# Patient Record
Sex: FEMALE | Race: WHITE | HISPANIC OR LATINO | Employment: FULL TIME | URBAN - METROPOLITAN AREA
[De-identification: names, ages, dates, MRNs, and addresses within clinical notes are randomized per-mention and may not be internally consistent; named-entity substitution may affect disease eponyms.]

---

## 2017-01-17 ENCOUNTER — TELEPHONE (OUTPATIENT)
Dept: INTERNAL MEDICINE | Facility: MEDICAL CENTER | Age: 22
End: 2017-01-17

## 2017-01-17 RX ORDER — PROPRANOLOL HYDROCHLORIDE 40 MG/1
40 TABLET ORAL 3 TIMES DAILY
Qty: 90 TAB | Refills: 5 | Status: ON HOLD | OUTPATIENT
Start: 2017-01-17 | End: 2017-03-23

## 2017-01-17 NOTE — TELEPHONE ENCOUNTER
Last seen: 12/5/16 by Dr. Flores  Next appt: None    Was the patient seen in the last year in this department? Yes   Does patient have an active prescription for medications requested? No   Received Request Via: Pharmacy

## 2017-01-17 NOTE — TELEPHONE ENCOUNTER
----- Message from Loree Tobin sent at 1/17/2017 10:50 AM PST -----  Regarding: RX REFILL/SEES DR [facility]  Contact: [phone]  Pt states she was on an rx for her heart,Inderal. Pt was directed to take 3x/day and has no refills. Pt states she needs a refill on this rx.

## 2017-02-04 ENCOUNTER — HOSPITAL ENCOUNTER (EMERGENCY)
Facility: MEDICAL CENTER | Age: 22
End: 2017-02-04
Payer: MEDICAID

## 2017-02-04 VITALS
HEART RATE: 106 BPM | HEIGHT: 62 IN | BODY MASS INDEX: 26.37 KG/M2 | RESPIRATION RATE: 16 BRPM | SYSTOLIC BLOOD PRESSURE: 142 MMHG | DIASTOLIC BLOOD PRESSURE: 67 MMHG | WEIGHT: 143.3 LBS | TEMPERATURE: 99 F | OXYGEN SATURATION: 98 %

## 2017-02-04 PROCEDURE — 302449 STATCHG TRIAGE ONLY (STATISTIC)

## 2017-02-04 ASSESSMENT — PAIN SCALES - GENERAL: PAINLEVEL_OUTOF10: 8

## 2017-02-08 ENCOUNTER — TELEPHONE (OUTPATIENT)
Dept: INTERNAL MEDICINE | Facility: MEDICAL CENTER | Age: 22
End: 2017-02-08

## 2017-02-08 NOTE — TELEPHONE ENCOUNTER
1. Caller Name: Ivis Klein                      Call Back Number: [phone] (home)     2. Message: Patient has called to hear back about her labs that she got done on 02/03/17. I scanned the results in the chart it will be in the media tab. I routed the results over to Dr. Flores.    3. Patient approves office to leave a detailed voicemail/MyChart message: N\A

## 2017-02-09 NOTE — TELEPHONE ENCOUNTER
Attempted to call patient regarding lab results but no answer.   The patients thyroid panel shows: TSH: 0.01, FT4: 1.5 and T3: 236. This suggests that her thyroid is still hyperactive.  Based on chart review, it appears that the patient's thyroid medications are being adjusted by Dr. Trivedi.   Would advise the patient to call Dr. Trivedi for further adjustments in medications.

## 2017-02-16 ENCOUNTER — OFFICE VISIT (OUTPATIENT)
Dept: INTERNAL MEDICINE | Facility: MEDICAL CENTER | Age: 22
End: 2017-02-16
Payer: MEDICAID

## 2017-02-16 VITALS
OXYGEN SATURATION: 96 % | HEART RATE: 113 BPM | BODY MASS INDEX: 25.45 KG/M2 | HEIGHT: 63 IN | SYSTOLIC BLOOD PRESSURE: 90 MMHG | DIASTOLIC BLOOD PRESSURE: 60 MMHG | WEIGHT: 143.6 LBS | TEMPERATURE: 99.1 F

## 2017-02-16 DIAGNOSIS — F39 MOOD DISORDER (HCC): ICD-10-CM

## 2017-02-16 DIAGNOSIS — E05.00 GRAVES DISEASE: ICD-10-CM

## 2017-02-16 DIAGNOSIS — E05.90 HYPERTHYROIDISM: ICD-10-CM

## 2017-02-16 DIAGNOSIS — Z86.79 HISTORY OF PERICARDITIS: ICD-10-CM

## 2017-02-16 DIAGNOSIS — Z00.00 HEALTH CARE MAINTENANCE: ICD-10-CM

## 2017-02-16 DIAGNOSIS — F41.9 ANXIETY: ICD-10-CM

## 2017-02-16 DIAGNOSIS — I31.9 PERICARDITIS, UNSPECIFIED CHRONICITY, UNSPECIFIED TYPE: ICD-10-CM

## 2017-02-16 DIAGNOSIS — R71.8 MICROCYTOSIS: ICD-10-CM

## 2017-02-16 DIAGNOSIS — N30.00 ACUTE CYSTITIS WITHOUT HEMATURIA: ICD-10-CM

## 2017-02-16 PROBLEM — N39.0 URINARY TRACT INFECTION WITHOUT HEMATURIA: Status: ACTIVE | Noted: 2017-02-16

## 2017-02-16 PROCEDURE — 99214 OFFICE O/P EST MOD 30 MIN: CPT | Mod: GC | Performed by: INTERNAL MEDICINE

## 2017-02-16 RX ORDER — INDOMETHACIN 25 MG/1
25 CAPSULE ORAL 3 TIMES DAILY
Qty: 42 CAP | Refills: 0 | Status: SHIPPED | OUTPATIENT
Start: 2017-02-16 | End: 2017-03-16

## 2017-02-16 RX ORDER — PAROXETINE HYDROCHLORIDE 20 MG/1
20 TABLET, FILM COATED ORAL DAILY
Qty: 30 TAB | Refills: 0 | Status: SHIPPED | OUTPATIENT
Start: 2017-02-16 | End: 2017-03-16

## 2017-02-16 RX ORDER — OMEPRAZOLE 20 MG/1
20 CAPSULE, DELAYED RELEASE ORAL DAILY
Qty: 30 CAP | Refills: 0 | Status: SHIPPED | OUTPATIENT
Start: 2017-02-16 | End: 2017-03-16

## 2017-02-16 RX ORDER — NITROFURANTOIN 25; 75 MG/1; MG/1
100 CAPSULE ORAL 2 TIMES DAILY
Qty: 6 CAP | Refills: 0 | Status: SHIPPED | OUTPATIENT
Start: 2017-02-16 | End: 2017-03-16

## 2017-02-16 RX ORDER — PROPYLTHIOURACIL 50 MG/1
100 TABLET ORAL 2 TIMES DAILY
Qty: 60 TAB | Refills: 0 | Status: SHIPPED | OUTPATIENT
Start: 2017-02-16 | End: 2017-03-22

## 2017-02-16 NOTE — MR AVS SNAPSHOT
"        Ivis Klein   2017 3:30 PM   Office Visit   MRN: [id number]    Department:  [facility] Med - Internal Med   Dept Phone:  [phone]    Description:  Female : 1995   Provider:  Kia Flores M.D.           Reason for Visit     Chest Pain x2 weeks    Anxiety starts having headaches with anxiety attacks    Depression depressed feeling    Medication Management new prescription for sumatriptan      Allergies as of 2017     Allergen Noted Reactions    Nkda [No Known Drug Allergy] 2009         You were diagnosed with     Graves disease   [537881]       Health care maintenance   [965776]       History of pericarditis   [498808]       Hyperthyroidism   [988336]       Mood disorder (CMS-[facility])   [348630]       Anxiety   [746454]       Acute cystitis without hematuria   [092849]       Microcytosis   [262804]       Pericarditis, unspecified chronicity, unspecified type   [1618605]         Vital Signs     Blood Pressure Pulse Temperature Height Weight Body Mass Index    90/60 mmHg 113 37.3 °C (99.1 °F) 1.588 m (5' 2.5\") 65.137 kg (143 lb 9.6 oz) 25.83 kg/m2    Oxygen Saturation Smoking Status                96% Former Smoker          Basic Information     Date Of Birth Sex Race Ethnicity Preferred Language    1995 Female White  Origin (Kyrgyz,Iraqi,Montserratian,Jay, etc) English      Your appointments     Mar 15, 2017  1:45 PM   Established Patient with Joao Lee M.D.   [facility] Med - Internal Medicine (--)    [address] [place] [id number] [phone]           You will be receiving a confirmation call a few days before your appointment from our automated call confirmation system.              Problem List              ICD-10-CM Priority Class Noted - Resolved    Migraine with aura, not intractable (Chronic) G43.109   2010 - Present    Not immune to rubella Z78.9   2013 - Present    Back pain M54.9   2016 - Present   " Vomiting R11.10   6/29/2016 - Present    Thyroid storm E05.91   6/29/2016 - Present    Health care maintenance Z00.00   12/5/2016 - Present    Hyperthyroidism E05.90   12/5/2016 - Present    Graves disease E05.00   12/5/2016 - Present    Need for influenza vaccination Z23   12/5/2016 - Present    History of pericarditis Z86.79   12/5/2016 - Present    Mood disorder (CMS-HCC) F39   12/5/2016 - Present    Anxiety F41.9   2/16/2017 - Present    Urinary tract infection without hematuria N39.0   2/16/2017 - Present    Microcytosis R71.8   2/16/2017 - Present    Pericarditis I31.9   2/16/2017 - Present      Health Maintenance        Date Due Completion Dates    IMM VARICELLA (CHICKENPOX) VACCINE (1 of 2 - 2 Dose Adolescent Series) 9/30/2010 ---    IMM HPV VACCINE (3 of 3 - Female 3 Dose Series) 3/30/2011 11/30/2010, 11/29/2010, 9/28/2010    IMM MENINGOCOCCAL VACCINE (MCV4) (1 of 1) 9/30/2013 ---    IMM DTaP/Tdap/Td Vaccine (8 - Td) 9/9/2023 9/9/2013, 8/25/2008, 10/2/2001, 12/30/1998, 4/1/1998, 2/2/1998, 1995            Current Immunizations     Dtap Vaccine 10/2/2001, 12/30/1998, 4/1/1998, 2/2/1998, 1997    HIB Vaccine (ACTHIB/HIBERIX) 12/30/1998, 4/1/1998, 2/2/1998, 1997    HPV Quadrivalent Vaccine (GARDASIL) 11/30/2010, 11/29/2010, 9/28/2010    Hepatitis A Vaccine, Ped/Adol 12/9/2002, 5/30/2002    Hepatitis B Vaccine Non-Recombivax (Ped/Adol) 4/1/1998, 2/2/1998, 1997    INFLUENZA VACCINE H1N1 11/14/2009    IPV 10/2/2001, 2/2/1998, 1997    Influenza Vaccine Quad Inj (Pf) 12/5/2016    MMR Vaccine 9/9/2013  4:45 AM, 10/2/2001, 12/30/1998    MMR/Varicella Combined Vaccine  Incomplete    OPV - Historical Data 12/30/1998    Pneumococcal polysaccharide vaccine (PPSV-23) 7/2/2016  2:42 PM    Tdap Vaccine  Incomplete, 9/9/2013  2:45 AM, 8/25/2008      Below and/or attached are the medications your provider expects you to take. Review all of your home medications and newly ordered medications with  your provider and/or pharmacist. Follow medication instructions as directed by your provider and/or pharmacist. Please keep your medication list with you and share with your provider. Update the information when medications are discontinued, doses are changed, or new medications (including over-the-counter products) are added; and carry medication information at all times in the event of emergency situations     Allergies:  NKDA - (reactions not documented)               Medications  Valid as of: February 16, 2017 -  4:38 PM    Generic Name Brand Name Tablet Size Instructions for use    Indomethacin (Cap) INDOCIN 25 MG Take 1 Cap by mouth 3 times a day.        Nitrofurantoin Monohyd Macro (Cap) MACROBID 100 MG Take 1 Cap by mouth 2 times a day.        Omeprazole (CAPSULE DELAYED RELEASE) PRILOSEC 20 MG Take 1 Cap by mouth every day.        Ondansetron (TABLET DISPERSIBLE) ZOFRAN ODT 4 MG Take 1 Tab by mouth every 8 hours as needed for Nausea/Vomiting.        PARoxetine HCl (Tab) PAXIL 20 MG Take 1 Tab by mouth every day.        Propranolol HCl (Tab) INDERAL 40 MG Take 1 Tab by mouth 3 times a day.        Propylthiouracil (Tab) PTU 50 MG Take 2 Tabs by mouth 2 Times a Day.        SUMAtriptan Succinate (Tab) IMITREX 25 MG Take 1-4 Tabs by mouth Once PRN for Migraine for up to 1 dose.        .                 Medicines prescribed today were sent to:     [facility] [address] [place] [address] (S), [place] - [address] [id number] [facility] (S) [place] [address]    Phone: [phone] Fax: [phone]    Open 24 Hours?: No      Medication refill instructions:       If your prescription bottle indicates you have medication refills left, it is not necessary to call your provider’s office. Please contact your pharmacy and they will refill your medication.    If your prescription bottle indicates you do not have any refills left, you may request refills at any time through one of the following ways: The online Ac[employer]ia system (except  Urgent Care), by calling your provider’s office, or by asking your pharmacy to contact your provider’s office with a refill request. Medication refills are processed only during regular business hours and may not be available until the next business day. Your provider may request additional information or to have a follow-up visit with you prior to refilling your medication.   *Please Note: Medication refills are assigned a new Rx number when refilled electronically. Your pharmacy may indicate that no refills were authorized even though a new prescription for the same medication is available at the pharmacy. Please request the medicine by name with the pharmacy before contacting your provider for a refill.        Your To Do List     Future Labs/Procedures Complete By Expires    DEJON ANTIBODY WITH REFLEX  As directed 2/16/2018    CBC WITH DIFFERENTIAL  As directed 2/16/2018    FERRITIN  As directed 2/16/2018    IRON/TOTAL IRON BIND  As directed 2/16/2018      Instructions    F/u in 4 Weeks..Please schedule on Wednesday with Resident and Dr. Gongora       Other Notes About Your Plan     Boy! Dennis Beatty Status: Patient Declined

## 2017-02-17 NOTE — PROGRESS NOTES
Established Patient    Ivis presents today with the following:    CC: Urinary tract infection, ongoing anxiety/depression/follow-up on labs    HPI:      ID: 21-year-old female with past medical history of recently diagnosis of Graves' disease, mood symptoms, untreated pericarditis anxiety/depression, history of poor compliance and thyroid storm came in for above-mentioned reasons.    Graves' disease  Hyperthyroidism  Diagnosied with hyperthyroidism due to thyroid storm in July 2016  Initially she was on methimazole and propranolol  --has been noncompliant multiple ER visits  Then she was Switchd to propylthiouracil and propranolol--  And had Poor compliance due to GI upsets  Ultrasound of the thyroid gland was suggestive of thyroiditis,  Discussed case with endocrinologist Dr. Wong--who suggested it is Graves' disease, given the continuity of symptoms, lack of preceding viral prodromes, [place] of painful enlargement of thyroid before onset of clinical symptoms, onset 3 years after the pregnancy.   Positive for thyroid-stimulating immunoglobulin 96, free T3 was 357, free T4 was3.97  Has been evaluated by thyroid surgeon and plan for subtotal thyroidectomy after making the euthyroid with propylthiouracil and propranolol  trending free T4/T3/TSH with increasing doses of PTU, monitoring CBC  Last seen by surgeon in January  Currently on propylthiouracil 50 mg 3 times a day, which she was supposed to be 100 mg twice daily per surgeon but patient is not taking the supposed dose because she was not told per patient  Persistent s/s of hyperthyroidism last 9 months, however symptoms are better compared to prior visit  She still endorses insomnia, occasional sweating palpitation and anxiety/depression, thyroid gland palpable on examination, nonpainful           Pericarditis, unspecified chronicity, unspecified type   History of pericarditis  Complains of sharp/acute chest pain on bending forward or lying back  No  exertional dyspnea, orthopnea, PND  EKG normal suggestive of pericarditis, has not been taking any ibuprofen lately    no pericardial rub on examination      Mood disorder (CMS-HCC)  Anxiety  no suicidal or homicidal ideation  Intermittent anxiety versus depressed mood  PHQ -9 score is 10     Acute cystitis without hematuria  Complains of increased frequency, dysuria  Complains of foul-smelling urine sometimes  Sexually active  Suprapubic discomfort       Microcytosis  Low MCV on CBC  No history of menorrhagia or bleeding  No family history of thalassemia or other problems,         Migraine with aura and without status migrainosus, not intractable  Relatively stable  Takes Excedrin once or twice a week as needed       Health care maintenance  Up to date with vaccinations including TDAP, hepatitis A, hepatitis B, HPV, MMR, Pneumovax 23,    Patient Active Problem List    Diagnosis Date Noted   • Anxiety 02/16/2017   • Urinary tract infection without hematuria 02/16/2017   • Microcytosis 02/16/2017   • Pericarditis 02/16/2017   • Health care maintenance 12/05/2016   • Hyperthyroidism 12/05/2016   • Graves disease 12/05/2016   • Need for influenza vaccination 12/05/2016   • History of pericarditis 12/05/2016   • Mood disorder (CMS-[facility]) 12/05/2016   • Back pain 06/29/2016   • Vomiting 06/29/2016   • Thyroid storm 06/29/2016   • Not immune to rubella 05/23/2013   • Migraine with aura, not intractable 09/28/2010       Current Outpatient Prescriptions   Medication Sig Dispense Refill   • indomethacin (INDOCIN) 25 MG Cap Take 1 Cap by mouth 3 times a day. 42 Cap 0   • paroxetine (PAXIL) 20 MG Tab Take 1 Tab by mouth every day. 30 Tab 0   • propylthiouracil (PTU) 50 MG Tab Take 2 Tabs by mouth 2 Times a Day. 60 Tab 0   • nitrofurantoin monohydr macro (MACROBID) 100 MG Cap Take 1 Cap by mouth 2 times a day. 6 Cap 0   • omeprazole (PRILOSEC) 20 MG delayed-release capsule Take 1 Cap by mouth every day. 30 Cap 0   • propranolol  "(INDERAL) 40 MG Tab Take 1 Tab by mouth 3 times a day. 90 Tab 5   • SUMAtriptan (IMITREX) 25 MG Tab tablet Take 1-4 Tabs by mouth Once PRN for Migraine for up to 1 dose. 10 Tab 1   • ondansetron (ZOFRAN ODT) 4 MG TABLET DISPERSIBLE Take 1 Tab by mouth every 8 hours as needed for Nausea/Vomiting. 20 Tab 1     No current facility-administered medications for this visit.       Social History     Social History   • Marital Status: Single     Spouse Name: N/A   • Number of Children: N/A   • Years of Education: N/A     Occupational History   • Not on file.     Social History Main Topics   • Smoking status: Former Smoker   • Smokeless tobacco: Never Used      Comment: quit in 2012   • Alcohol Use: Yes      Comment: occ   • Drug Use: Yes     Special: Marijuana   • Sexual Activity:     Partners: Male     Birth Control/ Protection: Abstinence      Comment: single     Other Topics Concern   • Not on file     Social History Narrative       Family History   Problem Relation Age of Onset   • Cancer Paternal Grandmother 56     breast cancer       ROS: As per HPI. Additional pertinent symptoms as noted below.    GI: Denies nausea vomiting, black stools  Respiratory: Denies cough fever, shortness of breath  Cardiovascular: Denies exertional dyspnea, orthopnea, PND  Genitourinary: see hpi  Musculoskeletal: Denies arthritis, swelling, back pain  Neuro: Denies numbness tingling, focal deficits, headaches  Skin: Denies any rash  Psychiatric: Denies suicidal or homicidal ideation, hallucination, however positive for intermittent anxiety/mood symptoms  OB/GYN: Denies dyspareunia, menorrhagia    Endocrine: Denies history of diabetes          BP 90/60 mmHg  Pulse 113  Temp(Src) 37.3 °C (99.1 °F)  Ht 1.588 m (5' 2.5\")  Wt 65.137 kg (143 lb 9.6 oz)  BMI 25.83 kg/m2  SpO2 96%    Physical Exam  General:  Alert and oriented, No apparent distress.    Eyes: Pupils equal and reactive. No scleral icterus.--and no exophthalmos     Throat: Clear " no erythema or exudates noted.    Neck: Supple. No lymphadenopathy noted. Thyroid gland mildly enlarged--- nontender, no isolated solitary nodule--no thyroid bruit    Lungs: Clear to auscultation and percussion bilaterally.    Cardiovascular: Regular rate and rhythm. No murmurs, rubs or gallops.    Abdomen:  Benign. No rebound or guarding noted.    Extremities: No clubbing, cyanosis, edema.    Skin: Clear. No rash or suspicious skin lesions noted.      Assessment and Plan     Graves' disease  Hyperthyroidism  Diagnosied with hyperthyroidism due to thyroid storm in July 2016  Initially she was on methimazole and propranolol  --has been noncompliant multiple ER visits  Then she was Switchd to propylthiouracil and propranolol--  And had Poor compliance due to GI upsets  Ultrasound of the thyroid gland was suggestive of thyroiditis,  Discussed case with endocrinologist Dr. Wong--who suggested it is Graves' disease, given the continuity of symptoms, lack of preceding viral prodromes, [place] of painful enlargement of thyroid before onset of clinical symptoms, onset 3 years after the pregnancy.   Positive for thyroid-stimulating immunoglobulin 296, free T3 was 357, free T4 was3.97, TSH 0.01  Has been evaluated by thyroid surgeon and plan for subtotal thyroidectomy after making her euthyroid with propylthiouracil   trending free T4/T3/TSH with increasing doses of PTU, monitoring CBC  Last seen by surgeon in January  Currently on propylthiouracil 50 mg 3 times a day, which she was supposed to be 100 mg twice daily per surgeon but patient is not taking the supposed dose because she was not told per patient  Persistent s/s of hyperthyroidism last 9 months, however symptoms are better compared to prior visit  She still endorses insomnia, occasional sweating palpitation and anxiety/depression, thyroid gland palpable on examination, nonpainful  Plan  Continue propylthiouracil 100 mg twice daily, propranolol 40 mg 3 times a  day  Follow-up with surgeon  Repeat T3/T4/TSH and CBC, ordered and followed by surgeon  Follow-up in 4 weeks  Repeat labs before appointment     Pericarditis, unspecified chronicity, unspecified type   History of pericarditis  Complains of sharp/acute chest pain on bending forward or lying back  No exertional dyspnea, orthopnea, PND  EKG suggestive of pericarditis, has not been taking any ibuprofen lately    no pericardial rub on examination  Pericarditis is likely related to Graves' disease  Plan  Indomethacin 25 mg 3 times a day for 2 weeks  Omeprazole as needed  Hold off on to colchicine  Repet EKG on next visit    Mood disorder (CMS-HCC)  Anxiety  no suicidal or homicidal ideation  Intermittent anxiety versus depressed mood  PHQ -9 score is 10  Plan  Start on paroxetine 20 mg daily for 6 weeks  Follow-up in 3-4 weeks      Acute cystitis without hematuria  Complains of increased frequency, dysuria, foul-smelling urine sometimes  Sexually active, Suprapubic discomfor  Plan  Macrobid 100 mg twice daily for 3 days     Microcytosis  Low MCV on CBC  No history of menorrhagia or bleeding  No family history of thalassemia or other problems,  Plan  Will get iron/TIBC/ferritin  If within normal limits we'll get hemoglobin electrophoresis to rule out thalassemia trait    Migraine with aura and without status migrainosus, not intractable  Relatively stable  Takes Excedrin once or twice a week as needed  Plan  Continue excederin  Continue Indomethacin    Health care maintenance  Up to date with vaccinations including TDAP, hepatitis A, hepatitis B, HPV, MMR, Pneumovax 23,    Followup: Return in about 4 weeks (around 3/16/2017).      Signed by: Kia Flores M.D.

## 2017-03-16 ENCOUNTER — HOSPITAL ENCOUNTER (OUTPATIENT)
Dept: RADIOLOGY | Facility: MEDICAL CENTER | Age: 22
End: 2017-03-16
Attending: SURGERY | Admitting: SURGERY
Payer: MEDICAID

## 2017-03-16 DIAGNOSIS — Z01.812 PRE-OPERATIVE LABORATORY EXAMINATION: ICD-10-CM

## 2017-03-16 DIAGNOSIS — Z01.810 PRE-OPERATIVE CARDIOVASCULAR EXAMINATION: ICD-10-CM

## 2017-03-16 DIAGNOSIS — Z01.811 PRE-OPERATIVE RESPIRATORY EXAMINATION: ICD-10-CM

## 2017-03-16 LAB
ALBUMIN SERPL BCP-MCNC: 4.9 G/DL (ref 3.2–4.9)
ALBUMIN/GLOB SERPL: 1.4 G/DL
ALP SERPL-CCNC: 149 U/L (ref 30–99)
ALT SERPL-CCNC: 18 U/L (ref 2–50)
ANION GAP SERPL CALC-SCNC: 10 MMOL/L (ref 0–11.9)
AST SERPL-CCNC: 19 U/L (ref 12–45)
BASOPHILS # BLD AUTO: 0.4 % (ref 0–1.8)
BASOPHILS # BLD: 0.03 K/UL (ref 0–0.12)
BILIRUB SERPL-MCNC: 0.8 MG/DL (ref 0.1–1.5)
BUN SERPL-MCNC: 10 MG/DL (ref 8–22)
CALCIUM SERPL-MCNC: 10.2 MG/DL (ref 8.5–10.5)
CHLORIDE SERPL-SCNC: 99 MMOL/L (ref 96–112)
CO2 SERPL-SCNC: 25 MMOL/L (ref 20–33)
CREAT SERPL-MCNC: 0.57 MG/DL (ref 0.5–1.4)
EKG IMPRESSION: NORMAL
EOSINOPHIL # BLD AUTO: 0.01 K/UL (ref 0–0.51)
EOSINOPHIL NFR BLD: 0.1 % (ref 0–6.9)
ERYTHROCYTE [DISTWIDTH] IN BLOOD BY AUTOMATED COUNT: 40.5 FL (ref 35.9–50)
GFR SERPL CREATININE-BSD FRML MDRD: >60 ML/MIN/1.73 M 2
GLOBULIN SER CALC-MCNC: 3.4 G/DL (ref 1.9–3.5)
GLUCOSE SERPL-MCNC: 92 MG/DL (ref 65–99)
HCT VFR BLD AUTO: 42.5 % (ref 37–47)
HGB BLD-MCNC: 13.4 G/DL (ref 12–16)
IMM GRANULOCYTES # BLD AUTO: 0.01 K/UL (ref 0–0.11)
IMM GRANULOCYTES NFR BLD AUTO: 0.1 % (ref 0–0.9)
LYMPHOCYTES # BLD AUTO: 1.81 K/UL (ref 1–4.8)
LYMPHOCYTES NFR BLD: 26 % (ref 22–41)
MCH RBC QN AUTO: 24.3 PG (ref 27–33)
MCHC RBC AUTO-ENTMCNC: 31.5 G/DL (ref 33.6–35)
MCV RBC AUTO: 77.1 FL (ref 81.4–97.8)
MONOCYTES # BLD AUTO: 0.45 K/UL (ref 0–0.85)
MONOCYTES NFR BLD AUTO: 6.5 % (ref 0–13.4)
NEUTROPHILS # BLD AUTO: 4.66 K/UL (ref 2–7.15)
NEUTROPHILS NFR BLD: 66.9 % (ref 44–72)
NRBC # BLD AUTO: 0 K/UL
NRBC BLD AUTO-RTO: 0 /100 WBC
PLATELET # BLD AUTO: 308 K/UL (ref 164–446)
PMV BLD AUTO: 11.4 FL (ref 9–12.9)
POTASSIUM SERPL-SCNC: 3.1 MMOL/L (ref 3.6–5.5)
PROT SERPL-MCNC: 8.3 G/DL (ref 6–8.2)
RBC # BLD AUTO: 5.51 M/UL (ref 4.2–5.4)
SODIUM SERPL-SCNC: 134 MMOL/L (ref 135–145)
WBC # BLD AUTO: 7 K/UL (ref 4.8–10.8)

## 2017-03-16 PROCEDURE — 84443 ASSAY THYROID STIM HORMONE: CPT

## 2017-03-16 PROCEDURE — 80053 COMPREHEN METABOLIC PANEL: CPT

## 2017-03-16 PROCEDURE — 85025 COMPLETE CBC W/AUTO DIFF WBC: CPT

## 2017-03-16 PROCEDURE — 84481 FREE ASSAY (FT-3): CPT

## 2017-03-16 PROCEDURE — 84439 ASSAY OF FREE THYROXINE: CPT

## 2017-03-16 PROCEDURE — 36415 COLL VENOUS BLD VENIPUNCTURE: CPT

## 2017-03-16 PROCEDURE — 84703 CHORIONIC GONADOTROPIN ASSAY: CPT

## 2017-03-16 PROCEDURE — 86800 THYROGLOBULIN ANTIBODY: CPT

## 2017-03-16 PROCEDURE — 84432 ASSAY OF THYROGLOBULIN: CPT

## 2017-03-16 PROCEDURE — 71010 DX-CHEST-LIMITED (1 VIEW): CPT

## 2017-03-16 RX ORDER — POTASSIUM IODIDE 1 G/ML
1 SOLUTION ORAL 3 TIMES DAILY
COMMUNITY
End: 2017-03-22

## 2017-03-17 LAB
HCG SERPL QL: NEGATIVE
T3FREE SERPL-MCNC: 5.37 PG/ML (ref 2.4–4.2)
T4 FREE SERPL-MCNC: 1.69 NG/DL (ref 0.53–1.43)
TSH SERPL DL<=0.005 MIU/L-ACNC: <0.015 UIU/ML (ref 0.3–3.7)

## 2017-03-22 ENCOUNTER — HOSPITAL ENCOUNTER (OUTPATIENT)
Facility: MEDICAL CENTER | Age: 22
End: 2017-03-23
Attending: SURGERY | Admitting: SURGERY
Payer: MEDICAID

## 2017-03-22 PROBLEM — E05.00 GRAVES' DISEASE: Status: ACTIVE | Noted: 2017-03-22

## 2017-03-22 LAB
ALBUMIN SERPL BCP-MCNC: 4 G/DL (ref 3.2–4.9)
HCG SERPL QL: NEGATIVE
THYROGLOB AB SERPL-ACNC: 17.1 IU/ML (ref 0–4)
THYROGLOB SERPL-MCNC: 3.6 NG/ML (ref 1.3–31.8)
THYROGLOB SERPL-MCNC: ABNORMAL NG/ML (ref 1.3–31.8)

## 2017-03-22 PROCEDURE — 700102 HCHG RX REV CODE 250 W/ 637 OVERRIDE(OP): Performed by: SURGERY

## 2017-03-22 PROCEDURE — 502627 HCHG HEMOSTAT, SURGICEL 4X4: Performed by: SURGERY

## 2017-03-22 PROCEDURE — 502594 HCHG SCISSOR HANDLE, HARMONIC ACE: Performed by: SURGERY

## 2017-03-22 PROCEDURE — A4606 OXYGEN PROBE USED W OXIMETER: HCPCS | Performed by: SURGERY

## 2017-03-22 PROCEDURE — 160042 HCHG SURGERY MINUTES - EA ADDL 1 MIN LEVEL 5: Performed by: SURGERY

## 2017-03-22 PROCEDURE — 160048 HCHG OR STATISTICAL LEVEL 1-5: Performed by: SURGERY

## 2017-03-22 PROCEDURE — A9270 NON-COVERED ITEM OR SERVICE: HCPCS

## 2017-03-22 PROCEDURE — 84703 CHORIONIC GONADOTROPIN ASSAY: CPT

## 2017-03-22 PROCEDURE — 700102 HCHG RX REV CODE 250 W/ 637 OVERRIDE(OP)

## 2017-03-22 PROCEDURE — 160009 HCHG ANES TIME/MIN: Performed by: SURGERY

## 2017-03-22 PROCEDURE — 700111 HCHG RX REV CODE 636 W/ 250 OVERRIDE (IP)

## 2017-03-22 PROCEDURE — 82040 ASSAY OF SERUM ALBUMIN: CPT

## 2017-03-22 PROCEDURE — 88307 TISSUE EXAM BY PATHOLOGIST: CPT

## 2017-03-22 PROCEDURE — 502590 HCHG PROBE, PRASS STANDARD: Performed by: SURGERY

## 2017-03-22 PROCEDURE — 501838 HCHG SUTURE GENERAL: Performed by: SURGERY

## 2017-03-22 PROCEDURE — G0378 HOSPITAL OBSERVATION PER HR: HCPCS

## 2017-03-22 PROCEDURE — 500888 HCHG PACK, MINOR BASIN: Performed by: SURGERY

## 2017-03-22 PROCEDURE — 160031 HCHG SURGERY MINUTES - 1ST 30 MINS LEVEL 5: Performed by: SURGERY

## 2017-03-22 PROCEDURE — A9270 NON-COVERED ITEM OR SERVICE: HCPCS | Performed by: SURGERY

## 2017-03-22 PROCEDURE — 160036 HCHG PACU - EA ADDL 30 MINS PHASE I: Performed by: SURGERY

## 2017-03-22 PROCEDURE — 110382 HCHG SHELL REV 271: Performed by: SURGERY

## 2017-03-22 PROCEDURE — 160035 HCHG PACU - 1ST 60 MINS PHASE I: Performed by: SURGERY

## 2017-03-22 PROCEDURE — A6402 STERILE GAUZE <= 16 SQ IN: HCPCS | Performed by: SURGERY

## 2017-03-22 PROCEDURE — 700101 HCHG RX REV CODE 250: Performed by: SURGERY

## 2017-03-22 PROCEDURE — 502240 HCHG MISC OR SUPPLY RC 0272: Performed by: SURGERY

## 2017-03-22 PROCEDURE — 110371 HCHG SHELL REV 272: Performed by: SURGERY

## 2017-03-22 PROCEDURE — 88305 TISSUE EXAM BY PATHOLOGIST: CPT

## 2017-03-22 PROCEDURE — 36415 COLL VENOUS BLD VENIPUNCTURE: CPT

## 2017-03-22 PROCEDURE — 500126 HCHG BOVIE, NEEDLE TIP: Performed by: SURGERY

## 2017-03-22 PROCEDURE — 160002 HCHG RECOVERY MINUTES (STAT): Performed by: SURGERY

## 2017-03-22 RX ORDER — PROPRANOLOL HYDROCHLORIDE 20 MG/1
40 TABLET ORAL TWICE DAILY
Status: DISCONTINUED | OUTPATIENT
Start: 2017-03-22 | End: 2017-03-23 | Stop reason: HOSPADM

## 2017-03-22 RX ORDER — ACETAMINOPHEN 500 MG
1000 TABLET ORAL EVERY 6 HOURS
Status: DISCONTINUED | OUTPATIENT
Start: 2017-03-22 | End: 2017-03-23 | Stop reason: HOSPADM

## 2017-03-22 RX ORDER — DIPHENHYDRAMINE HYDROCHLORIDE 50 MG/ML
25-50 INJECTION INTRAMUSCULAR; INTRAVENOUS EVERY 6 HOURS PRN
Status: DISCONTINUED | OUTPATIENT
Start: 2017-03-22 | End: 2017-03-23 | Stop reason: HOSPADM

## 2017-03-22 RX ORDER — ONDANSETRON 2 MG/ML
4 INJECTION INTRAMUSCULAR; INTRAVENOUS EVERY 6 HOURS PRN
Status: DISCONTINUED | OUTPATIENT
Start: 2017-03-22 | End: 2017-03-23 | Stop reason: HOSPADM

## 2017-03-22 RX ORDER — GABAPENTIN 300 MG/1
CAPSULE ORAL
Status: COMPLETED
Start: 2017-03-22 | End: 2017-03-22

## 2017-03-22 RX ORDER — POTASSIUM IODIDE 1 G/ML
1 SOLUTION ORAL 3 TIMES DAILY
Status: ON HOLD | COMMUNITY
End: 2017-03-23

## 2017-03-22 RX ORDER — LEVOTHYROXINE SODIUM 112 UG/1
112 TABLET ORAL
Status: DISCONTINUED | OUTPATIENT
Start: 2017-03-23 | End: 2017-03-23 | Stop reason: HOSPADM

## 2017-03-22 RX ORDER — CELECOXIB 200 MG/1
200 CAPSULE ORAL 2 TIMES DAILY WITH MEALS
Status: DISCONTINUED | OUTPATIENT
Start: 2017-03-22 | End: 2017-03-23 | Stop reason: HOSPADM

## 2017-03-22 RX ORDER — SODIUM CHLORIDE, SODIUM LACTATE, POTASSIUM CHLORIDE, CALCIUM CHLORIDE 600; 310; 30; 20 MG/100ML; MG/100ML; MG/100ML; MG/100ML
1000 INJECTION, SOLUTION INTRAVENOUS
Status: COMPLETED | OUTPATIENT
Start: 2017-03-22 | End: 2017-03-22

## 2017-03-22 RX ORDER — DEXTROSE MONOHYDRATE, SODIUM CHLORIDE, AND POTASSIUM CHLORIDE 50; 1.49; 4.5 G/1000ML; G/1000ML; G/1000ML
INJECTION, SOLUTION INTRAVENOUS CONTINUOUS
Status: DISCONTINUED | OUTPATIENT
Start: 2017-03-22 | End: 2017-03-23

## 2017-03-22 RX ORDER — OXYCODONE HCL 5 MG/5 ML
SOLUTION, ORAL ORAL
Status: COMPLETED
Start: 2017-03-22 | End: 2017-03-22

## 2017-03-22 RX ORDER — HALOPERIDOL 5 MG/ML
INJECTION INTRAMUSCULAR
Status: COMPLETED
Start: 2017-03-22 | End: 2017-03-22

## 2017-03-22 RX ORDER — CELECOXIB 200 MG/1
CAPSULE ORAL
Status: COMPLETED
Start: 2017-03-22 | End: 2017-03-22

## 2017-03-22 RX ORDER — PROPYLTHIOURACIL 50 MG/1
50 TABLET ORAL
Status: ON HOLD | COMMUNITY
End: 2017-03-23

## 2017-03-22 RX ORDER — OXYCODONE HYDROCHLORIDE 10 MG/1
10 TABLET ORAL
Status: DISCONTINUED | OUTPATIENT
Start: 2017-03-22 | End: 2017-03-23 | Stop reason: HOSPADM

## 2017-03-22 RX ORDER — ACETAMINOPHEN 500 MG
TABLET ORAL
Status: COMPLETED
Start: 2017-03-22 | End: 2017-03-22

## 2017-03-22 RX ORDER — MORPHINE SULFATE 4 MG/ML
INJECTION, SOLUTION INTRAMUSCULAR; INTRAVENOUS
Status: COMPLETED
Start: 2017-03-22 | End: 2017-03-22

## 2017-03-22 RX ORDER — OXYCODONE HYDROCHLORIDE 5 MG/1
5 TABLET ORAL
Status: DISCONTINUED | OUTPATIENT
Start: 2017-03-22 | End: 2017-03-23 | Stop reason: HOSPADM

## 2017-03-22 RX ADMIN — ACETAMINOPHEN 1000 MG: 500 TABLET, FILM COATED ORAL at 12:05

## 2017-03-22 RX ADMIN — MORPHINE SULFATE 2 MG: 4 INJECTION INTRAVENOUS at 16:20

## 2017-03-22 RX ADMIN — ACETAMINOPHEN 1000 MG: 500 TABLET, FILM COATED ORAL at 20:16

## 2017-03-22 RX ADMIN — FENTANYL CITRATE 25 MCG: 50 INJECTION, SOLUTION INTRAMUSCULAR; INTRAVENOUS at 15:35

## 2017-03-22 RX ADMIN — POTASSIUM CHLORIDE, DEXTROSE MONOHYDRATE AND SODIUM CHLORIDE: 150; 5; 450 INJECTION, SOLUTION INTRAVENOUS at 19:38

## 2017-03-22 RX ADMIN — PROPRANOLOL HYDROCHLORIDE 40 MG: 20 TABLET ORAL at 20:16

## 2017-03-22 RX ADMIN — MORPHINE SULFATE 2 MG: 4 INJECTION INTRAVENOUS at 17:37

## 2017-03-22 RX ADMIN — HALOPERIDOL LACTATE 1 MG: 5 INJECTION, SOLUTION INTRAMUSCULAR at 18:02

## 2017-03-22 RX ADMIN — CELECOXIB 200 MG: 200 CAPSULE ORAL at 20:16

## 2017-03-22 RX ADMIN — OXYCODONE HYDROCHLORIDE 10 MG: 5 SOLUTION ORAL at 15:35

## 2017-03-22 RX ADMIN — CELECOXIB 400 MG: 200 CAPSULE ORAL at 12:05

## 2017-03-22 RX ADMIN — SODIUM CHLORIDE, SODIUM LACTATE, POTASSIUM CHLORIDE, CALCIUM CHLORIDE 1000 ML: 600; 310; 30; 20 INJECTION, SOLUTION INTRAVENOUS at 11:40

## 2017-03-22 RX ADMIN — FENTANYL CITRATE 50 MCG: 50 INJECTION, SOLUTION INTRAMUSCULAR; INTRAVENOUS at 16:00

## 2017-03-22 RX ADMIN — OXYCODONE HYDROCHLORIDE 5 MG: 5 TABLET ORAL at 22:12

## 2017-03-22 RX ADMIN — GABAPENTIN 600 MG: 300 CAPSULE ORAL at 12:05

## 2017-03-22 RX ADMIN — FENTANYL CITRATE 25 MCG: 50 INJECTION, SOLUTION INTRAMUSCULAR; INTRAVENOUS at 15:45

## 2017-03-22 ASSESSMENT — PAIN SCALES - GENERAL
PAINLEVEL_OUTOF10: 4
PAINLEVEL_OUTOF10: ASSUMED PAIN PRESENT
PAINLEVEL_OUTOF10: 5
PAINLEVEL_OUTOF10: 0
PAINLEVEL_OUTOF10: 4
PAINLEVEL_OUTOF10: 3
PAINLEVEL_OUTOF10: 0
PAINLEVEL_OUTOF10: ASSUMED PAIN PRESENT
PAINLEVEL_OUTOF10: 4
PAINLEVEL_OUTOF10: 5

## 2017-03-22 ASSESSMENT — LIFESTYLE VARIABLES
EVER_SMOKED: NEVER
ALCOHOL_USE: NO

## 2017-03-22 NOTE — IP AVS SNAPSHOT
Showpad Access Code: Activation code not generated  Current Showpad Status: Patient Declined    Your email address is not on file at [facility].  Email Addresses are required for you to sign up for Showpad, please contact [phone] to verify your personal information and to provide your email address prior to attempting to register for Showpad.    Ivis Klein  [address] [place] [address]    LikeWheret  A secure, online tool to manage your health information     [facility]’s Showpad® is a secure, online tool that connects you to your personalized health information from the privacy of your home -- day or night - making it very easy for you to manage your healthcare. Once the activation process is completed, you can even access your medical information using the Showpad kt, which is available for free in the Apple Kt store or Google Play store.     To learn more about Showpad, visit [email or web address]    There are two levels of access available (as shown below):   My Chart Features  [facility] Primary Care Doctor [facility]  Specialists [facility]  Urgent  Care Non-[facility] Primary Care Doctor   Email your healthcare team securely and privately 24/7 X X X    Manage appointments: schedule your next appointment; view details of past/upcoming appointments X      Request prescription refills. X      View recent personal medical records, including lab and immunizations X X X X   View health record, including health history, allergies, medications X X X X   Read reports about your outpatient visits, procedures, consult and ER notes X X X X   See your discharge summary, which is a recap of your hospital and/or ER visit that includes your diagnosis, lab results, and care plan X X  X     How to register for LikeWheret:  Once your e-mail address has been verified, follow the following steps to sign up for LikeWheret.     1. Go to  [email or web address]  2. Click on the Sign Up Now box, which takes you to the  New Member Sign Up page. You will need to provide the following information:  a. Enter your Craigslist Access Code exactly as it appears at the top of this page. (You will not need to use this code after you’ve completed the sign-up process. If you do not sign up before the expiration date, you must request a new code.)   b. Enter your date of birth.   c. Enter your home email address.   d. Click Submit, and follow the next screen’s instructions.  3. Create a Craigslist ID. This will be your Craigslist login ID and cannot be changed, so think of one that is secure and easy to remember.  4. Create a Craigslist password. You can change your password at any time.  5. Enter your Password Reset Question and Answer. This can be used at a later time if you forget your password.   6. Enter your e-mail address. This allows you to receive e-mail notifications when new information is available in Craigslist.  7. Click Sign Up. You can now view your health information.    For assistance activating your Craigslist account, call [phone]

## 2017-03-22 NOTE — IP AVS SNAPSHOT
3/23/2017          Ivis Klein  [address] [place] [address]    Dear Ivis:    [facility] wants to ensure your discharge home is safe and you or your loved ones have had all your questions answered regarding your care after you leave the hospital.    You may receive a telephone call within two days of your discharge.  This call is to make certain you understand your discharge instructions as well as ensure we provided you with the best care possible during your stay with us.     The call will only last approximately 3-5 minutes and will be done by a nurse.    Once again, we want to ensure your discharge home is safe and that you have a clear understanding of any next steps in your care.  If you have any questions or concerns, please do not hesitate to contact us, we are here for you.  Thank you for choosing [facility] for your healthcare needs.    Sincerely,    Rishi Olvera    [facility]

## 2017-03-22 NOTE — IP AVS SNAPSHOT
" Home Care Instructions                                                                                                                  Name:Ivis Klein  Medical Record Number:[id number]  CSN: [id number]    YOB: 1995   Age: 21 y.o.  Sex: female  HT:1.575 m (5' 2\") (18 %, Z = -0.90, Source: [facility] 2-20 Years) WT: 60.7 kg (133 lb 13.1 oz) (61 %, Z = 0.28, Source: [facility] 2-20 Years)          Admit Date: 3/22/2017     Discharge Date:   Today's Date: 3/23/2017  Attending Doctor:  Vicente Eldridge M.D.                  Allergies:  Nkda            Discharge Instructions       Regular diet  May bathe or shower in 1 day  Stop taking PTU and SSKI  Follow prescription to taper off propranolol    Discharge Instructions    Discharged to home by car with relative. Discharged via wheelchair, hospital escort: Yes.  Special equipment needed: Not Applicable    Be sure to schedule a follow-up appointment with your primary care doctor or any specialists as instructed.     Discharge Plan:        I understand that a diet low in cholesterol, fat, and sodium is recommended for good health. Unless I have been given specific instructions below for another diet, I accept this instruction as my diet prescription.   Other diet: As Tolerated    Special Instructions: None    · Is patient discharged on Warfarin / Coumadin?   No     · Is patient Post Blood Transfusion?  No    Depression / Suicide Risk    As you are discharged from this [facility] facility, it is important to learn how to keep safe from harming yourself.    Recognize the warning signs:  · Abrupt changes in personality, positive or negative- including increase in energy   · Giving away possessions  · Change in eating patterns- significant weight changes-  positive or negative  · Change in sleeping patterns- unable to sleep or sleeping all the time   · Unwillingness or inability to communicate  · Depression  · Unusual sadness, discouragement and loneliness  · Talk " of wanting to die  · Neglect of personal appearance   · Rebelliousness- reckless behavior  · Withdrawal from people/activities they love  · Confusion- inability to concentrate     If you or a loved one observes any of these behaviors or has concerns about self-harm, here's what you can do:  · Talk about it- your feelings and reasons for harming yourself  · Remove any means that you might use to hurt yourself (examples: pills, rope, extension cords, firearm)  · Get professional help from the community (Mental Health, Substance Abuse, psychological counseling)  · Do not be alone:Call your Safe Contact- someone whom you trust who will be there for you.  · Call your local CRISIS HOTLINE 881-4028 or 593-063-5963  · Call your local Children's Mobile Crisis Response Team [place] (374) 951-6303 or www.Art[employer]  · Call the toll free National Suicide Prevention Hotlines   · National Suicide Prevention Lifeline 462-939-RMJL (4164)  · [employer] Hope Line Network 800-SUICIDE (287-6075)    Thyroidectomy, Care After  Refer to this sheet in the next few weeks. These instructions provide you with information about caring for yourself after your procedure. Your health care provider may also give you more specific instructions. Your treatment has been planned according to current medical practices, but problems sometimes occur. Call your health care provider if you have any problems or questions after your procedure.  WHAT TO EXPECT AFTER THE PROCEDURE  After your procedure, it is typical to have:  · Mild pain in the neck or upper body, especially when swallowing.  · A sore throat.  · A weak voice.  HOME CARE INSTRUCTIONS   · Take medicines only as directed by your health care provider.  · If your entire thyroid gland was removed, you may need to take thyroid hormone medicine from now on.  · Do not take medicines that contain aspirin and ibuprofen until your health care provider says that you can. These medicines can  increase your risk of bleeding.  · Some pain medicines cause constipation. Drink enough fluid to keep your urine clear or pale yellow. This can help to prevent constipation.  · Start slowly with eating. You may need to have only liquids and soft foods for a few days or as directed by your health care provider.  · Do not take baths, swim, or use a hot tub until your health care provider approves.  · There are many different ways to close and cover an incision, including stitches (sutures), skin glue, and adhesive strips. Follow your health care provider's instructions for:  ¨ Incision care.  ¨ Bandage (dressing) changes and removal.  ¨ Incision closure removal.  · Resume your usual activities as directed by your health care provider.  · For the first 10 days after the procedure or as instructed by your health care provider:  ¨ Do not lift anything heavier than 20 lb (9.1 kg).  ¨ Do not jog, swim, or do other strenuous exercises.  ¨ Do not play contact sports.  · Keep all follow-up visits as directed by your health care provider. This is important.  SEEK MEDICAL CARE IF:  · The soreness in your throat gets worse.  · You have increased pain at your incision or incisions.  · You have increased bleeding from an incision.  · Your incision becomes infected. Watch for:  ¨ Swelling.  ¨ Redness.  ¨ Warmth.  ¨ Pus.  · You notice a bad smell coming from an incision or dressing.  · You have a fever.  · You feel lightheaded or faint.  · You have numbness, tingling, or muscle spasms in your:  ¨ Arms.  ¨ Hands.  ¨ Feet.  ¨ Face.  · You have trouble swallowing.  SEEK IMMEDIATE MEDICAL CARE IF:   · You develop a rash.  · You have difficulty breathing.  · You hear whistling noises coming from your chest.  · You develop a cough that gets worse.  · Your speech changes, or you have hoarseness that gets worse.     This information is not intended to replace advice given to you by your health care provider. Make sure you discuss any  questions you have with your health care provider.     Levothyroxine tablets  What is this medicine?  LEVOTHYROXINE (edwin tian gallardo SOFÍA een) is a thyroid hormone. This medicine can improve symptoms of thyroid deficiency such as slow speech, lack of energy, weight gain, hair loss, dry skin, and feeling cold. It also helps to treat goiter (an enlarged thyroid gland). It is also used to treat some kinds of thyroid cancer along with surgery and other medicines.  This medicine may be used for other purposes; ask your health care provider or pharmacist if you have questions.  COMMON BRAND NAME(S): Estre , Levo-T, Levothroid, Levoxyl, Synthroid, Thyro-Tabs, Unithroid  What should I tell my health care provider before I take this medicine?  They need to know if you have any of these conditions:  -angina  -blood clotting problems  -diabetes  -dieting or on a weight loss program  -fertility problems  -heart disease  -high levels of thyroid hormone  -pituitary gland problem  -previous heart attack  -an unusual or allergic reaction to levothyroxine, thyroid hormones, other medicines, foods, dyes, or preservatives  -pregnant or trying to get pregnant  -breast-feeding  How should I use this medicine?  Take this medicine by mouth with plenty of water. It is best to take on an empty stomach, at least 30 minutes before or 2 hours after food. Follow the directions on the prescription label. Take at the same time each day. Do not take your medicine more often than directed.  Contact your pediatrician regarding the use of this medicine in children. While this drug may be prescribed for children and infants as young as a few days of age for selected conditions, precautions do apply. For infants, you may crush the tablet and place in a small amount of (5-10 ml or 1 to 2 teaspoonfuls) of water, breast milk, or non-soy based infant formula. Do not mix with soy-based infant formula. Give as directed.  Overdosage: If you think you have taken  too much of this medicine contact a poison control center or emergency room at once.  NOTE: This medicine is only for you. Do not share this medicine with others.  What if I miss a dose?  If you miss a dose, take it as soon as you can. If it is almost time for your next dose, take only that dose. Do not take double or extra doses.  What may interact with this medicine?  -amiodarone  -antacids  -anti-thyroid medicines  -calcium supplements  -carbamazepine  -cholestyramine  -colestipol  -digoxin  -female hormones, including contraceptive or birth control pills  -iron supplements  -ketamine  -liquid nutrition products like Ensure  -medicines for colds and breathing difficulties  -medicines for diabetes  -medicines for mental depression  -medicines or herbals used to decrease weight or appetite  -phenobarbital or other barbiturate medications  -phenytoin  -prednisone or other corticosteroids  -rifabutin  -rifampin  -soy isoflavones  -sucralfate  -theophylline  -warfarin  This list may not describe all possible interactions. Give your health care provider a list of all the medicines, herbs, non-prescription drugs, or dietary supplements you use. Also tell them if you smoke, drink alcohol, or use illegal drugs. Some items may interact with your medicine.  What should I watch for while using this medicine?  Be sure to take this medicine with plenty of fluids. Some tablets may cause choking, gagging, or difficulty swallowing from the tablet getting stuck in your throat. Most of these problems disappear if the medicine is taken with the right amount of water or other fluids.  Do not switch brands of this medicine unless your health care professional agrees with the change. Ask questions if you are uncertain.  You will need regular exams and occasional blood tests to check the response to treatment. If you are receiving this medicine for an underactive thyroid, it may be several weeks before you notice an improvement. Check  with your doctor or health care professional if your symptoms do not improve.  It may be necessary for you to take this medicine for the rest of your life. Do not stop using this medicine unless your doctor or health care professional advises you to.  This medicine can affect blood sugar levels. If you have diabetes, check your blood sugar as directed.  You may lose some of your hair when you first start treatment. With time, this usually corrects itself.  If you are going to have surgery, tell your doctor or health care professional that you are taking this medicine.  What side effects may I notice from receiving this medicine?  Side effects that you should report to your doctor or health care professional as soon as possible:  -allergic reactions like skin rash, itching or hives, swelling of the face, lips, or tongue  -chest pain  -excessive sweating or intolerance to heat  -fast or irregular heartbeat  -nervousness  -skin rash or hives  -swelling of ankles, feet, or legs  -tremors  Side effects that usually do not require medical attention (report to your doctor or health care professional if they continue or are bothersome):  -changes in appetite  -changes in menstrual periods  -diarrhea  -hair loss  -headache  -trouble sleeping  -weight loss  This list may not describe all possible side effects. Call your doctor for medical advice about side effects. You may report side effects to FDA at 2-984-FDA-7840.  Where should I keep my medicine?  Keep out of the reach of children.  Store at room temperature between 15 and 30 degrees C (59 and 86 degrees F). Protect from light and moisture. Keep container tightly closed. Throw away any unused medicine after the expiration date.          Follow-up Information     1. Follow up with Vicente Eldridge M.D. In 1 week.    Specialties:  Surgery, Radiology    Contact information    [address]  [place] [address] [phone]           Discharge Medication  Instructions:    Below are the medications your physician expects you to take upon discharge:    Review all your home medications and newly ordered medications with your doctor and/or pharmacist. Follow medication instructions as directed by your doctor and/or pharmacist.    Please keep your medication list with you and share with your physician.               Medication List      START taking these medications        Instructions    oxycodone immediate-release 5 MG Tabs   Last time this was given:  5 mg on 3/23/2017 11:09 AM   Commonly known as:  ROXICODONE   Next Dose Due:  3/23/17 3:10 as needed for pain     Take 1-2 Tabs by mouth every four hours as needed (Moderate Pain (NRS Pain Scale 4-6; CPOT Pain Scale 3-5)).   Dose:  5-10 mg       SYNTHROID 112 MCG Tabs   Last time this was given:  112 mcg on 3/23/2017  5:32 AM   Generic drug:  levothyroxine   Next Dose Due:  3/24/17 6:00 am 30min before breakfast    Take 1 Tab by mouth Every morning on an empty stomach.   Dose:  112 mcg         CHANGE how you take these medications        Instructions    propranolol 40 MG Tabs   What changed:    - when to take this  - additional instructions   Last time this was given:  40 mg on 3/23/2017  8:46 AM   Commonly known as:  INDERAL   Next Dose Due:  3/23/17 9:00pm     Take 1 Tab by mouth 2 times a day. Take 1 tablet twice a day for 3 days, 1 tablet daily for 3 days, 1/2 of a tablet daily for 4 days, then stop the medication.   Dose:  40 mg         STOP taking these medications     potassium iodide 1 GM/ML Soln   Commonly known as:  SSKI       propylthiouracil 50 MG Tabs   Commonly known as:  PTU               Instructions           Diet / Nutrition:    Follow any diet instructions given to you by your doctor or the dietician, including how much salt (sodium) you are allowed each day.    If you are overweight, talk to your doctor about a weight reduction plan.    Activity:    Remain physically active following your doctor's  instructions about exercise and activity.    Rest often.     Any time you become even a little tired or short of breath, SIT DOWN and rest.    Worsening Symptoms:    Report any of the following signs and symptoms to the doctor's office immediately:    *Pain of jaw, arm, or neck  *Chest pain not relieved by medication                               *Dizziness or loss of consciousness  *Difficulty breathing even when at rest   *More tired than usual                                       *Bleeding drainage or swelling of surgical site  *Swelling of feet, ankles, legs or stomach                 *Fever (>100ºF)  *Pink or blood tinged sputum  *Weight gain (3lbs/day or 5lbs /week)           *Shock from internal defibrillator (if applicable)  *Palpitations or irregular heartbeats                *Cool and/or numb extremities    Stroke Awareness    Common Risk Factors for Stroke include:    Age  Atrial Fibrillation  Carotid Artery Stenosis  Diabetes Mellitus  Excessive alcohol consumption  High blood pressure  Overweight   Physical inactivity  Smoking    Warning signs and symptoms of a stroke include:    *Sudden numbness or weakness of the face, arm or leg (especially on one side of the body).  *Sudden confusion, trouble speaking or understanding.  *Sudden trouble seeing in one or both eyes.  *Sudden trouble walking, dizziness, loss of balance or coordination.Sudden severe headache with no known cause.    It is very important to get treatment quickly when a stroke occurs. If you experience any of the above warning signs, call 911 immediately.                   Disclaimer         Quit Smoking / Tobacco Use:    I understand the use of any tobacco products increases my chance of suffering from future heart disease or stroke and could cause other illnesses which may shorten my life. Quitting the use of tobacco products is the single most important thing I can do to improve my health. For further information on smoking / tobacco  cessation call a Toll Free Quit Line at 1-104.577.2296 (*National Cancer [place]) or 1-587.827.8565 (American Lung Association) or you can access the web based program at www.lungusa.org.    [place] Tobacco Users Help Line:  (627) 331-6056       Toll Free: 1-232.208.3101  Quit Tobacco Program [facility] (716)192-7549    Crisis Hotline:    [place] Crisis Hotline:  5-238-NMIVFCJ or 1-977.481.3290    [place] Crisis Hotline:    1-162.961.6294 or 146-170-3110    Discharge Survey:   Thank you for choosing [facility]. We hope we did everything we could to make your hospital stay a pleasant one. You may be receiving a phone survey and we would appreciate your time and participation in answering the questions. Your input is very valuable to us in our efforts to improve our service to our patients and their families.        My signature on this form indicates that:    1. I have reviewed and understand the above information.  2. My questions regarding this information have been answered to my satisfaction.  3. I have formulated a plan with my discharge nurse to obtain my prescribed medications for home.                  Disclaimer         __________________________________                     __________       ________                       Patient Signature                                                 Date                    Time

## 2017-03-22 NOTE — OR NURSING
1512 received pt from OR with Dr. Copeland, VSS breathing even and unlabored. Dressing to anterior neck CDI.  1535 pt crying, pt medicated for assumed pain- see MAR  1545 pt continues to cry, not speaking, 2nd dose of IV fentanyl given- see MAR  1605 family at bedside  1725 pt up to bathroom with steady gait   1802 haldol given for nausea  1810 report called to Mil RN, plan of care discussed.  1820 pt feeling better no longer nauseated.   1830 pt transferred to [facility], family at side.

## 2017-03-22 NOTE — IP AVS SNAPSHOT
" <p align=\"LEFT\"><IMG SRC=\"[email or web address]\" alt=\"Image\" WIDTH=\"50%\" HEIGHT=\"200\" BORDER=\"\"></p>                   Name:Ivis Klein  Medical Record Number:[id number]  CSN: [id number]    YOB: 1995   Age: 21 y.o.  Sex: female  HT:1.575 m (5' 2\") (18 %, Z = -0.90, Source: [facility] 2-20 Years) WT: 60.7 kg (133 lb 13.1 oz) (61 %, Z = 0.28, Source: [facility] 2-20 Years)          Admit Date: 3/22/2017     Discharge Date:   Today's Date: 3/23/2017  Attending Doctor:  Vicente Eldridge M.D.                  Allergies:  Nkda          Follow-up Information     1. Follow up with Vicente Eldridge M.D. In 1 week.    Specialties:  Surgery, Radiology    Contact information    [address]  [address]  [place] [address] [phone]           Medication List      Take these Medications        Instructions    oxycodone immediate-release 5 MG Tabs   Commonly known as:  ROXICODONE    Take 1-2 Tabs by mouth every four hours as needed (Moderate Pain (NRS Pain Scale 4-6; CPOT Pain Scale 3-5)).   Dose:  5-10 mg       propranolol 40 MG Tabs   What changed:    - when to take this  - additional instructions   Commonly known as:  INDERAL    Take 1 Tab by mouth 2 times a day. Take 1 tablet twice a day for 3 days, 1 tablet daily for 3 days, 1/2 of a tablet daily for 4 days, then stop the medication.   Dose:  40 mg       SYNTHROID 112 MCG Tabs   Generic drug:  levothyroxine    Take 1 Tab by mouth Every morning on an empty stomach.   Dose:  112 mcg         "

## 2017-03-22 NOTE — OR SURGEON
Immediate Post-Operative Note      PreOp Diagnosis: Graves disease    PostOp Diagnosis: Graves disease    Procedure(s): 1. Total thyroidectomy                          2. Bilateral NIMS recurrent laryngeal nerve monitoring                          3. Left deep cervical lymph node biopsy    Surgeon(s): Cullen Eldridge M.D.    Assistant: SUNDAY Rhodes    Anesthesiologist/Type of Anesthesia:  Anesthesiologist: Hamzah Copeland M.D./General endotracheal anesthesia    Surgical Staff:  Circulator: (Unknown)  Scrub Person: (Unknown)    Specimen: 1. Left deep cervical lymph nodes                     2. Thyroid gland    Estimated Blood Loss: 20 ml    Findings: Mildly enlarged thyroid gland. Mildly prominent left deep cervical lymph nodes.     Complications: None        3/22/2017 7:12 AM Cullen Eldridge

## 2017-03-23 VITALS
RESPIRATION RATE: 16 BRPM | BODY MASS INDEX: 24.63 KG/M2 | OXYGEN SATURATION: 97 % | HEART RATE: 82 BPM | WEIGHT: 133.82 LBS | TEMPERATURE: 98.3 F | SYSTOLIC BLOOD PRESSURE: 117 MMHG | HEIGHT: 62 IN | DIASTOLIC BLOOD PRESSURE: 61 MMHG

## 2017-03-23 PROBLEM — E05.00 GRAVES' DISEASE: Status: RESOLVED | Noted: 2017-03-22 | Resolved: 2017-03-23

## 2017-03-23 LAB
ALBUMIN SERPL BCP-MCNC: 4 G/DL (ref 3.2–4.9)
CALCIUM SERPL-MCNC: 8.8 MG/DL (ref 8.5–10.5)
CALCIUM SERPL-MCNC: 8.9 MG/DL (ref 8.5–10.5)
CALCIUM SERPL-MCNC: 9.1 MG/DL (ref 8.5–10.5)

## 2017-03-23 PROCEDURE — 82310 ASSAY OF CALCIUM: CPT | Mod: 91

## 2017-03-23 PROCEDURE — 700102 HCHG RX REV CODE 250 W/ 637 OVERRIDE(OP): Performed by: SURGERY

## 2017-03-23 PROCEDURE — 82040 ASSAY OF SERUM ALBUMIN: CPT

## 2017-03-23 PROCEDURE — A9270 NON-COVERED ITEM OR SERVICE: HCPCS | Performed by: SURGERY

## 2017-03-23 PROCEDURE — 36415 COLL VENOUS BLD VENIPUNCTURE: CPT

## 2017-03-23 PROCEDURE — G0378 HOSPITAL OBSERVATION PER HR: HCPCS

## 2017-03-23 RX ORDER — OXYCODONE HYDROCHLORIDE 5 MG/1
5-10 TABLET ORAL EVERY 4 HOURS PRN
Qty: 30 TAB | Refills: 0 | Status: SHIPPED | OUTPATIENT
Start: 2017-03-23 | End: 2018-04-04

## 2017-03-23 RX ORDER — LEVOTHYROXINE SODIUM 112 MCG
112 TABLET ORAL
Qty: 30 TAB | Refills: 2 | Status: ON HOLD | OUTPATIENT
Start: 2017-03-23 | End: 2017-07-05

## 2017-03-23 RX ORDER — PROPRANOLOL HYDROCHLORIDE 40 MG/1
40 TABLET ORAL 2 TIMES DAILY
Qty: 11 TAB | Refills: 0 | Status: ON HOLD | OUTPATIENT
Start: 2017-03-23 | End: 2017-07-05

## 2017-03-23 RX ADMIN — ACETAMINOPHEN 1000 MG: 500 TABLET, FILM COATED ORAL at 12:25

## 2017-03-23 RX ADMIN — LEVOTHYROXINE SODIUM 112 MCG: 112 TABLET ORAL at 05:32

## 2017-03-23 RX ADMIN — OXYCODONE HYDROCHLORIDE 5 MG: 5 TABLET ORAL at 11:09

## 2017-03-23 RX ADMIN — CELECOXIB 200 MG: 200 CAPSULE ORAL at 08:46

## 2017-03-23 RX ADMIN — OXYCODONE HYDROCHLORIDE 5 MG: 5 TABLET ORAL at 03:44

## 2017-03-23 RX ADMIN — PROPRANOLOL HYDROCHLORIDE 40 MG: 20 TABLET ORAL at 08:46

## 2017-03-23 ASSESSMENT — PAIN SCALES - GENERAL
PAINLEVEL_OUTOF10: 8
PAINLEVEL_OUTOF10: 7
PAINLEVEL_OUTOF10: 9
PAINLEVEL_OUTOF10: 8

## 2017-03-23 NOTE — DISCHARGE INSTRUCTIONS
Regular diet  May bathe or shower in 1 day  Stop taking PTU and SSKI  Follow prescription to taper off propranolol    Discharge Instructions    Discharged to home by car with relative. Discharged via wheelchair, hospital escort: Yes.  Special equipment needed: Not Applicable    Be sure to schedule a follow-up appointment with your primary care doctor or any specialists as instructed.     Discharge Plan:        I understand that a diet low in cholesterol, fat, and sodium is recommended for good health. Unless I have been given specific instructions below for another diet, I accept this instruction as my diet prescription.   Other diet: As Tolerated    Special Instructions: None    · Is patient discharged on Warfarin / Coumadin?   No     · Is patient Post Blood Transfusion?  No    Depression / Suicide Risk    As you are discharged from this [facility] facility, it is important to learn how to keep safe from harming yourself.    Recognize the warning signs:  · Abrupt changes in personality, positive or negative- including increase in energy   · Giving away possessions  · Change in eating patterns- significant weight changes-  positive or negative  · Change in sleeping patterns- unable to sleep or sleeping all the time   · Unwillingness or inability to communicate  · Depression  · Unusual sadness, discouragement and loneliness  · Talk of wanting to die  · Neglect of personal appearance   · Rebelliousness- reckless behavior  · Withdrawal from people/activities they love  · Confusion- inability to concentrate     If you or a loved one observes any of these behaviors or has concerns about self-harm, here's what you can do:  · Talk about it- your feelings and reasons for harming yourself  · Remove any means that you might use to hurt yourself (examples: pills, rope, extension cords, firearm)  · Get professional help from the community (Mental Health, Substance Abuse, psychological counseling)  · Do not be alone:Call your  Safe Contact- someone whom you trust who will be there for you.  · Call your local CRISIS HOTLINE 653-4316 or 602-434-8518  · Call your local Children's Mobile Crisis Response Team [place] (401) 112-3147 or www.IMedExchange  · Call the toll free National Suicide Prevention Hotlines   · National Suicide Prevention Lifeline 267-260-WNXO (7558)  · National Hope Line Network 800-SUICIDE (061-1108)    Thyroidectomy, Care After  Refer to this sheet in the next few weeks. These instructions provide you with information about caring for yourself after your procedure. Your health care provider may also give you more specific instructions. Your treatment has been planned according to current medical practices, but problems sometimes occur. Call your health care provider if you have any problems or questions after your procedure.  WHAT TO EXPECT AFTER THE PROCEDURE  After your procedure, it is typical to have:  · Mild pain in the neck or upper body, especially when swallowing.  · A sore throat.  · A weak voice.  HOME CARE INSTRUCTIONS   · Take medicines only as directed by your health care provider.  · If your entire thyroid gland was removed, you may need to take thyroid hormone medicine from now on.  · Do not take medicines that contain aspirin and ibuprofen until your health care provider says that you can. These medicines can increase your risk of bleeding.  · Some pain medicines cause constipation. Drink enough fluid to keep your urine clear or pale yellow. This can help to prevent constipation.  · Start slowly with eating. You may need to have only liquids and soft foods for a few days or as directed by your health care provider.  · Do not take baths, swim, or use a hot tub until your health care provider approves.  · There are many different ways to close and cover an incision, including stitches (sutures), skin glue, and adhesive strips. Follow your health care provider's instructions for:  ¨ Incision  care.  ¨ Bandage (dressing) changes and removal.  ¨ Incision closure removal.  · Resume your usual activities as directed by your health care provider.  · For the first 10 days after the procedure or as instructed by your health care provider:  ¨ Do not lift anything heavier than 20 lb (9.1 kg).  ¨ Do not jog, swim, or do other strenuous exercises.  ¨ Do not play contact sports.  · Keep all follow-up visits as directed by your health care provider. This is important.  SEEK MEDICAL CARE IF:  · The soreness in your throat gets worse.  · You have increased pain at your incision or incisions.  · You have increased bleeding from an incision.  · Your incision becomes infected. Watch for:  ¨ Swelling.  ¨ Redness.  ¨ Warmth.  ¨ Pus.  · You notice a bad smell coming from an incision or dressing.  · You have a fever.  · You feel lightheaded or faint.  · You have numbness, tingling, or muscle spasms in your:  ¨ Arms.  ¨ Hands.  ¨ Feet.  ¨ Face.  · You have trouble swallowing.  SEEK IMMEDIATE MEDICAL CARE IF:   · You develop a rash.  · You have difficulty breathing.  · You hear whistling noises coming from your chest.  · You develop a cough that gets worse.  · Your speech changes, or you have hoarseness that gets worse.     This information is not intended to replace advice given to you by your health care provider. Make sure you discuss any questions you have with your health care provider.     Levothyroxine tablets  What is this medicine?  LEVOTHYROXINE (edwin voe thye SOFÍA een) is a thyroid hormone. This medicine can improve symptoms of thyroid deficiency such as slow speech, lack of energy, weight gain, hair loss, dry skin, and feeling cold. It also helps to treat goiter (an enlarged thyroid gland). It is also used to treat some kinds of thyroid cancer along with surgery and other medicines.  This medicine may be used for other purposes; ask your health care provider or pharmacist if you have questions.  COMMON BRAND NAME(S):  Estre , Levo-T, Levothroid, Levoxyl, Synthroid, Thyro-Tabs, Unithroid  What should I tell my health care provider before I take this medicine?  They need to know if you have any of these conditions:  -angina  -blood clotting problems  -diabetes  -dieting or on a weight loss program  -fertility problems  -heart disease  -high levels of thyroid hormone  -pituitary gland problem  -previous heart attack  -an unusual or allergic reaction to levothyroxine, thyroid hormones, other medicines, foods, dyes, or preservatives  -pregnant or trying to get pregnant  -breast-feeding  How should I use this medicine?  Take this medicine by mouth with plenty of water. It is best to take on an empty stomach, at least 30 minutes before or 2 hours after food. Follow the directions on the prescription label. Take at the same time each day. Do not take your medicine more often than directed.  Contact your pediatrician regarding the use of this medicine in children. While this drug may be prescribed for children and infants as young as a few days of age for selected conditions, precautions do apply. For infants, you may crush the tablet and place in a small amount of (5-10 ml or 1 to 2 teaspoonfuls) of water, breast milk, or non-soy based infant formula. Do not mix with soy-based infant formula. Give as directed.  Overdosage: If you think you have taken too much of this medicine contact a poison control center or emergency room at once.  NOTE: This medicine is only for you. Do not share this medicine with others.  What if I miss a dose?  If you miss a dose, take it as soon as you can. If it is almost time for your next dose, take only that dose. Do not take double or extra doses.  What may interact with this medicine?  -amiodarone  -antacids  -anti-thyroid medicines  -calcium supplements  -carbamazepine  -cholestyramine  -colestipol  -digoxin  -female hormones, including contraceptive or birth control pills  -iron  supplements  -ketamine  -liquid nutrition products like Ensure  -medicines for colds and breathing difficulties  -medicines for diabetes  -medicines for mental depression  -medicines or herbals used to decrease weight or appetite  -phenobarbital or other barbiturate medications  -phenytoin  -prednisone or other corticosteroids  -rifabutin  -rifampin  -soy isoflavones  -sucralfate  -theophylline  -warfarin  This list may not describe all possible interactions. Give your health care provider a list of all the medicines, herbs, non-prescription drugs, or dietary supplements you use. Also tell them if you smoke, drink alcohol, or use illegal drugs. Some items may interact with your medicine.  What should I watch for while using this medicine?  Be sure to take this medicine with plenty of fluids. Some tablets may cause choking, gagging, or difficulty swallowing from the tablet getting stuck in your throat. Most of these problems disappear if the medicine is taken with the right amount of water or other fluids.  Do not switch brands of this medicine unless your health care professional agrees with the change. Ask questions if you are uncertain.  You will need regular exams and occasional blood tests to check the response to treatment. If you are receiving this medicine for an underactive thyroid, it may be several weeks before you notice an improvement. Check with your doctor or health care professional if your symptoms do not improve.  It may be necessary for you to take this medicine for the rest of your life. Do not stop using this medicine unless your doctor or health care professional advises you to.  This medicine can affect blood sugar levels. If you have diabetes, check your blood sugar as directed.  You may lose some of your hair when you first start treatment. With time, this usually corrects itself.  If you are going to have surgery, tell your doctor or health care professional that you are taking this  medicine.  What side effects may I notice from receiving this medicine?  Side effects that you should report to your doctor or health care professional as soon as possible:  -allergic reactions like skin rash, itching or hives, swelling of the face, lips, or tongue  -chest pain  -excessive sweating or intolerance to heat  -fast or irregular heartbeat  -nervousness  -skin rash or hives  -swelling of ankles, feet, or legs  -tremors  Side effects that usually do not require medical attention (report to your doctor or health care professional if they continue or are bothersome):  -changes in appetite  -changes in menstrual periods  -diarrhea  -hair loss  -headache  -trouble sleeping  -weight loss  This list may not describe all possible side effects. Call your doctor for medical advice about side effects. You may report side effects to FDA at 4-138-FDA-1065.  Where should I keep my medicine?  Keep out of the reach of children.  Store at room temperature between 15 and 30 degrees C (59 and 86 degrees F). Protect from light and moisture. Keep container tightly closed. Throw away any unused medicine after the expiration date.

## 2017-03-23 NOTE — OP REPORT
DATE OF SERVICE:  03/22/2017    PREOPERATIVE DIAGNOSIS:  Graves' disease.    POSTOPERATIVE DIAGNOSIS:  Graves' disease.    OPERATIONS PERFORMED:  1.  Total thyroidectomy.  2.  Bilateral NIMS recurrent laryngeal nerve monitoring.  3.  Left deep cervical lymph node biopsy.    SURGEON:  Cullen Eldridge MD    FIRST ASSISTANT:  SEBASTIEN Rhodes    ANESTHESIOLOGIST:  Hamzah Copeland MD    ANESTHETIC TECHNIQUE:  General endotracheal anesthesia.    OPERATIVE FINDINGS:  The thyroid gland was mildly prominent, and there were   some mildly prominent left deep cervical lymph nodes.    DESCRIPTION OF THE PROCEDURE:  With the patient in the supine position, the   head of the bed slightly elevated, the neck slightly extended and the patient   intubated with a NIMS endotracheal tube, the precordial electrodes were placed   by the surgical assistant who then monitored the recurrent laryngeal nerves   bilaterally throughout the procedure.  The neck was prepared with Betadine and   draped in the usual fashion.  The neck was entered through a lower transverse   cervical incision, carried down through the platysma.  Subplatysmal flaps   were dissected superiorly to the level of the tracheal notch and inferiorly   down into the sternal notch.  The midline cervical fascia was incised down to   the capsule of the thyroid.  The strap muscles were mobilized off the left   thyroid lobe and the superior pole vessels progressively divided with the   Thunderbeat device.  The middle and inferior thyroid veins were divided with   the Thunderbeat device.  A couple of mildly prominent deep cervical lymph   nodes in the left neck were removed and sent for permanent pathologic exam.    Both parathyroids and the recurrent laryngeal nerve were identified and   protected.  Branches of the inferior thyroid artery were divided on the   capsule of the gland with the Thunderbeat device.  Smaller vessels were either   divided with the Thunderbeat  device or, when too near the recurrent nerve,   divided between clamps and suture ligated with 3-0 coated Vicryl suture as the   gland was dissected free of the nerve and off the trachea.    The strap muscles were mobilized off the right thyroid lobe and the superior   pole vessels progressively divided with the Thunderbeat device.  The middle   and inferior thyroid veins were divided with the Thunderbeat device.  Once   again, the recurrent laryngeal nerve and both parathyroids were identified and   protected.  However, when I first identified the recurrent laryngeal nerve on   the right, I could not get any activity from that nerve.  It was very   carefully dissected superiorly and noted to be intact throughout its course,   but even with manipulation of the ET tube, I could not get any stimulation of   that nerve.  I was careful there was not an anterior branch coming off low to   account for that.  Branches of the inferior thyroid artery were divided on the   capsule of the gland with the Thunderbeat device.  Smaller vessels were   either divided with the Thunderbeat device or, when too near the recurrent   nerve, divided between clamps and suture ligated with 3-0 coated Vicryl suture   as the gland was dissected free of the nerve and off the trachea.  I had   planned to do a subtotal thyroidectomy and would have left some thyroid tissue   on the right side overlying the nerve, but there was a nodule sitting   directly anterior to the recurrent nerve, and I did not want to leave that in   place.  The entire thyroid gland was examined on the operative field and sent   for permanent pathologic exam.  Good hemostasis was assured.  The integrity of   the left recurrent laryngeal nerve was documented.  Small pieces of Surgicel   fibrillar were placed in both sides of the neck.  The midline cervical fascia   was reapproximated with running 3-0 coated Vicryl suture.  Platysma was   reapproximated with interrupted 3-0  coated Vicryl suture.  Skin was closed   with interrupted 5-0 silk suture.  Sponge and needle counts were correct at   the completion of the procedure.  The estimated blood loss was 20 mL.  The   patient was awakened, extubated, and taken to the recovery room in   satisfactory condition.       ____________________________________     TRISTAN L. MD RONALDO RUIZ / COURTNEY    DD:  03/22/2017 15:35:00  DT:  03/22/2017 18:05:28    D#:  [id number]  Job#:  [id number]    cc: MICHELLE HINTON MD, Leonel Wong MD, MARGE CONWAY MD

## 2017-03-23 NOTE — PROGRESS NOTES
Pt discharged home. Left T421 via wheelchair with this RN. Discharge instructions and prescriptions provided prior to departure.

## 2017-03-23 NOTE — PROGRESS NOTES
The patient is afebrile with stable vital signs. The intake and output are good. The patient denies nausea, vomiting or paresthesias. The incision looks good. The IV site and extremities are normal. The total calcium was 8.8 mg/dl with an albumin of 4.0 grams at 0018 . I will heparin lock the IV, ambulate the patient and advance the diet as tolerated. I will continue to monitor the calcium levels today. If they stabilize, I anticipate discharge later today. I will see the patient in the office in 1 week. Activity limitations have been explained. The patient is to call or return sooner for any problems. The patient has been given discharge instructions and medications.

## 2017-03-23 NOTE — PROGRESS NOTES
Received report from PACU RN. Pt up to T424 bed 1 at 1845. Pt AOx4, up with SBA, ambulating from gurney to bed.  VSS on 1L O2. Admit profile completed with assistance of pts mother.  Pt oriented to unit and unit protocols. Suture removal kit, steri strips and benzoin at bedside. Pt voided QS in PACU.  Pt very timid and upset she is in a double room, discussed with charge and will move pt to T421 once clean. Stat order placed. All needs met at this time.

## 2017-03-23 NOTE — CARE PLAN
Problem: Safety  Goal: Will remain free from injury  Updated about POC. Reinforce call light use. Pt acknowledged understanding    Problem: Venous Thromboembolism (VTW)/Deep Vein Thrombosis (DVT) Prevention:  Goal: Patient will participate in Venous Thrombosis (VTE)/Deep Vein Thrombosis (DVT)Prevention Measures  Keith hose in place. Pt ambulatory.     Problem: Mobility  Goal: Risk for activity intolerance will decrease  Ambulating by self in room and halls with no issues.

## 2017-03-23 NOTE — CARE PLAN
Problem: Safety  Goal: Will remain free from injury  Outcome: PROGRESSING AS EXPECTED  Fall risk assessed, call light within reach, bed in lowest position, treaded socks on, pt educated to call for assistance     Problem: Knowledge Deficit  Goal: Knowledge of disease process/condition, treatment plan, diagnostic tests, and medications will improve  Outcome: PROGRESSING AS EXPECTED  POC discussed, questions answered

## 2017-03-23 NOTE — PROGRESS NOTES
Assumed care of pt at 0700. Pt sitting in bed, family present, oriented x4, Pt rates pain 8 out of 10 in throat and head, medicated per MAR. Pt - N/V. + BS, - flatus, PTA BM. Anterior neck incision with sutures, open to air, CDI. Pt up self. Labs noted, assessment complete. Pt updated on POC. Pt educated to use call light when in need of assisstance. Bed locked and in lowest position. All needs met at this time, call light within reach, will continue to monitor.

## 2017-03-23 NOTE — PROGRESS NOTES
Assumed care at 1845. Pt resting in bed. A&ox 4. Family at bedside. Dressing to anterior neck CDI. Tolerating liquids for now. C/o hard time swallowing. meds crushed for now. Pain well controlled. 96%on RA. Pt on 1LNC for comfort and for pt request. Call light within reach. Hourly rounding in place

## 2017-04-18 ENCOUNTER — APPOINTMENT (OUTPATIENT)
Dept: RADIOLOGY | Facility: MEDICAL CENTER | Age: 22
End: 2017-04-18
Attending: EMERGENCY MEDICINE
Payer: MEDICAID

## 2017-04-18 ENCOUNTER — HOSPITAL ENCOUNTER (EMERGENCY)
Facility: MEDICAL CENTER | Age: 22
End: 2017-04-18
Attending: EMERGENCY MEDICINE
Payer: MEDICAID

## 2017-04-18 VITALS
WEIGHT: 133.38 LBS | OXYGEN SATURATION: 99 % | RESPIRATION RATE: 16 BRPM | HEART RATE: 63 BPM | SYSTOLIC BLOOD PRESSURE: 118 MMHG | DIASTOLIC BLOOD PRESSURE: 66 MMHG | HEIGHT: 62 IN | TEMPERATURE: 97.4 F | BODY MASS INDEX: 24.54 KG/M2

## 2017-04-18 DIAGNOSIS — R10.9 ACUTE ABDOMINAL PAIN: ICD-10-CM

## 2017-04-18 DIAGNOSIS — R10.9 CHRONIC ABDOMINAL PAIN: ICD-10-CM

## 2017-04-18 DIAGNOSIS — G89.29 CHRONIC ABDOMINAL PAIN: ICD-10-CM

## 2017-04-18 LAB
ALBUMIN SERPL BCP-MCNC: 4.5 G/DL (ref 3.2–4.9)
ALBUMIN/GLOB SERPL: 1.5 G/DL
ALP SERPL-CCNC: 124 U/L (ref 30–99)
ALT SERPL-CCNC: 12 U/L (ref 2–50)
AMPHET UR QL SCN: NEGATIVE
ANION GAP SERPL CALC-SCNC: 9 MMOL/L (ref 0–11.9)
APPEARANCE UR: CLEAR
AST SERPL-CCNC: 21 U/L (ref 12–45)
BARBITURATES UR QL SCN: NEGATIVE
BASOPHILS # BLD AUTO: 1.1 % (ref 0–1.8)
BASOPHILS # BLD: 0.06 K/UL (ref 0–0.12)
BENZODIAZ UR QL SCN: NEGATIVE
BILIRUB SERPL-MCNC: 0.5 MG/DL (ref 0.1–1.5)
BILIRUB UR QL STRIP.AUTO: NEGATIVE
BUN SERPL-MCNC: 7 MG/DL (ref 8–22)
BZE UR QL SCN: NEGATIVE
CALCIUM SERPL-MCNC: 9.6 MG/DL (ref 8.5–10.5)
CANNABINOIDS UR QL SCN: POSITIVE
CHLORIDE SERPL-SCNC: 102 MMOL/L (ref 96–112)
CO2 SERPL-SCNC: 26 MMOL/L (ref 20–33)
COLOR UR: YELLOW
CREAT SERPL-MCNC: 0.69 MG/DL (ref 0.5–1.4)
CULTURE IF INDICATED INDCX: NO UA CULTURE
EOSINOPHIL # BLD AUTO: 0.02 K/UL (ref 0–0.51)
EOSINOPHIL NFR BLD: 0.4 % (ref 0–6.9)
EPI CELLS #/AREA URNS HPF: NORMAL /HPF
ERYTHROCYTE [DISTWIDTH] IN BLOOD BY AUTOMATED COUNT: 44.2 FL (ref 35.9–50)
GFR SERPL CREATININE-BSD FRML MDRD: >60 ML/MIN/1.73 M 2
GLOBULIN SER CALC-MCNC: 3.1 G/DL (ref 1.9–3.5)
GLUCOSE SERPL-MCNC: 82 MG/DL (ref 65–99)
GLUCOSE UR STRIP.AUTO-MCNC: NEGATIVE MG/DL
HCG SERPL QL: NEGATIVE
HCT VFR BLD AUTO: 41.3 % (ref 37–47)
HGB BLD-MCNC: 12.7 G/DL (ref 12–16)
IMM GRANULOCYTES # BLD AUTO: 0.01 K/UL (ref 0–0.11)
IMM GRANULOCYTES NFR BLD AUTO: 0.2 % (ref 0–0.9)
KETONES UR STRIP.AUTO-MCNC: 40 MG/DL
LEUKOCYTE ESTERASE UR QL STRIP.AUTO: NEGATIVE
LIPASE SERPL-CCNC: 5 U/L (ref 11–82)
LYMPHOCYTES # BLD AUTO: 2.84 K/UL (ref 1–4.8)
LYMPHOCYTES NFR BLD: 50.2 % (ref 22–41)
MCH RBC QN AUTO: 24.3 PG (ref 27–33)
MCHC RBC AUTO-ENTMCNC: 30.8 G/DL (ref 33.6–35)
MCV RBC AUTO: 79.1 FL (ref 81.4–97.8)
MDMA UR QL SCN: NEGATIVE
METHADONE UR QL SCN: NEGATIVE
MICRO URNS: ABNORMAL
MONOCYTES # BLD AUTO: 0.2 K/UL (ref 0–0.85)
MONOCYTES NFR BLD AUTO: 3.5 % (ref 0–13.4)
MUCOUS THREADS #/AREA URNS HPF: NORMAL /HPF
NEUTROPHILS # BLD AUTO: 2.53 K/UL (ref 2–7.15)
NEUTROPHILS NFR BLD: 44.6 % (ref 44–72)
NITRITE UR QL STRIP.AUTO: NEGATIVE
NRBC # BLD AUTO: 0 K/UL
NRBC BLD AUTO-RTO: 0 /100 WBC
OPIATES UR QL SCN: NEGATIVE
OXYCODONE UR QL SCN: POSITIVE
PCP UR QL SCN: NEGATIVE
PH UR STRIP.AUTO: 6 [PH]
PLATELET # BLD AUTO: 273 K/UL (ref 164–446)
PMV BLD AUTO: 11.2 FL (ref 9–12.9)
POTASSIUM SERPL-SCNC: 3.1 MMOL/L (ref 3.6–5.5)
PROPOXYPH UR QL SCN: NEGATIVE
PROT SERPL-MCNC: 7.6 G/DL (ref 6–8.2)
PROT UR QL STRIP: NEGATIVE MG/DL
RBC # BLD AUTO: 5.22 M/UL (ref 4.2–5.4)
RBC UR QL AUTO: NEGATIVE
SODIUM SERPL-SCNC: 137 MMOL/L (ref 135–145)
SP GR UR STRIP.AUTO: 1.03
WBC # BLD AUTO: 5.7 K/UL (ref 4.8–10.8)
WBC #/AREA URNS HPF: NORMAL /HPF

## 2017-04-18 PROCEDURE — 83690 ASSAY OF LIPASE: CPT

## 2017-04-18 PROCEDURE — 700117 HCHG RX CONTRAST REV CODE 255: Performed by: EMERGENCY MEDICINE

## 2017-04-18 PROCEDURE — 85025 COMPLETE CBC W/AUTO DIFF WBC: CPT

## 2017-04-18 PROCEDURE — 99284 EMERGENCY DEPT VISIT MOD MDM: CPT

## 2017-04-18 PROCEDURE — 80053 COMPREHEN METABOLIC PANEL: CPT

## 2017-04-18 PROCEDURE — 84703 CHORIONIC GONADOTROPIN ASSAY: CPT

## 2017-04-18 PROCEDURE — 36415 COLL VENOUS BLD VENIPUNCTURE: CPT

## 2017-04-18 PROCEDURE — 81001 URINALYSIS AUTO W/SCOPE: CPT

## 2017-04-18 PROCEDURE — 74177 CT ABD & PELVIS W/CONTRAST: CPT

## 2017-04-18 PROCEDURE — 80307 DRUG TEST PRSMV CHEM ANLYZR: CPT

## 2017-04-18 RX ADMIN — IOHEXOL 100 ML: 350 INJECTION, SOLUTION INTRAVENOUS at 14:51

## 2017-04-18 ASSESSMENT — ENCOUNTER SYMPTOMS
CHILLS: 0
ABDOMINAL PAIN: 1
VOMITING: 1
FEVER: 0
CONSTIPATION: 0
DIARRHEA: 0
NAUSEA: 1

## 2017-04-18 ASSESSMENT — PAIN SCALES - GENERAL: PAINLEVEL_OUTOF10: 9

## 2017-04-18 NOTE — ED AVS SNAPSHOT
Pick1 Access Code: Activation code not generated  Current Pick1 Status: Patient Declined    Your email address is not on file at [facility].  Email Addresses are required for you to sign up for Pick1, please contact [phone] to verify your personal information and to provide your email address prior to attempting to register for Pick1.    Ivis Klein  [address] [place] [address]    [email or web address]  A secure, online tool to manage your health information     [facility]’s Pick1® is a secure, online tool that connects you to your personalized health information from the privacy of your home -- day or night - making it very easy for you to manage your healthcare. Once the activation process is completed, you can even access your medical information using the Pick1 kt, which is available for free in the Apple Kt store or Google Play store.     To learn more about Pick1, visit [email or web address]    There are two levels of access available (as shown below):   My Chart Features  [facility] Primary Care Doctor [facility]  Specialists [facility]  Urgent  Care Non-[facility] Primary Care Doctor   Email your healthcare team securely and privately 24/7 X X X    Manage appointments: schedule your next appointment; view details of past/upcoming appointments X      Request prescription refills. X      View recent personal medical records, including lab and immunizations X X X X   View health record, including health history, allergies, medications X X X X   Read reports about your outpatient visits, procedures, consult and ER notes X X X X   See your discharge summary, which is a recap of your hospital and/or ER visit that includes your diagnosis, lab results, and care plan X X  X     How to register for [email or web address]:  Once your e-mail address has been verified, follow the following steps to sign up for [email or web address].     1. Go to  [email or web address]  2. Click on the Sign Up Now box, which takes you to the  New Member Sign Up page. You will need to provide the following information:  a. Enter your [employer] Access Code exactly as it appears at the top of this page. (You will not need to use this code after you’ve completed the sign-up process. If you do not sign up before the expiration date, you must request a new code.)   b. Enter your date of birth.   c. Enter your home email address.   d. Click Submit, and follow the next screen’s instructions.  3. Create a [employer] ID. This will be your [employer] login ID and cannot be changed, so think of one that is secure and easy to remember.  4. Create a [employer] password. You can change your password at any time.  5. Enter your Password Reset Question and Answer. This can be used at a later time if you forget your password.   6. Enter your e-mail address. This allows you to receive e-mail notifications when new information is available in [employer].  7. Click Sign Up. You can now view your health information.    For assistance activating your [employer] account, call [phone]

## 2017-04-18 NOTE — DISCHARGE INSTRUCTIONS
Abdominal Pain, Women  Abdominal (stomach, pelvic, or belly) pain can be caused by many things. It is important to tell your doctor:  · The location of the pain.  · Does it come and go or is it present all the time?  · Are there things that start the pain (eating certain foods, exercise)?  · Are there other symptoms associated with the pain (fever, nausea, vomiting, diarrhea)?  All of this is helpful to know when trying to find the cause of the pain.  CAUSES   · Stomach: virus or bacteria infection, or ulcer.  · Intestine: appendicitis (inflamed appendix), regional ileitis (Crohn's disease), ulcerative colitis (inflamed colon), irritable bowel syndrome, diverticulitis (inflamed diverticulum of the colon), or cancer of the stomach or intestine.  · Gallbladder disease or stones in the gallbladder.  · Kidney disease, kidney stones, or infection.  · Pancreas infection or cancer.  · Fibromyalgia (pain disorder).  · Diseases of the female organs:  · Uterus: fibroid (non-cancerous) tumors or infection.  · Fallopian tubes: infection or tubal pregnancy.  · Ovary: cysts or tumors.  · Pelvic adhesions (scar tissue).  · Endometriosis (uterus lining tissue growing in the pelvis and on the pelvic organs).  · Pelvic congestion syndrome (female organs filling up with blood just before the menstrual period).  · Pain with the menstrual period.  · Pain with ovulation (producing an egg).  · Pain with an IUD (intrauterine device, birth control) in the uterus.  · Cancer of the female organs.  · Functional pain (pain not caused by a disease, may improve without treatment).  · Psychological pain.  · Depression.  DIAGNOSIS   Your doctor will decide the seriousness of your pain by doing an examination.  · Blood tests.  · X-rays.  · Ultrasound.  · CT scan (computed tomography, special type of X-ray).  · MRI (magnetic resonance imaging).  · Cultures, for infection.  · Barium enema (dye inserted in the large intestine, to better view it with  X-rays).  · Colonoscopy (looking in intestine with a lighted tube).  · Laparoscopy (minor surgery, looking in abdomen with a lighted tube).  · Major abdominal exploratory surgery (looking in abdomen with a large incision).  TREATMENT   The treatment will depend on the cause of the pain.   · Many cases can be observed and treated at home.  · Over-the-counter medicines recommended by your caregiver.  · Prescription medicine.  · Antibiotics, for infection.  · Birth control pills, for painful periods or for ovulation pain.  · Hormone treatment, for endometriosis.  · Nerve blocking injections.  · Physical therapy.  · Antidepressants.  · Counseling with a psychologist or psychiatrist.  · Minor or major surgery.  HOME CARE INSTRUCTIONS   · Do not take laxatives, unless directed by your caregiver.  · Take over-the-counter pain medicine only if ordered by your caregiver. Do not take aspirin because it can cause an upset stomach or bleeding.  · Try a clear liquid diet (broth or water) as ordered by your caregiver. Slowly move to a bland diet, as tolerated, if the pain is related to the stomach or intestine.  · Have a thermometer and take your temperature several times a day, and record it.  · Bed rest and sleep, if it helps the pain.  · Avoid sexual intercourse, if it causes pain.  · Avoid stressful situations.  · Keep your follow-up appointments and tests, as your caregiver orders.  · If the pain does not go away with medicine or surgery, you may try:  · Acupuncture.  · Relaxation exercises (yoga, meditation).  · Group therapy.  · Counseling.  SEEK MEDICAL CARE IF:   · You notice certain foods cause stomach pain.  · Your home care treatment is not helping your pain.  · You need stronger pain medicine.  · You want your IUD removed.  · You feel faint or lightheaded.  · You develop nausea and vomiting.  · You develop a rash.  · You are having side effects or an allergy to your medicine.  SEEK IMMEDIATE MEDICAL CARE IF:   · Your  pain does not go away or gets worse.  · You have a fever.  · Your pain is felt only in portions of the abdomen. The right side could possibly be appendicitis. The left lower portion of the abdomen could be colitis or diverticulitis.  · You are passing blood in your stools (bright red or black tarry stools, with or without vomiting).  · You have blood in your urine.  · You develop chills, with or without a fever.  · You pass out.  MAKE SURE YOU:   · Understand these instructions.  · Will watch your condition.  · Will get help right away if you are not doing well or get worse.  Document Released: 10/14/2008 Document Revised: 03/11/2013 Document Reviewed: 11/04/2010  [employer]® Patient Information ©2014 [employer].

## 2017-04-18 NOTE — ED PROVIDER NOTES
"ED Provider Note    Scribed for Chilango Cummins M.D. by Laurel Patton. 2017, 1:46 PM.    Primary care provider: Kia Flores M.D.  Means of arrival: Walk-in  History obtained from: Patient  History limited by: None    CHIEF COMPLAINT  Chief Complaint   Patient presents with   • Abdominal Pain     lower abd   • Vomiting     x yesterday x3 yesterday and x 1 this am   • Painful Urination     burning       HPI  Ivis Klein is a 21 y.o. female who presents to the Emergency Department for abdominal pain. Her pain has been intermittent over the past 3 weeks, but significantly worsened yesterday to where she \"couldn't get out of bed.\" Her pain is located across her lower abdomen and left upper quadrant. The patient's only abdominal surgery consisted of a . However, she also notes she has not had her IUD evaluated by a physician in over 2 years. The patient has had a decreased appetite, but continues to drink fluids. She's felt nauseated and has vomited. The patient denies any fevers, chills, diarrhea, or constipation. She reports no other pertinent medical history.    Review of old medical records shows the patient was last seen in the ED on 2017 for shortness of breath.    Review of chart shows the patient was last in February for shortness of breath and headache. In November last year she was once seen for vomiting and hypothyroidism on one occasion and flu symptoms on another and chest pain and syncope on a 3rd occasion. Patient was evaluated and discharged for pericarditis in July and seen for headache back pain in ..    REVIEW OF SYSTEMS  Review of Systems   Constitutional: Negative for fever and chills.        + decreased appetite   Gastrointestinal: Positive for nausea, vomiting and abdominal pain. Negative for diarrhea and constipation.   All other systems reviewed and are negative.      PAST MEDICAL HISTORY   has a past medical history of Menarche; Migraine; Vomiting " "(6/29/2016); Hyperthyroidism (6/29/2016); Graves disease (12/5/2016); Anxiety (2/16/2017); Psychiatric problem; Supervision of normal first teen pregnancy; Pregnancy; Pericarditis (06/2016); Arrhythmia; and Breath shortness.    SURGICAL HISTORY   has past surgical history that includes primary c section (9/8/2013) and thyroidectomy total (Bilateral, 3/22/2017).    SOCIAL HISTORY  Social History   Substance Use Topics   • Smoking status: Never Smoker    • Smokeless tobacco: Never Used      Comment: quit in 2012   • Alcohol Use: No      Comment: occ      History   Drug Use No     Comment: marijuana, just quit 3/13/17     FAMILY HISTORY  Family History   Problem Relation Age of Onset   • Cancer Paternal Grandmother 56     breast cancer     CURRENT MEDICATIONS  No current facility-administered medications on file prior to encounter.     Current Outpatient Prescriptions on File Prior to Encounter   Medication Sig Dispense Refill   • oxycodone immediate-release (ROXICODONE) 5 MG Tab Take 1-2 Tabs by mouth every four hours as needed (Moderate Pain (NRS Pain Scale 4-6; CPOT Pain Scale 3-5)). 30 Tab 0   • propranolol (INDERAL) 40 MG Tab Take 1 Tab by mouth 2 times a day. Take 1 tablet twice a day for 3 days, 1 tablet daily for 3 days, 1/2 of a tablet daily for 4 days, then stop the medication. 11 Tab 0   • SYNTHROID 112 MCG Tab Take 1 Tab by mouth Every morning on an empty stomach. 30 Tab 2     ALLERGIES  Allergies   Allergen Reactions   • Nkda [No Known Drug Allergy]        PHYSICAL EXAM  VITAL SIGNS: /66 mmHg  Pulse 71  Temp(Src) 36.2 °C (97.1 °F) (Temporal)  Resp 16  Ht 1.575 m (5' 2.01\")  Wt 60.5 kg (133 lb 6.1 oz)  BMI 24.39 kg/m2  SpO2 98%    Constitutional: Well developed, Well nourished, No acute distress, Non-toxic appearance.   HENT: Normocephalic, Atraumatic, Bilateral external ears normal, Oropharynx moist, no evidence of dehydration, No oral exudates, Nose normal.   Eyes: PERRLA, EOMI, Conjunctiva " normal, No discharge.   Neck: Normal range of motion, No tenderness, Supple, No stridor. No masses. No evidence of meningitis or meningismus.   Lymphatic: No lymphadenopathy noted.   Cardiovascular: Normal heart rate, Normal rhythm, No murmurs, No rubs, No gallops.   Thorax & Lungs: Normal breath sounds, No respiratory distress, No wheezing or rhonchi, No chest tenderness.   Abdomen: Bowel sounds normal, Soft, Minimal tenderness to lower abdomen, No masses, No pulsatile masses. No guarding or rebound. No evidence of peritoneal findings.  Skin: Warm, Dry, No erythema, No rash. No exanthem.   Back: No tenderness.   Extremities:  No edema, No tenderness, No cyanosis, No clubbing.   Musculoskeletal: Good range of motion in all major joints. No major deformities noted.   Neurologic: Alert & oriented x 3, Normal motor function, No focal deficits noted.   Psychiatric: Affect normal, mood normal.                                                              DIAGNOSTIC STUDIES / PROCEDURES    LABS  Results for orders placed or performed during the hospital encounter of 04/18/17   CBC WITH DIFFERENTIAL   Result Value Ref Range    WBC 5.7 4.8 - 10.8 K/uL    RBC 5.22 4.20 - 5.40 M/uL    Hemoglobin 12.7 12.0 - 16.0 g/dL    Hematocrit 41.3 37.0 - 47.0 %    MCV 79.1 (L) 81.4 - 97.8 fL    MCH 24.3 (L) 27.0 - 33.0 pg    MCHC 30.8 (L) 33.6 - 35.0 g/dL    RDW 44.2 35.9 - 50.0 fL    Platelet Count 273 164 - 446 K/uL    MPV 11.2 9.0 - 12.9 fL    Neutrophils-Polys 44.60 44.00 - 72.00 %    Lymphocytes 50.20 (H) 22.00 - 41.00 %    Monocytes 3.50 0.00 - 13.40 %    Eosinophils 0.40 0.00 - 6.90 %    Basophils 1.10 0.00 - 1.80 %    Immature Granulocytes 0.20 0.00 - 0.90 %    Nucleated RBC 0.00 /100 WBC    Neutrophils (Absolute) 2.53 2.00 - 7.15 K/uL    Lymphs (Absolute) 2.84 1.00 - 4.80 K/uL    Monos (Absolute) 0.20 0.00 - 0.85 K/uL    Eos (Absolute) 0.02 0.00 - 0.51 K/uL    Baso (Absolute) 0.06 0.00 - 0.12 K/uL    Immature Granulocytes (abs)  0.01 0.00 - 0.11 K/uL    NRBC (Absolute) 0.00 K/uL   COMP METABOLIC PANEL   Result Value Ref Range    Sodium 137 135 - 145 mmol/L    Potassium 3.1 (L) 3.6 - 5.5 mmol/L    Chloride 102 96 - 112 mmol/L    Co2 26 20 - 33 mmol/L    Anion Gap 9.0 0.0 - 11.9    Glucose 82 65 - 99 mg/dL    Bun 7 (L) 8 - 22 mg/dL    Creatinine 0.69 0.50 - 1.40 mg/dL    Calcium 9.6 8.5 - 10.5 mg/dL    AST(SGOT) 21 12 - 45 U/L    ALT(SGPT) 12 2 - 50 U/L    Alkaline Phosphatase 124 (H) 30 - 99 U/L    Total Bilirubin 0.5 0.1 - 1.5 mg/dL    Albumin 4.5 3.2 - 4.9 g/dL    Total Protein 7.6 6.0 - 8.2 g/dL    Globulin 3.1 1.9 - 3.5 g/dL    A-G Ratio 1.5 g/dL   LIPASE   Result Value Ref Range    Lipase 5 (L) 11 - 82 U/L   URINALYSIS,CULTURE IF INDICATED   Result Value Ref Range    Color Yellow     Character Clear     Specific Gravity 1.027 <1.035    Ph 6.0 5.0-8.0    Glucose Negative Negative mg/dL    Ketones 40 (A) Negative mg/dL    Protein Negative Negative mg/dL    Bilirubin Negative Negative    Nitrite Negative Negative    Leukocyte Esterase Negative Negative    Occult Blood Negative Negative    Micro Urine Req see below     Culture Indicated No UA Culture   BETA-HCG QUALITATIVE SERUM   Result Value Ref Range    Beta-Hcg Qualitative Serum Negative Negative   URINE MICROSCOPIC (W/UA)   Result Value Ref Range    WBC 0-2 /hpf    Epithelial Cells Few /hpf    Mucous Threads Many /hpf   ESTIMATED GFR   Result Value Ref Range    GFR If African American >60 >60 mL/min/1.73 m 2    GFR If Non African American >60 >60 mL/min/1.73 m 2   URINE DRUG SCREEN   Result Value Ref Range    Amphetamines Urine Negative Negative    Barbiturates Negative Negative    Benzodiazepines Negative Negative    Cocaine Metabolite Negative Negative    Methadone Negative Negative    Ecstasy Negative Negative    Opiates Negative Negative    Oxycodone Positive (A) Negative    Phencyclidine -Pcp Negative Negative    Propoxyphene Negative Negative    Cannabinoid Metab Positive (A)  Negative   All labs reviewed by me.    RADIOLOGY  The radiologist's interpretation of all radiological studies have been reviewed by me.    COURSE & MEDICAL DECISION MAKING    CT-ABDOMEN-PELVIS WITH   Final Result      1.  No evidence of bowel obstruction or inflammatory change.      2.  Bilateral ovarian follicles, right greater than left. There is a small amount of free fluid seen dependently within the pelvis.      3.  IUD present.          Nursing notes, VS, PMSFHx reviewed in chart.    Review of old medical records shows the patient was last seen in the ED on 02/04/2017 for shortness of breath.     1:46 PM - Patient seen and examined at bedside. Ordered CT abdomen-pelvis, urine drug screen, CBC, CMP, lipase, urinalysis, beta HCG qualitative serum, urine microscopic, and estimated GFR to evaluate her symptoms.    4:27 PM - Re-examined; The patient is resting in bed comfortably. She offers no further complaints at this time. I discussed her above findings were overall unremarkable and plans for discharge. She was given a referral to [facility] and instructed to return to the ED if her symptoms worsen. Patient understands and agrees.     The patient is referred to a primary physician for blood pressure management, diabetic screening, and for all other preventative health concerns.    DISPOSITION:  Patient will be discharged home in stable condition.    FOLLOW UP:  [facility]    Schedule an appointment as soon as possible for a visit in 1 day      FINAL IMPRESSION  1. Acute abdominal pain    2. Chronic abdominal pain         Laurel HARRIS (Kamini), am scribing for, and in the presence of, Chilango Cummins M.D..    Electronically signed by: Laurel Aguilar), 4/18/2017    Chilango HARRIS M.D. personally performed the services described in this documentation, as scribed by Laurel Patton in my presence, and it is both accurate and complete.    The note  accurately reflects work and decisions made by me.  Chilango Cummins  4/18/2017  8:20 PM

## 2017-04-18 NOTE — ED NOTES
Pt ambulates to triage, with guest  Chief Complaint   Patient presents with   • Abdominal Pain     lower abd   • Vomiting     x yesterday x3 yesterday and x 1 this am   • Painful Urination     burning   recently had thyroid removed  Pt also c/o fatigue and headache  Clean catch urine sample requested  Pt asked to wait in lobby, pt updated on triage process and pt asked to inform RN of any changes.

## 2017-04-18 NOTE — ED AVS SNAPSHOT
Home Care Instructions                                                                                                                Ivis Klein   MRN: [id number]    Department:  [facility], Emergency Dept   Date of Visit:  4/18/2017            [facility], Emergency Dept    [address]    [place] [address]    Phone:  [phone]      You were seen by     Chilango Cummins M.D.      Your Diagnosis Was     Acute abdominal pain     R10.9       These are the medications you received during your hospitalization from 04/18/2017 0913 to 04/18/2017 1628     Date/Time Order Dose Route Action    04/18/2017 1451 iohexol (OMNIPAQUE) 350 mg/mL 100 mL Intravenous Given      Follow-up Information     1. Follow up with [facility]. Schedule an appointment as soon as possible for a visit in 1 day.      Medication Information     Review all of your home medications and newly ordered medications with your primary doctor and/or pharmacist as soon as possible. Follow medication instructions as directed by your doctor and/or pharmacist.     Please keep your complete medication list with you and share with your physician. Update the information when medications are discontinued, doses are changed, or new medications (including over-the-counter products) are added; and carry medication information at all times in the event of emergency situations.               Medication List      ASK your doctor about these medications        Instructions    Morning Afternoon Evening Bedtime    oxycodone immediate-release 5 MG Tabs   Commonly known as:  ROXICODONE        Take 1-2 Tabs by mouth every four hours as needed (Moderate Pain (NRS Pain Scale 4-6; CPOT Pain Scale 3-5)).   Dose:  5-10 mg                        propranolol 40 MG Tabs   Commonly known as:  INDERAL        Take 1 Tab by mouth 2 times a day. Take 1 tablet twice a day for 3 days, 1 tablet daily for 3 days,  1/2 of a tablet daily for 4 days, then stop the medication.   Dose:  40 mg                        SYNTHROID 112 MCG Tabs   Generic drug:  levothyroxine        Take 1 Tab by mouth Every morning on an empty stomach.   Dose:  112 mcg                                Procedures and tests performed during your visit     BETA-HCG QUALITATIVE SERUM    CARDIAC MONITORING    CBC WITH DIFFERENTIAL    COMP METABOLIC PANEL    CONSENT FOR CONTRAST INJECTION    CT-ABDOMEN-PELVIS WITH    ESTIMATED GFR    LIPASE    O2 Protocol    SALINE LOCK    URINALYSIS,CULTURE IF INDICATED    URINE DRUG SCREEN    URINE MICROSCOPIC (W/UA)        Discharge Instructions       Abdominal Pain, Women  Abdominal (stomach, pelvic, or belly) pain can be caused by many things. It is important to tell your doctor:  · The location of the pain.  · Does it come and go or is it present all the time?  · Are there things that start the pain (eating certain foods, exercise)?  · Are there other symptoms associated with the pain (fever, nausea, vomiting, diarrhea)?  All of this is helpful to know when trying to find the cause of the pain.  CAUSES   · Stomach: virus or bacteria infection, or ulcer.  · Intestine: appendicitis (inflamed appendix), regional ileitis (Crohn's disease), ulcerative colitis (inflamed colon), irritable bowel syndrome, diverticulitis (inflamed diverticulum of the colon), or cancer of the stomach or intestine.  · Gallbladder disease or stones in the gallbladder.  · Kidney disease, kidney stones, or infection.  · Pancreas infection or cancer.  · Fibromyalgia (pain disorder).  · Diseases of the female organs:  · Uterus: fibroid (non-cancerous) tumors or infection.  · Fallopian tubes: infection or tubal pregnancy.  · Ovary: cysts or tumors.  · Pelvic adhesions (scar tissue).  · Endometriosis (uterus lining tissue growing in the pelvis and on the pelvic organs).  · Pelvic congestion syndrome (female organs filling up with blood just before the  menstrual period).  · Pain with the menstrual period.  · Pain with ovulation (producing an egg).  · Pain with an IUD (intrauterine device, birth control) in the uterus.  · Cancer of the female organs.  · Functional pain (pain not caused by a disease, may improve without treatment).  · Psychological pain.  · Depression.  DIAGNOSIS   Your doctor will decide the seriousness of your pain by doing an examination.  · Blood tests.  · X-rays.  · Ultrasound.  · CT scan (computed tomography, special type of X-ray).  · MRI (magnetic resonance imaging).  · Cultures, for infection.  · Barium enema (dye inserted in the large intestine, to better view it with X-rays).  · Colonoscopy (looking in intestine with a lighted tube).  · Laparoscopy (minor surgery, looking in abdomen with a lighted tube).  · Major abdominal exploratory surgery (looking in abdomen with a large incision).  TREATMENT   The treatment will depend on the cause of the pain.   · Many cases can be observed and treated at home.  · Over-the-counter medicines recommended by your caregiver.  · Prescription medicine.  · Antibiotics, for infection.  · Birth control pills, for painful periods or for ovulation pain.  · Hormone treatment, for endometriosis.  · Nerve blocking injections.  · Physical therapy.  · Antidepressants.  · Counseling with a psychologist or psychiatrist.  · Minor or major surgery.  HOME CARE INSTRUCTIONS   · Do not take laxatives, unless directed by your caregiver.  · Take over-the-counter pain medicine only if ordered by your caregiver. Do not take aspirin because it can cause an upset stomach or bleeding.  · Try a clear liquid diet (broth or water) as ordered by your caregiver. Slowly move to a bland diet, as tolerated, if the pain is related to the stomach or intestine.  · Have a thermometer and take your temperature several times a day, and record it.  · Bed rest and sleep, if it helps the pain.  · Avoid sexual intercourse, if it causes  pain.  · Avoid stressful situations.  · Keep your follow-up appointments and tests, as your caregiver orders.  · If the pain does not go away with medicine or surgery, you may try:  · Acupuncture.  · Relaxation exercises (yoga, meditation).  · Group therapy.  · Counseling.  SEEK MEDICAL CARE IF:   · You notice certain foods cause stomach pain.  · Your home care treatment is not helping your pain.  · You need stronger pain medicine.  · You want your IUD removed.  · You feel faint or lightheaded.  · You develop nausea and vomiting.  · You develop a rash.  · You are having side effects or an allergy to your medicine.  SEEK IMMEDIATE MEDICAL CARE IF:   · Your pain does not go away or gets worse.  · You have a fever.  · Your pain is felt only in portions of the abdomen. The right side could possibly be appendicitis. The left lower portion of the abdomen could be colitis or diverticulitis.  · You are passing blood in your stools (bright red or black tarry stools, with or without vomiting).  · You have blood in your urine.  · You develop chills, with or without a fever.  · You pass out.  MAKE SURE YOU:   · Understand these instructions.  · Will watch your condition.  · Will get help right away if you are not doing well or get worse.  Document Released: 10/14/2008 Document Revised: 03/11/2013 Document Reviewed: 11/04/2010  ExitCare® Patient Information ©2014 [email or web address].            Patient Information     Patient Information    Following emergency treatment: all patient requiring follow-up care must return either to a private physician or a clinic if your condition worsens before you are able to obtain further medical attention, please return to the emergency room.     Billing Information    At [facility], we work to make the billing process streamlined for our patients.  Our Representatives are here to answer any questions you may have regarding your hospital bill.  If you have insurance coverage and have supplied  your insurance information to us, we will submit a claim to your insurer on your behalf.  Should you have any questions regarding your bill, we can be reached online or by phone as follows:  Online: You are able pay your bills online or live chat with our representatives about any billing questions you may have. We are here to help Monday - Friday from 8:00am to 7:30pm and 9:00am - 12:00pm on Saturdays.  Please visit [email or web address]  for more information.   Phone:  [phone] or [phone]    Please note that your emergency physician, surgeon, pathologist, radiologist, anesthesiologist, and other specialists are not employed by [facility] and will therefore bill separately for their services.  Please contact them directly for any questions concerning their bills at the numbers below:     [facility]:  [phone]  [facility]:  [phone]  [facility]:  [phone]  [facility]:  [phone]    1. Your final bill may vary from the amount quoted upon discharge if all procedures are not complete at that time, or if your doctor has additional procedures of which we are not aware. You will receive an additional bill if you return to the Emergency Department at [facility] for suture removal regardless of the facility of which the sutures were placed.     2. Please arrange for settlement of this account at the emergency registration.    3. All self-pay accounts are due in full at the time of treatment.  If you are unable to meet this obligation then payment is expected within 4-5 days.     4. If you have had radiology studies (CT, X-ray, Ultrasound, MRI), you have received a preliminary result during your emergency department visit. Please contact the radiology department [phone] to receive a copy of your final result. Please discuss the Final result with your primary physician or with the follow up  physician provided.     Crisis Hotline:  [place] Crisis Hotline:  9-316-MRFFUDE or 1-898.180.6677  [place] Crisis Hotline:    1-510.110.9713 or 607-214-7617         ED Discharge Follow Up Questions    1. In order to provide you with very good care, we would like to follow up with a phone call in the next few days.  May we have your permission to contact you?     YES /  NO    2. What is the best phone number to call you? (       )_____-__________    3. What is the best time to call you?      Morning  /  Afternoon  /  Evening                   Patient Signature:  ____________________________________________________________    Date:  ____________________________________________________________

## 2017-04-18 NOTE — ED NOTES
Discharged home with friend. Pt advised to follow up with digestive health if symptoms persist. If symptoms worsen or pt developers fever return to ed. Follow up with pcp.

## 2017-04-28 DIAGNOSIS — E87.6 HYPOKALEMIA: ICD-10-CM

## 2017-05-08 PROCEDURE — 99284 EMERGENCY DEPT VISIT MOD MDM: CPT

## 2017-05-08 ASSESSMENT — PAIN SCALES - GENERAL: PAINLEVEL_OUTOF10: 8

## 2017-05-09 ENCOUNTER — HOSPITAL ENCOUNTER (EMERGENCY)
Facility: MEDICAL CENTER | Age: 22
End: 2017-05-09
Attending: EMERGENCY MEDICINE
Payer: MEDICAID

## 2017-05-09 VITALS
BODY MASS INDEX: 25.76 KG/M2 | HEIGHT: 62 IN | TEMPERATURE: 98.2 F | OXYGEN SATURATION: 99 % | WEIGHT: 139.99 LBS | DIASTOLIC BLOOD PRESSURE: 62 MMHG | HEART RATE: 89 BPM | RESPIRATION RATE: 16 BRPM | SYSTOLIC BLOOD PRESSURE: 121 MMHG

## 2017-05-09 DIAGNOSIS — N30.01 ACUTE CYSTITIS WITH HEMATURIA: ICD-10-CM

## 2017-05-09 LAB
APPEARANCE UR: ABNORMAL
COLOR UR AUTO: ABNORMAL
GLUCOSE UR QL STRIP.AUTO: NEGATIVE MG/DL
HCG UR QL: NEGATIVE
KETONES UR QL STRIP.AUTO: NEGATIVE MG/DL
LEUKOCYTE ESTERASE UR QL STRIP.AUTO: ABNORMAL
NITRITE UR QL STRIP.AUTO: POSITIVE
PH UR STRIP.AUTO: 7 [PH]
PROT UR QL STRIP: >=300 MG/DL
RBC UR QL AUTO: ABNORMAL
SP GR UR: 1.02

## 2017-05-09 PROCEDURE — 87186 SC STD MICRODIL/AGAR DIL: CPT

## 2017-05-09 PROCEDURE — A9270 NON-COVERED ITEM OR SERVICE: HCPCS | Performed by: EMERGENCY MEDICINE

## 2017-05-09 PROCEDURE — 87086 URINE CULTURE/COLONY COUNT: CPT

## 2017-05-09 PROCEDURE — 81002 URINALYSIS NONAUTO W/O SCOPE: CPT

## 2017-05-09 PROCEDURE — 87077 CULTURE AEROBIC IDENTIFY: CPT

## 2017-05-09 PROCEDURE — 700102 HCHG RX REV CODE 250 W/ 637 OVERRIDE(OP): Performed by: EMERGENCY MEDICINE

## 2017-05-09 PROCEDURE — 81025 URINE PREGNANCY TEST: CPT

## 2017-05-09 RX ORDER — CIPROFLOXACIN 500 MG/1
500 TABLET, FILM COATED ORAL 2 TIMES DAILY
Qty: 6 TAB | Refills: 0 | Status: SHIPPED | OUTPATIENT
Start: 2017-05-09 | End: 2017-05-12

## 2017-05-09 RX ORDER — CIPROFLOXACIN 500 MG/1
500 TABLET, FILM COATED ORAL ONCE
Status: COMPLETED | OUTPATIENT
Start: 2017-05-09 | End: 2017-05-09

## 2017-05-09 RX ORDER — PHENAZOPYRIDINE HYDROCHLORIDE 200 MG/1
200 TABLET, FILM COATED ORAL 3 TIMES DAILY PRN
Qty: 6 TAB | Refills: 0 | Status: ON HOLD | OUTPATIENT
Start: 2017-05-09 | End: 2017-07-05

## 2017-05-09 RX ORDER — PHENAZOPYRIDINE HYDROCHLORIDE 200 MG/1
200 TABLET, FILM COATED ORAL ONCE
Status: COMPLETED | OUTPATIENT
Start: 2017-05-09 | End: 2017-05-09

## 2017-05-09 RX ADMIN — CIPROFLOXACIN HYDROCHLORIDE 500 MG: 500 TABLET, FILM COATED ORAL at 02:21

## 2017-05-09 RX ADMIN — PHENAZOPYRIDINE 200 MG: 200 TABLET ORAL at 02:21

## 2017-05-09 NOTE — ED AVS SNAPSHOT
Home Care Instructions                                                                                                                Ivis Klein   MRN: [id number]    Department:  [facility], Emergency Dept   Date of Visit:  5/8/2017            [facility], Emergency Dept    [address] [place] [id number]    Phone:  [phone]      You were seen by     Rosetta Roman M.D.      Your Diagnosis Was     Acute cystitis with hematuria     N30.01       These are the medications you received during your hospitalization from 05/08/2017 2024 to 05/09/2017 0223     Date/Time Order Dose Route Action    05/09/2017 0221 ciprofloxacin (CIPRO) tablet 500 mg 500 mg Oral Given    05/09/2017 0221 phenazopyridine (PYRIDIUM) tablet 200 mg 200 mg Oral Given      Follow-up Information     1. Follow up with [facility], Emergency Dept.    Specialty:  Emergency Medicine    Why:  If symptoms worsen    Contact information    [address] [place] [id number] [phone]      Medication Information     Review all of your home medications and newly ordered medications with your primary doctor and/or pharmacist as soon as possible. Follow medication instructions as directed by your doctor and/or pharmacist.     Please keep your complete medication list with you and share with your physician. Update the information when medications are discontinued, doses are changed, or new medications (including over-the-counter products) are added; and carry medication information at all times in the event of emergency situations.               Medication List      START taking these medications        Instructions    Morning Afternoon Evening Bedtime    ciprofloxacin 500 MG Tabs   Last time this was given:  500 mg on 5/9/2017  2:21 AM   Commonly known as:  CIPRO        Take 1 Tab by mouth 2 times a day for 3 days.   Dose:  500 mg                        phenazopyridine 200  MG Tabs   Last time this was given:  200 mg on 5/9/2017  2:21 AM   Commonly known as:  PYRIDIUM        Take 1 Tab by mouth 3 times a day as needed.   Dose:  200 mg                          ASK your doctor about these medications        Instructions    Morning Afternoon Evening Bedtime    oxycodone immediate-release 5 MG Tabs   Commonly known as:  ROXICODONE        Take 1-2 Tabs by mouth every four hours as needed (Moderate Pain (NRS Pain Scale 4-6; CPOT Pain Scale 3-5)).   Dose:  5-10 mg                        propranolol 40 MG Tabs   Commonly known as:  INDERAL        Take 1 Tab by mouth 2 times a day. Take 1 tablet twice a day for 3 days, 1 tablet daily for 3 days, 1/2 of a tablet daily for 4 days, then stop the medication.   Dose:  40 mg                        SYNTHROID 112 MCG Tabs   Generic drug:  levothyroxine        Take 1 Tab by mouth Every morning on an empty stomach.   Dose:  112 mcg                             Where to Get Your Medications      These medications were sent to [facility] [place] [address]  [address] [place] [address]     Phone:  [phone]    - ciprofloxacin 500 MG Tabs  - phenazopyridine 200 MG Tabs            Procedures and tests performed during your visit     POC UA    POC URINE PREGNANCY    URINE CULTURE        Discharge Instructions       Urinary Tract Infection  A urinary tract infection (UTI) can occur any place along the urinary tract. The tract includes the kidneys, ureters, bladder, and urethra. A type of germ called bacteria often causes a UTI. UTIs are often helped with antibiotic medicine.   HOME CARE   · If given, take antibiotics as told by your doctor. Finish them even if you start to feel better.  · Drink enough fluids to keep your pee (urine) clear or pale yellow.  · Avoid tea, drinks with caffeine, and bubbly (carbonated) drinks.  · Pee often. Avoid holding your pee in for a long time.  · Pee before and after having sex  (intercourse).  · Wipe from front to back after you poop (bowel movement) if you are a woman. Use each tissue only once.  GET HELP RIGHT AWAY IF:   · You have back pain.  · You have lower belly (abdominal) pain.  · You have chills.  · You feel sick to your stomach (nauseous).  · You throw up (vomit).  · Your burning or discomfort with peeing does not go away.  · You have a fever.  · Your symptoms are not better in 3 days.  MAKE SURE YOU:   · Understand these instructions.  · Will watch your condition.  · Will get help right away if you are not doing well or get worse.     This information is not intended to replace advice given to you by your health care provider. Make sure you discuss any questions you have with your health care provider.     Document Released: 06/05/2009 Document Revised: 01/08/2016 Document Reviewed: 07/18/2013  [employer] Interactive Patient Education ©2016 [employer]            Patient Information     Patient Information    Following emergency treatment: all patient requiring follow-up care must return either to a private physician or a clinic if your condition worsens before you are able to obtain further medical attention, please return to the emergency room.     Billing Information    At [facility], we work to make the billing process streamlined for our patients.  Our Representatives are here to answer any questions you may have regarding your hospital bill.  If you have insurance coverage and have supplied your insurance information to us, we will submit a claim to your insurer on your behalf.  Should you have any questions regarding your bill, we can be reached online or by phone as follows:  Online: You are able pay your bills online or live chat with our representatives about any billing questions you may have. We are here to help Monday - Friday from 8:00am to 7:30pm and 9:00am - 12:00pm on Saturdays.  Please visit [email or web address]  for more  information.   Phone:  [phone] or [phone]    Please note that your emergency physician, surgeon, pathologist, radiologist, anesthesiologist, and other specialists are not employed by [facility] and will therefore bill separately for their services.  Please contact them directly for any questions concerning their bills at the numbers below:     Emergency Physician Services:  [phone]  [facility]:  [phone]  [facility]:  [phone]  [facility]:  [phone]    1. Your final bill may vary from the amount quoted upon discharge if all procedures are not complete at that time, or if your doctor has additional procedures of which we are not aware. You will receive an additional bill if you return to the Emergency Department at [facility] for suture removal regardless of the facility of which the sutures were placed.     2. Please arrange for settlement of this account at the emergency registration.    3. All self-pay accounts are due in full at the time of treatment.  If you are unable to meet this obligation then payment is expected within 4-5 days.     4. If you have had radiology studies (CT, X-ray, Ultrasound, MRI), you have received a preliminary result during your emergency department visit. Please contact the radiology department [phone] to receive a copy of your final result. Please discuss the Final result with your primary physician or with the follow up physician provided.     Crisis Hotline:  [place] Crisis Hotline:  0-311-QEBJDYM or 1-791.266.9626  [place] Crisis Hotline:    1-368.684.6210 or 607-797-9907         ED Discharge Follow Up Questions    1. In order to provide you with very good care, we would like to follow up with a phone call in the next few days.  May we have your permission to contact you?     YES /  NO    2. What is the best phone number to call you? (       )_____-__________    3. What is the best time to call  you?      Morning  /  Afternoon  /  Evening                   Patient Signature:  ____________________________________________________________    Date:  ____________________________________________________________      Your appointments     May 31, 2017  1:15 PM   Established Patient with Kia Flores M.D.   [facility] - Internal Medicine (--)    [facility] [address] [place] [id number] [phone]           You will be receiving a confirmation call a few days before your appointment from our automated call confirmation system.

## 2017-05-09 NOTE — ED AVS SNAPSHOT
5/9/2017    Ivis Klein  [address] [place] [address]    Dear Ivis:    [facility] wants to ensure your discharge home is safe and you or your loved ones have had all of your questions answered regarding your care after you leave the hospital.    Below is a list of resources and contact information should you have any questions regarding your hospital stay, follow-up instructions, or active medical symptoms.    Questions or Concerns Regarding… Contact   Medical Questions Related to Your Discharge  (7 days a week, 8am-5pm) Contact a Nurse Care Coordinator   [phone]   Medical Questions Not Related to Your Discharge  (24 hours a day / 7 days a week)  Contact the Nurse Health Line   [phone]    Medications or Discharge Instructions Refer to your discharge packet   or contact your [facility] Primary Care Provider   [phone]   Follow-up Appointment(s) Schedule your appointment via [employer]   or contact Scheduling [phone]   Billing Review your statement via [employer]  or contact Billing [phone]   Medical Records Review your records via [employer]   or contact Medical Records [phone]     You may receive a telephone call within two days of discharge. This call is to make certain you understand your discharge instructions and have the opportunity to have any questions answered. You can also easily access your medical information, test results and upcoming appointments via the [employer]unch free online health management tool. You can learn more and sign up at [email or web address]. For assistance setting up your [employer] account, please call [phone].    Once again, we want to ensure your discharge home is safe and that you have a clear understanding of any next steps in your care. If you have any questions or concerns, please do not hesitate to contact us, we are here for you. Thank you for choosing [facility] for your healthcare needs.    Sincerely,    Your [facility] Healthcare Team

## 2017-05-09 NOTE — ED NOTES
Pt ambualtory to triage:  Chief Complaint   Patient presents with   • Painful Urination     x1 day, frequent urination, small amount.   • Blood in Urine   • Cramping     x1 day     Pt reports was seen 2 weeks ago in ED for similar symptoms, worsening over past 2 weeks.      Explained wait time and triage process to pt. Pt placed back out in lobby, told to notify ED tech or triage RN of any changes.

## 2017-05-09 NOTE — DISCHARGE INSTRUCTIONS
Urinary Tract Infection    Please follow up with a primary physician for blood pressure management, diabetic screening, and all other preventive health concerns.     A urinary tract infection (UTI) can occur any place along the urinary tract. The tract includes the kidneys, ureters, bladder, and urethra. A type of germ called bacteria often causes a UTI. UTIs are often helped with antibiotic medicine.   HOME CARE   · If given, take antibiotics as told by your doctor. Finish them even if you start to feel better.  · Drink enough fluids to keep your pee (urine) clear or pale yellow.  · Avoid tea, drinks with caffeine, and bubbly (carbonated) drinks.  · Pee often. Avoid holding your pee in for a long time.  · Pee before and after having sex (intercourse).  · Wipe from front to back after you poop (bowel movement) if you are a woman. Use each tissue only once.  GET HELP RIGHT AWAY IF:   · You have back pain.  · You have lower belly (abdominal) pain.  · You have chills.  · You feel sick to your stomach (nauseous).  · You throw up (vomit).  · Your burning or discomfort with peeing does not go away.  · You have a fever.  · Your symptoms are not better in 3 days.  MAKE SURE YOU:   · Understand these instructions.  · Will watch your condition.  · Will get help right away if you are not doing well or get worse.     This information is not intended to replace advice given to you by your health care provider. Make sure you discuss any questions you have with your health care provider.     Document Released: 06/05/2009 Document Revised: 01/08/2016 Document Reviewed: 07/18/2013  [employer] Interactive Patient Education ©2016 [employer]

## 2017-05-09 NOTE — ED PROVIDER NOTES
ED Provider Note    Scribed for Rosetta Roman M.D. by Serene Winter. 5/9/2017, 2:14 AM.    Primary care provider: None  Means of arrival: Walkin  History obtained from: patient  History limited by: none    CHIEF COMPLAINT  Chief Complaint   Patient presents with   • Painful Urination     x1 day, frequent urination, small amount.   • Blood in Urine   • Cramping     x1 day       HPI  Ivis Klein is a 21 y.o. female who presents to the Emergency Department for suprapubic pain the last two weeks. This worsened tonight when it woke her from sleep. She was seen for the same two weeks ago. Urination exacerbates the pain. There is associated frequency and blood with wiping. She denies dysuria and vomiting. Last menstruation was one month. She denies any allergies to medication.    REVIEW OF SYSTEMS  Pertinent positives include suprapubic pain, frequency, blood with wiping. Pertinent negatives include no dysuria, vomiting.  See HPI for further details.   E    PAST MEDICAL HISTORY   has a past medical history of Menarche; Migraine; Vomiting (6/29/2016); Hyperthyroidism (6/29/2016); Graves disease (12/5/2016); Anxiety (2/16/2017); Psychiatric problem; Supervision of normal first teen pregnancy; Pregnancy; Pericarditis (06/2016); Arrhythmia; and Breath shortness.    SURGICAL HISTORY  Past Surgical History   Procedure Laterality Date   • Primary c section  9/8/2013     Performed by Melisa Wright M.D. at LABOR AND DELIVERY   • Thyroidectomy total Bilateral 3/22/2017     Procedure: THYROIDECTOMY TOTAL -NIMS RECURRENT LARYNGEAL NERVE MONITORING;  Surgeon: Vicente Eldridge M.D.;  Location: SURGERY SAME DAY [facility];  Service:        SOCIAL HISTORY  Social History   Substance Use Topics   • Smoking status: Never Smoker    • Smokeless tobacco: Never Used      Comment: quit in 2012   • Alcohol Use: No      Comment: occ      History   Drug Use   • Yes   • Special: Marijuana     Comment: marijuana, just  "quit 3/13/17       FAMILY HISTORY  Family History   Problem Relation Age of Onset   • Cancer Paternal Grandmother 56     breast cancer       CURRENT MEDICATIONS  None    ALLERGIES  Allergies   Allergen Reactions   • Nkda [No Known Drug Allergy]        PHYSICAL EXAM  VITAL SIGNS: /61 mmHg  Pulse 86  Temp(Src) 36.8 °C (98.2 °F)  Resp 16  Ht 1.575 m (5' 2.01\")  Wt 63.5 kg (139 lb 15.9 oz)  BMI 25.60 kg/m2  SpO2 99%  LMP 04/12/2017 (Approximate)  Vitals reviewed.  Consitutional: Well-developed, well-nourished. Negative for: distress.  HENT: Normocephalic, atraumatic, right external ear normal, left external ear normal, oropharynx clear and moist.  Eyes: Conjunctivae normal, negative for left and right eye discharge.  Neck: Range of motion normal, supple.   Musculoskeletal: Normal range of motion.  Back: No CVA tenderness  Neurological: Alert and oriented x3.  Skin: Warm, dry. Negative for: erythema, rash.  Psych: Mood/affect normal, behavior normal.    DIAGNOSTIC STUDIES / PROCEDURES  LABS  Results for orders placed or performed during the hospital encounter of 05/09/17   POC UA   Result Value Ref Range    POC Color Felisha     POC Appearance Turbid (A)     POC Glucose Negative Negative mg/dL    POC Ketones Negative Negative mg/dL    POC Specific Gravity 1.025 1.005-1.030    POC Blood Large (A) Negative    POC Urine PH 7.0 5.0-8.0    POC Protein >=300 (A) Negative mg/dL    POC Nitrites Positive (A) Negative    POC Leukocyte Esterase Large (A) Negative   POC URINE PREGNANCY   Result Value Ref Range    POC Urine Pregnancy Test Negative Negative   All labs reviewed by me.    Urine has been sent for culture    COURSE & MEDICAL DECISION MAKING  Nursing notes, VS, PMSFHx reviewed in chart.    2:14 AM - Patient seen and examined at bedside. Ordered for urine pregnancy, poc UA to evaluate. Patient will be treated with 500mg cipro, 200mg pyridium for her symptoms. Explained that she has a UTI. Discussed plan for " discharge; I advised the patient to follow-up with [facility], and to return to the [facility] ED with any new or worsening symptoms, including fever. Patient was given the opportunity for questions. I addressed all questions or concerns at this time and they verbalize agreement to the plan of care.      The patient will return for new or worsening symptoms and is stable at the time of discharge.      DISPOSITION:  Patient will be discharged home in stable condition.    FOLLOW UP:  [facility], Emergency Dept  [address] [address] [phone]    If symptoms worsen      OUTPATIENT MEDICATIONS:  Discharge Medication List as of 5/9/2017  2:23 AM      START taking these medications    Details   ciprofloxacin (CIPRO) 500 MG Tab Take 1 Tab by mouth 2 times a day for 3 days., Disp-6 Tab, R-0, Normal      phenazopyridine (PYRIDIUM) 200 MG Tab Take 1 Tab by mouth 3 times a day as needed., Disp-6 Tab, R-0, Normal           FINAL IMPRESSION  1. Acute cystitis with hematuria       Serene HARRIS), am scribing for, and in the presence of, Rosetta Roman M.D.    Electronically signed by: Serene Aguilar), 5/9/2017    Rosetta HARRIS M.D. personally performed the services described in this documentation, as scribed by Serene Winter in my presence, and it is both accurate and complete.    The note accurately reflects work and decisions made by me.  Rosetta Roman  5/9/2017  4:28 AM

## 2017-05-09 NOTE — ED AVS SNAPSHOT
[facility] Access Code: Activation code not generated  Current [facility] Status: Patient Declined    Your email address is not on file at [facility].  Email Addresses are required for you to sign up for [facility], please contact [phone] to verify your personal information and to provide your email address prior to attempting to register for [facility].    Ivis Klein  [address] [place] [address]    iClinicalt  A secure, online tool to manage your health information     [facility]’s [facility]® is a secure, online tool that connects you to your personalized health information from the privacy of your home -- day or night - making it very easy for you to manage your healthcare. Once the activation process is completed, you can even access your medical information using the [facility] kt, which is available for free in the Apple Kt store or Google Play store.     To learn more about [facility], visit [email or web address]    There are two levels of access available (as shown below):   My Chart Features  [facility] Primary Care Doctor [facility]  Specialists [facility]  Urgent  Care Non-[facility] Primary Care Doctor   Email your healthcare team securely and privately 24/7 X X X    Manage appointments: schedule your next appointment; view details of past/upcoming appointments X      Request prescription refills. X      View recent personal medical records, including lab and immunizations X X X X   View health record, including health history, allergies, medications X X X X   Read reports about your outpatient visits, procedures, consult and ER notes X X X X   See your discharge summary, which is a recap of your hospital and/or ER visit that includes your diagnosis, lab results, and care plan X X  X     How to register for [employer]:  Once your e-mail address has been verified, follow the following steps to sign up for [employer].     1. Go to  [email or web address]  2. Click on the Sign Up Now box, which takes you to the  New Member Sign Up page. You will need to provide the following information:  a. Enter your [employer] Access Code exactly as it appears at the top of this page. (You will not need to use this code after you’ve completed the sign-up process. If you do not sign up before the expiration date, you must request a new code.)   b. Enter your date of birth.   c. Enter your home email address.   d. Click Submit, and follow the next screen’s instructions.  3. Create a [employer] ID. This will be your [employer] login ID and cannot be changed, so think of one that is secure and easy to remember.  4. Create a [employer] password. You can change your password at any time.  5. Enter your Password Reset Question and Answer. This can be used at a later time if you forget your password.   6. Enter your e-mail address. This allows you to receive e-mail notifications when new information is available in [employer].  7. Click Sign Up. You can now view your health information.    For assistance activating your [employer] account, call [phone]

## 2017-05-09 NOTE — ED NOTES
Pt discharged no IV in place and 2 prescriptions given to have filled.  Pt having friend  in lobby and ambulated well on own out of ED

## 2017-05-11 LAB
BACTERIA UR CULT: ABNORMAL
BACTERIA UR CULT: ABNORMAL
SIGNIFICANT IND 70042: ABNORMAL
SITE SITE: ABNORMAL
SOURCE SOURCE: ABNORMAL

## 2017-05-11 NOTE — ED NOTES
ED Positive Culture Follow-up/Notification Note:    Date: 5/11/17     Patient seen in the ED on 5/8/2017 for suprapubic pain x 2 weeks exacerbated by urination.   1. Acute cystitis with hematuria       Discharge Medication List as of 5/9/2017  2:23 AM      START taking these medications    Details   ciprofloxacin (CIPRO) 500 MG Tab Take 1 Tab by mouth 2 times a day for 3 days., Disp-6 Tab, R-0, Normal      phenazopyridine (PYRIDIUM) 200 MG Tab Take 1 Tab by mouth 3 times a day as needed., Disp-6 Tab, R-0, Normal             Allergies: Nkda     Final cultures:   Results     Procedure Component Value Units Date/Time    URINE CULTURE [[id number]]  (Abnormal)  (Susceptibility) Collected:  05/09/17 0142    Order Status:  Completed Specimen Information:  Urine Updated:  05/11/17 0850     Significant Indicator POS (POS)      Source UR      Site --      Urine Culture -- (A)      Urine Culture -- (A)      Result:        Escherichia coli  >100,000 cfu/mL      Narrative:      Indication for culture:->Dysuria/Frequency/Burning    Culture & Susceptibility     ESCHERICHIA COLI     Antibiotic Sensitivity Microscan Unit Status    Ampicillin Sensitive <=8 mcg/mL Final    Cefepime Sensitive <=8 mcg/mL Final    Cefotaxime Sensitive <=2 mcg/mL Final    Cefotetan Sensitive <=16 mcg/mL Final    Ceftazidime Sensitive <=1 mcg/mL Final    Ceftriaxone Sensitive <=8 mcg/mL Final    Cefuroxime Sensitive <=4 mcg/mL Final    Cephalothin Sensitive <=8 mcg/mL Final    Ciprofloxacin Sensitive <=1 mcg/mL Final    Gentamicin Sensitive <=4 mcg/mL Final    Levofloxacin Sensitive <=2 mcg/mL Final    Nitrofurantoin Sensitive <=32 mcg/mL Final    Pip/Tazobactam Sensitive <=16 mcg/mL Final    Piperacillin Sensitive <=16 mcg/mL Final    Tigecycline Sensitive <=2 mcg/mL Final    Tobramycin Sensitive <=4 mcg/mL Final    Trimeth/Sulfa Sensitive <=2/38 mcg/mL Final                             Plan:   Appropriate antibiotic therapy prescribed. No changes required  based upon culture result.      Salina Gutierrez

## 2017-07-03 ENCOUNTER — HOSPITAL ENCOUNTER (INPATIENT)
Facility: MEDICAL CENTER | Age: 22
LOS: 2 days | DRG: 081 | End: 2017-07-05
Attending: EMERGENCY MEDICINE | Admitting: HOSPITALIST
Payer: MEDICAID

## 2017-07-03 ENCOUNTER — RESOLUTE PROFESSIONAL BILLING HOSPITAL PROF FEE (OUTPATIENT)
Dept: HOSPITALIST | Facility: MEDICAL CENTER | Age: 22
End: 2017-07-03
Payer: MEDICAID

## 2017-07-03 ENCOUNTER — APPOINTMENT (OUTPATIENT)
Dept: RADIOLOGY | Facility: MEDICAL CENTER | Age: 22
DRG: 081 | End: 2017-07-03
Attending: EMERGENCY MEDICINE
Payer: MEDICAID

## 2017-07-03 DIAGNOSIS — E87.20 LACTIC ACIDOSIS: ICD-10-CM

## 2017-07-03 DIAGNOSIS — E03.9 HYPOTHYROIDISM, UNSPECIFIED TYPE: ICD-10-CM

## 2017-07-03 PROBLEM — Z91.148 NONCOMPLIANCE WITH MEDICATION REGIMEN: Status: ACTIVE | Noted: 2017-07-03

## 2017-07-03 PROBLEM — E03.5 HYPOTHYROID COMA (HCC): Status: ACTIVE | Noted: 2017-07-03

## 2017-07-03 PROBLEM — R79.89 TSH ELEVATION: Status: ACTIVE | Noted: 2017-07-03

## 2017-07-03 LAB
ALBUMIN SERPL BCP-MCNC: 4.9 G/DL (ref 3.2–4.9)
ALBUMIN/GLOB SERPL: 1.3 G/DL
ALP SERPL-CCNC: 136 U/L (ref 30–99)
ALT SERPL-CCNC: 9 U/L (ref 2–50)
ANION GAP SERPL CALC-SCNC: 18 MMOL/L (ref 0–11.9)
APPEARANCE UR: ABNORMAL
AST SERPL-CCNC: 18 U/L (ref 12–45)
BACTERIA #/AREA URNS HPF: ABNORMAL /HPF
BASOPHILS # BLD AUTO: 0.7 % (ref 0–1.8)
BASOPHILS # BLD: 0.06 K/UL (ref 0–0.12)
BILIRUB SERPL-MCNC: 1.1 MG/DL (ref 0.1–1.5)
BILIRUB UR QL STRIP.AUTO: NEGATIVE
BUN SERPL-MCNC: 9 MG/DL (ref 8–22)
CALCIUM SERPL-MCNC: 9.8 MG/DL (ref 8.5–10.5)
CHLORIDE SERPL-SCNC: 102 MMOL/L (ref 96–112)
CK SERPL-CCNC: 151 U/L (ref 0–154)
CO2 SERPL-SCNC: 19 MMOL/L (ref 20–33)
COLOR UR: YELLOW
CORTIS SERPL-MCNC: 36.1 UG/DL (ref 0–23)
CREAT SERPL-MCNC: 0.76 MG/DL (ref 0.5–1.4)
CULTURE IF INDICATED INDCX: YES UA CULTURE
EOSINOPHIL # BLD AUTO: 0.01 K/UL (ref 0–0.51)
EOSINOPHIL NFR BLD: 0.1 % (ref 0–6.9)
EPI CELLS #/AREA URNS HPF: ABNORMAL /HPF
ERYTHROCYTE [DISTWIDTH] IN BLOOD BY AUTOMATED COUNT: 52.7 FL (ref 35.9–50)
GFR SERPL CREATININE-BSD FRML MDRD: >60 ML/MIN/1.73 M 2
GLOBULIN SER CALC-MCNC: 3.8 G/DL (ref 1.9–3.5)
GLUCOSE SERPL-MCNC: 92 MG/DL (ref 65–99)
GLUCOSE UR STRIP.AUTO-MCNC: NEGATIVE MG/DL
HCG SERPL QL: NEGATIVE
HCT VFR BLD AUTO: 39 % (ref 37–47)
HGB BLD-MCNC: 12.5 G/DL (ref 12–16)
IMM GRANULOCYTES # BLD AUTO: 0.02 K/UL (ref 0–0.11)
IMM GRANULOCYTES NFR BLD AUTO: 0.2 % (ref 0–0.9)
KETONES UR STRIP.AUTO-MCNC: >=160 MG/DL
LACTATE BLD-SCNC: 1.7 MMOL/L (ref 0.5–2)
LACTATE BLD-SCNC: 4.3 MMOL/L (ref 0.5–2)
LEUKOCYTE ESTERASE UR QL STRIP.AUTO: NEGATIVE
LYMPHOCYTES # BLD AUTO: 2.21 K/UL (ref 1–4.8)
LYMPHOCYTES NFR BLD: 24.3 % (ref 22–41)
MCH RBC QN AUTO: 27.2 PG (ref 27–33)
MCHC RBC AUTO-ENTMCNC: 32.1 G/DL (ref 33.6–35)
MCV RBC AUTO: 84.8 FL (ref 81.4–97.8)
MICRO URNS: ABNORMAL
MONOCYTES # BLD AUTO: 0.48 K/UL (ref 0–0.85)
MONOCYTES NFR BLD AUTO: 5.3 % (ref 0–13.4)
MUCOUS THREADS #/AREA URNS HPF: ABNORMAL /HPF
NEUTROPHILS # BLD AUTO: 6.33 K/UL (ref 2–7.15)
NEUTROPHILS NFR BLD: 69.4 % (ref 44–72)
NITRITE UR QL STRIP.AUTO: NEGATIVE
NRBC # BLD AUTO: 0 K/UL
NRBC BLD AUTO-RTO: 0 /100 WBC
PH UR STRIP.AUTO: 8 [PH]
PLATELET # BLD AUTO: 309 K/UL (ref 164–446)
PMV BLD AUTO: 11.5 FL (ref 9–12.9)
POTASSIUM SERPL-SCNC: 3.3 MMOL/L (ref 3.6–5.5)
PROT SERPL-MCNC: 8.7 G/DL (ref 6–8.2)
PROT UR QL STRIP: NEGATIVE MG/DL
RBC # BLD AUTO: 4.6 M/UL (ref 4.2–5.4)
RBC # URNS HPF: ABNORMAL /HPF
RBC UR QL AUTO: NEGATIVE
SODIUM SERPL-SCNC: 139 MMOL/L (ref 135–145)
SP GR UR STRIP.AUTO: 1.02
T4 FREE SERPL-MCNC: 0.61 NG/DL (ref 0.53–1.43)
TROPONIN I SERPL-MCNC: <0.01 NG/ML (ref 0–0.04)
TSH SERPL DL<=0.005 MIU/L-ACNC: 82.2 UIU/ML (ref 0.3–3.7)
WBC # BLD AUTO: 9.1 K/UL (ref 4.8–10.8)
WBC #/AREA URNS HPF: ABNORMAL /HPF

## 2017-07-03 PROCEDURE — 700102 HCHG RX REV CODE 250 W/ 637 OVERRIDE(OP): Performed by: HOSPITALIST

## 2017-07-03 PROCEDURE — 700111 HCHG RX REV CODE 636 W/ 250 OVERRIDE (IP): Performed by: HOSPITALIST

## 2017-07-03 PROCEDURE — 36415 COLL VENOUS BLD VENIPUNCTURE: CPT

## 2017-07-03 PROCEDURE — 84439 ASSAY OF FREE THYROXINE: CPT

## 2017-07-03 PROCEDURE — 700105 HCHG RX REV CODE 258: Performed by: EMERGENCY MEDICINE

## 2017-07-03 PROCEDURE — 81001 URINALYSIS AUTO W/SCOPE: CPT

## 2017-07-03 PROCEDURE — 96375 TX/PRO/DX INJ NEW DRUG ADDON: CPT

## 2017-07-03 PROCEDURE — 82550 ASSAY OF CK (CPK): CPT

## 2017-07-03 PROCEDURE — 85025 COMPLETE CBC W/AUTO DIFF WBC: CPT

## 2017-07-03 PROCEDURE — 700105 HCHG RX REV CODE 258: Performed by: HOSPITALIST

## 2017-07-03 PROCEDURE — 82533 TOTAL CORTISOL: CPT

## 2017-07-03 PROCEDURE — 99223 1ST HOSP IP/OBS HIGH 75: CPT | Performed by: HOSPITALIST

## 2017-07-03 PROCEDURE — 700111 HCHG RX REV CODE 636 W/ 250 OVERRIDE (IP): Performed by: EMERGENCY MEDICINE

## 2017-07-03 PROCEDURE — 84703 CHORIONIC GONADOTROPIN ASSAY: CPT

## 2017-07-03 PROCEDURE — 770022 HCHG ROOM/CARE - ICU (200)

## 2017-07-03 PROCEDURE — 96374 THER/PROPH/DIAG INJ IV PUSH: CPT

## 2017-07-03 PROCEDURE — 84443 ASSAY THYROID STIM HORMONE: CPT

## 2017-07-03 PROCEDURE — A9270 NON-COVERED ITEM OR SERVICE: HCPCS | Performed by: HOSPITALIST

## 2017-07-03 PROCEDURE — 80053 COMPREHEN METABOLIC PANEL: CPT

## 2017-07-03 PROCEDURE — 99291 CRITICAL CARE FIRST HOUR: CPT

## 2017-07-03 PROCEDURE — 87086 URINE CULTURE/COLONY COUNT: CPT

## 2017-07-03 PROCEDURE — 83605 ASSAY OF LACTIC ACID: CPT

## 2017-07-03 PROCEDURE — 84484 ASSAY OF TROPONIN QUANT: CPT

## 2017-07-03 PROCEDURE — 700101 HCHG RX REV CODE 250: Performed by: HOSPITALIST

## 2017-07-03 PROCEDURE — 71010 DX-CHEST-PORTABLE (1 VIEW): CPT

## 2017-07-03 RX ORDER — OXYCODONE HYDROCHLORIDE 5 MG/1
5-10 TABLET ORAL EVERY 4 HOURS PRN
Status: DISCONTINUED | OUTPATIENT
Start: 2017-07-03 | End: 2017-07-03

## 2017-07-03 RX ORDER — LEVOTHYROXINE SODIUM 0.05 MG/1
125 TABLET ORAL
Status: DISCONTINUED | OUTPATIENT
Start: 2017-07-04 | End: 2017-07-05 | Stop reason: HOSPADM

## 2017-07-03 RX ORDER — SODIUM CHLORIDE 9 MG/ML
1000 INJECTION, SOLUTION INTRAVENOUS ONCE
Status: COMPLETED | OUTPATIENT
Start: 2017-07-03 | End: 2017-07-03

## 2017-07-03 RX ORDER — PHENAZOPYRIDINE HYDROCHLORIDE 200 MG/1
200 TABLET, FILM COATED ORAL 3 TIMES DAILY PRN
Status: DISCONTINUED | OUTPATIENT
Start: 2017-07-03 | End: 2017-07-05 | Stop reason: HOSPADM

## 2017-07-03 RX ORDER — POLYETHYLENE GLYCOL 3350 17 G/17G
1 POWDER, FOR SOLUTION ORAL
Status: DISCONTINUED | OUTPATIENT
Start: 2017-07-03 | End: 2017-07-05 | Stop reason: HOSPADM

## 2017-07-03 RX ORDER — OXYCODONE HYDROCHLORIDE 5 MG/1
5 TABLET ORAL
Status: DISCONTINUED | OUTPATIENT
Start: 2017-07-03 | End: 2017-07-05 | Stop reason: HOSPADM

## 2017-07-03 RX ORDER — AMOXICILLIN 250 MG
2 CAPSULE ORAL 2 TIMES DAILY
Status: DISCONTINUED | OUTPATIENT
Start: 2017-07-03 | End: 2017-07-05 | Stop reason: HOSPADM

## 2017-07-03 RX ORDER — BISACODYL 10 MG
10 SUPPOSITORY, RECTAL RECTAL
Status: DISCONTINUED | OUTPATIENT
Start: 2017-07-03 | End: 2017-07-05 | Stop reason: HOSPADM

## 2017-07-03 RX ORDER — ONDANSETRON 4 MG/1
4 TABLET, ORALLY DISINTEGRATING ORAL EVERY 4 HOURS PRN
Status: DISCONTINUED | OUTPATIENT
Start: 2017-07-03 | End: 2017-07-05 | Stop reason: HOSPADM

## 2017-07-03 RX ORDER — SODIUM CHLORIDE 9 MG/ML
INJECTION, SOLUTION INTRAVENOUS CONTINUOUS
Status: DISCONTINUED | OUTPATIENT
Start: 2017-07-03 | End: 2017-07-04

## 2017-07-03 RX ORDER — OXYCODONE HYDROCHLORIDE 5 MG/1
2.5 TABLET ORAL
Status: DISCONTINUED | OUTPATIENT
Start: 2017-07-03 | End: 2017-07-05 | Stop reason: HOSPADM

## 2017-07-03 RX ORDER — ACETAMINOPHEN 325 MG/1
650 TABLET ORAL EVERY 6 HOURS PRN
Status: DISCONTINUED | OUTPATIENT
Start: 2017-07-03 | End: 2017-07-05 | Stop reason: HOSPADM

## 2017-07-03 RX ORDER — LIOTHYRONINE SODIUM 5 UG/1
10 TABLET ORAL ONCE
Status: COMPLETED | OUTPATIENT
Start: 2017-07-03 | End: 2017-07-03

## 2017-07-03 RX ORDER — ONDANSETRON 2 MG/ML
4 INJECTION INTRAMUSCULAR; INTRAVENOUS EVERY 4 HOURS PRN
Status: DISCONTINUED | OUTPATIENT
Start: 2017-07-03 | End: 2017-07-05 | Stop reason: HOSPADM

## 2017-07-03 RX ORDER — MORPHINE SULFATE 4 MG/ML
2 INJECTION, SOLUTION INTRAMUSCULAR; INTRAVENOUS
Status: DISCONTINUED | OUTPATIENT
Start: 2017-07-03 | End: 2017-07-05 | Stop reason: HOSPADM

## 2017-07-03 RX ORDER — ONDANSETRON 2 MG/ML
4 INJECTION INTRAMUSCULAR; INTRAVENOUS ONCE
Status: COMPLETED | OUTPATIENT
Start: 2017-07-03 | End: 2017-07-03

## 2017-07-03 RX ORDER — MORPHINE SULFATE 4 MG/ML
4 INJECTION, SOLUTION INTRAMUSCULAR; INTRAVENOUS ONCE
Status: COMPLETED | OUTPATIENT
Start: 2017-07-03 | End: 2017-07-03

## 2017-07-03 RX ORDER — HEPARIN SODIUM 5000 [USP'U]/ML
5000 INJECTION, SOLUTION INTRAVENOUS; SUBCUTANEOUS EVERY 8 HOURS
Status: DISCONTINUED | OUTPATIENT
Start: 2017-07-04 | End: 2017-07-05 | Stop reason: HOSPADM

## 2017-07-03 RX ORDER — PROMETHAZINE HYDROCHLORIDE 25 MG/1
12.5-25 SUPPOSITORY RECTAL EVERY 4 HOURS PRN
Status: DISCONTINUED | OUTPATIENT
Start: 2017-07-03 | End: 2017-07-05 | Stop reason: HOSPADM

## 2017-07-03 RX ORDER — PROMETHAZINE HYDROCHLORIDE 25 MG/1
12.5-25 TABLET ORAL EVERY 4 HOURS PRN
Status: DISCONTINUED | OUTPATIENT
Start: 2017-07-03 | End: 2017-07-05 | Stop reason: HOSPADM

## 2017-07-03 RX ADMIN — SODIUM CHLORIDE 1000 ML: 9 INJECTION, SOLUTION INTRAVENOUS at 18:47

## 2017-07-03 RX ADMIN — SODIUM CHLORIDE: 9 INJECTION, SOLUTION INTRAVENOUS at 22:40

## 2017-07-03 RX ADMIN — MORPHINE SULFATE 4 MG: 4 INJECTION INTRAVENOUS at 18:47

## 2017-07-03 RX ADMIN — OXYCODONE HYDROCHLORIDE 5 MG: 5 TABLET ORAL at 22:40

## 2017-07-03 RX ADMIN — PROCHLORPERAZINE EDISYLATE 10 MG: 5 INJECTION INTRAMUSCULAR; INTRAVENOUS at 22:40

## 2017-07-03 RX ADMIN — LIOTHYRONINE SODIUM 10 MCG: 5 TABLET ORAL at 23:07

## 2017-07-03 RX ADMIN — ONDANSETRON 4 MG: 2 INJECTION INTRAMUSCULAR; INTRAVENOUS at 18:47

## 2017-07-03 RX ADMIN — MORPHINE SULFATE 4 MG: 4 INJECTION INTRAVENOUS at 21:22

## 2017-07-03 RX ADMIN — LEVOTHYROXINE SODIUM ANHYDROUS 50 MCG: 100 INJECTION, POWDER, LYOPHILIZED, FOR SOLUTION INTRAVENOUS at 23:07

## 2017-07-03 ASSESSMENT — COGNITIVE AND FUNCTIONAL STATUS - GENERAL
EATING MEALS: A LITTLE
CLIMB 3 TO 5 STEPS WITH RAILING: A LOT
WALKING IN HOSPITAL ROOM: A LOT
SUGGESTED CMS G CODE MODIFIER DAILY ACTIVITY: CK
STANDING UP FROM CHAIR USING ARMS: A LITTLE
MOBILITY SCORE: 17
HELP NEEDED FOR BATHING: A LITTLE
DAILY ACTIVITIY SCORE: 17
PERSONAL GROOMING: A LITTLE
MOVING FROM LYING ON BACK TO SITTING ON SIDE OF FLAT BED: A LOT
DRESSING REGULAR LOWER BODY CLOTHING: A LOT
TOILETING: A LITTLE
DRESSING REGULAR UPPER BODY CLOTHING: A LITTLE
SUGGESTED CMS G CODE MODIFIER MOBILITY: CK

## 2017-07-03 ASSESSMENT — LIFESTYLE VARIABLES
TOTAL SCORE: 0
EVER_SMOKED: NEVER
EVER HAD A DRINK FIRST THING IN THE MORNING TO STEADY YOUR NERVES TO GET RID OF A HANGOVER: NO
DO YOU DRINK ALCOHOL: YES
CONSUMPTION TOTAL: INCOMPLETE
EVER HAD A DRINK FIRST THING IN THE MORNING TO STEADY YOUR NERVES TO GET RID OF A HANGOVER: NO
HAVE YOU EVER FELT YOU SHOULD CUT DOWN ON YOUR DRINKING: NO
EVER FELT BAD OR GUILTY ABOUT YOUR DRINKING: NO
TOTAL SCORE: 0
HAVE PEOPLE ANNOYED YOU BY CRITICIZING YOUR DRINKING: NO
HAVE PEOPLE ANNOYED YOU BY CRITICIZING YOUR DRINKING: NO
HAVE YOU EVER FELT YOU SHOULD CUT DOWN ON YOUR DRINKING: NO
TOTAL SCORE: 0
TOTAL SCORE: 0
CONSUMPTION TOTAL: INCOMPLETE
ALCOHOL_USE: YES
EVER FELT BAD OR GUILTY ABOUT YOUR DRINKING: NO

## 2017-07-03 ASSESSMENT — ENCOUNTER SYMPTOMS
MYALGIAS: 1
HEADACHES: 1
NECK PAIN: 1
CHILLS: 1
NAUSEA: 1
DIARRHEA: 1
VOMITING: 1

## 2017-07-03 ASSESSMENT — PAIN SCALES - GENERAL
PAINLEVEL_OUTOF10: 8
PAINLEVEL_OUTOF10: 10
PAINLEVEL_OUTOF10: 8

## 2017-07-03 ASSESSMENT — PATIENT HEALTH QUESTIONNAIRE - PHQ9
1. LITTLE INTEREST OR PLEASURE IN DOING THINGS: NOT AT ALL
SUM OF ALL RESPONSES TO PHQ9 QUESTIONS 1 AND 2: 0
SUM OF ALL RESPONSES TO PHQ QUESTIONS 1-9: 0
2. FEELING DOWN, DEPRESSED, IRRITABLE, OR HOPELESS: NOT AT ALL

## 2017-07-03 NOTE — IP AVS SNAPSHOT
" Home Care Instructions                                                                                                                  Name:Ivis Klein  Medical Record Number:[id number]  CSN: [id number]    YOB: 1995   Age: 21 y.o.  Sex: female  HT:1.575 m (5' 2\") (18 %, Z = -0.90, Source: [facility] 2-20 Years) WT: 79.8 kg (175 lb 14.8 oz) (93 %, Z = 1.51, Source: [facility] 2-20 Years)          Admit Date: 7/3/2017     Discharge Date:   Today's Date: 7/5/2017  Attending Doctor:  Tristin Sanchez M.D.                  Allergies:  Nkda            Discharge Instructions       Discharge Instructions    Discharged to home  by car with relative. Discharged via wheelchair, hospital escort: Yes.  Special equipment needed: none    Be sure to schedule a follow-up appointment with your primary care doctor or any specialists as instructed.     Discharge Plan:   Influenza Vaccine Indication: Indicated: Adults > or equal to 18 years of age with severe allergy to eggs (Flublok vaccine)  Influenza Vaccine Given - only chart on this line when given:  (pt already given )    I understand that a diet low in cholesterol, fat, and sodium is recommended for good health. Unless I have been given specific instructions below for another diet, I accept this instruction as my diet prescription.   Other diet: Regular diet    Special Instructions: None    · Is patient discharged on Warfarin / Coumadin?   no    · Is patient Post Blood Transfusion?  no    Depression / Suicide Risk    As you are discharged from this [facility] facility, it is important to learn how to keep safe from harming yourself.    Recognize the warning signs:  · Abrupt changes in personality, positive or negative- including increase in energy   · Giving away possessions  · Change in eating patterns- significant weight changes-  positive or negative  · Change in sleeping patterns- unable to sleep or sleeping all the time   · Unwillingness or inability to " communicate  · Depression  · Unusual sadness, discouragement and loneliness  · Talk of wanting to die  · Neglect of personal appearance   · Rebelliousness- reckless behavior  · Withdrawal from people/activities they love  · Confusion- inability to concentrate     If you or a loved one observes any of these behaviors or has concerns about self-harm, here's what you can do:  · Talk about it- your feelings and reasons for harming yourself  · Remove any means that you might use to hurt yourself (examples: pills, rope, extension cords, firearm)  · Get professional help from the community (Mental Health, Substance Abuse, psychological counseling)  · Do not be alone:Call your Safe Contact- someone whom you trust who will be there for you.  · Call your local CRISIS HOTLINE 805-6022 or 598-354-3411  · Call your local Children's Mobile Crisis Response Team [place] (227) 114-1101 or www.xCloud  · Call the toll free National Suicide Prevention Hotlines   · National Suicide Prevention Lifeline 229-982-BFUH (7472)  · [place] Hope Line Network 800-SUICIDE (266-3474)        Follow-up Information     1. Follow up with [facility]. Schedule an appointment as soon as possible for a visit in 1 week.    Why:   left a message with the office to contact patient and schedule an appointment.    Contact information    [address] [place] [address]  [phone]           Discharge Medication Instructions:    Below are the medications your physician expects you to take upon discharge:    Review all your home medications and newly ordered medications with your doctor and/or pharmacist. Follow medication instructions as directed by your doctor and/or pharmacist.    Please keep your medication list with you and share with your physician.               Medication List      CHANGE how you take these medications        Instructions    Morning Afternoon Evening Bedtime    levothyroxine 125 MCG Tabs    What changed:    - medication strength  - how much to take   Last time this was given:  125 mcg on 7/5/2017  6:18 AM   Commonly known as:  SYNTHROID        Take 1 Tab by mouth Every morning on an empty stomach.   Dose:  125 mcg                          CONTINUE taking these medications        Instructions    Morning Afternoon Evening Bedtime    oxycodone immediate-release 5 MG Tabs   Last time this was given:  5 mg on 7/3/2017 10:40 PM   Commonly known as:  ROXICODONE        Take 1-2 Tabs by mouth every four hours as needed (Moderate Pain (NRS Pain Scale 4-6; CPOT Pain Scale 3-5)).   Dose:  5-10 mg                          STOP taking these medications     phenazopyridine 200 MG Tabs   Commonly known as:  PYRIDIUM               propranolol 40 MG Tabs   Commonly known as:  INDERAL                    Where to Get Your Medications      Information about where to get these medications is not yet available     ! Ask your nurse or doctor about these medications    - levothyroxine 125 MCG Tabs            Instructions           Diet / Nutrition:    Follow any diet instructions given to you by your doctor or the dietician, including how much salt (sodium) you are allowed each day.    If you are overweight, talk to your doctor about a weight reduction plan.    Activity:    Remain physically active following your doctor's instructions about exercise and activity.    Rest often.     Any time you become even a little tired or short of breath, SIT DOWN and rest.    Worsening Symptoms:    Report any of the following signs and symptoms to the doctor's office immediately:    *Pain of jaw, arm, or neck  *Chest pain not relieved by medication                               *Dizziness or loss of consciousness  *Difficulty breathing even when at rest   *More tired than usual                                       *Bleeding drainage or swelling of surgical site  *Swelling of feet, ankles, legs or stomach                 *Fever  (>100ºF)  *Pink or blood tinged sputum  *Weight gain (3lbs/day or 5lbs /week)           *Shock from internal defibrillator (if applicable)  *Palpitations or irregular heartbeats                *Cool and/or numb extremities    Stroke Awareness    Common Risk Factors for Stroke include:    Age  Atrial Fibrillation  Carotid Artery Stenosis  Diabetes Mellitus  Excessive alcohol consumption  High blood pressure  Overweight   Physical inactivity  Smoking    Warning signs and symptoms of a stroke include:    *Sudden numbness or weakness of the face, arm or leg (especially on one side of the body).  *Sudden confusion, trouble speaking or understanding.  *Sudden trouble seeing in one or both eyes.  *Sudden trouble walking, dizziness, loss of balance or coordination.Sudden severe headache with no known cause.    It is very important to get treatment quickly when a stroke occurs. If you experience any of the above warning signs, call 911 immediately.                   Disclaimer         Quit Smoking / Tobacco Use:    I understand the use of any tobacco products increases my chance of suffering from future heart disease or stroke and could cause other illnesses which may shorten my life. Quitting the use of tobacco products is the single most important thing I can do to improve my health. For further information on smoking / tobacco cessation call a Toll Free Quit Line at 1-188.872.6343 (*National Cancer [place]) or 1-310.384.1334 (American Lung Association) or you can access the web based program at www.lungusa.org.    [place] Tobacco Users Help Line:  (494) 779-5998       Toll Free: 1-663.324.3144  Quit Tobacco Program [facility] (799)448-1808    Crisis Hotline:    [place] Crisis Hotline:  1-548-WJUEUAA or 1-994.734.6913    [place] Crisis Hotline:    1-394.234.6076 or 573-484-2468    Discharge Survey:   Thank you for choosing [facility]. We hope we did everything we could to make your hospital stay  a pleasant one. You may be receiving a phone survey and we would appreciate your time and participation in answering the questions. Your input is very valuable to us in our efforts to improve our service to our patients and their families.        My signature on this form indicates that:    1. I have reviewed and understand the above information.  2. My questions regarding this information have been answered to my satisfaction.  3. I have formulated a plan with my discharge nurse to obtain my prescribed medications for home.                  Disclaimer         __________________________________                     __________       ________                       Patient Signature                                                 Date                    Time

## 2017-07-03 NOTE — IP AVS SNAPSHOT
" <p align=\"LEFT\"><IMG SRC=\"[email or web address]\" alt=\"Image\" WIDTH=\"50%\" HEIGHT=\"200\" BORDER=\"\"></p>                   Name:Ivis Klein  Medical Record Number:[id number]  CSN: [id number]    YOB: 1995   Age: 21 y.o.  Sex: female  HT:1.575 m (5' 2\") (18 %, Z = -0.90, Source: [facility] 2-20 Years) WT: 79.8 kg (175 lb 14.8 oz) (93 %, Z = 1.51, Source: [facility] 2-20 Years)          Admit Date: 7/3/2017     Discharge Date:   Today's Date: 7/5/2017  Attending Doctor:  Tristin Sanchez M.D.                  Allergies:  Nkda          Follow-up Information     1. Follow up with [facility]. Schedule an appointment as soon as possible for a visit in 1 week.    Why:   left a message with the office to contact patient and schedule an appointment.    Contact information    [address] [facility] [id number] [phone]           Medication List      Take these Medications        Instructions    levothyroxine 125 MCG Tabs   What changed:    - medication strength  - how much to take   Commonly known as:  SYNTHROID    Take 1 Tab by mouth Every morning on an empty stomach.   Dose:  125 mcg       oxycodone immediate-release 5 MG Tabs   Commonly known as:  ROXICODONE    Take 1-2 Tabs by mouth every four hours as needed (Moderate Pain (NRS Pain Scale 4-6; CPOT Pain Scale 3-5)).   Dose:  5-10 mg         "

## 2017-07-03 NOTE — IP AVS SNAPSHOT
7/5/2017    Ivis Klein  [address] [address]    Dear Ivis:    [facility] wants to ensure your discharge home is safe and you or your loved ones have had all of your questions answered regarding your care after you leave the hospital.    Below is a list of resources and contact information should you have any questions regarding your hospital stay, follow-up instructions, or active medical symptoms.    Questions or Concerns Regarding… Contact   Medical Questions Related to Your Discharge  (7 days a week, 8am-5pm) Contact a Nurse Care Coordinator   [phone]   Medical Questions Not Related to Your Discharge  (24 hours a day / 7 days a week)  Contact the Nurse Health Line   [phone]    Medications or Discharge Instructions Refer to your discharge packet   or contact your [facility] Primary Care Provider   [phone]   Follow-up Appointment(s) Schedule your appointment via Easy [employer]ings   or contact Scheduling [phone]   Billing Review your statement via [employer]  or contact Billing [phone]   Medical Records Review your records via [employer]   or contact Medical Records [phone]     You may receive a telephone call within two days of discharge. This call is to make certain you understand your discharge instructions and have the opportunity to have any questions answered. You can also easily access your medical information, test results and upcoming appointments via the [employer]ings free online health management tool. You can learn more and sign up at [email or web address]/Easy [email or web address]. For assistance setting up your [employer] account, please call [phone].    Once again, we want to ensure your discharge home is safe and that you have a clear understanding of any next steps in your care. If you have any questions or concerns, please do not hesitate to contact us, we are here for you. Thank you for choosing [facility] for your healthcare needs.    Sincerely,    Your [facility] Healthcare Team

## 2017-07-03 NOTE — IP AVS SNAPSHOT
"2,10E+07" Access Code: Activation code not generated  Current "2,10E+07" Status: Patient Declined    Your email address is not on file at [facility].  Email Addresses are required for you to sign up for "2,10E+07", please contact [phone] to verify your personal information and to provide your email address prior to attempting to register for "2,10E+07".    Ivisadriana Dawkinsz  [address] [place] [address]    [employer]  A secure, online tool to manage your health information     [facility]’s "2,10E+07"® is a secure, online tool that connects you to your personalized health information from the privacy of your home -- day or night - making it very easy for you to manage your healthcare. Once the activation process is completed, you can even access your medical information using the "2,10E+07" kt, which is available for free in the Apple Kt store or Google Play store.     To learn more about "2,10E+07", visit [email or web address]    There are two levels of access available (as shown below):   My Chart Features  [facility] Primary Care Doctor [facility]  Specialists [facility]  Urgent  Care Non-[facility] Primary Care Doctor   Email your healthcare team securely and privately 24/7 X X X    Manage appointments: schedule your next appointment; view details of past/upcoming appointments X      Request prescription refills. X      View recent personal medical records, including lab and immunizations X X X X   View health record, including health history, allergies, medications X X X X   Read reports about your outpatient visits, procedures, consult and ER notes X X X X   See your discharge summary, which is a recap of your hospital and/or ER visit that includes your diagnosis, lab results, and care plan X X  X     How to register for [employer]:  Once your e-mail address has been verified, follow the following steps to sign up for [employer].     1. Go to  [email or web address]  2. Click on the Sign Up Now box, which takes you to  the New Member Sign Up page. You will need to provide the following information:  a. Enter your Tappit Access Code exactly as it appears at the top of this page. (You will not need to use this code after you’ve completed the sign-up process. If you do not sign up before the expiration date, you must request a new code.)   b. Enter your date of birth.   c. Enter your home email address.   d. Click Submit, and follow the next screen’s instructions.  3. Create a Tappit ID. This will be your Tappit login ID and cannot be changed, so think of one that is secure and easy to remember.  4. Create a Tappit password. You can change your password at any time.  5. Enter your Password Reset Question and Answer. This can be used at a later time if you forget your password.   6. Enter your e-mail address. This allows you to receive e-mail notifications when new information is available in Tappit.  7. Click Sign Up. You can now view your health information.    For assistance activating your [employer] account, call [phone]

## 2017-07-04 PROBLEM — E87.6 HYPOKALEMIA: Status: ACTIVE | Noted: 2017-07-04

## 2017-07-04 PROBLEM — Z91.148 NONCOMPLIANCE WITH MEDICATION REGIMEN: Status: ACTIVE | Noted: 2017-07-04

## 2017-07-04 PROBLEM — Z91.148 NONCOMPLIANCE WITH MEDICATION REGIMEN: Status: RESOLVED | Noted: 2017-07-03 | Resolved: 2017-07-04

## 2017-07-04 PROBLEM — R79.89 TSH ELEVATION: Status: RESOLVED | Noted: 2017-07-03 | Resolved: 2017-07-04

## 2017-07-04 PROBLEM — R79.89 TSH ELEVATION: Status: ACTIVE | Noted: 2017-07-04

## 2017-07-04 LAB
ALBUMIN SERPL BCP-MCNC: 3.7 G/DL (ref 3.2–4.9)
ALBUMIN/GLOB SERPL: 1.4 G/DL
ALP SERPL-CCNC: 95 U/L (ref 30–99)
ALT SERPL-CCNC: 6 U/L (ref 2–50)
ANION GAP SERPL CALC-SCNC: 9 MMOL/L (ref 0–11.9)
AST SERPL-CCNC: 13 U/L (ref 12–45)
BASOPHILS # BLD AUTO: 0.3 % (ref 0–1.8)
BASOPHILS # BLD: 0.03 K/UL (ref 0–0.12)
BILIRUB SERPL-MCNC: 0.7 MG/DL (ref 0.1–1.5)
BUN SERPL-MCNC: 6 MG/DL (ref 8–22)
CALCIUM SERPL-MCNC: 7.9 MG/DL (ref 8.5–10.5)
CHLORIDE SERPL-SCNC: 106 MMOL/L (ref 96–112)
CO2 SERPL-SCNC: 21 MMOL/L (ref 20–33)
CREAT SERPL-MCNC: 0.54 MG/DL (ref 0.5–1.4)
EKG IMPRESSION: NORMAL
EOSINOPHIL # BLD AUTO: 0 K/UL (ref 0–0.51)
EOSINOPHIL NFR BLD: 0 % (ref 0–6.9)
ERYTHROCYTE [DISTWIDTH] IN BLOOD BY AUTOMATED COUNT: 52.9 FL (ref 35.9–50)
GFR SERPL CREATININE-BSD FRML MDRD: >60 ML/MIN/1.73 M 2
GLOBULIN SER CALC-MCNC: 2.6 G/DL (ref 1.9–3.5)
GLUCOSE SERPL-MCNC: 99 MG/DL (ref 65–99)
HCT VFR BLD AUTO: 31.7 % (ref 37–47)
HGB BLD-MCNC: 10.2 G/DL (ref 12–16)
IMM GRANULOCYTES # BLD AUTO: 0.04 K/UL (ref 0–0.11)
IMM GRANULOCYTES NFR BLD AUTO: 0.3 % (ref 0–0.9)
LYMPHOCYTES # BLD AUTO: 1.41 K/UL (ref 1–4.8)
LYMPHOCYTES NFR BLD: 11.9 % (ref 22–41)
MAGNESIUM SERPL-MCNC: 1.6 MG/DL (ref 1.5–2.5)
MCH RBC QN AUTO: 27.6 PG (ref 27–33)
MCHC RBC AUTO-ENTMCNC: 32.2 G/DL (ref 33.6–35)
MCV RBC AUTO: 85.9 FL (ref 81.4–97.8)
MONOCYTES # BLD AUTO: 0.39 K/UL (ref 0–0.85)
MONOCYTES NFR BLD AUTO: 3.3 % (ref 0–13.4)
NEUTROPHILS # BLD AUTO: 9.94 K/UL (ref 2–7.15)
NEUTROPHILS NFR BLD: 84.2 % (ref 44–72)
NRBC # BLD AUTO: 0 K/UL
NRBC BLD AUTO-RTO: 0 /100 WBC
PLATELET # BLD AUTO: 252 K/UL (ref 164–446)
PMV BLD AUTO: 11.2 FL (ref 9–12.9)
POTASSIUM SERPL-SCNC: 3.6 MMOL/L (ref 3.6–5.5)
PROT SERPL-MCNC: 6.3 G/DL (ref 6–8.2)
RBC # BLD AUTO: 3.69 M/UL (ref 4.2–5.4)
SODIUM SERPL-SCNC: 136 MMOL/L (ref 135–145)
WBC # BLD AUTO: 11.8 K/UL (ref 4.8–10.8)

## 2017-07-04 PROCEDURE — 93005 ELECTROCARDIOGRAM TRACING: CPT | Performed by: HOSPITALIST

## 2017-07-04 PROCEDURE — 700111 HCHG RX REV CODE 636 W/ 250 OVERRIDE (IP): Performed by: HOSPITALIST

## 2017-07-04 PROCEDURE — 700102 HCHG RX REV CODE 250 W/ 637 OVERRIDE(OP): Performed by: HOSPITALIST

## 2017-07-04 PROCEDURE — 80053 COMPREHEN METABOLIC PANEL: CPT

## 2017-07-04 PROCEDURE — 99232 SBSQ HOSP IP/OBS MODERATE 35: CPT | Performed by: HOSPITALIST

## 2017-07-04 PROCEDURE — 85025 COMPLETE CBC W/AUTO DIFF WBC: CPT

## 2017-07-04 PROCEDURE — A9270 NON-COVERED ITEM OR SERVICE: HCPCS | Performed by: HOSPITALIST

## 2017-07-04 PROCEDURE — 770006 HCHG ROOM/CARE - MED/SURG/GYN SEMI*

## 2017-07-04 PROCEDURE — 700105 HCHG RX REV CODE 258: Performed by: HOSPITALIST

## 2017-07-04 PROCEDURE — 93010 ELECTROCARDIOGRAM REPORT: CPT | Performed by: INTERNAL MEDICINE

## 2017-07-04 PROCEDURE — 83735 ASSAY OF MAGNESIUM: CPT

## 2017-07-04 RX ORDER — POTASSIUM CHLORIDE 750 MG/1
40 TABLET, FILM COATED, EXTENDED RELEASE ORAL ONCE
Status: COMPLETED | OUTPATIENT
Start: 2017-07-04 | End: 2017-07-04

## 2017-07-04 RX ADMIN — SODIUM CHLORIDE: 9 INJECTION, SOLUTION INTRAVENOUS at 09:36

## 2017-07-04 RX ADMIN — HEPARIN SODIUM 5000 UNITS: 5000 INJECTION, SOLUTION INTRAVENOUS; SUBCUTANEOUS at 15:10

## 2017-07-04 RX ADMIN — ACETAMINOPHEN 650 MG: 325 TABLET, FILM COATED ORAL at 09:35

## 2017-07-04 RX ADMIN — LEVOTHYROXINE SODIUM 125 MCG: 50 TABLET ORAL at 06:04

## 2017-07-04 RX ADMIN — POTASSIUM CHLORIDE 40 MEQ: 750 TABLET, FILM COATED, EXTENDED RELEASE ORAL at 02:36

## 2017-07-04 RX ADMIN — ACETAMINOPHEN 650 MG: 325 TABLET, FILM COATED ORAL at 17:27

## 2017-07-04 RX ADMIN — HEPARIN SODIUM 5000 UNITS: 5000 INJECTION, SOLUTION INTRAVENOUS; SUBCUTANEOUS at 06:05

## 2017-07-04 ASSESSMENT — PAIN SCALES - GENERAL
PAINLEVEL_OUTOF10: 7
PAINLEVEL_OUTOF10: 2
PAINLEVEL_OUTOF10: 0
PAINLEVEL_OUTOF10: 7

## 2017-07-04 NOTE — PROGRESS NOTES
[facility] Hospitalist Progress Note    Date of Service: 2017    Chief Complaint  21 y.o. female admitted 7/3/2017 with fatigue.      Interval Problem Update  Ms. Klein has a hx of Graves Dz s/p thyroidectomy that was on 112 mcg of synthroid though had not taken it for nearly a week prior to presentation. In the ER she had a TSH of 82 consistent with hypothyroid coma. She also was noted to have a lactic acid over 4 and was admitted to the ICU for IV fluids and further work up.  Today she feels better. Her cousin is at bedside.  Her nurse notes that she had been bradycardic last night but not today.    Her pregnancy test is negative.  Pt seen and evaluated in the ICU.  Consultants/Specialty  none    Disposition  medical        ROS   Physical Exam  Laboratory/Imaging   Hemodynamics  Temp (24hrs), Av °C (96.8 °F), Min:35.6 °C (96.1 °F), Max:36.2 °C (97.2 °F)   Temperature: 36.2 °C (97.2 °F)  Pulse  Av.1  Min: 55  Max: 85 Heart Rate (Monitored): (!) 55  Blood Pressure: 132/83 mmHg, NIBP: (!) 96/55 mmHg      Respiratory      Respiration: (!) 10, Pulse Oximetry: 100 %        RUL Breath Sounds: Clear;Diminished, RML Breath Sounds: Clear;Diminished, RLL Breath Sounds: Clear;Diminished, LEOBARDO Breath Sounds: Clear;Diminished, LLL Breath Sounds: Clear;Diminished    Fluids    Intake/Output Summary (Last 24 hours) at 17 0815  Last data filed at 17 0600   Gross per 24 hour   Intake    975 ml   Output      0 ml   Net    975 ml       Nutrition  Orders Placed This Encounter   Procedures   • DIET ORDER     Standing Status: Standing      Number of Occurrences: 1      Standing Expiration Date:      Order Specific Question:  Diet:     Answer:  Regular [1]     Physical Exam    Recent Labs      17   1806  17   0208   WBC  9.1  11.8*   RBC  4.60  3.69*   HEMOGLOBIN  12.5  10.2*   HEMATOCRIT  39.0  31.7*   MCV  84.8  85.9   MCH  27.2  27.6   MCHC  32.1*  32.2*   RDW  52.7*  52.9*   PLATELETCT  309  252   MPV   11.5  11.2     Recent Labs      07/03/17   1806  07/04/17   0208   SODIUM  139  136   POTASSIUM  3.3*  3.6   CHLORIDE  102  106   CO2  19*  21   GLUCOSE  92  99   BUN  9  6*   CREATININE  0.76  0.54   CALCIUM  9.8  7.9*                      Assessment/Plan     Hypothyroid coma (CMS-HCC) (present on admission)  Assessment & Plan  TSH 82. Free T4 0.6.  IV synthroid given  Synthroid 125 mcg started.      Graves disease (present on admission)  Assessment & Plan  S/p thyroidectomy.    Lactic acidosis (present on admission)  Assessment & Plan  Lactic acid was 4.3 on admit.   No apparent source of infection.   IV fluids have been given.    Hypokalemia (present on admission)  Assessment & Plan  K 3.3 on admit.  Replacement given.     Core Measures

## 2017-07-04 NOTE — ED NOTES
Norm RN at bedside for report.  Patient transported by ICU RN, pt placed on zoll.  Family at bedside.  Pt has chart and all belongings.

## 2017-07-04 NOTE — ED PROVIDER NOTES
ED Provider Note    Scribed for Jaime Ennis M.D. by Chilango Dotson. 7/3/2017, 6:34 PM.    Primary care provider: Pcp Not In Computer  Means of arrival: Walk-In  History obtained from: Patient  History limited by: None    CHIEF COMPLAINT  Chief Complaint   Patient presents with   • Numbness     to legs and L arm   • Leg Pain     both legs and L arm       HPI  Ivis Klein is a 21 y.o. female who presents to the Emergency Department complaining of numbness and body aches onset 3 hours prior to arrival. Patient had a total thyroidectomy on  and has been doing fine since then. She was prescribed Synthroid but has ran out of her supplement 4 days ago when they increased the dosage. She currently has associated neck pain, chest pain, headache, diarrhea, nausea, vomiting, chills. Denies hematemesis, vaginal bleeding, vaginal discharge, dysuria, leg swelling. The patient endorses smoking marijuana. She has a history of a .     REVIEW OF SYSTEMS  Review of Systems   Constitutional: Positive for chills.   Cardiovascular: Positive for chest pain. Negative for leg swelling.   Gastrointestinal: Positive for nausea, vomiting (No blood) and diarrhea.   Genitourinary: Negative for dysuria.   Musculoskeletal: Positive for myalgias and neck pain.   Neurological: Positive for headaches.        + Numbness   All other systems reviewed and are negative.    C.    PAST MEDICAL HISTORY   has a past medical history of Menarche; Migraine; Vomiting (2016); Hyperthyroidism (2016); Graves disease (2016); Anxiety (2017); Psychiatric problem; Supervision of normal first teen pregnancy; Pregnancy; Pericarditis (2016); Arrhythmia; and Breath shortness.    SURGICAL HISTORY   has past surgical history that includes primary c section (2013) and thyroidectomy total (Bilateral, 3/22/2017).    SOCIAL HISTORY  Social History   Substance Use Topics   • Smoking status: Never Smoker    •  "Smokeless tobacco: Never Used      Comment: quit in 2012   • Alcohol Use: No      Comment: occ      History   Drug Use   • Yes   • Special: Marijuana     Comment: marijuana, just quit 3/13/17       FAMILY HISTORY  Family History   Problem Relation Age of Onset   • Cancer Paternal Grandmother 56     breast cancer       CURRENT MEDICATIONS  No current facility-administered medications on file prior to encounter.     Current Outpatient Prescriptions on File Prior to Encounter   Medication Sig Dispense Refill   • phenazopyridine (PYRIDIUM) 200 MG Tab Take 1 Tab by mouth 3 times a day as needed. 6 Tab 0   • oxycodone immediate-release (ROXICODONE) 5 MG Tab Take 1-2 Tabs by mouth every four hours as needed (Moderate Pain (NRS Pain Scale 4-6; CPOT Pain Scale 3-5)). 30 Tab 0   • propranolol (INDERAL) 40 MG Tab Take 1 Tab by mouth 2 times a day. Take 1 tablet twice a day for 3 days, 1 tablet daily for 3 days, 1/2 of a tablet daily for 4 days, then stop the medication. 11 Tab 0   • SYNTHROID 112 MCG Tab Take 1 Tab by mouth Every morning on an empty stomach. 30 Tab 2     ALLERGIES  Allergies   Allergen Reactions   • Nkda [No Known Drug Allergy]        PHYSICAL EXAM  VITAL SIGNS: /83 mmHg  Pulse 66  Temp(Src) 36.1 °C (97 °F)  Resp 18  Ht 1.575 m (5' 2\")  Wt 61.6 kg (135 lb 12.9 oz)  BMI 24.83 kg/m2  SpO2 100%    Constitutional: Moderate discomfort  HENT:  Slightly dry mucous membranes  Eyes: No conjunctivitis or icterus  Neck: trachea is midline, healing scar  Lymphatic: No cervical lymphadenopathy  Cardiovascular: Regular rate and rhythm, no murmurs  Thorax & Lungs: Normal breath sounds, no rhonchi  Abdomen: Soft, Non-tender  Skin:. no rash  Back: Non-tender, no CVA tenderness  Extremities:  no edema  Vascular: symmetric radial pulse  Neurologic: Normal gross motor    LABS  Labs Reviewed   CBC WITH DIFFERENTIAL - Abnormal; Notable for the following:     MCHC 32.1 (*)     RDW 52.7 (*)     All other components " within normal limits   COMP METABOLIC PANEL - Abnormal; Notable for the following:     Potassium 3.3 (*)     Co2 19 (*)     Anion Gap 18.0 (*)     Alkaline Phosphatase 136 (*)     Total Protein 8.7 (*)     Globulin 3.8 (*)     All other components within normal limits   LACTIC ACID - Abnormal; Notable for the following:     Lactic Acid 4.3 (*)     All other components within normal limits   URINALYSIS,CULTURE IF INDICATED - Abnormal; Notable for the following:     Character Cloudy (*)     All other components within normal limits   TSH - Abnormal; Notable for the following:     TSH 82.200 (*)     All other components within normal limits   URINE MICROSCOPIC (W/UA) - Abnormal; Notable for the following:     WBC 5-10 (*)     Bacteria Many (*)     Epithelial Cells Many (*)     All other components within normal limits   TROPONIN   HCG QUAL SERUM   CREATINE KINASE   FREE THYROXINE   ESTIMATED GFR   URINE CULTURE(NEW)   CORTISOL   LACTIC ACID     All labs reviewed by me.    RADIOLOGY  DX-CHEST-PORTABLE (1 VIEW)   Final Result      No acute cardiac or pulmonary abnormality is noted.        The radiologist's interpretation of all radiological studies have been reviewed by me.    COURSE & MEDICAL DECISION MAKING  Pertinent Labs & Imaging studies reviewed. (See chart for details)    6:34 PM - Patient seen and examined at bedside. Patient will be treated with IV fluids for hydration, 4mg Morphine, 4mg Zofran. Ordered DX-Chest, Creatine Kinase, TSH, Free Thyroxine, CBC, CMP, Troponin, Lactic Acid, Urinalysis, HCG Qual Serum to evaluate her symptoms. The differential diagnoses include but are not limited to: Rule out hypothyroidism.     8:21 PM - Consulted with Dr. Borja, Hospitalist, who is aware of the patient and agrees to admit.    8:24 PM - Patient seen at bedside and is feeling slightly better. I discussed the lab and radiology results noted above and that the patient will be admitted for further treatment. They understand  and verbalized agreement.    Medical Decision Making:  Patient has multiple complaints the arose acutely today. They include a headache muscle aching nausea vomiting. Patient's labs are obtained she has a significant lactic acidosis 4.3 and a significant TSH elevation in the 80s. She is given IV fluid and pain medication and antinausea with some improvement. Patient's rechecked and still feels uncomfortable.    I spent more than 30 minutes of critical care time with this patient    DISPOSITION:  Patient will be admitted to Dr. Borja, Hospitalist, in guarded condition.    FINAL IMPRESSION  1. Hypothyroidism, unspecified type    2. Lactic acidosis     critical care 30 minutes     Chilango HARRIS (Scribe), am scribing for, and in the presence of, Jaime Ennis M.D..    Electronically signed by: Chilango Dotson (Scribe), 7/3/2017    Jaime HARRIS M.D. personally performed the services described in this documentation, as scribed by Chilango Dotson in my presence, and it is both accurate and complete.    The note accurately reflects work and decisions made by me.  Jaime Ennis  7/3/2017  9:52 PM

## 2017-07-04 NOTE — ED NOTES
Art from Lab called with critical result of lactic 4.3 at 1854. Critical lab result read back to Art.   Dr. san notified of critical lab result at 1854.

## 2017-07-04 NOTE — ED NOTES
"Ambulates to triage with assistance from family  Chief Complaint   Patient presents with   • Numbness     to legs and L arm   • Leg Pain     both legs and L arm     Pt had her thyroid removed in March this year, her endocrinologist increased her synthroid, but it has not been approved yet.  Has been off her thyroid meds for 2 days, \"it feels like a thyroid storm.\"  Charge RN aware of pt.   "

## 2017-07-04 NOTE — PROGRESS NOTES
Md brown notified of patients decreased pulse (40s).  Md advised nurse to continue to monitor. Md advises to continue plan of care.

## 2017-07-04 NOTE — H&P
HOSPITAL MEDICINE HISTORY/ PHYSICAL    Date of Service:  7/3/2017   11:50 PM       Patient ID:   Name: Ivis Klein. YOB: 1995. Age: 21 y.o. female. MRN: [id number]    Admitting Attending:  Alex Borja     PCP : Pcp Not In Computer    Chief Complaint:       Fatigue, inability to get medications    History of Present Illness:    Ernie is a 21 y.o. female w/h/o Graves' disease status post thyroidectomy, anxiety, depression, shortness of breath who presents with fatigue and inability to get medications. Patient previously had a total thyroidectomy back in March 2017 for Graves' disease. This was done by Dr. Eldridge. Patient was then started on levothyroxine. She is previously on apparently 112 µg. However, her doctor wanted to increase it. Thus the patient stopped taking the 112 µg 4-6 days ago. Patient was ready to switch to the new dosing but she was not able to obtain it from the pharmacy as the pharmacy said that the doctor had not written the prescription yet. Thus the patient was off all levothyroxine for at least 4-6 days. She became progressively fatigued that time period and started having nausea and vomiting. She became dehydrated and thus came to the hospital.    Review of Systems:    Has headache, chills, chest pain, shortness of breath, cough, nausea, vomiting, lightheadedness, hands numb and feet hurt  Please see HPI, all other systems were reviewed and are negative (AMA/CMS criteria)              Past Medical/ Family / Social history (PFSH):   Past Medical History   Diagnosis Date   • Menarche      started at age 14   • Migraine    • Vomiting 6/29/2016   • Hyperthyroidism 6/29/2016   • Graves disease 12/5/2016   • Anxiety 2/16/2017   • Psychiatric problem      anxiety, depression   • Supervision of normal first teen pregnancy    • Pregnancy      delivered 9/8/2013, 3/16/17 pt reports that she is not pregnant at this time   • Pericarditis 06/2016   • Arrhythmia       "tachycardia \"pt reports d/t thryoid\"   • Breath shortness      no c/o at this time; c/o sob at night unsure if it is d/t anxiety   • Heart valve disease      Past Surgical History   Procedure Laterality Date   • Primary c section  9/8/2013     Performed by Melisa Wright M.D. at [facility] DELIVERY   • Thyroidectomy total Bilateral 3/22/2017     Procedure: THYROIDECTOMY TOTAL -NIMS RECURRENT LARYNGEAL NERVE MONITORING;  Surgeon: Vicente Eldridge M.D.;  Location: SURGERY SAME DAY [facility];  Service:      Current Outpatient Medications:  No current facility-administered medications on file prior to encounter.     Current Outpatient Prescriptions on File Prior to Encounter   Medication Sig Dispense Refill   • phenazopyridine (PYRIDIUM) 200 MG Tab Take 1 Tab by mouth 3 times a day as needed. 6 Tab 0   • oxycodone immediate-release (ROXICODONE) 5 MG Tab Take 1-2 Tabs by mouth every four hours as needed (Moderate Pain (NRS Pain Scale 4-6; CPOT Pain Scale 3-5)). 30 Tab 0   • propranolol (INDERAL) 40 MG Tab Take 1 Tab by mouth 2 times a day. Take 1 tablet twice a day for 3 days, 1 tablet daily for 3 days, 1/2 of a tablet daily for 4 days, then stop the medication. 11 Tab 0   • SYNTHROID 112 MCG Tab Take 1 Tab by mouth Every morning on an empty stomach. 30 Tab 2     Medication Allergy/Sensitivities:  Allergies   Allergen Reactions   • Nkda [No Known Drug Allergy]      Family History:  Family History   Problem Relation Age of Onset   • Cancer Paternal Grandmother 56     breast cancer      Social History:  Social History   Substance Use Topics   • Smoking status: Never Smoker    • Smokeless tobacco: Never Used      Comment: quit in 2012   • Alcohol Use: No      Comment: occ     #################################################################  Physical Exam:   Vitals/ General Appearance:   Weight/BMI: Body mass index is 32.17 kg/(m^2).  Blood pressure 132/83, pulse 70, temperature 36.1 °C (97 °F), resp. rate 18, " "height 1.575 m (5' 2\"), weight 79.8 kg (175 lb 14.8 oz), last menstrual period 06/03/2016, SpO2 100 %, not currently breastfeeding.   Filed Vitals:    07/03/17 2030 07/03/17 2100 07/03/17 2130 07/03/17 2200   BP:       Pulse: 68 74 83 70   Temp:       Resp:       Height:    1.575 m (5' 2\")   Weight:    79.8 kg (175 lb 14.8 oz)   SpO2: 100% 100% 100%     Oxygen Therapy:  Pulse Oximetry: 100 %, O2 (LPM): 2, O2 Delivery: Silicone Nasal Cannula    Constitutional:  well developed, well nourished, but very fatigued  HENMT: Normocephalic, atraumatic, b/l ears normal, nose normal  Eyes:  EOMI, conjunctiva normal, no discharge  Neck: no tracheal deviation, supple  Cardiovascular: normal heart rate, normal rhythm, no murmurs, no rubs or gallops; no cyanosis, clubbing or edema  Lungs: Respiratory effort is normal, normal breath sounds, breath sounds clear to auscultation b/l, no rales, rhonchi or wheezing  Abdomen: soft, non-tender, no guarding or rebound  Skin: warm, dry, no erythema, no rash  Neurologic: Alert but very fatigued  Psychiatric: Some anxiety or depression    #################################################################  Lab Data Review:    Objective  Recent Results (from the past 24 hour(s))   CBC WITH DIFFERENTIAL    Collection Time: 07/03/17  6:06 PM   Result Value Ref Range    WBC 9.1 4.8 - 10.8 K/uL    RBC 4.60 4.20 - 5.40 M/uL    Hemoglobin 12.5 12.0 - 16.0 g/dL    Hematocrit 39.0 37.0 - 47.0 %    MCV 84.8 81.4 - 97.8 fL    MCH 27.2 27.0 - 33.0 pg    MCHC 32.1 (L) 33.6 - 35.0 g/dL    RDW 52.7 (H) 35.9 - 50.0 fL    Platelet Count 309 164 - 446 K/uL    MPV 11.5 9.0 - 12.9 fL    Neutrophils-Polys 69.40 44.00 - 72.00 %    Lymphocytes 24.30 22.00 - 41.00 %    Monocytes 5.30 0.00 - 13.40 %    Eosinophils 0.10 0.00 - 6.90 %    Basophils 0.70 0.00 - 1.80 %    Immature Granulocytes 0.20 0.00 - 0.90 %    Nucleated RBC 0.00 /100 WBC    Neutrophils (Absolute) 6.33 2.00 - 7.15 K/uL    Lymphs (Absolute) 2.21 1.00 - " 4.80 K/uL    Monos (Absolute) 0.48 0.00 - 0.85 K/uL    Eos (Absolute) 0.01 0.00 - 0.51 K/uL    Baso (Absolute) 0.06 0.00 - 0.12 K/uL    Immature Granulocytes (abs) 0.02 0.00 - 0.11 K/uL    NRBC (Absolute) 0.00 K/uL   COMP METABOLIC PANEL    Collection Time: 07/03/17  6:06 PM   Result Value Ref Range    Sodium 139 135 - 145 mmol/L    Potassium 3.3 (L) 3.6 - 5.5 mmol/L    Chloride 102 96 - 112 mmol/L    Co2 19 (L) 20 - 33 mmol/L    Anion Gap 18.0 (H) 0.0 - 11.9    Glucose 92 65 - 99 mg/dL    Bun 9 8 - 22 mg/dL    Creatinine 0.76 0.50 - 1.40 mg/dL    Calcium 9.8 8.5 - 10.5 mg/dL    AST(SGOT) 18 12 - 45 U/L    ALT(SGPT) 9 2 - 50 U/L    Alkaline Phosphatase 136 (H) 30 - 99 U/L    Total Bilirubin 1.1 0.1 - 1.5 mg/dL    Albumin 4.9 3.2 - 4.9 g/dL    Total Protein 8.7 (H) 6.0 - 8.2 g/dL    Globulin 3.8 (H) 1.9 - 3.5 g/dL    A-G Ratio 1.3 g/dL   TROPONIN    Collection Time: 07/03/17  6:06 PM   Result Value Ref Range    Troponin I <0.01 0.00 - 0.04 ng/mL   LACTIC ACID    Collection Time: 07/03/17  6:06 PM   Result Value Ref Range    Lactic Acid 4.3 (HH) 0.5 - 2.0 mmol/L   HCG QUAL SERUM    Collection Time: 07/03/17  6:06 PM   Result Value Ref Range    Beta-Hcg Qualitative Serum Negative Negative   CREATINE KINASE    Collection Time: 07/03/17  6:06 PM   Result Value Ref Range    CPK Total 151 0 - 154 U/L   TSH    Collection Time: 07/03/17  6:06 PM   Result Value Ref Range    TSH 82.200 (H) 0.300 - 3.700 uIU/mL   FREE THYROXINE    Collection Time: 07/03/17  6:06 PM   Result Value Ref Range    Free T-4 0.61 0.53 - 1.43 ng/dL   ESTIMATED GFR    Collection Time: 07/03/17  6:06 PM   Result Value Ref Range    GFR If African American >60 >60 mL/min/1.73 m 2    GFR If Non African American >60 >60 mL/min/1.73 m 2   CORTISOL    Collection Time: 07/03/17  6:06 PM   Result Value Ref Range    Cortisol 36.1 (H) 0.0 - 23.0 ug/dL   URINALYSIS CULTURE, IF INDICATED    Collection Time: 07/03/17  8:45 PM   Result Value Ref Range    Color Yellow      Character Cloudy (A)     Specific Gravity 1.024 <1.035    Ph 8.0 5.0-8.0    Glucose Negative Negative mg/dL    Ketones >=160 Negative mg/dL    Protein Negative Negative mg/dL    Bilirubin Negative Negative    Nitrite Negative Negative    Leukocyte Esterase Negative Negative    Occult Blood Negative Negative    Micro Urine Req Microscopic     Culture Indicated Yes UA Culture   URINE MICROSCOPIC (W/UA)    Collection Time: 07/03/17  8:45 PM   Result Value Ref Range    WBC 5-10 (A) /hpf    RBC 0-2 /hpf    Bacteria Many (A) None /hpf    Epithelial Cells Many (A) /hpf    Mucous Threads Few /hpf   LACTIC ACID    Collection Time: 07/03/17  9:34 PM   Result Value Ref Range    Lactic Acid 1.7 0.5 - 2.0 mmol/L       (click the triangle to expand results)  My interpretation of lab results:   Lactic acid 4.3, TSH 82.2, free T4 0.61, troponin negative, potassium 3.3  Imaging/Procedures Review:    DX-CHEST-PORTABLE (1 VIEW)   Final Result      No acute cardiac or pulmonary abnormality is noted.        EKG:   per my independant read:  QTc: 421, HR: 72, Normal Sinus Rhythm, no ST/T changes    Assessment and Plan:      1. Hypothyroid coma  - Patient has severe hypothyroidism, and is very fatigued  - Missed levothyroxine for 4-6 days  - Checked cortisol and it is not low  - Thus, starting with IV levothyroxine as well as oral liothyronine  - We'll resume patient's oral levothyroxine in the morning, she does not remember her new dose but it is likely one step up from her previous dosing which would be 125 µg  2. History of Graves' disease  - Status post thyroidectomy  - Now has surgical hypothyroidism  - Holding propranolol for now as patient is no longer hyperthyroid  3. Lactic acidosis  - Likely due to severe hypothyroid state  - Repleted with IV fluids and lactic acid has now trended down  4.  Hypokalemia  - repleting orally   - Mg PENDING   5.  Sepsis ruled out  - Patient is currently not septic and we will not put the patient  on sepsis protocol    6. Prophylaxis: sc heparin  7. Code: Full code per patient with family present  8. Dispo: She will be admitted to inpatient for management that is expected to take greater than 2 midnights    Patient is critically ill.   The patient continues to have: Severe hypothyroid state/hypothyroid coma  The vital organ system that is affected is the: Circulation  If untreated there is a high chance of deterioration into: Shock  And eventually death.   The critical care that I am providing today is: Exam, medications, admission  The critical that has been undertaken is medically complex.   There has been no overlap in critical care time.   Critical Care Time not including procedures: 44 mins

## 2017-07-04 NOTE — ED NOTES
Received report from FELISA Sherwood.  Pt updated on POC, call light in place, jackelyn in low/locked position.

## 2017-07-04 NOTE — CARE PLAN
Problem: Safety  Goal: Will remain free from injury  Outcome: PROGRESSING AS EXPECTED  Patient is Clinically progressing.     Problem: Knowledge Deficit  Goal: Knowledge of disease process/condition, treatment plan, diagnostic tests, and medications will improve  Outcome: PROGRESSING AS EXPECTED  Patient is Clinically progressing.     Problem: Pain Management  Goal: Pain level will decrease to patient’s comfort goal  Outcome: PROGRESSING AS EXPECTED  Patient is Clinically progressing.

## 2017-07-05 ENCOUNTER — PATIENT OUTREACH (OUTPATIENT)
Dept: HEALTH INFORMATION MANAGEMENT | Facility: OTHER | Age: 22
End: 2017-07-05

## 2017-07-05 VITALS
HEIGHT: 62 IN | TEMPERATURE: 97 F | BODY MASS INDEX: 32.37 KG/M2 | RESPIRATION RATE: 16 BRPM | WEIGHT: 175.93 LBS | OXYGEN SATURATION: 97 % | DIASTOLIC BLOOD PRESSURE: 48 MMHG | HEART RATE: 88 BPM | SYSTOLIC BLOOD PRESSURE: 105 MMHG

## 2017-07-05 LAB
BACTERIA UR CULT: NORMAL
SIGNIFICANT IND 70042: NORMAL
SITE SITE: NORMAL
SOURCE SOURCE: NORMAL

## 2017-07-05 PROCEDURE — 700102 HCHG RX REV CODE 250 W/ 637 OVERRIDE(OP): Performed by: HOSPITALIST

## 2017-07-05 PROCEDURE — A9270 NON-COVERED ITEM OR SERVICE: HCPCS | Performed by: HOSPITALIST

## 2017-07-05 PROCEDURE — 99239 HOSP IP/OBS DSCHRG MGMT >30: CPT | Performed by: HOSPITALIST

## 2017-07-05 RX ORDER — LEVOTHYROXINE SODIUM 0.12 MG/1
125 TABLET ORAL
Qty: 30 TAB | Refills: 2 | Status: SHIPPED | OUTPATIENT
Start: 2017-07-05 | End: 2018-03-28

## 2017-07-05 RX ADMIN — LEVOTHYROXINE SODIUM 125 MCG: 50 TABLET ORAL at 06:18

## 2017-07-05 RX ADMIN — STANDARDIZED SENNA CONCENTRATE AND DOCUSATE SODIUM 2 TABLET: 8.6; 5 TABLET, FILM COATED ORAL at 10:04

## 2017-07-05 RX ADMIN — ACETAMINOPHEN 650 MG: 325 TABLET, FILM COATED ORAL at 06:24

## 2017-07-05 ASSESSMENT — PAIN SCALES - GENERAL
PAINLEVEL_OUTOF10: 0
PAINLEVEL_OUTOF10: 4
PAINLEVEL_OUTOF10: 4

## 2017-07-05 NOTE — CARE PLAN
Problem: Communication  Goal: The ability to communicate needs accurately and effectively will improve  Outcome: PROGRESSING AS EXPECTED  Pt is alert and oriented able to follow instructions, uses call light appropriately    Problem: Safety  Goal: Will remain free from injury  Outcome: PROGRESSING AS EXPECTED  Pt is steady on her feet    Problem: Bowel/Gastric:  Goal: Normal bowel function is maintained or improved  Outcome: PROGRESSING AS EXPECTED  Pt feeling constipated encourage to take her senna today and a prune juice    Problem: Knowledge Deficit  Goal: Knowledge of disease process/condition, treatment plan, diagnostic tests, and medications will improve  Outcome: PROGRESSING AS EXPECTED  Pt given information on depression and exercise and the role of the thyroid glad.

## 2017-07-05 NOTE — DISCHARGE SUMMARY
CHIEF COMPLAINT ON ADMISSION  Chief Complaint   Patient presents with   • Numbness     to legs and L arm   • Leg Pain     both legs and L arm       CODE STATUS  Full Code    HPI & HOSPITAL COURSE  This is a 21 y.o. female with a history of Graves Disease status post thyroidectomy was admitted on   7/3/17 with fatigue. She had been on 112 mcg of Synthroid and was noted by the provider at the [facility] to need a higher dose thus she stopped the 112 mcg dose and waited until the [facility] pharmacy had the medication change. She had been off of synthroid for about 6 days when she came to the ER and was found to have a TSH of 82 and a Free T4 of 0.61. Due to a lactic acid of 4.3, she was admitted to the ICU for IV fluids and further work up. No source of infection was found. She was given IV synthroid and has been initiated on Synthroid 125 mcg daily.     Today we had a long discussion about the need for daily synthroid and the need for thyroid labs in 6 weeks. Her mother is at bedside during this discussion.     Therefore, she is discharged in good and stable condition with close outpatient follow-up.    SPECIFIC OUTPATIENT FOLLOW-UP  [facility]  Outpatient TSH and Free T4 in 6 weeks    DISCHARGE PROBLEM LIST  Active Problems:    1. Hypothyroid coma     2. Graves disease-s/p thryroidectomy    3. Lactic acidosis: non-infectious    4. Hypokalemia           FOLLOW UP  No future appointments.  [address] [address]  [phone]    Schedule an appointment as soon as possible for a visit in 1 week        MEDICATIONS ON DISCHARGE   Ivis Klein   Home Medication Instructions EDIE:[id number]    Printed on:07/05/17 1130   Medication Information                      levothyroxine (SYNTHROID) 125 MCG Tab  Take 1 Tab by mouth Every morning on an empty stomach.             oxycodone immediate-release (ROXICODONE) 5 MG Tab  Take 1-2 Tabs by mouth every  four hours as needed (Moderate Pain (NRS Pain Scale 4-6; CPOT Pain Scale 3-5)).                 DIET  Orders Placed This Encounter   Procedures   • DIET ORDER     Standing Status: Standing      Number of Occurrences: 1      Standing Expiration Date:      Order Specific Question:  Diet:     Answer:  Regular [1]       ACTIVITY  Daily exercise.      CONSULTATIONS  none    PROCEDURES  none    LABORATORY  Lab Results   Component Value Date/Time    SODIUM 136 07/04/2017 02:08 AM    POTASSIUM 3.6 07/04/2017 02:08 AM    CHLORIDE 106 07/04/2017 02:08 AM    CO2 21 07/04/2017 02:08 AM    GLUCOSE 99 07/04/2017 02:08 AM    BUN 6* 07/04/2017 02:08 AM    CREATININE 0.54 07/04/2017 02:08 AM        Lab Results   Component Value Date/Time    WBC 11.8* 07/04/2017 02:08 AM    HEMOGLOBIN 10.2* 07/04/2017 02:08 AM    HEMATOCRIT 31.7* 07/04/2017 02:08 AM    PLATELET COUNT 252 07/04/2017 02:08 AM      Pregnancy test was negative.  She has an IUD  Total time of the discharge process exceeds 32 minutes

## 2017-07-05 NOTE — PROGRESS NOTES
Pt was asleep and first assessment done at 0845 to 900.  Pt is alert and oriented, VSS no c/o of pain in chest or headache.  Pt feels better still fatigued though.  During assessment pt shows decreased sensation to her right lower leg.  While doing both legs at the same time she stated this was a new symptom.  Dr. Sanchez notified during rounds.  Pt agreed to take her Senna today and had some prune juice last BM on Saturday she stated July 1.   Pt given education regarding her thyroid surgery and exercise.      IV dc'd no complications, discharge orders reviewed and signed.  Pt off the floor via wheelchair, dc'd home at 1340.

## 2017-07-05 NOTE — CARE PLAN
Problem: Safety  Goal: Will remain free from falls  Outcome: PROGRESSING AS EXPECTED  Patient educated on unit policies and procedures to prevent fall risk, including: hourly rounding, call light usage, bed alarms, treaded socks and calling for assistance. Patient Ivis verbalizes understanding of teaching. Fall prevention measures assessed and in place.     Problem: Pain Management  Goal: Pain level will decrease to patient’s comfort goal  Outcome: PROGRESSING AS EXPECTED  Pain assessed and documented per unit protocol and appropriate pharmacological and non-pharmacological interventions implemented. Reviewed pain goals with patient and family.

## 2017-07-05 NOTE — DISCHARGE INSTRUCTIONS
Discharge Instructions    Discharged to home  by car with relative. Discharged via wheelchair, hospital escort: Yes.  Special equipment needed: none    Be sure to schedule a follow-up appointment with your primary care doctor or any specialists as instructed.     Discharge Plan:   Influenza Vaccine Indication: Indicated: Adults > or equal to 18 years of age with severe allergy to eggs (Flublok vaccine)  Influenza Vaccine Given - only chart on this line when given:  (pt already given )    I understand that a diet low in cholesterol, fat, and sodium is recommended for good health. Unless I have been given specific instructions below for another diet, I accept this instruction as my diet prescription.   Other diet: Regular diet    Special Instructions: None    · Is patient discharged on Warfarin / Coumadin?   no    · Is patient Post Blood Transfusion?  no    Depression / Suicide Risk    As you are discharged from this [facility] facility, it is important to learn how to keep safe from harming yourself.    Recognize the warning signs:  · Abrupt changes in personality, positive or negative- including increase in energy   · Giving away possessions  · Change in eating patterns- significant weight changes-  positive or negative  · Change in sleeping patterns- unable to sleep or sleeping all the time   · Unwillingness or inability to communicate  · Depression  · Unusual sadness, discouragement and loneliness  · Talk of wanting to die  · Neglect of personal appearance   · Rebelliousness- reckless behavior  · Withdrawal from people/activities they love  · Confusion- inability to concentrate     If you or a loved one observes any of these behaviors or has concerns about self-harm, here's what you can do:  · Talk about it- your feelings and reasons for harming yourself  · Remove any means that you might use to hurt yourself (examples: pills, rope, extension cords, firearm)  · Get professional help from the community (Mental  Health, Substance Abuse, psychological counseling)  · Do not be alone:Call your Safe Contact- someone whom you trust who will be there for you.  · Call your local CRISIS HOTLINE 581-8082 or 438-118-8438  · Call your local Children's Mobile Crisis Response Team [place] (329) 395-9252 or www.Poken  · Call the toll free National Suicide Prevention Hotlines   · National Suicide Prevention Lifeline 617-443-NMSE (8976)  · National Hope Line Network 800-SUICIDE (126-6765)

## 2017-12-15 NOTE — ADDENDUM NOTE
Encounter addended by: Olivia Beckham R.N. on: 12/15/2017  6:53 AM<BR>    Actions taken: Flowsheet accepted

## 2017-12-17 ENCOUNTER — HOSPITAL ENCOUNTER (EMERGENCY)
Facility: MEDICAL CENTER | Age: 22
End: 2017-12-17
Attending: EMERGENCY MEDICINE
Payer: MEDICAID

## 2017-12-17 VITALS
HEART RATE: 72 BPM | WEIGHT: 139.77 LBS | SYSTOLIC BLOOD PRESSURE: 112 MMHG | RESPIRATION RATE: 16 BRPM | HEIGHT: 62 IN | TEMPERATURE: 96.7 F | BODY MASS INDEX: 25.72 KG/M2 | DIASTOLIC BLOOD PRESSURE: 54 MMHG | OXYGEN SATURATION: 99 %

## 2017-12-17 DIAGNOSIS — Z30.432 ENCOUNTER FOR IUD REMOVAL: ICD-10-CM

## 2017-12-17 DIAGNOSIS — R10.2 VAGINAL PAIN: ICD-10-CM

## 2017-12-17 LAB
APPEARANCE UR: CLEAR
BACTERIA GENITAL QL WET PREP: NORMAL
C TRACH DNA SPEC QL NAA+PROBE: NEGATIVE
COLOR UR AUTO: YELLOW
GLUCOSE UR QL STRIP.AUTO: NEGATIVE MG/DL
HCG UR QL: NEGATIVE
KETONES UR QL STRIP.AUTO: NEGATIVE MG/DL
LEUKOCYTE ESTERASE UR QL STRIP.AUTO: NEGATIVE
N GONORRHOEA DNA SPEC QL NAA+PROBE: NEGATIVE
NITRITE UR QL STRIP.AUTO: NEGATIVE
PH UR STRIP.AUTO: 5.5 [PH]
PROT UR QL STRIP: 30 MG/DL
RBC UR QL AUTO: NEGATIVE
SIGNIFICANT IND 70042: NORMAL
SITE SITE: NORMAL
SOURCE SOURCE: NORMAL
SP GR UR: >=1.03
SPECIMEN SOURCE: NORMAL

## 2017-12-17 PROCEDURE — 87591 N.GONORRHOEAE DNA AMP PROB: CPT

## 2017-12-17 PROCEDURE — 81025 URINE PREGNANCY TEST: CPT

## 2017-12-17 PROCEDURE — 99284 EMERGENCY DEPT VISIT MOD MDM: CPT | Mod: 25

## 2017-12-17 PROCEDURE — 81002 URINALYSIS NONAUTO W/O SCOPE: CPT

## 2017-12-17 PROCEDURE — 87491 CHLMYD TRACH DNA AMP PROBE: CPT

## 2017-12-17 ASSESSMENT — PAIN SCALES - GENERAL: PAINLEVEL_OUTOF10: 8

## 2017-12-17 NOTE — DISCHARGE INSTRUCTIONS
Pelvic Pain, Female  Female pelvic pain can be caused by many different things and start from a variety of places. Pelvic pain refers to pain that is located in the lower half of the abdomen and between your hips. The pain may occur over a short period of time (acute) or may be reoccurring (chronic). The cause of pelvic pain may be related to disorders affecting the female reproductive organs (gynecologic), but it may also be related to the bladder, kidney stones, an intestinal complication, or muscle or skeletal problems. Getting help right away for pelvic pain is important, especially if there has been severe, sharp, or a sudden onset of unusual pain. It is also important to get help right away because some types of pelvic pain can be life threatening.   CAUSES   Below are only some of the causes of pelvic pain. The causes of pelvic pain can be in one of several categories.   · Gynecologic.  ¨ Pelvic inflammatory disease.  ¨ Sexually transmitted infection.  ¨ Ovarian cyst or a twisted ovarian ligament (ovarian torsion).  ¨ Uterine lining that grows outside the uterus (endometriosis).  ¨ Fibroids, cysts, or tumors.  ¨ Ovulation.  · Pregnancy.  ¨ Pregnancy that occurs outside the uterus (ectopic pregnancy).  ¨ Miscarriage.  ¨ Labor.  ¨ Abruption of the placenta or ruptured uterus.  · Infection.  ¨ Uterine infection (endometritis).  ¨ Bladder infection.  ¨ Diverticulitis.  ¨ Miscarriage related to a uterine infection (septic ).  · Bladder.  ¨ Inflammation of the bladder (cystitis).  ¨ Kidney stone(s).  · Gastrointestinal.  ¨ Constipation.  ¨ Diverticulitis.  · Neurologic.  ¨ Trauma.  ¨ Feeling pelvic pain because of mental or emotional causes (psychosomatic).  · Cancers of the bowel or pelvis.  EVALUATION   Your caregiver will want to take a careful history of your concerns. This includes recent changes in your health, a careful gynecologic history of your periods (menses), and a sexual history. Obtaining  your family history and medical history is also important. Your caregiver may suggest a pelvic exam. A pelvic exam will help identify the location and severity of the pain. It also helps in the evaluation of which organ system may be involved. In order to identify the cause of the pelvic pain and be properly treated, your caregiver may order tests. These tests may include:   · A pregnancy test.  · Pelvic ultrasonography.  · An X-ray exam of the abdomen.  · A urinalysis or evaluation of vaginal discharge.  · Blood tests.  HOME CARE INSTRUCTIONS   · Only take over-the-counter or prescription medicines for pain, discomfort, or fever as directed by your caregiver.    · Rest as directed by your caregiver.    · Eat a balanced diet.    · Drink enough fluids to make your urine clear or pale yellow, or as directed.    · Avoid sexual intercourse if it causes pain.    · Apply warm or cold compresses to the lower abdomen depending on which one helps the pain.    · Avoid stressful situations.    · Keep a journal of your pelvic pain. Write down when it started, where the pain is located, and if there are things that seem to be associated with the pain, such as food or your menstrual cycle.  · Follow up with your caregiver as directed.    SEEK MEDICAL CARE IF:  · Your medicine does not help your pain.  · You have abnormal vaginal discharge.  SEEK IMMEDIATE MEDICAL CARE IF:   · You have heavy bleeding from the vagina.    · Your pelvic pain increases.    · You feel light-headed or faint.    · You have chills.    · You have pain with urination or blood in your urine.    · You have uncontrolled diarrhea or vomiting.    · You have a fever or persistent symptoms for more than 3 days.  · You have a fever and your symptoms suddenly get worse.    · You are being physically or sexually abused.    MAKE SURE YOU:  · Understand these instructions.  · Will watch your condition.  · Will get help if you are not doing well or get worse.     This  information is not intended to replace advice given to you by your health care provider. Make sure you discuss any questions you have with your health care provider.     Document Released: 11/14/2005 Document Revised: 05/03/2016 Document Reviewed: 04/08/2013  [employer] Interactive Patient Education ©2016 [employer]

## 2017-12-17 NOTE — ED PROVIDER NOTES
ED Provider Note    Scribed for Ace Becker M.D. by Annalise Coburn. 12/17/2017  7:27 AM    Primary care provider: Pcp Not In Computer  Means of arrival: walk in   History obtained from: patient   History limited by: none       CHIEF COMPLAINT  Chief Complaint   Patient presents with   • Vaginal Pain     internal vaginal pain, pt believes her IUD may be coming out        HPI  Ivis Klein is a 22 y.o. female who presents to the Emergency Department for evaluation of vaginal pain onset this morning. Patient suspects that her copper IUD is coming out because she can feel the string to her IUD. Patient requests that her IUD be removed in the ED. She has noted some associated vaginal discharge but denies vaginal bleeding, dysuria, fever, chills, vomiting and diarrhea.       REVIEW OF SYSTEMS  Pertinent positives include vaginal pain, vaginal discharge.   Pertinent negatives include no vaginal bleeding, dysuria, fever, chills, vomiting, diarrhea.    E.       PAST MEDICAL HISTORY   has a past medical history of Anxiety (2/16/2017); Arrhythmia; Breath shortness; Graves disease (12/5/2016); Heart valve disease; Hyperthyroidism (6/29/2016); Menarche; Migraine; Pericarditis (06/2016); Pregnancy; Psychiatric problem; Supervision of normal first teen pregnancy; and Vomiting (6/29/2016).      SURGICAL HISTORY   has a past surgical history that includes primary c section (9/8/2013) and thyroidectomy total (Bilateral, 3/22/2017).      SOCIAL HISTORY  Social History   Substance Use Topics   • Smoking status: Never Smoker   • Smokeless tobacco: Never Used      Comment: quit in 2012   • Alcohol use No      Comment: occ      History   Drug Use   • Types: Marijuana     Comment: marijuana, just quit 3/13/17       FAMILY HISTORY  Family History   Problem Relation Age of Onset   • Cancer Paternal Grandmother 56     breast cancer       CURRENT MEDICATIONS  Home Medications    **Home medications have not yet been reviewed  "for this encounter**         ALLERGIES  Allergies   Allergen Reactions   • Nkda [No Known Drug Allergy]          PHYSICAL EXAM  VITAL SIGNS: /54   Pulse 80   Temp 35.9 °C (96.7 °F) (Temporal)   Resp 16   Ht 1.575 m (5' 2\")   Wt 63.4 kg (139 lb 12.4 oz)   SpO2 100%   BMI 25.56 kg/m²   Nursing note and vitals reviewed.  Constitutional: Well-developed and well-nourished. No distress.   HENT: Head is normocephalic and atraumatic. Oropharynx is clear and moist without exudate or erythema.   Eyes: Pupils are equal, round, and reactive to light. Conjunctiva are normal.   Cardiovascular: Normal rate and regular rhythm. No murmur heard. Normal radial pulses.  Pulmonary/Chest: Breath sounds normal. No wheezes or rales.   Abdominal: Soft and non-tender. No distention    : Normal external female genitalia. Speculum exam reveals IUD string protruding from the cervical OS.  IUD removed at patient's request without complication.    Musculoskeletal: Extremities exhibit normal range of motion without edema or tenderness.   Neurological: Awake, alert and oriented to person, place, and time. No focal deficits noted.  Skin: Skin is warm and dry. No rash.   Psychiatric: Normal mood and affect. Appropriate for clinical situation        DIAGNOSTIC STUDIES / PROCEDURES  LABS  Results for orders placed or performed during the hospital encounter of 12/17/17   WET PREP   Result Value Ref Range    Significant Indicator NEG     Source GEN     Site VAGINAL     Wet Prep For Parasites       No yeast.  No motile Trichomonas seen.  No clue cells seen.     CHLAMYDIA & GC BY PCR   Result Value Ref Range    Source Vaginal    POC UA   Result Value Ref Range    POC Color Yellow     POC Appearance Clear     POC Glucose Negative Negative mg/dL    POC Ketones Negative Negative mg/dL    POC Specific Gravity >=1.030 (A) 1.005 - 1.030    POC Blood Negative Negative    POC Urine PH 5.5 5.0 - 8.0    POC Protein 30 (A) Negative mg/dL    POC " Nitrites Negative Negative    POC Leukocyte Esterase Negative Negative   POC URINE PREGNANCY   Result Value Ref Range    POC Urine Pregnancy Test Negative Negative   All labs reviewed by me.        COURSE & MEDICAL DECISION MAKING  Nursing notes, VS, PMSFHx reviewed in chart.     Review of past medical records shows the patient has a history of teen pregnancy.      7:27 AM - Patient seen and examined at bedside. Ordered POC urine pregnancy, POC UA, POC Urinalysis, chlamydia and GC by PCR to evaluate her symptoms.     7:38 AM Performed pelvic exam with female chaperone present. See physical exam for details. The patient's IUD was removed at her request without complication. She understands she should follow up with primary care for birth control.     8:24 AM Patients lab results were negative. She agrees to discharge home. Patient encouraged to follow up with primary care. The patient will return for new or worsening symptoms and is stable at the time of discharge.        DISPOSITION:  Patient will be discharged home in stable condition.      FOLLOW UP:  [facility], Emergency Dept  [address] [place] [id number] [phone]    If symptoms worsen        Schedule an appointment as soon as possible for a visit  your ob/gyn       OUTPATIENT MEDICATIONS:  New Prescriptions    No medications on file       FINAL IMPRESSION  1. Vaginal pain    2. Encounter for IUD removal         Annalise HARRIS (Scribe), am scribing for, and in the presence of, Ace Becker M.D..  Electronically signed by: Annalise Coburn (Scribe), 12/17/2017  Ace HARRIS M.D. personally performed the services described in this documentation, as scribed by Annalise Coburn in my presence, and it is both accurate and complete.    The note accurately reflects work and decisions made by me.  Ace Becker  12/17/2017  9:44 AM

## 2017-12-17 NOTE — ED NOTES
"Chief Complaint   Patient presents with   • Vaginal Pain     internal vaginal pain, pt believes her IUD may be coming out      /54   Pulse 80   Temp 35.9 °C (96.7 °F) (Temporal)   Resp 16   Ht 1.575 m (5' 2\")   Wt 63.4 kg (139 lb 12.4 oz)   SpO2 100%   BMI 25.56 kg/m²     Ambulatory to triage, steady on feet. Pt presents with above complaints, as well as stating \"I just don't feel right.\" Pt returned to [facility], educated on triage process and instructed to notify staff of worsening concerns.   "

## 2018-01-19 ENCOUNTER — HOSPITAL ENCOUNTER (EMERGENCY)
Facility: MEDICAL CENTER | Age: 23
End: 2018-01-19
Attending: EMERGENCY MEDICINE
Payer: MEDICAID

## 2018-01-19 VITALS
OXYGEN SATURATION: 98 % | SYSTOLIC BLOOD PRESSURE: 121 MMHG | WEIGHT: 143.74 LBS | DIASTOLIC BLOOD PRESSURE: 69 MMHG | TEMPERATURE: 96.6 F | HEIGHT: 62 IN | BODY MASS INDEX: 26.45 KG/M2 | RESPIRATION RATE: 16 BRPM | HEART RATE: 77 BPM

## 2018-01-19 DIAGNOSIS — R51.9 ACUTE NONINTRACTABLE HEADACHE, UNSPECIFIED HEADACHE TYPE: ICD-10-CM

## 2018-01-19 LAB
ANION GAP SERPL CALC-SCNC: 9 MMOL/L (ref 0–11.9)
BUN SERPL-MCNC: 15 MG/DL (ref 8–22)
CALCIUM SERPL-MCNC: 8.9 MG/DL (ref 8.5–10.5)
CHLORIDE SERPL-SCNC: 102 MMOL/L (ref 96–112)
CO2 SERPL-SCNC: 24 MMOL/L (ref 20–33)
CREAT SERPL-MCNC: 0.55 MG/DL (ref 0.5–1.4)
GLUCOSE SERPL-MCNC: 102 MG/DL (ref 65–99)
HCG UR QL: NEGATIVE
POTASSIUM SERPL-SCNC: 3.6 MMOL/L (ref 3.6–5.5)
SODIUM SERPL-SCNC: 135 MMOL/L (ref 135–145)

## 2018-01-19 PROCEDURE — 36415 COLL VENOUS BLD VENIPUNCTURE: CPT

## 2018-01-19 PROCEDURE — 80048 BASIC METABOLIC PNL TOTAL CA: CPT

## 2018-01-19 PROCEDURE — 700105 HCHG RX REV CODE 258: Performed by: EMERGENCY MEDICINE

## 2018-01-19 PROCEDURE — 99284 EMERGENCY DEPT VISIT MOD MDM: CPT

## 2018-01-19 PROCEDURE — 700111 HCHG RX REV CODE 636 W/ 250 OVERRIDE (IP): Performed by: EMERGENCY MEDICINE

## 2018-01-19 PROCEDURE — 96375 TX/PRO/DX INJ NEW DRUG ADDON: CPT

## 2018-01-19 PROCEDURE — 81025 URINE PREGNANCY TEST: CPT

## 2018-01-19 PROCEDURE — 96374 THER/PROPH/DIAG INJ IV PUSH: CPT

## 2018-01-19 RX ORDER — DIPHENHYDRAMINE HYDROCHLORIDE 50 MG/ML
12.5 INJECTION INTRAMUSCULAR; INTRAVENOUS ONCE
Status: COMPLETED | OUTPATIENT
Start: 2018-01-19 | End: 2018-01-19

## 2018-01-19 RX ORDER — SODIUM CHLORIDE 9 MG/ML
1000 INJECTION, SOLUTION INTRAVENOUS ONCE
Status: COMPLETED | OUTPATIENT
Start: 2018-01-19 | End: 2018-01-19

## 2018-01-19 RX ADMIN — PROCHLORPERAZINE EDISYLATE 10 MG: 5 INJECTION INTRAMUSCULAR; INTRAVENOUS at 10:03

## 2018-01-19 RX ADMIN — DIPHENHYDRAMINE HYDROCHLORIDE 12.5 MG: 50 INJECTION, SOLUTION INTRAMUSCULAR; INTRAVENOUS at 10:03

## 2018-01-19 RX ADMIN — SODIUM CHLORIDE 1000 ML: 9 INJECTION, SOLUTION INTRAVENOUS at 10:04

## 2018-01-19 ASSESSMENT — PAIN SCALES - GENERAL
PAINLEVEL_OUTOF10: 8
PAINLEVEL_OUTOF10: 4

## 2018-01-19 NOTE — ED NOTES
Ambulates to triage  Chief Complaint   Patient presents with   • Migraine     started last night, hx of same   • N/V     started last night     Took advil, has not helped, also asking for a pregnancy test.

## 2018-01-19 NOTE — DISCHARGE INSTRUCTIONS
General Headache Without Cause  A general headache is pain or discomfort felt around the head or neck area. The cause may not be found.   HOME CARE   · Keep all doctor visits.  · Only take medicines as told by your doctor.  · Lie down in a dark, quiet room when you have a headache.  · Keep a journal to find out if certain things bring on headaches. For example, write down:  ¨ What you eat and drink.  ¨ How much sleep you get.  ¨ Any change to your diet or medicines.  · Relax by getting a massage or doing other relaxing activities.  · Put ice or heat packs on the head and neck area as told by your doctor.  · Lessen stress.  · Sit up straight. Do not tighten (tense) your muscles.  · Quit smoking if you smoke.  · Lessen how much alcohol you drink.  · Lessen how much caffeine you drink, or stop drinking caffeine.  · Eat and sleep on a regular schedule.  · Get 7 to 9 hours of sleep, or as told by your doctor.  · Keep lights dim if bright lights bother you or make your headaches worse.  GET HELP RIGHT AWAY IF:   · Your headache becomes really bad.  · You have a fever.  · You have a stiff neck.  · You have trouble seeing.  · Your muscles are weak, or you lose muscle control.  · You lose your balance or have trouble walking.  · You feel like you will pass out (faint), or you pass out.  · You have really bad symptoms that are different than your first symptoms.  · You have problems with the medicines given to you by your doctor.  · Your medicines do not work.  · Your headache feels different than the other headaches.  · You feel sick to your stomach (nauseous) or throw up (vomit).     This information is not intended to replace advice given to you by your health care provider. Make sure you discuss any questions you have with your health care provider.     Document Released: 09/26/2009 Document Revised: 05/03/2016 Document Reviewed: 12/07/2012  Elsevier Interactive Patient Education ©2016 [employer]

## 2018-01-19 NOTE — ED PROVIDER NOTES
ED Provider Note    Scribed for Ann Mcelroy M.D. by Annalise Coburn. 1/19/2018, 8:52 AM.    Primary care provider: Pcp Pt States None  Means of arrival: walk in   History obtained from: patient   History limited by: none    CHIEF COMPLAINT  Chief Complaint   Patient presents with   • Migraine     started last night, hx of same   • N/V     started last night       HPI  Ivis Klein is a 22 y.o. female who presents to the Emergency Department for evaluation of a diffuse migraine headache onset two days ago. Her migraine was initially mild with progressively worsening severity since onset. Patient has a history of migraine headaches since she was 14 years old but she reports this has been her first migraine since having her thyroid removed on 3/22/17. She has associated numbness to her left face, right arm and bilateral legs. Patient also reports associated pain to her eyelids and lines in her vision this morning which is now improved. Last night she had several episodes of nausea and vomiting. She denies runny nose, cough, sore throat and head trauma.       REVIEW OF SYSTEMS  Pertinent positives include headache, nausea, vomiting, numbness.   Pertinent negatives include no rhinorrhea, sore throat, cough, head trauma.   All other systems reviewed and negative. C.       PAST MEDICAL HISTORY   has a past medical history of Anxiety (2/16/2017); Arrhythmia; Breath shortness; Graves disease (12/5/2016); Heart valve disease; Hyperthyroidism (6/29/2016); Menarche; Migraine; Pericarditis (06/2016); Pregnancy; Psychiatric problem; Supervision of normal first teen pregnancy; and Vomiting (6/29/2016).      SURGICAL HISTORY   has a past surgical history that includes primary c section (9/8/2013) and thyroidectomy total (Bilateral, 3/22/2017).      SOCIAL HISTORY  Social History   Substance Use Topics   • Smoking status: Never Smoker   • Smokeless tobacco: Never Used      Comment: quit in 2012   • Alcohol use  "No      Comment: occ      History   Drug Use   • Types: Marijuana     Comment: marijuana, just quit 3/13/17       FAMILY HISTORY  Family History   Problem Relation Age of Onset   • Cancer Paternal Grandmother 56     breast cancer       CURRENT MEDICATIONS  Home Medications    **Home medications have not yet been reviewed for this encounter**         ALLERGIES  Allergies   Allergen Reactions   • Nkda [No Known Drug Allergy]          PHYSICAL EXAM  VITAL SIGNS: /69   Pulse 76   Temp 35.9 °C (96.6 °F)   Resp 18   Ht 1.575 m (5' 2\")   Wt 65.2 kg (143 lb 11.8 oz)   SpO2 100%   BMI 26.29 kg/m²   Constitutional:   Able to answer questions  HENT: Nose is normal in appearance without rhinorrhea, external ears are normal,  moist mucous membranes  Eyes: Anicteric,  pupils are equal round and reactive, there is no conjunctival drainage or pallor   Neck: The trachea is midline, there is no obvious mass or meningeal signs  Cardiovascular: Equal radial pulsation, regular rate and rhythm without murmurs gallops or rubs  Thorax & Lungs: Respiratory rate and effort are normal. There is normal chest excursion with respiration.  No wheezes rhonchi or rales noted.  Abdomen: Abdomen is normal in appearance  :  No CVA tenderness to palpation  Musculoskeletal: No deformities noted in all 4 extremities. Actively moves all 4 extremities  Skin: Visualized skin is warm, no erythema, no rash.  Neurologic:  Cranial nerves II through XII are intact there is no focal abnormality noted.  Psychiatric: Normal mood and mentation        DIAGNOSTIC STUDIES / PROCEDURES  LABS  Results for orders placed or performed during the hospital encounter of 01/19/18   BMP   Result Value Ref Range    Sodium 135 135 - 145 mmol/L    Potassium 3.6 3.6 - 5.5 mmol/L    Chloride 102 96 - 112 mmol/L    Co2 24 20 - 33 mmol/L    Glucose 102 (H) 65 - 99 mg/dL    Bun 15 8 - 22 mg/dL    Creatinine 0.55 0.50 - 1.40 mg/dL    Calcium 8.9 8.5 - 10.5 mg/dL    Anion " Gap 9.0 0.0 - 11.9   ESTIMATED GFR   Result Value Ref Range    GFR If African American >60 >60 mL/min/1.73 m 2    GFR If Non African American >60 >60 mL/min/1.73 m 2   POC URINE PREGNANCY   Result Value Ref Range    POC Urine Pregnancy Test Negative Negative   All labs reviewed by me.        COURSE & MEDICAL DECISION MAKING  Nursing notes and vital signs were reviewed. (See chart for details)  The patient's  records were reviewed, history was obtained from the patient.      The patient presents with a diffuse and progressively worsening migraine headache that is concerning for pregnancy. Patient complains of neurological symptoms that do not follow a nerve distribution therefore stroke is doubtful.        8:52 AM Initial orders in the Emergency Department included BMP and POC urine pregnancy and initial treatment in the Emergency Department included Compazine 10 mg and Benadryl 12.5 mg and the patient received IV fluids secondary to history of nausea and vomiting.     10:35 AM Patient reevaluated at bedside. She is resting and reports her migraine is relieved following IV fluids and medication. Patient is requesting to go home at this time. She was encouraged to follow up with primary care. The patient will return for new or worsening symptoms and is stable at the time of discharge.    The patient was discharged home with an information sheet on headaches and told to return immediately for any signs or symptoms listed, but specifically if she has change in her speech or focal weakness.  The patient agreed to the discharge precautions and follow-up plan which is documented in EPIC.      DISPOSITION:  Patient will be discharged home in stable condition.      FOLLOW UP:  Keila Maynard M.D.  [address] [facility] [id number] [phone]    Schedule an appointment as soon as possible for a visit  for headaches, return to ED if any worsening symptoms      OUTPATIENT MEDICATIONS:  New Prescriptions    No medications on  file         FINAL IMPRESSION  1. Acute nonintractable headache, unspecified headache type           I, Annalise Coburn (Scribe), am scribing for, and in the presence of, Ann Mcelroy M.D..  Electronically signed by: Annalise Coburn (Beth[place]ibboston), 1/19/2018  I, Ann Mcelroy M.D. personally performed the services described in this documentation, as scribed by Annalise Coburn in my presence, and it is both accurate and complete.    The note accurately reflects work and decisions made by me.  Ann Mcelroy  1/19/2018  10:54 AM

## 2018-02-06 ENCOUNTER — HOSPITAL ENCOUNTER (EMERGENCY)
Facility: MEDICAL CENTER | Age: 23
End: 2018-02-06
Attending: EMERGENCY MEDICINE
Payer: MEDICAID

## 2018-02-06 VITALS
WEIGHT: 146.61 LBS | TEMPERATURE: 97.5 F | RESPIRATION RATE: 17 BRPM | SYSTOLIC BLOOD PRESSURE: 137 MMHG | DIASTOLIC BLOOD PRESSURE: 58 MMHG | HEIGHT: 62 IN | BODY MASS INDEX: 26.98 KG/M2 | OXYGEN SATURATION: 99 % | HEART RATE: 84 BPM

## 2018-02-06 DIAGNOSIS — J06.9 UPPER RESPIRATORY TRACT INFECTION, UNSPECIFIED TYPE: ICD-10-CM

## 2018-02-06 DIAGNOSIS — N39.0 ACUTE UTI: ICD-10-CM

## 2018-02-06 LAB
ALBUMIN SERPL BCP-MCNC: 4.5 G/DL (ref 3.2–4.9)
ALBUMIN/GLOB SERPL: 1.5 G/DL
ALP SERPL-CCNC: 76 U/L (ref 30–99)
ALT SERPL-CCNC: 10 U/L (ref 2–50)
ANION GAP SERPL CALC-SCNC: 11 MMOL/L (ref 0–11.9)
APPEARANCE UR: CLEAR
AST SERPL-CCNC: 19 U/L (ref 12–45)
BACTERIA #/AREA URNS HPF: ABNORMAL /HPF
BASOPHILS # BLD AUTO: 1 % (ref 0–1.8)
BASOPHILS # BLD: 0.08 K/UL (ref 0–0.12)
BILIRUB SERPL-MCNC: 0.6 MG/DL (ref 0.1–1.5)
BILIRUB UR QL STRIP.AUTO: NEGATIVE
BUN SERPL-MCNC: 9 MG/DL (ref 8–22)
CALCIUM SERPL-MCNC: 9.1 MG/DL (ref 8.5–10.5)
CHLORIDE SERPL-SCNC: 102 MMOL/L (ref 96–112)
CO2 SERPL-SCNC: 25 MMOL/L (ref 20–33)
COLOR UR: YELLOW
CREAT SERPL-MCNC: 0.73 MG/DL (ref 0.5–1.4)
EOSINOPHIL # BLD AUTO: 0.13 K/UL (ref 0–0.51)
EOSINOPHIL NFR BLD: 1.6 % (ref 0–6.9)
EPI CELLS #/AREA URNS HPF: ABNORMAL /HPF
ERYTHROCYTE [DISTWIDTH] IN BLOOD BY AUTOMATED COUNT: 44.2 FL (ref 35.9–50)
FLUAV RNA SPEC QL NAA+PROBE: NEGATIVE
FLUBV RNA SPEC QL NAA+PROBE: NEGATIVE
GLOBULIN SER CALC-MCNC: 3.1 G/DL (ref 1.9–3.5)
GLUCOSE SERPL-MCNC: 85 MG/DL (ref 65–99)
GLUCOSE UR STRIP.AUTO-MCNC: NEGATIVE MG/DL
HCG UR QL: NEGATIVE
HCT VFR BLD AUTO: 39.6 % (ref 37–47)
HGB BLD-MCNC: 12.5 G/DL (ref 12–16)
HYALINE CASTS #/AREA URNS LPF: ABNORMAL /LPF
IMM GRANULOCYTES # BLD AUTO: 0.02 K/UL (ref 0–0.11)
IMM GRANULOCYTES NFR BLD AUTO: 0.2 % (ref 0–0.9)
KETONES UR STRIP.AUTO-MCNC: 15 MG/DL
LACTATE BLD-SCNC: 1.1 MMOL/L (ref 0.5–2)
LEUKOCYTE ESTERASE UR QL STRIP.AUTO: ABNORMAL
LYMPHOCYTES # BLD AUTO: 1.35 K/UL (ref 1–4.8)
LYMPHOCYTES NFR BLD: 16.3 % (ref 22–41)
MCH RBC QN AUTO: 27.2 PG (ref 27–33)
MCHC RBC AUTO-ENTMCNC: 31.6 G/DL (ref 33.6–35)
MCV RBC AUTO: 86.1 FL (ref 81.4–97.8)
MICRO URNS: ABNORMAL
MONOCYTES # BLD AUTO: 0.55 K/UL (ref 0–0.85)
MONOCYTES NFR BLD AUTO: 6.7 % (ref 0–13.4)
NEUTROPHILS # BLD AUTO: 6.14 K/UL (ref 2–7.15)
NEUTROPHILS NFR BLD: 74.2 % (ref 44–72)
NITRITE UR QL STRIP.AUTO: NEGATIVE
NRBC # BLD AUTO: 0 K/UL
NRBC BLD-RTO: 0 /100 WBC
PH UR STRIP.AUTO: 7.5 [PH]
PLATELET # BLD AUTO: 224 K/UL (ref 164–446)
PMV BLD AUTO: 10.8 FL (ref 9–12.9)
POTASSIUM SERPL-SCNC: 3.6 MMOL/L (ref 3.6–5.5)
PROT SERPL-MCNC: 7.6 G/DL (ref 6–8.2)
PROT UR QL STRIP: NEGATIVE MG/DL
RBC # BLD AUTO: 4.6 M/UL (ref 4.2–5.4)
RBC # URNS HPF: ABNORMAL /HPF
RBC UR QL AUTO: ABNORMAL
SODIUM SERPL-SCNC: 138 MMOL/L (ref 135–145)
SP GR UR REFRACTOMETRY: 1.02
SP GR UR STRIP.AUTO: 1.02
UROBILINOGEN UR STRIP.AUTO-MCNC: 1 MG/DL
WBC # BLD AUTO: 8.3 K/UL (ref 4.8–10.8)
WBC #/AREA URNS HPF: ABNORMAL /HPF

## 2018-02-06 PROCEDURE — 87040 BLOOD CULTURE FOR BACTERIA: CPT

## 2018-02-06 PROCEDURE — 80053 COMPREHEN METABOLIC PANEL: CPT

## 2018-02-06 PROCEDURE — 85025 COMPLETE CBC W/AUTO DIFF WBC: CPT

## 2018-02-06 PROCEDURE — 83605 ASSAY OF LACTIC ACID: CPT

## 2018-02-06 PROCEDURE — 99284 EMERGENCY DEPT VISIT MOD MDM: CPT

## 2018-02-06 PROCEDURE — 81001 URINALYSIS AUTO W/SCOPE: CPT

## 2018-02-06 PROCEDURE — 87502 INFLUENZA DNA AMP PROBE: CPT

## 2018-02-06 PROCEDURE — 87086 URINE CULTURE/COLONY COUNT: CPT

## 2018-02-06 PROCEDURE — 36415 COLL VENOUS BLD VENIPUNCTURE: CPT

## 2018-02-06 PROCEDURE — 81025 URINE PREGNANCY TEST: CPT

## 2018-02-06 RX ORDER — SULFAMETHOXAZOLE AND TRIMETHOPRIM 800; 160 MG/1; MG/1
1 TABLET ORAL 2 TIMES DAILY
Qty: 10 TAB | Refills: 0 | Status: SHIPPED | OUTPATIENT
Start: 2018-02-06 | End: 2018-02-11

## 2018-02-06 ASSESSMENT — PAIN SCALES - GENERAL: PAINLEVEL_OUTOF10: 10

## 2018-02-07 NOTE — DISCHARGE INSTRUCTIONS
"Upper Respiratory Infection, Adult  Most upper respiratory infections (URIs) are a viral infection of the air passages leading to the lungs. A URI affects the nose, throat, and upper air passages. The most common type of URI is nasopharyngitis and is typically referred to as \"the common cold.\"  URIs run their course and usually go away on their own. Most of the time, a URI does not require medical attention, but sometimes a bacterial infection in the upper airways can follow a viral infection. This is called a secondary infection. Sinus and middle ear infections are common types of secondary upper respiratory infections.  Bacterial pneumonia can also complicate a URI. A URI can worsen asthma and chronic obstructive pulmonary disease (COPD). Sometimes, these complications can require emergency medical care and may be life threatening.   CAUSES  Almost all URIs are caused by viruses. A virus is a type of germ and can spread from one person to another.   RISKS FACTORS  You may be at risk for a URI if:   · You smoke.    · You have chronic heart or lung disease.  · You have a weakened defense (immune) system.    · You are very young or very old.    · You have nasal allergies or asthma.  · You work in crowded or poorly ventilated areas.  · You work in health care facilities or schools.  SIGNS AND SYMPTOMS   Symptoms typically develop 2-3 days after you come in contact with a cold virus. Most viral URIs last 7-10 days. However, viral URIs from the influenza virus (flu virus) can last 14-18 days and are typically more severe. Symptoms may include:   · Runny or stuffy (congested) nose.    · Sneezing.    · Cough.    · Sore throat.    · Headache.    · Fatigue.    · Fever.    · Loss of appetite.    · Pain in your forehead, behind your eyes, and over your cheekbones (sinus pain).  · Muscle aches.    DIAGNOSIS   Your health care provider may diagnose a URI by:  · Physical exam.  · Tests to check that your symptoms are not due to " another condition such as:  ¨ Strep throat.  ¨ Sinusitis.  ¨ Pneumonia.  ¨ Asthma.  TREATMENT   A URI goes away on its own with time. It cannot be cured with medicines, but medicines may be prescribed or recommended to relieve symptoms. Medicines may help:  · Reduce your fever.  · Reduce your cough.  · Relieve nasal congestion.  HOME CARE INSTRUCTIONS   · Take medicines only as directed by your health care provider.    · Gargle warm saltwater or take cough drops to comfort your throat as directed by your health care provider.  · Use a warm mist humidifier or inhale steam from a shower to increase air moisture. This may make it easier to breathe.  · Drink enough fluid to keep your urine clear or pale yellow.    · Eat soups and other clear broths and maintain good nutrition.    · Rest as needed.    · Return to work when your temperature has returned to normal or as your health care provider advises. You may need to stay home longer to avoid infecting others. You can also use a face mask and careful hand washing to prevent spread of the virus.  · Increase the usage of your inhaler if you have asthma.    · Do not use any tobacco products, including cigarettes, chewing tobacco, or electronic cigarettes. If you need help quitting, ask your health care provider.  PREVENTION   The best way to protect yourself from getting a cold is to practice good hygiene.   · Avoid oral or hand contact with people with cold symptoms.    · Wash your hands often if contact occurs.    There is no clear evidence that vitamin C, vitamin E, echinacea, or exercise reduces the chance of developing a cold. However, it is always recommended to get plenty of rest, exercise, and practice good nutrition.   SEEK MEDICAL CARE IF:   · You are getting worse rather than better.    · Your symptoms are not controlled by medicine.    · You have chills.  · You have worsening shortness of breath.  · You have brown or red mucus.  · You have yellow or brown nasal  discharge.  · You have pain in your face, especially when you bend forward.  · You have a fever.  · You have swollen neck glands.  · You have pain while swallowing.  · You have white areas in the back of your throat.  SEEK IMMEDIATE MEDICAL CARE IF:   · You have severe or persistent:  ¨ Headache.  ¨ Ear pain.  ¨ Sinus pain.  ¨ Chest pain.  · You have chronic lung disease and any of the following:  ¨ Wheezing.  ¨ Prolonged cough.  ¨ Coughing up blood.  ¨ A change in your usual mucus.  · You have a stiff neck.  · You have changes in your:  ¨ Vision.  ¨ Hearing.  ¨ Thinking.  ¨ Mood.  MAKE SURE YOU:   · Understand these instructions.  · Will watch your condition.  · Will get help right away if you are not doing well or get worse.     This information is not intended to replace advice given to you by your health care provider. Make sure you discuss any questions you have with your health care provider.     Document Released: 06/13/2002 Document Revised: 05/03/2016 Document Reviewed: 03/25/2015  [employer] Interactive Patient Education ©2016 [employer]      Urinary Tract Infection  Urinary tract infections (UTIs) can develop anywhere along your urinary tract. Your urinary tract is your body's drainage system for removing wastes and extra water. Your urinary tract includes two kidneys, two ureters, a bladder, and a urethra. Your kidneys are a pair of bean-shaped organs. Each kidney is about the size of your fist. They are located below your ribs, one on each side of your spine.  CAUSES  Infections are caused by microbes, which are microscopic organisms, including fungi, viruses, and bacteria. These organisms are so small that they can only be seen through a microscope. Bacteria are the microbes that most commonly cause UTIs.  SYMPTOMS   Symptoms of UTIs may vary by age and gender of the patient and by the location of the infection. Symptoms in young women typically include a frequent and intense urge to urinate and a  painful, burning feeling in the bladder or urethra during urination. Older women and men are more likely to be tired, shaky, and weak and have muscle aches and abdominal pain. A fever may mean the infection is in your kidneys. Other symptoms of a kidney infection include pain in your back or sides below the ribs, nausea, and vomiting.  DIAGNOSIS  To diagnose a UTI, your caregiver will ask you about your symptoms. Your caregiver also will ask to provide a urine sample. The urine sample will be tested for bacteria and white blood cells. White blood cells are made by your body to help fight infection.  TREATMENT   Typically, UTIs can be treated with medication. Because most UTIs are caused by a bacterial infection, they usually can be treated with the use of antibiotics. The choice of antibiotic and length of treatment depend on your symptoms and the type of bacteria causing your infection.  HOME CARE INSTRUCTIONS  · If you were prescribed antibiotics, take them exactly as your caregiver instructs you. Finish the medication even if you feel better after you have only taken some of the medication.  · Drink enough water and fluids to keep your urine clear or pale yellow.  · Avoid caffeine, tea, and carbonated beverages. They tend to irritate your bladder.  · Empty your bladder often. Avoid holding urine for long periods of time.  · Empty your bladder before and after sexual intercourse.  · After a bowel movement, women should cleanse from front to back. Use each tissue only once.  SEEK MEDICAL CARE IF:   · You have back pain.  · You develop a fever.  · Your symptoms do not begin to resolve within 3 days.  SEEK IMMEDIATE MEDICAL CARE IF:   · You have severe back pain or lower abdominal pain.  · You develop chills.  · You have nausea or vomiting.  · You have continued burning or discomfort with urination.  MAKE SURE YOU:   · Understand these instructions.  · Will watch your condition.  · Will get help right away if you are  "not doing well or get worse.     This information is not intended to replace advice given to you by your health care provider. Make sure you discuss any questions you have with your health care provider.     Document Released: 09/27/2006 Document Revised: 01/08/2016 Document Reviewed: 01/25/2013  [employer] Interactive Patient Education ©2016 [employer]    Viral Infections  A viral infection can be caused by different types of viruses. Most viral infections are not serious and resolve on their own. However, some infections may cause severe symptoms and may lead to further complications.  SYMPTOMS  Viruses can frequently cause:  · Minor sore throat.  · Aches and pains.  · Headaches.  · Runny nose.  · Different types of rashes.  · Watery eyes.  · Tiredness.  · Cough.  · Loss of appetite.  · Gastrointestinal infections, resulting in nausea, vomiting, and diarrhea.  These symptoms do not respond to antibiotics because the infection is not caused by bacteria. However, you might catch a bacterial infection following the viral infection. This is sometimes called a \"superinfection.\" Symptoms of such a bacterial infection may include:  · Worsening sore throat with pus and difficulty swallowing.  · Swollen neck glands.  · Chills and a high or persistent fever.  · Severe headache.  · Tenderness over the sinuses.  · Persistent overall ill feeling (malaise), muscle aches, and tiredness (fatigue).  · Persistent cough.  · Yellow, green, or brown mucus production with coughing.  HOME CARE INSTRUCTIONS   · Only take over-the-counter or prescription medicines for pain, discomfort, diarrhea, or fever as directed by your caregiver.  · Drink enough water and fluids to keep your urine clear or pale yellow. Sports drinks can provide valuable electrolytes, sugars, and hydration.  · Get plenty of rest and maintain proper nutrition. Soups and broths with crackers or rice are fine.  SEEK IMMEDIATE MEDICAL CARE IF:   · You have severe " headaches, shortness of breath, chest pain, neck pain, or an unusual rash.  · You have uncontrolled vomiting, diarrhea, or you are unable to keep down fluids.  · You or your child has an oral temperature above 102° F (38.9° C), not controlled by medicine.  · Your baby is older than 3 months with a rectal temperature of 102° F (38.9° C) or higher.  · Your baby is 3 months old or younger with a rectal temperature of 100.4° F (38° C) or higher.  MAKE SURE YOU:   · Understand these instructions.  · Will watch your condition.  · Will get help right away if you are not doing well or get worse.     This information is not intended to replace advice given to you by your health care provider. Make sure you discuss any questions you have with your health care provider.     Document Released: 09/27/2006 Document Revised: 03/11/2013 Document Reviewed: 04/23/2012  [employer] Interactive Patient Education ©2016 [employer]

## 2018-02-07 NOTE — ED NOTES
Assumed care...  Pt voices no complaints, urine sent, MD updated, cancel second blood cultures and lac acid.

## 2018-02-07 NOTE — ED TRIAGE NOTES
Pt ambulated to triage with   Chief Complaint   Patient presents with   • Flu Like Symptoms     fever, chills, cough, nasal congestion, ha, sore throat, congested for the last two days   • Painful Urination     started last night     Sepsis score 3, pt reports temp 101.2 at home.  Charge informed of pt.  Pt Informed regarding triage process and verbalized understanding to inform triage tech or RN for any changes in condition. Placed in lobby.

## 2018-02-07 NOTE — ED PROVIDER NOTES
"ED Provider Note    Scribed for Pollo Mcdermott D.O. by Martita Gunn. 2/6/2018  7:02 PM    Primary care provider: Pcp Pt States None   History obtained from: patient   History limited by: None     CHIEF COMPLAINT  Chief Complaint   Patient presents with   • Flu Like Symptoms     fever, chills, cough, nasal congestion, ha, sore throat, congested for the last two days   • Painful Urination     started last night        HPI    Ivis Klein is a 22 y.o. female who presents to the ED with complaints of flu like symptoms onset 2 days ago. Patient reports associated fever, body aches, chills, cough, nasal congestion, headache, ear pain, nausea, vomiting X3, shortness of breath, and a sore throat. She describes her sore throat as \"pain where her thyroid is\". Her nausea is exacerbated with coughing. Additionally, she is complaining of dysuria and frequent urination onset last night. She states she has a history of UTI's and her urinary symptoms feel similar to what she experienced with her previous UTI's. Patient reports sick contacts. She has not recently traveled outside of the [place]. Patient denies diarrhea. Patient has a medical history of hyperthyroidism.     REVIEW OF SYSTEMS  Please see HPI for pertinent positives/negatives.  All other systems reviewed and are negative. C    PAST MEDICAL HISTORY  Past Medical History:   Diagnosis Date   • Anxiety 2/16/2017   • Graves disease 12/5/2016   • Vomiting 6/29/2016   • Hyperthyroidism 6/29/2016   • Pericarditis 06/2016   • Arrhythmia     tachycardia \"pt reports d/t thryoid\"   • Breath shortness     no c/o at this time; c/o sob at night unsure if it is d/t anxiety   • Heart valve disease    • Menarche     started at age 14   • Migraine    • Pregnancy     delivered 9/8/2013, 3/16/17 pt reports that she is not pregnant at this time   • Psychiatric problem     anxiety, depression   • Supervision of normal first teen pregnancy         SURGICAL HISTORY  Past Surgical History: " "  Procedure Laterality Date   • THYROIDECTOMY TOTAL Bilateral 3/22/2017    Procedure: THYROIDECTOMY TOTAL -NIMS RECURRENT LARYNGEAL NERVE MONITORING;  Surgeon: Vicente Eldridge M.D.;  Location: SURGERY SAME DAY [facility];  Service:    • PRIMARY C SECTION  9/8/2013    Performed by Melisa Wright M.D. at LABOR AND DELIVERY        SOCIAL HISTORY  Social History     Social History Main Topics   • Smoking status: Never Smoker   • Smokeless tobacco: Never Used      Comment: quit in 2012   • Alcohol use No      Comment: occ   • Drug use: Yes     Types: Marijuana      Comment: marijuana, just quit 3/13/17   • Sexual activity: Yes     Partners: Male     Birth control/ protection: Abstinence      Comment: single        FAMILY HISTORY  Family History   Problem Relation Age of Onset   • Cancer Paternal Grandmother 56     breast cancer        CURRENT MEDICATIONS  Home Medications    **Home medications have not yet been reviewed for this encounter**          ALLERGIES  Allergies   Allergen Reactions   • Nkda [No Known Drug Allergy]         PHYSICAL EXAM  VITAL SIGNS: /58   Pulse 84   Temp 36.4 °C (97.5 °F)   Resp 17   Ht 1.575 m (5' 2\")   Wt 66.5 kg (146 lb 9.7 oz)   LMP 01/10/2018   SpO2 99%   BMI 26.81 kg/m²  @MELODIE[[id number]::@     Pulse ox interpretation: 99% I interpret this pulse ox as normal     Constitutional: Well developed, well nourished, alert in no apparent distress, nontoxic appearance    HENT: No external signs of trauma, normocephalic, bilateral external ears normal, oropharynx moist and clear, nose normal    Eyes: PERRL, conjunctiva without erythema, no discharge, no icterus    Neck: Soft and supple, trachea midline, no stridor, no tenderness, no LAD, no JVD, good ROM    Cardiovascular: Regular rate and rhythm, no murmurs/rubs/gallops, strong distal pulses and good perfusion    Thorax & Lungs: No respiratory distress, CTAB  Abdomen: Soft, nontender, nondistended, no guarding, no rebound, " normal BS    Back: No CVAT    Extremities: No clubbing, no cyanosis, no edema, no gross deformity, good ROM, no tenderness, intact distal pulses with brisk cap refill    Skin: Warm, dry, no pallor/cyanosis, no rash noted    Lymphatic: No lymphadenopathy noted    Neuro: A/O times 3, no focal deficits noted    Psychiatric: Cooperative, normal judgement, appropriate for clinical situation          DIAGNOSTIC STUDIES / PROCEDURES        LABS  All labs reviewed by me.     Results for orders placed or performed during the hospital encounter of 02/06/18   Lactic acid (lactate)   Result Value Ref Range    Lactic Acid 1.1 0.5 - 2.0 mmol/L   CBC WITH DIFFERENTIAL   Result Value Ref Range    WBC 8.3 4.8 - 10.8 K/uL    RBC 4.60 4.20 - 5.40 M/uL    Hemoglobin 12.5 12.0 - 16.0 g/dL    Hematocrit 39.6 37.0 - 47.0 %    MCV 86.1 81.4 - 97.8 fL    MCH 27.2 27.0 - 33.0 pg    MCHC 31.6 (L) 33.6 - 35.0 g/dL    RDW 44.2 35.9 - 50.0 fL    Platelet Count 224 164 - 446 K/uL    MPV 10.8 9.0 - 12.9 fL    Neutrophils-Polys 74.20 (H) 44.00 - 72.00 %    Lymphocytes 16.30 (L) 22.00 - 41.00 %    Monocytes 6.70 0.00 - 13.40 %    Eosinophils 1.60 0.00 - 6.90 %    Basophils 1.00 0.00 - 1.80 %    Immature Granulocytes 0.20 0.00 - 0.90 %    Nucleated RBC 0.00 /100 WBC    Neutrophils (Absolute) 6.14 2.00 - 7.15 K/uL    Lymphs (Absolute) 1.35 1.00 - 4.80 K/uL    Monos (Absolute) 0.55 0.00 - 0.85 K/uL    Eos (Absolute) 0.13 0.00 - 0.51 K/uL    Baso (Absolute) 0.08 0.00 - 0.12 K/uL    Immature Granulocytes (abs) 0.02 0.00 - 0.11 K/uL    NRBC (Absolute) 0.00 K/uL   COMP METABOLIC PANEL   Result Value Ref Range    Sodium 138 135 - 145 mmol/L    Potassium 3.6 3.6 - 5.5 mmol/L    Chloride 102 96 - 112 mmol/L    Co2 25 20 - 33 mmol/L    Anion Gap 11.0 0.0 - 11.9    Glucose 85 65 - 99 mg/dL    Bun 9 8 - 22 mg/dL    Creatinine 0.73 0.50 - 1.40 mg/dL    Calcium 9.1 8.5 - 10.5 mg/dL    AST(SGOT) 19 12 - 45 U/L    ALT(SGPT) 10 2 - 50 U/L    Alkaline Phosphatase 76  30 - 99 U/L    Total Bilirubin 0.6 0.1 - 1.5 mg/dL    Albumin 4.5 3.2 - 4.9 g/dL    Total Protein 7.6 6.0 - 8.2 g/dL    Globulin 3.1 1.9 - 3.5 g/dL    A-G Ratio 1.5 g/dL   URINALYSIS   Result Value Ref Range    Color Yellow     Character Clear     Specific Gravity 1.024 <1.035    Ph 7.5 5.0 - 8.0    Glucose Negative Negative mg/dL    Ketones 15 (A) Negative mg/dL    Protein Negative Negative mg/dL    Bilirubin Negative Negative    Urobilinogen, Urine 1.0 Negative    Nitrite Negative Negative    Leukocyte Esterase Large (A) Negative    Occult Blood Moderate (A) Negative    Micro Urine Req Microscopic    BLOOD CULTURE   Result Value Ref Range    Significant Indicator NEG     Source BLD     Site PERIPHERAL     Blood Culture       No Growth    Note: Blood cultures are incubated for 5 days and  are monitored continuously.Positive blood cultures  are called to the RN and reported as soon as  they are identified.     INFLUENZA A/B BY PCR   Result Value Ref Range    Influenza virus A RNA Negative Negative    Influenza virus B, PCR Negative Negative   ESTIMATED GFR   Result Value Ref Range    GFR If African American >60 >60 mL/min/1.73 m 2    GFR If Non African American >60 >60 mL/min/1.73 m 2   BETA-HCG QUALITATIVE URINE   Result Value Ref Range    Beta-Hcg Urine Negative Negative   REFRACTOMETER SG   Result Value Ref Range    Specific Gravity 1.024    URINE MICROSCOPIC (W/UA)   Result Value Ref Range    WBC Packed WBC /hpf    RBC 20-50 (A) /hpf    Bacteria Few (A) None /hpf    Epithelial Cells Few /hpf    Hyaline Cast 3-5 (A) /lpf        RADIOLOGY  The radiologist's interpretation of all radiological studies have been reviewed by me.     No orders to display          COURSE & MEDICAL DECISION MAKING  Nursing notes, VS, PMSFHx reviewed in chart.     Differential diagnoses considered include but are not limited to: URI, pneumonia, bronchitis, influenza, laryngitis, pharyngitis/tonsillitis, epiglottitis, peritonsillar  abscess, retropharyngeal abscess, sinusitis, mononucleosis, viral syndrome, allergic rhinitis, otitis media, pregnancy, UTI/cystitis/pyelo, KS/renal colic       Lactic acid, beta HCG qualitative, influenza A/B by PCR, estimated GFR, PO challenge, cardiac monitoring, CBC, CMP, urinalysis, urine culture, blood culture, refractometer SG was ordered to evaluate for her flu like symptoms and dysuria.      7:43 PM- Reviewed the patient's lab results.     7:46 PM- Upon re-evaluation the patient is resting comfortably. Updated her on her lab results. She is stable for discharge at this time. Instructed the patient on return to ED precautions and she agrees to be discharged home.     Patient presents with her  to the ED with above complaint. Labs were essentially unremarkable except for evidence of UTI. Findings discussed with the patient. This is a pleasant well-appearing patient in no acute distress and nontoxic in appearance with a benign exam. She will be treated for UTI with outpatient antibiotic and prescribed Bactrim. I discussed with her hydration, good hygiene and acetaminophen/ibuprofen as needed. She was advised on outpatient follow-up and given return to ED precautions. She verbalized understanding and agreed with plan of care with no further questions or concerns.      The patient is referred to a primary physician for blood pressure management, diabetic screening, and for all other preventative health concerns.       FINAL IMPRESSION  1. Acute UTI    2. Upper respiratory tract infection, unspecified type       DISPOSITION  Patient will be discharged home in stable condition.     FOLLOW UP  [address] [address]  [phone]  Call in 1 day      [facility] Emergency Dept  [address] [facility] [id number] [phone]    If symptoms worsen       OUTPATIENT MEDICATIONS  Discharge Medication List as of 2/6/2018  8:00 PM      START taking  these medications    Details   sulfamethoxazole-trimethoprim (BACTRIM DS) 800-160 MG tablet Take 1 Tab by mouth 2 times a day for 5 days., Disp-10 Tab, R-0, Print Rx Paper             Martita HARRIS (Bethanyibe), am scribing for, and in the presence of, Pollo Mcdermott D.O..    Electronically signed by: Martita Gunn (Kamini), 2/6/2018    Pollo HARRIS D.O. personally performed the services described in this documentation, as scribed by Martita Gunn in my presence, and it is both accurate and complete.      Portions of this record were made with voice recognition software and by scribes.  Despite my review, spelling/grammar/context errors may still remain.  Interpretation of this chart should be taken in this context.

## 2018-02-08 LAB
BACTERIA UR CULT: NORMAL
SIGNIFICANT IND 70042: NORMAL
SITE SITE: NORMAL
SOURCE SOURCE: NORMAL

## 2018-02-11 LAB
BACTERIA BLD CULT: NORMAL
SIGNIFICANT IND 70042: NORMAL
SITE SITE: NORMAL
SOURCE SOURCE: NORMAL

## 2018-03-26 ENCOUNTER — HOSPITAL ENCOUNTER (EMERGENCY)
Dept: HOSPITAL 8 - ED | Age: 23
Discharge: HOME | End: 2018-03-26
Payer: SELF-PAY

## 2018-03-26 VITALS — SYSTOLIC BLOOD PRESSURE: 114 MMHG | DIASTOLIC BLOOD PRESSURE: 59 MMHG

## 2018-03-26 VITALS — HEIGHT: 63 IN | WEIGHT: 148.15 LBS | BODY MASS INDEX: 26.25 KG/M2

## 2018-03-26 DIAGNOSIS — E89.0: Primary | ICD-10-CM

## 2018-03-26 DIAGNOSIS — F41.1: ICD-10-CM

## 2018-03-26 DIAGNOSIS — E05.01: ICD-10-CM

## 2018-03-26 LAB
ALBUMIN SERPL-MCNC: 4.5 G/DL (ref 3.4–5)
ALP SERPL-CCNC: 81 U/L (ref 45–117)
ALT SERPL-CCNC: 15 U/L (ref 12–78)
ANION GAP SERPL CALC-SCNC: 12 MMOL/L (ref 5–15)
BASOPHILS # BLD AUTO: 0.12 X10^3/UL (ref 0–0.3)
BASOPHILS NFR BLD AUTO: 1 % (ref 0–1)
BILIRUB SERPL-MCNC: 0.3 MG/DL (ref 0.2–1)
CALCIUM SERPL-MCNC: 9 MG/DL (ref 8.5–10.1)
CHLORIDE SERPL-SCNC: 105 MMOL/L (ref 98–107)
CREAT SERPL-MCNC: 0.88 MG/DL (ref 0.55–1.02)
EOSINOPHIL # BLD AUTO: 0.02 X10^3/UL (ref 0–0.8)
EOSINOPHIL NFR BLD AUTO: 0 % (ref 1–7)
ERYTHROCYTE [DISTWIDTH] IN BLOOD BY AUTOMATED COUNT: 15.8 % (ref 9.6–15.2)
LYMPHOCYTES # BLD AUTO: 2.93 X10^3/UL (ref 1–6.1)
LYMPHOCYTES NFR BLD AUTO: 36 % (ref 22–44)
MCH RBC QN AUTO: 27.3 PG (ref 27–34.8)
MCHC RBC AUTO-ENTMCNC: 33 G/DL (ref 32.4–35.8)
MCV RBC AUTO: 82.9 FL (ref 80–100)
MD: NO
MONOCYTES # BLD AUTO: 0.31 X10^3/UL (ref 0–1.4)
MONOCYTES NFR BLD AUTO: 4 % (ref 2–9)
NEUTROPHILS # BLD AUTO: 4.83 X10^3/UL (ref 1.8–8)
NEUTROPHILS NFR BLD AUTO: 59 % (ref 42–75)
PLATELET # BLD AUTO: 301 X10^3/UL (ref 130–400)
PMV BLD AUTO: 9.2 FL (ref 7.4–10.4)
PROT SERPL-MCNC: 8.8 G/DL (ref 6.4–8.2)
RBC # BLD AUTO: 4.93 X10^6/UL (ref 3.82–5.3)
T4 FREE SERPL-MCNC: 0.29 NG/DL (ref 0.76–1.46)

## 2018-03-26 PROCEDURE — 84443 ASSAY THYROID STIM HORMONE: CPT

## 2018-03-26 PROCEDURE — 93005 ELECTROCARDIOGRAM TRACING: CPT

## 2018-03-26 PROCEDURE — 96374 THER/PROPH/DIAG INJ IV PUSH: CPT

## 2018-03-26 PROCEDURE — 99285 EMERGENCY DEPT VISIT HI MDM: CPT

## 2018-03-26 PROCEDURE — 80053 COMPREHEN METABOLIC PANEL: CPT

## 2018-03-26 PROCEDURE — 85025 COMPLETE CBC W/AUTO DIFF WBC: CPT

## 2018-03-26 PROCEDURE — 84439 ASSAY OF FREE THYROXINE: CPT

## 2018-03-26 PROCEDURE — 96375 TX/PRO/DX INJ NEW DRUG ADDON: CPT

## 2018-03-26 PROCEDURE — 84703 CHORIONIC GONADOTROPIN ASSAY: CPT

## 2018-03-26 PROCEDURE — 36415 COLL VENOUS BLD VENIPUNCTURE: CPT

## 2018-03-28 ENCOUNTER — HOSPITAL ENCOUNTER (EMERGENCY)
Facility: MEDICAL CENTER | Age: 23
End: 2018-03-28
Attending: EMERGENCY MEDICINE

## 2018-03-28 VITALS
OXYGEN SATURATION: 97 % | HEIGHT: 64 IN | HEART RATE: 74 BPM | SYSTOLIC BLOOD PRESSURE: 120 MMHG | WEIGHT: 146.61 LBS | RESPIRATION RATE: 18 BRPM | BODY MASS INDEX: 25.03 KG/M2 | TEMPERATURE: 98 F | DIASTOLIC BLOOD PRESSURE: 60 MMHG

## 2018-03-28 DIAGNOSIS — Z76.0 MEDICATION REFILL: ICD-10-CM

## 2018-03-28 DIAGNOSIS — E03.9 HYPOTHYROIDISM, UNSPECIFIED TYPE: ICD-10-CM

## 2018-03-28 PROCEDURE — 99283 EMERGENCY DEPT VISIT LOW MDM: CPT

## 2018-03-28 RX ORDER — LEVOTHYROXINE SODIUM 0.03 MG/1
25 TABLET ORAL
Qty: 30 TAB | Refills: 0 | Status: SHIPPED | OUTPATIENT
Start: 2018-03-28 | End: 2018-04-04

## 2018-03-28 RX ORDER — LEVOTHYROXINE SODIUM 0.03 MG/1
25 TABLET ORAL
Qty: 30 TAB | Refills: 0 | Status: SHIPPED | OUTPATIENT
Start: 2018-03-28 | End: 2018-03-28

## 2018-03-28 ASSESSMENT — ENCOUNTER SYMPTOMS
HEADACHES: 1
CHILLS: 0
FEVER: 0

## 2018-03-28 NOTE — ED NOTES
Discharge instructions given, pt verbalized understanding.  Prescription instructions given, pt verbalize understanding.  A&ox4.  VSS.  Ambulates out of ER.

## 2018-03-28 NOTE — ED TRIAGE NOTES
Chief Complaint   Patient presents with   • Medication Refill     levothyroxine 25mcg     She states she no longer has PCP. Seen at [facility] two days ago but has lost prescription. VSS. Explained triage process, to waiting room. Asked to inform RN if questions or concerns arise.

## 2018-03-28 NOTE — ED PROVIDER NOTES
"ED Provider Note    Scribed for Jared Díaz M.D. by Trina Gonzales. 3/28/2018, 1:19 PM.    Primary care provider: Pcp Pt States None  Means of arrival: Walk-in  History obtained from: Patient  History limited by: None    CHIEF COMPLAINT  Chief Complaint   Patient presents with   • Medication Refill     levothyroxine 25mcg       HPI  Ivis Klein is a 22 y.o. Female with a history of Thyroidectomy who presents to the Emergency Department for medication refill as she is feeling tired and \"not herself\". She has been without her thyroid medication for the past 30 days. She went to [facility] and was given a script which she lost. She states that she has been waking up with headaches. Currently has No complaints at all. No complaints of of fevers or chills.      REVIEW OF SYSTEMS  Review of Systems   Constitutional: Positive for malaise/fatigue. Negative for chills and fever.   Neurological: Positive for headaches.   E.    PAST MEDICAL HISTORY   has a past medical history of Anxiety (2/16/2017); Arrhythmia; Breath shortness; Graves disease (12/5/2016); Heart valve disease; Hyperthyroidism (6/29/2016); Menarche; Migraine; Pericarditis (06/2016); Pregnancy; Psychiatric problem; Supervision of normal first teen pregnancy; and Vomiting (6/29/2016).    SURGICAL HISTORY   has a past surgical history that includes primary c section (9/8/2013) and thyroidectomy total (Bilateral, 3/22/2017).    SOCIAL HISTORY  Social History   Substance Use Topics   • Smoking status: Never Smoker   • Smokeless tobacco: Never Used      Comment: quit in 2012   • Alcohol use No      Comment: occ      History   Drug Use   • Types: Marijuana     Comment: marijuana, just quit 3/13/17       FAMILY HISTORY  Family History   Problem Relation Age of Onset   • Cancer Paternal Grandmother 56     breast cancer       CURRENT MEDICATIONS  No current facility-administered medications for this encounter.     Current Outpatient " "Prescriptions:   •  levothyroxine (SYNTHROID) 125 MCG Tab, Take 1 Tab by mouth Every morning on an empty stomach., Disp: 30 Tab, Rfl: 2  •  oxycodone immediate-release (ROXICODONE) 5 MG Tab, Take 1-2 Tabs by mouth every four hours as needed (Moderate Pain (NRS Pain Scale 4-6; CPOT Pain Scale 3-5))., Disp: 30 Tab, Rfl: 0    ALLERGIES  Allergies   Allergen Reactions   • Nkda [No Known Drug Allergy]        PHYSICAL EXAM  VITAL SIGNS: /61   Pulse 100   Temp 36.7 °C (98 °F)   Resp 16   Ht 1.626 m (5' 4\")   Wt 66.5 kg (146 lb 9.7 oz)   SpO2 97%   BMI 25.16 kg/m²     Constitutional: Well developed, Well nourished, No acute distress, Non-toxic appearance.   HENT: Normocephalic, Atraumatic, Bilateral external ears normal.  Eyes: conjunctiva is normal. There are no signs of exudate.  Neck: Supple. Surgical incision, well-healed, to the inferior aspect of anterior neck status post thyroidectomy.  Cardiovascular: Regular rate and rhythm without murmurs gallops or rubs.   Thorax & Lungs: No respiratory distress. Breathing comfortably. Lungs are clear to auscultation bilaterally, there are no wheezes no rales. Chest wall is nontender.  Skin: Warm, Dry, No erythema,   Back: No tenderness, No CVA tenderness.    Psychiatric: Affect normal, Judgment normal, Mood normal.       COURSE & MEDICAL DECISION MAKING  Nursing notes, VS, PMSFHx reviewed in chart.    1:19 PM - Patient seen and examined at bedside. Will provide thyroid medication script but strongly recommended establishing a primary care provider for long-term medication follow-up. The patient will be discharged with prescription of the volar thyroxine and should return if symptoms worsen or if new symptoms arise. The patient understands and agrees to plan.       Decision Making:  Patient is here describing that she is chronically fatigued, has not been on her thyroid medications. I would give her prescription of her thyroid medications. She is to follow-up with " her a primary care physician for further outpatient treatment and care.        DISPOSITION:  Patient will be discharged home in stable condition.    FOLLOW UP:  No follow-up provider specified.    OUTPATIENT MEDICATIONS:  New Prescriptions    No medications on file         FINAL IMPRESSION  1. Hypothyroidism, unspecified type    2. Medication refill        The patient will return for new or worsening symptoms and is stable at the time of discharge.    The patient is referred to a primary physician for blood pressure management, diabetic screening, and for all other preventative health concerns.        DISPOSITION:  Patient will be discharged home in stable condition.    FOLLOW UP:  [facility]  [address]  [facility] [address]  [phone]    Schedule an appointment as soon as possible for a visit      [address] [address]  [phone]  Schedule an appointment as soon as possible for a visit        OUTPATIENT MEDICATIONS:  Discharge Medication List as of 3/28/2018  1:42 PM      START taking these medications    Details   levothyroxine (SYNTHROID) 25 MCG Tab Take 1 Tab by mouth Every morning on an empty stomach., Disp-30 Tab, R-0, Print Rx Paper              Trina HARRIS (Kamini), am scribing for, and in the presence of, Jared Díaz M.D..    Electronically signed by: Trina Gonzales (Kamini), 3/28/2018    IJared M.D. personally performed the services described in this documentation, as scribed by Trina Gonzales in my presence, and it is both accurate and complete.    The note accurately reflects work and decisions made by me.  Jared Díaz  3/28/2018  6:17 PM

## 2018-03-28 NOTE — DISCHARGE INSTRUCTIONS
Hypothyroidism  Hypothyroidism is a disorder of the thyroid. The thyroid is a large gland that is located in the lower front of the neck. The thyroid releases hormones that control how the body works. With hypothyroidism, the thyroid does not make enough of these hormones.  What are the causes?  Causes of hypothyroidism may include:  · Viral infections.  · Pregnancy.  · Your own defense system (immune system) attacking your thyroid.  · Certain medicines.  · Birth defects.  · Past radiation treatments to your head or neck.  · Past treatment with radioactive iodine.  · Past surgical removal of part or all of your thyroid.  · Problems with the gland that is located in the center of your brain (pituitary).  What are the signs or symptoms?  Signs and symptoms of hypothyroidism may include:  · Feeling as though you have no energy (lethargy).  · Inability to tolerate cold.  · Weight gain that is not explained by a change in diet or exercise habits.  · Dry skin.  · Coarse hair.  · Menstrual irregularity.  · Slowing of thought processes.  · Constipation.  · Sadness or depression.  How is this diagnosed?  Your health care provider may diagnose hypothyroidism with blood tests and ultrasound tests.  How is this treated?  Hypothyroidism is treated with medicine that replaces the hormones that your body does not make. After you begin treatment, it may take several weeks for symptoms to go away.  Follow these instructions at home:  · Take medicines only as directed by your health care provider.  · If you start taking any new medicines, tell your health care provider.  · Keep all follow-up visits as directed by your health care provider. This is important. As your condition improves, your dosage needs may change. You will need to have blood tests regularly so that your health care provider can watch your condition.  Contact a health care provider if:  · Your symptoms do not get better with treatment.  · You are taking thyroid  replacement medicine and:  ¨ You sweat excessively.  ¨ You have tremors.  ¨ You feel anxious.  ¨ You lose weight rapidly.  ¨ You cannot tolerate heat.  ¨ You have emotional swings.  ¨ You have diarrhea.  ¨ You feel weak.  Get help right away if:  · You develop chest pain.  · You develop an irregular heartbeat.  · You develop a rapid heartbeat.  This information is not intended to replace advice given to you by your health care provider. Make sure you discuss any questions you have with your health care provider.  Document Released: 12/18/2006 Document Revised: 05/25/2017 Document Reviewed: 05/05/2015  [employer] Interactive Patient Education © 2017 [employer]

## 2018-04-04 ENCOUNTER — HOSPITAL ENCOUNTER (EMERGENCY)
Facility: MEDICAL CENTER | Age: 23
End: 2018-04-04
Attending: EMERGENCY MEDICINE

## 2018-04-04 VITALS
OXYGEN SATURATION: 99 % | HEIGHT: 64 IN | WEIGHT: 147.93 LBS | TEMPERATURE: 98.8 F | DIASTOLIC BLOOD PRESSURE: 63 MMHG | RESPIRATION RATE: 16 BRPM | BODY MASS INDEX: 25.25 KG/M2 | HEART RATE: 72 BPM | SYSTOLIC BLOOD PRESSURE: 120 MMHG

## 2018-04-04 DIAGNOSIS — N39.0 URINARY TRACT INFECTION WITHOUT HEMATURIA, SITE UNSPECIFIED: ICD-10-CM

## 2018-04-04 LAB
APPEARANCE UR: ABNORMAL
BACTERIA #/AREA URNS HPF: NEGATIVE /HPF
BILIRUB UR QL STRIP.AUTO: NEGATIVE
COLOR UR: YELLOW
CULTURE IF INDICATED INDCX: YES UA CULTURE
EPI CELLS #/AREA URNS HPF: NEGATIVE /HPF
GLUCOSE UR STRIP.AUTO-MCNC: NEGATIVE MG/DL
HCG UR QL: NEGATIVE
HYALINE CASTS #/AREA URNS LPF: ABNORMAL /LPF
KETONES UR STRIP.AUTO-MCNC: NEGATIVE MG/DL
LEUKOCYTE ESTERASE UR QL STRIP.AUTO: ABNORMAL
MICRO URNS: ABNORMAL
NITRITE UR QL STRIP.AUTO: NEGATIVE
PH UR STRIP.AUTO: 7.5 [PH]
PROT UR QL STRIP: NEGATIVE MG/DL
RBC # URNS HPF: ABNORMAL /HPF
RBC UR QL AUTO: ABNORMAL
SP GR UR REFRACTOMETRY: 1.02
SP GR UR STRIP.AUTO: 1.02
UROBILINOGEN UR STRIP.AUTO-MCNC: 1 MG/DL
WBC #/AREA URNS HPF: ABNORMAL /HPF

## 2018-04-04 PROCEDURE — 700102 HCHG RX REV CODE 250 W/ 637 OVERRIDE(OP): Mod: EDC | Performed by: EMERGENCY MEDICINE

## 2018-04-04 PROCEDURE — 87086 URINE CULTURE/COLONY COUNT: CPT | Mod: EDC

## 2018-04-04 PROCEDURE — 99284 EMERGENCY DEPT VISIT MOD MDM: CPT | Mod: EDC

## 2018-04-04 PROCEDURE — 700111 HCHG RX REV CODE 636 W/ 250 OVERRIDE (IP): Mod: EDC | Performed by: EMERGENCY MEDICINE

## 2018-04-04 PROCEDURE — 81025 URINE PREGNANCY TEST: CPT | Mod: EDC

## 2018-04-04 PROCEDURE — 96372 THER/PROPH/DIAG INJ SC/IM: CPT | Mod: EDC

## 2018-04-04 PROCEDURE — A9270 NON-COVERED ITEM OR SERVICE: HCPCS | Mod: EDC | Performed by: EMERGENCY MEDICINE

## 2018-04-04 PROCEDURE — 81001 URINALYSIS AUTO W/SCOPE: CPT | Mod: EDC

## 2018-04-04 RX ORDER — LEVOTHYROXINE SODIUM 0.12 MG/1
125 TABLET ORAL ONCE
Status: COMPLETED | OUTPATIENT
Start: 2018-04-04 | End: 2018-04-04

## 2018-04-04 RX ORDER — SULFAMETHOXAZOLE AND TRIMETHOPRIM 800; 160 MG/1; MG/1
1 TABLET ORAL 2 TIMES DAILY
Qty: 14 TAB | Refills: 0 | Status: ON HOLD | OUTPATIENT
Start: 2018-04-04 | End: 2018-04-08

## 2018-04-04 RX ORDER — SULFAMETHOXAZOLE AND TRIMETHOPRIM 800; 160 MG/1; MG/1
1 TABLET ORAL 2 TIMES DAILY
Qty: 14 TAB | Refills: 0 | Status: SHIPPED | OUTPATIENT
Start: 2018-04-04 | End: 2018-04-04

## 2018-04-04 RX ORDER — LEVOTHYROXINE SODIUM 0.12 MG/1
125 TABLET ORAL
Qty: 30 TAB | Refills: 3 | Status: ON HOLD | OUTPATIENT
Start: 2018-04-04 | End: 2018-04-08

## 2018-04-04 RX ORDER — SULFAMETHOXAZOLE AND TRIMETHOPRIM 800; 160 MG/1; MG/1
1 TABLET ORAL ONCE
Status: COMPLETED | OUTPATIENT
Start: 2018-04-04 | End: 2018-04-04

## 2018-04-04 RX ORDER — CEFTRIAXONE 1 G/1
1000 INJECTION, POWDER, FOR SOLUTION INTRAMUSCULAR; INTRAVENOUS ONCE
Status: COMPLETED | OUTPATIENT
Start: 2018-04-04 | End: 2018-04-04

## 2018-04-04 RX ADMIN — SULFAMETHOXAZOLE AND TRIMETHOPRIM 1 TABLET: 800; 160 TABLET ORAL at 13:01

## 2018-04-04 RX ADMIN — CEFTRIAXONE SODIUM 1000 MG: 1 INJECTION, POWDER, FOR SOLUTION INTRAMUSCULAR; INTRAVENOUS at 13:01

## 2018-04-04 RX ADMIN — LEVOTHYROXINE SODIUM 125 MCG: 125 TABLET ORAL at 13:01

## 2018-04-04 ASSESSMENT — PAIN SCALES - GENERAL: PAINLEVEL_OUTOF10: 0

## 2018-04-04 NOTE — ED NOTES
No s/s allergic reaction. Discharge instructions discussed with pt, copy of discharge instructions and rx for bactrim septra and synthroid e-prescribed to pharmacy. RN verified pharmacy with pt. Instructed to follow up with [facility], Emergency Dept  [address] [address] [phone]    If symptoms worsen    Pt requested financial assistance paperwork. RN provided paperwork and highlighted where to send form. Pt reports she will obtain PCP after getting aid and will follow up in ED in the mean time PRN.  Verbalized understanding of discharge information. Pt discharged to self. Pt awake, alert, calm, NAD. VSS.

## 2018-04-04 NOTE — ED PROVIDER NOTES
ED Provider Note    Scribed for Jensen Lowry M.D. by Earl Powell. 4/4/2018  11:55 AM    CHIEF COMPLAINT  Chief Complaint   Patient presents with   • Painful Urination     LMP about 2 months ago. Negative home pregnancy test. Pt feeling fatigue and nausea. Reports painful urination.        HPI  Ivis Klein is a 22 y.o. female who presents to the Emergency Room for evaluation of a possible pregnancy. The patient reports that she has been experiencing urinary frequency and an uncomfortable sensation of chills in her urethra at the end of urination that she attributes to the inability to fully expel all urine. She also reports associated fatigue, abdominal pain, and nausea. She denies fever or vomiting. Per nursing notes, she took a negative home pregnancy test on 3/14/2018. Her last normal menstrual period was in 02/2018, which is abnormal. The patient has a history of hyperthyroidism for which she takes levothyroxine. Her most recent prescription for levothyroxine was accidentally for 25 mcg instead of 125 mcg. She last took her levothyroxine one week prior to my exam.    REVIEW OF SYSTEMS  Pertinent positives include urinary frequency, an uncomfortable sensation of chills at the end of urination, fatigue, abdominal pain, and nausea. Pertinent negatives include no fever or vomiting. See HPI for further details.  E    PAST MEDICAL HISTORY   has a past medical history of Anxiety (2/16/2017); Arrhythmia; Breath shortness; Graves disease (12/5/2016); Heart valve disease; Hyperthyroidism (6/29/2016); Menarche; Migraine; Pericarditis (06/2016); Pregnancy; Psychiatric problem; Supervision of normal first teen pregnancy; and Vomiting (6/29/2016).    SOCIAL HISTORY  Social History     Social History Main Topics   • Smoking status: Never Smoker   • Smokeless tobacco: Never Used      Comment: quit in 2012   • Alcohol use No      Comment: occ   • Drug use: Yes     Types: Marijuana      Comment:  "marijuana, just quit 3/13/17   • Sexual activity: Yes     Partners: Male     Birth control/ protection: Abstinence      Comment: single       SURGICAL HISTORY   has a past surgical history that includes primary c section (9/8/2013) and thyroidectomy total (Bilateral, 3/22/2017).    CURRENT MEDICATIONS  Home Medications     Reviewed by Millie Sharma R.N. (Registered Nurse) on 04/04/18 at 1054  Med List Status: Complete   Medication Last Dose Status   levothyroxine (SYNTHROID) 25 MCG Tab taking Active   oxycodone immediate-release (ROXICODONE) 5 MG Tab  Active                ALLERGIES  Allergies   Allergen Reactions   • Nkda [No Known Drug Allergy]        PHYSICAL EXAM  VITAL SIGNS: /70   Pulse 67   Temp 36.6 °C (97.9 °F)   Resp 16   Ht 1.626 m (5' 4\")   Wt 67.1 kg (147 lb 14.9 oz)   LMP 02/05/2018 (Approximate)   SpO2 100%   BMI 25.39 kg/m²   Pulse ox interpretation: I interpret this pulse ox as normal.  Constitutional: Alert in no apparent distress.  HENT: Normocephalic, Atraumatic, Bilateral external ears normal. Nose normal.   Eyes: Conjunctiva normal, non-icteric.   Heart: Regular rate and rythm, no murmurs.    Lungs: Clear to auscultation bilaterally.  Abdomen: Mild suprapubic tenderness. Normal bowel sounds. No masses.  Skin: Warm, Dry, No erythema, No rash.   Neurologic: Alert, Grossly non-focal.   Psychiatric: Affect normal, Judgment normal, Mood normal, Appears appropriate and not intoxicated.     DIAGNOSTIC STUDIES / PROCEDURES  LABS  Labs Reviewed   URINALYSIS,CULTURE IF INDICATED - Abnormal; Notable for the following:        Result Value    Character Cloudy (*)     Leukocyte Esterase Moderate (*)     Occult Blood Moderate (*)     All other components within normal limits   URINE MICROSCOPIC (W/UA) - Abnormal; Notable for the following:     -150 (*)     All other components within normal limits   HCG QUALITATIVE UR   REFRACTOMETER SG   URINE CULTURE(NEW)   All labs reviewed by " me.    COURSE & MEDICAL DECISION MAKING  The patient's VS, Nurses notes reviewed. (See chart for details)    11:55 AM Patient seen and examined at bedside. Ordered for U/A culture if indicated, Beta-HCG qualitative urine, and refractometer SG to evaluate. Patient will be treated with levothyroxine tablet 15 mg for her symptoms.     12:36 PM Review of laboratory results reveal evidence of urinary tract infection    12:42 PM - Re-examined; The patient is resting in bed comfortably. I discussed her above findings plans for discharge with a prescription for Bactrim DS. Because of her reported back pain, and thus the possibility of early pyelonephritis, especially since she's been off her Synthroid which may be a bit of an immunosuppressant, I gave her a dose of intramuscular ceftriaxone here. She was instructed to return to the ED if her symptoms worsen. Patient understands and agrees.    The patient will return for new or worsening symptoms and is stable at the time of discharge.    DISPOSITION:  Patient will be discharged home in stable condition.    FOLLOW UP:  [facility], Emergency Dept  [address] [place] [id number] [phone]    If symptoms worsen      OUTPATIENT MEDICATIONS:  Discharge Medication List as of 4/4/2018  1:41 PM      START taking these medications    Details   sulfamethoxazole-trimethoprim (BACTRIM DS) 800-160 MG tablet Take 1 Tab by mouth 2 times a day for 7 days., Disp-14 Tab, R-0, Normal               FINAL IMPRESSION  1. Urinary tract infection without hematuria, site unspecified         Earl HARRIS (Kamini), am scribing for, and in the presence of, Jensen Lowry M.D..    Electronically signed by: Earl Powell (Scribe), 4/4/2018    Jensen HARRIS M.D. personally performed the services described in this documentation, as scribed by Earl Powell in my presence, and it is both accurate and complete.    The note accurately  reflects work and decisions made by me.  Jensen Lowry  4/4/2018  2:14 PM

## 2018-04-04 NOTE — ED TRIAGE NOTES
"Chief Complaint   Patient presents with   • Painful Urination     LMP about 2 months ago. Negative home pregnancy test. Pt feeling fatigue and nausea. Reports painful urination.      /70   Pulse 67   Temp 36.6 °C (97.9 °F)   Resp 16   Ht 1.626 m (5' 4\")   Wt 67.1 kg (147 lb 14.9 oz)   SpO2 100%   BMI 25.39 kg/m²   Pt placed back in lobby, educated on triage process, and told to inform staff of any change in condition.     "

## 2018-04-04 NOTE — DISCHARGE INSTRUCTIONS
You have a urinary tract infection. You're not pregnant. Please fill the antibiotic prescription provided here. We'll also refill your Synthroid. Follow-up with the doctor who prescribes your synthyroid prescription.    Urinary Tract Infection, Adult  Introduction  A urinary tract infection (UTI) is an infection of any part of the urinary tract. The urinary tract includes the:  · Kidneys.  · Ureters.  · Bladder.  · Urethra.  These organs make, store, and get rid of pee (urine) in the body.  Follow these instructions at home:  · Take over-the-counter and prescription medicines only as told by your doctor.  · If you were prescribed an antibiotic medicine, take it as told by your doctor. Do not stop taking the antibiotic even if you start to feel better.  · Avoid the following drinks:  ¨ Alcohol.  ¨ Caffeine.  ¨ Tea.  ¨ Carbonated drinks.  · Drink enough fluid to keep your pee clear or pale yellow.  · Keep all follow-up visits as told by your doctor. This is important.  · Make sure to:  ¨ Empty your bladder often and completely. Do not to hold pee for long periods of time.  ¨ Empty your bladder before and after sex.  ¨ Wipe from front to back after a bowel movement if you are female. Use each tissue one time when you wipe.  Contact a doctor if:  · You have back pain.  · You have a fever.  · You feel sick to your stomach (nauseous).  · You throw up (vomit).  · Your symptoms do not get better after 3 days.  · Your symptoms go away and then come back.  Get help right away if:  · You have very bad back pain.  · You have very bad lower belly (abdominal) pain.  · You are throwing up and cannot keep down any medicines or water.  This information is not intended to replace advice given to you by your health care provider. Make sure you discuss any questions you have with your health care provider.  Document Released: 06/05/2009 Document Revised: 05/25/2017 Document Reviewed: 11/07/2016  © 2017 Elsevier

## 2018-04-04 NOTE — ED NOTES
Pt last took synthroid for hyperthyroidism on Monday- Pt reports taking 25mcg were prescribed but supposed to take 125mcg (last dose on Monday, pt took 125mg).

## 2018-04-04 NOTE — ED NOTES
Pt to yellow 52. Care assumed on pt. Pt changing into gown and given blanket and call light. Whiteboard introduced.]  Pt reports LMP 2/5/2018- had negative home pregnancy test approx 3/14/218. Pt reports increase urinary frequency, decreased urination amount at a time and chills. Pt reports back pain. Pt denies fevers but reports chills. Pt awake, alert, calm. Chart up for erp.

## 2018-04-06 LAB
BACTERIA UR CULT: NORMAL
SIGNIFICANT IND 70042: NORMAL
SITE SITE: NORMAL
SOURCE SOURCE: NORMAL

## 2018-04-07 ENCOUNTER — RESOLUTE PROFESSIONAL BILLING HOSPITAL PROF FEE (OUTPATIENT)
Dept: MEDSURG UNIT | Facility: MEDICAL CENTER | Age: 23
End: 2018-04-07

## 2018-04-07 ENCOUNTER — HOSPITAL ENCOUNTER (OUTPATIENT)
Facility: MEDICAL CENTER | Age: 23
End: 2018-04-08
Attending: EMERGENCY MEDICINE | Admitting: HOSPITALIST

## 2018-04-07 ENCOUNTER — PATIENT OUTREACH (OUTPATIENT)
Dept: HEALTH INFORMATION MANAGEMENT | Facility: OTHER | Age: 23
End: 2018-04-07

## 2018-04-07 ENCOUNTER — APPOINTMENT (OUTPATIENT)
Dept: RADIOLOGY | Facility: MEDICAL CENTER | Age: 23
End: 2018-04-07
Attending: EMERGENCY MEDICINE

## 2018-04-07 DIAGNOSIS — E03.9 HYPOTHYROIDISM, UNSPECIFIED TYPE: ICD-10-CM

## 2018-04-07 DIAGNOSIS — R07.9 CHEST PAIN, UNSPECIFIED TYPE: ICD-10-CM

## 2018-04-07 DIAGNOSIS — R53.1 WEAKNESS: ICD-10-CM

## 2018-04-07 PROBLEM — E03.8 SUBCLINICAL HYPOTHYROIDISM: Status: ACTIVE | Noted: 2018-04-07

## 2018-04-07 PROBLEM — R68.89 FLU-LIKE SYMPTOMS: Chronic | Status: ACTIVE | Noted: 2018-04-07

## 2018-04-07 PROBLEM — R68.89 FLU-LIKE SYMPTOMS: Status: ACTIVE | Noted: 2018-04-07

## 2018-04-07 LAB
ALBUMIN SERPL BCP-MCNC: 5 G/DL (ref 3.2–4.9)
ALBUMIN/GLOB SERPL: 1.5 G/DL
ALP SERPL-CCNC: 61 U/L (ref 30–99)
ALT SERPL-CCNC: 7 U/L (ref 2–50)
AMPHET UR QL SCN: NEGATIVE
ANION GAP SERPL CALC-SCNC: 11 MMOL/L (ref 0–11.9)
APPEARANCE UR: CLEAR
APTT PPP: 28 SEC (ref 24.7–36)
AST SERPL-CCNC: 18 U/L (ref 12–45)
BARBITURATES UR QL SCN: NEGATIVE
BASOPHILS # BLD AUTO: 0.9 % (ref 0–1.8)
BASOPHILS # BLD: 0.06 K/UL (ref 0–0.12)
BENZODIAZ UR QL SCN: NEGATIVE
BILIRUB SERPL-MCNC: 0.4 MG/DL (ref 0.1–1.5)
BILIRUB UR QL STRIP.AUTO: NEGATIVE
BNP SERPL-MCNC: 10 PG/ML (ref 0–100)
BUN SERPL-MCNC: 9 MG/DL (ref 8–22)
BZE UR QL SCN: NEGATIVE
CALCIUM SERPL-MCNC: 10.2 MG/DL (ref 8.5–10.5)
CANNABINOIDS UR QL SCN: POSITIVE
CHLORIDE SERPL-SCNC: 106 MMOL/L (ref 96–112)
CK SERPL-CCNC: 147 U/L (ref 0–154)
CK SERPL-CCNC: 170 U/L (ref 0–154)
CO2 SERPL-SCNC: 18 MMOL/L (ref 20–33)
COLOR UR: YELLOW
CREAT SERPL-MCNC: 0.98 MG/DL (ref 0.5–1.4)
CULTURE IF INDICATED INDCX: NO UA CULTURE
EKG IMPRESSION: NORMAL
EOSINOPHIL # BLD AUTO: 0.05 K/UL (ref 0–0.51)
EOSINOPHIL NFR BLD: 0.7 % (ref 0–6.9)
ERYTHROCYTE [DISTWIDTH] IN BLOOD BY AUTOMATED COUNT: 45.4 FL (ref 35.9–50)
ERYTHROCYTE [SEDIMENTATION RATE] IN BLOOD BY WESTERGREN METHOD: 3 MM/HOUR (ref 0–20)
FLUAV RNA SPEC QL NAA+PROBE: NEGATIVE
FLUBV RNA SPEC QL NAA+PROBE: NEGATIVE
GLOBULIN SER CALC-MCNC: 3.4 G/DL (ref 1.9–3.5)
GLUCOSE SERPL-MCNC: 98 MG/DL (ref 65–99)
GLUCOSE UR STRIP.AUTO-MCNC: NEGATIVE MG/DL
HCG SERPL QL: NEGATIVE
HCT VFR BLD AUTO: 40.2 % (ref 37–47)
HGB BLD-MCNC: 13.3 G/DL (ref 12–16)
HIV 1+2 AB+HIV1 P24 AG SERPL QL IA: NON REACTIVE
IMM GRANULOCYTES # BLD AUTO: 0.02 K/UL (ref 0–0.11)
IMM GRANULOCYTES NFR BLD AUTO: 0.3 % (ref 0–0.9)
INR PPP: 1.07 (ref 0.87–1.13)
KETONES UR STRIP.AUTO-MCNC: NEGATIVE MG/DL
LEUKOCYTE ESTERASE UR QL STRIP.AUTO: NEGATIVE
LIPASE SERPL-CCNC: 16 U/L (ref 11–82)
LYMPHOCYTES # BLD AUTO: 1.11 K/UL (ref 1–4.8)
LYMPHOCYTES NFR BLD: 16.4 % (ref 22–41)
MCH RBC QN AUTO: 27.4 PG (ref 27–33)
MCHC RBC AUTO-ENTMCNC: 33.1 G/DL (ref 33.6–35)
MCV RBC AUTO: 82.9 FL (ref 81.4–97.8)
METHADONE UR QL SCN: NEGATIVE
MICRO URNS: ABNORMAL
MONOCYTES # BLD AUTO: 0.3 K/UL (ref 0–0.85)
MONOCYTES NFR BLD AUTO: 4.4 % (ref 0–13.4)
NEUTROPHILS # BLD AUTO: 5.22 K/UL (ref 2–7.15)
NEUTROPHILS NFR BLD: 77.3 % (ref 44–72)
NITRITE UR QL STRIP.AUTO: NEGATIVE
NRBC # BLD AUTO: 0 K/UL
NRBC BLD-RTO: 0 /100 WBC
OPIATES UR QL SCN: NEGATIVE
OXYCODONE UR QL SCN: NEGATIVE
PCP UR QL SCN: NEGATIVE
PH UR STRIP.AUTO: 8 [PH]
PLATELET # BLD AUTO: 235 K/UL (ref 164–446)
PMV BLD AUTO: 11.1 FL (ref 9–12.9)
POTASSIUM SERPL-SCNC: 4 MMOL/L (ref 3.6–5.5)
PROPOXYPH UR QL SCN: NEGATIVE
PROT SERPL-MCNC: 8.4 G/DL (ref 6–8.2)
PROT UR QL STRIP: NEGATIVE MG/DL
PROTHROMBIN TIME: 13.6 SEC (ref 12–14.6)
RBC # BLD AUTO: 4.85 M/UL (ref 4.2–5.4)
RBC # URNS HPF: ABNORMAL /HPF
RBC UR QL AUTO: ABNORMAL
SODIUM SERPL-SCNC: 135 MMOL/L (ref 135–145)
SP GR UR STRIP.AUTO: 1.01
T4 FREE SERPL-MCNC: 0.82 NG/DL (ref 0.53–1.43)
TROPONIN I SERPL-MCNC: <0.01 NG/ML (ref 0–0.04)
TSH SERPL DL<=0.005 MIU/L-ACNC: 64.02 UIU/ML (ref 0.38–5.33)
UROBILINOGEN UR STRIP.AUTO-MCNC: 0.2 MG/DL
WBC # BLD AUTO: 6.8 K/UL (ref 4.8–10.8)

## 2018-04-07 PROCEDURE — 87502 INFLUENZA DNA AMP PROBE: CPT

## 2018-04-07 PROCEDURE — 85730 THROMBOPLASTIN TIME PARTIAL: CPT

## 2018-04-07 PROCEDURE — 71045 X-RAY EXAM CHEST 1 VIEW: CPT

## 2018-04-07 PROCEDURE — 84484 ASSAY OF TROPONIN QUANT: CPT

## 2018-04-07 PROCEDURE — 85652 RBC SED RATE AUTOMATED: CPT

## 2018-04-07 PROCEDURE — 94760 N-INVAS EAR/PLS OXIMETRY 1: CPT

## 2018-04-07 PROCEDURE — G0378 HOSPITAL OBSERVATION PER HR: HCPCS

## 2018-04-07 PROCEDURE — 96372 THER/PROPH/DIAG INJ SC/IM: CPT

## 2018-04-07 PROCEDURE — 80307 DRUG TEST PRSMV CHEM ANLYZR: CPT

## 2018-04-07 PROCEDURE — 700111 HCHG RX REV CODE 636 W/ 250 OVERRIDE (IP): Performed by: STUDENT IN AN ORGANIZED HEALTH CARE EDUCATION/TRAINING PROGRAM

## 2018-04-07 PROCEDURE — A9270 NON-COVERED ITEM OR SERVICE: HCPCS | Performed by: STUDENT IN AN ORGANIZED HEALTH CARE EDUCATION/TRAINING PROGRAM

## 2018-04-07 PROCEDURE — 84443 ASSAY THYROID STIM HORMONE: CPT

## 2018-04-07 PROCEDURE — 82550 ASSAY OF CK (CPK): CPT

## 2018-04-07 PROCEDURE — 93005 ELECTROCARDIOGRAM TRACING: CPT

## 2018-04-07 PROCEDURE — 87086 URINE CULTURE/COLONY COUNT: CPT

## 2018-04-07 PROCEDURE — 83880 ASSAY OF NATRIURETIC PEPTIDE: CPT

## 2018-04-07 PROCEDURE — 99285 EMERGENCY DEPT VISIT HI MDM: CPT

## 2018-04-07 PROCEDURE — 85025 COMPLETE CBC W/AUTO DIFF WBC: CPT

## 2018-04-07 PROCEDURE — 84439 ASSAY OF FREE THYROXINE: CPT

## 2018-04-07 PROCEDURE — 99220 PR INITIAL OBSERVATION CARE,LEVL III: CPT | Mod: GC | Performed by: HOSPITALIST

## 2018-04-07 PROCEDURE — 83690 ASSAY OF LIPASE: CPT

## 2018-04-07 PROCEDURE — 700111 HCHG RX REV CODE 636 W/ 250 OVERRIDE (IP): Performed by: EMERGENCY MEDICINE

## 2018-04-07 PROCEDURE — 87389 HIV-1 AG W/HIV-1&-2 AB AG IA: CPT

## 2018-04-07 PROCEDURE — 84703 CHORIONIC GONADOTROPIN ASSAY: CPT

## 2018-04-07 PROCEDURE — 80053 COMPREHEN METABOLIC PANEL: CPT

## 2018-04-07 PROCEDURE — 81001 URINALYSIS AUTO W/SCOPE: CPT

## 2018-04-07 PROCEDURE — 85610 PROTHROMBIN TIME: CPT

## 2018-04-07 PROCEDURE — 96374 THER/PROPH/DIAG INJ IV PUSH: CPT

## 2018-04-07 PROCEDURE — 93005 ELECTROCARDIOGRAM TRACING: CPT | Performed by: EMERGENCY MEDICINE

## 2018-04-07 PROCEDURE — 700102 HCHG RX REV CODE 250 W/ 637 OVERRIDE(OP): Performed by: STUDENT IN AN ORGANIZED HEALTH CARE EDUCATION/TRAINING PROGRAM

## 2018-04-07 RX ORDER — LORAZEPAM 2 MG/ML
1 INJECTION INTRAMUSCULAR ONCE
Status: COMPLETED | OUTPATIENT
Start: 2018-04-07 | End: 2018-04-07

## 2018-04-07 RX ORDER — BISACODYL 10 MG
10 SUPPOSITORY, RECTAL RECTAL
Status: DISCONTINUED | OUTPATIENT
Start: 2018-04-07 | End: 2018-04-08 | Stop reason: HOSPADM

## 2018-04-07 RX ORDER — AMOXICILLIN 250 MG
2 CAPSULE ORAL 2 TIMES DAILY
Status: DISCONTINUED | OUTPATIENT
Start: 2018-04-07 | End: 2018-04-08 | Stop reason: HOSPADM

## 2018-04-07 RX ORDER — LEVOTHYROXINE SODIUM 137 UG/1
137 TABLET ORAL
Status: DISCONTINUED | OUTPATIENT
Start: 2018-04-08 | End: 2018-04-08 | Stop reason: HOSPADM

## 2018-04-07 RX ORDER — IBUPROFEN 400 MG/1
400 TABLET ORAL EVERY 6 HOURS PRN
Status: DISCONTINUED | OUTPATIENT
Start: 2018-04-07 | End: 2018-04-08 | Stop reason: HOSPADM

## 2018-04-07 RX ORDER — ACETAMINOPHEN 325 MG/1
650 TABLET ORAL EVERY 6 HOURS PRN
Status: DISCONTINUED | OUTPATIENT
Start: 2018-04-07 | End: 2018-04-08 | Stop reason: HOSPADM

## 2018-04-07 RX ORDER — POLYETHYLENE GLYCOL 3350 17 G/17G
1 POWDER, FOR SOLUTION ORAL
Status: DISCONTINUED | OUTPATIENT
Start: 2018-04-07 | End: 2018-04-08 | Stop reason: HOSPADM

## 2018-04-07 RX ADMIN — ENOXAPARIN SODIUM 40 MG: 100 INJECTION SUBCUTANEOUS at 18:08

## 2018-04-07 RX ADMIN — IBUPROFEN 400 MG: 400 TABLET, FILM COATED ORAL at 19:57

## 2018-04-07 RX ADMIN — ACETAMINOPHEN 650 MG: 325 TABLET, FILM COATED ORAL at 18:10

## 2018-04-07 RX ADMIN — LORAZEPAM 1 MG: 2 INJECTION INTRAMUSCULAR; INTRAVENOUS at 11:57

## 2018-04-07 ASSESSMENT — ENCOUNTER SYMPTOMS
NAUSEA: 1
SORE THROAT: 0
WEAKNESS: 1
CHILLS: 1
EYE PAIN: 0
PALPITATIONS: 0
BLURRED VISION: 0
ABDOMINAL PAIN: 1
VOMITING: 1
SHORTNESS OF BREATH: 1
COUGH: 1

## 2018-04-07 ASSESSMENT — PATIENT HEALTH QUESTIONNAIRE - PHQ9
1. LITTLE INTEREST OR PLEASURE IN DOING THINGS: NOT AT ALL
SUM OF ALL RESPONSES TO PHQ9 QUESTIONS 1 AND 2: 0
2. FEELING DOWN, DEPRESSED, IRRITABLE, OR HOPELESS: NOT AT ALL

## 2018-04-07 ASSESSMENT — PAIN SCALES - GENERAL
PAINLEVEL_OUTOF10: 10
PAINLEVEL_OUTOF10: 7
PAINLEVEL_OUTOF10: 10

## 2018-04-07 ASSESSMENT — LIFESTYLE VARIABLES
ALCOHOL_USE: NO
EVER_SMOKED: NEVER

## 2018-04-07 NOTE — ED NOTES
Med rec complete per pt at bedside   NKDA  Pt started a 7 day course of Bactrim BID for UTI on 04/04/18

## 2018-04-07 NOTE — ED TRIAGE NOTES
"Pt to triage in WC. EKG complete.  Chief Complaint   Patient presents with   • Chest Pain   • Shortness of Breath   • Tired   • Other     \"I have a burning sensation all over.\"     Onset of symptoms last night.  Hx of thyroid disease.  "

## 2018-04-07 NOTE — DISCHARGE PLANNING
Care Transition Team Assessment    Patient lives with her boyfriend, Arcenio Serna, his phone number is [phone].   Her mother is close by and her name is Joleen Grimes, number is [phone].   Patient has no insurance and I left a message with Patient Financial Assistance to check in with her when time permits.  Patient will also go to the community resource if PFA does not contact her.    Patient does not have an advance directive and packet was given to her.  At this time, PFA is the only need anticipated for this admission.        Information Source  Orientation : Oriented x 4  Information Given By: Patient  Informant's Name:  (Ivis Klein)  Who is responsible for making decisions for patient? : Patient    Readmission Evaluation  Is this a readmission?: Yes - unplanned readmission  Why do you think you were readmitted?:  (feeling lousy)  Did you understand your discharge instructions?: No  Did you have enough support after your last discharge?: No    Elopement Risk  Legal Hold: No    Interdisciplinary Discharge Planning  Does Admitting Nurse Feel This Could be a Complex Discharge?: Yes  Primary Care Physician:  (Does not have one)  Lives with - Patient's Self Care Capacity: Significant Other, Child Less than 18 Years of Age  Support Systems: Parent, Spouse / Significant Other  Housing / Facility: [address]  Do You Take your Prescribed Medications Regularly: Yes  Able to Return to Previous ADL's: Yes  Mobility Issues: No  Prior Services: None  Patient Expects to be Discharged to::  (Home)  Assistance Needed: Yes  Durable Medical Equipment: Not Applicable    Discharge Preparedness  What is your plan after discharge?: Home with help  What are your discharge supports?: Parent, Partner  Prior Functional Level: Ambulatory  Difficulity with ADLs: None  Difficulity with IADLs: None    Functional Assesment  Prior Functional Level: Ambulatory    Finances  Financial Barriers to Discharge: Yes  Prescription  Coverage: No  Prescription Coverage Comments:  (no insurance)              Advance Directive  Advance Directive?: None  Advance Directive offered?: AD Booklet given    Domestic Abuse  Have you ever been the victim of abuse or violence?: No  Physical Abuse or Sexual Abuse: No  Verbal Abuse or Emotional Abuse: No    Psychological Assessment  History of Substance Abuse: None  History of Psychiatric Problems: No    Discharge Risks or Barriers  Discharge risks or barriers?: Uninsured / underinsured, Other (comment)  Patient risk factors: Lack of outside supports, Language barrier, Readmission, Uninsured or underinsured    Anticipated Discharge Information  Anticipated discharge disposition: Home  Discharge Address:  ([address] [place] [address])  Discharge Contact Phone Number:  ([phone])

## 2018-04-07 NOTE — H&P
Internal Medicine Admitting History and Physical    Note Author: Alexandro Fajardo M.D.       Name Ivis Klein     1995   Age/Sex 22 y.o. female   MRN [id number]   Code Status Full     After 5PM or if no immediate response to page, please call for cross-coverage  Attending/Team: RENARD Royal Dr. See Patient List for primary contact information  Call [phone] to page    1st Call - Day Intern (R1):   DAYNE Bolton 2nd Call - Day Sr. Resident (R2/R3):   OLE Parrish       Chief Complaint:  Fatigue, full body pain/weakness, chest pain/pressure, shortness of breath    HPI:  Mrs. Klein is a 21 yo female with a history of Graves disease (S/P thyroidecotomy, synthroid) and recent treatment for UTI with Bactrium that presented to the ED for sudden onset fatigue, full body pain/weakness, chest pain/pressure, shortness of breath, and other symptoms.    The following symptoms started suddenly two days prior.  -Fatigues:  Feels tired with low energy  -Cold:  Patient feels cold  -Eyelid droop:  Constant, no change throughout the day.  -Chest pain:  Sharp pain on the R, pressure on the left.  No radiation.  Pain causes difficulty breathing.  -Shortness of breath:  Due to chest pain.  -Abdominal pain:  RUQ  -Nausea/vomitin-6 times in the past several days.  Yellow in color, no blood.  -Numbness:  R arm and R face.  -Upper extremity pain:  Pain in the joints and in the muscles. Function is reduced due to pain and weakness.   -Lower extremity pain:  The pain is the worst.  Located in the muscles and joints of the legs.  Function is reduced due to pain and weakness.  It keeps her from walking.    -Back pain:  Located at the R sacroilliac joint and midline/middle back.      She denies nausea/voming, vision changes.  Her  has been sick with a sore throat.  No flu vaccination.  She is sexually active with her  and they reliably use protection.  No autoimmune disease in her first degree  "relatives. Patient describes taking her Synthroid without difficult.    She was assessed in the ED three days prior for suprapubic pain and reduce urinary volume, but increased frequency.  She received Bactrium and took it for two days.  On presentation, she denied suprapubic pain, urinary frequency/volume changes.    ED course.  Triage vitals, T 36.7, HR 92, R 20, /53, 99% on RA, wt 61.2kg.  CBC was normal.  Notable labs, bicarb 18, AG 18.  UDS positive for cannabinoids.  TSH 64.0, FT4 0.82.  Rapid flu negative.  BHCG negative.  CXR normal.  EKG HR 81, , SR.  ED gave lorazepam.    Patient admitted for flu-like symptoms.    Review of Systems   Constitutional: Positive for chills and malaise/fatigue.   HENT: Negative for nosebleeds and sore throat.    Eyes: Negative for blurred vision and pain.   Respiratory: Positive for cough and shortness of breath.    Cardiovascular: Positive for chest pain. Negative for palpitations.   Gastrointestinal: Positive for abdominal pain, nausea and vomiting.   Genitourinary: Negative for dysuria.   Skin: Negative for itching and rash.   Neurological: Positive for weakness.             Past Medical History:   Past Medical History:   Diagnosis Date   • Anxiety 2/16/2017   • Arrhythmia     tachycardia \"pt reports d/t thryoid\"   • Breath shortness     no c/o at this time; c/o sob at night unsure if it is d/t anxiety   • Graves disease 12/5/2016   • Heart valve disease    • Hyperthyroidism 6/29/2016   • Menarche     started at age 14   • Migraine    • Pericarditis 06/2016   • Pregnancy     delivered 9/8/2013, 3/16/17 pt reports that she is not pregnant at this time   • Psychiatric problem     anxiety, depression   • Supervision of normal first teen pregnancy    • Vomiting 6/29/2016       Past Surgical History:  Past Surgical History:   Procedure Laterality Date   • THYROIDECTOMY TOTAL Bilateral 3/22/2017    Procedure: THYROIDECTOMY TOTAL -NIMS RECURRENT LARYNGEAL NERVE " "MONITORING;  Surgeon: Vicente Eldridge M.D.;  Location: SURGERY SAME DAY [facility] ORS;  Service:    • PRIMARY C SECTION  9/8/2013    Performed by Melisa Wright M.D. at LABOR AND DELIVERY       Current Outpatient Medications:  Home Medications     Reviewed by Jes Brunner, Pharmacy Intern (Pharmacy Intern) on 04/07/18 at 1520  Med List Status: Complete   Medication Last Dose Status   levothyroxine (SYNTHROID) 125 MCG Tab 4/7/2018 Active   sulfamethoxazole-trimethoprim (BACTRIM DS) 800-160 MG tablet 4/7/2018 Active                Medication Allergy/Sensitivities:  Allergies   Allergen Reactions   • Nkda [No Known Drug Allergy]          Family History:  Family History   Problem Relation Age of Onset   • Cancer Paternal Grandmother 56     breast cancer       Social History:  Social History     Social History   • Marital status: Single     Spouse name: N/A   • Number of children: N/A   • Years of education: N/A     Occupational History   • Not on file.     Social History Main Topics   • Smoking status: Never Smoker   • Smokeless tobacco: Never Used      Comment: quit in 2012   • Alcohol use No      Comment: occ   • Drug use: Yes     Types: Marijuana      Comment: marijuana,   • Sexual activity: Yes     Partners: Male     Birth control/ protection: Abstinence      Comment: single     Other Topics Concern   • Not on file     Social History Narrative   • No narrative on file     Living situation: Lives in [place] with   PCP : None      Physical Exam     Vitals:    04/07/18 1500 04/07/18 1530 04/07/18 1600 04/07/18 1700   BP:       Pulse:  83 84 74   Resp: 16  16 16   Temp:       SpO2:  99% 98% 98%   Weight:       Height:         Body mass index is 24.69 kg/m².  /53   Pulse 74   Temp 36.7 °C (98.1 °F)   Resp 16   Ht 1.575 m (5' 2\")   Wt 61.2 kg (135 lb)   LMP 04/04/2018   SpO2 98%   BMI 24.69 kg/m²   O2 therapy: Pulse Oximetry: 98 %    Physical Exam   Constitutional: She is oriented to " person, place, and time and well-developed, well-nourished, and in no distress. No distress.   Eyes: Conjunctivae and EOM are normal. Pupils are equal, round, and reactive to light. Right eye exhibits no discharge. Left eye exhibits no discharge.   Ptosis   Neck: Normal range of motion. Neck supple.   Anterior neck scar from thyroidectomy   Cardiovascular: Normal rate and regular rhythm.  Exam reveals no gallop and no friction rub.    No murmur heard.  Pulmonary/Chest: Effort normal and breath sounds normal. No respiratory distress. She has no wheezes. She exhibits tenderness (right chest wall tenderness).   Abdominal: Soft. She exhibits no mass. There is tenderness (RUQ). There is no rebound.   Musculoskeletal: She exhibits tenderness (Diffuse myalgia). She exhibits no edema or deformity.   Lymphadenopathy:     She has no cervical adenopathy.   Neurological: She is alert and oriented to person, place, and time. Coordination normal.   Ride sided facial numbness.  Non ambulatory given pain and weakness  No focal weakness of facial asymetry  No dysarthria   Skin: Skin is dry. No rash noted. She is not diaphoretic. No erythema. No pallor.     Data Review       Old Records Request:   Completed  Current Records review and summary: Completed    Lab Data Review:  Recent Results (from the past 24 hour(s))   EKG (ER)    Collection Time: 18 10:53 AM   Result Value Ref Range    Report       [facility] Emergency Dept.    Test Date:  2018  Pt Name:    MICHELLE MENARD               Department: ER  MRN:        [id number]                      Room:  Gender:     Female                       Technician: [id number]  :        1995                   Requested By:ER TRIAGE PROTOCOL  Order #:    [id number]                    Reading MD:    Measurements  Intervals                                Axis  Rate:       81                           P:          69  DC:         192                          QRS:         61  QRSD:       74                           T:          22  QT:         372  QTc:        432    Interpretive Statements  SINUS RHYTHM  Compared to ECG 07/04/2017 06:36:33  Sinus bradycardia no longer present     URINE DRUG SCREEN    Collection Time: 04/07/18 11:18 AM   Result Value Ref Range    Amphetamines Urine Negative Negative    Barbiturates Negative Negative    Benzodiazepines Negative Negative    Cocaine Metabolite Negative Negative    Methadone Negative Negative    Opiates Negative Negative    Oxycodone Negative Negative    Phencyclidine -Pcp Negative Negative    Propoxyphene Negative Negative    Cannabinoid Metab Positive (A) Negative   URINALYSIS,CULTURE IF INDICATED    Collection Time: 04/07/18 11:18 AM   Result Value Ref Range    Color Yellow     Character Clear     Specific Gravity 1.010 <1.035    Ph 8.0 5.0 - 8.0    Glucose Negative Negative mg/dL    Ketones Negative Negative mg/dL    Protein Negative Negative mg/dL    Bilirubin Negative Negative    Urobilinogen, Urine 0.2 Negative    Nitrite Negative Negative    Leukocyte Esterase Negative Negative    Occult Blood Small (A) Negative    Micro Urine Req Microscopic     Culture Indicated No UA Culture   URINE MICROSCOPIC (W/UA)    Collection Time: 04/07/18 11:18 AM   Result Value Ref Range    RBC 10-20 (A) /hpf   CBC WITH DIFFERENTIAL    Collection Time: 04/07/18 11:47 AM   Result Value Ref Range    WBC 6.8 4.8 - 10.8 K/uL    RBC 4.85 4.20 - 5.40 M/uL    Hemoglobin 13.3 12.0 - 16.0 g/dL    Hematocrit 40.2 37.0 - 47.0 %    MCV 82.9 81.4 - 97.8 fL    MCH 27.4 27.0 - 33.0 pg    MCHC 33.1 (L) 33.6 - 35.0 g/dL    RDW 45.4 35.9 - 50.0 fL    Platelet Count 235 164 - 446 K/uL    MPV 11.1 9.0 - 12.9 fL    Neutrophils-Polys 77.30 (H) 44.00 - 72.00 %    Lymphocytes 16.40 (L) 22.00 - 41.00 %    Monocytes 4.40 0.00 - 13.40 %    Eosinophils 0.70 0.00 - 6.90 %    Basophils 0.90 0.00 - 1.80 %    Immature Granulocytes 0.30 0.00 - 0.90 %    Nucleated RBC 0.00 /100 WBC     Neutrophils (Absolute) 5.22 2.00 - 7.15 K/uL    Lymphs (Absolute) 1.11 1.00 - 4.80 K/uL    Monos (Absolute) 0.30 0.00 - 0.85 K/uL    Eos (Absolute) 0.05 0.00 - 0.51 K/uL    Baso (Absolute) 0.06 0.00 - 0.12 K/uL    Immature Granulocytes (abs) 0.02 0.00 - 0.11 K/uL    NRBC (Absolute) 0.00 K/uL   COMP METABOLIC PANEL    Collection Time: 04/07/18 11:47 AM   Result Value Ref Range    Sodium 135 135 - 145 mmol/L    Potassium 4.0 3.6 - 5.5 mmol/L    Chloride 106 96 - 112 mmol/L    Co2 18 (L) 20 - 33 mmol/L    Anion Gap 11.0 0.0 - 11.9    Glucose 98 65 - 99 mg/dL    Bun 9 8 - 22 mg/dL    Creatinine 0.98 0.50 - 1.40 mg/dL    Calcium 10.2 8.5 - 10.5 mg/dL    AST(SGOT) 18 12 - 45 U/L    ALT(SGPT) 7 2 - 50 U/L    Alkaline Phosphatase 61 30 - 99 U/L    Total Bilirubin 0.4 0.1 - 1.5 mg/dL    Albumin 5.0 (H) 3.2 - 4.9 g/dL    Total Protein 8.4 (H) 6.0 - 8.2 g/dL    Globulin 3.4 1.9 - 3.5 g/dL    A-G Ratio 1.5 g/dL   LIPASE    Collection Time: 04/07/18 11:47 AM   Result Value Ref Range    Lipase 16 11 - 82 U/L   PROTHROMBIN TIME    Collection Time: 04/07/18 11:47 AM   Result Value Ref Range    PT 13.6 12.0 - 14.6 sec    INR 1.07 0.87 - 1.13   APTT    Collection Time: 04/07/18 11:47 AM   Result Value Ref Range    APTT 28.0 24.7 - 36.0 sec   TROPONIN    Collection Time: 04/07/18 11:47 AM   Result Value Ref Range    Troponin I <0.01 0.00 - 0.04 ng/mL   BTYPE NATRIURETIC PEPTIDE    Collection Time: 04/07/18 11:47 AM   Result Value Ref Range    B Natriuretic Peptide 10 0 - 100 pg/mL   HCG QUAL SERUM    Collection Time: 04/07/18 11:47 AM   Result Value Ref Range    Beta-Hcg Qualitative Serum Negative Negative   TSH    Collection Time: 04/07/18 11:47 AM   Result Value Ref Range    TSH 64.020 (H) 0.380 - 5.330 uIU/mL   FREE THYROXINE    Collection Time: 04/07/18 11:47 AM   Result Value Ref Range    Free T-4 0.82 0.53 - 1.43 ng/dL   ESTIMATED GFR    Collection Time: 04/07/18 11:47 AM   Result Value Ref Range    GFR If  >60  >60 mL/min/1.73 m 2    GFR If Non African American >60 >60 mL/min/1.73 m 2   HIV AG/AB COMBO ASSAY SCREENING    Collection Time: 04/07/18 11:47 AM   Result Value Ref Range    HIV Ag/Ab Combo Assay Non Reactive Non Reactive   CREATINE KINASE    Collection Time: 04/07/18 11:47 AM   Result Value Ref Range    CPK Total 147 0 - 154 U/L   INFLUENZA A/B BY PCR    Collection Time: 04/07/18  4:05 PM   Result Value Ref Range    Influenza virus A RNA Negative Negative    Influenza virus B, PCR Negative Negative       Imaging/Procedures Review:    ndependant Imaging Review: Completed  DX-CHEST-PORTABLE (1 VIEW)   Final Result      1.  There is no acute cardiopulmonary process.               EKG:   EKG Independant Review: Completed  QTc:432, HR: 81, Normal Sinus Rhythm, no ST/T changes    () Records reviewed and summarized in current documentation             Assessment/Plan     * Flu-like symptoms   Assessment & Plan    Notable symptoms inculde myalgia, arthralgia, ptosis.  Considering viral infection, Bactrim adverse reaction, subclinical hypothyroidism.  Consistency of ptosis, inconsistent with myesthenia gravis.  Negative flu, makes viral infection less likely.  Timing of symptoms and start of Bactrim support of drug adverse reaction.  Bactrim adverse reactions inculde fatiuge, abdominal pain, nausea, vomiting, arthralgia, myalgia.  Bactrim has been documented in the literature as cause thyroid disorders.  Hypothyroidism as cause less likely given patient had a high TSH on prior ED visit and was asymptomatic.  Rapid flu was negative.  -Admit to obs  -Hold Bactrim  -Pain control with tylenol and ibuprophen  -Pending CPK and ESR        Subclinical hypothyroidism   Assessment & Plan    On admission TSH 64, FT4 0.82  -Increased Synthroid dose to 137 mcg qAM        Urinary tract infection without hematuria- (present on admission)   Assessment & Plan    Presented to ED 3 days prior for suprapubic pain and increase urinary  frequency.  Patient described resolution of urinary symptoms.  -Holding Bactrim            Anticipated Hospital stay: Observation admit        Quality Measures  Quality-Core Measures   Reviewed items::  EKG reviewed, Labs reviewed, Medications reviewed and Radiology images reviewed  DVT prophylaxis pharmacological::  Enoxaparin (Lovenox)

## 2018-04-07 NOTE — ED TRIAGE NOTES
"Chief Complaint   Patient presents with   • Chest Pain   • Shortness of Breath   • Tired   • Other     \"I have a burning sensation all over.\"   Wheeled in WC to room.  Agree with triage assessment.  Changing into gown.  Chart placed for ERP omar.    Pt states she took her thyroid medication and her antibiotic for a   UTI that she recently prescribed today.  Reports having bile emesis earlier.    "

## 2018-04-07 NOTE — ED NOTES
Pt ambulated to BR.  She says she is weak and continues to c/o chest pain ans sob when ambulating.

## 2018-04-07 NOTE — SENIOR ADMIT NOTE
23 y/o F with PMHx graves disease s/p thyroidectomy, hypothyroidism presenting with multiple complaints since last night. She has been having chest pain, SOB, tired, and difficulty walking. The pain over right side of chest is sharp and over left side is pressure like. She is having RUQ pain as well. She is having pain over her body and legs and feels she does not have any energy to move. Right arm numbness and right face numbness. She has had several episodes of vomiting. When she is in pain she also feels SOB.     Vital signs: afebrile, HR 92, RR 20, /53, 99% RA    Physical examination:  General: NAD, sleepy  Musculoskeletal: limited active and passive ROM due to pain and stiffness. Endorses muscle tenderness.  Neuro: decreased sensation over right face and arm compared to left side    Assessment and Plan:  #Polyarthralgia and myalgia  #Subclinical hypothyroidism  #Hematuria    - hypothyroid myopathy vs flu like illness vs medication side effect (bactrim?)  - has had subclinical hypothyroidism since at least July 2017, slightly improved  - started on bactrim on 4/4 for cystitis, hematuria improving  - h/o autoimmune disorder, but doubt autoimmune nature with such acute presentation of all symptoms(less than 24 hours)  - check CPK, ESR, HIV, rapid flu  - will increase synthroid form 125 to 137 mcg    DVT ppx: lovenox  Code status: full code

## 2018-04-07 NOTE — ED PROVIDER NOTES
"ED Provider Note    Scribed for Kashif Maynard M.D. by Nicolasa Colunga. 4/7/2018  11:22 AM    Primary Care Provider: Pcp Pt States None  Means of arrival: Walk-In  History limited by: None    CHIEF COMPLAINT  Chief Complaint   Patient presents with   • Chest Pain   • Shortness of Breath   • Tired   • Other     \"I have a burning sensation all over.\"     HPI  Ivis Klein is a 22 y.o. female who presents to the ED complaining of chest pain with associated shortness of breath and dyspnea onset 3-4:00 AM this morning. Patient describes her chest pain as a sharp and hard pressure feeling like \"someone is standing on it.\" Associated symptoms include rapid heart beat, \"burning\" sensation to entire body, diarrhea, tingling to bilateral hands and face, numbness to bilateral lower extremities, migraine, 6 episodes of emesis and blurred vision. Per patient, she has a thyroid disorder and states these symptoms feel similar to when she experiences her thyroid problems. Confirms only partial thyroidectomy and takes a thyroid replacement. Patient was seen at [facility] on 4/4 and diagnosed with a UTI which she was prescribed Bactrim for. She reports taking the antibiotics as prescribed which she last took today. She confirms feeling at baseline up until today. Denies sore throat, hematemesis, new skin rashes or known weight changes. She does not have a primary care physician who is managing her thyroid.     REVIEW OF SYSTEMS - C  CONSTITUTIONAL: Unknown weight changes.  EYES: Positive for blurred vision. Denies photophobia or discharge.   ENT: Denies sore throat, runny nose.  CARDIOVASCULAR: Positive for chest pain and rapid heart beat. Denies palpitations, or swelling.  RESPIRATORY: Positive for difficulty breathing and shortness of breath. Denies cough  GI: Positive for vomiting and diarrhea. Denies abdominal pain, nausea  MUSCULOSKELETAL: Denies joint swelling, or back pain.  SKIN:  No new skin rashes or " "bruising.  NEUROLOGIC: Positive for migraine, numbness and weakness.   PSYCHIATRIC: Denies depression.    PAST MEDICAL HISTORY  Past Medical History:   Diagnosis Date   • Anxiety 2/16/2017   • Arrhythmia     tachycardia \"pt reports d/t thryoid\"   • Breath shortness     no c/o at this time; c/o sob at night unsure if it is d/t anxiety   • Graves disease 12/5/2016   • Heart valve disease    • Hyperthyroidism 6/29/2016   • Menarche     started at age 14   • Migraine    • Pericarditis 06/2016   • Pregnancy     delivered 9/8/2013, 3/16/17 pt reports that she is not pregnant at this time   • Psychiatric problem     anxiety, depression   • Supervision of normal first teen pregnancy    • Vomiting 6/29/2016       FAMILY HISTORY  Family History   Problem Relation Age of Onset   • Cancer Paternal Grandmother 56     breast cancer       SOCIAL HISTORY   reports that she has never smoked. She has never used smokeless tobacco. She reports that she uses drugs, including Marijuana. She reports that she does not drink alcohol.    SURGICAL HISTORY  Past Surgical History:   Procedure Laterality Date   • THYROIDECTOMY TOTAL Bilateral 3/22/2017    Procedure: THYROIDECTOMY TOTAL -NIMS RECURRENT LARYNGEAL NERVE MONITORING;  Surgeon: Vicente Eldridge M.D.;  Location: SURGERY SAME DAY [facility];  Service:    • PRIMARY C SECTION  9/8/2013    Performed by Melisa Wright M.D. at LABOR AND DELIVERY       CURRENT MEDICATIONS  Home Medications     Reviewed by Yue Mane R.N. (Registered Nurse) on 04/07/18 at 1108  Med List Status: Not Addressed   Medication Last Dose Status   levothyroxine (SYNTHROID) 125 MCG Tab  Active   sulfamethoxazole-trimethoprim (BACTRIM DS) 800-160 MG tablet  Active                ALLERGIES  Allergies   Allergen Reactions   • Nkda [No Known Drug Allergy]         PHYSICAL EXAM  VITAL SIGNS: /53   Pulse 92   Temp 36.7 °C (98.1 °F)   Resp 20   Ht 1.575 m (5' 2\")   Wt 61.2 kg (135 lb)   LMP " 04/04/2018   SpO2 99%   BMI 24.69 kg/m²      Constitutional: Patient is awake and alert. No acute respiratory distress. Patient is laying in bed since that she has tingling to her hands and legs. She cannot move her legs she states. HENT: Normocephalic, Atraumatic, Bilateral external ears normal, Oropharynx pink moist with no exudates, Nose patent.  Eyes: PERRLA, EOMI,   Neck: Anterior midline. Supple no nuchal rigidity, no thyromegaly or mass. Non-tender  Cardiovascular: Heart is regular rate and rhythm   Thorax & Lungs: Chest is symmetrical, with good breath sounds. No obvious wheezing or crackles. No respiratory distress, No chest tenderness.   Abdomen: Epigastric tenderness. Soft, No hepatosplenomegaly there is no guarding or rebound, No masses, No pulsatile masses.   Skin: Well healed scar to anterior neck. Warm, Dry, no petechia, purpura, or rash.   Back: Non tender with palpation, No CVA tenderness.   Extremities: No edema. Non tender.   Musculoskeletal: Decreased range of motion to her upper extremities says that they're weak. Cannot raise her legs off the bed. Can barely wiggle her toes.  Neurologic: Weak  bilaterally. Alert & oriented to person, time, and place. strength is decreased to her upper extremities with weak . Also cannot raise her legs up off the bed can wiggle her toes initially. Psychiatric: Normal affect    Patient received Ativan she was able to walk to the bathroom slowly saw her strength has ink stanchion in her lower extremities  EKG  10:53 AM - 13 Lead EKG interpreted by me shows normal sinus rhythm at 81.  . Axis QRS 61, slight ST elevation on V4, V5 and V6 similar to EKG done on 7/4/17.      LABS  Results for orders placed or performed during the hospital encounter of 04/07/18   CBC WITH DIFFERENTIAL   Result Value Ref Range    WBC 6.8 4.8 - 10.8 K/uL    RBC 4.85 4.20 - 5.40 M/uL    Hemoglobin 13.3 12.0 - 16.0 g/dL    Hematocrit 40.2 37.0 - 47.0 %    MCV 82.9 81.4 -  97.8 fL    MCH 27.4 27.0 - 33.0 pg    MCHC 33.1 (L) 33.6 - 35.0 g/dL    RDW 45.4 35.9 - 50.0 fL    Platelet Count 235 164 - 446 K/uL    MPV 11.1 9.0 - 12.9 fL    Neutrophils-Polys 77.30 (H) 44.00 - 72.00 %    Lymphocytes 16.40 (L) 22.00 - 41.00 %    Monocytes 4.40 0.00 - 13.40 %    Eosinophils 0.70 0.00 - 6.90 %    Basophils 0.90 0.00 - 1.80 %    Immature Granulocytes 0.30 0.00 - 0.90 %    Nucleated RBC 0.00 /100 WBC    Neutrophils (Absolute) 5.22 2.00 - 7.15 K/uL    Lymphs (Absolute) 1.11 1.00 - 4.80 K/uL    Monos (Absolute) 0.30 0.00 - 0.85 K/uL    Eos (Absolute) 0.05 0.00 - 0.51 K/uL    Baso (Absolute) 0.06 0.00 - 0.12 K/uL    Immature Granulocytes (abs) 0.02 0.00 - 0.11 K/uL    NRBC (Absolute) 0.00 K/uL   COMP METABOLIC PANEL   Result Value Ref Range    Sodium 135 135 - 145 mmol/L    Potassium 4.0 3.6 - 5.5 mmol/L    Chloride 106 96 - 112 mmol/L    Co2 18 (L) 20 - 33 mmol/L    Anion Gap 11.0 0.0 - 11.9    Glucose 98 65 - 99 mg/dL    Bun 9 8 - 22 mg/dL    Creatinine 0.98 0.50 - 1.40 mg/dL    Calcium 10.2 8.5 - 10.5 mg/dL    AST(SGOT) 18 12 - 45 U/L    ALT(SGPT) 7 2 - 50 U/L    Alkaline Phosphatase 61 30 - 99 U/L    Total Bilirubin 0.4 0.1 - 1.5 mg/dL    Albumin 5.0 (H) 3.2 - 4.9 g/dL    Total Protein 8.4 (H) 6.0 - 8.2 g/dL    Globulin 3.4 1.9 - 3.5 g/dL    A-G Ratio 1.5 g/dL   LIPASE   Result Value Ref Range    Lipase 16 11 - 82 U/L   PROTHROMBIN TIME   Result Value Ref Range    PT 13.6 12.0 - 14.6 sec    INR 1.07 0.87 - 1.13   APTT   Result Value Ref Range    APTT 28.0 24.7 - 36.0 sec   TROPONIN   Result Value Ref Range    Troponin I <0.01 0.00 - 0.04 ng/mL   HCG QUAL SERUM   Result Value Ref Range    Beta-Hcg Qualitative Serum Negative Negative   URINE DRUG SCREEN   Result Value Ref Range    Amphetamines Urine Negative Negative    Barbiturates Negative Negative    Benzodiazepines Negative Negative    Cocaine Metabolite Negative Negative    Methadone Negative Negative    Opiates Negative Negative    Oxycodone  Negative Negative    Phencyclidine -Pcp Negative Negative    Propoxyphene Negative Negative    Cannabinoid Metab Positive (A) Negative   ESTIMATED GFR   Result Value Ref Range    GFR If African American >60 >60 mL/min/1.73 m 2    GFR If Non African American >60 >60 mL/min/1.73 m 2       TSH 64  All labs reviewed by me.    RADIOLOGY/PROCEDURES  DX-CHEST-PORTABLE (1 VIEW)   Final Result      1.  There is no acute cardiopulmonary process.        The radiologist's interpretations of all radiological studies have been reviewed by me.     COURSE & MEDICAL DECISION MAKING  Pertinent Labs & Imaging studies reviewed. (See chart for details)    Differential diagnoses include but are not limited to medication side effects, thyroid storm, anxiety, intracrine abnormalities, infections.    11:22 AM - Patient seen and examined at bedside. Discussed the treatment care plan with the patietn which is to image the chest, EKG and check her thyroid. Ordered DX-chest, urine drug screen, TSH, free thyroxine, CBC with differential, CMP, lipase, prothrombin time, APTT, troponin, BNP, urine culture, HCG qual serum, EKG.  Patient will be medicated with Ativan 1 mg for her symptoms.     Decision Making  Patient presents with onset of muscle aches joint aches palpitations chest pain nausea vomiting diarrhea anxiousness weakness to her upper and lower extremities or the lower extremities. We felt she was also hyperventilating I gave her Ativan. This helped quite a bit. Still having some chest pain. EKG was negative per troponin was negative. She does have a history of pericarditis but I saw no change in her EKG. I discussed the case with the [facility] and they know her and take care of her 2 years ago. They will be down to see her for further evaluation. This time it appears that she has hypothyroidism may not been taking her thyroid medicines although it looks like she was in the emergency room recently for the  prescription refill. She is also started on Septra for urinary tract infection. Her urine a longer shows obese urine tract infection. I ran a drug interaction program and did not find it the Synthroid and the Bactrim would interact specifically with each other that there may be a decrease in her absorption. Again at this time should be consultation with the [facility] service for further evaluation and care        FINAL IMPRESSION  1. Chest pain  2. Weakness  3. Markedly elevated TSH    PLAN  1. Per [facility]      Nicolasa HARRIS (Kamini), am scribing for, and in the presence of, Kashif Maynard M.D..    Electronically signed by: Nicolasa Colunga (Kamini), 4/7/2018    Kashif HARRIS M.D. personally performed the services described in this documentation, as scribed by Nicolasa Colunga in my presence, and it is both accurate and complete.    The note accurately reflects work and decisions made by me.  Kashif Maynard  4/7/2018  2:56 PM

## 2018-04-08 VITALS
OXYGEN SATURATION: 98 % | DIASTOLIC BLOOD PRESSURE: 60 MMHG | RESPIRATION RATE: 16 BRPM | TEMPERATURE: 97.7 F | WEIGHT: 142.64 LBS | BODY MASS INDEX: 26.25 KG/M2 | HEIGHT: 62 IN | SYSTOLIC BLOOD PRESSURE: 113 MMHG | HEART RATE: 71 BPM

## 2018-04-08 LAB
ALBUMIN SERPL BCP-MCNC: 4.2 G/DL (ref 3.2–4.9)
ALBUMIN/GLOB SERPL: 1.3 G/DL
ALP SERPL-CCNC: 64 U/L (ref 30–99)
ALT SERPL-CCNC: 8 U/L (ref 2–50)
ANION GAP SERPL CALC-SCNC: 10 MMOL/L (ref 0–11.9)
AST SERPL-CCNC: 17 U/L (ref 12–45)
BASOPHILS # BLD AUTO: 1.1 % (ref 0–1.8)
BASOPHILS # BLD AUTO: 1.6 % (ref 0–1.8)
BASOPHILS # BLD: 0.04 K/UL (ref 0–0.12)
BASOPHILS # BLD: 0.08 K/UL (ref 0–0.12)
BILIRUB SERPL-MCNC: 0.5 MG/DL (ref 0.1–1.5)
BUN SERPL-MCNC: 10 MG/DL (ref 8–22)
CALCIUM SERPL-MCNC: 8.9 MG/DL (ref 8.5–10.5)
CHLORIDE SERPL-SCNC: 106 MMOL/L (ref 96–112)
CO2 SERPL-SCNC: 20 MMOL/L (ref 20–33)
CREAT SERPL-MCNC: 0.68 MG/DL (ref 0.5–1.4)
EOSINOPHIL # BLD AUTO: 0.07 K/UL (ref 0–0.51)
EOSINOPHIL # BLD AUTO: 0.12 K/UL (ref 0–0.51)
EOSINOPHIL NFR BLD: 1.9 % (ref 0–6.9)
EOSINOPHIL NFR BLD: 2.4 % (ref 0–6.9)
ERYTHROCYTE [DISTWIDTH] IN BLOOD BY AUTOMATED COUNT: 46.1 FL (ref 35.9–50)
ERYTHROCYTE [DISTWIDTH] IN BLOOD BY AUTOMATED COUNT: 46.8 FL (ref 35.9–50)
GLOBULIN SER CALC-MCNC: 3.2 G/DL (ref 1.9–3.5)
GLUCOSE SERPL-MCNC: 85 MG/DL (ref 65–99)
HCT VFR BLD AUTO: 35.8 % (ref 37–47)
HCT VFR BLD AUTO: 38 % (ref 37–47)
HGB BLD-MCNC: 11.5 G/DL (ref 12–16)
HGB BLD-MCNC: 12.4 G/DL (ref 12–16)
IMM GRANULOCYTES # BLD AUTO: 0.01 K/UL (ref 0–0.11)
IMM GRANULOCYTES # BLD AUTO: 0.01 K/UL (ref 0–0.11)
IMM GRANULOCYTES NFR BLD AUTO: 0.2 % (ref 0–0.9)
IMM GRANULOCYTES NFR BLD AUTO: 0.3 % (ref 0–0.9)
LYMPHOCYTES # BLD AUTO: 1.61 K/UL (ref 1–4.8)
LYMPHOCYTES # BLD AUTO: 2.63 K/UL (ref 1–4.8)
LYMPHOCYTES NFR BLD: 44.7 % (ref 22–41)
LYMPHOCYTES NFR BLD: 53.2 % (ref 22–41)
MAGNESIUM SERPL-MCNC: 2.2 MG/DL (ref 1.5–2.5)
MCH RBC QN AUTO: 27.3 PG (ref 27–33)
MCH RBC QN AUTO: 27.4 PG (ref 27–33)
MCHC RBC AUTO-ENTMCNC: 32.1 G/DL (ref 33.6–35)
MCHC RBC AUTO-ENTMCNC: 32.6 G/DL (ref 33.6–35)
MCV RBC AUTO: 83.9 FL (ref 81.4–97.8)
MCV RBC AUTO: 85 FL (ref 81.4–97.8)
MONOCYTES # BLD AUTO: 0.29 K/UL (ref 0–0.85)
MONOCYTES # BLD AUTO: 0.34 K/UL (ref 0–0.85)
MONOCYTES NFR BLD AUTO: 6.9 % (ref 0–13.4)
MONOCYTES NFR BLD AUTO: 8.1 % (ref 0–13.4)
NEUTROPHILS # BLD AUTO: 1.58 K/UL (ref 2–7.15)
NEUTROPHILS # BLD AUTO: 1.76 K/UL (ref 2–7.15)
NEUTROPHILS NFR BLD: 35.7 % (ref 44–72)
NEUTROPHILS NFR BLD: 43.9 % (ref 44–72)
NRBC # BLD AUTO: 0 K/UL
NRBC # BLD AUTO: 0 K/UL
NRBC BLD-RTO: 0 /100 WBC
NRBC BLD-RTO: 0 /100 WBC
PLATELET # BLD AUTO: 200 K/UL (ref 164–446)
PLATELET # BLD AUTO: 233 K/UL (ref 164–446)
PMV BLD AUTO: 11.2 FL (ref 9–12.9)
PMV BLD AUTO: 11.3 FL (ref 9–12.9)
POTASSIUM SERPL-SCNC: 3.7 MMOL/L (ref 3.6–5.5)
PROT SERPL-MCNC: 7.4 G/DL (ref 6–8.2)
RBC # BLD AUTO: 4.21 M/UL (ref 4.2–5.4)
RBC # BLD AUTO: 4.53 M/UL (ref 4.2–5.4)
SODIUM SERPL-SCNC: 136 MMOL/L (ref 135–145)
WBC # BLD AUTO: 3.6 K/UL (ref 4.8–10.8)
WBC # BLD AUTO: 4.9 K/UL (ref 4.8–10.8)

## 2018-04-08 PROCEDURE — 99217 PR OBSERVATION CARE DISCHARGE: CPT | Mod: GC | Performed by: HOSPITALIST

## 2018-04-08 PROCEDURE — 36415 COLL VENOUS BLD VENIPUNCTURE: CPT

## 2018-04-08 PROCEDURE — A9270 NON-COVERED ITEM OR SERVICE: HCPCS | Performed by: STUDENT IN AN ORGANIZED HEALTH CARE EDUCATION/TRAINING PROGRAM

## 2018-04-08 PROCEDURE — G0378 HOSPITAL OBSERVATION PER HR: HCPCS

## 2018-04-08 PROCEDURE — 80053 COMPREHEN METABOLIC PANEL: CPT

## 2018-04-08 PROCEDURE — 85025 COMPLETE CBC W/AUTO DIFF WBC: CPT

## 2018-04-08 PROCEDURE — 700102 HCHG RX REV CODE 250 W/ 637 OVERRIDE(OP): Performed by: STUDENT IN AN ORGANIZED HEALTH CARE EDUCATION/TRAINING PROGRAM

## 2018-04-08 PROCEDURE — 83735 ASSAY OF MAGNESIUM: CPT

## 2018-04-08 RX ORDER — LEVOTHYROXINE SODIUM 137 UG/1
137 TABLET ORAL
Qty: 90 TAB | Refills: 2 | Status: ON HOLD | OUTPATIENT
Start: 2018-04-09 | End: 2018-09-13

## 2018-04-08 RX ADMIN — LEVOTHYROXINE SODIUM 137 MCG: 137 TABLET ORAL at 06:43

## 2018-04-08 RX ADMIN — ACETAMINOPHEN 650 MG: 325 TABLET, FILM COATED ORAL at 00:11

## 2018-04-08 ASSESSMENT — PAIN SCALES - GENERAL
PAINLEVEL_OUTOF10: 0
PAINLEVEL_OUTOF10: 10

## 2018-04-08 NOTE — PROGRESS NOTES
Pt is anxious, teary eyed. Pt stated is okay, and doesn't anything right now, family at bedside. Will continue to monitor

## 2018-04-08 NOTE — DISCHARGE INSTRUCTIONS
Discharge Instructions    Discharged to home by car with relative. Discharged via wheelchair, hospital escort: Yes.  Special equipment needed: Not Applicable    Be sure to schedule a follow-up appointment with your primary care doctor or any specialists as instructed.     Discharge Plan:   Diet Plan: Discussed (Regular)  Activity Level: Discussed (As tolerated)  Confirmed Follow up Appointment: Patient to Call and Schedule Appointment  Confirmed Symptoms Management: Discussed  Medication Reconciliation Updated: Yes    I understand that a diet low in cholesterol, fat, and sodium is recommended for good health. Unless I have been given specific instructions below for another diet, I accept this instruction as my diet prescription.   Other diet: Regular    Special Instructions: None    · Is patient discharged on Warfarin / Coumadin?   No     Depression / Suicide Risk    As you are discharged from this [facility] facility, it is important to learn how to keep safe from harming yourself.    Recognize the warning signs:  · Abrupt changes in personality, positive or negative- including increase in energy   · Giving away possessions  · Change in eating patterns- significant weight changes-  positive or negative  · Change in sleeping patterns- unable to sleep or sleeping all the time   · Unwillingness or inability to communicate  · Depression  · Unusual sadness, discouragement and loneliness  · Talk of wanting to die  · Neglect of personal appearance   · Rebelliousness- reckless behavior  · Withdrawal from people/activities they love  · Confusion- inability to concentrate     If you or a loved one observes any of these behaviors or has concerns about self-harm, here's what you can do:  · Talk about it- your feelings and reasons for harming yourself  · Remove any means that you might use to hurt yourself (examples: pills, rope, extension cords, firearm)  · Get professional help from the community (Mental Health, Substance  Abuse, psychological counseling)  · Do not be alone:Call your Safe Contact- someone whom you trust who will be there for you.  · Call your local CRISIS HOTLINE 412-6616 or 181-431-8141  · Call your local Children's Mobile Crisis Response Team [place] (280) 834-3169 or www.[employer]  · Call the toll free National Suicide Prevention Hotlines   · National Suicide Prevention Lifeline 569-746-DTWW (6748)  · National Hope Line Network 800-SUICIDE (121-5116)

## 2018-04-08 NOTE — PROGRESS NOTES
Assumed care of Pt at 1745 when Pt transferred to T215 via [facility] assessment complete.  Pt resting comfortably, A & O X 4, vital signs stable, Pt c/o 10/10 sharp chest pain.  Pt updated on and understands POC; oriented to call light and unit routine; medicated per MAR.  Up self to RR and back with steady gait; family at bedside; bed in low position, call light within reach - will continue to monitor.

## 2018-04-08 NOTE — ASSESSMENT & PLAN NOTE
Notable symptoms inculde myalgia, arthralgia, ptosis.  Considering viral infection, Bactrim adverse reaction, subclinical hypothyroidism.  Consistency of ptosis, inconsistent with myesthenia gravis.  Negative flu, makes viral infection less likely.  Timing of symptoms and start of Bactrim support of drug adverse reaction.  Bactrim adverse reactions inculde fatiuge, abdominal pain, nausea, vomiting, arthralgia, myalgia.  Bactrim has been documented in the literature as cause thyroid disorders.  Hypothyroidism as cause less likely given patient had a high TSH on prior ED visit and was asymptomatic.  Rapid flu was negative.  -Admit to obs  -Hold Bactrim  -Pain control with tylenol and ibuprophen  -Pending CPK and ESR

## 2018-04-08 NOTE — DISCHARGE SUMMARY
Internal Medicine Discharge Summary  Note Author: Sandra Park M.D.       Admit Date:  4/7/2018       Discharge Date:   4/8/2018    Service:   [facility] Internal Medicine Red Team  Attending Physician(s):   Dr. Poole       Senior Resident(s):   Dr. Park  Shahzad Resident(s):   Dr. Fajardo      Primary Diagnosis:   Hypothyroid myopathy vs Bactrim adverse reaction vs viral infection    Secondary Diagnoses:                Principal Problem:    Flu-like symptoms (Chronic) POA: Unknown  Active Problems:    Subclinical hypothyroidism POA: Unknown    Urinary tract infection without hematuria POA: Yes  Resolved Problems:    * No resolved hospital problems. *      Hospital Summary (Brief Narrative):       23 y/o F with PMHx Graves disease s/p thyroidectomy presenting with chest pain, shortness of breath, muscle aches, and joint pain one day prior to admission.  Her TSH was noted to be elevated at 64 and FT4 of .82 and her Synthroid dose was increased from 125mcg to 137mcg per day. Patient's rapid flu and HIV were negative and her ESR was 3. She had been started on Bactrim on 4/4 with her symptoms starting on 4/6 so there was concern for possible unusual side effect. Nonetheless, patient has remained stable and will be discharged home today.     Patient /Hospital Summary (Details -- Problem Oriented) :          Subclinical hypothyroidism   Assessment & Plan    On admission TSH 64, FT4 0.82.  Mother noted a history of medication noncompliance.  -Increased Synthroid dose to 137 mcg qAM        * Flu-like symptoms   Assessment & Plan    Notable symptoms inculde myalgia, arthralgia, ptosis.  Considering viral infection, Bactrim adverse reaction, subclinical hypothyroidism.  Consistency of ptosis, inconsistent with myesthenia gravis.  Negative flu, makes viral infection less likely.  Timing of symptoms and start of Bactrim support of drug adverse reaction.  Bactrim adverse reactions inculde fatiuge, abdominal pain, nausea,  vomiting, arthralgia, myalgia.  Bactrim has been documented in the literature as cause thyroid disorders.  Hypothyroidism as cause less likely given patient had a high TSH on prior ED visit and was asymptomatic.  Rapid flu was negative.  ESR and CPK normal.    -Admited to obs  -Held Bactrim  -Pain controled with tylenol and ibuprophen  -Patient to improved control of hypothyroidism with increased dose of Synthroid, now 137mcg every morning before meals.      Urinary tract infection without hematuria   Assessment & Plan    Presented to ED 3 days prior for suprapubic pain and increase urinary frequency.  Patient described resolution of urinary symptoms.  -Held Bactrim  -Patient coached to increase fluid intake.       Neutropenia   Assessment & Plan    Presented with WBC 6.8 and ANC of 5.22.  The next day WBC was 3.6 and ANC 1.58.  Possibly due to medication reaction vs viral infection.  -Patient advised to return if exacerbation or prolongation of symptoms after consistent use of Synthroid.              Consultants:     none    Procedures:        none    Imaging/ Testing:      DX-CHEST-PORTABLE (1 VIEW)   Final Result      1.  There is no acute cardiopulmonary process.            Discharge Medications:         Medication Reconciliation: Completed       Medication List      ASK your doctor about these medications      Instructions   levothyroxine 125 MCG Tabs  Commonly known as:  SYNTHROID   Take 1 Tab by mouth Every morning on an empty stomach for 30 days.  Dose:  125 mcg     sulfamethoxazole-trimethoprim 800-160 MG tablet  Commonly known as:  BACTRIM DS   Take 1 Tab by mouth 2 times a day for 7 days.  Dose:  1 Tab            Disposition:   home    Diet:   Regular diet    Activity:   As tolerated    Instructions:         The patient was instructed to return to the ER in the event of worsening symptoms. I have counseled the patient on the importance of compliance and the patient has agreed to proceed with all medical  recommendations and follow up plan indicated above.   The patient understands that all medications come with benefits and risks. Risks may include permanent injury or death and these risks can be minimized with close reassessment and monitoring.        Primary Care Provider:    none  Discharge summary faxed to primary care provider:  Deferred  Copy of discharge summary given to the patient: Deferred      Follow up appointment details :      None, patient uninsured with no PCP    Pending Studies:        None    Time spent on discharge day patient visit, preparing discharge paperwork and arranging for patient follow up.     Summary of follow up issues:   Hypothyroidism  UTI      Discharge Time (Minutes) :    35  Hospital Course Type: Observation Stay      Condition on Discharge    ______________________________________________________________________    Interval history/exam for day of discharge:     No events over night.  No new symptoms.  Patient noted mild improvement in myalgia, arthralgia, and chest pain.  Patient described worse stressors as a  at [employer] as exacerbating anxiety.  Mother describe inconsistency in the patient taking Synthroid.    Vitals:    04/07/18 1746 04/07/18 1942 04/07/18 2354 04/08/18 0402   BP: 134/66 116/66 105/65 103/61   Pulse: 69 72 69 60   Resp: 14 16 16 18   Temp: 36.6 °C (97.8 °F) 36.9 °C (98.4 °F) 36.4 °C (97.5 °F) 36.4 °C (97.5 °F)   SpO2: 100% 99% 99% 97%   Weight: 64.7 kg (142 lb 10.2 oz)      Height:         Weight/BMI: Body mass index is 26.09 kg/m².  Pulse Oximetry: 97 %, O2 (LPM): 0, O2 Delivery: None (Room Air)    General: No acute distress  CVS: Regular rate and rhythm, no rubs, no gallops, no murmurs  PULM: Bilaterally clear to auscultation  MSK:  Continued myalgia and arthralgia with reduced ROM.    Most Recent Labs:    Lab Results   Component Value Date/Time    WBC 4.9 04/08/2018 04:12 AM    RBC 4.53 04/08/2018 04:12 AM    HEMOGLOBIN 12.4 04/08/2018 04:12 AM     HEMATOCRIT 38.0 04/08/2018 04:12 AM    MCV 83.9 04/08/2018 04:12 AM    MCH 27.4 04/08/2018 04:12 AM    MCHC 32.6 (L) 04/08/2018 04:12 AM    MPV 11.2 04/08/2018 04:12 AM    NEUTSPOLYS 35.70 (L) 04/08/2018 04:12 AM    LYMPHOCYTES 53.20 (H) 04/08/2018 04:12 AM    MONOCYTES 6.90 04/08/2018 04:12 AM    EOSINOPHILS 2.40 04/08/2018 04:12 AM    BASOPHILS 1.60 04/08/2018 04:12 AM    ANISOCYTOSIS 1+ 07/06/2016 03:22 PM      Lab Results   Component Value Date/Time    SODIUM 136 04/08/2018 04:12 AM    POTASSIUM 3.7 04/08/2018 04:12 AM    CHLORIDE 106 04/08/2018 04:12 AM    CO2 20 04/08/2018 04:12 AM    GLUCOSE 85 04/08/2018 04:12 AM    BUN 10 04/08/2018 04:12 AM    CREATININE 0.68 04/08/2018 04:12 AM    CREATININE 0.6 01/31/2009 05:20 PM      Lab Results   Component Value Date/Time    ALTSGPT 8 04/08/2018 04:12 AM    ASTSGOT 17 04/08/2018 04:12 AM    ALKPHOSPHAT 64 04/08/2018 04:12 AM    TBILIRUBIN 0.5 04/08/2018 04:12 AM    LIPASE 16 04/07/2018 11:47 AM    ALBUMIN 4.2 04/08/2018 04:12 AM    GLOBULIN 3.2 04/08/2018 04:12 AM    INR 1.07 04/07/2018 11:47 AM     Lab Results   Component Value Date/Time    PROTHROMBTM 13.6 04/07/2018 11:47 AM    INR 1.07 04/07/2018 11:47 AM

## 2018-04-08 NOTE — PROGRESS NOTES
Pt discharged to home. IV d/c'd, pt armband removed. Pt and family understand written and verbal d/c instructions.  Prescription given.  Regular diet, activity as tolerated.  Pt to follow up with PCP as schedlued.  Pt verbalized understanding.

## 2018-04-08 NOTE — PROGRESS NOTES
Ambulated pt to bathroom, SBA, steady (pain with movement/ambulation).    Medicated per MAR. Ice/Heat pack applied alternately.

## 2018-04-08 NOTE — PROGRESS NOTES
Seen pt, AOx 4.  C/o pain on Both leg R>L 10/10, slight abd. Pain. Plan of care discussed includes Safety, monitoring, labs, pain control and pt understands.

## 2018-04-08 NOTE — ASSESSMENT & PLAN NOTE
Presented to ED 3 days prior for suprapubic pain and increase urinary frequency.  Patient described resolution of urinary symptoms.  -Holding Bactrim

## 2018-04-09 LAB
BACTERIA UR CULT: NORMAL
SIGNIFICANT IND 70042: NORMAL
SITE SITE: NORMAL
SOURCE SOURCE: NORMAL

## 2018-06-12 ENCOUNTER — HOSPITAL ENCOUNTER (EMERGENCY)
Facility: MEDICAL CENTER | Age: 23
End: 2018-06-12
Attending: EMERGENCY MEDICINE
Payer: MEDICAID

## 2018-06-12 VITALS
WEIGHT: 144.84 LBS | OXYGEN SATURATION: 99 % | HEIGHT: 63 IN | RESPIRATION RATE: 14 BRPM | HEART RATE: 91 BPM | TEMPERATURE: 98.4 F | DIASTOLIC BLOOD PRESSURE: 59 MMHG | SYSTOLIC BLOOD PRESSURE: 123 MMHG | BODY MASS INDEX: 25.66 KG/M2

## 2018-06-12 DIAGNOSIS — J06.9 UPPER RESPIRATORY TRACT INFECTION, UNSPECIFIED TYPE: Primary | ICD-10-CM

## 2018-06-12 PROCEDURE — 99283 EMERGENCY DEPT VISIT LOW MDM: CPT

## 2018-06-12 ASSESSMENT — LIFESTYLE VARIABLES: DO YOU DRINK ALCOHOL: NO

## 2018-06-12 ASSESSMENT — PAIN SCALES - GENERAL: PAINLEVEL_OUTOF10: 0

## 2018-06-12 NOTE — ED TRIAGE NOTES
Pt to triage c/o cold symptoms x 2 days. Pt with sore throat, nasal congestion, body aches/chills, and N/V.   Pt advised to return to triage nurse for any changes or concerns.

## 2018-06-12 NOTE — ED NOTES
Assumed care of pt from waiting room. Pt ambulatory to room with steady gait.  Changed to gown, hooked to monitor- VSS.  Fall precautions in place. Call bell in reach. Family at bedside. Awaiting MD eval/orders. Ongoing monitoring.

## 2018-06-12 NOTE — DISCHARGE INSTRUCTIONS
"Follow-up with primary care 1-2 days for reevaluation, medication management, close blood pressure monitoring.    Tylenol and ibuprofen, alternating if needed, as needed for fever or discomfort.  Over-the-counter medications as needed for symptomatic relief of cough, congestion, sore throat.    Encourage oral fluid hydration.  Otherwise diet and activity as tolerated.    Return to the emergency department for intractable fever, altered mental status, difficulty breathing or swallowing, vomiting or other new concerns.    Upper Respiratory Infection, Adult  Most upper respiratory infections (URIs) are caused by a virus. A URI affects the nose, throat, and upper air passages. The most common type of URI is often called \"the common cold.\"  Follow these instructions at home:  · Take medicines only as told by your doctor.  · Gargle warm saltwater or take cough drops to comfort your throat as told by your doctor.  · Use a warm mist humidifier or inhale steam from a shower to increase air moisture. This may make it easier to breathe.  · Drink enough fluid to keep your pee (urine) clear or pale yellow.  · Eat soups and other clear broths.  · Have a healthy diet.  · Rest as needed.  · Go back to work when your fever is gone or your doctor says it is okay.  ¨ You may need to stay home longer to avoid giving your URI to others.  ¨ You can also wear a face mask and wash your hands often to prevent spread of the virus.  · Use your inhaler more if you have asthma.  · Do not use any tobacco products, including cigarettes, chewing tobacco, or electronic cigarettes. If you need help quitting, ask your doctor.  Contact a doctor if:  · You are getting worse, not better.  · Your symptoms are not helped by medicine.  · You have chills.  · You are getting more short of breath.  · You have brown or red mucus.  · You have yellow or brown discharge from your nose.  · You have pain in your face, especially when you bend forward.  · You have a " "fever.  · You have puffy (swollen) neck glands.  · You have pain while swallowing.  · You have white areas in the back of your throat.  Get help right away if:  · You have very bad or constant:  ¨ Headache.  ¨ Ear pain.  ¨ Pain in your forehead, behind your eyes, and over your cheekbones (sinus pain).  ¨ Chest pain.  · You have long-lasting (chronic) lung disease and any of the following:  ¨ Wheezing.  ¨ Long-lasting cough.  ¨ Coughing up blood.  ¨ A change in your usual mucus.  · You have a stiff neck.  · You have changes in your:  ¨ Vision.  ¨ Hearing.  ¨ Thinking.  ¨ Mood.  This information is not intended to replace advice given to you by your health care provider. Make sure you discuss any questions you have with your health care provider.  Document Released: 06/05/2009 Document Revised: 08/20/2017 Document Reviewed: 03/25/2015  [employer] Interactive Patient Education © 2017 [employer]  Viral Syndrome  You or your child has Viral Syndrome. It is the most common infection causing \"colds\" and infections in the nose, throat, sinuses, and breathing tubes. Sometimes the infection causes nausea, vomiting, or diarrhea. The germ that causes the infection is a virus. No antibiotic or other medicine will kill it. There are medicines that you can take to make you or your child more comfortable.   HOME CARE INSTRUCTIONS   · Rest in bed until you start to feel better.   · If you have diarrhea or vomiting, eat small amounts of crackers and toast. Soup is helpful.   · Do not give aspirin or medicine that contains aspirin to children.   · Only take over-the-counter or prescription medicines for pain, discomfort, or fever as directed by your caregiver.   SEEK IMMEDIATE MEDICAL CARE IF:   · You or your child has not improved within one week.   · You or your child has pain that is not at least partially relieved by over-the-counter medicine.   · Thick, colored mucus or blood is coughed up.   · Discharge from the nose becomes " thick yellow or green.   · Diarrhea or vomiting gets worse.   · There is any major change in your or your child's condition.   · You or your child develops a skin rash, stiff neck, severe headache, or are unable to hold down food or fluid.   · You or your child has an oral temperature above 102° F (38.9° C), not controlled by medicine.   · Your baby is older than 3 months with a rectal temperature of 102° F (38.9° C) or higher.   · Your baby is 3 months old or younger with a rectal temperature of 100.4° F (38° C) or higher.   Document Released: 12/03/2007 Document Revised: 03/11/2013 Document Reviewed: 12/03/2008  [employer]® Patient Information ©2013 [employer].

## 2018-08-27 ENCOUNTER — HOSPITAL ENCOUNTER (EMERGENCY)
Facility: MEDICAL CENTER | Age: 23
End: 2018-08-27
Attending: EMERGENCY MEDICINE
Payer: MEDICAID

## 2018-08-27 VITALS
TEMPERATURE: 96.9 F | DIASTOLIC BLOOD PRESSURE: 68 MMHG | HEIGHT: 63 IN | OXYGEN SATURATION: 99 % | SYSTOLIC BLOOD PRESSURE: 121 MMHG | BODY MASS INDEX: 24.26 KG/M2 | HEART RATE: 59 BPM | WEIGHT: 136.91 LBS | RESPIRATION RATE: 18 BRPM

## 2018-08-27 DIAGNOSIS — R19.7 NAUSEA VOMITING AND DIARRHEA: ICD-10-CM

## 2018-08-27 DIAGNOSIS — R11.2 NAUSEA VOMITING AND DIARRHEA: ICD-10-CM

## 2018-08-27 DIAGNOSIS — N39.0 URINARY TRACT INFECTION WITHOUT HEMATURIA, SITE UNSPECIFIED: ICD-10-CM

## 2018-08-27 LAB
ALBUMIN SERPL BCP-MCNC: 4.4 G/DL (ref 3.2–4.9)
ALBUMIN/GLOB SERPL: 1.5 G/DL
ALP SERPL-CCNC: 47 U/L (ref 30–99)
ALT SERPL-CCNC: 8 U/L (ref 2–50)
ANION GAP SERPL CALC-SCNC: 7 MMOL/L (ref 0–11.9)
APPEARANCE UR: ABNORMAL
AST SERPL-CCNC: 24 U/L (ref 12–45)
BACTERIA #/AREA URNS HPF: ABNORMAL /HPF
BASOPHILS # BLD AUTO: 0.9 % (ref 0–1.8)
BASOPHILS # BLD: 0.05 K/UL (ref 0–0.12)
BILIRUB SERPL-MCNC: 0.4 MG/DL (ref 0.1–1.5)
BILIRUB UR QL STRIP.AUTO: ABNORMAL
BUN SERPL-MCNC: 9 MG/DL (ref 8–22)
CALCIUM SERPL-MCNC: 8.7 MG/DL (ref 8.5–10.5)
CHLORIDE SERPL-SCNC: 106 MMOL/L (ref 96–112)
CO2 SERPL-SCNC: 25 MMOL/L (ref 20–33)
COLOR UR: ABNORMAL
CREAT SERPL-MCNC: 0.71 MG/DL (ref 0.5–1.4)
EOSINOPHIL # BLD AUTO: 0 K/UL (ref 0–0.51)
EOSINOPHIL NFR BLD: 0 % (ref 0–6.9)
EPI CELLS #/AREA URNS HPF: ABNORMAL /HPF
ERYTHROCYTE [DISTWIDTH] IN BLOOD BY AUTOMATED COUNT: 47.9 FL (ref 35.9–50)
GLOBULIN SER CALC-MCNC: 2.9 G/DL (ref 1.9–3.5)
GLUCOSE SERPL-MCNC: 103 MG/DL (ref 65–99)
GLUCOSE UR STRIP.AUTO-MCNC: NEGATIVE MG/DL
HCG SERPL QL: NEGATIVE
HCT VFR BLD AUTO: 40.6 % (ref 37–47)
HGB BLD-MCNC: 12.9 G/DL (ref 12–16)
HYALINE CASTS #/AREA URNS LPF: ABNORMAL /LPF
KETONES UR STRIP.AUTO-MCNC: NEGATIVE MG/DL
LEUKOCYTE ESTERASE UR QL STRIP.AUTO: ABNORMAL
LG PLATELETS BLD QL SMEAR: NORMAL
LIPASE SERPL-CCNC: 15 U/L (ref 11–82)
LYMPHOCYTES # BLD AUTO: 2.92 K/UL (ref 1–4.8)
LYMPHOCYTES NFR BLD: 58.4 % (ref 22–41)
MANUAL DIFF BLD: NORMAL
MCH RBC QN AUTO: 27.6 PG (ref 27–33)
MCHC RBC AUTO-ENTMCNC: 31.8 G/DL (ref 33.6–35)
MCV RBC AUTO: 86.9 FL (ref 81.4–97.8)
MICRO URNS: ABNORMAL
MONOCYTES # BLD AUTO: 0.14 K/UL (ref 0–0.85)
MONOCYTES NFR BLD AUTO: 2.7 % (ref 0–13.4)
MORPHOLOGY BLD-IMP: NORMAL
NEUTROPHILS # BLD AUTO: 1.9 K/UL (ref 2–7.15)
NEUTROPHILS NFR BLD: 38 % (ref 44–72)
NITRITE UR QL STRIP.AUTO: NEGATIVE
NRBC # BLD AUTO: 0 K/UL
NRBC BLD-RTO: 0 /100 WBC
PH UR STRIP.AUTO: 5.5 [PH]
PLATELET # BLD AUTO: 204 K/UL (ref 164–446)
PLATELET BLD QL SMEAR: NORMAL
PMV BLD AUTO: 11.9 FL (ref 9–12.9)
POTASSIUM SERPL-SCNC: 4.2 MMOL/L (ref 3.6–5.5)
PROT SERPL-MCNC: 7.3 G/DL (ref 6–8.2)
PROT UR QL STRIP: 30 MG/DL
RBC # BLD AUTO: 4.67 M/UL (ref 4.2–5.4)
RBC # URNS HPF: >150 /HPF
RBC BLD AUTO: PRESENT
RBC UR QL AUTO: ABNORMAL
SODIUM SERPL-SCNC: 138 MMOL/L (ref 135–145)
SP GR UR STRIP.AUTO: 1.03
UROBILINOGEN UR STRIP.AUTO-MCNC: 1 MG/DL
WBC # BLD AUTO: 5 K/UL (ref 4.8–10.8)
WBC #/AREA URNS HPF: ABNORMAL /HPF

## 2018-08-27 PROCEDURE — 84703 CHORIONIC GONADOTROPIN ASSAY: CPT

## 2018-08-27 PROCEDURE — 83690 ASSAY OF LIPASE: CPT

## 2018-08-27 PROCEDURE — 85007 BL SMEAR W/DIFF WBC COUNT: CPT

## 2018-08-27 PROCEDURE — 96361 HYDRATE IV INFUSION ADD-ON: CPT

## 2018-08-27 PROCEDURE — 700102 HCHG RX REV CODE 250 W/ 637 OVERRIDE(OP): Performed by: EMERGENCY MEDICINE

## 2018-08-27 PROCEDURE — 80053 COMPREHEN METABOLIC PANEL: CPT

## 2018-08-27 PROCEDURE — A9270 NON-COVERED ITEM OR SERVICE: HCPCS | Performed by: EMERGENCY MEDICINE

## 2018-08-27 PROCEDURE — 96374 THER/PROPH/DIAG INJ IV PUSH: CPT

## 2018-08-27 PROCEDURE — 85027 COMPLETE CBC AUTOMATED: CPT

## 2018-08-27 PROCEDURE — 81001 URINALYSIS AUTO W/SCOPE: CPT

## 2018-08-27 PROCEDURE — 99285 EMERGENCY DEPT VISIT HI MDM: CPT

## 2018-08-27 PROCEDURE — 700105 HCHG RX REV CODE 258: Performed by: EMERGENCY MEDICINE

## 2018-08-27 PROCEDURE — 700111 HCHG RX REV CODE 636 W/ 250 OVERRIDE (IP): Performed by: EMERGENCY MEDICINE

## 2018-08-27 RX ORDER — ONDANSETRON 2 MG/ML
4 INJECTION INTRAMUSCULAR; INTRAVENOUS ONCE
Status: COMPLETED | OUTPATIENT
Start: 2018-08-27 | End: 2018-08-27

## 2018-08-27 RX ORDER — SODIUM CHLORIDE 9 MG/ML
1000 INJECTION, SOLUTION INTRAVENOUS ONCE
Status: COMPLETED | OUTPATIENT
Start: 2018-08-27 | End: 2018-08-27

## 2018-08-27 RX ORDER — NITROFURANTOIN 25; 75 MG/1; MG/1
100 CAPSULE ORAL 2 TIMES DAILY
Qty: 14 CAP | Refills: 0 | Status: SHIPPED | OUTPATIENT
Start: 2018-08-27 | End: 2018-09-03

## 2018-08-27 RX ORDER — ONDANSETRON 4 MG/1
4 TABLET, ORALLY DISINTEGRATING ORAL EVERY 6 HOURS PRN
Qty: 10 TAB | Refills: 0 | Status: SHIPPED | OUTPATIENT
Start: 2018-08-27 | End: 2018-09-12

## 2018-08-27 RX ADMIN — LIDOCAINE HYDROCHLORIDE 30 ML: 20 SOLUTION OROPHARYNGEAL at 10:32

## 2018-08-27 RX ADMIN — ONDANSETRON 4 MG: 2 INJECTION INTRAMUSCULAR; INTRAVENOUS at 07:48

## 2018-08-27 RX ADMIN — SODIUM CHLORIDE 1000 ML: 9 INJECTION, SOLUTION INTRAVENOUS at 07:48

## 2018-08-27 ASSESSMENT — LIFESTYLE VARIABLES: DO YOU DRINK ALCOHOL: NO

## 2018-08-27 NOTE — DISCHARGE INSTRUCTIONS
Urinary Tract Infection, Adult  Introduction  A urinary tract infection (UTI) is an infection of any part of the urinary tract. The urinary tract includes the:  · Kidneys.  · Ureters.  · Bladder.  · Urethra.  These organs make, store, and get rid of pee (urine) in the body.  Follow these instructions at home:  · Take over-the-counter and prescription medicines only as told by your doctor.  · If you were prescribed an antibiotic medicine, take it as told by your doctor. Do not stop taking the antibiotic even if you start to feel better.  · Avoid the following drinks:  ¨ Alcohol.  ¨ Caffeine.  ¨ Tea.  ¨ Carbonated drinks.  · Drink enough fluid to keep your pee clear or pale yellow.  · Keep all follow-up visits as told by your doctor. This is important.  · Make sure to:  ¨ Empty your bladder often and completely. Do not to hold pee for long periods of time.  ¨ Empty your bladder before and after sex.  ¨ Wipe from front to back after a bowel movement if you are female. Use each tissue one time when you wipe.  Contact a doctor if:  · You have back pain.  · You have a fever.  · You feel sick to your stomach (nauseous).  · You throw up (vomit).  · Your symptoms do not get better after 3 days.  · Your symptoms go away and then come back.  Get help right away if:  · You have very bad back pain.  · You have very bad lower belly (abdominal) pain.  · You are throwing up and cannot keep down any medicines or water.  This information is not intended to replace advice given to you by your health care provider. Make sure you discuss any questions you have with your health care provider.  Document Released: 06/05/2009 Document Revised: 05/25/2017 Document Reviewed: 11/07/2016  © 2017 Elsevier  Viral Gastroenteritis, Adult  Viral gastroenteritis is also known as the stomach flu. This condition is caused by various viruses. These viruses can be passed from person to person very easily (are very contagious). This condition may affect  your stomach, small intestine, and large intestine. It can cause sudden watery diarrhea, fever, and vomiting.  Diarrhea and vomiting can make you feel weak and cause you to become dehydrated. You may not be able to keep fluids down. Dehydration can make you tired and thirsty, cause you to have a dry mouth, and decrease how often you urinate. Older adults and people with other diseases or a weak immune system are at higher risk for dehydration.  It is important to replace the fluids that you lose from diarrhea and vomiting. If you become severely dehydrated, you may need to get fluids through an IV tube.  What are the causes?  Gastroenteritis is caused by various viruses, including rotavirus and norovirus. Norovirus is the most common cause in adults.  You can get sick by eating food, drinking water, or touching a surface contaminated with one of these viruses. You can also get sick from sharing utensils or other personal items with an infected person.  What increases the risk?  This condition is more likely to develop in people:  · Who have a weak defense system (immune system).  · Who live with one or more children who are younger than 2 years old.  · Who live in a nursing home.  · Who go on cruise ships.  What are the signs or symptoms?  Symptoms of this condition start suddenly 1-2 days after exposure to a virus. Symptoms may last a few days or as long as a week. The most common symptoms are watery diarrhea and vomiting. Other symptoms include:  · Fever.  · Headache.  · Fatigue.  · Pain in the abdomen.  · Chills.  · Weakness.  · Nausea.  · Muscle aches.  · Loss of appetite.  How is this diagnosed?  This condition is diagnosed with a medical history and physical exam. You may also have a stool test to check for viruses or other infections.  How is this treated?  This condition typically goes away on its own. The focus of treatment is to restore lost fluids (rehydration). Your health care provider may recommend  that you take an oral rehydration solution (ORS) to replace important salts and minerals (electrolytes) in your body. Severe cases of this condition may require giving fluids through an IV tube.  Treatment may also include medicine to help with your symptoms.  Follow these instructions at home:  Follow instructions from your health care provider about how to care for yourself at home.  Eating and drinking  Follow these recommendations as told by your health care provider:  · Take an ORS. This is a drink that is sold at pharmacies and retail stores.  · Drink clear fluids in small amounts as you are able. Clear fluids include water, ice chips, diluted fruit juice, and low-calorie sports drinks.  · Eat bland, easy-to-digest foods in small amounts as you are able. These foods include bananas, applesauce, rice, lean meats, toast, and crackers.  · Avoid fluids that contain a lot of sugar or caffeine, such as energy drinks, sports drinks, and soda.  · Avoid alcohol.  · Avoid spicy or fatty foods.  General instructions  · Drink enough fluid to keep your urine clear or pale yellow.  · Wash your hands often. If soap and water are not available, use hand .  · Make sure that all people in your household wash their hands well and often.  · Take over-the-counter and prescription medicines only as told by your health care provider.  · Rest at home while you recover.  · Watch your condition for any changes.  · Take a warm bath to relieve any burning or pain from frequent diarrhea episodes.  · Keep all follow-up visits as told by your health care provider. This is important.  Contact a health care provider if:  · You cannot keep fluids down.  · Your symptoms get worse.  · You have new symptoms.  · You feel light-headed or dizzy.  · You have muscle cramps.  Get help right away if:  · You have chest pain.  · You feel extremely weak or you faint.  · You see blood in your vomit.  · Your vomit looks like coffee grounds.  · You  have bloody or black stools or stools that look like tar.  · You have a severe headache, a stiff neck, or both.  · You have a rash.  · You have severe pain, cramping, or bloating in your abdomen.  · You have trouble breathing or you are breathing very quickly.  · Your heart is beating very quickly.  · Your skin feels cold and clammy.  · You feel confused.  · You have pain when you urinate.  · You have signs of dehydration, such as:  ¨ Dark urine, very little urine, or no urine.  ¨ Cracked lips.  ¨ Dry mouth.  ¨ Sunken eyes.  ¨ Sleepiness.  ¨ Weakness.  This information is not intended to replace advice given to you by your health care provider. Make sure you discuss any questions you have with your health care provider.  Document Released: 12/18/2006 Document Revised: 05/31/2017 Document Reviewed: 08/23/2016  [employer] Interactive Patient Education © 2017 [employer] Inc.

## 2018-08-27 NOTE — ED NOTES
Awaiting IVF to infuse prior to DC. Pt c/o burning CP. Dr. Cordova made aware and pt medicated w GI cocktail as ordered. See MAR for details. Ongoing monitoring

## 2018-08-27 NOTE — ED PROVIDER NOTES
"ED Provider Note    CHIEF COMPLAINT  Chief Complaint   Patient presents with   • Nausea/Vomiting/Diarrhea     x4 days   • Abdominal Pain     diffuse abd pain x4 days       HPI  Ivis Klein is a 22 y.o. female who presents for 3-4 days of crampy abdominal pain nausea vomiting and nonbloody diarrhea. Patient denies pregnancy she is currently on her menstrual cycle. She reports some blood streaks in her vomit but no stan hematemesis. No recent antibiotic use or foreign travel. No abdominal surgical history. Only known main medical issues thyroid disease. She denies flank pain or hematuria but does report some mild dysuria. No associated high fevers or chills. No other symptoms reported and seems to make it better or worse    REVIEW OF SYSTEMS  See HPI for further details. No lethargy cyanosis apnea rash All other systems are negative.     PAST MEDICAL HISTORY  Past Medical History:   Diagnosis Date   • Anxiety 2/16/2017   • Graves disease 12/5/2016   • Vomiting 6/29/2016   • Hyperthyroidism 6/29/2016   • Pericarditis 06/2016   • Arrhythmia     tachycardia \"pt reports d/t thryoid\"   • Breath shortness     no c/o at this time; c/o sob at night unsure if it is d/t anxiety   • Heart valve disease    • Menarche     started at age 14   • Migraine    • Pregnancy     delivered 9/8/2013, 3/16/17 pt reports that she is not pregnant at this time   • Psychiatric problem     anxiety, depression   • Supervision of normal first teen pregnancy        FAMILY HISTORY  Noncontributory     SOCIAL HISTORY  Social History     Social History   • Marital status: Single     Spouse name: N/A   • Number of children: N/A   • Years of education: N/A     Social History Main Topics   • Smoking status: Never Smoker   • Smokeless tobacco: Never Used      Comment: quit in 2012   • Alcohol use No      Comment: occ   • Drug use: Yes     Types: Marijuana      Comment: marijuana,   • Sexual activity: Yes     Partners: Male     Birth control/ " "protection: Abstinence      Comment: single     Other Topics Concern   • Not on file     Social History Narrative   • No narrative on file     Denies IV drugs  SURGICAL HISTORY  Past Surgical History:   Procedure Laterality Date   • THYROIDECTOMY TOTAL Bilateral 3/22/2017    Procedure: THYROIDECTOMY TOTAL -NIMS RECURRENT LARYNGEAL NERVE MONITORING;  Surgeon: Vicente Eldridge M.D.;  Location: SURGERY SAME DAY [facility];  Service:    • PRIMARY C SECTION  9/8/2013    Performed by Melisa Wright M.D. at LABOR AND DELIVERY       CURRENT MEDICATIONS  Home Medications     Reviewed by Karina Langford R.N. (Registered Nurse) on 08/27/18 at 0725  Med List Status: Partial   Medication Last Dose Status   levothyroxine (SYNTHROID) 137 MCG Tab  Active                ALLERGIES  Allergies   Allergen Reactions   • Nkda [No Known Drug Allergy]        PHYSICAL EXAM  VITAL SIGNS: /68   Pulse 75   Temp 36.1 °C (96.9 °F)   Resp 18   Ht 1.6 m (5' 3\")   Wt 62.1 kg (136 lb 14.5 oz)   LMP 08/25/2018   SpO2 97%   BMI 24.25 kg/m²       Constitutional: Well developed, Well nourished, No acute distress, Non-toxic appearance.   HENT: Normocephalic, Atraumatic, Bilateral external ears normal, dry mucous membranes, No oral exudates, Nose normal.   Eyes: PERRLA, EOMI, Conjunctiva normal, No discharge.   Neck: Normal range of motion, No tenderness, Supple, No stridor.   Cardiovascular: Normal heart rate, Normal rhythm, No murmurs, No rubs, No gallops.   Thorax & Lungs: Normal breath sounds, No respiratory distress, No wheezing, No chest tenderness.   Abdomen: Bowel sounds hyperactive, no hernias or masses mild diffuse tenderness no rebound or guarding   Skin: Warm, Dry, No erythema, No rash.   Back: No tenderness, No CVA tenderness.   Extremities: Intact distal pulses, No edema, No tenderness, No cyanosis, No clubbing.   Neurologic: Alert & oriented x 3, Normal motor function, Normal sensory function, No focal deficits " noted.   Psychiatric: Anxious        RADIOLOGY/PROCEDURES  Results for orders placed or performed during the hospital encounter of 08/27/18   CBC WITH DIFFERENTIAL   Result Value Ref Range    WBC 5.0 4.8 - 10.8 K/uL    RBC 4.67 4.20 - 5.40 M/uL    Hemoglobin 12.9 12.0 - 16.0 g/dL    Hematocrit 40.6 37.0 - 47.0 %    MCV 86.9 81.4 - 97.8 fL    MCH 27.6 27.0 - 33.0 pg    MCHC 31.8 (L) 33.6 - 35.0 g/dL    RDW 47.9 35.9 - 50.0 fL    Platelet Count 204 164 - 446 K/uL    MPV 11.9 9.0 - 12.9 fL    Nucleated RBC 0.00 /100 WBC    NRBC (Absolute) 0.00 K/uL    Neutrophils-Polys 38.00 (L) 44.00 - 72.00 %    Lymphocytes 58.40 (H) 22.00 - 41.00 %    Monocytes 2.70 0.00 - 13.40 %    Eosinophils 0.00 0.00 - 6.90 %    Basophils 0.90 0.00 - 1.80 %    Neutrophils (Absolute) 1.90 (L) 2.00 - 7.15 K/uL    Lymphs (Absolute) 2.92 1.00 - 4.80 K/uL    Monos (Absolute) 0.14 0.00 - 0.85 K/uL    Eos (Absolute) 0.00 0.00 - 0.51 K/uL    Baso (Absolute) 0.05 0.00 - 0.12 K/uL   COMP METABOLIC PANEL   Result Value Ref Range    Sodium 138 135 - 145 mmol/L    Potassium 4.2 3.6 - 5.5 mmol/L    Chloride 106 96 - 112 mmol/L    Co2 25 20 - 33 mmol/L    Anion Gap 7.0 0.0 - 11.9    Glucose 103 (H) 65 - 99 mg/dL    Bun 9 8 - 22 mg/dL    Creatinine 0.71 0.50 - 1.40 mg/dL    Calcium 8.7 8.5 - 10.5 mg/dL    AST(SGOT) 24 12 - 45 U/L    ALT(SGPT) 8 2 - 50 U/L    Alkaline Phosphatase 47 30 - 99 U/L    Total Bilirubin 0.4 0.1 - 1.5 mg/dL    Albumin 4.4 3.2 - 4.9 g/dL    Total Protein 7.3 6.0 - 8.2 g/dL    Globulin 2.9 1.9 - 3.5 g/dL    A-G Ratio 1.5 g/dL   LIPASE   Result Value Ref Range    Lipase 15 11 - 82 U/L   HCG QUAL SERUM   Result Value Ref Range    Beta-Hcg Qualitative Serum Negative Negative   URINALYSIS   Result Value Ref Range    Micro Urine Req Microscopic     Color Red     Character Turbid (A)     Specific Gravity 1.031 <1.035    Ph 5.5 5.0 - 8.0    Glucose Negative Negative mg/dL    Ketones Negative Negative mg/dL    Protein 30 (A) Negative mg/dL     Bilirubin Small (A) Negative    Urobilinogen, Urine 1.0 Negative    Nitrite Negative Negative    Leukocyte Esterase Small (A) Negative    Occult Blood Large (A) Negative   ESTIMATED GFR   Result Value Ref Range    GFR If African American >60 >60 mL/min/1.73 m 2    GFR If Non African American >60 >60 mL/min/1.73 m 2   DIFFERENTIAL MANUAL   Result Value Ref Range    Manual Diff Status PERFORMED    PERIPHERAL SMEAR REVIEW   Result Value Ref Range    Peripheral Smear Review see below    PLATELET ESTIMATE   Result Value Ref Range    Plt Estimation Normal    MORPHOLOGY   Result Value Ref Range    RBC Morphology Present     Large Platelets 1+    URINE MICROSCOPIC (W/UA)   Result Value Ref Range    WBC 20-50 (A) /hpf    RBC >150 (A) /hpf    Bacteria Few (A) None /hpf    Epithelial Cells Few /hpf    Hyaline Cast 11-20 (A) /lpf        COURSE & MEDICAL DECISION MAKING  Pertinent Labs & Imaging studies reviewed. (See chart for details)  An IV was established. Clinically the patient appeared dehydrated likely due to volume loss from vomiting and diarrhea. She is given antiemetics and a liter of crystalloid. After IV fluids she was starting to make urine heart rate came down she feels better. Clinically the patient has likely gastroenteritis viral etiology. Serial abdominal exams formed and she had no evidence of focal peritonitis. Without any leukocytosis bandemia or localizing pain and did not feel that CT scan is clinically indicated. She did not specifically have findings suggestive of pyelonephritis but she did report some urinary symptoms possibly diarrhea has triggered a mild UTI. Sample is likely tamponaded but it did demonstrate significant white cells with bacteria and a possibly of epithelial cells therefore I think we should culture this and treated, I will start her on Macrobid and discharge her home with Zofran    FINAL IMPRESSION  1. Nausea vomiting and diarrhea  2. Urinary tract infection         Electronically  signed by: Rasheed Sloan, 8/27/2018 7:37 AM

## 2018-08-27 NOTE — ED NOTES
Assumed care of pt from waiting room. Pt ambulatory to room with steady gait.  Changed to gown, hooked to monitor- VSS. PIV placed with blood draw. Fall precautions in place. Call bell in reach. Awaiting MD eval/orders. Ongoing monitoring.

## 2018-08-27 NOTE — ED NOTES
VSS- PIV DC'd intact. Pt given DC instructions, including medication education, with verbalized understanding. Ambulatory to exit with steady gait.

## 2018-08-27 NOTE — ED NOTES
Pt ambulates to triage with c/c n/v/d & diffuse abd pain x4 days.  A&ox4.  Pt to lobby & advised to inform RN of any changes.

## 2018-08-27 NOTE — ED NOTES
Pt to be DC'd after IVF infused. Pt instructed multiple times to keep arm straight for fluids to infuse. Ongoing monitoring.

## 2018-09-12 ENCOUNTER — PATIENT OUTREACH (OUTPATIENT)
Dept: HEALTH INFORMATION MANAGEMENT | Facility: OTHER | Age: 23
End: 2018-09-12

## 2018-09-12 ENCOUNTER — HOSPITAL ENCOUNTER (OUTPATIENT)
Facility: MEDICAL CENTER | Age: 23
End: 2018-09-13
Attending: EMERGENCY MEDICINE | Admitting: HOSPITALIST
Payer: MEDICAID

## 2018-09-12 PROBLEM — E89.0 POSTOPERATIVE HYPOTHYROIDISM: Status: ACTIVE | Noted: 2018-09-12

## 2018-09-12 PROBLEM — M79.10 MYALGIA: Status: ACTIVE | Noted: 2018-09-12

## 2018-09-12 PROBLEM — I49.8 SINUS ARRHYTHMIA: Status: ACTIVE | Noted: 2018-09-12

## 2018-09-12 LAB
ALBUMIN SERPL BCP-MCNC: 4.4 G/DL (ref 3.2–4.9)
ALBUMIN/GLOB SERPL: 1.4 G/DL
ALP SERPL-CCNC: 46 U/L (ref 30–99)
ALT SERPL-CCNC: 8 U/L (ref 2–50)
AMPHET UR QL SCN: NEGATIVE
ANION GAP SERPL CALC-SCNC: 13 MMOL/L (ref 0–11.9)
ANION GAP SERPL CALC-SCNC: 9 MMOL/L (ref 0–11.9)
APPEARANCE UR: CLEAR
AST SERPL-CCNC: 15 U/L (ref 12–45)
BARBITURATES UR QL SCN: NEGATIVE
BASOPHILS # BLD AUTO: 1.2 % (ref 0–1.8)
BASOPHILS # BLD: 0.07 K/UL (ref 0–0.12)
BENZODIAZ UR QL SCN: NEGATIVE
BILIRUB SERPL-MCNC: 0.5 MG/DL (ref 0.1–1.5)
BILIRUB UR QL STRIP.AUTO: NEGATIVE
BUN SERPL-MCNC: 7 MG/DL (ref 8–22)
BUN SERPL-MCNC: 8 MG/DL (ref 8–22)
BZE UR QL SCN: NEGATIVE
CALCIUM SERPL-MCNC: 8.9 MG/DL (ref 8.5–10.5)
CALCIUM SERPL-MCNC: 9.6 MG/DL (ref 8.5–10.5)
CANNABINOIDS UR QL SCN: POSITIVE
CHLORIDE SERPL-SCNC: 103 MMOL/L (ref 96–112)
CHLORIDE SERPL-SCNC: 105 MMOL/L (ref 96–112)
CO2 SERPL-SCNC: 19 MMOL/L (ref 20–33)
CO2 SERPL-SCNC: 27 MMOL/L (ref 20–33)
COLOR UR: YELLOW
CREAT SERPL-MCNC: 0.63 MG/DL (ref 0.5–1.4)
CREAT SERPL-MCNC: 0.75 MG/DL (ref 0.5–1.4)
CRP SERPL HS-MCNC: 0.02 MG/DL (ref 0–0.75)
DEPRECATED D DIMER PPP IA-ACNC: <200 NG/ML(D-DU)
EOSINOPHIL # BLD AUTO: 0.03 K/UL (ref 0–0.51)
EOSINOPHIL NFR BLD: 0.5 % (ref 0–6.9)
ERYTHROCYTE [DISTWIDTH] IN BLOOD BY AUTOMATED COUNT: 48.9 FL (ref 35.9–50)
ERYTHROCYTE [SEDIMENTATION RATE] IN BLOOD BY WESTERGREN METHOD: 1 MM/HOUR (ref 0–20)
GLOBULIN SER CALC-MCNC: 3.1 G/DL (ref 1.9–3.5)
GLUCOSE SERPL-MCNC: 76 MG/DL (ref 65–99)
GLUCOSE SERPL-MCNC: 97 MG/DL (ref 65–99)
GLUCOSE UR STRIP.AUTO-MCNC: NEGATIVE MG/DL
HCG SERPL QL: NEGATIVE
HCT VFR BLD AUTO: 41.3 % (ref 37–47)
HGB BLD-MCNC: 13.5 G/DL (ref 12–16)
IMM GRANULOCYTES # BLD AUTO: 0.01 K/UL (ref 0–0.11)
IMM GRANULOCYTES NFR BLD AUTO: 0.2 % (ref 0–0.9)
KETONES UR STRIP.AUTO-MCNC: NEGATIVE MG/DL
LEUKOCYTE ESTERASE UR QL STRIP.AUTO: NEGATIVE
LIPASE SERPL-CCNC: 9 U/L (ref 11–82)
LYMPHOCYTES # BLD AUTO: 2.74 K/UL (ref 1–4.8)
LYMPHOCYTES NFR BLD: 45.3 % (ref 22–41)
MAGNESIUM SERPL-MCNC: 1.9 MG/DL (ref 1.5–2.5)
MCH RBC QN AUTO: 28.2 PG (ref 27–33)
MCHC RBC AUTO-ENTMCNC: 32.7 G/DL (ref 33.6–35)
MCV RBC AUTO: 86.2 FL (ref 81.4–97.8)
METHADONE UR QL SCN: NEGATIVE
MICRO URNS: ABNORMAL
MONOCYTES # BLD AUTO: 0.36 K/UL (ref 0–0.85)
MONOCYTES NFR BLD AUTO: 6 % (ref 0–13.4)
NEUTROPHILS # BLD AUTO: 2.84 K/UL (ref 2–7.15)
NEUTROPHILS NFR BLD: 46.8 % (ref 44–72)
NITRITE UR QL STRIP.AUTO: NEGATIVE
NRBC # BLD AUTO: 0 K/UL
NRBC BLD-RTO: 0 /100 WBC
OPIATES UR QL SCN: NEGATIVE
OXYCODONE UR QL SCN: NEGATIVE
PCP UR QL SCN: NEGATIVE
PH UR STRIP.AUTO: >=9 [PH]
PHOSPHATE SERPL-MCNC: 2.1 MG/DL (ref 2.5–4.5)
PLATELET # BLD AUTO: 245 K/UL (ref 164–446)
PMV BLD AUTO: 12.4 FL (ref 9–12.9)
POTASSIUM SERPL-SCNC: 3.3 MMOL/L (ref 3.6–5.5)
POTASSIUM SERPL-SCNC: 3.5 MMOL/L (ref 3.6–5.5)
PROPOXYPH UR QL SCN: NEGATIVE
PROT SERPL-MCNC: 7.5 G/DL (ref 6–8.2)
PROT UR QL STRIP: NEGATIVE MG/DL
RBC # BLD AUTO: 4.79 M/UL (ref 4.2–5.4)
RBC UR QL AUTO: NEGATIVE
SODIUM SERPL-SCNC: 137 MMOL/L (ref 135–145)
SODIUM SERPL-SCNC: 139 MMOL/L (ref 135–145)
SP GR UR STRIP.AUTO: 1.02
T3FREE SERPL-MCNC: 2.08 PG/ML (ref 2.4–4.2)
T4 FREE SERPL-MCNC: <0.25 NG/DL (ref 0.53–1.43)
TROPONIN I SERPL-MCNC: <0.01 NG/ML (ref 0–0.04)
TSH SERPL DL<=0.005 MIU/L-ACNC: 353.47 UIU/ML (ref 0.38–5.33)
UROBILINOGEN UR STRIP.AUTO-MCNC: 1 MG/DL
WBC # BLD AUTO: 6.1 K/UL (ref 4.8–10.8)

## 2018-09-12 PROCEDURE — 700102 HCHG RX REV CODE 250 W/ 637 OVERRIDE(OP): Performed by: STUDENT IN AN ORGANIZED HEALTH CARE EDUCATION/TRAINING PROGRAM

## 2018-09-12 PROCEDURE — A9270 NON-COVERED ITEM OR SERVICE: HCPCS | Performed by: STUDENT IN AN ORGANIZED HEALTH CARE EDUCATION/TRAINING PROGRAM

## 2018-09-12 PROCEDURE — 84481 FREE ASSAY (FT-3): CPT

## 2018-09-12 PROCEDURE — G0378 HOSPITAL OBSERVATION PER HR: HCPCS

## 2018-09-12 PROCEDURE — 80053 COMPREHEN METABOLIC PANEL: CPT

## 2018-09-12 PROCEDURE — 99220 PR INITIAL OBSERVATION CARE,LEVL III: CPT | Mod: GC | Performed by: HOSPITALIST

## 2018-09-12 PROCEDURE — 85652 RBC SED RATE AUTOMATED: CPT

## 2018-09-12 PROCEDURE — 85379 FIBRIN DEGRADATION QUANT: CPT

## 2018-09-12 PROCEDURE — 80048 BASIC METABOLIC PNL TOTAL CA: CPT

## 2018-09-12 PROCEDURE — 96365 THER/PROPH/DIAG IV INF INIT: CPT

## 2018-09-12 PROCEDURE — 84484 ASSAY OF TROPONIN QUANT: CPT

## 2018-09-12 PROCEDURE — 86140 C-REACTIVE PROTEIN: CPT

## 2018-09-12 PROCEDURE — 93005 ELECTROCARDIOGRAM TRACING: CPT | Performed by: HOSPITALIST

## 2018-09-12 PROCEDURE — 96375 TX/PRO/DX INJ NEW DRUG ADDON: CPT

## 2018-09-12 PROCEDURE — 81003 URINALYSIS AUTO W/O SCOPE: CPT

## 2018-09-12 PROCEDURE — 93005 ELECTROCARDIOGRAM TRACING: CPT | Performed by: STUDENT IN AN ORGANIZED HEALTH CARE EDUCATION/TRAINING PROGRAM

## 2018-09-12 PROCEDURE — 84703 CHORIONIC GONADOTROPIN ASSAY: CPT

## 2018-09-12 PROCEDURE — 84100 ASSAY OF PHOSPHORUS: CPT

## 2018-09-12 PROCEDURE — 93010 ELECTROCARDIOGRAM REPORT: CPT | Performed by: INTERNAL MEDICINE

## 2018-09-12 PROCEDURE — 93005 ELECTROCARDIOGRAM TRACING: CPT | Performed by: EMERGENCY MEDICINE

## 2018-09-12 PROCEDURE — 85025 COMPLETE CBC W/AUTO DIFF WBC: CPT

## 2018-09-12 PROCEDURE — 96376 TX/PRO/DX INJ SAME DRUG ADON: CPT

## 2018-09-12 PROCEDURE — 83735 ASSAY OF MAGNESIUM: CPT

## 2018-09-12 PROCEDURE — 96374 THER/PROPH/DIAG INJ IV PUSH: CPT

## 2018-09-12 PROCEDURE — 36415 COLL VENOUS BLD VENIPUNCTURE: CPT

## 2018-09-12 PROCEDURE — 80307 DRUG TEST PRSMV CHEM ANLYZR: CPT

## 2018-09-12 PROCEDURE — 84443 ASSAY THYROID STIM HORMONE: CPT

## 2018-09-12 PROCEDURE — 83690 ASSAY OF LIPASE: CPT

## 2018-09-12 PROCEDURE — 84439 ASSAY OF FREE THYROXINE: CPT

## 2018-09-12 PROCEDURE — 700111 HCHG RX REV CODE 636 W/ 250 OVERRIDE (IP): Performed by: STUDENT IN AN ORGANIZED HEALTH CARE EDUCATION/TRAINING PROGRAM

## 2018-09-12 PROCEDURE — 700111 HCHG RX REV CODE 636 W/ 250 OVERRIDE (IP): Performed by: EMERGENCY MEDICINE

## 2018-09-12 PROCEDURE — 99285 EMERGENCY DEPT VISIT HI MDM: CPT

## 2018-09-12 RX ORDER — KETOROLAC TROMETHAMINE 30 MG/ML
15 INJECTION, SOLUTION INTRAMUSCULAR; INTRAVENOUS ONCE
Status: COMPLETED | OUTPATIENT
Start: 2018-09-12 | End: 2018-09-12

## 2018-09-12 RX ORDER — BISACODYL 10 MG
10 SUPPOSITORY, RECTAL RECTAL
Status: DISCONTINUED | OUTPATIENT
Start: 2018-09-12 | End: 2018-09-13 | Stop reason: HOSPADM

## 2018-09-12 RX ORDER — POTASSIUM CHLORIDE 20 MEQ/1
40 TABLET, EXTENDED RELEASE ORAL ONCE
Status: COMPLETED | OUTPATIENT
Start: 2018-09-12 | End: 2018-09-12

## 2018-09-12 RX ORDER — LORAZEPAM 1 MG/1
0.5 TABLET ORAL EVERY 6 HOURS PRN
Status: DISCONTINUED | OUTPATIENT
Start: 2018-09-12 | End: 2018-09-13

## 2018-09-12 RX ORDER — AMOXICILLIN 250 MG
2 CAPSULE ORAL 2 TIMES DAILY
Status: DISCONTINUED | OUTPATIENT
Start: 2018-09-12 | End: 2018-09-13 | Stop reason: HOSPADM

## 2018-09-12 RX ORDER — MAGNESIUM SULFATE 1 G/100ML
1 INJECTION INTRAVENOUS ONCE
Status: COMPLETED | OUTPATIENT
Start: 2018-09-12 | End: 2018-09-12

## 2018-09-12 RX ORDER — LEVOTHYROXINE SODIUM 137 UG/1
137 TABLET ORAL
Status: DISCONTINUED | OUTPATIENT
Start: 2018-09-13 | End: 2018-09-13 | Stop reason: HOSPADM

## 2018-09-12 RX ORDER — IBUPROFEN 200 MG
200 TABLET ORAL
COMMUNITY
End: 2019-01-24

## 2018-09-12 RX ORDER — POTASSIUM CHLORIDE 20 MEQ/1
40 TABLET, EXTENDED RELEASE ORAL ONCE
Status: DISCONTINUED | OUTPATIENT
Start: 2018-09-12 | End: 2018-09-12

## 2018-09-12 RX ORDER — KETOROLAC TROMETHAMINE 30 MG/ML
30 INJECTION, SOLUTION INTRAMUSCULAR; INTRAVENOUS EVERY 6 HOURS PRN
Status: DISCONTINUED | OUTPATIENT
Start: 2018-09-12 | End: 2018-09-13 | Stop reason: HOSPADM

## 2018-09-12 RX ORDER — POTASSIUM CHLORIDE 20 MEQ/1
20 TABLET, EXTENDED RELEASE ORAL ONCE
Status: DISCONTINUED | OUTPATIENT
Start: 2018-09-12 | End: 2018-09-13

## 2018-09-12 RX ORDER — POLYETHYLENE GLYCOL 3350 17 G/17G
1 POWDER, FOR SOLUTION ORAL
Status: DISCONTINUED | OUTPATIENT
Start: 2018-09-12 | End: 2018-09-13 | Stop reason: HOSPADM

## 2018-09-12 RX ADMIN — LORAZEPAM 0.5 MG: 1 TABLET ORAL at 21:30

## 2018-09-12 RX ADMIN — MAGNESIUM SULFATE IN DEXTROSE 1 G: 10 INJECTION, SOLUTION INTRAVENOUS at 19:27

## 2018-09-12 RX ADMIN — KETOROLAC TROMETHAMINE 30 MG: 30 INJECTION, SOLUTION INTRAMUSCULAR; INTRAVENOUS at 19:27

## 2018-09-12 RX ADMIN — KETOROLAC TROMETHAMINE 15 MG: 30 INJECTION, SOLUTION INTRAMUSCULAR; INTRAVENOUS at 09:00

## 2018-09-12 RX ADMIN — POTASSIUM CHLORIDE 40 MEQ: 1500 TABLET, EXTENDED RELEASE ORAL at 15:29

## 2018-09-12 RX ADMIN — DIBASIC SODIUM PHOSPHATE, MONOBASIC POTASSIUM PHOSPHATE AND MONOBASIC SODIUM PHOSPHATE 1 TABLET: 852; 155; 130 TABLET ORAL at 17:26

## 2018-09-12 ASSESSMENT — ENCOUNTER SYMPTOMS
MYALGIAS: 1
DOUBLE VISION: 0
CHILLS: 1
COUGH: 0
BLURRED VISION: 0
FALLS: 1
NERVOUS/ANXIOUS: 1
NECK PAIN: 1
NAUSEA: 0
BACK PAIN: 1
HEADACHES: 1
FEVER: 0
ABDOMINAL PAIN: 1
SHORTNESS OF BREATH: 1
WEAKNESS: 1
WEIGHT LOSS: 0
VOMITING: 0

## 2018-09-12 ASSESSMENT — LIFESTYLE VARIABLES
ALCOHOL_USE: NO
EVER_SMOKED: YES
SUBSTANCE_ABUSE: 1

## 2018-09-12 ASSESSMENT — PATIENT HEALTH QUESTIONNAIRE - PHQ9
SUM OF ALL RESPONSES TO PHQ9 QUESTIONS 1 AND 2: 0
2. FEELING DOWN, DEPRESSED, IRRITABLE, OR HOPELESS: NOT AT ALL
1. LITTLE INTEREST OR PLEASURE IN DOING THINGS: NOT AT ALL

## 2018-09-12 ASSESSMENT — PAIN SCALES - GENERAL
PAINLEVEL_OUTOF10: 5
PAINLEVEL_OUTOF10: 6
PAINLEVEL_OUTOF10: 10
PAINLEVEL_OUTOF10: 10

## 2018-09-12 ASSESSMENT — COPD QUESTIONNAIRES
DURING THE PAST 4 WEEKS HOW MUCH DID YOU FEEL SHORT OF BREATH: SOME OF THE TIME
HAVE YOU SMOKED AT LEAST 100 CIGARETTES IN YOUR ENTIRE LIFE: NO/DON'T KNOW
IN THE PAST 12 MONTHS DO YOU DO LESS THAN YOU USED TO BECAUSE OF YOUR BREATHING PROBLEMS: DISAGREE/UNSURE
DO YOU EVER COUGH UP ANY MUCUS OR PHLEGM?: NO/ONLY WITH OCCASIONAL COLDS OR INFECTIONS

## 2018-09-12 NOTE — ASSESSMENT & PLAN NOTE
Assessment: High level of concern for underlying psychiatric disorder exacerbating her likely physiological depression due to hypothyroidism. She has had 8 ED visits since January of 2018 for various vague somatic symptoms. She definitely does have an underlying pathophysiology in her uncontrolled hypothyroidism, but some component of somatization, conversion, or malingering as well as anxiety and depression seems to underlie and worsen this given her vague symptoms which often do not fit the pattern of her disease process. This also likely decreases her medication adherence.    Plan:  Patient amenable to seeing psychiatry, psychiatry has been consulted

## 2018-09-12 NOTE — ED TRIAGE NOTES
Chief Complaint   Patient presents with   • Leg Pain     woke up with numbness on her legs and body aches     Pt wheeled to triage, she said she has history of hyperthyroid and feels like she is having thyroid storm. Pain described as stabbing ,also c/o neck pain.

## 2018-09-12 NOTE — ASSESSMENT & PLAN NOTE
Sinus arrhythmia likely secondary to hypothyroidism, likely no clinical significance.    Plan:  Repleting K, phos, Mg   Tele overnight

## 2018-09-12 NOTE — ASSESSMENT & PLAN NOTE
Assessment: Patient presents with generalized weakness and myalgia in the setting of an elevated TSH and undetectable T4, likely due to medication non-adherence vs insufficient dosing. Inflammatory markers normal, neurological exam normal, no indication of any rheumatological or neurological disease process. Patient alert and oriented, was still working until yesterday. Vital signs normal, no signs of myxedema coma. Patient does not have PCP due to financial issues and is having frequent ED visits due to poor thyroid control; her current prescription for levothyroxine was issued in the ED. Spoke to her regarding the necessity of establishing with a PCP.    Plan:  Start home Levothyroxine 137mcg with dose today  Place on tele for sinus arrhythmia, likely 2/2 to hypothyroidism  Patient will need excellent post-hospital follow-up to avoid re-admission, including resources from , referral to affordable PCP, and mental health services - consulted  for assistance, appreciate recommendations

## 2018-09-12 NOTE — ASSESSMENT & PLAN NOTE
Assessment: Patient has generalized, bodywide myalgia and muscle tenderness.    Plan:   Toradol PRN while inpatient

## 2018-09-12 NOTE — H&P
"      Internal Medicine Admitting History and Physical    Note Author: Rishi Waller M.D.       Name Ivis Klein     1995   Age/Sex 22 y.o. female   MRN [id number]   Code Status Full     After 5PM or if no immediate response to page, please call for cross-coverage  Attending/Team: Dr. Poole See Patient List for primary contact information  Call [phone] to page    1st Call - Day Intern (R1):   Dr. Waller 2nd Call - Day Sr. Resident (R2/R3):   Dr. Millan       Chief Complaint:   Generalized weakness, pain    HPI:  Ms. Klein is a 22 year old female with a PMHx of Graves disease s/p thyroidectomy last year who comes in complaining of a 1 day history of generalized weakness and pain. She states that this began this morning when she attempted to get out of bed and fell to the floor after standing up because her legs gave out. She states she attempted to get up several times but was unable to. She states that this was associated with pain \"all over her body\" in her bones and muscles of a sharp, stabbing character, which was exacerbated with movement as well as bilateral leg numbness.This concerned her and prompted her to drive to the emergency department for evaluation.     She was seen in the ED and by this time her symptoms had improved somewhat; she was able to ambulate and did not have any leg numbness although she continued to complain of continuing musculoskeletal pain. A TSH was found to be 349 with undetectable T4, however she states she takes her medication daily at 5:30AM on an empty stomach. She denies recent weight gain, endorses cold intolerance and significant fatigue although she has been able to work the last few days. She denied any domestic problems at home, but did state she has significant financial stress and recently lost several friends to a car crash which has left her feeling somewhat sad. She has also had some recent issues with fertility, although she is no longer " attempting to become pregnant, which had affected her mood. She has not been seeing a PCP due to financial and insurance issues as she is waiting on Medicaid, although she had previously seen Dr. Eldridge and Dr. Flores.    Review of Systems   Constitutional: Positive for chills (cold intolerance) and malaise/fatigue. Negative for fever and weight loss.   HENT: Negative for hearing loss.    Eyes: Negative for blurred vision and double vision.   Respiratory: Positive for shortness of breath (on exertion). Negative for cough.    Cardiovascular: Positive for chest pain (mild chest pressure for months). Negative for leg swelling.   Gastrointestinal: Positive for abdominal pain. Negative for nausea and vomiting.   Musculoskeletal: Positive for back pain, falls, joint pain, myalgias and neck pain.   Skin: Negative for itching and rash.   Neurological: Positive for weakness and headaches.   Endo/Heme/Allergies:        +cold intolerance     Psychiatric/Behavioral: Positive for substance abuse. The patient is nervous/anxious.              Past Medical History (Chronic medical problem, known complications and current treatment)    Graves disease s/p thyroid surgery  Postoperative hypothyroidism, poorly controlled     Past Surgical History:  Past Surgical History:   Procedure Laterality Date   • THYROIDECTOMY TOTAL Bilateral 3/22/2017    Procedure: THYROIDECTOMY TOTAL -NIMS RECURRENT LARYNGEAL NERVE MONITORING;  Surgeon: Vicente Eldridge M.D.;  Location: SURGERY SAME DAY [facility];  Service:    • PRIMARY C SECTION  9/8/2013    Performed by Melisa Wright M.D. at LABOR AND DELIVERY       Current Outpatient Medications:  Home Medications     Reviewed by Lynne Hayes (Pharmacy Tech) on 09/12/18 at 1244  Med List Status: Complete   Medication Last Dose Status   ibuprofen (MOTRIN) 200 MG Tab 9/11/2018 Active   levothyroxine (SYNTHROID) 137 MCG Tab 9/12/2018 Active                Medication  Allergy/Sensitivities:  Allergies   Allergen Reactions   • Nkda [No Known Drug Allergy]          Family History (mandatory)   Family History   Problem Relation Age of Onset   • Cancer Paternal Grandmother 56        breast cancer       Social History (mandatory)   Social History     Social History   • Marital status: Single     Spouse name: N/A   • Number of children: N/A   • Years of education: N/A     Occupational History   • Not on file.     Social History Main Topics   • Smoking status: Never Smoker   • Smokeless tobacco: Never Used      Comment: quit in 2012   • Alcohol use No      Comment: occ   • Drug use: Yes     Types: Marijuana, Inhaled      Comment: marijuana weekly   • Sexual activity: Yes     Partners: Male     Birth control/ protection: Abstinence      Comment: single     Other Topics Concern   • Not on file     Social History Narrative   • No narrative on file     Living situation: Lives with boyfriend  PCP : None    Physical Exam     Vitals:    09/12/18 0721 09/12/18 0723 09/12/18 0935 09/12/18 1238   BP: 116/72  112/70 115/70   Pulse: 70  68 60   Resp: 16  15 15   Temp: 36.7 °C (98.1 °F)  36.7 °C (98.1 °F) 36.9 °C (98.4 °F)   SpO2: 100%  99%    Weight:  61.4 kg (135 lb 5.8 oz)       Body mass index is 23.98 kg/m².  /70   Pulse 60   Temp 36.9 °C (98.4 °F)   Resp 15   Wt 61.4 kg (135 lb 5.8 oz)   LMP 08/25/2018   SpO2 99%   BMI 23.98 kg/m²   O2 therapy: Pulse Oximetry: 99 %, O2 Delivery: None (Room Air)    Physical Exam   Constitutional: She is oriented to person, place, and time and well-developed, well-nourished, and in no distress.   HENT:   Mallampati 1   Eyes: Pupils are equal, round, and reactive to light.   Neck: Normal range of motion. Neck supple.   Thyroid surgically absent, no masses in thyroid bed   Cardiovascular: Normal rate and regular rhythm.  Exam reveals no gallop and no friction rub.    No murmur heard.  Dorsalis pedis palpable bilaterally   Pulmonary/Chest: Effort  normal and breath sounds normal. She has no wheezes. She has no rales.   Abdominal: Soft. Bowel sounds are normal. She exhibits no distension. There is no tenderness.   Musculoskeletal: She exhibits no edema.   Diffuse tenderness to palpation  Decreased tenderness when distracted   Neurological: She is alert and oriented to person, place, and time.   CNII-XII intact  Sensation equal and intact bilaterally  Strength 5/5, somewhat limited by pain     Skin:   Cool lower extremities         Data Review       Old Records Request:   Completed  Current Records review/summary: Completed    Lab Data Review:  Recent Results (from the past 24 hour(s))   CBC WITH DIFFERENTIAL    Collection Time: 09/12/18  7:44 AM   Result Value Ref Range    WBC 6.1 4.8 - 10.8 K/uL    RBC 4.79 4.20 - 5.40 M/uL    Hemoglobin 13.5 12.0 - 16.0 g/dL    Hematocrit 41.3 37.0 - 47.0 %    MCV 86.2 81.4 - 97.8 fL    MCH 28.2 27.0 - 33.0 pg    MCHC 32.7 (L) 33.6 - 35.0 g/dL    RDW 48.9 35.9 - 50.0 fL    Platelet Count 245 164 - 446 K/uL    MPV 12.4 9.0 - 12.9 fL    Neutrophils-Polys 46.80 44.00 - 72.00 %    Lymphocytes 45.30 (H) 22.00 - 41.00 %    Monocytes 6.00 0.00 - 13.40 %    Eosinophils 0.50 0.00 - 6.90 %    Basophils 1.20 0.00 - 1.80 %    Immature Granulocytes 0.20 0.00 - 0.90 %    Nucleated RBC 0.00 /100 WBC    Neutrophils (Absolute) 2.84 2.00 - 7.15 K/uL    Lymphs (Absolute) 2.74 1.00 - 4.80 K/uL    Monos (Absolute) 0.36 0.00 - 0.85 K/uL    Eos (Absolute) 0.03 0.00 - 0.51 K/uL    Baso (Absolute) 0.07 0.00 - 0.12 K/uL    Immature Granulocytes (abs) 0.01 0.00 - 0.11 K/uL    NRBC (Absolute) 0.00 K/uL   COMP METABOLIC PANEL    Collection Time: 09/12/18  7:44 AM   Result Value Ref Range    Sodium 139 135 - 145 mmol/L    Potassium 3.3 (L) 3.6 - 5.5 mmol/L    Chloride 103 96 - 112 mmol/L    Co2 27 20 - 33 mmol/L    Anion Gap 9.0 0.0 - 11.9    Glucose 97 65 - 99 mg/dL    Bun 7 (L) 8 - 22 mg/dL    Creatinine 0.75 0.50 - 1.40 mg/dL    Calcium 9.6 8.5 - 10.5  mg/dL    AST(SGOT) 15 12 - 45 U/L    ALT(SGPT) 8 2 - 50 U/L    Alkaline Phosphatase 46 30 - 99 U/L    Total Bilirubin 0.5 0.1 - 1.5 mg/dL    Albumin 4.4 3.2 - 4.9 g/dL    Total Protein 7.5 6.0 - 8.2 g/dL    Globulin 3.1 1.9 - 3.5 g/dL    A-G Ratio 1.4 g/dL   LIPASE    Collection Time: 18  7:44 AM   Result Value Ref Range    Lipase 9 (L) 11 - 82 U/L   HCG QUAL SERUM    Collection Time: 18  7:44 AM   Result Value Ref Range    Beta-Hcg Qualitative Serum Negative Negative   MAGNESIUM    Collection Time: 18  7:44 AM   Result Value Ref Range    Magnesium 1.9 1.5 - 2.5 mg/dL   PHOSPHORUS    Collection Time: 18  7:44 AM   Result Value Ref Range    Phosphorus 2.1 (L) 2.5 - 4.5 mg/dL   TSH    Collection Time: 18  7:44 AM   Result Value Ref Range    .470 (H) 0.380 - 5.330 uIU/mL   WESTERGREN SED RATE    Collection Time: 18  7:44 AM   Result Value Ref Range    Sed Rate Westergren 1 0 - 20 mm/hour   CRP QUANTITIVE (NON-CARDIAC)    Collection Time: 18  7:44 AM   Result Value Ref Range    Stat C-Reactive Protein 0.02 0.00 - 0.75 mg/dL   FREE THYROXINE    Collection Time: 18  7:44 AM   Result Value Ref Range    Free T-4 <0.25 (L) 0.53 - 1.43 ng/dL   ESTIMATED GFR    Collection Time: 18  7:44 AM   Result Value Ref Range    GFR If African American >60 >60 mL/min/1.73 m 2    GFR If Non African American >60 >60 mL/min/1.73 m 2   T3 FREE    Collection Time: 18  7:44 AM   Result Value Ref Range    T3,Free 2.08 (L) 2.40 - 4.20 pg/mL   EKG (ER)    Collection Time: 18  8:53 AM   Result Value Ref Range    Report       [facility] Emergency Dept.    Test Date:  2018  Pt Name:    MICHELLE MENARD               Department:   MRN:        [id number]                      Room:       [facility]  Gender:     Female                       Technician: [id number]  :        1995                   Requested By:FELIX DE JESUS  Order #:    [id number]                     Reading MD:    Measurements  Intervals                                Axis  Rate:       63                           P:          24  NC:         183                          QRS:        62  QRSD:       76                           T:          7  QT:         404  QTc:        414    Interpretive Statements  UNKNOWN RHYTHM, IRREGULAR RATE  51- 74  Compared to ECG 04/07/2018 10:53:06  Sinus rhythm no longer present     URINALYSIS CULTURE, IF INDICATED    Collection Time: 09/12/18  9:30 AM   Result Value Ref Range    Color Yellow     Character Clear     Specific Gravity 1.022 <1.035    Ph >=9.0 (A) 5.0 - 8.0    Glucose Negative Negative mg/dL    Ketones Negative Negative mg/dL    Protein Negative Negative mg/dL    Bilirubin Negative Negative    Urobilinogen, Urine 1.0 Negative    Nitrite Negative Negative    Leukocyte Esterase Negative Negative    Occult Blood Negative Negative    Micro Urine Req see below    URINE DRUG SCREEN    Collection Time: 09/12/18  9:30 AM   Result Value Ref Range    Amphetamines Urine Negative Negative    Barbiturates Negative Negative    Benzodiazepines Negative Negative    Cocaine Metabolite Negative Negative    Methadone Negative Negative    Opiates Negative Negative    Oxycodone Negative Negative    Phencyclidine -Pcp Negative Negative    Propoxyphene Negative Negative    Cannabinoid Metab Positive (A) Negative         EKG:   EKG Independant Review: Completed  QTc:414, HR: 63, Sinus arrhythmia, no ST changes    Records reviewed and summarized in current documentation :  Yes  [employer] teaching service handout given to patient:  No         Assessment/Plan     * Postoperative hypothyroidism   Assessment & Plan    Assessment: Patient presents with generalized weakness and myalgia in the setting of an elevated TSH and undetectable T4, likely due to medication non-adherence vs insufficient dosing. Inflammatory markers normal, neurological exam normal, no indication of any rheumatological or  neurological disease process. Patient alert and oriented, was still working until yesterday. Vital signs normal, no signs of myxedema coma. Patient does not have PCP due to financial issues and is having frequent ED visits due to poor thyroid control; her current prescription for levothyroxine was issued in the ED. Spoke to her regarding the necessity of establishing with a PCP.    Plan:  Start home Levothyroxine 137mcg with dose today  Place on tele for sinus arrhythmia, likely 2/2 to hypothyroidism  Patient will need excellent post-hospital follow-up to avoid re-admission, including resources from , referral to affordable PCP, and mental health services - consulted  for assistance, appreciate recommendations        Hypokalemia- (present on admission)   Assessment & Plan    Repleting K, Mg, Phos        Anxiety- (present on admission)   Assessment & Plan    Assessment: High level of concern for underlying psychiatric disorder exacerbating her likely physiological depression due to hypothyroidism. She has had 8 ED visits since January of 2018 for various vague somatic symptoms. She definitely does have an underlying pathophysiology in her uncontrolled hypothyroidism, but some component of somatization, conversion, or malingering as well as anxiety and depression seems to underlie and worsen this given her vague symptoms which often do not fit the pattern of her disease process. This also likely decreases her medication adherence.    Plan:  Patient amenable to seeing psychiatry, psychiatry has been consulted        Myalgia   Assessment & Plan    Assessment: Patient has generalized, bodywide myalgia and muscle tenderness.    Plan:   Toradol PRN while inpatient        Sinus arrhythmia   Assessment & Plan    Sinus arrhythmia likely secondary to hypothyroidism, likely no clinical significance.    Plan:  Repleting K, phos, Mg   Tele overnight            Anticipated Hospital stay: Observation  admit        Quality Measures  Quality-Core Measures   Reviewed items::  Labs reviewed, Medications reviewed and EKG reviewed  Ferro catheter::  No Ferro  DVT prophylaxis pharmacological::  Enoxaparin (Lovenox)    PCP: Pcp Pt States None

## 2018-09-12 NOTE — DISCHARGE PLANNING
Care Transition Team Assessment    Met with pt at bedside. According to pt she lives with her boyfriend Arcenio. Independent at baseline, does not use assistive devices. Pt does not have insurance or PCP. Telephoned PFA and scheduling to advised of no insurance or PCP. No other needs identified at this time. Pt confirmed Arcenio will provide transport at discharge.    Information Source  Orientation : Oriented x 4  Information Given By: Patient  Informant's Name: Ivis Klein    Readmission Evaluation  Is this a readmission?: No    Interdisciplinary Discharge Planning  Does Admitting Nurse Feel This Could be a Complex Discharge?: No  Primary Care Physician: None  Lives with - Patient's Self Care Capacity: Significant Other  Support Systems: Friends / Neighbors, Family Member(s)  Housing / Facility: 1 Story Apartment / Condo  Do You Take your Prescribed Medications Regularly: Yes  Reasons Why Not Taking Medications :  (Does not take medications)  Able to Return to Previous ADL's: Yes  Mobility Issues: No  Prior Services: None  Patient Expects to be Discharged to:: home  Assistance Needed: No  Durable Medical Equipment: Not Applicable    Discharge Preparedness  What are your discharge supports?: Other (comment) (Boyfriend - Arcenio)  Prior Functional Level: Ambulatory, Independent with Activities of Daily Living, Independent with Medication Management  Difficulity with ADLs: None  Difficulity with IADLs: None    Functional Assesment  Prior Functional Level: Ambulatory, Independent with Activities of Daily Living, Independent with Medication Management    Finances  Prescription Coverage: No  Prescription Coverage Comments: Does not have insurance    Discharge Risks or Barriers  Discharge risks or barriers?: No PCP, Uninsured / underinsured  Patient risk factors: No PCP, Uninsured or underinsured    Anticipated Discharge Information  Anticipated discharge disposition: Home  Discharge Address: [address] [place] [address]  [facility]  Discharge Contact Phone Number: Patient [phone]

## 2018-09-12 NOTE — ED PROVIDER NOTES
"ED Provider Note    Scribed for Arcenio Harrell D.O. by Slim Scott. 9/12/2018  8:02 AM    Primary care provider: Pcp Pt States None  Means of arrival: walk in  History obtained from: patient  History limited by: none    CHIEF COMPLAINT  Chief Complaint   Patient presents with   • Leg Pain     woke up with numbness on her legs and body aches       HPI  Ivis Klein is a 22 y.o. female with a history of thyroid dysfunction who presents to the Emergency Department complaining of sudden bilateral leg weakness starting this morning. Patient reports associated body aches, dysphagia, chest pain with inspiration, leg pain when bearing weight. She describes her leg pain pain as sharp that does not radiate, localized throughout her bilateral legs. Patient states that she takes synthroid for her history of thyroid dysfunction, but states that she is not followed by a primary and receives her prescription through the ED and guesses her dosing. She has a history of cigarette use, marijuana use. Patient denies fever, cough, shortness of breath, vaginal bleeding, dysuria, IV drug use.    REVIEW OF SYSTEMS  Pertinent positives include weakness, body aches, dysphagia, chest pain, leg pain. Pertinent negatives include no fever, cough, shortness of breath, vaginal bleeding, dysuria.  All other systems reviewed and negative.    PAST MEDICAL HISTORY  Past Medical History:   Diagnosis Date   • Anxiety 2/16/2017   • Arrhythmia     tachycardia \"pt reports d/t thryoid\"   • Breath shortness     no c/o at this time; c/o sob at night unsure if it is d/t anxiety   • Graves disease 12/5/2016   • Heart valve disease    • Hyperthyroidism 6/29/2016   • Menarche     started at age 14   • Migraine    • Pericarditis 06/2016   • Pregnancy     delivered 9/8/2013, 3/16/17 pt reports that she is not pregnant at this time   • Psychiatric problem     anxiety, depression   • Supervision of normal first teen pregnancy    • Vomiting " 6/29/2016       SURGICAL HISTORY  Past Surgical History:   Procedure Laterality Date   • THYROIDECTOMY TOTAL Bilateral 3/22/2017    Procedure: THYROIDECTOMY TOTAL -NIMS RECURRENT LARYNGEAL NERVE MONITORING;  Surgeon: Vicente Eldridge M.D.;  Location: SURGERY SAME DAY [facility];  Service:    • PRIMARY C SECTION  9/8/2013    Performed by Melisa Wright M.D. at LABOR AND DELIVERY        SOCIAL HISTORY  Social History   Substance Use Topics   • Smoking status: Never Smoker   • Smokeless tobacco: Never Used      Comment: quit in 2012   • Alcohol use No      Comment: occ      History   Drug Use   • Types: Marijuana, Inhaled     Comment: marijuana weekly       FAMILY HISTORY  Family History   Problem Relation Age of Onset   • Cancer Paternal Grandmother 56        breast cancer   • Hyperlipidemia Father        CURRENT MEDICATIONS  Home Medications     Reviewed by Lynne Hayes (Pharmacy Tech) on 09/12/18 at 1244  Med List Status: Complete   Medication Last Dose Status   ibuprofen (MOTRIN) 200 MG Tab 9/11/2018 Active   levothyroxine (SYNTHROID) 137 MCG Tab 9/12/2018 Active                ALLERGIES  Allergies   Allergen Reactions   • Nkda [No Known Drug Allergy]        PHYSICAL EXAM  VITAL SIGNS: /72   Pulse 70   Temp 36.7 °C (98.1 °F)   Resp 16   Wt 61.4 kg (135 lb 5.8 oz)   LMP 08/25/2018   SpO2 100%   BMI 23.98 kg/m²     Nursing notes and vitals reviewed.  Constitutional: Well developed, Well nourished, No acute distress, Non-toxic appearance.   Eyes: PERRLA, EOMI, Conjunctiva normal, No discharge. Slightly exophthalmus   Cardiovascular: Normal heart rate, Normal rhythm, No murmurs, No rubs, No gallops.   Thorax & Lungs: No respiratory distress, No rales, No rhonchi, No wheezing, No chest tenderness.   Abdomen: Bowel sounds normal, Soft, No tenderness, No guarding, No rebound, No masses, No pulsatile masses.   Skin: Warm, Dry, No erythema, No rash.   Musculoskeletal: Intact distal pulses,  No edema, No cyanosis, No clubbing. Good range of motion in all major joints. No tenderness to palpation or major deformities noted, no CVA tenderness, no midline back tenderness.   Neurologic: Alert & oriented x 3, DTRs 3/4. Normal cognition, Cranial nerves II-XII are intact, No slurred speech, Negative finger to nose bilaterally, No pronator drift bilaterally,   strength 5/5 bilaterally, Leg raise strength 4+/5. Bilaterally, No clonus, Plantarflexion strength 5/5 bilaterally, Dorsiflexion strength 5/5 bilaterally, Sensation intact throughout, No Nystagmus.  Psychiatric: Affect normal for clinical presentation.    DIAGNOSTIC STUDIES/PROCEDURES    LABS  Results for orders placed or performed during the hospital encounter of 09/12/18   CBC WITH DIFFERENTIAL   Result Value Ref Range    WBC 6.1 4.8 - 10.8 K/uL    RBC 4.79 4.20 - 5.40 M/uL    Hemoglobin 13.5 12.0 - 16.0 g/dL    Hematocrit 41.3 37.0 - 47.0 %    MCV 86.2 81.4 - 97.8 fL    MCH 28.2 27.0 - 33.0 pg    MCHC 32.7 (L) 33.6 - 35.0 g/dL    RDW 48.9 35.9 - 50.0 fL    Platelet Count 245 164 - 446 K/uL    MPV 12.4 9.0 - 12.9 fL    Neutrophils-Polys 46.80 44.00 - 72.00 %    Lymphocytes 45.30 (H) 22.00 - 41.00 %    Monocytes 6.00 0.00 - 13.40 %    Eosinophils 0.50 0.00 - 6.90 %    Basophils 1.20 0.00 - 1.80 %    Immature Granulocytes 0.20 0.00 - 0.90 %    Nucleated RBC 0.00 /100 WBC    Neutrophils (Absolute) 2.84 2.00 - 7.15 K/uL    Lymphs (Absolute) 2.74 1.00 - 4.80 K/uL    Monos (Absolute) 0.36 0.00 - 0.85 K/uL    Eos (Absolute) 0.03 0.00 - 0.51 K/uL    Baso (Absolute) 0.07 0.00 - 0.12 K/uL    Immature Granulocytes (abs) 0.01 0.00 - 0.11 K/uL    NRBC (Absolute) 0.00 K/uL   COMP METABOLIC PANEL   Result Value Ref Range    Sodium 139 135 - 145 mmol/L    Potassium 3.3 (L) 3.6 - 5.5 mmol/L    Chloride 103 96 - 112 mmol/L    Co2 27 20 - 33 mmol/L    Anion Gap 9.0 0.0 - 11.9    Glucose 97 65 - 99 mg/dL    Bun 7 (L) 8 - 22 mg/dL    Creatinine 0.75 0.50 - 1.40 mg/dL     Calcium 9.6 8.5 - 10.5 mg/dL    AST(SGOT) 15 12 - 45 U/L    ALT(SGPT) 8 2 - 50 U/L    Alkaline Phosphatase 46 30 - 99 U/L    Total Bilirubin 0.5 0.1 - 1.5 mg/dL    Albumin 4.4 3.2 - 4.9 g/dL    Total Protein 7.5 6.0 - 8.2 g/dL    Globulin 3.1 1.9 - 3.5 g/dL    A-G Ratio 1.4 g/dL   LIPASE   Result Value Ref Range    Lipase 9 (L) 11 - 82 U/L   URINALYSIS CULTURE, IF INDICATED   Result Value Ref Range    Color Yellow     Character Clear     Specific Gravity 1.022 <1.035    Ph >=9.0 (A) 5.0 - 8.0    Glucose Negative Negative mg/dL    Ketones Negative Negative mg/dL    Protein Negative Negative mg/dL    Bilirubin Negative Negative    Urobilinogen, Urine 1.0 Negative    Nitrite Negative Negative    Leukocyte Esterase Negative Negative    Occult Blood Negative Negative    Micro Urine Req see below    HCG QUAL SERUM   Result Value Ref Range    Beta-Hcg Qualitative Serum Negative Negative   MAGNESIUM   Result Value Ref Range    Magnesium 1.9 1.5 - 2.5 mg/dL   PHOSPHORUS   Result Value Ref Range    Phosphorus 2.1 (L) 2.5 - 4.5 mg/dL   TSH   Result Value Ref Range    .470 (H) 0.380 - 5.330 uIU/mL   WESTERGREN SED RATE   Result Value Ref Range    Sed Rate Westergren 1 0 - 20 mm/hour   CRP QUANTITIVE (NON-CARDIAC)   Result Value Ref Range    Stat C-Reactive Protein 0.02 0.00 - 0.75 mg/dL   URINE DRUG SCREEN   Result Value Ref Range    Amphetamines Urine Negative Negative    Barbiturates Negative Negative    Benzodiazepines Negative Negative    Cocaine Metabolite Negative Negative    Methadone Negative Negative    Opiates Negative Negative    Oxycodone Negative Negative    Phencyclidine -Pcp Negative Negative    Propoxyphene Negative Negative    Cannabinoid Metab Positive (A) Negative   FREE THYROXINE   Result Value Ref Range    Free T-4 <0.25 (L) 0.53 - 1.43 ng/dL   ESTIMATED GFR   Result Value Ref Range    GFR If African American >60 >60 mL/min/1.73 m 2    GFR If Non African American >60 >60 mL/min/1.73 m 2   T3 FREE    Result Value Ref Range    T3,Free 2.08 (L) 2.40 - 4.20 pg/mL   EKG (ER)   Result Value Ref Range    Report       [facility] Emergency Dept.    Test Date:  2018  Pt Name:    MICHELLE MENARD               Department: ER  MRN:        [id number]                      Room:       [facility]  Gender:     Female                       Technician: [id number]  :        1995                   Requested By:FELIX DE JESUS  Order #:    [id number]                    Reading MD:    Measurements  Intervals                                Axis  Rate:       63                           P:          24  DC:         183                          QRS:        62  QRSD:       76                           T:          7  QT:         404  QTc:        414    Interpretive Statements  UNKNOWN RHYTHM, IRREGULAR RATE  51- 74  Compared to ECG 2018 10:53:06  Sinus rhythm no longer present     All labs reviewed by me.    COURSE & MEDICAL DECISION MAKING  Pertinent Labs & Imaging studies reviewed. (See chart for details)    8:02 AM - Patient seen and examined at bedside. Discussed her need for a primary care physician in order to follow her chronic health. She agrees that this is necessary. I will provide her with information to establish with a primary on discharge. Discussed her evaluation in the ED. Patient verbalizes understanding and agreement to this plan of care. Patient will be treated with Toradol 15 mg. Ordered Phosphorus, TSH, Westergren sed rate, CRP quantitive, Urine drug screen, CBC with differential,. CMP, Lipase, Urinalysis, culture, HCG qual serum, Magnesium, Free thyroxine to evaluate her symptoms.     11:35 AM - Paged [facility].     11:46 AM - I discussed the patient's case and the above findings with [facility] who agrees to admit the patient.     This is a charming 22 y.o. female that presents with depression and hypothyroidism.  The patient has significant hypothyroidism and will require further evaluation.   She does have evidence of a seemly high TSH, low T4 and load T3.  The patient will need thyroid supplementation.  She has no evidence of mixed edema, or other significant abnormality.  She is not pregnant, she has no evidence of significant electrolyte abnormality except for slightly low potassium 3.3.  Patient will be admitted to general internal medicine for further evaluation and management of her hypothyroidism as well as depression.  As for her neurological deficits, I do believe she has conditions consistent with hypothyroidism versus transverse myelitis, CVA, intra-cranial hemorrhage, myasthenia gravis, given bradycardia syndrome, multiple sclerosis.    DISPOSITION:  Patient will be admitted to hospital in guarded condition.    FINAL IMPRESSION  Hypothyroidism  Depression     Slim HARRIS (Scribe), am scribing for, and in the presence of, Arcenio Harrell D.O    Electronically signed by: Slim Scott (Scribe), 9/12/2018    Arcenio HARRIS D.O. personally performed the services described in this documentation, as scribed by Slim Scott in my presence, and it is both accurate and complete. C.    The note accurately reflects work and decisions made by me.  Arcenio Harrell  9/12/2018  5:48 PM

## 2018-09-13 VITALS
DIASTOLIC BLOOD PRESSURE: 76 MMHG | SYSTOLIC BLOOD PRESSURE: 110 MMHG | WEIGHT: 135.14 LBS | BODY MASS INDEX: 23.95 KG/M2 | TEMPERATURE: 99.1 F | HEIGHT: 63 IN | HEART RATE: 75 BPM | OXYGEN SATURATION: 100 % | RESPIRATION RATE: 18 BRPM

## 2018-09-13 PROBLEM — Z78.9 FREQUENT HOSPITAL ADMISSIONS: Status: ACTIVE | Noted: 2018-09-13

## 2018-09-13 PROBLEM — E87.6 HYPOKALEMIA: Status: RESOLVED | Noted: 2017-07-04 | Resolved: 2018-09-13

## 2018-09-13 PROBLEM — I49.8 SINUS ARRHYTHMIA: Status: RESOLVED | Noted: 2018-09-12 | Resolved: 2018-09-13

## 2018-09-13 LAB
ALBUMIN SERPL BCP-MCNC: 4.2 G/DL (ref 3.2–4.9)
ALBUMIN/GLOB SERPL: 1.4 G/DL
ALP SERPL-CCNC: 48 U/L (ref 30–99)
ALT SERPL-CCNC: 7 U/L (ref 2–50)
ANION GAP SERPL CALC-SCNC: 9 MMOL/L (ref 0–11.9)
AST SERPL-CCNC: 14 U/L (ref 12–45)
BASOPHILS # BLD AUTO: 0.6 % (ref 0–1.8)
BASOPHILS # BLD: 0.05 K/UL (ref 0–0.12)
BILIRUB SERPL-MCNC: 0.7 MG/DL (ref 0.1–1.5)
BUN SERPL-MCNC: 9 MG/DL (ref 8–22)
CALCIUM SERPL-MCNC: 9.3 MG/DL (ref 8.5–10.5)
CHLORIDE SERPL-SCNC: 106 MMOL/L (ref 96–112)
CO2 SERPL-SCNC: 23 MMOL/L (ref 20–33)
CREAT SERPL-MCNC: 0.75 MG/DL (ref 0.5–1.4)
EKG IMPRESSION: NORMAL
EOSINOPHIL # BLD AUTO: 0.04 K/UL (ref 0–0.51)
EOSINOPHIL NFR BLD: 0.5 % (ref 0–6.9)
ERYTHROCYTE [DISTWIDTH] IN BLOOD BY AUTOMATED COUNT: 50.3 FL (ref 35.9–50)
GLOBULIN SER CALC-MCNC: 3.1 G/DL (ref 1.9–3.5)
GLUCOSE SERPL-MCNC: 92 MG/DL (ref 65–99)
HCT VFR BLD AUTO: 45.8 % (ref 37–47)
HGB BLD-MCNC: 14.2 G/DL (ref 12–16)
IMM GRANULOCYTES # BLD AUTO: 0.01 K/UL (ref 0–0.11)
IMM GRANULOCYTES NFR BLD AUTO: 0.1 % (ref 0–0.9)
LACTATE BLD-SCNC: 1.3 MMOL/L (ref 0.5–2)
LYMPHOCYTES # BLD AUTO: 2.25 K/UL (ref 1–4.8)
LYMPHOCYTES NFR BLD: 27.7 % (ref 22–41)
MAGNESIUM SERPL-MCNC: 2.6 MG/DL (ref 1.5–2.5)
MCH RBC QN AUTO: 26.7 PG (ref 27–33)
MCHC RBC AUTO-ENTMCNC: 31 G/DL (ref 33.6–35)
MCV RBC AUTO: 86.3 FL (ref 81.4–97.8)
MONOCYTES # BLD AUTO: 0.57 K/UL (ref 0–0.85)
MONOCYTES NFR BLD AUTO: 7 % (ref 0–13.4)
NEUTROPHILS # BLD AUTO: 5.2 K/UL (ref 2–7.15)
NEUTROPHILS NFR BLD: 64.1 % (ref 44–72)
NRBC # BLD AUTO: 0 K/UL
NRBC BLD-RTO: 0 /100 WBC
PHOSPHATE SERPL-MCNC: 3.8 MG/DL (ref 2.5–4.5)
PLATELET # BLD AUTO: 241 K/UL (ref 164–446)
PMV BLD AUTO: 11.2 FL (ref 9–12.9)
POTASSIUM SERPL-SCNC: 3.8 MMOL/L (ref 3.6–5.5)
PROT SERPL-MCNC: 7.3 G/DL (ref 6–8.2)
RBC # BLD AUTO: 5.31 M/UL (ref 4.2–5.4)
SODIUM SERPL-SCNC: 138 MMOL/L (ref 135–145)
WBC # BLD AUTO: 8.1 K/UL (ref 4.8–10.8)

## 2018-09-13 PROCEDURE — 85025 COMPLETE CBC W/AUTO DIFF WBC: CPT

## 2018-09-13 PROCEDURE — 83735 ASSAY OF MAGNESIUM: CPT

## 2018-09-13 PROCEDURE — A9270 NON-COVERED ITEM OR SERVICE: HCPCS | Performed by: STUDENT IN AN ORGANIZED HEALTH CARE EDUCATION/TRAINING PROGRAM

## 2018-09-13 PROCEDURE — 99217 PR OBSERVATION CARE DISCHARGE: CPT | Mod: GC | Performed by: HOSPITALIST

## 2018-09-13 PROCEDURE — G0378 HOSPITAL OBSERVATION PER HR: HCPCS

## 2018-09-13 PROCEDURE — 700102 HCHG RX REV CODE 250 W/ 637 OVERRIDE(OP): Performed by: STUDENT IN AN ORGANIZED HEALTH CARE EDUCATION/TRAINING PROGRAM

## 2018-09-13 PROCEDURE — 83605 ASSAY OF LACTIC ACID: CPT

## 2018-09-13 PROCEDURE — 84100 ASSAY OF PHOSPHORUS: CPT

## 2018-09-13 PROCEDURE — 96372 THER/PROPH/DIAG INJ SC/IM: CPT

## 2018-09-13 PROCEDURE — 80053 COMPREHEN METABOLIC PANEL: CPT

## 2018-09-13 PROCEDURE — 700111 HCHG RX REV CODE 636 W/ 250 OVERRIDE (IP): Performed by: STUDENT IN AN ORGANIZED HEALTH CARE EDUCATION/TRAINING PROGRAM

## 2018-09-13 RX ORDER — LIOTHYRONINE SODIUM 25 UG/1
25 TABLET ORAL
Qty: 30 TAB | Refills: 2 | Status: SHIPPED | OUTPATIENT
Start: 2018-09-13 | End: 2019-01-24

## 2018-09-13 RX ORDER — LEVOTHYROXINE SODIUM 137 UG/1
137 TABLET ORAL
Qty: 30 TAB | Refills: 2 | Status: SHIPPED | OUTPATIENT
Start: 2018-09-14 | End: 2019-04-15

## 2018-09-13 RX ORDER — LIOTHYRONINE SODIUM 25 UG/1
25 TABLET ORAL
Status: DISCONTINUED | OUTPATIENT
Start: 2018-09-13 | End: 2018-09-13 | Stop reason: HOSPADM

## 2018-09-13 RX ORDER — POTASSIUM CHLORIDE 20 MEQ/1
40 TABLET, EXTENDED RELEASE ORAL ONCE
Status: COMPLETED | OUTPATIENT
Start: 2018-09-13 | End: 2018-09-13

## 2018-09-13 RX ADMIN — POTASSIUM CHLORIDE 40 MEQ: 1500 TABLET, EXTENDED RELEASE ORAL at 08:09

## 2018-09-13 RX ADMIN — LEVOTHYROXINE SODIUM 137 MCG: 137 TABLET ORAL at 08:10

## 2018-09-13 RX ADMIN — Medication 2 TABLET: at 08:09

## 2018-09-13 RX ADMIN — LIOTHYRONINE SODIUM 25 MCG: 25 TABLET ORAL at 12:36

## 2018-09-13 RX ADMIN — DIBASIC SODIUM PHOSPHATE, MONOBASIC POTASSIUM PHOSPHATE AND MONOBASIC SODIUM PHOSPHATE 1 TABLET: 852; 155; 130 TABLET ORAL at 08:10

## 2018-09-13 RX ADMIN — ENOXAPARIN SODIUM 40 MG: 100 INJECTION SUBCUTANEOUS at 08:09

## 2018-09-13 ASSESSMENT — LIFESTYLE VARIABLES: REASON UNABLE TO ASSESS: N

## 2018-09-13 ASSESSMENT — PAIN SCALES - GENERAL: PAINLEVEL_OUTOF10: 3

## 2018-09-13 NOTE — PROGRESS NOTES
Pt states still feels weak,no c/o CP,sleepy,poc explained,am meds given,blood sample sent to lab for LA.

## 2018-09-13 NOTE — PROGRESS NOTES
Pt called , c/o CP, per pt very uncomfortable, anxious , prn ativan given, MD paged,to update,spoke with Dr. Garcia, lab called to follow up on troponin results and D dimer, per lab another 20 minutes, will continue to monitor.

## 2018-09-13 NOTE — DISCHARGE PLANNING
Met with pt to review discharge plan and medications. Pt's boyfriend, sister and another male friend in the room Pt provided with information on [employer] to pay for medications, suggested to pt to download dilip to her phone so she can check the cost of prescribed medications. Cost of synthroid 137 mcgs is $4 for 30 day supply (at [facility]) and liothyronine 5mcg 30 tablets is $14.99 - pt provided with coupon (at [facility]). Pt confirmed she was able to afford these medications. Stressed to pt the importance of taking her medications as prescribed, pt verbalised understanding.  PFA currently with pt.

## 2018-09-13 NOTE — PROGRESS NOTES
Report received,assumed pt care.pt c/o CP 10/10, iv access established, toradol iv given, stat EKG done by tech, shows Normal EKG, vitals bp= 118/73, hr= 67, rr=15, SR on tele, sating 98% on RA,but states she is SOB, placed on 1L of oxygen for comfort, MD paged

## 2018-09-13 NOTE — DISCHARGE SUMMARY
"        Internal Medicine Discharge Summary  Note Author: Joyce Ybarra MD       Name Ivis Klein     1995   Age/Sex 22 y.o. female   MRN [id number]         Admit Date:  2018       Discharge Date:   2018    Service:   [facility] Internal Medicine [place] Team  Attending Physician(s):   Dr. Poole       Senior Resident(s):   Dr. Ybarra  Shahzad Resident(s):   Dr. Waller  PCP: Pcp Pt States None      Primary Diagnosis:   Generalized fatigue likely secondary to hypothyroidism    Secondary Diagnoses:                Principal Problem:    Postoperative hypothyroidism POA: Unknown  Active Problems:    Anxiety POA: Yes    Myalgia POA: Unknown  Resolved Problems:    Hypokalemia POA: Yes    Sinus arrhythmia POA: Unknown      Hospital Summary (Brief Narrative):       Ms.Jocelyn Klein is a 22 year old female with a PMHx of Graves disease s/p total thyroidectomy in  who presented to the ED 2018 complaining of a 1 day history of generalized weakness, myalgias, and numbness and weakness of her legs. The myalgias were diffuse across her entire body and felt like \"stabbing pains\" in her muscles and bones. She had attempted to get out of bed the morning of admit and had fallen on the floor, but was subsequently able to drive herself to the ED and ambulate into the hospital.     On presentation to the ED her leg weakness and numbness had resolved although she continued to have significant generalized weakness and pain. A TSH was found to be ~350 with an undetectable T4. Reports to be compliant with her medication regimen of home levothyroxine, however has had some financial and emotional stressors recently. She was admitted overnight for observation, K and Mag were replaced adequately, resumed levothyroxine at 137mcg. As patient reports compliance, there can be a component of malabsorption. Will add liothyronine 25 mcg. She is medically stable for discharge on 2018 with information provided to " follow up with [facility] for management of her hypothyroidism. Explained the risks of not following up with a doctor on a regular basis, patient seems to understand the situation and agrees with the plan.    Patient /Hospital Summary (Details -- Problem Oriented) :          * Postoperative hypothyroidism   Assessment & Plan    Assessment: Patient presents with generalized weakness and myalgia in the setting of an elevated TSH and undetectable T4, likely due to medication non-adherence vs insufficient dosing. Inflammatory markers normal, neurological exam normal, no indication of any rheumatological or neurological disease process. Patient alert and oriented, was still working until yesterday. Vital signs normal, no signs of myxedema coma. Patient does not have PCP due to financial issues and is having frequent ED visits due to poor thyroid control; her current prescription for levothyroxine was issued in the ED. Spoke to her regarding the necessity of establishing with a PCP.    Plan:  Start home Levothyroxine 137mcg - provided with 90 days supply  Start liothyronine 25mcg daily - provided with 90 days supply  Patient will need excellent post-hospital follow-up to avoid re-admission, including resources from , referral to affordable PCP, and mental health services.  Follow up with [facility] for thyroid follow-up; needs TSH in 4-6 weeks        Frequent hospital admissions   Assessment & Plan    Suspected somatic component to complaints, as well as possible underlying mental health condition exacerbating her self-management of her hypothyroidism. Upon her next ED admission or hospitalization for symptoms related to her hypothyroidism, psychiatry should be consulted to evaluate for somatization vs malingering vs anxiety disorders.        Anxiety   Assessment & Plan    Assessment: High level of concern for underlying psychiatric disorder exacerbating her likely  physiological depression due to hypothyroidism. She has had 8 ED visits since January of 2018 for various vague somatic symptoms. She definitely does have an underlying pathophysiology in her uncontrolled hypothyroidism, but some component of somatization, conversion, or malingering as well as anxiety and depression seems to underlie and worsen this given her vague symptoms which often do not fit the pattern of her disease process. This also likely decreases her medication adherence.    Plan:  Recommended patient follow up with the walk-in service at [facility]           Myalgia   Assessment & Plan    Assessment: Patient has generalized, bodywide myalgia and muscle tenderness.    Plan:   Patient can resume daily ibuprofen outpatient        Sinus arrhythmia-resolved as of 9/13/2018   Assessment & Plan    Sinus arrhythmia likely secondary to hypothyroidism, likely no clinical significance.    Plan:  Electrolytes repleted            Consultants:     None    Procedures:        None    Imaging/ Testing:      No orders to display       Discharge Medications:         Medication Reconciliation: Completed       Medication List      START taking these medications      Instructions   liothyronine 25 MCG Tabs  Commonly known as:  CYTOMEL   Take 1 Tab by mouth every morning before breakfast.  Dose:  25 mcg        CONTINUE taking these medications      Instructions   ibuprofen 200 MG Tabs  Commonly known as:  MOTRIN   Take 200 mg by mouth 1 time daily as needed.  Dose:  200 mg     levothyroxine 137 MCG Tabs  Commonly known as:  SYNTHROID   Take 1 Tab by mouth Every morning on an empty stomach.  Dose:  137 mcg               Disposition:   Discharged to home    Diet:   Regular    Activity:   As Tolerated    Instructions:      Start liothyronine  Take home levothyroxine  Follow up with [facility] within 1 week, labs in 4-6 weeks for thyroid  Follow up with [facility] as able   The patient was instructed to  return to the ER in the event of worsening symptoms. I have counseled the patient on the importance of compliance and the patient has agreed to proceed with all medical recommendations and follow up plan indicated above.   The patient understands that all medications come with benefits and risks. Risks may include permanent injury or death and these risks can be minimized with close reassessment and monitoring.        Primary Care Provider:    None, to establish with [facility]  Discharge summary faxed to primary care provider:  Deferred  Copy of discharge summary given to the patient: Deferred      Follow up appointment details :      [facility] within 1 week  [facility] as able    Pending Studies:        None    Time spent on discharge day patient visit, preparing discharge paperwork and arranging for patient follow up.    Summary of follow up issues:   Hypothyroidism and compliance  Recheck TSH and Free T3 and T4 every 6-8 weeks  Psychiatric follow up    Discharge Time (Minutes) :    45 min  Hospital Course Type: Observation Stay      Condition on Discharge  stable  ______________________________________________________________________    Interval history/exam for day of discharge:     Patient feeling well this morning although still having some complaints of pain and fatigue. Patient able to ambulate with assistance/walker, has been to rest room, low appetite. Patient had chest discomfort overnight, workup including EKG, d-dimer, troponin was unremarkable. Had an extensive conversation with the patient regarding the importance of managing her thyroid condition and following up with a primary care provider, including the possible consequences including myxedema coma and ultimately, death if this is not managed appropriately. Recommended psychiatric evaluation at [facility] and explained that an underlying psychiatric diagnosis may be exacerbating her symptoms.    Vital signs normal overnight    General: No acute distress, appears  tired and slightly anxious  CV: Regular rate and rhythm, no murmurs, rubs, or gallops  Respiratory: Clear to auscultation bilaterally  Abdomen: Normoactive bowel sounds, non-tender, non-distended  Extremities: No peripheral edema  Neuro: Moving all extremities, grossly normal, AnOx4      Most Recent Labs:    Lab Results   Component Value Date/Time    WBC 8.1 09/13/2018 05:47 AM    RBC 5.31 09/13/2018 05:47 AM    HEMOGLOBIN 14.2 09/13/2018 05:47 AM    HEMATOCRIT 45.8 09/13/2018 05:47 AM    MCV 86.3 09/13/2018 05:47 AM    MCH 26.7 (L) 09/13/2018 05:47 AM    MCHC 31.0 (L) 09/13/2018 05:47 AM    MPV 11.2 09/13/2018 05:47 AM    NEUTSPOLYS 64.10 09/13/2018 05:47 AM    LYMPHOCYTES 27.70 09/13/2018 05:47 AM    MONOCYTES 7.00 09/13/2018 05:47 AM    EOSINOPHILS 0.50 09/13/2018 05:47 AM    BASOPHILS 0.60 09/13/2018 05:47 AM    ANISOCYTOSIS 1+ 07/06/2016 03:22 PM      Lab Results   Component Value Date/Time    SODIUM 138 09/13/2018 08:00 AM    POTASSIUM 3.8 09/13/2018 08:00 AM    CHLORIDE 106 09/13/2018 08:00 AM    CO2 23 09/13/2018 08:00 AM    GLUCOSE 92 09/13/2018 08:00 AM    BUN 9 09/13/2018 08:00 AM    CREATININE 0.75 09/13/2018 08:00 AM    CREATININE 0.6 01/31/2009 05:20 PM      Lab Results   Component Value Date/Time    ALTSGPT 7 09/13/2018 08:00 AM    ASTSGOT 14 09/13/2018 08:00 AM    ALKPHOSPHAT 48 09/13/2018 08:00 AM    TBILIRUBIN 0.7 09/13/2018 08:00 AM    LIPASE 9 (L) 09/12/2018 07:44 AM    ALBUMIN 4.2 09/13/2018 08:00 AM    GLOBULIN 3.1 09/13/2018 08:00 AM    INR 1.07 04/07/2018 11:47 AM     Lab Results   Component Value Date/Time    PROTHROMBTM 13.6 04/07/2018 11:47 AM    INR 1.07 04/07/2018 11:47 AM

## 2018-09-13 NOTE — ASSESSMENT & PLAN NOTE
Suspected somatic component to complaints, as well as possible underlying mental health condition exacerbating her self-management of her hypothyroidism. Upon her next ED admission or hospitalization for symptoms related to her hypothyroidism, psychiatry should be consulted to evaluate for somatization vs malingering vs anxiety disorders.

## 2018-09-13 NOTE — PROGRESS NOTES
MD at bedside,pt assessed, updated,new orders received, now pt feeling better after toradol, lots of visitors in room.

## 2018-09-13 NOTE — DISCHARGE SUMMARY
Internal Medicine Discharge Summary  Note Author: Rishi Waller M.D.       Name Ivis Klein     1995   Age/Sex 22 y.o. female   MRN [id number]         Admit Date:  2018       Discharge Date:   ***    Service:   [facility] *** Team  Attending Physician(s):   ***       Senior Resident(s):   ***  Shahzad Resident(s):   ***  PCP: Pcp Pt States None      Primary Diagnosis:   ***    Secondary Diagnoses:                Principal Problem:    Postoperative hypothyroidism POA: Unknown  Active Problems:    Anxiety POA: Yes    Hypokalemia POA: Yes    Sinus arrhythmia POA: Unknown    Myalgia POA: Unknown  Resolved Problems:    * No resolved hospital problems. *      Hospital Summary (Brief Narrative):       ***     Patient /Hospital Summary (Details -- Problem Oriented) :          * Postoperative hypothyroidism   Assessment & Plan    Assessment: Patient presents with generalized weakness and myalgia in the setting of an elevated TSH and undetectable T4, likely due to medication non-adherence vs insufficient dosing. Inflammatory markers normal, neurological exam normal, no indication of any rheumatological or neurological disease process. Patient alert and oriented, was still working until yesterday. Vital signs normal, no signs of myxedema coma. Patient does not have PCP due to financial issues and is having frequent ED visits due to poor thyroid control; her current prescription for levothyroxine was issued in the ED. Spoke to her regarding the necessity of establishing with a PCP.    Plan:  Start home Levothyroxine 137mcg with dose today  Place on tele for sinus arrhythmia, likely 2/2 to hypothyroidism  Patient will need excellent post-hospital follow-up to avoid re-admission, including resources from , referral to affordable PCP, and mental health services - consulted  for assistance, appreciate recommendations        Hypokalemia   Assessment & Plan    "Repleting K, Mg, Phos        Anxiety   Assessment & Plan    Assessment: High level of concern for underlying psychiatric disorder exacerbating her likely physiological depression due to hypothyroidism. She has had 8 ED visits since January of 2018 for various vague somatic symptoms. She definitely does have an underlying pathophysiology in her uncontrolled hypothyroidism, but some component of somatization, conversion, or malingering as well as anxiety and depression seems to underlie and worsen this given her vague symptoms which often do not fit the pattern of her disease process. This also likely decreases her medication adherence.    Plan:  Patient amenable to seeing psychiatry, psychiatry has been consulted        Myalgia   Assessment & Plan    Assessment: Patient has generalized, bodywide myalgia and muscle tenderness.    Plan:   Toradol PRN while inpatient        Sinus arrhythmia   Assessment & Plan    Sinus arrhythmia likely secondary to hypothyroidism, likely no clinical significance.    Plan:  Repleting K, phos, Mg   Tele overnight            Consultants:     ***    Procedures:        ***    Imaging/ Testing:      ***    Discharge Medications:         Medication Reconciliation: {Review Status:[id number]::\"Completed\",\"Deferred\"}       Medication List      ASK your doctor about these medications      Instructions   ibuprofen 200 MG Tabs  Commonly known as:  MOTRIN   Take 200 mg by mouth 1 time daily as needed.  Dose:  200 mg     levothyroxine 137 MCG Tabs  Commonly known as:  SYNTHROID   Take 1 Tab by mouth Every morning on an empty stomach.  Dose:  137 mcg            Can use .DISCHARGEMEDSLIST if going to another facility         Disposition:   ***    Diet:   ***    Activity:   ***    Instructions:      ***   The patient was instructed to return to the ER in the event of worsening symptoms. I have counseled the patient on the importance of compliance and the patient has agreed to proceed with all medical " "recommendations and follow up plan indicated above.   The patient understands that all medications come with benefits and risks. Risks may include permanent injury or death and these risks can be minimized with close reassessment and monitoring.        Primary Care Provider:    ***  Discharge summary faxed to primary care provider:  {Review Status:[id number]::\"Completed\",\"Deferred\"}  Copy of discharge summary given to the patient: {Review Status:[id number]::\"Completed\",\"Deferred\"}      Follow up appointment details :      ***    Pending Studies:        ***    Time spent on discharge day patient visit, preparing discharge paperwork and arranging for patient follow up.    Summary of follow up issues:   ***    Discharge Time (Minutes) :    ***  Hospital Course Type: {Review Status:[id number]::\"Observation Stay\",\" Inpatient Stay >2 midnights\",\"Inpatient Stay < 2 midnights, patient recovered more rapidly than anticipated\"}      Condition on Discharge    ______________________________________________________________________    Interval history/exam for day of discharge:     ***      Most Recent Labs:    Lab Results   Component Value Date/Time    WBC 8.1 09/13/2018 05:47 AM    RBC 5.31 09/13/2018 05:47 AM    HEMOGLOBIN 14.2 09/13/2018 05:47 AM    HEMATOCRIT 45.8 09/13/2018 05:47 AM    MCV 86.3 09/13/2018 05:47 AM    MCH 26.7 (L) 09/13/2018 05:47 AM    MCHC 31.0 (L) 09/13/2018 05:47 AM    MPV 11.2 09/13/2018 05:47 AM    NEUTSPOLYS 64.10 09/13/2018 05:47 AM    LYMPHOCYTES 27.70 09/13/2018 05:47 AM    MONOCYTES 7.00 09/13/2018 05:47 AM    EOSINOPHILS 0.50 09/13/2018 05:47 AM    BASOPHILS 0.60 09/13/2018 05:47 AM    ANISOCYTOSIS 1+ 07/06/2016 03:22 PM      Lab Results   Component Value Date/Time    SODIUM 138 09/13/2018 08:00 AM    POTASSIUM 3.8 09/13/2018 08:00 AM    CHLORIDE 106 09/13/2018 08:00 AM    CO2 23 09/13/2018 08:00 AM    GLUCOSE 92 09/13/2018 08:00 AM    BUN 9 09/13/2018 08:00 AM    CREATININE 0.75 09/13/2018 08:00 AM    " CREATININE 0.6 01/31/2009 05:20 PM      Lab Results   Component Value Date/Time    ALTSGPT 7 09/13/2018 08:00 AM    ASTSGOT 14 09/13/2018 08:00 AM    ALKPHOSPHAT 48 09/13/2018 08:00 AM    TBILIRUBIN 0.7 09/13/2018 08:00 AM    LIPASE 9 (L) 09/12/2018 07:44 AM    ALBUMIN 4.2 09/13/2018 08:00 AM    GLOBULIN 3.1 09/13/2018 08:00 AM    INR 1.07 04/07/2018 11:47 AM     Lab Results   Component Value Date/Time    PROTHROMBTM 13.6 04/07/2018 11:47 AM    INR 1.07 04/07/2018 11:47 AM

## 2018-09-13 NOTE — CARE PLAN
Problem: Communication  Goal: The ability to communicate needs accurately and effectively will improve  Outcome: PROGRESSING AS EXPECTED      Problem: Safety  Goal: Will remain free from injury  Outcome: PROGRESSING AS EXPECTED      Problem: Pain Management  Goal: Pain level will decrease to patient's comfort goal  Outcome: PROGRESSING SLOWER THAN EXPECTED

## 2018-09-13 NOTE — DISCHARGE INSTRUCTIONS
Discharge Instructions    Discharged to home by car with relative. Discharged via wheelchair, hospital escort: Yes.  Special equipment needed: Not Applicable    Be sure to schedule a follow-up appointment with your primary care doctor or any specialists as instructed.     Discharge Plan:   Diet Plan: Discussed  Activity Level: Discussed  Smoking Cessation Offered: Patient Refused  Confirmed Follow up Appointment: Appointment Scheduled  Confirmed Symptoms Management: Discussed  Medication Reconciliation Updated: Yes  Influenza Vaccine Indication: Patient Refuses    I understand that a diet low in cholesterol, fat, and sodium is recommended for good health. Unless I have been given specific instructions below for another diet, I accept this instruction as my diet prescription.   Other diet:     Special Instructions: None    · Is patient discharged on Warfarin / Coumadin?   No   Hypothyroidism  Hypothyroidism is a disorder of the thyroid. The thyroid is a large gland that is located in the lower front of the neck. The thyroid releases hormones that control how the body works. With hypothyroidism, the thyroid does not make enough of these hormones.  What are the causes?  Causes of hypothyroidism may include:  · Viral infections.  · Pregnancy.  · Your own defense system (immune system) attacking your thyroid.  · Certain medicines.  · Birth defects.  · Past radiation treatments to your head or neck.  · Past treatment with radioactive iodine.  · Past surgical removal of part or all of your thyroid.  · Problems with the gland that is located in the center of your brain (pituitary).  What are the signs or symptoms?  Signs and symptoms of hypothyroidism may include:  · Feeling as though you have no energy (lethargy).  · Inability to tolerate cold.  · Weight gain that is not explained by a change in diet or exercise habits.  · Dry skin.  · Coarse hair.  · Menstrual irregularity.  · Slowing of thought  processes.  · Constipation.  · Sadness or depression.  How is this diagnosed?  Your health care provider may diagnose hypothyroidism with blood tests and ultrasound tests.  How is this treated?  Hypothyroidism is treated with medicine that replaces the hormones that your body does not make. After you begin treatment, it may take several weeks for symptoms to go away.  Follow these instructions at home:  · Take medicines only as directed by your health care provider.  · If you start taking any new medicines, tell your health care provider.  · Keep all follow-up visits as directed by your health care provider. This is important. As your condition improves, your dosage needs may change. You will need to have blood tests regularly so that your health care provider can watch your condition.  Contact a health care provider if:  · Your symptoms do not get better with treatment.  · You are taking thyroid replacement medicine and:  ¨ You sweat excessively.  ¨ You have tremors.  ¨ You feel anxious.  ¨ You lose weight rapidly.  ¨ You cannot tolerate heat.  ¨ You have emotional swings.  ¨ You have diarrhea.  ¨ You feel weak.  Get help right away if:  · You develop chest pain.  · You develop an irregular heartbeat.  · You develop a rapid heartbeat.  This information is not intended to replace advice given to you by your health care provider. Make sure you discuss any questions you have with your health care provider.  Document Released: 12/18/2006 Document Revised: 05/25/2017 Document Reviewed: 05/05/2015  [employer] Interactive Patient Education © 2017 [employer]      Depression / Suicide Risk    As you are discharged from this [facility] facility, it is important to learn how to keep safe from harming yourself.    Recognize the warning signs:  · Abrupt changes in personality, positive or negative- including increase in energy   · Giving away possessions  · Change in eating patterns- significant weight changes-  positive or  negative  · Change in sleeping patterns- unable to sleep or sleeping all the time   · Unwillingness or inability to communicate  · Depression  · Unusual sadness, discouragement and loneliness  · Talk of wanting to die  · Neglect of personal appearance   · Rebelliousness- reckless behavior  · Withdrawal from people/activities they love  · Confusion- inability to concentrate     If you or a loved one observes any of these behaviors or has concerns about self-harm, here's what you can do:  · Talk about it- your feelings and reasons for harming yourself  · Remove any means that you might use to hurt yourself (examples: pills, rope, extension cords, firearm)  · Get professional help from the community (Mental Health, Substance Abuse, psychological counseling)  · Do not be alone:Call your Safe Contact- someone whom you trust who will be there for you.  · Call your local CRISIS HOTLINE 622-4790 or 725-064-7167  · Call your local Children's Mobile Crisis Response Team [place] (793) 131-5191 or www.[employer]  · Call the toll free National Suicide Prevention Hotlines   · National Suicide Prevention Lifeline 263-845-GIPI (9526)  · National Hope Line Network 800-SUICIDE (796-4227)

## 2018-09-22 LAB — EKG IMPRESSION: NORMAL

## 2018-12-19 ENCOUNTER — HOSPITAL ENCOUNTER (EMERGENCY)
Facility: MEDICAL CENTER | Age: 23
End: 2018-12-19
Attending: EMERGENCY MEDICINE
Payer: MEDICAID

## 2018-12-19 ENCOUNTER — APPOINTMENT (OUTPATIENT)
Dept: RADIOLOGY | Facility: MEDICAL CENTER | Age: 23
End: 2018-12-19
Attending: EMERGENCY MEDICINE
Payer: MEDICAID

## 2018-12-19 VITALS
RESPIRATION RATE: 18 BRPM | SYSTOLIC BLOOD PRESSURE: 121 MMHG | DIASTOLIC BLOOD PRESSURE: 74 MMHG | BODY MASS INDEX: 24.45 KG/M2 | HEART RATE: 75 BPM | TEMPERATURE: 98.3 F | WEIGHT: 138.01 LBS

## 2018-12-19 DIAGNOSIS — R11.0 NAUSEA: ICD-10-CM

## 2018-12-19 DIAGNOSIS — N76.0 BACTERIAL VAGINOSIS: ICD-10-CM

## 2018-12-19 DIAGNOSIS — B96.89 BACTERIAL VAGINOSIS: ICD-10-CM

## 2018-12-19 LAB
ALBUMIN SERPL BCP-MCNC: 4.4 G/DL (ref 3.2–4.9)
ALBUMIN/GLOB SERPL: 1.5 G/DL
ALP SERPL-CCNC: 49 U/L (ref 30–99)
ALT SERPL-CCNC: 6 U/L (ref 2–50)
ANION GAP SERPL CALC-SCNC: 4 MMOL/L (ref 0–11.9)
APPEARANCE UR: CLEAR
AST SERPL-CCNC: 16 U/L (ref 12–45)
BACTERIA GENITAL QL WET PREP: NORMAL
BASOPHILS # BLD AUTO: 1.3 % (ref 0–1.8)
BASOPHILS # BLD: 0.07 K/UL (ref 0–0.12)
BILIRUB SERPL-MCNC: 0.5 MG/DL (ref 0.1–1.5)
BILIRUB UR QL STRIP.AUTO: NEGATIVE
BUN SERPL-MCNC: 8 MG/DL (ref 8–22)
C TRACH DNA SPEC QL NAA+PROBE: NEGATIVE
CALCIUM SERPL-MCNC: 8.8 MG/DL (ref 8.5–10.5)
CHLORIDE SERPL-SCNC: 105 MMOL/L (ref 96–112)
CO2 SERPL-SCNC: 30 MMOL/L (ref 20–33)
COLOR UR: YELLOW
CREAT SERPL-MCNC: 0.66 MG/DL (ref 0.5–1.4)
EOSINOPHIL # BLD AUTO: 0.06 K/UL (ref 0–0.51)
EOSINOPHIL NFR BLD: 1.1 % (ref 0–6.9)
ERYTHROCYTE [DISTWIDTH] IN BLOOD BY AUTOMATED COUNT: 50.2 FL (ref 35.9–50)
GLOBULIN SER CALC-MCNC: 2.9 G/DL (ref 1.9–3.5)
GLUCOSE SERPL-MCNC: 90 MG/DL (ref 65–99)
GLUCOSE UR STRIP.AUTO-MCNC: NEGATIVE MG/DL
HCG UR QL: NEGATIVE
HCT VFR BLD AUTO: 38.8 % (ref 37–47)
HGB BLD-MCNC: 12.3 G/DL (ref 12–16)
IMM GRANULOCYTES # BLD AUTO: 0.01 K/UL (ref 0–0.11)
IMM GRANULOCYTES NFR BLD AUTO: 0.2 % (ref 0–0.9)
KETONES UR STRIP.AUTO-MCNC: NEGATIVE MG/DL
LEUKOCYTE ESTERASE UR QL STRIP.AUTO: NEGATIVE
LYMPHOCYTES # BLD AUTO: 2.41 K/UL (ref 1–4.8)
LYMPHOCYTES NFR BLD: 43.5 % (ref 22–41)
MCH RBC QN AUTO: 29 PG (ref 27–33)
MCHC RBC AUTO-ENTMCNC: 31.7 G/DL (ref 33.6–35)
MCV RBC AUTO: 91.5 FL (ref 81.4–97.8)
MICRO URNS: NORMAL
MONOCYTES # BLD AUTO: 0.28 K/UL (ref 0–0.85)
MONOCYTES NFR BLD AUTO: 5.1 % (ref 0–13.4)
N GONORRHOEA DNA SPEC QL NAA+PROBE: NEGATIVE
NEUTROPHILS # BLD AUTO: 2.71 K/UL (ref 2–7.15)
NEUTROPHILS NFR BLD: 48.8 % (ref 44–72)
NITRITE UR QL STRIP.AUTO: NEGATIVE
NRBC # BLD AUTO: 0 K/UL
NRBC BLD-RTO: 0 /100 WBC
PH UR STRIP.AUTO: 6 [PH]
PLATELET # BLD AUTO: 239 K/UL (ref 164–446)
PMV BLD AUTO: 11.4 FL (ref 9–12.9)
POTASSIUM SERPL-SCNC: 3.3 MMOL/L (ref 3.6–5.5)
PROT SERPL-MCNC: 7.3 G/DL (ref 6–8.2)
PROT UR QL STRIP: NEGATIVE MG/DL
RBC # BLD AUTO: 4.24 M/UL (ref 4.2–5.4)
RBC UR QL AUTO: NEGATIVE
SIGNIFICANT IND 70042: NORMAL
SITE SITE: NORMAL
SODIUM SERPL-SCNC: 139 MMOL/L (ref 135–145)
SOURCE SOURCE: NORMAL
SP GR UR REFRACTOMETRY: 1.03
SP GR UR STRIP.AUTO: 1.03
SPECIMEN SOURCE: NORMAL
UROBILINOGEN UR STRIP.AUTO-MCNC: 1 MG/DL
WBC # BLD AUTO: 5.5 K/UL (ref 4.8–10.8)

## 2018-12-19 PROCEDURE — 87491 CHLMYD TRACH DNA AMP PROBE: CPT

## 2018-12-19 PROCEDURE — 700111 HCHG RX REV CODE 636 W/ 250 OVERRIDE (IP): Performed by: EMERGENCY MEDICINE

## 2018-12-19 PROCEDURE — 81003 URINALYSIS AUTO W/O SCOPE: CPT

## 2018-12-19 PROCEDURE — 76830 TRANSVAGINAL US NON-OB: CPT

## 2018-12-19 PROCEDURE — 81025 URINE PREGNANCY TEST: CPT

## 2018-12-19 PROCEDURE — 87591 N.GONORRHOEAE DNA AMP PROB: CPT

## 2018-12-19 PROCEDURE — 85025 COMPLETE CBC W/AUTO DIFF WBC: CPT

## 2018-12-19 PROCEDURE — 80053 COMPREHEN METABOLIC PANEL: CPT

## 2018-12-19 PROCEDURE — 99284 EMERGENCY DEPT VISIT MOD MDM: CPT

## 2018-12-19 RX ORDER — ONDANSETRON 4 MG/1
4 TABLET, ORALLY DISINTEGRATING ORAL ONCE
Status: COMPLETED | OUTPATIENT
Start: 2018-12-19 | End: 2018-12-19

## 2018-12-19 RX ORDER — ONDANSETRON 4 MG/1
4 TABLET, ORALLY DISINTEGRATING ORAL EVERY 6 HOURS PRN
Qty: 5 TAB | Refills: 0 | Status: SHIPPED | OUTPATIENT
Start: 2018-12-19 | End: 2019-01-24

## 2018-12-19 RX ORDER — METRONIDAZOLE 500 MG/1
500 TABLET ORAL 2 TIMES DAILY
Qty: 14 TAB | Refills: 0 | Status: SHIPPED | OUTPATIENT
Start: 2018-12-19 | End: 2018-12-26

## 2018-12-19 RX ADMIN — ONDANSETRON 4 MG: 4 TABLET, ORALLY DISINTEGRATING ORAL at 11:15

## 2018-12-19 ASSESSMENT — PAIN SCALES - GENERAL: PAINLEVEL_OUTOF10: 9

## 2018-12-19 NOTE — ED PROVIDER NOTES
"ED Provider Note    CHIEF COMPLAINT  Chief Complaint   Patient presents with   • Nausea     X 2 days, pt denies fever/chills/dysuria   • Loss of Appetite   • Pelvic Pain     stabbing pain   • Low Back Pain       HPI  Ivis Klein is a 23 y.o. female who presents to the emergency department complaining of lower abdominal discomfort, and nausea.  Started a couple of days ago with nausea.  She denies any vomiting or diarrhea or constipation.  She has had some pain in the left lower quadrant deep down in her pelvis.  Comes and goes.  She had some associated vaginal discharge is yellowish is been there for about a week.  Denies any dysuria or hematuria.  May be a slight increased urinary frequency.  Denies any STD risk factors.  Denies pregnancy.  Denies flank pain.  Denies fevers or chills.  Does not feel systemically ill.  Denies any other acute concerns or complaints per      REVIEW OF SYSTEMS  See HPI for further details. All other systems are negative.    PAST MEDICAL HISTORY  Past Medical History:   Diagnosis Date   • Anxiety 2/16/2017   • Arrhythmia     tachycardia \"pt reports d/t thryoid\"   • Breath shortness     no c/o at this time; c/o sob at night unsure if it is d/t anxiety   • Graves disease 12/5/2016   • Heart valve disease    • Hyperthyroidism 6/29/2016   • Menarche     started at age 14   • Migraine    • Pericarditis 06/2016   • Pregnancy     delivered 9/8/2013, 3/16/17 pt reports that she is not pregnant at this time   • Psychiatric problem     anxiety, depression   • Supervision of normal first teen pregnancy    • Vomiting 6/29/2016       FAMILY HISTORY  Family History   Problem Relation Age of Onset   • Cancer Paternal Grandmother 56        breast cancer   • Hyperlipidemia Father        SOCIAL HISTORY  Social History     Social History   • Marital status: Single     Spouse name: N/A   • Number of children: N/A   • Years of education: N/A     Social History Main Topics   • Smoking status: " Never Smoker   • Smokeless tobacco: Never Used      Comment: quit in 2012   • Alcohol use No      Comment: occ   • Drug use: Yes     Types: Marijuana, Inhaled      Comment: marijuana weekly   • Sexual activity: Yes     Partners: Male     Birth control/ protection: Abstinence      Comment: single     Other Topics Concern   • Not on file     Social History Narrative   • No narrative on file       SURGICAL HISTORY  Past Surgical History:   Procedure Laterality Date   • THYROIDECTOMY TOTAL Bilateral 3/22/2017    Procedure: THYROIDECTOMY TOTAL -NIMS RECURRENT LARYNGEAL NERVE MONITORING;  Surgeon: Vicente Eldridge M.D.;  Location: SURGERY SAME DAY [facility];  Service:    • PRIMARY C SECTION  9/8/2013    Performed by Melisa Wright M.D. at LABOR AND DELIVERY       CURRENT MEDICATIONS  Home Medications    **Home medications have not yet been reviewed for this encounter**         ALLERGIES  Allergies   Allergen Reactions   • Nkda [No Known Drug Allergy]        PHYSICAL EXAM  VITAL SIGNS: Wt 62.6 kg (138 lb 0.1 oz)   BMI 24.45 kg/m²  Wt 62.6 kg (138 lb 0.1 oz)   BMI 24.45 kg/m²     Constitutional: Well developed, Well nourished, No acute distress, Non-toxic appearance.   HENT: Normocephalic, Atraumatic, Bilateral external ears normal, Oropharynx moist, No oral exudates, Nose normal.   Eyes: PERRL, EOMI, Conjunctiva normal, No discharge.   Neck: Normal range of motion, No tenderness, Supple, No stridor.   Lymphatic: No lymphadenopathy noted.   Cardiovascular: Normal heart rate, Normal rhythm, No murmurs, No rubs, No gallops.   Thorax & Lungs: Normal breath sounds, No respiratory distress, No wheezing, No chest tenderness.   Abdomen: Bowel sounds normal, Soft, No tenderness, No masses, No pulsatile masses.   Skin: Warm, Dry, No erythema, No rash.   Back: No tenderness, No CVA tenderness.   Genitalia: No obvious discharge no cervicitis.  Subtle left adnexal tenderness but no masses  Musculoskeletal: Good range of  motion in all major joints.  Neurologic: Alert & oriented x 3, Normal motor function, Normal sensory function, No focal deficits noted.   Psychiatric: Affect normal, Judgment normal, Mood normal.     Results for orders placed or performed during the hospital encounter of 12/19/18   URINALYSIS,CULTURE IF INDICATED   Result Value Ref Range    Color Yellow     Character Clear     Specific Gravity 1.027 <1.035    Ph 6.0 5.0 - 8.0    Glucose Negative Negative mg/dL    Ketones Negative Negative mg/dL    Protein Negative Negative mg/dL    Bilirubin Negative Negative    Urobilinogen, Urine 1.0 Negative    Nitrite Negative Negative    Leukocyte Esterase Negative Negative    Occult Blood Negative Negative    Micro Urine Req see below    BETA-HCG QUALITATIVE URINE   Result Value Ref Range    Beta-Hcg Urine Negative Negative   REFRACTOMETER SG   Result Value Ref Range    Specific Gravity 1.027    CBC WITH DIFFERENTIAL   Result Value Ref Range    WBC 5.5 4.8 - 10.8 K/uL    RBC 4.24 4.20 - 5.40 M/uL    Hemoglobin 12.3 12.0 - 16.0 g/dL    Hematocrit 38.8 37.0 - 47.0 %    MCV 91.5 81.4 - 97.8 fL    MCH 29.0 27.0 - 33.0 pg    MCHC 31.7 (L) 33.6 - 35.0 g/dL    RDW 50.2 (H) 35.9 - 50.0 fL    Platelet Count 239 164 - 446 K/uL    MPV 11.4 9.0 - 12.9 fL    Neutrophils-Polys 48.80 44.00 - 72.00 %    Lymphocytes 43.50 (H) 22.00 - 41.00 %    Monocytes 5.10 0.00 - 13.40 %    Eosinophils 1.10 0.00 - 6.90 %    Basophils 1.30 0.00 - 1.80 %    Immature Granulocytes 0.20 0.00 - 0.90 %    Nucleated RBC 0.00 /100 WBC    Neutrophils (Absolute) 2.71 2.00 - 7.15 K/uL    Lymphs (Absolute) 2.41 1.00 - 4.80 K/uL    Monos (Absolute) 0.28 0.00 - 0.85 K/uL    Eos (Absolute) 0.06 0.00 - 0.51 K/uL    Baso (Absolute) 0.07 0.00 - 0.12 K/uL    Immature Granulocytes (abs) 0.01 0.00 - 0.11 K/uL    NRBC (Absolute) 0.00 K/uL   COMP METABOLIC PANEL   Result Value Ref Range    Sodium 139 135 - 145 mmol/L    Potassium 3.3 (L) 3.6 - 5.5 mmol/L    Chloride 105 96 - 112  mmol/L    Co2 30 20 - 33 mmol/L    Anion Gap 4.0 0.0 - 11.9    Glucose 90 65 - 99 mg/dL    Bun 8 8 - 22 mg/dL    Creatinine 0.66 0.50 - 1.40 mg/dL    Calcium 8.8 8.5 - 10.5 mg/dL    AST(SGOT) 16 12 - 45 U/L    ALT(SGPT) 6 2 - 50 U/L    Alkaline Phosphatase 49 30 - 99 U/L    Total Bilirubin 0.5 0.1 - 1.5 mg/dL    Albumin 4.4 3.2 - 4.9 g/dL    Total Protein 7.3 6.0 - 8.2 g/dL    Globulin 2.9 1.9 - 3.5 g/dL    A-G Ratio 1.5 g/dL   WET PREP   Result Value Ref Range    Significant Indicator NEG     Source GEN     Site VAGINAL     Wet Prep For Parasites       Many clue cells seen.  Few yeast.  No motile Trichomonas seen.     CHLAMYDIA & GC BY PCR   Result Value Ref Range    Source Vaginal     C. trachomatis by PCR Negative Negative    N. gonorrhoeae by PCR Negative Negative   ESTIMATED GFR   Result Value Ref Range    GFR If African American >60 >60 mL/min/1.73 m 2    GFR If Non African American >60 >60 mL/min/1.73 m 2        RADIOLOGY/PROCEDURES  US-PELVIC TRANSVAGINAL ONLY   Final Result      Normal transvaginal appearance of the pelvis.            COURSE & MEDICAL DECISION MAKING  Pertinent Labs & Imaging studies reviewed. (See chart for details)  The patient presents emerged part with nausea, loss of appetite pelvic discomfort and vaginal discharge.  Broad official diagnosis was considered including but not limited to an STD ectopic pregnancy UTI pyelonephritis colitis constipation name a few.    Patient has no tenderness in right lower quadrant right upper quadrant but there is no signs of biliary disease or acute appendicitis.  CBC CMP and lipase were normal.    Urinalysis is negative no signs of hematuria or UTI or infection.    Pregnancy test is negative.  GYN exam was performed this is normal except for some subtle left adnexal tenderness.  GC and Chlamydia tests are sent.  The patient's tenderness was concerning for an ultrasound this is normal.    At this point her exam is repeated and she has really no  tenderness along the left or right lower quadrants.  History and physical exam analysis of appendicitis, or colitis.  Think she is constipated.  She feels much better on reassessment she looks well and abdominal exam is benign.    At this point we will treat her with Flagyl.  500 twice daily, she will return for pain discharge bleeding fevers or other concerns.  She is referred back to her doctor.    Explained that she should return for worsening pain or other concern.  She is discharged in good condition      The patient was noted to have elevated blood pressure while in the ER and was counseled to see their doctor within one wee to have this rechecked.    [address] [address]  [phone]  Schedule an appointment as soon as possible for a visit in 2 days          FINAL IMPRESSION  1. Nausea    2. Bacterial vaginosis        3.         Electronically signed by: Rasheed Ennis, 12/19/2018 9:36 AM

## 2018-12-19 NOTE — ED NOTES
.Patient states understanding of discharge instructions. Ambulated with steady gait out of ED with prescriptions x 2. Personal belongings with patient.

## 2018-12-19 NOTE — DISCHARGE INSTRUCTIONS
Return to the emergency room for more nausea, vomiting, or other concerns.  Follow-up with primary care.  Antibiotics as prescribed.  No alcohol while on Flagyl per

## 2018-12-19 NOTE — ED TRIAGE NOTES
Chief Complaint   Patient presents with   • Nausea     X 2 days, pt denies fever/chills/dysuria   • Loss of Appetite   • Pelvic Pain     stabbing pain   • Low Back Pain   pt given urine collection supplies/instruction.  Explained to pt triage process, made pt aware to tell this RN/staff of any changes/concerns, pt verbalized understanding of process and instructions given. Pt to ER geoff.

## 2019-01-24 ENCOUNTER — HOSPITAL ENCOUNTER (EMERGENCY)
Facility: MEDICAL CENTER | Age: 24
End: 2019-01-24
Attending: EMERGENCY MEDICINE
Payer: MEDICAID

## 2019-01-24 VITALS
BODY MASS INDEX: 24.45 KG/M2 | HEART RATE: 74 BPM | DIASTOLIC BLOOD PRESSURE: 62 MMHG | RESPIRATION RATE: 18 BRPM | WEIGHT: 138.01 LBS | TEMPERATURE: 98.2 F | HEIGHT: 63 IN | SYSTOLIC BLOOD PRESSURE: 114 MMHG | OXYGEN SATURATION: 98 %

## 2019-01-24 DIAGNOSIS — B37.9 YEAST INFECTION: ICD-10-CM

## 2019-01-24 DIAGNOSIS — N76.0 ACUTE VAGINITIS: ICD-10-CM

## 2019-01-24 LAB
APPEARANCE UR: CLEAR
BACTERIA #/AREA URNS HPF: ABNORMAL /HPF
BACTERIA GENITAL QL WET PREP: NORMAL
BILIRUB UR QL STRIP.AUTO: NEGATIVE
C TRACH DNA SPEC QL NAA+PROBE: NEGATIVE
COLOR UR: YELLOW
EPI CELLS #/AREA URNS HPF: ABNORMAL /HPF
GLUCOSE UR STRIP.AUTO-MCNC: NEGATIVE MG/DL
HCG UR QL: NEGATIVE
HYALINE CASTS #/AREA URNS LPF: ABNORMAL /LPF
KETONES UR STRIP.AUTO-MCNC: NEGATIVE MG/DL
LEUKOCYTE ESTERASE UR QL STRIP.AUTO: ABNORMAL
MICRO URNS: ABNORMAL
N GONORRHOEA DNA SPEC QL NAA+PROBE: NEGATIVE
NITRITE UR QL STRIP.AUTO: NEGATIVE
PH UR STRIP.AUTO: 7.5 [PH]
PROT UR QL STRIP: NEGATIVE MG/DL
RBC # URNS HPF: ABNORMAL /HPF
RBC UR QL AUTO: NEGATIVE
SIGNIFICANT IND 70042: NORMAL
SITE SITE: NORMAL
SOURCE SOURCE: NORMAL
SP GR UR REFRACTOMETRY: 1.01
SP GR UR STRIP.AUTO: 1.02
SPECIMEN SOURCE: NORMAL
UROBILINOGEN UR STRIP.AUTO-MCNC: 1 MG/DL
WBC #/AREA URNS HPF: ABNORMAL /HPF

## 2019-01-24 PROCEDURE — 81001 URINALYSIS AUTO W/SCOPE: CPT

## 2019-01-24 PROCEDURE — 87591 N.GONORRHOEAE DNA AMP PROB: CPT

## 2019-01-24 PROCEDURE — 99284 EMERGENCY DEPT VISIT MOD MDM: CPT

## 2019-01-24 PROCEDURE — 87491 CHLMYD TRACH DNA AMP PROBE: CPT

## 2019-01-24 PROCEDURE — 81025 URINE PREGNANCY TEST: CPT

## 2019-01-24 RX ORDER — CLOTRIMAZOLE 1 %
1 CREAM (GRAM) TOPICAL DAILY
Qty: 1 TUBE | Refills: 0 | Status: SHIPPED | OUTPATIENT
Start: 2019-01-24 | End: 2019-01-31

## 2019-01-24 RX ORDER — FLUCONAZOLE 150 MG/1
150 TABLET ORAL DAILY
Qty: 1 TAB | Refills: 0 | Status: SHIPPED | OUTPATIENT
Start: 2019-01-24 | End: 2019-03-11

## 2019-01-24 RX ORDER — LIDOCAINE HYDROCHLORIDE 10 MG/ML
INJECTION, SOLUTION INFILTRATION; PERINEURAL
Status: DISCONTINUED
Start: 2019-01-24 | End: 2019-01-24 | Stop reason: HOSPADM

## 2019-01-24 ASSESSMENT — LIFESTYLE VARIABLES: DO YOU DRINK ALCOHOL: NO

## 2019-01-24 NOTE — ED PROVIDER NOTES
ED Provider Note    ER PROVIDER NOTE        CHIEF COMPLAINT  Chief Complaint   Patient presents with   • Vaginal Pain   • Painful Urination       HPI  Ivis Klein is a 23 y.o. female who presents to the emergency department complaining of dysuria and vaginal pain.  Patient reports over the last few days she has noticed some vaginal pain and white discharge.  States it stings when she urinates.  She denies any vaginal bleeding.  Denies any abdominal pain or back pain.  States she occasionally gets chills but otherwise no known fever.  No nausea or vomiting.  No diarrhea.  States has not had any unprotected intercourse    REVIEW OF SYSTEMS  Pertinent positives include vaginal pain, discharge. Pertinent negatives include no fever or vomiting. See HPI for details. All other systems reviewed and are negative.    PAST MEDICAL HISTORY   has a past medical history of Anxiety (2/16/2017); Arrhythmia; Breath shortness; Graves disease (12/5/2016); Heart valve disease; Hyperthyroidism (6/29/2016); Menarche; Migraine; Pericarditis (06/2016); Pregnancy; Psychiatric problem; Supervision of normal first teen pregnancy; and Vomiting (6/29/2016).    SURGICAL HISTORY   has a past surgical history that includes primary c section (9/8/2013) and thyroidectomy total (Bilateral, 3/22/2017).    FAMILY HISTORY  Family History   Problem Relation Age of Onset   • Cancer Paternal Grandmother 56        breast cancer   • Hyperlipidemia Father        SOCIAL HISTORY  Social History     Social History   • Marital status: Single     Spouse name: N/A   • Number of children: N/A   • Years of education: N/A     Social History Main Topics   • Smoking status: Never Smoker   • Smokeless tobacco: Never Used      Comment: quit in 2012   • Alcohol use No      Comment: occ   • Drug use: Yes     Types: Marijuana, Inhaled      Comment: marijuana weekly   • Sexual activity: Yes     Partners: Male     Birth control/ protection: Abstinence       "Comment: single     Other Topics Concern   • Not on file     Social History Narrative   • No narrative on file      History   Drug Use   • Types: Marijuana, Inhaled     Comment: marijuana weekly       CURRENT MEDICATIONS  Home Medications     Reviewed by Nikhil Brooke R.N. (Registered Nurse) on 01/24/19 at 0951  Med List Status: Not Addressed   Medication Last Dose Status   levothyroxine (SYNTHROID) 137 MCG Tab  Active                ALLERGIES  Allergies   Allergen Reactions   • Nkda [No Known Drug Allergy]        PHYSICAL EXAM  VITAL SIGNS: /62   Pulse 74   Temp 36.8 °C (98.2 °F) (Temporal)   Resp 18   Ht 1.6 m (5' 3\")   Wt 62.6 kg (138 lb 0.1 oz)   SpO2 98%   BMI 24.45 kg/m²   Pulse ox interpretation: I interpret this pulse ox as normal.    Constitutional: Alert in no apparent distress.  HENT: No signs of trauma, Bilateral external ears normal, Nose normal.   Eyes: Pupils are equal and reactive, Conjunctiva normal, Non-icteric.   Neck: Normal range of motion, No tenderness, Supple, No stridor.   Lymphatic: No lymphadenopathy noted.   Cardiovascular: Regular rate and rhythm, no murmurs.   Thorax & Lungs: Normal breath sounds, No respiratory distress, No wheezing, No chest tenderness.   Abdomen: Bowel sounds normal, Soft, No tenderness, No masses, No pulsatile masses. No peritoneal signs.  : Normal external genitalia with no rash lesion or swelling.  Irritation with clumpy white discharge noted in vaginal vault.  No CMT or adnexal mass or tenderness  Skin: Warm, Dry, No erythema, No rash.   Back: No bony tenderness, No CVA tenderness.   Extremities: Intact distal pulses, No edema, No tenderness, No cyanosis,   Musculoskeletal: Good range of motion in all major joints. No tenderness to palpation or major deformities noted.   Neurologic: Alert , Normal motor function, Normal sensory function, No focal deficits noted.   Psychiatric: Affect normal, Judgment normal, Mood normal.     DIAGNOSTIC STUDIES / " PROCEDURES        LABS  Labs Reviewed   URINALYSIS,CULTURE IF INDICATED - Abnormal; Notable for the following:        Result Value    Leukocyte Esterase Trace (*)     All other components within normal limits   URINE MICROSCOPIC (W/UA) - Abnormal; Notable for the following:     Bacteria Few (*)     All other components within normal limits   WET PREP   CHLAMYDIA/GC PCR URINE OR SWAB   HCG QUALITATIVE UR   REFRACTOMETER SG       All labs reviewed by me.    RADIOLOGY  No orders to display     The radiologist's interpretation of all radiological studies have been reviewed by me.    COURSE & MEDICAL DECISION MAKING  Nursing notes, VS, PMSFHx reviewed in chart.    11:25 AM Patient seen and examined at bedside. P. Ordered for pelvic palatal, urinalysis, pregnancy to evaluate her symptoms.     Patient reevaluated updated on results and plan for discharge        Decision Making:  This is a 23 y.o. female presenting with vaginal discomfort and dysuria.  On exam consistent with vaginitis, appears yeast and wet prep confirms this.  She will be treated with fluconazole will also provide lotrimin if symptoms persist.  She has no swelling or physical exam findings suggestive of abscess or Bartholin's gland pathology.  Has not had unprotected intercourse and nature exam is not consistent with STD so not empirically treat, cultures are pending.      The patient will return for new or worsening symptoms and is stable at the time of discharge.    The patient is referred to a primary physician for blood pressure management, diabetic screening, and for all other preventative health concerns.      DISPOSITION:  Patient will be discharged home in stable condition.    FOLLOW UP:  [address] [address]  [phone]    As needed      OUTPATIENT MEDICATIONS:  New Prescriptions    CLOTRIMAZOLE (LOTRIMIN) 1 % CREAM    Apply 1 Application to affected area(s) every day for 7 days.    FLUCONAZOLE  (DIFLUCAN) 150 MG TABLET    Take 1 Tab by mouth every day.         FINAL IMPRESSION  1. Acute vaginitis    2. Yeast infection         The note accurately reflects work and decisions made by me.  Lucas Telles  1/24/2019  12:36 PM

## 2019-01-24 NOTE — ED TRIAGE NOTES
Pt ambulatory to triage. Pt c/o vaginal pain and swelling that began 2 days ago. Pt also c/o painful urination, n/v, chills, and fever.  LMP 12/14/18.     Chief Complaint   Patient presents with   • Vaginal Pain   • Painful Urination     Pt placed in lobby, updated on triage process. Pt educated to notified RN or triage tech if changes in condition.

## 2019-03-11 ENCOUNTER — OFFICE VISIT (OUTPATIENT)
Dept: INTERNAL MEDICINE | Facility: MEDICAL CENTER | Age: 24
End: 2019-03-11
Payer: MEDICAID

## 2019-03-11 VITALS
TEMPERATURE: 97.6 F | HEIGHT: 63 IN | BODY MASS INDEX: 23.85 KG/M2 | DIASTOLIC BLOOD PRESSURE: 78 MMHG | WEIGHT: 134.6 LBS | HEART RATE: 73 BPM | OXYGEN SATURATION: 100 % | SYSTOLIC BLOOD PRESSURE: 109 MMHG

## 2019-03-11 DIAGNOSIS — M79.10 MYALGIA: ICD-10-CM

## 2019-03-11 DIAGNOSIS — F41.9 ANXIETY: ICD-10-CM

## 2019-03-11 DIAGNOSIS — N92.6 MISSED PERIODS: ICD-10-CM

## 2019-03-11 DIAGNOSIS — F41.0 PANIC ATTACK: ICD-10-CM

## 2019-03-11 DIAGNOSIS — E05.00 GRAVES DISEASE: ICD-10-CM

## 2019-03-11 DIAGNOSIS — R53.83 OTHER FATIGUE: ICD-10-CM

## 2019-03-11 DIAGNOSIS — R79.89 TSH ELEVATION: ICD-10-CM

## 2019-03-11 PROBLEM — E03.5 HYPOTHYROID COMA (HCC): Status: RESOLVED | Noted: 2017-07-03 | Resolved: 2019-03-11

## 2019-03-11 PROBLEM — E03.8 SUBCLINICAL HYPOTHYROIDISM: Status: RESOLVED | Noted: 2018-04-07 | Resolved: 2019-03-11

## 2019-03-11 PROBLEM — I31.9 PERICARDITIS: Status: RESOLVED | Noted: 2017-02-16 | Resolved: 2019-03-11

## 2019-03-11 PROBLEM — R68.89 FLU-LIKE SYMPTOMS: Chronic | Status: RESOLVED | Noted: 2018-04-07 | Resolved: 2019-03-11

## 2019-03-11 PROBLEM — N39.0 URINARY TRACT INFECTION WITHOUT HEMATURIA: Status: RESOLVED | Noted: 2017-02-16 | Resolved: 2019-03-11

## 2019-03-11 PROBLEM — E87.20 LACTIC ACIDOSIS: Status: RESOLVED | Noted: 2017-07-03 | Resolved: 2019-03-11

## 2019-03-11 LAB
INT CON NEG: NORMAL
INT CON POS: NORMAL
POC URINE PREGNANCY TEST: NEGATIVE

## 2019-03-11 PROCEDURE — 81025 URINE PREGNANCY TEST: CPT | Mod: GC | Performed by: INTERNAL MEDICINE

## 2019-03-11 PROCEDURE — 99999 POCT PREGNANCY: CPT | Mod: GC | Performed by: INTERNAL MEDICINE

## 2019-03-11 PROCEDURE — 99214 OFFICE O/P EST MOD 30 MIN: CPT | Mod: GC | Performed by: INTERNAL MEDICINE

## 2019-03-11 ASSESSMENT — PAIN SCALES - GENERAL: PAINLEVEL: NO PAIN

## 2019-03-11 NOTE — PROGRESS NOTES
New Patient to Establish    Reason to establish: New patient to establish    CC: Fatigue    HPI: 23-year-old female with a past medical history of Graves' disease status post thyroidectomy in 2017, postop hypothyroidism, history of sinus arrhythmia, history of  section, noncompliance and frequent hospitalizations is coming in to reestablish with us as well as to discuss multiple problems.    Patient reports that she lost her insurance and could not follow-up with us before.  She has been in and out of the hospital for a lot of reasons mainly because of varied symptoms including bacterial vaginosis yeast infections UTI-like symptoms and have been treated for both.    Today she complains of having lots of problems with anxiety and panic attacks.  She feels tired most of the day and is unable to sleep and wakes up at 4:30 in the morning with anxiety attack/panic attacks.  Sometimes she feels depressed but denies any homicidal or suicidal ideations.  She feels like when she gets her anxiety/panic attacks her hands get numb and she is also noticed decreased appetite and trouble swallowing.  She also reports having dry skin and hair loss.  She also is stating that she is having right leg numbness.    She has missed periods for the past couple of months and is sexually active and is not using any contraception at this point.  She is interested in planning for a baby with her boyfriend and have a 5-year-old son.  She wants to stabilize her medical problems initially and then plan to get pregnant and that is why she is not using any contraception.  She would like to test for pregnancy today.    Have not been established with psychiatrist or psychology and have not seen endocrinologist over 2 years and would like to get referrals for that as well.    Patient Active Problem List    Diagnosis Date Noted   • Postoperative hypothyroidism 2018     Priority: High   • Frequent hospital admissions 2018      "Priority: Medium   • Anxiety 02/16/2017     Priority: Medium   • Myalgia 09/12/2018     Priority: Low   • Panic attack 03/11/2019   • Noncompliance with medication regimen 07/04/2017   • TSH elevation 07/04/2017   • Microcytosis 02/16/2017   • Health care maintenance 12/05/2016   • Hyperthyroidism 12/05/2016   • Graves disease 12/05/2016   • History of pericarditis 12/05/2016   • Mood disorder (HCC) 12/05/2016   • Not immune to rubella 05/23/2013   • Migraine with aura, not intractable 09/28/2010       Past Medical History:   Diagnosis Date   • Anxiety 2/16/2017   • Arrhythmia     tachycardia \"pt reports d/t thryoid\"   • Breath shortness     no c/o at this time; c/o sob at night unsure if it is d/t anxiety   • Graves disease 12/5/2016   • Heart valve disease    • Hyperthyroidism 6/29/2016   • Menarche     started at age 14   • Migraine    • Pericarditis 06/2016   • Pregnancy     delivered 9/8/2013, 3/16/17 pt reports that she is not pregnant at this time   • Psychiatric problem     anxiety, depression   • Supervision of normal first teen pregnancy    • Vomiting 6/29/2016       Current Outpatient Prescriptions   Medication Sig Dispense Refill   • levothyroxine (SYNTHROID) 137 MCG Tab Take 1 Tab by mouth Every morning on an empty stomach. 30 Tab 2     No current facility-administered medications for this visit.        Allergies as of 03/11/2019 - Reviewed 01/24/2019   Allergen Reaction Noted   • Nkda [no known drug allergy]  01/31/2009       Social History     Social History   • Marital status: Single     Spouse name: N/A   • Number of children: N/A   • Years of education: N/A     Occupational History   • Not on file.     Social History Main Topics   • Smoking status: Never Smoker   • Smokeless tobacco: Never Used      Comment: quit in 2012   • Alcohol use No      Comment: occ   • Drug use: Yes     Types: Marijuana, Inhaled      Comment: marijuana weekly   • Sexual activity: Yes     Partners: Male     Birth control/ " "protection: Abstinence      Comment: single     Other Topics Concern   • Not on file     Social History Narrative   • No narrative on file       Family History   Problem Relation Age of Onset   • Cancer Paternal Grandmother 56        breast cancer   • Hyperlipidemia Father        Past Surgical History:   Procedure Laterality Date   • THYROIDECTOMY TOTAL Bilateral 3/22/2017    Procedure: THYROIDECTOMY TOTAL -NIMS RECURRENT LARYNGEAL NERVE MONITORING;  Surgeon: Vicente Eldridge M.D.;  Location: SURGERY SAME DAY [facility];  Service:    • PRIMARY C SECTION  9/8/2013    Performed by Melisa Wright M.D. at [facility] DELIVERY       ROS: As per HPI. Additional pertinent symptoms as noted below.        /78 (BP Location: Left arm, Patient Position: Sitting)   Pulse 73   Temp 36.4 °C (97.6 °F) (Temporal)   Ht 1.588 m (5' 2.52\")   Wt 61.1 kg (134 lb 9.6 oz)   SpO2 100%   BMI 24.21 kg/m²     Physical Exam  General:  Alert and oriented, No apparent distress.    Eyes: Pupils equal and reactive. No scleral icterus.    Throat:+surgical scar around her throat area. Clear no erythema or exudates noted.    Neck: Supple. No lymphadenopathy noted. Thyroid not enlarged.    Lungs: Clear to auscultation and percussion bilaterally.    Cardiovascular: Regular rate and rhythm. No murmurs, rubs or gallops.    Abdomen:  Benign. No rebound or guarding noted.    Extremities: No clubbing, cyanosis, edema.    Skin: Clear. No rash or suspicious skin lesions noted.        Assessment and Plan    1.  History of Graves' disease status post thyroidectomy  #Postop hypothyroidism  #Elevated TSH with undetectable T4  -09/12/2018 TSH of 353, free T4 of less than 0.25 free T3 of 2.08.  -She is having some signs and symptoms of hypothyroidism including fatigue.  -Check TSH T4 and T3.  -Referral placed for endocrinology.    2. Myalgia  #Right leg Numbness  -Unclear etiology but will check vitamin B-12 levels.  -On neuro examination no " signs of focal weakness and/or intact sensations..     3. Other fatigue  -Could be related to her hypothyroidism although she reports been compliant with medication and takes medication on empty stomach.  -She also reports some dry skin as well as hair loss.  -Check TSH plus T4 plus T3.    4. Missed periods  -Pregnancy test done today in office was negative.  -Is sexually active currently not using any forms of contraception with monogamous partner.  - urine pregnancy test today in the clinic was negative.  -Check serum beta hCG.  -Educated on signs and symptoms to watch out for early signs of pregnancy.    5. Anxiety  # Panic attack  -Frequent hospitalizations and based on chart review some documented history of somatization, malingering and anxiety.  -Besides checking thyroid function tests, it would be reasonable to put in a referral for behavioral therapy/psychiatry at this point.  -Patient not interested in any pharmacotherapy at this point and is encouraged to seek medical care for worsening of symptoms.  -She reports intermittent low mood episodes but denies any homicidal or suicidal ideations.        Signed by: Hamlet Potter M.D.

## 2019-03-11 NOTE — PATIENT INSTRUCTIONS
Panic Attacks  Panic attacks are sudden, short feelings of great fear or discomfort. You may have them for no reason when you are relaxed, when you are uneasy (anxious), or when you are sleeping.  Follow these instructions at home:  · Take all your medicines as told.  · Check with your doctor before starting new medicines.  · Keep all doctor visits.  Contact a doctor if:  · You are not able to take your medicines as told.  · Your symptoms do not get better.  · Your symptoms get worse.  Get help right away if:  · Your attacks seem different than your normal attacks.  · You have thoughts about hurting yourself or others.  · You take panic attack medicine and you have a side effect.  This information is not intended to replace advice given to you by your health care provider. Make sure you discuss any questions you have with your health care provider.  Document Released: 01/20/2012 Document Revised: 05/25/2017 Document Reviewed: 08/01/2014  [employer] Interactive Patient Education © 2017 [employer]

## 2019-03-25 ENCOUNTER — HOSPITAL ENCOUNTER (EMERGENCY)
Facility: MEDICAL CENTER | Age: 24
End: 2019-03-25
Attending: EMERGENCY MEDICINE
Payer: MEDICAID

## 2019-03-25 VITALS
SYSTOLIC BLOOD PRESSURE: 105 MMHG | OXYGEN SATURATION: 99 % | HEART RATE: 82 BPM | WEIGHT: 138.45 LBS | RESPIRATION RATE: 16 BRPM | BODY MASS INDEX: 24.9 KG/M2 | TEMPERATURE: 98.1 F | DIASTOLIC BLOOD PRESSURE: 65 MMHG

## 2019-03-25 DIAGNOSIS — N92.6 IRREGULAR MENSES: ICD-10-CM

## 2019-03-25 DIAGNOSIS — N30.01 ACUTE CYSTITIS WITH HEMATURIA: ICD-10-CM

## 2019-03-25 LAB
APPEARANCE UR: CLEAR
COLOR UR AUTO: YELLOW
GLUCOSE UR QL STRIP.AUTO: NEGATIVE MG/DL
HCG UR QL: NEGATIVE
HCG UR QL: NEGATIVE
KETONES UR QL STRIP.AUTO: NEGATIVE MG/DL
LEUKOCYTE ESTERASE UR QL STRIP.AUTO: ABNORMAL
NITRITE UR QL STRIP.AUTO: NEGATIVE
PH UR STRIP.AUTO: 7 [PH]
PROT UR QL STRIP: 30 MG/DL
RBC UR QL AUTO: ABNORMAL
SP GR UR REFRACTOMETRY: 1.02
SP GR UR: 1.02

## 2019-03-25 PROCEDURE — 87186 SC STD MICRODIL/AGAR DIL: CPT

## 2019-03-25 PROCEDURE — 87086 URINE CULTURE/COLONY COUNT: CPT

## 2019-03-25 PROCEDURE — 81002 URINALYSIS NONAUTO W/O SCOPE: CPT

## 2019-03-25 PROCEDURE — 87077 CULTURE AEROBIC IDENTIFY: CPT

## 2019-03-25 PROCEDURE — 81025 URINE PREGNANCY TEST: CPT

## 2019-03-25 PROCEDURE — 99283 EMERGENCY DEPT VISIT LOW MDM: CPT

## 2019-03-25 RX ORDER — NITROFURANTOIN 25; 75 MG/1; MG/1
100 CAPSULE ORAL 2 TIMES DAILY
Qty: 10 CAP | Refills: 0 | Status: SHIPPED | OUTPATIENT
Start: 2019-03-25 | End: 2019-03-30

## 2019-03-25 NOTE — LETTER
3/27/2019               Ivis Klein  [address] [address]        Dear Ivis (MR#[id number])    This letter is sent in regards to your, recent visit to the [facility] Emergency Department on 3/25/2019.  During the visit, tests were performed to assist the physician in a medical diagnosis.  A review of those tests requires that we notify you of the following:    Your urine culture was POSITIVE for a bacteria called Escherichia coli. The antibiotic prescribed for you (nitrofurantoin) should be active to treat this bacteria. IT IS IMPORTANT THAT YOU CONTINUE TAKING YOUR ANTIBIOTIC UNTIL IT IS FINISHED.      Please feel free to contact me at the number below if you have any questions or concerns. Thank you for your cooperation in the matter.    Sincerely,  ED Culture Follow-Up Staff  Skye Villatoro, PharmD    [facility], Emergency Department  [address] [place], [place] [address]  [phone]

## 2019-03-25 NOTE — ED PROVIDER NOTES
ED Provider Note    CHIEF COMPLAINT  Chief Complaint   Patient presents with   • Painful Urination   • Blood in Urine       HPI  Ivis Klein is a 23 y.o. female who presents for evaluation of dysuria and hematuria over the past 4 days of suprapubic abdominal discomfort, no fever, no flank pain.  The patient states that she also has not had her menstrual period since January, she missed her February.  And should be getting her March menstrual period now.  She has been having some vaginal spotting, has not taken a pregnancy test.  No vomiting, no chest pain or shortness of breath, no other complaints.    REVIEW OF SYSTEMS  Negative for fever, rash, chest pain, dyspnea.     PAST MEDICAL HISTORY   has a past medical history of Anxiety (2/16/2017); Arrhythmia; Breath shortness; Graves disease (12/5/2016); Heart valve disease; Hyperthyroidism (6/29/2016); Menarche; Migraine; Pericarditis (06/2016); Pregnancy; Psychiatric problem; Supervision of normal first teen pregnancy; and Vomiting (6/29/2016).    SOCIAL HISTORY  Social History     Social History Main Topics   • Smoking status: Never Smoker   • Smokeless tobacco: Never Used      Comment: quit in 2012   • Alcohol use No   • Drug use: Yes     Types: Marijuana, Inhaled      Comment: marijuana weekly   • Sexual activity: Yes     Partners: Male     Birth control/ protection: Abstinence      Comment: single       SURGICAL HISTORY   has a past surgical history that includes primary c section (9/8/2013) and thyroidectomy total (Bilateral, 3/22/2017).    CURRENT MEDICATIONS  I personally reviewed the medication list in the charting documentation.     ALLERGIES  Allergies   Allergen Reactions   • Nkda [No Known Drug Allergy]        PHYSICAL EXAM  VITAL SIGNS: /65   Pulse 82   Temp 36.7 °C (98.1 °F) (Oral)   Resp 16   Wt 62.8 kg (138 lb 7.2 oz)   LMP 01/28/2019 (Approximate)   SpO2 99%   BMI 24.90 kg/m²   Constitutional: Alert in no apparent  distress.  HENT: No signs of trauma.   Eyes: Conjunctiva normal, Non-icteric.   Chest: Normal nonlabored respirations  Skin: No erythema, No rash.   Musculoskeletal: Good range of motion in all major joints.   Neurologic: Alert, No focal deficits noted.   Psychiatric: Affect normal, Judgment normal.    DIAGNOSTIC STUDIES / PROCEDURES    LABS/EKGs  Results for orders placed or performed during the hospital encounter of 03/25/19   BETA-HCG QUALITATIVE URINE   Result Value Ref Range    Beta-Hcg Urine Negative Negative   REFRACTOMETER SG   Result Value Ref Range    Specific Gravity 1.020    POC UA   Result Value Ref Range    POC Color Yellow     POC Appearance Clear     POC Glucose Negative Negative mg/dL    POC Ketones Negative Negative mg/dL    POC Specific Gravity 1.020 1.005 - 1.030    POC Blood Moderate (A) Negative    POC Urine PH 7.0 5.0 - 8.0    POC Protein 30 (A) Negative mg/dL    POC Nitrites Negative Negative    POC Leukocyte Esterase Trace (A) Negative   POC URINE PREGNANCY   Result Value Ref Range    POC Urine Pregnancy Test Negative Negative       COURSE & MEDICAL DECISION MAKING  Pertinent Labs & Imaging studies reviewed. (See chart for details)    Encounter Summary: This is a 23 y.o. female with signs and symptoms consistent with cystitis, her urinalysis here has some leukocyte esterase and blood, will treat with Macrobid for UTI and obtain a urine culture.  Pregnancy test in emergency department is negative, she has some dysfunctional uterine bleeding missing her last month's menstrual period.  She will follow-up with her gynecologist regarding this, strict return instructions have been discussed, she will be prescribed Keflex.      DISPOSITION: Discharge Home      FINAL IMPRESSION  1. Acute cystitis with hematuria    2. Irregular menses        This dictation was created using voice recognition software. The accuracy of the dictation is limited to the abilities of the software. I expect there may be  some errors of grammar and possibly content. The nursing notes were reviewed and certain aspects of this information were incorporated into this note.    Electronically signed by: Brayan Navarrete, 3/25/2019 10:46 AM

## 2019-03-25 NOTE — ED TRIAGE NOTES
Ambulates to triage  Chief Complaint   Patient presents with   • Painful Urination   • Blood in Urine     Sx since Friday, pt also said her lat period was in January, unsure if she's pregnant.

## 2019-03-27 NOTE — ED NOTES
ED Positive Culture Follow-up/Notification Note:    Date: 3/27/19     Patient seen in the ED on 3/25/2019 for dysuria and hematuria x 4 days along with suprapubic discomfort.   1. Acute cystitis with hematuria    2. Irregular menses       Discharge Medication List as of 3/25/2019 10:50 AM      START taking these medications    Details   nitrofurantoin monohyd macro (MACROBID) 100 MG Cap Take 1 Cap by mouth 2 times a day for 5 days., Disp-10 Cap, R-0, Normal             Allergies: Nkda [no known drug allergy]     Vitals:    03/25/19 0935 03/25/19 0954   BP: 105/65    Pulse: 82    Resp: 16    Temp: 36.7 °C (98.1 °F)    TempSrc: Oral    SpO2: 99%    Weight:  62.8 kg (138 lb 7.2 oz)       Final cultures:   Results     Procedure Component Value Units Date/Time    URINE CULTURE(NEW) [[id number]]  (Abnormal)  (Susceptibility) Collected:  03/25/19 1010    Order Status:  Completed Specimen:  Urine Updated:  03/27/19 0839     Significant Indicator POS (POS)     Source UR     Site -     Urine Culture - (A)      Escherichia coli  ,000 cfu/mL   (A)    Narrative:       Indication for culture:->Emergency Room Patient    Culture & Susceptibility     ESCHERICHIA COLI     Antibiotic Sensitivity Microscan Unit Status    Ampicillin Sensitive <=8 mcg/mL Final    Method: MARY JANE    Cefepime Sensitive <=8 mcg/mL Final    Method: MARY JANE    Cefotaxime Sensitive <=2 mcg/mL Final    Method: MARY JANE    Cefotetan Sensitive <=16 mcg/mL Final    Method: MARY JANE    Ceftazidime Sensitive <=1 mcg/mL Final    Method: MARY JANE    Ceftriaxone Sensitive <=8 mcg/mL Final    Method: MARY JANE    Cefuroxime Sensitive <=4 mcg/mL Final    Method: MARY JANE    Cephalothin Sensitive <=8 mcg/mL Final    Method: MARY JANE    Ciprofloxacin Sensitive <=1 mcg/mL Final    Method: MARY JANE    Gentamicin Sensitive <=4 mcg/mL Final    Method: MARY JANE    Levofloxacin Sensitive <=2 mcg/mL Final    Method: MARY JANE    Nitrofurantoin Sensitive <=32 mcg/mL Final    Method: MAYR JANE    Pip/Tazobactam Sensitive <=16 mcg/mL  Final    Method: MARY JANE    Piperacillin Sensitive <=16 mcg/mL Final    Method: MARY JANE    Tigecycline Sensitive <=2 mcg/mL Final    Method: MARY JANE    Tobramycin Sensitive <=4 mcg/mL Final    Method: MARY JANE    Trimeth/Sulfa Sensitive <=2/38 mcg/mL Final    Method: MARY JAEN                             Plan:   Appropriate antibiotic therapy prescribed. No changes required based upon culture result.  Sent letter to patient to notify of positive culture result and encourage compliance with prescribed antibiotics.     Skye Villatoro, PharmD

## 2019-04-15 ENCOUNTER — OFFICE VISIT (OUTPATIENT)
Dept: INTERNAL MEDICINE | Facility: MEDICAL CENTER | Age: 24
End: 2019-04-15
Payer: MEDICAID

## 2019-04-15 VITALS
HEART RATE: 67 BPM | HEIGHT: 63 IN | SYSTOLIC BLOOD PRESSURE: 119 MMHG | DIASTOLIC BLOOD PRESSURE: 80 MMHG | BODY MASS INDEX: 23.39 KG/M2 | TEMPERATURE: 98.3 F | WEIGHT: 132 LBS | OXYGEN SATURATION: 98 %

## 2019-04-15 DIAGNOSIS — N92.1 MENOMETRORRHAGIA: ICD-10-CM

## 2019-04-15 DIAGNOSIS — E05.00 GRAVES DISEASE: ICD-10-CM

## 2019-04-15 DIAGNOSIS — F41.9 ANXIETY: ICD-10-CM

## 2019-04-15 DIAGNOSIS — G43.109 MIGRAINE WITH AURA AND WITHOUT STATUS MIGRAINOSUS, NOT INTRACTABLE: Chronic | ICD-10-CM

## 2019-04-15 DIAGNOSIS — Z78.9 FREQUENT HOSPITAL ADMISSIONS: ICD-10-CM

## 2019-04-15 DIAGNOSIS — M79.10 MYALGIA: ICD-10-CM

## 2019-04-15 DIAGNOSIS — R13.10 SWALLOWING PROBLEM: ICD-10-CM

## 2019-04-15 DIAGNOSIS — T14.8XXA BRUISING: ICD-10-CM

## 2019-04-15 DIAGNOSIS — F41.0 PANIC ATTACK: ICD-10-CM

## 2019-04-15 DIAGNOSIS — F39 MOOD DISORDER (HCC): ICD-10-CM

## 2019-04-15 DIAGNOSIS — R06.02 SHORT OF BREATH ON EXERTION: ICD-10-CM

## 2019-04-15 DIAGNOSIS — E05.90 HYPERTHYROIDISM: ICD-10-CM

## 2019-04-15 DIAGNOSIS — R71.8 MICROCYTOSIS: ICD-10-CM

## 2019-04-15 DIAGNOSIS — E89.0 POSTOPERATIVE HYPOTHYROIDISM: ICD-10-CM

## 2019-04-15 DIAGNOSIS — R31.29 MICROSCOPIC HEMATURIA: ICD-10-CM

## 2019-04-15 DIAGNOSIS — R07.89 CHEST DISCOMFORT: ICD-10-CM

## 2019-04-15 PROCEDURE — 99999 EKG: CPT | Performed by: INTERNAL MEDICINE

## 2019-04-15 PROCEDURE — 99214 OFFICE O/P EST MOD 30 MIN: CPT | Mod: GC | Performed by: INTERNAL MEDICINE

## 2019-04-15 PROCEDURE — 93000 ELECTROCARDIOGRAM COMPLETE: CPT | Mod: GC | Performed by: INTERNAL MEDICINE

## 2019-04-15 RX ORDER — LEVOTHYROXINE SODIUM 175 UG/1
175 TABLET ORAL
Qty: 30 TAB | Refills: 6 | Status: SHIPPED | OUTPATIENT
Start: 2019-04-15 | End: 2019-05-23

## 2019-04-15 RX ORDER — FERROUS SULFATE 325(65) MG
325 TABLET ORAL 2 TIMES DAILY
Qty: 60 TAB | Refills: 3 | Status: SHIPPED | OUTPATIENT
Start: 2019-04-15 | End: 2019-05-08

## 2019-04-15 ASSESSMENT — PATIENT HEALTH QUESTIONNAIRE - PHQ9: CLINICAL INTERPRETATION OF PHQ2 SCORE: 0

## 2019-04-16 NOTE — PROGRESS NOTES
Established Patient    Ivis presents today with the following:    CC: Hypothyroidism.     HPI:  23-year-old female with a past medical history of Graves' disease status post thyroidectomy in 2017, postop hypothyroidism, history of sinus arrhythmia, history of  section, noncompliance and frequent hospitalizations is coming in for a follow up today.    She has been having some issues with her chest pain associated with shortness of breath and feeling lightheaded and dizzy.  Her symptoms started this past weekend but she has been having these symptoms on and off for a while now.  She denies any syncopal episodes but just does not feel good and feels tired all the time.  She did underwent her last test for her thyroid that showed TSH >150, FT4 0.8, FT3 1.7 which is low and T3 1.7 which is low and total T3 48 which is low with vitamin B12 level of 390 and vitamin D level of 17.    She did not seek an endocrinologist or a psychiatrist/psychologist since last seen she lost her phone although she did get a blood test done.  She reports the chest pain being very much localized around the anterior part of the chest nonradiating associated with difficulty taking deep breaths and feeling lightheaded and dizzy.  She denies any palpitations although she does feel that she has been having a lot of issues in terms of her health.  She also reports that she has noticed widespread bruising all along her upper and lower extremity which is not new and has have happened in the past few months and started last February before she establish care with us.  Denies any trauma or injuries or muscle problems or any bleeding disorders.  She does report since she delivered her baby which was 5 years ago she has been having issues with her periods where she bleeds more than the usual for the first 3 days and ends up using almost 1 pack of pads for the whole 3 days.  The blood test that was done recently did not show any profound anemia  although she did had history of anemia in the past.    Patient Active Problem List    Diagnosis Date Noted   • Postoperative hypothyroidism 09/12/2018     Priority: High   • Frequent hospital admissions 09/13/2018     Priority: Medium   • Anxiety 02/16/2017     Priority: Medium   • Myalgia 09/12/2018     Priority: Low   • Panic attack 03/11/2019   • Noncompliance with medication regimen 07/04/2017   • TSH elevation 07/04/2017   • Microcytosis 02/16/2017   • Health care maintenance 12/05/2016   • Hyperthyroidism 12/05/2016   • Graves disease 12/05/2016   • History of pericarditis 12/05/2016   • Mood disorder (HCC) 12/05/2016   • Not immune to rubella 05/23/2013   • Migraine with aura, not intractable 09/28/2010       Current Outpatient Prescriptions   Medication Sig Dispense Refill   • ferrous sulfate 325 (65 Fe) MG tablet Take 1 Tab by mouth 2 Times a Day. 60 Tab 3   • levothyroxine (SYNTHROID) 175 MCG Tab Take 1 Tab by mouth Every morning on an empty stomach. 30 Tab 6     No current facility-administered medications for this visit.          Health Maintenance        Hep C: Screening for those born between [birth year]50-9537    Diabetes: All non-Caucasians; All Caucasians with sustained blood pressure greater than 135/80, or BMI greater than or equal to 25, or history of gestational diabetes, or family history of diabetes.    Colorectal Cancer (Options): Colonoscopy every 10 years; Fecal Occult Blood Testing every 3 years with sigmoidoscopy every 5 years; or Annual Fecal Occult Blood testing.    HIV Screening: Between ages 15-65    Alcohol Misuse:     Influenza: Yearly    Tdap/Td: Adults under 65 who have never received Tdap should get a Tdap booster, regardless of when a prior Td was given. After this, Td is required every 10 years.    Zoster (Shingles): At age 60    Pneumococcal Vaccine: Series beginning at age 65    Men's [facility]  Lipid Test: Every 5 years  Prostate Specific Antigen (PSA): Current evidence does not  "recommend routine PSA screening for average risk men.    Women's Health  Cervical Cancer Screening: Pap only every 3 years for ages 21-29, Pap test with HPV screening every 5 years from ages 30-65  Mammogram: Every 2 years  Bone Density: At age 65                ROS: As per HPI. Additional pertinent symptoms as noted below.      /80 (BP Location: Left arm, Patient Position: Sitting, BP Cuff Size: Adult)   Pulse 67   Temp 36.8 °C (98.3 °F) (Temporal)   Ht 1.588 m (5' 2.52\")   Wt 59.9 kg (132 lb)   SpO2 98%   BMI 23.74 kg/m²     Physical Exam     ConstitutionaAlert and oriented, No acute distress   HEENT: Normocephalic, Atraumatic, Bilateral external ears normal, Oropharynx moist mucous membranes, No oral exudates, Nose normal. No thyromegaly.   Eyes: PERRLA, EOMI, Conjunctiva normal, No discharge.   Neck: Normal range of motion, No stridor, no JVD.   Cardiovascular: Normal heart rate, Normal rhythm, No murmurs, No rubs, No gallops.   Lungs: Clear to auscultation bilaterally   Abdomen: Bowel sounds normal, Soft, , No guarding   Skin: +widespread bruising.   Neurologic: Normal motor function, No focal deficits noted, cranial nerves II through XII are normal   Psychiatric: Affect normal, Judgment normal, Mood normal.   Extremities: B/L Peripheral Pulses +, no pitting edema.      Assessment and Plan    1.  History of Graves' disease status post thyroidectomy  #Postop hypothyroidism  #Elevated TSH with undetectable T4/T3  -TSH>150, low free T4/T3.   -She is having some signs and symptoms of hypothyroidism including fatigue, muscle aches,bruising.  -Please note she has had TSH>350 with undetectable levels of T4/T3 in the past.  -Increased dose of Levothyroxine from 150 to 175 mcg.  -New referral placed again for endocrinology today.   -Repeat testing in the next visit.     2.Myalgia  -with widespread bruising.  ?Thyroid disease related.     3. Chest discomfort  # Short of breath on exertion  -EKG done in the " office today showed no evidence of ischemia ST segment changes branch blocks axis deviation with no evidence of ischemic heart disease.  -Unclear etiology at this point but patient made aware of signs and symptoms to watch out for just in case if her symptoms gets worse she will to seek immediate care or go to the urgent care center.    4. Bruising  ?Unclear etiology  Platelet counts in past normal  Check for bleeding disorders, platelet function test, DEJON, ANCA.    5. Swallowing problem  -to both solids and liquids with no regurgitation.  -Unclear etiology and will need detail assessment in the next visit.     6. Menometrorrhagia  ?thyroid related problems  Check CBC and platelet function test.     7. Microscopic hematuria  We shall repeat UA.   Denies any UTI like symptoms.     8. Anxiety  # Panic attack  -Frequent hospitalizations and based on chart review some documented history of somatization, malingering and anxiety.  -Patient not interested in any pharmacotherapy at this point and is encouraged to seek medical care for worsening of symptoms.  -She reports intermittent low mood/anxiety episodes but denies any homicidal or suicidal ideations.  -Referrals placed last time but patient lost her phone so failed to follow up.  -At this time she would like to get better medically before seeking mental care.  -Please note no schizoprenia or dick like symptoms/.       Signed by: Hamlet Potter M.D.

## 2019-05-08 ENCOUNTER — HOSPITAL ENCOUNTER (EMERGENCY)
Facility: MEDICAL CENTER | Age: 24
End: 2019-05-08
Attending: EMERGENCY MEDICINE
Payer: MEDICAID

## 2019-05-08 ENCOUNTER — APPOINTMENT (OUTPATIENT)
Dept: RADIOLOGY | Facility: MEDICAL CENTER | Age: 24
End: 2019-05-08
Attending: EMERGENCY MEDICINE
Payer: MEDICAID

## 2019-05-08 VITALS
WEIGHT: 130 LBS | OXYGEN SATURATION: 99 % | DIASTOLIC BLOOD PRESSURE: 76 MMHG | RESPIRATION RATE: 20 BRPM | SYSTOLIC BLOOD PRESSURE: 115 MMHG | HEIGHT: 63 IN | TEMPERATURE: 97.9 F | BODY MASS INDEX: 23.04 KG/M2 | HEART RATE: 94 BPM

## 2019-05-08 DIAGNOSIS — R10.31 RIGHT LOWER QUADRANT PAIN: ICD-10-CM

## 2019-05-08 DIAGNOSIS — E87.6 HYPOKALEMIA: ICD-10-CM

## 2019-05-08 DIAGNOSIS — E86.0 DEHYDRATION: ICD-10-CM

## 2019-05-08 DIAGNOSIS — N83.209 HEMORRHAGIC OVARIAN CYST: ICD-10-CM

## 2019-05-08 LAB
ALBUMIN SERPL BCP-MCNC: 4.5 G/DL (ref 3.2–4.9)
ALBUMIN/GLOB SERPL: 1.4 G/DL
ALP SERPL-CCNC: 53 U/L (ref 30–99)
ALT SERPL-CCNC: 6 U/L (ref 2–50)
AMPHET UR QL SCN: NEGATIVE
ANION GAP SERPL CALC-SCNC: 12 MMOL/L (ref 0–11.9)
APPEARANCE UR: ABNORMAL
APPEARANCE UR: CLEAR
AST SERPL-CCNC: 12 U/L (ref 12–45)
BACTERIA #/AREA URNS HPF: ABNORMAL /HPF
BACTERIA GENITAL QL WET PREP: NORMAL
BARBITURATES UR QL SCN: NEGATIVE
BASOPHILS # BLD AUTO: 0.3 % (ref 0–1.8)
BASOPHILS # BLD: 0.04 K/UL (ref 0–0.12)
BENZODIAZ UR QL SCN: NEGATIVE
BILIRUB SERPL-MCNC: 0.7 MG/DL (ref 0.1–1.5)
BILIRUB UR QL STRIP.AUTO: NEGATIVE
BILIRUB UR QL STRIP.AUTO: NEGATIVE
BUN SERPL-MCNC: 12 MG/DL (ref 8–22)
BZE UR QL SCN: NEGATIVE
CALCIUM SERPL-MCNC: 9.5 MG/DL (ref 8.5–10.5)
CANNABINOIDS UR QL SCN: POSITIVE
CHLORIDE SERPL-SCNC: 105 MMOL/L (ref 96–112)
CK SERPL-CCNC: 56 U/L (ref 0–154)
CO2 SERPL-SCNC: 19 MMOL/L (ref 20–33)
COLOR UR: YELLOW
COLOR UR: YELLOW
CREAT SERPL-MCNC: 0.69 MG/DL (ref 0.5–1.4)
EKG IMPRESSION: NORMAL
EOSINOPHIL # BLD AUTO: 0 K/UL (ref 0–0.51)
EOSINOPHIL NFR BLD: 0 % (ref 0–6.9)
EPI CELLS #/AREA URNS HPF: ABNORMAL /HPF
ERYTHROCYTE [DISTWIDTH] IN BLOOD BY AUTOMATED COUNT: 48 FL (ref 35.9–50)
GLOBULIN SER CALC-MCNC: 3.3 G/DL (ref 1.9–3.5)
GLUCOSE SERPL-MCNC: 137 MG/DL (ref 65–99)
GLUCOSE UR STRIP.AUTO-MCNC: NEGATIVE MG/DL
GLUCOSE UR STRIP.AUTO-MCNC: NEGATIVE MG/DL
HCG SERPL QL: NEGATIVE
HCT VFR BLD AUTO: 38.8 % (ref 37–47)
HGB BLD-MCNC: 12.7 G/DL (ref 12–16)
HYALINE CASTS #/AREA URNS LPF: ABNORMAL /LPF
IMM GRANULOCYTES # BLD AUTO: 0.03 K/UL (ref 0–0.11)
IMM GRANULOCYTES NFR BLD AUTO: 0.2 % (ref 0–0.9)
KETONES UR STRIP.AUTO-MCNC: 80 MG/DL
KETONES UR STRIP.AUTO-MCNC: >=160 MG/DL
LEUKOCYTE ESTERASE UR QL STRIP.AUTO: ABNORMAL
LEUKOCYTE ESTERASE UR QL STRIP.AUTO: NEGATIVE
LIPASE SERPL-CCNC: 13 U/L (ref 11–82)
LYMPHOCYTES # BLD AUTO: 1.07 K/UL (ref 1–4.8)
LYMPHOCYTES NFR BLD: 8.7 % (ref 22–41)
MCH RBC QN AUTO: 29.4 PG (ref 27–33)
MCHC RBC AUTO-ENTMCNC: 32.7 G/DL (ref 33.6–35)
MCV RBC AUTO: 89.8 FL (ref 81.4–97.8)
METHADONE UR QL SCN: NEGATIVE
MICRO URNS: ABNORMAL
MICRO URNS: ABNORMAL
MONOCYTES # BLD AUTO: 0.27 K/UL (ref 0–0.85)
MONOCYTES NFR BLD AUTO: 2.2 % (ref 0–13.4)
MUCOUS THREADS #/AREA URNS HPF: ABNORMAL /HPF
NEUTROPHILS # BLD AUTO: 10.82 K/UL (ref 2–7.15)
NEUTROPHILS NFR BLD: 88.6 % (ref 44–72)
NITRITE UR QL STRIP.AUTO: NEGATIVE
NITRITE UR QL STRIP.AUTO: NEGATIVE
NRBC # BLD AUTO: 0 K/UL
NRBC BLD-RTO: 0 /100 WBC
OPIATES UR QL SCN: NEGATIVE
OXYCODONE UR QL SCN: NEGATIVE
PCP UR QL SCN: NEGATIVE
PH UR STRIP.AUTO: 6.5 [PH]
PH UR STRIP.AUTO: 7 [PH]
PLATELET # BLD AUTO: 241 K/UL (ref 164–446)
PMV BLD AUTO: 11.2 FL (ref 9–12.9)
POTASSIUM SERPL-SCNC: 3.3 MMOL/L (ref 3.6–5.5)
PROPOXYPH UR QL SCN: NEGATIVE
PROT SERPL-MCNC: 7.8 G/DL (ref 6–8.2)
PROT UR QL STRIP: NEGATIVE MG/DL
PROT UR QL STRIP: NEGATIVE MG/DL
RBC # BLD AUTO: 4.32 M/UL (ref 4.2–5.4)
RBC # URNS HPF: ABNORMAL /HPF
RBC UR QL AUTO: ABNORMAL
RBC UR QL AUTO: NEGATIVE
SIGNIFICANT IND 70042: NORMAL
SITE SITE: NORMAL
SODIUM SERPL-SCNC: 136 MMOL/L (ref 135–145)
SOURCE SOURCE: NORMAL
SP GR UR STRIP.AUTO: 1.02
SP GR UR STRIP.AUTO: 1.03
T4 FREE SERPL-MCNC: 1.94 NG/DL (ref 0.53–1.43)
TSH SERPL DL<=0.005 MIU/L-ACNC: 1.63 UIU/ML (ref 0.38–5.33)
UROBILINOGEN UR STRIP.AUTO-MCNC: 0.2 MG/DL
UROBILINOGEN UR STRIP.AUTO-MCNC: 1 MG/DL
WBC # BLD AUTO: 12.2 K/UL (ref 4.8–10.8)
WBC #/AREA URNS HPF: ABNORMAL /HPF

## 2019-05-08 PROCEDURE — 99285 EMERGENCY DEPT VISIT HI MDM: CPT

## 2019-05-08 PROCEDURE — 93005 ELECTROCARDIOGRAM TRACING: CPT | Performed by: EMERGENCY MEDICINE

## 2019-05-08 PROCEDURE — 87591 N.GONORRHOEAE DNA AMP PROB: CPT

## 2019-05-08 PROCEDURE — 84439 ASSAY OF FREE THYROXINE: CPT

## 2019-05-08 PROCEDURE — 84703 CHORIONIC GONADOTROPIN ASSAY: CPT

## 2019-05-08 PROCEDURE — 84443 ASSAY THYROID STIM HORMONE: CPT

## 2019-05-08 PROCEDURE — 93005 ELECTROCARDIOGRAM TRACING: CPT

## 2019-05-08 PROCEDURE — 700105 HCHG RX REV CODE 258: Performed by: EMERGENCY MEDICINE

## 2019-05-08 PROCEDURE — 96375 TX/PRO/DX INJ NEW DRUG ADDON: CPT

## 2019-05-08 PROCEDURE — 96374 THER/PROPH/DIAG INJ IV PUSH: CPT | Mod: 25

## 2019-05-08 PROCEDURE — 74177 CT ABD & PELVIS W/CONTRAST: CPT

## 2019-05-08 PROCEDURE — 82550 ASSAY OF CK (CPK): CPT

## 2019-05-08 PROCEDURE — 87491 CHLMYD TRACH DNA AMP PROBE: CPT

## 2019-05-08 PROCEDURE — 81003 URINALYSIS AUTO W/O SCOPE: CPT | Mod: XU

## 2019-05-08 PROCEDURE — 700117 HCHG RX CONTRAST REV CODE 255: Performed by: EMERGENCY MEDICINE

## 2019-05-08 PROCEDURE — 80307 DRUG TEST PRSMV CHEM ANLYZR: CPT

## 2019-05-08 PROCEDURE — 85025 COMPLETE CBC W/AUTO DIFF WBC: CPT

## 2019-05-08 PROCEDURE — 700102 HCHG RX REV CODE 250 W/ 637 OVERRIDE(OP)

## 2019-05-08 PROCEDURE — A9270 NON-COVERED ITEM OR SERVICE: HCPCS

## 2019-05-08 PROCEDURE — 81001 URINALYSIS AUTO W/SCOPE: CPT | Mod: XU

## 2019-05-08 PROCEDURE — 83690 ASSAY OF LIPASE: CPT

## 2019-05-08 PROCEDURE — 80053 COMPREHEN METABOLIC PANEL: CPT

## 2019-05-08 PROCEDURE — 700111 HCHG RX REV CODE 636 W/ 250 OVERRIDE (IP): Performed by: EMERGENCY MEDICINE

## 2019-05-08 PROCEDURE — 51701 INSERT BLADDER CATHETER: CPT

## 2019-05-08 PROCEDURE — 87086 URINE CULTURE/COLONY COUNT: CPT

## 2019-05-08 RX ORDER — ONDANSETRON 4 MG/1
4 TABLET, ORALLY DISINTEGRATING ORAL EVERY 8 HOURS PRN
Qty: 10 TAB | Refills: 0 | Status: SHIPPED | OUTPATIENT
Start: 2019-05-08 | End: 2019-05-23

## 2019-05-08 RX ORDER — ONDANSETRON 2 MG/ML
4 INJECTION INTRAMUSCULAR; INTRAVENOUS ONCE
Status: COMPLETED | OUTPATIENT
Start: 2019-05-08 | End: 2019-05-08

## 2019-05-08 RX ORDER — SODIUM CHLORIDE 9 MG/ML
1000 INJECTION, SOLUTION INTRAVENOUS ONCE
Status: COMPLETED | OUTPATIENT
Start: 2019-05-08 | End: 2019-05-08

## 2019-05-08 RX ORDER — KETOROLAC TROMETHAMINE 30 MG/ML
30 INJECTION, SOLUTION INTRAMUSCULAR; INTRAVENOUS ONCE
Status: COMPLETED | OUTPATIENT
Start: 2019-05-08 | End: 2019-05-08

## 2019-05-08 RX ORDER — SODIUM CHLORIDE 9 MG/ML
1000 INJECTION, SOLUTION INTRAVENOUS CONTINUOUS
Status: ACTIVE | OUTPATIENT
Start: 2019-05-08 | End: 2019-05-08

## 2019-05-08 RX ORDER — FAMOTIDINE 20 MG/1
20 TABLET, FILM COATED ORAL 2 TIMES DAILY
Qty: 60 TAB | Refills: 0 | Status: SHIPPED | OUTPATIENT
Start: 2019-05-08 | End: 2019-05-23

## 2019-05-08 RX ORDER — IBUPROFEN 600 MG/1
600 TABLET ORAL ONCE
Status: COMPLETED | OUTPATIENT
Start: 2019-05-08 | End: 2019-05-08

## 2019-05-08 RX ADMIN — ONDANSETRON 4 MG: 2 INJECTION INTRAMUSCULAR; INTRAVENOUS at 13:51

## 2019-05-08 RX ADMIN — SODIUM CHLORIDE 1000 ML: 9 INJECTION, SOLUTION INTRAVENOUS at 17:06

## 2019-05-08 RX ADMIN — IOHEXOL 100 ML: 350 INJECTION, SOLUTION INTRAVENOUS at 13:00

## 2019-05-08 RX ADMIN — SODIUM CHLORIDE 1000 ML: 9 INJECTION, SOLUTION INTRAVENOUS at 14:15

## 2019-05-08 RX ADMIN — KETOROLAC TROMETHAMINE 30 MG: 30 INJECTION, SOLUTION INTRAMUSCULAR at 14:15

## 2019-05-08 RX ADMIN — IBUPROFEN 600 MG: 600 TABLET ORAL at 19:48

## 2019-05-08 NOTE — ED PROVIDER NOTES
ED Provider Note    CHIEF COMPLAINT  Chief Complaint   Patient presents with   • N/V   • Abdominal Pain     Patient's mother is at the bedside and gives most of the history    HPI  Ivis Klein is a 23 y.o. female with a history of anemia and hypothyroidism status post thyroidectomy 18 months ago who presents complaining of generalized body pain and vomiting.    Mom states the patient began vomiting this morning.  They lived together and the mother had gone to work and returned home for lunch and found the patient in her current state.  She states she has had similar symptoms in the past when her thyroid level has been abnormal.  She states the patient was normal/at her baseline yesterday.    ALLERGIES  Allergies   Allergen Reactions   • Nkda [No Known Drug Allergy]        CURRENT MEDICATIONS  Home Medications     Reviewed by Lynne Go (Pharmacy Tech) on 05/08/19 at 1543  Med List Status: Complete   Medication Last Dose Status   levothyroxine (SYNTHROID) 175 MCG Tab 5/8/2019 Active                PAST MEDICAL HISTORY   has a past medical history of Anxiety (2/16/2017); Arrhythmia; Breath shortness; Graves disease (12/5/2016); Heart valve disease; Hyperthyroidism (6/29/2016); Menarche; Migraine; Pericarditis (06/2016); Pregnancy; Psychiatric problem; Supervision of normal first teen pregnancy; and Vomiting (6/29/2016).    SURGICAL HISTORY   has a past surgical history that includes primary c section (9/8/2013) and thyroidectomy total (Bilateral, 3/22/2017).    SOCIAL HISTORY  Social History     Social History Main Topics   • Smoking status: Never Smoker   • Smokeless tobacco: Never Used      Comment: quit in 2012   • Alcohol use No   • Drug use: Yes     Types: Marijuana, Inhaled      Comment: marijuana weekly   • Sexual activity: Yes     Partners: Male     Birth control/ protection: Abstinence      Comment: single     Here with her mom  Lives with her boyfriend    REVIEW OF SYSTEMS  See HPI for  "further details.  History is limited by patient's inability/unwillingness to speak    PHYSICAL EXAM  VITAL SIGNS: /76   Pulse 94   Temp (!) 38.1 °C (100.6 °F) (Temporal)   Resp 20   Ht 1.6 m (5' 3\")   Wt 59 kg (130 lb)   LMP  (Within Weeks)   SpO2 98%   BMI 23.03 kg/m²    General:  WDWN, crying, moaning, verbal and able to state her name with much encouragement; V/S as above; states she is in [place] and knows her name; tearful at times  Skin: warm and dry; slightly pale color; no rash  HEENT: NCAT; EOMs intact; PERRL; no scleral icterus   Neck: FROM; no meningismus, no LAD  Cardiovascular: Regular heart rate and rhythm.  No murmurs, rubs, or gallops; pulses 2+ bilaterally radially and DP areas  Lungs: Clear to auscultation with good air movement bilaterally.  No wheezes, rhonchi, or rales.   Abdomen: BS present; soft; NTND; no rebound, guarding, or rigidity.  No organomegaly or pulsatile mass  Extremities: MENSAH x 4; no e/o trauma; no pedal edema; neg Dwayne's  Neurologic: CNs III-XII grossly intact; speech clear; distal sensation intact; strength 5/5 UE/LEs  Psychiatric: Appropriate affect, anxious mood  Pelvic: Normal external female genitalia with whitish discharge in the vault; no cervical motion tenderness, no adnexal or uterine tenderness or masses    LABS  Results for orders placed or performed during the hospital encounter of 05/08/19   CBC WITH DIFFERENTIAL   Result Value Ref Range    WBC 12.2 (H) 4.8 - 10.8 K/uL    RBC 4.32 4.20 - 5.40 M/uL    Hemoglobin 12.7 12.0 - 16.0 g/dL    Hematocrit 38.8 37.0 - 47.0 %    MCV 89.8 81.4 - 97.8 fL    MCH 29.4 27.0 - 33.0 pg    MCHC 32.7 (L) 33.6 - 35.0 g/dL    RDW 48.0 35.9 - 50.0 fL    Platelet Count 241 164 - 446 K/uL    MPV 11.2 9.0 - 12.9 fL    Neutrophils-Polys 88.60 (H) 44.00 - 72.00 %    Lymphocytes 8.70 (L) 22.00 - 41.00 %    Monocytes 2.20 0.00 - 13.40 %    Eosinophils 0.00 0.00 - 6.90 %    Basophils 0.30 0.00 - 1.80 %    Immature Granulocytes 0.20 " 0.00 - 0.90 %    Nucleated RBC 0.00 /100 WBC    Neutrophils (Absolute) 10.82 (H) 2.00 - 7.15 K/uL    Lymphs (Absolute) 1.07 1.00 - 4.80 K/uL    Monos (Absolute) 0.27 0.00 - 0.85 K/uL    Eos (Absolute) 0.00 0.00 - 0.51 K/uL    Baso (Absolute) 0.04 0.00 - 0.12 K/uL    Immature Granulocytes (abs) 0.03 0.00 - 0.11 K/uL    NRBC (Absolute) 0.00 K/uL   COMP METABOLIC PANEL   Result Value Ref Range    Sodium 136 135 - 145 mmol/L    Potassium 3.3 (L) 3.6 - 5.5 mmol/L    Chloride 105 96 - 112 mmol/L    Co2 19 (L) 20 - 33 mmol/L    Anion Gap 12.0 (H) 0.0 - 11.9    Glucose 137 (H) 65 - 99 mg/dL    Bun 12 8 - 22 mg/dL    Creatinine 0.69 0.50 - 1.40 mg/dL    Calcium 9.5 8.5 - 10.5 mg/dL    AST(SGOT) 12 12 - 45 U/L    ALT(SGPT) 6 2 - 50 U/L    Alkaline Phosphatase 53 30 - 99 U/L    Total Bilirubin 0.7 0.1 - 1.5 mg/dL    Albumin 4.5 3.2 - 4.9 g/dL    Total Protein 7.8 6.0 - 8.2 g/dL    Globulin 3.3 1.9 - 3.5 g/dL    A-G Ratio 1.4 g/dL   LIPASE   Result Value Ref Range    Lipase 13 11 - 82 U/L   HCG QUAL SERUM   Result Value Ref Range    Beta-Hcg Qualitative Serum Negative Negative   URINALYSIS,CULTURE IF INDICATED   Result Value Ref Range    Color Yellow     Character Cloudy (A)     Specific Gravity 1.029 <1.035    Ph 6.5 5.0 - 8.0    Glucose Negative Negative mg/dL    Ketones >=160 Negative mg/dL    Protein Negative Negative mg/dL    Bilirubin Negative Negative    Urobilinogen, Urine 1.0 Negative    Nitrite Negative Negative    Leukocyte Esterase Large (A) Negative    Occult Blood Trace (A) Negative    Micro Urine Req Microscopic    TSH   Result Value Ref Range    TSH 1.630 0.380 - 5.330 uIU/mL   FREE THYROXINE   Result Value Ref Range    Free T-4 1.94 (H) 0.53 - 1.43 ng/dL   URINE MICROSCOPIC (W/UA)   Result Value Ref Range    WBC 2-5 /hpf    RBC 0-2 /hpf    Bacteria Rare (A) None /hpf    Epithelial Cells Moderate (A) /hpf    Mucous Threads Moderate /hpf    Hyaline Cast 0-2 /lpf   ESTIMATED GFR   Result Value Ref Range    GFR If  African American >60 >60 mL/min/1.73 m 2    GFR If Non African American >60 >60 mL/min/1.73 m 2   URINE DRUG SCREEN   Result Value Ref Range    Amphetamines Urine Negative Negative    Barbiturates Negative Negative    Benzodiazepines Negative Negative    Cocaine Metabolite Negative Negative    Methadone Negative Negative    Opiates Negative Negative    Oxycodone Negative Negative    Phencyclidine -Pcp Negative Negative    Propoxyphene Negative Negative    Cannabinoid Metab Positive (A) Negative   URINALYSIS   Result Value Ref Range    Color Yellow     Character Clear     Specific Gravity 1.016 <1.035    Ph 7.0 5.0 - 8.0    Glucose Negative Negative mg/dL    Ketones 80 (A) Negative mg/dL    Protein Negative Negative mg/dL    Bilirubin Negative Negative    Urobilinogen, Urine 0.2 Negative    Nitrite Negative Negative    Leukocyte Esterase Negative Negative    Occult Blood Negative Negative    Micro Urine Req see below    CREATINE KINASE   Result Value Ref Range    CPK Total 56 0 - 154 U/L   CHLAMYDIA & GC BY PCR   Result Value Ref Range    Source Vaginal    EKG (NOW)   Result Value Ref Range    Report       [facility] Emergency Dept.    Test Date:  2019  Pt Name:    MICHELLE MENARD               Department: ER  MRN:        [id number]                      Room:  Gender:     Female                       Technician: [id number]  :        [id number]                   Requested By:ER TRIAGE PROTOCOL  Order #:    [id number]                    Reading MD: HILARY CLARK MD    Measurements  Intervals                                Axis  Rate:       77                           P:          26  AZ:         184                          QRS:        69  QRSD:       76                           T:          35  QT:         364  QTc:        412    Interpretive Statements  SINUS RHYTHM  Compared to ECG 2018 19:15:15  No significant changes    Electronically Signed On 2019 13:30:42 PDT by HILARY CLARK MD          MEDICAL RECORD  I have reviewed patient's medical record and pertinent results are listed below.      COURSE & MEDICAL DECISION MAKING  I have reviewed any medical record information, laboratory studies and radiographic results as noted.    Ivis Klein is a 23 y.o. female who presents complaining of generalized body pain.  Reportedly, she began vomiting this morning.    Differential diagnosis includes but is not limited to thyroid disorder, dehydration, UTI, anxiety, electrolyte abnormality.      NS bolus was ordered for possible dehydration related to patient's sxs of vomiting with evidence of dehydration on labs including ketonuria, CO2 of 19, anion gap of 12, potassium 3.3.    Pt's HR normalized after fluid bolus.    Labs demonstrate a mild leukocytosis of 12.2 without bandemia or elevation of immature granulocytes, hypokalemia of 3.3, CO2 19, anion gap 12, glucose 137, and large leukocyte esterase and ketones of 160 in the urine.    UDS, diagnostic alcohol, repeat urinalysis given the epithelial cells present on the first sample, and creatinine kinase are pending.    5:48 PM  UDS demonstrates cannabinoids.  Urinalysis is negative for UTI.  CPK is normal.  Patient is reevaluated.  She is now arousable and verbal, giving me history that she has had right lower quadrant pain since April.  She has developed a low-grade temperature here in the ER of 100.6.  She reports vaginal discharge.  We will set up for pelvic exam, obtain swabs, and imaging to evaluate for cyst versus appendicitis.    6:16 PM  Pelvic completed with female chaperone.  Pelvic swabs obtained and pending.  CT abdomen/pelvis ordered.  I will ask Dr. Caicedo, the oncoming ERP, to follow up on these results.  If CT and pelvic swabs are negative, the patient may be discharged home with abdominal pain return precautions and a follow-up with her primary care provider tomorrow.  A prescription for Zofran has been sent to her  pharmacy.    FINAL IMPRESSION  1. Dehydration    2. Hypokalemia    3. Right lower quadrant pain      Electronically signed by: Ami Fitzpatrick, 5/8/2019 1:50 PM

## 2019-05-08 NOTE — ED NOTES
Break RN: Medicated per MAR for pain, IVF infusing, mother at bedside, lab at bedside for blood draw.

## 2019-05-08 NOTE — ED TRIAGE NOTES
".  Chief Complaint   Patient presents with   • N/V   • Abdominal Pain     ./76   Pulse 100   Temp 37 °C (98.6 °F) (Temporal)   Resp 20   Ht 1.6 m (5' 3\")   Wt 59 kg (130 lb)   LMP  (Within Weeks)   SpO2 95%   BMI 23.03 kg/m²     Patient to triage with above complaints, BIB mother, mother answering most questions in triage as patient is dry heaving, states patient \"passed out\" in bathroom at home today, unwitnessed, symptoms started this morning, abdominal pain protocol initiated.    "

## 2019-05-09 LAB
C TRACH DNA SPEC QL NAA+PROBE: NEGATIVE
N GONORRHOEA DNA SPEC QL NAA+PROBE: NEGATIVE
SPECIMEN SOURCE: NORMAL

## 2019-05-09 NOTE — ED PROVIDER NOTES
ED Provider Note    Patient was turned over me pending CT results.  Discussed the patient findings of a left ovarian cyst.  Her pain may be referred from this.  Her appendix is normal.  We will give her Pepcid because of the epigastric pain she may have some slight gastritis from this.    1. Dehydration      2. Hypokalemia      3. Right lower quadrant pain      4. Hemorrhagic ovarian cyst  Comments: Left

## 2019-05-09 NOTE — ED NOTES
Pt given dc inst & aware rx at pharm. piv dc. Bleeding controlled. Ambulatory to lobby c steady gait c family.

## 2019-05-09 NOTE — DISCHARGE INSTRUCTIONS
Discontinue use of marijuana as this may be leading to nausea and vomiting    Return to the ER for fever, increased pain or continued pain over the next 8 to 12-hours, vomiting, or other concerns    See your primary care doctor for recheck tomorrow

## 2019-05-10 LAB
BACTERIA UR CULT: ABNORMAL
BACTERIA UR CULT: ABNORMAL
BACTERIA UR CULT: NORMAL
SIGNIFICANT IND 70042: ABNORMAL
SIGNIFICANT IND 70042: NORMAL
SITE SITE: ABNORMAL
SITE SITE: NORMAL
SOURCE SOURCE: ABNORMAL
SOURCE SOURCE: NORMAL

## 2019-05-11 NOTE — ED NOTES
ED Positive Culture Follow-up/Notification Note:    Date: 5/11/19     Patient seen in the ED on 5/8/2019 for nausea, vomiting, and abdominal pain.   1. Dehydration    2. Hypokalemia    3. Right lower quadrant pain    4. Hemorrhagic ovarian cyst       Discharge Medication List as of 5/8/2019  8:02 PM      START taking these medications    Details   ondansetron (ZOFRAN ODT) 4 MG TABLET DISPERSIBLE Take 1 Tab by mouth every 8 hours as needed for Nausea., Disp-10 Tab, R-0, Normal      famotidine (PEPCID) 20 MG Tab Take 1 Tab by mouth 2 times a day., Disp-60 Tab, R-0, Normal             Allergies: Nkda [no known drug allergy]     Vitals:    05/08/19 1700 05/08/19 1805 05/08/19 1930 05/08/19 2014   BP:       Pulse: 98 94 94    Resp:       Temp:    36.6 °C (97.9 °F)   TempSrc:    Oral   SpO2: 97% 98% 99%    Weight:       Height:           Final cultures:   Results     Procedure Component Value Units Date/Time    URINE CULTURE(NEW) [[id number]]  (Abnormal) Collected:  05/08/19 1349    Order Status:  Completed Specimen:  Urine Updated:  05/10/19 0857     Significant Indicator POS (POS)     Source UR     Site -     Culture Result Mixed skin soto >100,000 cfu/mL (A)      Yeast  <10,000 cfu/mL   (A)    URINE CULTURE(NEW) [[id number]] Collected:  05/08/19 1648    Order Status:  Completed Specimen:  Urine Updated:  05/10/19 0818     Significant Indicator NEG     Source UR     Site -     Culture Result No growth at 48 hours.    Narrative:       Indication for culture:->Emergency Room Patient  Indication for culture:->Emergency Room Patient    CHLAMYDIA & GC BY PCR [[id number]] Collected:  05/08/19 1806    Order Status:  Completed Specimen:  Genital from Genital Updated:  05/09/19 2156     C. trachomatis by PCR Negative     N. gonorrhoeae by PCR Negative     Source Vaginal    WET PREP [[id number]] Collected:  05/08/19 1806    Order Status:  Completed Specimen:  Genital from Vaginal Updated:  05/08/19 1821     Significant Indicator NEG      Source GEN     Site VAGINAL     Wet Prep For Parasites Few WBCs seen.  Few clue cells seen.  No yeast.  No motile Trichomonas seen.      URINALYSIS [[id number]]  (Abnormal) Collected:  05/08/19 1648    Order Status:  Completed Specimen:  Urine Updated:  05/08/19 1658     Color Yellow     Character Clear     Specific Gravity 1.016     Ph 7.0     Glucose Negative mg/dL      Ketones 80 (A) mg/dL      Protein Negative mg/dL      Bilirubin Negative     Urobilinogen, Urine 0.2     Nitrite Negative     Leukocyte Esterase Negative     Occult Blood Negative     Micro Urine Req see below     Comment: Microscopic examination not performed when specimen is clear  and chemically negative for protein, blood, leukocyte esterase  and nitrite.         Narrative:       Indication for culture:->Emergency Room Patient    URINALYSIS,CULTURE IF INDICATED [[id number]]  (Abnormal) Collected:  05/08/19 1349    Order Status:  Completed Specimen:  Urine Updated:  05/08/19 1423     Color Yellow     Character Cloudy (A)     Specific Gravity 1.029     Ph 6.5     Glucose Negative mg/dL      Ketones >=160 mg/dL      Protein Negative mg/dL      Bilirubin Negative     Urobilinogen, Urine 1.0     Nitrite Negative     Leukocyte Esterase Large (A)     Occult Blood Trace (A)     Micro Urine Req Microscopic          Plan:   No antibiotics prescribed on presentation.  Growth represents contaminant and a common colonizer of the female urinary tract.   No treatment recommended.     Chilango Bartholomew

## 2019-05-23 ENCOUNTER — OFFICE VISIT (OUTPATIENT)
Dept: INTERNAL MEDICINE | Facility: MEDICAL CENTER | Age: 24
End: 2019-05-23
Payer: MEDICAID

## 2019-05-23 VITALS
HEART RATE: 76 BPM | OXYGEN SATURATION: 98 % | SYSTOLIC BLOOD PRESSURE: 104 MMHG | DIASTOLIC BLOOD PRESSURE: 64 MMHG | TEMPERATURE: 98.1 F | BODY MASS INDEX: 22.93 KG/M2 | WEIGHT: 129.4 LBS | HEIGHT: 63 IN

## 2019-05-23 DIAGNOSIS — R53.83 OTHER FATIGUE: ICD-10-CM

## 2019-05-23 DIAGNOSIS — E05.00 GRAVES DISEASE: ICD-10-CM

## 2019-05-23 DIAGNOSIS — F39 MOOD DISORDER (HCC): ICD-10-CM

## 2019-05-23 DIAGNOSIS — N83.202 HEMORRHAGIC CYSTS OF BOTH OVARIES: ICD-10-CM

## 2019-05-23 DIAGNOSIS — M79.10 MYALGIA: ICD-10-CM

## 2019-05-23 DIAGNOSIS — N83.201 HEMORRHAGIC CYSTS OF BOTH OVARIES: ICD-10-CM

## 2019-05-23 DIAGNOSIS — E89.0 POSTOPERATIVE HYPOTHYROIDISM: ICD-10-CM

## 2019-05-23 DIAGNOSIS — R76.8 ELEVATED ANTINUCLEAR ANTIBODY (ANA) LEVEL: ICD-10-CM

## 2019-05-23 DIAGNOSIS — R79.89 TSH ELEVATION: ICD-10-CM

## 2019-05-23 DIAGNOSIS — T14.8XXA BRUISING: ICD-10-CM

## 2019-05-23 DIAGNOSIS — I31.9 PERICARDITIS, UNSPECIFIED CHRONICITY, UNSPECIFIED TYPE: ICD-10-CM

## 2019-05-23 PROCEDURE — 99214 OFFICE O/P EST MOD 30 MIN: CPT | Mod: GC | Performed by: INTERNAL MEDICINE

## 2019-05-23 RX ORDER — LEVOTHYROXINE SODIUM 0.15 MG/1
150 TABLET ORAL
Qty: 30 TAB | Refills: 1 | Status: SHIPPED | OUTPATIENT
Start: 2019-05-23 | End: 2019-07-27

## 2019-05-23 ASSESSMENT — PAIN SCALES - GENERAL: PAINLEVEL: 7=MODERATE-SEVERE PAIN

## 2019-05-23 NOTE — PATIENT INSTRUCTIONS
Hypothyroidism  Hypothyroidism is a disorder of the thyroid. The thyroid is a large gland that is located in the lower front of the neck. The thyroid releases hormones that control how the body works. With hypothyroidism, the thyroid does not make enough of these hormones.  What are the causes?  Causes of hypothyroidism may include:  · Viral infections.  · Pregnancy.  · Your own defense system (immune system) attacking your thyroid.  · Certain medicines.  · Birth defects.  · Past radiation treatments to your head or neck.  · Past treatment with radioactive iodine.  · Past surgical removal of part or all of your thyroid.  · Problems with the gland that is located in the center of your brain (pituitary).  What are the signs or symptoms?  Signs and symptoms of hypothyroidism may include:  · Feeling as though you have no energy (lethargy).  · Inability to tolerate cold.  · Weight gain that is not explained by a change in diet or exercise habits.  · Dry skin.  · Coarse hair.  · Menstrual irregularity.  · Slowing of thought processes.  · Constipation.  · Sadness or depression.  How is this diagnosed?  Your health care provider may diagnose hypothyroidism with blood tests and ultrasound tests.  How is this treated?  Hypothyroidism is treated with medicine that replaces the hormones that your body does not make. After you begin treatment, it may take several weeks for symptoms to go away.  Follow these instructions at home:  · Take medicines only as directed by your health care provider.  · If you start taking any new medicines, tell your health care provider.  · Keep all follow-up visits as directed by your health care provider. This is important. As your condition improves, your dosage needs may change. You will need to have blood tests regularly so that your health care provider can watch your condition.  Contact a health care provider if:  · Your symptoms do not get better with treatment.  · You are taking thyroid  replacement medicine and:  ¨ You sweat excessively.  ¨ You have tremors.  ¨ You feel anxious.  ¨ You lose weight rapidly.  ¨ You cannot tolerate heat.  ¨ You have emotional swings.  ¨ You have diarrhea.  ¨ You feel weak.  Get help right away if:  · You develop chest pain.  · You develop an irregular heartbeat.  · You develop a rapid heartbeat.  This information is not intended to replace advice given to you by your health care provider. Make sure you discuss any questions you have with your health care provider.  Document Released: 12/18/2006 Document Revised: 05/25/2017 Document Reviewed: 05/05/2015  [employer] Interactive Patient Education © 2017 [employer]

## 2019-05-24 NOTE — PROGRESS NOTES
Established Patient    Ivis presents today with the following:    CC: Fatigue    HPI: 23-year-old female with a past medical history of Graves' disease status post thyroidectomy in 2017, postop hypothyroidism, history of sinus arrhythmia, history of  section, noncompliance and frequent hospitalizations, history of pericarditis is coming for a follow up today.    Patient was accompanied by her mother today and mother was usually concerned about her deteriorating health.  Discussed in detail with both the patient and the mother about her test results her recent hospitalizations.  Patient was recently seen in the ED on  for abdominal pain associated with nausea vomiting underwent a CT abdomen pelvis that showed 3.5 x 3 cm left ovarian hemorrhagic cyst and pelvic ultrasound and gynecologist referral was further recommended.  Mother she was pretty lethargic and drowsy when she went into the ER and after supportive measures she was further discharged to follow-up with her PCP.     Today patient denies any further episodes of abdominal pain nausea vomiting diarrhea constipation blood in the stool blood in the urine or vomiting blood.  We shall put in referral for transvaginal/transabdominal ultrasound as well as an OB/GYN referral for a follow-up.  She did report that she finally got her periods which lasted for almost a week and were heavy this time.  Please note she did not had her periods for the past few months likely related to her thyroid issues.      She had her thyroid testing done while she was in the ER that showed a free TSH of 1.630 and free T4 of 1.94 please note her TSH dropped quite a bit from 353 to 1.630.  She is compliant with her levothyroxine 175 mcg.  We shall go down on her levothyroxine from 175 to 150mcg.  She is further instructed to take the medication on an empty stomach.  Also we are putting in another referral for her to follow-up with endocrinology postop her surgery status  post total thyroidectomy for her Graves' disease.       In terms of his symptoms her main complaints is excessive amount of daytime fatigue tiredness just not feeling up to the par as well as generalized myalgias muscle aches joint pains and bruising in her upper and lower extremity.  She denies worsening of the bruising.  She denies any kind of bleeding or any visible forms of bleeding concerning for any kind of bleeding disorder.  Her platelet count as well as her coag studies are all normal.  Reassured a lot of this could be explained due to severe hypothyroidism although her labs are much improved prior to few months ago but she was also noted to have an elevated DEJON of 1 :80 with a speckled pattern.  She also has a history of pericarditis as well.  I think would be reasonable at this point to have patient be seen by a rheumatologist.    Please note no signs or symptoms or any concerns for any kind of domestic/physical abuse based on clinical history and physical exam.          Patient Active Problem List    Diagnosis Date Noted   • Postoperative hypothyroidism 09/12/2018     Priority: High   • Frequent hospital admissions 09/13/2018     Priority: Medium   • Anxiety 02/16/2017     Priority: Medium   • Myalgia 09/12/2018     Priority: Low   • Hemorrhagic cysts of both ovaries 05/23/2019   • Elevated antinuclear antibody (DEJON) level 05/23/2019   • Panic attack 03/11/2019   • Noncompliance with medication regimen 07/04/2017   • TSH elevation 07/04/2017   • Microcytosis 02/16/2017   • Health care maintenance 12/05/2016   • Hyperthyroidism 12/05/2016   • Graves disease 12/05/2016   • History of pericarditis 12/05/2016   • Mood disorder (HCC) 12/05/2016   • Not immune to rubella 05/23/2013   • Migraine with aura, not intractable 09/28/2010       Current Outpatient Prescriptions   Medication Sig Dispense Refill   • levothyroxine (SYNTHROID) 150 MCG Tab Take 1 Tab by mouth Every morning on an empty stomach. 30 Tab 1  "    No current facility-administered medications for this visit.          Health Maintenance        Hep C: Screening for those born between 5383-8124    Diabetes: All non-Caucasians; All Caucasians with sustained blood pressure greater than 135/80, or BMI greater than or equal to 25, or history of gestational diabetes, or family history of diabetes.    Colorectal Cancer (Options): Colonoscopy every 10 years; Fecal Occult Blood Testing every 3 years with sigmoidoscopy every 5 years; or Annual Fecal Occult Blood testing.    HIV Screening: Between ages 15-65    Alcohol Misuse:     Influenza: Yearly    Tdap/Td: Adults under 65 who have never received Tdap should get a Tdap booster, regardless of when a prior Td was given. After this, Td is required every 10 years.    Zoster (Shingles): At age 60    Pneumococcal Vaccine: Series beginning at age 65    [facility]  Lipid Test: Every 5 years  Prostate Specific Antigen (PSA): Current evidence does not recommend routine PSA screening for average risk men.    Women's Health  Cervical Cancer Screening: Pap only every 3 years for ages 21-29, Pap test with HPV screening every 5 years from ages 30-65  Mammogram: Every 2 years  Bone Density: At age 65                ROS: As per HPI. Additional pertinent symptoms as noted below.      /64 (BP Location: Right arm, Patient Position: Sitting)   Pulse 76   Temp 36.7 °C (98.1 °F) (Temporal)   Ht 1.588 m (5' 2.52\")   Wt 58.7 kg (129 lb 6.4 oz)   SpO2 98%   BMI 23.28 kg/m²     Physical Exam     Constitutional: , Alert and oriented, No acute distress   HEENT:+slight proptosis. Normocephalic, Atraumatic, Bilateral external ears normal, Oropharynx moist mucous membranes, No oral exudates, Nose normal. No thyromegaly.   Eyes: PERRLA, EOMI, Conjunctiva normal, No discharge.   Neck: Normal range of motion, No stridor, no JVD.   Cardiovascular: Normal heart rate, Normal rhythm, No murmurs, No rubs, No gallops.    Lungs: Clear to " auscultation bilaterally   Abdomen: Bowel sounds normal, Soft, , No guarding   Skin: +bruising in upper and lower extremity.  Neurologic: Normal motor function, No focal deficits noted, cranial nerves II through XII are normal   Psychiatric: Affect normal, Judgment normal, Mood normal.   Extremities: B/L Peripheral Pulses +, no pitting edema.      Assessment and Plan    1. Left Ovarian Hemorrhagic Cysts  -ER visit for abdominal pain associated with nausea vomiting and was found to have left ovarian hemorrhagic cyst.  -Denies any further episodes of abdominal pain nausea vomiting and physical examination very much benign.  -We shall put in a referral for OB/GYN as well as ordering a transvaginal transabdominal ultrasound to further investigate and study.    2.  History of Graves' disease status post thyroidectomy  #Postop hypothyroidism  #Elevated TSH with undetectable T4/T3  -05/08/2019 TSH 1.630 with free T4 of 1.94  -She is having some signs and symptoms of hypothyroidism including fatigue, muscle aches,bruising.  -Please note she has had TSH>350 with undetectable levels of T4/T3 in the past.  -Referrals have been made for endocrinology during prior visit but she has not been able to see them.  Patient emphasized urged and educated on importance of following up especially given her ongoing issues although her labs are looking much better.     3.Myalgia  #Polyarthralgia  #Widespread bruising  # Fatigue  #History of pericarditis  #Dysphagia  #Elevated DEJON  -DEJON elevated to 1:80 with speckled pattern.  -ANCA antibodies negative.  -Urine in the past have shown proteinuria and microscopic hematuria.   -Although most of her symptoms could be explained by hypothyroidism but given the fact that she already suffers from one autoimmune condition which is Graves' disease and there is definitely a possibility of having any other autoimmune condition such as lupus or any other disease around that spectrum.  -She will put in a  referral for rheumatology.     4. Menometrorrhagia  #Secondary amenorrhea  -Got her period last month but there were heavy. She did not have her her periods for a long time.   ?likely related thyroid related problems  Normal platelets/coags study     5. Microscopic hematuria  Repeat UA have shown no blood or RBCS.  Denies any UTI like symptoms.     6. Anxiety  # Panic attack  -Frequent hospitalizations and based on chart review some documented history of somatization, malingering and anxiety.  -Patient not interested in any pharmacotherapy at this point and is encouraged to seek medical care for worsening of symptoms.  -She reports intermittent low mood/anxiety episodes but denies any homicidal or suicidal ideations.  -Referrals placed last time but patient lost her phone so failed to follow up.  -At this time she would like to get better medically before seeking mental care.  -Please note no schizoprenia or dick like symptoms/.         Signed by: Hamlet Potter M.D.

## 2019-06-06 ENCOUNTER — APPOINTMENT (OUTPATIENT)
Dept: RADIOLOGY | Facility: MEDICAL CENTER | Age: 24
End: 2019-06-06
Attending: EMERGENCY MEDICINE
Payer: MEDICAID

## 2019-06-06 ENCOUNTER — HOSPITAL ENCOUNTER (EMERGENCY)
Facility: MEDICAL CENTER | Age: 24
End: 2019-06-06
Attending: EMERGENCY MEDICINE
Payer: MEDICAID

## 2019-06-06 VITALS
RESPIRATION RATE: 17 BRPM | TEMPERATURE: 98.6 F | HEIGHT: 63 IN | WEIGHT: 130.73 LBS | BODY MASS INDEX: 23.16 KG/M2 | HEART RATE: 70 BPM | SYSTOLIC BLOOD PRESSURE: 121 MMHG | OXYGEN SATURATION: 99 % | DIASTOLIC BLOOD PRESSURE: 70 MMHG

## 2019-06-06 DIAGNOSIS — N83.201 CYST OF RIGHT OVARY: ICD-10-CM

## 2019-06-06 DIAGNOSIS — B37.9 CANDIDA INFECTION: ICD-10-CM

## 2019-06-06 DIAGNOSIS — N39.0 ACUTE UTI: ICD-10-CM

## 2019-06-06 LAB
APPEARANCE UR: ABNORMAL
BACTERIA #/AREA URNS HPF: ABNORMAL /HPF
BACTERIA GENITAL QL WET PREP: NORMAL
BILIRUB UR QL STRIP.AUTO: NEGATIVE
C TRACH DNA SPEC QL NAA+PROBE: NEGATIVE
COLOR UR: YELLOW
EPI CELLS #/AREA URNS HPF: ABNORMAL /HPF
GLUCOSE UR STRIP.AUTO-MCNC: NEGATIVE MG/DL
HCG UR QL: NEGATIVE
HYALINE CASTS #/AREA URNS LPF: ABNORMAL /LPF
KETONES UR STRIP.AUTO-MCNC: NEGATIVE MG/DL
LEUKOCYTE ESTERASE UR QL STRIP.AUTO: ABNORMAL
MICRO URNS: ABNORMAL
N GONORRHOEA DNA SPEC QL NAA+PROBE: NEGATIVE
NITRITE UR QL STRIP.AUTO: NEGATIVE
PH UR STRIP.AUTO: 5.5 [PH]
PROT UR QL STRIP: NEGATIVE MG/DL
RBC # URNS HPF: ABNORMAL /HPF
RBC UR QL AUTO: ABNORMAL
SIGNIFICANT IND 70042: NORMAL
SITE SITE: NORMAL
SOURCE SOURCE: NORMAL
SP GR UR REFRACTOMETRY: 1.03
SPECIMEN SOURCE: NORMAL
UROBILINOGEN UR STRIP.AUTO-MCNC: 0.2 MG/DL
WBC #/AREA URNS HPF: ABNORMAL /HPF

## 2019-06-06 PROCEDURE — 700111 HCHG RX REV CODE 636 W/ 250 OVERRIDE (IP): Performed by: EMERGENCY MEDICINE

## 2019-06-06 PROCEDURE — 99284 EMERGENCY DEPT VISIT MOD MDM: CPT

## 2019-06-06 PROCEDURE — A9270 NON-COVERED ITEM OR SERVICE: HCPCS | Performed by: EMERGENCY MEDICINE

## 2019-06-06 PROCEDURE — 87591 N.GONORRHOEAE DNA AMP PROB: CPT

## 2019-06-06 PROCEDURE — 96372 THER/PROPH/DIAG INJ SC/IM: CPT

## 2019-06-06 PROCEDURE — 87491 CHLMYD TRACH DNA AMP PROBE: CPT

## 2019-06-06 PROCEDURE — 76856 US EXAM PELVIC COMPLETE: CPT

## 2019-06-06 PROCEDURE — 81001 URINALYSIS AUTO W/SCOPE: CPT

## 2019-06-06 PROCEDURE — 81025 URINE PREGNANCY TEST: CPT

## 2019-06-06 PROCEDURE — 700102 HCHG RX REV CODE 250 W/ 637 OVERRIDE(OP): Performed by: EMERGENCY MEDICINE

## 2019-06-06 RX ORDER — CEFTRIAXONE SODIUM 250 MG/1
250 INJECTION, POWDER, FOR SOLUTION INTRAMUSCULAR; INTRAVENOUS ONCE
Status: COMPLETED | OUTPATIENT
Start: 2019-06-06 | End: 2019-06-06

## 2019-06-06 RX ORDER — FLUCONAZOLE 100 MG/1
200 TABLET ORAL ONCE
Status: COMPLETED | OUTPATIENT
Start: 2019-06-06 | End: 2019-06-06

## 2019-06-06 RX ORDER — AZITHROMYCIN 250 MG/1
1000 TABLET, FILM COATED ORAL ONCE
Status: COMPLETED | OUTPATIENT
Start: 2019-06-06 | End: 2019-06-06

## 2019-06-06 RX ORDER — CEPHALEXIN 500 MG/1
500 CAPSULE ORAL 4 TIMES DAILY
Qty: 28 CAP | Refills: 0 | Status: SHIPPED | OUTPATIENT
Start: 2019-06-06 | End: 2019-06-13

## 2019-06-06 RX ORDER — IBUPROFEN 800 MG/1
800 TABLET ORAL EVERY 8 HOURS PRN
Qty: 30 TAB | Refills: 0 | Status: SHIPPED | OUTPATIENT
Start: 2019-06-06 | End: 2019-06-13

## 2019-06-06 RX ADMIN — CEFTRIAXONE SODIUM 250 MG: 250 INJECTION, POWDER, FOR SOLUTION INTRAMUSCULAR; INTRAVENOUS at 19:49

## 2019-06-06 RX ADMIN — AZITHROMYCIN 1000 MG: 250 TABLET, FILM COATED ORAL at 19:48

## 2019-06-06 RX ADMIN — FLUCONAZOLE 200 MG: 100 TABLET ORAL at 20:46

## 2019-06-06 ASSESSMENT — LIFESTYLE VARIABLES: DO YOU DRINK ALCOHOL: NO

## 2019-06-06 NOTE — ED TRIAGE NOTES
Pt c/o burning with urination, vaginal itching and pelvic pain, denies new sexual partners. x2-3 days.

## 2019-06-07 NOTE — ED NOTES
"Pt discharged home via ambulatory to lobby with steady gait, AOx4. Pt in possession of belongings. Pt provided discharge education and information pertaining to medications and follow up appointments. Pt received copy of discharge instructions and verbalized understanding.     /70   Pulse 70   Temp 37 °C (98.6 °F) (Temporal)   Resp 17   Ht 1.6 m (5' 3\")   Wt 59.3 kg (130 lb 11.7 oz)   SpO2 99%   BMI 23.16 kg/m²   "

## 2019-06-07 NOTE — DISCHARGE INSTRUCTIONS
Take all the antibiotics as directed.  Take the ibuprofen for pain.  Any fevers, worsening pain or other concerns return for a recheck.

## 2019-06-07 NOTE — ED PROVIDER NOTES
"ED Provider Note  CHIEF COMPLAINT  Chief Complaint   Patient presents with   • Pelvic Pain   • Vaginal Itching   • Painful Urination       HPI  Ivis Klein is a 23 y.o. female who presents with pelvic pain.  Patient symptoms started about 3 days ago.  She also has a thick vaginal discharge.  She complains of vaginal itching as well.  She states it does burn with urination but is not having increased frequency of urination.  She denies any vomiting or diarrhea.  She has no history of hyperglycemia.  She is unsure if she is pregnant.  She denies history of sexually transmitted infections in the past.  She does have a history of a left ovarian cyst.  However she states the pain is different.  The pain is diffuse in her abdomen.  Nothing seems to make it better or worse.    REVIEW OF SYSTEMS  See HPI for further details.  Positive for pelvic pain, vaginal itching and burning with urination.  Denies fevers, vomiting or diarrhea, flank pain, skin rash or lesions in the vaginal area, headache. all other systems are negative.     PAST MEDICAL HISTORY  Past Medical History:   Diagnosis Date   • Anxiety 2/16/2017   • Graves disease 12/5/2016   • Vomiting 6/29/2016   • Hyperthyroidism 6/29/2016   • Pericarditis 06/2016   • Arrhythmia     tachycardia \"pt reports d/t thryoid\"   • Breath shortness     no c/o at this time; c/o sob at night unsure if it is d/t anxiety   • Heart valve disease    • Menarche     started at age 14   • Migraine    • Pregnancy     delivered 9/8/2013, 3/16/17 pt reports that she is not pregnant at this time   • Psychiatric problem     anxiety, depression   • Supervision of normal first teen pregnancy        FAMILY HISTORY  [unfilled]    SOCIAL HISTORY  Social History     Social History   • Marital status: Single     Spouse name: N/A   • Number of children: N/A   • Years of education: N/A     Social History Main Topics   • Smoking status: Never Smoker   • Smokeless tobacco: Never Used      " "Comment: quit in 2012   • Alcohol use No   • Drug use: Yes     Types: Marijuana, Inhaled      Comment: marijuana weekly   • Sexual activity: Yes     Partners: Male     Birth control/ protection: Abstinence      Comment: single     Other Topics Concern   • Not on file     Social History Narrative   • No narrative on file       SURGICAL HISTORY  Past Surgical History:   Procedure Laterality Date   • THYROIDECTOMY TOTAL Bilateral 3/22/2017    Procedure: THYROIDECTOMY TOTAL -NIMS RECURRENT LARYNGEAL NERVE MONITORING;  Surgeon: Vicente Eldridge M.D.;  Location: SURGERY SAME DAY [facility];  Service:    • PRIMARY C SECTION  9/8/2013    Performed by Melisa Wright M.D. at LABOR AND DELIVERY       CURRENT MEDICATIONS  Home Medications    **Home medications have not yet been reviewed for this encounter**         ALLERGIES  Allergies   Allergen Reactions   • Nkda [No Known Drug Allergy]        PHYSICAL EXAM  VITAL SIGNS: /67   Pulse 86   Temp 37.1 °C (98.8 °F) (Temporal)   Resp 16   Ht 1.6 m (5' 3\")   Wt 59.3 kg (130 lb 11.7 oz)   SpO2 98%   BMI 23.16 kg/m²       Constitutional: Well developed, No acute distress, Non-toxic appearance.   HENT: Normocephalic, Atraumatic, Bilateral external ears normal, Oropharynx moist, No oral exudates, Nose normal.   Eyes: PERRL, EOMI, Conjunctiva normal  Neck: Normal range of motion, No tenderness, Supple,   Cardiovascular: Normal heart rate, Normal rhythm.   Thorax & Lungs: Normal breath sounds, No respiratory distress,   Abdomen: Benign abdominal exam, no guarding no rebound, no masses, no pulsatile mass, no tenderness, no distention  Pelvic Exam:   Normal external genitalia with Normal vulva.  Normal vagina with no lesions or blood.  Thick yellow-green discharge, reddish cervix with CMT.  No adnexal tenderness or masses palpated.  Skin: Warm, Dry, No erythema, No rash.   Back: No tenderness, No CVA tenderness.   Extremities: Intact distal pulses, No edema, No " tenderness   Neurologic: Alert & oriented x 3, Normal motor function, Normal sensory function, No focal deficits noted.   Psychiatric: appropriate          Labs  Results for orders placed or performed during the hospital encounter of 06/06/19   URINALYSIS (UA)   Result Value Ref Range    Color Yellow     Character Cloudy (A)     Ph 5.5 5.0 - 8.0    Glucose Negative Negative mg/dL    Ketones Negative Negative mg/dL    Protein Negative Negative mg/dL    Bilirubin Negative Negative    Urobilinogen, Urine 0.2 Negative    Nitrite Negative Negative    Leukocyte Esterase Large (A) Negative    Occult Blood Trace (A) Negative    Micro Urine Req Microscopic    HCG QUALITATIVE UR (Lab)   Result Value Ref Range    Beta-Hcg Urine Negative Negative   WET PREP   Result Value Ref Range    Significant Indicator NEG     Source GEN     Site VAGINAL     Wet Prep For Parasites       Rare WBCs seen.  Few yeast.  No motile Trichomonas seen.  No clue cells seen.     CHLAMYDIA & GC BY PCR   Result Value Ref Range    Source Vaginal    REFRACTOMETER SG   Result Value Ref Range    Specific Gravity 1.030    URINE MICROSCOPIC (W/UA)   Result Value Ref Range    WBC 20-50 (A) /hpf    RBC 2-5 (A) /hpf    Bacteria Many (A) None /hpf    Epithelial Cells Many (A) /hpf    Hyaline Cast 0-2 /lpf       RADIOLOGY/PROCEDURES  US-PELVIC COMPLETE (TRANSABDOMINAL/TRANSVAGINAL) (COMBO)   Final Result      1.  A 4.8 cm hemorrhagic right ovarian cyst.   2.  Normal uterus and left ovary.   3.  Small amount of free fluid in the cul-de-sac.          COURSE & MEDICAL DECISION MAKING  Pertinent Labs & Imaging studies reviewed. (See chart for details)  Patient presents with pelvic pain and vaginal discharge.  Patient does have cervical motion tenderness on pelvic exam.  I am concerned about a cervicitis.  I doubt TOA as patient is overall well-appearing and afebrile here without evidence of an acute abdomen.  However I will obtain an ultrasound.  We will rule out  pregnancy as well as UTI.  Wet mount and GC chlamydia were sent.  We will prophylactically treat her with Rocephin and Zithromax.  Patient has no right lower quadrant tenderness to suggest appendicitis.    Patient's urine pregnancy test was negative.  Patient does have evidence of a urinary tract infection.  Pelvic ultrasound shows a right hemorrhagic ovarian cyst.  No evidence of abscess.  Wet mount shows no clue cells and no trichomonas.  She has a few yeast and we will treat her with Diflucan.  She will be placed on Keflex for her urinary tract infection.  She is advised to follow-up with gynecology concerning her ovarian cyst.  She will be placed on ibuprofen.  Strict return precautions were given.  Patient understands that GC and Chlamydia are still pending and we will call her if the results are positive.      FINAL IMPRESSION     1. Acute UTI    2. Candida infection    3. Cyst of right ovary             Electronically signed by: Caryl Ramsey, 6/6/2019 5:54 PM

## 2019-07-27 ENCOUNTER — HOSPITAL ENCOUNTER (EMERGENCY)
Facility: MEDICAL CENTER | Age: 24
End: 2019-07-27
Attending: EMERGENCY MEDICINE
Payer: MEDICAID

## 2019-07-27 ENCOUNTER — APPOINTMENT (OUTPATIENT)
Dept: RADIOLOGY | Facility: MEDICAL CENTER | Age: 24
End: 2019-07-27
Attending: EMERGENCY MEDICINE
Payer: MEDICAID

## 2019-07-27 VITALS
HEIGHT: 64 IN | OXYGEN SATURATION: 100 % | RESPIRATION RATE: 16 BRPM | SYSTOLIC BLOOD PRESSURE: 113 MMHG | BODY MASS INDEX: 21.72 KG/M2 | TEMPERATURE: 97.4 F | HEART RATE: 79 BPM | WEIGHT: 127.21 LBS | DIASTOLIC BLOOD PRESSURE: 43 MMHG

## 2019-07-27 DIAGNOSIS — E03.9 HYPOTHYROIDISM, UNSPECIFIED TYPE: ICD-10-CM

## 2019-07-27 DIAGNOSIS — E05.00 GRAVES DISEASE: ICD-10-CM

## 2019-07-27 DIAGNOSIS — E89.0 POSTOPERATIVE HYPOTHYROIDISM: ICD-10-CM

## 2019-07-27 DIAGNOSIS — E87.6 HYPOKALEMIA: ICD-10-CM

## 2019-07-27 DIAGNOSIS — R55 SYNCOPE, UNSPECIFIED SYNCOPE TYPE: ICD-10-CM

## 2019-07-27 DIAGNOSIS — R79.89 TSH ELEVATION: ICD-10-CM

## 2019-07-27 LAB
ALBUMIN SERPL BCP-MCNC: 4.2 G/DL (ref 3.2–4.9)
ALBUMIN/GLOB SERPL: 1.3 G/DL
ALP SERPL-CCNC: 45 U/L (ref 30–99)
ALT SERPL-CCNC: 5 U/L (ref 2–50)
ANION GAP SERPL CALC-SCNC: 13 MMOL/L (ref 0–11.9)
AST SERPL-CCNC: 15 U/L (ref 12–45)
BASOPHILS # BLD AUTO: 0.8 % (ref 0–1.8)
BASOPHILS # BLD: 0.04 K/UL (ref 0–0.12)
BILIRUB SERPL-MCNC: 0.5 MG/DL (ref 0.1–1.5)
BUN SERPL-MCNC: 8 MG/DL (ref 8–22)
CALCIUM SERPL-MCNC: 9.4 MG/DL (ref 8.5–10.5)
CHLORIDE SERPL-SCNC: 105 MMOL/L (ref 96–112)
CO2 SERPL-SCNC: 21 MMOL/L (ref 20–33)
CREAT SERPL-MCNC: 0.69 MG/DL (ref 0.5–1.4)
D DIMER PPP IA.FEU-MCNC: <0.4 UG/ML (FEU) (ref 0–0.5)
EKG IMPRESSION: NORMAL
EOSINOPHIL # BLD AUTO: 0.02 K/UL (ref 0–0.51)
EOSINOPHIL NFR BLD: 0.4 % (ref 0–6.9)
ERYTHROCYTE [DISTWIDTH] IN BLOOD BY AUTOMATED COUNT: 44.9 FL (ref 35.9–50)
GLOBULIN SER CALC-MCNC: 3.2 G/DL (ref 1.9–3.5)
GLUCOSE SERPL-MCNC: 79 MG/DL (ref 65–99)
HCG SERPL QL: NEGATIVE
HCT VFR BLD AUTO: 38.3 % (ref 37–47)
HGB BLD-MCNC: 12.7 G/DL (ref 12–16)
IMM GRANULOCYTES # BLD AUTO: 0.01 K/UL (ref 0–0.11)
IMM GRANULOCYTES NFR BLD AUTO: 0.2 % (ref 0–0.9)
LYMPHOCYTES # BLD AUTO: 2.08 K/UL (ref 1–4.8)
LYMPHOCYTES NFR BLD: 40.9 % (ref 22–41)
MCH RBC QN AUTO: 29.2 PG (ref 27–33)
MCHC RBC AUTO-ENTMCNC: 33.2 G/DL (ref 33.6–35)
MCV RBC AUTO: 88 FL (ref 81.4–97.8)
MONOCYTES # BLD AUTO: 0.32 K/UL (ref 0–0.85)
MONOCYTES NFR BLD AUTO: 6.3 % (ref 0–13.4)
NEUTROPHILS # BLD AUTO: 2.62 K/UL (ref 2–7.15)
NEUTROPHILS NFR BLD: 51.4 % (ref 44–72)
NRBC # BLD AUTO: 0 K/UL
NRBC BLD-RTO: 0 /100 WBC
PLATELET # BLD AUTO: 227 K/UL (ref 164–446)
PMV BLD AUTO: 11 FL (ref 9–12.9)
POTASSIUM SERPL-SCNC: 3.1 MMOL/L (ref 3.6–5.5)
PROT SERPL-MCNC: 7.4 G/DL (ref 6–8.2)
RBC # BLD AUTO: 4.35 M/UL (ref 4.2–5.4)
SODIUM SERPL-SCNC: 139 MMOL/L (ref 135–145)
T3FREE SERPL-MCNC: 2.82 PG/ML (ref 2.4–4.2)
T4 FREE SERPL-MCNC: 0.77 NG/DL (ref 0.53–1.43)
TROPONIN T SERPL-MCNC: <6 NG/L (ref 6–19)
TSH SERPL DL<=0.005 MIU/L-ACNC: 23.83 UIU/ML (ref 0.38–5.33)
WBC # BLD AUTO: 5.1 K/UL (ref 4.8–10.8)

## 2019-07-27 PROCEDURE — A9270 NON-COVERED ITEM OR SERVICE: HCPCS | Performed by: EMERGENCY MEDICINE

## 2019-07-27 PROCEDURE — 84703 CHORIONIC GONADOTROPIN ASSAY: CPT

## 2019-07-27 PROCEDURE — 700105 HCHG RX REV CODE 258: Performed by: EMERGENCY MEDICINE

## 2019-07-27 PROCEDURE — 84481 FREE ASSAY (FT-3): CPT

## 2019-07-27 PROCEDURE — 85025 COMPLETE CBC W/AUTO DIFF WBC: CPT

## 2019-07-27 PROCEDURE — 80053 COMPREHEN METABOLIC PANEL: CPT

## 2019-07-27 PROCEDURE — 84484 ASSAY OF TROPONIN QUANT: CPT

## 2019-07-27 PROCEDURE — 99284 EMERGENCY DEPT VISIT MOD MDM: CPT

## 2019-07-27 PROCEDURE — 700102 HCHG RX REV CODE 250 W/ 637 OVERRIDE(OP): Performed by: EMERGENCY MEDICINE

## 2019-07-27 PROCEDURE — 93005 ELECTROCARDIOGRAM TRACING: CPT

## 2019-07-27 PROCEDURE — 93005 ELECTROCARDIOGRAM TRACING: CPT | Performed by: EMERGENCY MEDICINE

## 2019-07-27 PROCEDURE — 84439 ASSAY OF FREE THYROXINE: CPT

## 2019-07-27 PROCEDURE — 85379 FIBRIN DEGRADATION QUANT: CPT

## 2019-07-27 PROCEDURE — 71045 X-RAY EXAM CHEST 1 VIEW: CPT

## 2019-07-27 PROCEDURE — 84443 ASSAY THYROID STIM HORMONE: CPT

## 2019-07-27 RX ORDER — POTASSIUM CHLORIDE 20 MEQ/1
20 TABLET, EXTENDED RELEASE ORAL ONCE
Status: COMPLETED | OUTPATIENT
Start: 2019-07-27 | End: 2019-07-27

## 2019-07-27 RX ORDER — LEVOTHYROXINE SODIUM 0.15 MG/1
150 TABLET ORAL ONCE
Status: COMPLETED | OUTPATIENT
Start: 2019-07-27 | End: 2019-07-27

## 2019-07-27 RX ORDER — LEVOTHYROXINE SODIUM 0.15 MG/1
150 TABLET ORAL
Qty: 30 TAB | Refills: 2 | Status: ON HOLD | OUTPATIENT
Start: 2019-07-27 | End: 2019-09-19 | Stop reason: SDUPTHER

## 2019-07-27 RX ORDER — SODIUM CHLORIDE 9 MG/ML
1000 INJECTION, SOLUTION INTRAVENOUS ONCE
Status: COMPLETED | OUTPATIENT
Start: 2019-07-27 | End: 2019-07-27

## 2019-07-27 RX ADMIN — LEVOTHYROXINE SODIUM 150 MCG: 150 TABLET ORAL at 18:50

## 2019-07-27 RX ADMIN — POTASSIUM CHLORIDE 20 MEQ: 1500 TABLET, EXTENDED RELEASE ORAL at 18:19

## 2019-07-27 RX ADMIN — SODIUM CHLORIDE 1000 ML: 9 INJECTION, SOLUTION INTRAVENOUS at 17:00

## 2019-07-27 ASSESSMENT — ENCOUNTER SYMPTOMS
CHILLS: 0
FEVER: 0

## 2019-07-27 NOTE — ED PROVIDER NOTES
ED Provider Note    Scribed for ANDREA Schilling II* by Solomon Madison. 7/27/2019  4:37 PM    Means of Arrival: walk in  History obtained by: patient  Limitations: none     CHIEF COMPLAINT  Chief Complaint   Patient presents with   • Syncope   • Other     HPI  Ivis Klein is a 23 y.o. female with history of total thyroidectomy for hyperthyroidism and Graves disease. who presents to the Emergency Department after a short episode of syncope occurring a few hours prior to arrival. The duration was one minute long according to her boyfriend. She was at a park for a [place] when the episode occurred. She has a history of Graves disease and has had her thyroid surgically removed. She is taking thyroid medication, but has ran out of medication the last two weeks. She endorses associated constant chest pain and constant fatigue. She had episodes of syncope in the past. She denies any alleviating factors. She admits to alcohol and marijuana use, last used within the last few hours. Denies fever or chills.    I have reviewed the patients chart. She has a history of admissions for not taking her Synthroid medication.       REVIEW OF SYSTEMS  Review of Systems   Constitutional: Positive for malaise/fatigue. Negative for chills and fever.   Cardiovascular: Positive for chest pain.   Neurological:        Syncope   All other systems reviewed and are negative.  See HPI for further details.    PAST MEDICAL HISTORY   has a past medical history of Anxiety (2/16/2017); Arrhythmia; Breath shortness; Graves disease (12/5/2016); Heart valve disease; Hyperthyroidism (6/29/2016); Menarche; Migraine; Pericarditis (06/2016); Pregnancy; Psychiatric problem; Supervision of normal first teen pregnancy; and Vomiting (6/29/2016).    SOCIAL HISTORY  Social History     Social History Main Topics   • Smoking status: Never Smoker   • Smokeless tobacco: Never Used      Comment: quit in 2012   • Alcohol use No   • Drug use: Yes     Types:  "Marijuana, Inhaled      Comment: marijuana weekly   • Sexual activity: Yes     Partners: Male     Birth control/ protection: Abstinence      Comment: single       SURGICAL HISTORY   has a past surgical history that includes primary c section (9/8/2013) and thyroidectomy total (Bilateral, 3/22/2017).    CURRENT MEDICATIONS  Home Medications    **Home medications have not yet been reviewed for this encounter**         ALLERGIES  Allergies   Allergen Reactions   • Nkda [No Known Drug Allergy]        PHYSICAL EXAM  VITAL SIGNS: /43   Pulse 90   Temp 36.3 °C (97.4 °F) (Temporal)   Resp 18   Ht 1.626 m (5' 4\")   Wt 57.7 kg (127 lb 3.3 oz)   SpO2 100%   BMI 21.83 kg/m²     Pulse ox interpretation: I interpret this pulse ox as normal.  Constitutional: Alert in no apparent distress. Fatigued appearing 23 year old   HENT: No signs of trauma, Bilateral external ears normal, Nose normal. Dry mucous membranes.   Eyes: Pupils are equal, Conjunctiva normal, Non-icteric.   Neck: Normal range of motion, No tenderness, Surgical incision scar at her neck, no JVD no stridor  Cardiovascular: Regular rate and rhythm, no murmurs. Symmetric distal pulses. No cyanosis of extremities. No peripheral edema of extremities.  Thorax & Lungs: Normal breath sounds, Increased respiratory rate, No wheezing, No chest tenderness.   Abdomen: Bowel sounds normal, Soft, No tenderness, No masses, No pulsatile masses. No peritoneal signs.  Skin: Warm, Dry, No erythema, No rash.   Back: No midline bony tenderness, No CVA tenderness.   Musculoskeletal: Good range of motion in all major joints. No tenderness to palpation or major deformities noted.   Neurologic: Alert , Normal motor function, Normal sensory function, No focal deficits noted.   Psychiatric: Affect normal, Judgment normal, Mood normal.     DIAGNOSTIC STUDIES / PROCEDURES    EKG Interpretation:  Interpreted by me  Results for orders placed or performed during the hospital encounter " of 19   EKG (NOW)   Result Value Ref Range    Report       [facility] Emergency Dept.    Test Date:  2019  Pt Name:    MICHELLE MENARD               Department: ER  MRN:        [id number]                      Room:  Gender:     Female                       Technician: [id number]  :        1995                   Requested By:ER TRIAGE PROTOCOL  Order #:    [id number]                    Reading MD: Kain Ta II, MD    Measurements  Intervals                                Axis  Rate:       84                           P:          18  MD:         172                          QRS:        59  QRSD:       74                           T:          13  QT:         356  QTc:        421    Interpretive Statements  SINUS RHYTHM  rate 84  normal intervals  no st elevation or depression.  normal t waves  Normal EKG  Compared to ECG 2019 12:29:43  No significant changes    Electronically Signed On 2019 16:42:24 PDT by Kain Ta II, MD       LABS  Pertinent Labs & Imaging studies reviewed. (See chart for details)    RADIOLOGY  DX-CHEST-PORTABLE (1 VIEW)   Final Result      1.  There is hypoinflation with probable bibasilar atelectasis.        Pertinent Labs & Imaging studies reviewed. (See chart for details)    COURSE & MEDICAL DECISION MAKING  Pertinent Labs & Imaging studies reviewed. (See chart for details)    4:37 PM This is a 23 y.o. female who presents with a one minute episode of syncope and a history of graves disease and total thyroidectomy and the differential diagnosis includes but is not limited to hypothyroidism, Arrhythmia, electrolyte abnormality, dehydraytion, intoxication, myocardial infarction, pulmonary embolism. Ordered for free thyroxine, T3 free, D dimer chest xray, troponin, HCG qual, TSH, CBC, CMP, and EKG to evaluate. Patient will be treated with IV fluids for dehydration, lightheadedness, syncope. Patient was informed of need for lab and imaging  studies to rule out emergent process. Patient understands and agrees with the plan.  Informed that adherence to thyroid medication is crucial to not developing these symptoms.     6 PM - Potassium levels low at 3.1 Ordered PO potassium chloride.     6:17 PM Recheck: Patient re-evaluated at beside. Patient reports feeling improved. Discussed patient's condition and treatment plan. Patient's lab and radiology results discussed.  Elevated TSH.  T3 and T4 levels are at low normal.  She has a slightly low potassium at 3.1.  I have given her a dose of Synthroid here.  I have also written a prescription for this.  Of asked her to eat a regular diet and this will help improve her potassium levels.   The patient understood and is in agreement. Her heart score is 0.  I do not think she needs to be admitted today.  Unclear exact reason for syncopal episode.  Illicit substance use prior to episode does confuse etiology of episode.  However given normal EKG no severely abnormal labs besides TSH level she can be discharged.  She is not showing symptoms of hypothyroid coma.    6:22 PM Paged [facility].    6:24 PM I discussed the patient's case and the above findings with Dr. Brantley ([facility]). Discussed getting close follow up for the patient.     6:30 PM Informed patient of follow up with [facility].       HYDRATION: Based on the patient's presentation of Dehydration and Other lightheadedness syncope the patient was given IV fluids. IV Hydration was used because oral hydration was not adequate alone. Upon recheck following hydration, the patient was improved.. Chest pain improved. Vitals normal. Not having symptoms she was having earlier.     The patient will return for worsening symptoms and is stable at the time of discharge. The patient verbalizes understanding and will comply. Guidance provided on appropriate use of medications.    DISPOSITION:  Patient will be discharged home in stable condition.    FOLLOW UP:  [facility]  MEDICINE  [address] [address]  [phone]  Call   Call [phone] to make a appointment with [facility] Medicine    [facility], Emergency Dept  [address] [address] [phone]    If symptoms worsen, shortness of breath, unable to stop vomiting, recurrent episodes of losing consciousness      OUTPATIENT MEDICATIONS:  Discharge Medication List as of 7/27/2019  7:08 PM           FINAL IMPRESSION  1. Hypokalemia    2. Hypothyroidism, unspecified type    3. Syncope, unspecified syncope type    4. Graves disease    5. TSH elevation    6. Postoperative hypothyroidism          Solomon HARRIS (Scribe), am scribing for, and in the presence of, DIPTI Schilling II.    Electronically signed by: Solomon Madison (Scribe), 7/27/2019    Kain HARRIS II, M* personally performed the services described in this documentation, as scribed by Solomon Madison in my presence, and it is both accurate and complete.    The note accurately reflects work and decisions made by me.  Kain Ta II  7/28/2019  12:55 AM

## 2019-07-27 NOTE — ED TRIAGE NOTES
Chief Complaint   Patient presents with   • Syncope   • Other   Pt bib triage after passing out a picnic today at a park.  Pt reports that she has a history of Graves disease and has not taking medication x 2 weeks, although medication listed is Synthroid.  EKG completed.  Pt educated on triage process and instructed to notify triage RN of any change in status.

## 2019-07-28 NOTE — ED NOTES
MD at bedside prior to d/c. Pt given d/c instruction and verbalized understanding. One rx given. Pt taken to lobby via wheelchair. Pt took all belongings from room.

## 2019-09-06 ENCOUNTER — HOSPITAL ENCOUNTER (EMERGENCY)
Facility: MEDICAL CENTER | Age: 24
End: 2019-09-06
Attending: EMERGENCY MEDICINE
Payer: MEDICAID

## 2019-09-06 VITALS
TEMPERATURE: 97.5 F | BODY MASS INDEX: 22.46 KG/M2 | RESPIRATION RATE: 17 BRPM | HEIGHT: 63 IN | OXYGEN SATURATION: 97 % | WEIGHT: 126.76 LBS | DIASTOLIC BLOOD PRESSURE: 69 MMHG | SYSTOLIC BLOOD PRESSURE: 117 MMHG | HEART RATE: 89 BPM

## 2019-09-06 DIAGNOSIS — R10.84 ACUTE GENERALIZED ABDOMINAL PAIN: ICD-10-CM

## 2019-09-06 DIAGNOSIS — R11.2 NON-INTRACTABLE VOMITING WITH NAUSEA, UNSPECIFIED VOMITING TYPE: ICD-10-CM

## 2019-09-06 LAB
ALBUMIN SERPL BCP-MCNC: 4.6 G/DL (ref 3.2–4.9)
ALBUMIN/GLOB SERPL: 1.4 G/DL
ALP SERPL-CCNC: 51 U/L (ref 30–99)
ALT SERPL-CCNC: 7 U/L (ref 2–50)
ANION GAP SERPL CALC-SCNC: 10 MMOL/L (ref 0–11.9)
AST SERPL-CCNC: 15 U/L (ref 12–45)
BASOPHILS # BLD AUTO: 0.7 % (ref 0–1.8)
BASOPHILS # BLD: 0.06 K/UL (ref 0–0.12)
BILIRUB SERPL-MCNC: 1 MG/DL (ref 0.1–1.5)
BUN SERPL-MCNC: 9 MG/DL (ref 8–22)
CALCIUM SERPL-MCNC: 9.2 MG/DL (ref 8.5–10.5)
CHLORIDE SERPL-SCNC: 102 MMOL/L (ref 96–112)
CO2 SERPL-SCNC: 25 MMOL/L (ref 20–33)
CREAT SERPL-MCNC: 0.58 MG/DL (ref 0.5–1.4)
EOSINOPHIL # BLD AUTO: 0.22 K/UL (ref 0–0.51)
EOSINOPHIL NFR BLD: 2.4 % (ref 0–6.9)
ERYTHROCYTE [DISTWIDTH] IN BLOOD BY AUTOMATED COUNT: 43 FL (ref 35.9–50)
GLOBULIN SER CALC-MCNC: 3.2 G/DL (ref 1.9–3.5)
GLUCOSE SERPL-MCNC: 79 MG/DL (ref 65–99)
HCG SERPL QL: NEGATIVE
HCT VFR BLD AUTO: 41.7 % (ref 37–47)
HGB BLD-MCNC: 13.2 G/DL (ref 12–16)
IMM GRANULOCYTES # BLD AUTO: 0.03 K/UL (ref 0–0.11)
IMM GRANULOCYTES NFR BLD AUTO: 0.3 % (ref 0–0.9)
LIPASE SERPL-CCNC: 13 U/L (ref 11–82)
LYMPHOCYTES # BLD AUTO: 1.51 K/UL (ref 1–4.8)
LYMPHOCYTES NFR BLD: 16.7 % (ref 22–41)
MCH RBC QN AUTO: 28.2 PG (ref 27–33)
MCHC RBC AUTO-ENTMCNC: 31.7 G/DL (ref 33.6–35)
MCV RBC AUTO: 89.1 FL (ref 81.4–97.8)
MONOCYTES # BLD AUTO: 0.58 K/UL (ref 0–0.85)
MONOCYTES NFR BLD AUTO: 6.4 % (ref 0–13.4)
NEUTROPHILS # BLD AUTO: 6.62 K/UL (ref 2–7.15)
NEUTROPHILS NFR BLD: 73.5 % (ref 44–72)
NRBC # BLD AUTO: 0 K/UL
NRBC BLD-RTO: 0 /100 WBC
PLATELET # BLD AUTO: 221 K/UL (ref 164–446)
PMV BLD AUTO: 11.1 FL (ref 9–12.9)
POTASSIUM SERPL-SCNC: 3.3 MMOL/L (ref 3.6–5.5)
PROT SERPL-MCNC: 7.8 G/DL (ref 6–8.2)
RBC # BLD AUTO: 4.68 M/UL (ref 4.2–5.4)
SODIUM SERPL-SCNC: 137 MMOL/L (ref 135–145)
WBC # BLD AUTO: 9 K/UL (ref 4.8–10.8)

## 2019-09-06 PROCEDURE — 700105 HCHG RX REV CODE 258: Performed by: EMERGENCY MEDICINE

## 2019-09-06 PROCEDURE — 84703 CHORIONIC GONADOTROPIN ASSAY: CPT

## 2019-09-06 PROCEDURE — 700102 HCHG RX REV CODE 250 W/ 637 OVERRIDE(OP): Performed by: EMERGENCY MEDICINE

## 2019-09-06 PROCEDURE — 99285 EMERGENCY DEPT VISIT HI MDM: CPT

## 2019-09-06 PROCEDURE — 700111 HCHG RX REV CODE 636 W/ 250 OVERRIDE (IP): Performed by: EMERGENCY MEDICINE

## 2019-09-06 PROCEDURE — 96375 TX/PRO/DX INJ NEW DRUG ADDON: CPT

## 2019-09-06 PROCEDURE — 96374 THER/PROPH/DIAG INJ IV PUSH: CPT

## 2019-09-06 PROCEDURE — 83690 ASSAY OF LIPASE: CPT

## 2019-09-06 PROCEDURE — A9270 NON-COVERED ITEM OR SERVICE: HCPCS | Performed by: EMERGENCY MEDICINE

## 2019-09-06 PROCEDURE — 85025 COMPLETE CBC W/AUTO DIFF WBC: CPT

## 2019-09-06 PROCEDURE — 80053 COMPREHEN METABOLIC PANEL: CPT

## 2019-09-06 RX ORDER — SODIUM CHLORIDE 9 MG/ML
1000 INJECTION, SOLUTION INTRAVENOUS ONCE
Status: COMPLETED | OUTPATIENT
Start: 2019-09-06 | End: 2019-09-06

## 2019-09-06 RX ORDER — KETOROLAC TROMETHAMINE 30 MG/ML
30 INJECTION, SOLUTION INTRAMUSCULAR; INTRAVENOUS ONCE
Status: COMPLETED | OUTPATIENT
Start: 2019-09-06 | End: 2019-09-06

## 2019-09-06 RX ORDER — ONDANSETRON 2 MG/ML
4 INJECTION INTRAMUSCULAR; INTRAVENOUS ONCE
Status: COMPLETED | OUTPATIENT
Start: 2019-09-06 | End: 2019-09-06

## 2019-09-06 RX ORDER — ONDANSETRON 4 MG/1
4 TABLET, ORALLY DISINTEGRATING ORAL EVERY 6 HOURS PRN
Qty: 10 TAB | Refills: 0 | Status: SHIPPED | OUTPATIENT
Start: 2019-09-06 | End: 2019-09-10

## 2019-09-06 RX ADMIN — KETOROLAC TROMETHAMINE 30 MG: 30 INJECTION, SOLUTION INTRAMUSCULAR; INTRAVENOUS at 10:55

## 2019-09-06 RX ADMIN — ONDANSETRON 4 MG: 2 INJECTION INTRAMUSCULAR; INTRAVENOUS at 10:10

## 2019-09-06 RX ADMIN — LIDOCAINE HYDROCHLORIDE 30 ML: 20 SOLUTION OROPHARYNGEAL at 10:10

## 2019-09-06 RX ADMIN — SODIUM CHLORIDE 1000 ML: 9 INJECTION, SOLUTION INTRAVENOUS at 10:09

## 2019-09-06 NOTE — DISCHARGE INSTRUCTIONS
Clear liquids only for the rest of the day today.  Use nausea medications as prescribed.  Return if your symptoms change or worsen.

## 2019-09-06 NOTE — ED PROVIDER NOTES
ED Provider  Scribed for Ze Sifuentes D.O. by Jeanine Borja. 9/6/2019  9:41 AM    Means of arrival:Walk In  History obtained from:Patient  History limited by: None    CHIEF COMPLAINT  Chief Complaint   Patient presents with   • Flu Like Symptoms     sinus pressure, cough x2 days    • N/V     since last night       HPI  Ivis Klein is a 23 y.o. female who presents for flu like symptoms onset 2 days ago. The patient states she has developed vomiting that has improved since onset, intermittent fever with a Tmax of 99 °F , constipation, back pain, generalized weakness, and dizziness. She states she last vomited at 2:30 AM. The patient denies any associated diarrhea dysuria, or chest pain. She denies any history of abdominal surgeries.       REVIEW OF SYSTEMS  See HPI for further details. All other systems are negative.     PAST MEDICAL HISTORY   has a past medical history of Anxiety (2/16/2017), Arrhythmia, Breath shortness, Graves disease (12/5/2016), Heart valve disease, Hyperthyroidism (6/29/2016), Menarche, Migraine, Pericarditis (06/2016), Pregnancy, Psychiatric problem, Supervision of normal first teen pregnancy, and Vomiting (6/29/2016).    SOCIAL HISTORY  Social History     Tobacco Use   • Smoking status: Never Smoker   • Smokeless tobacco: Never Used   • Tobacco comment: quit in 2012   Substance and Sexual Activity   • Alcohol use: No   • Drug use: Yes     Types: Marijuana, Inhaled     Comment: marijuana weekly   • Sexual activity: Yes     Partners: Male     Birth control/protection: Abstinence     Comment: single       SURGICAL HISTORY   has a past surgical history that includes primary c section (9/8/2013) and thyroidectomy total (Bilateral, 3/22/2017).    CURRENT MEDICATIONS  Home Medications     Reviewed by Denisha Hurtado R.N. (Registered Nurse) on 09/06/19 at 0919  Med List Status: Complete   Medication Last Dose Status   levothyroxine (SYNTHROID) 150 MCG Tab 9/6/2019 Active           "      ALLERGIES  Allergies   Allergen Reactions   • Nkda [No Known Drug Allergy]        PHYSICAL EXAM  VITAL SIGNS: /79   Pulse 94   Temp 37.2 °C (99 °F) (Temporal)   Resp 20   Ht 1.6 m (5' 3\")   Wt 57.5 kg (126 lb 12.2 oz)   LMP 08/15/2019 (Approximate)   SpO2 100%   BMI 22.46 kg/m²   Constitutional: Alert in no apparent distress.  HENT: No signs of trauma, mucous membranes are moist  Eyes: Conjunctiva normal, Non-icteric.   Neck: Normal range of motion, No tenderness, Supple.  Lymphatic: No lymphadenopathy noted.   Cardiovascular: Regular rate and rhythm, no murmurs.   Thorax & Lungs: Normal breath sounds, No respiratory distress, No wheezing, No chest tenderness.   Abdomen: Mild left lower quadrant tenderness. No guarding, rigidity, or rebound. Bowel sounds normal, Soft, No masses, No pulsatile masses. No peritoneal signs.  Skin: Warm, Dry, normal color.   Back: No bony tenderness, No CVA tenderness.   Extremities: No edema, No tenderness, No cyanosis  Musculoskeletal: Good range of motion in all major joints. No tenderness to palpation or major deformities noted.   Neurologic: Alert and oriented x4, Normal motor function, Normal sensory function, No focal deficits noted.   Psychiatric: Affect normal, Judgment normal, Mood normal.         MEDICAL DECISION MAKING  This is a 23 y.o. female who presents with complaints of abdominal pain and vomiting.  She was medicated for symptoms with very good results.  Her lab test shows mildly decreased potassium, this will be typically corrected when she starts eating again.  She is able to tolerate liquids and she is discharged home with symptomatic care.    DIAGNOSTIC STUDIES / PROCEDURES    LABS  Results for orders placed or performed during the hospital encounter of 09/06/19   CBC WITH DIFFERENTIAL   Result Value Ref Range    WBC 9.0 4.8 - 10.8 K/uL    RBC 4.68 4.20 - 5.40 M/uL    Hemoglobin 13.2 12.0 - 16.0 g/dL    Hematocrit 41.7 37.0 - 47.0 %    MCV 89.1 " 81.4 - 97.8 fL    MCH 28.2 27.0 - 33.0 pg    MCHC 31.7 (L) 33.6 - 35.0 g/dL    RDW 43.0 35.9 - 50.0 fL    Platelet Count 221 164 - 446 K/uL    MPV 11.1 9.0 - 12.9 fL    Neutrophils-Polys 73.50 (H) 44.00 - 72.00 %    Lymphocytes 16.70 (L) 22.00 - 41.00 %    Monocytes 6.40 0.00 - 13.40 %    Eosinophils 2.40 0.00 - 6.90 %    Basophils 0.70 0.00 - 1.80 %    Immature Granulocytes 0.30 0.00 - 0.90 %    Nucleated RBC 0.00 /100 WBC    Neutrophils (Absolute) 6.62 2.00 - 7.15 K/uL    Lymphs (Absolute) 1.51 1.00 - 4.80 K/uL    Monos (Absolute) 0.58 0.00 - 0.85 K/uL    Eos (Absolute) 0.22 0.00 - 0.51 K/uL    Baso (Absolute) 0.06 0.00 - 0.12 K/uL    Immature Granulocytes (abs) 0.03 0.00 - 0.11 K/uL    NRBC (Absolute) 0.00 K/uL   COMP METABOLIC PANEL   Result Value Ref Range    Sodium 137 135 - 145 mmol/L    Potassium 3.3 (L) 3.6 - 5.5 mmol/L    Chloride 102 96 - 112 mmol/L    Co2 25 20 - 33 mmol/L    Anion Gap 10.0 0.0 - 11.9    Glucose 79 65 - 99 mg/dL    Bun 9 8 - 22 mg/dL    Creatinine 0.58 0.50 - 1.40 mg/dL    Calcium 9.2 8.5 - 10.5 mg/dL    AST(SGOT) 15 12 - 45 U/L    ALT(SGPT) 7 2 - 50 U/L    Alkaline Phosphatase 51 30 - 99 U/L    Total Bilirubin 1.0 0.1 - 1.5 mg/dL    Albumin 4.6 3.2 - 4.9 g/dL    Total Protein 7.8 6.0 - 8.2 g/dL    Globulin 3.2 1.9 - 3.5 g/dL    A-G Ratio 1.4 g/dL   LIPASE   Result Value Ref Range    Lipase 13 11 - 82 U/L   HCG QUAL SERUM   Result Value Ref Range    Beta-Hcg Qualitative Serum Negative Negative   ESTIMATED GFR   Result Value Ref Range    GFR If African American >60 >60 mL/min/1.73 m 2    GFR If Non African American >60 >60 mL/min/1.73 m 2       All labs reviewed by me.    RADIOLOGY  No orders to display     The radiologist's interpretations of all radiological studies have been reviewed by me.        COURSE  Pertinent Labs & Imaging studies reviewed. (See chart for details)    9:41 AM - Patient seen and examined at bedside. Discussed plan of care. The patient will be resuscitated with 1L  NS IV and medicated with Toradol injection 30 mg, Zofran injection 4 mg and GI Coctaktail Ordered for HCG qual serum, CBC with differential, CMP, and Lipase to evaluate her symptoms.     11:07 AM Patient was reevaluated at bedside. Patient is resting comfortably in bed and nausea and discomfort have improved. She is able to tolerate fluids. Discussed lab  and radiology  results with the patient and informed them that results were reassuring. She will be discharged home with Zofran at this time in stable condition and advised to return to the ED  for new or worsening symptoms and is stable at the time of discharge.    HYDRATION: Based on the patient's presentation of Other nausea the patient was given IV fluids. IV Hydration was used because oral hydration was not adequate alone. Upon recheck following hydration, the patient was mildly improved.        DISPOSITION:  Patient will be discharged home in stable condition.    FOLLOW UP:  Hamlet Potter M.D.  [address] [place] [id number]  [phone]    In 3 days        OUTPATIENT MEDICATIONS:  Discharge Medication List as of 9/6/2019 11:47 AM      START taking these medications    Details   ondansetron (ZOFRAN ODT) 4 MG TABLET DISPERSIBLE Take 1 Tab by mouth every 6 hours as needed for Nausea., Disp-10 Tab, R-0, Normal              FINAL IMPRESSION  1. Non-intractable vomiting with nausea, unspecified vomiting type    2. Acute generalized abdominal pain         Jeanine HARRIS (Kamini), am scribing for, and in the presence of, Ze Sifuentes D.O..    Electronically signed by: Jeanine Borja (Kamini), 9/6/2019    Ze HARRIS D.O. personally performed the services described in this documentation, as scribed by Jeanine Borja in my presence, and it is both accurate and complete. C    The note accurately reflects work and decisions made by me.  Ze Sifuentes  9/6/2019  1:36 PM

## 2019-09-06 NOTE — ED NOTES
PIV placed. Pt tolerated well. Blood walked to lab. Pt medicated per orders, see MAR. IVF infusing.

## 2019-09-06 NOTE — ED NOTES
Pt roomed from camille tellez. Gait steady. Pt presents for symptoms as stated in the triage note. Pt reports URI symptoms with 10+ episodes of N/V.

## 2019-09-06 NOTE — ED TRIAGE NOTES
"Chief Complaint   Patient presents with   • Flu Like Symptoms     sinus pressure, cough x2 days    • N/V     since last night     Pt to triage for above. Mask in place. Provided kleenex.     Pt returned to lobby. Educated on triage process and to inform staff of any changes.     /79   Pulse 94   Temp 37.2 °C (99 °F) (Temporal)   Resp 20   Ht 1.6 m (5' 3\")   Wt 57.5 kg (126 lb 12.2 oz)   SpO2 100%   BMI 22.46 kg/m²     "

## 2019-09-06 NOTE — ED NOTES
Pt states to be nausea free at this time. Pt medicated for pain, see MAR. IVF infusing. Lights dimmed for comfort.

## 2019-09-10 ENCOUNTER — HOSPITAL ENCOUNTER (EMERGENCY)
Facility: MEDICAL CENTER | Age: 24
End: 2019-09-10
Attending: EMERGENCY MEDICINE
Payer: MEDICAID

## 2019-09-10 VITALS
BODY MASS INDEX: 22.54 KG/M2 | SYSTOLIC BLOOD PRESSURE: 125 MMHG | RESPIRATION RATE: 14 BRPM | DIASTOLIC BLOOD PRESSURE: 79 MMHG | TEMPERATURE: 98.6 F | OXYGEN SATURATION: 94 % | HEIGHT: 63 IN | WEIGHT: 127.21 LBS | HEART RATE: 79 BPM

## 2019-09-10 DIAGNOSIS — R10.2 PELVIC PAIN: ICD-10-CM

## 2019-09-10 DIAGNOSIS — R10.2 ACUTE PELVIC PAIN: ICD-10-CM

## 2019-09-10 LAB
ALBUMIN SERPL BCP-MCNC: 4.3 G/DL (ref 3.2–4.9)
ALBUMIN/GLOB SERPL: 1.3 G/DL
ALP SERPL-CCNC: 51 U/L (ref 30–99)
ALT SERPL-CCNC: 7 U/L (ref 2–50)
ANION GAP SERPL CALC-SCNC: 11 MMOL/L (ref 0–11.9)
APPEARANCE UR: CLEAR
AST SERPL-CCNC: 12 U/L (ref 12–45)
BASOPHILS # BLD AUTO: 0.4 % (ref 0–1.8)
BASOPHILS # BLD: 0.04 K/UL (ref 0–0.12)
BILIRUB SERPL-MCNC: 0.3 MG/DL (ref 0.1–1.5)
BILIRUB UR QL STRIP.AUTO: NEGATIVE
BUN SERPL-MCNC: 8 MG/DL (ref 8–22)
CALCIUM SERPL-MCNC: 9.4 MG/DL (ref 8.5–10.5)
CHLORIDE SERPL-SCNC: 104 MMOL/L (ref 96–112)
CO2 SERPL-SCNC: 21 MMOL/L (ref 20–33)
COLOR UR: YELLOW
CREAT SERPL-MCNC: 0.64 MG/DL (ref 0.5–1.4)
EOSINOPHIL # BLD AUTO: 0.05 K/UL (ref 0–0.51)
EOSINOPHIL NFR BLD: 0.6 % (ref 0–6.9)
ERYTHROCYTE [DISTWIDTH] IN BLOOD BY AUTOMATED COUNT: 42.1 FL (ref 35.9–50)
GLOBULIN SER CALC-MCNC: 3.2 G/DL (ref 1.9–3.5)
GLUCOSE SERPL-MCNC: 81 MG/DL (ref 65–99)
GLUCOSE UR STRIP.AUTO-MCNC: NEGATIVE MG/DL
HCG SERPL QL: NEGATIVE
HCT VFR BLD AUTO: 40.2 % (ref 37–47)
HGB BLD-MCNC: 12.9 G/DL (ref 12–16)
IMM GRANULOCYTES # BLD AUTO: 0.02 K/UL (ref 0–0.11)
IMM GRANULOCYTES NFR BLD AUTO: 0.2 % (ref 0–0.9)
KETONES UR STRIP.AUTO-MCNC: NEGATIVE MG/DL
LEUKOCYTE ESTERASE UR QL STRIP.AUTO: NEGATIVE
LIPASE SERPL-CCNC: 17 U/L (ref 11–82)
LYMPHOCYTES # BLD AUTO: 2.97 K/UL (ref 1–4.8)
LYMPHOCYTES NFR BLD: 33.3 % (ref 22–41)
MCH RBC QN AUTO: 28.4 PG (ref 27–33)
MCHC RBC AUTO-ENTMCNC: 32.1 G/DL (ref 33.6–35)
MCV RBC AUTO: 88.5 FL (ref 81.4–97.8)
MICRO URNS: NORMAL
MONOCYTES # BLD AUTO: 0.49 K/UL (ref 0–0.85)
MONOCYTES NFR BLD AUTO: 5.5 % (ref 0–13.4)
NEUTROPHILS # BLD AUTO: 5.34 K/UL (ref 2–7.15)
NEUTROPHILS NFR BLD: 60 % (ref 44–72)
NITRITE UR QL STRIP.AUTO: NEGATIVE
NRBC # BLD AUTO: 0 K/UL
NRBC BLD-RTO: 0 /100 WBC
PH UR STRIP.AUTO: 6.5 [PH] (ref 5–8)
PLATELET # BLD AUTO: 271 K/UL (ref 164–446)
PMV BLD AUTO: 10.7 FL (ref 9–12.9)
POTASSIUM SERPL-SCNC: 3.6 MMOL/L (ref 3.6–5.5)
PROT SERPL-MCNC: 7.5 G/DL (ref 6–8.2)
PROT UR QL STRIP: NEGATIVE MG/DL
RBC # BLD AUTO: 4.54 M/UL (ref 4.2–5.4)
RBC UR QL AUTO: NEGATIVE
SODIUM SERPL-SCNC: 136 MMOL/L (ref 135–145)
SP GR UR STRIP.AUTO: 1.01
UROBILINOGEN UR STRIP.AUTO-MCNC: 0.2 MG/DL
WBC # BLD AUTO: 8.9 K/UL (ref 4.8–10.8)

## 2019-09-10 PROCEDURE — 83690 ASSAY OF LIPASE: CPT

## 2019-09-10 PROCEDURE — 81003 URINALYSIS AUTO W/O SCOPE: CPT

## 2019-09-10 PROCEDURE — 96374 THER/PROPH/DIAG INJ IV PUSH: CPT

## 2019-09-10 PROCEDURE — 80053 COMPREHEN METABOLIC PANEL: CPT

## 2019-09-10 PROCEDURE — 84703 CHORIONIC GONADOTROPIN ASSAY: CPT

## 2019-09-10 PROCEDURE — 99285 EMERGENCY DEPT VISIT HI MDM: CPT

## 2019-09-10 PROCEDURE — 700111 HCHG RX REV CODE 636 W/ 250 OVERRIDE (IP): Performed by: EMERGENCY MEDICINE

## 2019-09-10 PROCEDURE — 85025 COMPLETE CBC W/AUTO DIFF WBC: CPT

## 2019-09-10 RX ORDER — KETOROLAC TROMETHAMINE 30 MG/ML
30 INJECTION, SOLUTION INTRAMUSCULAR; INTRAVENOUS ONCE
Status: COMPLETED | OUTPATIENT
Start: 2019-09-10 | End: 2019-09-10

## 2019-09-10 RX ORDER — NAPROXEN 500 MG/1
500 TABLET ORAL 2 TIMES DAILY WITH MEALS
Qty: 20 TAB | Refills: 0 | Status: SHIPPED | OUTPATIENT
Start: 2019-09-10 | End: 2019-11-03

## 2019-09-10 RX ADMIN — KETOROLAC TROMETHAMINE 30 MG: 30 INJECTION, SOLUTION INTRAMUSCULAR at 18:56

## 2019-09-11 ENCOUNTER — PATIENT OUTREACH (OUTPATIENT)
Dept: HEALTH INFORMATION MANAGEMENT | Facility: OTHER | Age: 24
End: 2019-09-11

## 2019-09-11 NOTE — ED TRIAGE NOTES
Pt comes in complaining of lower abd pain today. Pt reporting hx of ovarian cyst in the past. Pt denies pregnancy.

## 2019-09-11 NOTE — DISCHARGE INSTRUCTIONS
Your lab tests are normal, you have pain that has been consistent with prior ovarian cyst.  The treatment for this is pain medication.  Return if you develop fevers, vomiting or any change or worsening of symptoms.

## 2019-09-11 NOTE — ED NOTES
Pt educated on 1 Rx, on use and administration. Pt given return precaution along with DC and follow up instructions. Pt verbalized understanding

## 2019-09-11 NOTE — ED PROVIDER NOTES
ED Provider  Scribed for Ze Sifuentes D.O. by Marlyn Hedrick. 9/10/2019  6:41 PM    Means of arrival:Walk-in  History obtained from:Patient  History limited by: None    CHIEF COMPLAINT  Chief Complaint   Patient presents with   • Pelvic Pain   • Abdominal Pain       HPI  Ivis Klein is a 23 y.o. female who presents to the ED for evaluation of acute, intermittent, non-radiating moderate bilateral lower abdominal pain onset five days ago.The patient reports that she was at work earlier today and when she tried to stand up she experienced moderate abdominal pain which prompted her visit the ED for further evaluation of her condition. Exacerbating factors includes palpation to the abdomen. No alleviating factors were reported. The patient does not report taking any over the counter medications for their current symptoms. She notes that her current symptoms are similar to when she was diagnosed with an ovarian cyst three months ago, however her current abdominal pain is worse. The patient states that she visited her OB/GYN for her current symptoms but did not have any significant findings. Denies fevers, chills, nausea, vomiting, diarrhea, painful urination, urinary urgency, vaginal discharge or vaginal bleeding. Upon reviewing the patient's past medical records, the patient was last seen at this facility on 6/6/19 for a chief complaint of pelvic pain where she was diagnosed with a yeast infection and a hemorrhagic cyst. No further past medical or surgical history was reported. She does not report taking any daily medications. The patient has no known drug allergies.       REVIEW OF SYSTEMS  See HPI for further details. All other systems are negative.     PAST MEDICAL HISTORY   has a past medical history of Anxiety (2/16/2017), Arrhythmia, Breath shortness, Graves disease (12/5/2016), Heart valve disease, Hyperthyroidism (6/29/2016), Menarche, Migraine, Pericarditis (06/2016), Pregnancy, Psychiatric  "problem, Supervision of normal first teen pregnancy, and Vomiting (6/29/2016).    SOCIAL HISTORY  Social History     Tobacco Use   • Smoking status: Never Smoker   • Smokeless tobacco: Never Used   • Tobacco comment: quit in 2012   Substance and Sexual Activity   • Alcohol use: No   • Drug use: Yes     Types: Marijuana, Inhaled     Comment: marijuana weekly   • Sexual activity: Yes     Partners: Male     Birth control/protection: Abstinence     Comment: single       SURGICAL HISTORY   has a past surgical history that includes primary c section (9/8/2013) and thyroidectomy total (Bilateral, 3/22/2017).    CURRENT MEDICATIONS  Home Medications     Reviewed by Leslee Torres R.N. (Registered Nurse) on 09/10/19 at 1727  Med List Status: Complete   Medication Last Dose Status   levothyroxine (SYNTHROID) 150 MCG Tab 9/10/2019 Active                ALLERGIES  Allergies   Allergen Reactions   • Nkda [No Known Drug Allergy]        PHYSICAL EXAM  VITAL SIGNS: /84   Pulse 80   Temp 37 °C (98.6 °F) (Temporal)   Resp 14   Ht 1.6 m (5' 3\")   Wt 57.7 kg (127 lb 3.3 oz)   LMP 08/15/2019 (Approximate)   BMI 22.53 kg/m²   Constitutional: Alert in no apparent distress.  HENT: No signs of trauma, mucous membranes are moist  Eyes: Conjunctiva normal, Non-icteric.   Neck: Normal range of motion, No tenderness, Supple.  Lymphatic: No lymphadenopathy noted.   Cardiovascular: Regular rate and rhythm, no murmurs.   Thorax & Lungs: Normal breath sounds, No respiratory distress, No wheezing, No chest tenderness.   Abdomen: Bowel sounds normal, Soft, No tenderness, No masses, No pulsatile masses. No peritoneal signs.  Skin: Warm, Dry, normal color.   Back: No bony tenderness, No CVA tenderness.   Extremities: No edema, No tenderness, No cyanosis  Musculoskeletal: Good range of motion in all major joints. No tenderness to palpation or major deformities noted.   Neurologic: Alert and oriented x4, Normal motor function, Normal " sensory function, No focal deficits noted.   Psychiatric: Affect normal, Judgment normal, Mood normal.       MEDICAL DECISION MAKING  This is a 23 y.o. female who presents with lower abdominal pain,/pelvic pain.  She is been here before for similar pains and she did have a diagnosis of ovarian cyst.  Her abdominal exam is not consistent with a surgical abdomen her white blood cell count is normal I do not suspect appendicitis or other infection.  She has no vaginal discharge or odor.  She will be treated symptomatically to follow-up with her primary care physician.    DIAGNOSTIC STUDIES / PROCEDURES      LABS  Results for orders placed or performed during the hospital encounter of 09/10/19   CBC WITH DIFFERENTIAL   Result Value Ref Range    WBC 8.9 4.8 - 10.8 K/uL    RBC 4.54 4.20 - 5.40 M/uL    Hemoglobin 12.9 12.0 - 16.0 g/dL    Hematocrit 40.2 37.0 - 47.0 %    MCV 88.5 81.4 - 97.8 fL    MCH 28.4 27.0 - 33.0 pg    MCHC 32.1 (L) 33.6 - 35.0 g/dL    RDW 42.1 35.9 - 50.0 fL    Platelet Count 271 164 - 446 K/uL    MPV 10.7 9.0 - 12.9 fL    Neutrophils-Polys 60.00 44.00 - 72.00 %    Lymphocytes 33.30 22.00 - 41.00 %    Monocytes 5.50 0.00 - 13.40 %    Eosinophils 0.60 0.00 - 6.90 %    Basophils 0.40 0.00 - 1.80 %    Immature Granulocytes 0.20 0.00 - 0.90 %    Nucleated RBC 0.00 /100 WBC    Neutrophils (Absolute) 5.34 2.00 - 7.15 K/uL    Lymphs (Absolute) 2.97 1.00 - 4.80 K/uL    Monos (Absolute) 0.49 0.00 - 0.85 K/uL    Eos (Absolute) 0.05 0.00 - 0.51 K/uL    Baso (Absolute) 0.04 0.00 - 0.12 K/uL    Immature Granulocytes (abs) 0.02 0.00 - 0.11 K/uL    NRBC (Absolute) 0.00 K/uL   COMP METABOLIC PANEL   Result Value Ref Range    Sodium 136 135 - 145 mmol/L    Potassium 3.6 3.6 - 5.5 mmol/L    Chloride 104 96 - 112 mmol/L    Co2 21 20 - 33 mmol/L    Anion Gap 11.0 0.0 - 11.9    Glucose 81 65 - 99 mg/dL    Bun 8 8 - 22 mg/dL    Creatinine 0.64 0.50 - 1.40 mg/dL    Calcium 9.4 8.5 - 10.5 mg/dL    AST(SGOT) 12 12 - 45 U/L     ALT(SGPT) 7 2 - 50 U/L    Alkaline Phosphatase 51 30 - 99 U/L    Total Bilirubin 0.3 0.1 - 1.5 mg/dL    Albumin 4.3 3.2 - 4.9 g/dL    Total Protein 7.5 6.0 - 8.2 g/dL    Globulin 3.2 1.9 - 3.5 g/dL    A-G Ratio 1.3 g/dL   LIPASE   Result Value Ref Range    Lipase 17 11 - 82 U/L   HCG Qual Serum   Result Value Ref Range    Beta-Hcg Qualitative Serum Negative Negative   URINALYSIS CULTURE, IF INDICATED   Result Value Ref Range    Color Yellow     Character Clear     Specific Gravity 1.013 <1.035    Ph 6.5 5.0 - 8.0    Glucose Negative Negative mg/dL    Ketones Negative Negative mg/dL    Protein Negative Negative mg/dL    Bilirubin Negative Negative    Urobilinogen, Urine 0.2 Negative    Nitrite Negative Negative    Leukocyte Esterase Negative Negative    Occult Blood Negative Negative    Micro Urine Req see below    ESTIMATED GFR   Result Value Ref Range    GFR If African American >60 >60 mL/min/1.73 m 2    GFR If Non African American >60 >60 mL/min/1.73 m 2       All labs reviewed by me.    COURSE  Pertinent Labs & Imaging studies reviewed. (See chart for details)    Review of past medical records shows the patient Upon reviewing the patient's past medical records, the patient was last seen at this facility on 6/6/19 for a chief complaint of pelvic pain where she was diagnosed with a yeast infection and a hemorrhagic cyst.    6:41 PM - Patient seen and examined at bedside. Discussed plan of care. The patient will be medicated with Toradol 30 mg. Ordered for lipase, CMP, CBC with differential, UA culture, hCG qual serum and estimated GFR to evaluate her symptoms.     7:56 PM Recheck. The patient reports that her pain has been. Her abdominal exam is still benign. I discussed her lab work findings with her. She was informed that her lab work was within normal limits and she has been having pain that is consistent with a prior ovarian cyst. The treatment for her condition will be pain medications. The patient will be  discharged with a prescription for Naprosyn 500 mg which is to be taken twice daily. She was instructed to immediately return to the ED if her symptoms worsen or if she develops fevers. Patient verbalizes their understanding and agreement to plan of discharge.     The patient will return for new or worsening symptoms and is stable at the time of discharge.    DISPOSITION:  Patient will be discharged home in stable condition.    FOLLOW UP:  [facility] scheduling  Please call [phone] to make an appoint with the next available practitioner or call your primary care doctor for follow-up.          OUTPATIENT MEDICATIONS:  New Prescriptions    NAPROXEN (NAPROSYN) 500 MG TAB    Take 1 Tab by mouth 2 times a day, with meals.         FINAL IMPRESSION  1. Pelvic pain    2. Acute pelvic pain         Marlyn HARRIS (Kamini), am scribing for, and in the presence of, Ze Sifuentes D.O..    Electronically signed by: Marlyn Hedrick (Bethanyibe), 9/10/2019    Ze HARRIS D.O. personally performed the services described in this documentation, as scribed by Marlyn Hedrick in my presence, and it is both accurate and complete.    C    The note accurately reflects work and decisions made by me.  Ze Sifuentes  9/10/2019  11:12 PM

## 2019-09-11 NOTE — PROGRESS NOTES
9/16/19 Spoke with patient about scheduling with PCP and follow up at [facility]. Due to work schedule patient hasn't been able to schedule PCP appointment yet. CHW Hema maciel was able to contact [facility] and make her an appointment for Wednesday Sept. 25 at 1:15p. Patient instructed to call insurance company to change PCP to Gamal Wiggins, then she will be able to schedule with the first available provider at the [facility] for primary care.     9/11/19 CHW Hema maciel contacted Ivis regarding follow up and primary care. [facility] has provided Ivis with information to schedule PCP appointment with [facility] @[phone]. Patient instructed to contact insurance and update her PCP to Gamal Wiggins, then be will be able to be scheduled to see any PCP in the [facility] clinic. CHW will follow up with patient 9/17/19 to ensure appointment has been made.

## 2019-09-16 ENCOUNTER — APPOINTMENT (OUTPATIENT)
Dept: RADIOLOGY | Facility: MEDICAL CENTER | Age: 24
End: 2019-09-16
Attending: EMERGENCY MEDICINE
Payer: MEDICAID

## 2019-09-16 ENCOUNTER — HOSPITAL ENCOUNTER (EMERGENCY)
Facility: MEDICAL CENTER | Age: 24
End: 2019-09-16
Attending: EMERGENCY MEDICINE
Payer: MEDICAID

## 2019-09-16 VITALS
DIASTOLIC BLOOD PRESSURE: 58 MMHG | HEART RATE: 75 BPM | WEIGHT: 127.21 LBS | TEMPERATURE: 98.2 F | SYSTOLIC BLOOD PRESSURE: 113 MMHG | RESPIRATION RATE: 16 BRPM | HEIGHT: 63 IN | OXYGEN SATURATION: 100 % | BODY MASS INDEX: 22.54 KG/M2

## 2019-09-16 DIAGNOSIS — N39.0 URINARY TRACT INFECTION WITHOUT HEMATURIA, SITE UNSPECIFIED: ICD-10-CM

## 2019-09-16 DIAGNOSIS — N83.209 RUPTURED OVARIAN CYST: ICD-10-CM

## 2019-09-16 LAB
APPEARANCE UR: CLEAR
BACTERIA #/AREA URNS HPF: NEGATIVE /HPF
BILIRUB UR QL STRIP.AUTO: NEGATIVE
COLOR UR: YELLOW
EPI CELLS #/AREA URNS HPF: NEGATIVE /HPF
GLUCOSE UR STRIP.AUTO-MCNC: NEGATIVE MG/DL
HCG UR QL: NEGATIVE
HYALINE CASTS #/AREA URNS LPF: ABNORMAL /LPF
KETONES UR STRIP.AUTO-MCNC: NEGATIVE MG/DL
LEUKOCYTE ESTERASE UR QL STRIP.AUTO: ABNORMAL
MICRO URNS: ABNORMAL
NITRITE UR QL STRIP.AUTO: NEGATIVE
PH UR STRIP.AUTO: 7 [PH] (ref 5–8)
PROT UR QL STRIP: NEGATIVE MG/DL
RBC # URNS HPF: ABNORMAL /HPF
RBC UR QL AUTO: ABNORMAL
SP GR UR REFRACTOMETRY: 1.01
UROBILINOGEN UR STRIP.AUTO-MCNC: 0.2 MG/DL
WBC #/AREA URNS HPF: ABNORMAL /HPF

## 2019-09-16 PROCEDURE — 81001 URINALYSIS AUTO W/SCOPE: CPT

## 2019-09-16 PROCEDURE — 81025 URINE PREGNANCY TEST: CPT

## 2019-09-16 PROCEDURE — 76856 US EXAM PELVIC COMPLETE: CPT

## 2019-09-16 PROCEDURE — 87086 URINE CULTURE/COLONY COUNT: CPT

## 2019-09-16 PROCEDURE — 99284 EMERGENCY DEPT VISIT MOD MDM: CPT

## 2019-09-16 RX ORDER — NITROFURANTOIN 25; 75 MG/1; MG/1
100 CAPSULE ORAL 2 TIMES DAILY
Qty: 14 CAP | Refills: 0 | Status: ON HOLD | OUTPATIENT
Start: 2019-09-16 | End: 2019-09-19

## 2019-09-16 NOTE — ED NOTES
Ivis Klein discharged via ambulation with self.  Discharge instructions given and reviewed, patient educated to follow up with OB, verbalized understanding.  Prescriptions given x 1.  All personal belongings in possession.  No questions at this time.

## 2019-09-16 NOTE — DISCHARGE INSTRUCTIONS
Follow-up at the [facility]necology clinic [facility] regarding ovarian cyst and pelvic pain.    Return for high fever, vomiting, severe pain, any concerns.

## 2019-09-16 NOTE — ED PROVIDER NOTES
"ED Provider Note    CHIEF COMPLAINT  Chief Complaint   Patient presents with   • Abdominal Pain   • Painful Urination   • Urinary Frequency       HPI  Ivis Klein is a 23 y.o. female who presents for reevaluation of lower abdominal pain, she states now left greater than right.  Patient seen here 3 months ago diagnosed with hemorrhagic right ovarian cyst at the time.  She states she has not yet followed up with primary doctor or specialist, recently starting to call around to obtain this follow-up.  Pain is now greater in the left lower quadrant in the right lower quadrant.  She denies vaginal discharge, however does have dysuria.  Patient has had some urinary urgency without good urine outflow.  No abdominal distention, no vomiting, no flank pain.    REVIEW OF SYSTEMS  Constitutional: No fever  Respiratory: No shortness of breath  Cardiac: No chest pain or syncope  Gastrointestinal: Lower abdominal pain.  No change in bowel habits.  Musculoskeletal: No flank pain  Neurologic: No headache  Genitourinary: Dysuria, frequency       All other systems are negative.     PAST MEDICAL HISTORY  Past Medical History:   Diagnosis Date   • Anxiety 2/16/2017   • Arrhythmia     tachycardia \"pt reports d/t thryoid\"   • Breath shortness     no c/o at this time; c/o sob at night unsure if it is d/t anxiety   • Graves disease 12/5/2016   • Heart valve disease    • Hyperthyroidism 6/29/2016   • Menarche     started at age 14   • Migraine    • Pericarditis 06/2016   • Pregnancy     delivered 9/8/2013, 3/16/17 pt reports that she is not pregnant at this time   • Psychiatric problem     anxiety, depression   • Supervision of normal first teen pregnancy    • Vomiting 6/29/2016       FAMILY HISTORY  Family History   Problem Relation Age of Onset   • Cancer Paternal Grandmother 56        breast cancer   • Hyperlipidemia Father        SOCIAL HISTORY  Social History     Socioeconomic History   • Marital status: Single     Spouse " name: Not on file   • Number of children: Not on file   • Years of education: Not on file   • Highest education level: Not on file   Occupational History   • Not on file   Social Needs   • Financial resource strain: Not on file   • Food insecurity:     Worry: Not on file     Inability: Not on file   • Transportation needs:     Medical: Not on file     Non-medical: Not on file   Tobacco Use   • Smoking status: Never Smoker   • Smokeless tobacco: Never Used   • Tobacco comment: quit in 2012   Substance and Sexual Activity   • Alcohol use: No   • Drug use: Yes     Types: Marijuana, Inhaled     Comment: marijuana weekly   • Sexual activity: Yes     Partners: Male     Birth control/protection: Abstinence     Comment: single   Lifestyle   • Physical activity:     Days per week: Not on file     Minutes per session: Not on file   • Stress: Not on file   Relationships   • Social connections:     Talks on phone: Not on file     Gets together: Not on file     Attends [employer] service: Not on file     Active member of club or organization: Not on file     Attends meetings of clubs or organizations: Not on file     Relationship status: Not on file   • Intimate partner violence:     Fear of current or ex partner: Not on file     Emotionally abused: Not on file     Physically abused: Not on file     Forced sexual activity: Not on file   Other Topics Concern   • Not on file   Social History Narrative   • Not on file       SURGICAL HISTORY  Past Surgical History:   Procedure Laterality Date   • THYROIDECTOMY TOTAL Bilateral 3/22/2017    Procedure: THYROIDECTOMY TOTAL -NIMS RECURRENT LARYNGEAL NERVE MONITORING;  Surgeon: Vicente Eldridge M.D.;  Location: SURGERY SAME DAY [facility];  Service:    • PRIMARY C SECTION  9/8/2013    Performed by Melisa Wright M.D. at LABOR AND DELIVERY       CURRENT MEDICATIONS  Home Medications    **Home medications have not yet been reviewed for this encounter**    "      ALLERGIES  Allergies   Allergen Reactions   • Nkda [No Known Drug Allergy]        PHYSICAL EXAM  VITAL SIGNS: /70   Pulse 91   Temp 36.8 °C (98.2 °F) (Temporal)   Resp 16   Ht 1.6 m (5' 3\")   Wt 57.7 kg (127 lb 3.3 oz)   SpO2 98%   BMI 22.53 kg/m²   Constitutional: Well-nourished in no distress  ENT: Nares clear, mucous membranes moist.  Eyes:  Conjunctiva normal, No discharge.    Lymphatic: No adenopathy.   Cardiovascular: Normal heart rate, Normal rhythm.   Pulmonary: No wheezing, no rales  Gastrointestinal: Suprapubic tenderness, left lower quadrant tenderness.  No pain over McBurney's point.  Skin: Warm, Dry, No jaundice.   Musculoskeletal:  No CVA tenderness.   Neurologic:  Normal motor and sensory function, No focal deficits noted.   Psychiatric:Normal affect, Normal mood.    RADIOLOGY/PROCEDURES/Labs  Results for orders placed or performed during the hospital encounter of 09/16/19   URINALYSIS CULTURE, IF INDICATED   Result Value Ref Range    Color Yellow     Character Clear     Ph 7.0 5.0 - 8.0    Glucose Negative Negative mg/dL    Ketones Negative Negative mg/dL    Protein Negative Negative mg/dL    Bilirubin Negative Negative    Urobilinogen, Urine 0.2 Negative    Nitrite Negative Negative    Leukocyte Esterase Large (A) Negative    Occult Blood Moderate (A) Negative    Micro Urine Req Microscopic    HCG QUALITATIVE UR   Result Value Ref Range    Beta-Hcg Urine Negative Negative   REFRACTOMETER SG   Result Value Ref Range    Specific Gravity 1.007    URINE MICROSCOPIC (W/UA)   Result Value Ref Range    WBC  (A) /hpf    RBC 2-5 (A) /hpf    Bacteria Negative None /hpf    Epithelial Cells Negative /hpf    Hyaline Cast 0-2 /lpf     US-PELVIC COMPLETE (TRANSABDOMINAL/TRANSVAGINAL) (COMBO)   Final Result      1.  2.9 x 2.2 x 2.1 cm partially collapsed right ovarian cyst.      2.  Mildly heterogeneous uterus without discrete fibroids seen.            COURSE & MEDICAL DECISION " MAKING  Pertinent Labs & Imaging studies reviewed. (See chart for details)  Ultrasound demonstrates a ruptured right ovarian cyst, appears to be resolving.  No left-sided ovarian cyst.  Urinary tract infection is documented, patient started on Macrobid.  Suprapubic tenderness likely secondary to cystitis.  No evidence of pyelonephritis, patient is stable for discharge home.  She is agreed to return if worse or for any concerns and see a doctor next week if no improvement.    FINAL IMPRESSION  1. Ruptured ovarian cyst     2. Urinary tract infection without hematuria, site unspecified             Electronically signed by: Oleg Lakhani, 9/16/2019 12:20 PM

## 2019-09-16 NOTE — ED TRIAGE NOTES
Chief Complaint   Patient presents with   • Abdominal Pain   • Painful Urination   • Urinary Frequency     Pt states seen here for similar symptoms in past month and diagnosed with ovarian cyst.  Triage process explained to patient.  Pt back to waiting room.  Pt instructed to inform RN if any changes or questions arise.

## 2019-09-16 NOTE — ED NOTES
Pt ambulates well to restroom. Urine specimen collection cup and hygiene wipe provided to pt with instruction for clean catch urine sample.

## 2019-09-18 ENCOUNTER — APPOINTMENT (OUTPATIENT)
Dept: RADIOLOGY | Facility: MEDICAL CENTER | Age: 24
End: 2019-09-18
Attending: EMERGENCY MEDICINE
Payer: MEDICAID

## 2019-09-18 ENCOUNTER — HOSPITAL ENCOUNTER (OUTPATIENT)
Facility: MEDICAL CENTER | Age: 24
End: 2019-09-19
Attending: EMERGENCY MEDICINE | Admitting: INTERNAL MEDICINE
Payer: MEDICAID

## 2019-09-18 DIAGNOSIS — E05.00 GRAVES DISEASE: ICD-10-CM

## 2019-09-18 DIAGNOSIS — R79.89 TSH ELEVATION: ICD-10-CM

## 2019-09-18 DIAGNOSIS — R50.9 FEVER, UNSPECIFIED FEVER CAUSE: ICD-10-CM

## 2019-09-18 DIAGNOSIS — E89.0 POSTOPERATIVE HYPOTHYROIDISM: ICD-10-CM

## 2019-09-18 DIAGNOSIS — A41.9 SEPSIS, DUE TO UNSPECIFIED ORGANISM: Primary | ICD-10-CM

## 2019-09-18 LAB
ALBUMIN SERPL BCP-MCNC: 4.7 G/DL (ref 3.2–4.9)
ALBUMIN/GLOB SERPL: 1.4 G/DL
ALP SERPL-CCNC: 55 U/L (ref 30–99)
ALT SERPL-CCNC: 7 U/L (ref 2–50)
ANION GAP SERPL CALC-SCNC: 11 MMOL/L (ref 0–11.9)
APPEARANCE UR: CLEAR
AST SERPL-CCNC: 11 U/L (ref 12–45)
BACTERIA #/AREA URNS HPF: ABNORMAL /HPF
BACTERIA UR CULT: NORMAL
BASOPHILS # BLD AUTO: 0.3 % (ref 0–1.8)
BASOPHILS # BLD: 0.03 K/UL (ref 0–0.12)
BILIRUB SERPL-MCNC: 0.9 MG/DL (ref 0.1–1.5)
BILIRUB UR QL STRIP.AUTO: ABNORMAL
BUN SERPL-MCNC: 11 MG/DL (ref 8–22)
CALCIUM SERPL-MCNC: 9.4 MG/DL (ref 8.5–10.5)
CHLORIDE SERPL-SCNC: 102 MMOL/L (ref 96–112)
CO2 SERPL-SCNC: 24 MMOL/L (ref 20–33)
COLOR UR: ABNORMAL
CREAT SERPL-MCNC: 0.7 MG/DL (ref 0.5–1.4)
EOSINOPHIL # BLD AUTO: 0.3 K/UL (ref 0–0.51)
EOSINOPHIL NFR BLD: 2.9 % (ref 0–6.9)
EPI CELLS #/AREA URNS HPF: ABNORMAL /HPF
ERYTHROCYTE [DISTWIDTH] IN BLOOD BY AUTOMATED COUNT: 42.8 FL (ref 35.9–50)
FLUAV RNA SPEC QL NAA+PROBE: NEGATIVE
FLUBV RNA SPEC QL NAA+PROBE: NEGATIVE
GLOBULIN SER CALC-MCNC: 3.3 G/DL (ref 1.9–3.5)
GLUCOSE SERPL-MCNC: 106 MG/DL (ref 65–99)
GLUCOSE UR STRIP.AUTO-MCNC: NEGATIVE MG/DL
HCG SERPL QL: NEGATIVE
HCT VFR BLD AUTO: 39.4 % (ref 37–47)
HGB BLD-MCNC: 13.2 G/DL (ref 12–16)
HYALINE CASTS #/AREA URNS LPF: ABNORMAL /LPF
IMM GRANULOCYTES # BLD AUTO: 0.04 K/UL (ref 0–0.11)
IMM GRANULOCYTES NFR BLD AUTO: 0.4 % (ref 0–0.9)
KETONES UR STRIP.AUTO-MCNC: >=160 MG/DL
LACTATE BLD-SCNC: 1.3 MMOL/L (ref 0.5–2)
LEUKOCYTE ESTERASE UR QL STRIP.AUTO: ABNORMAL
LYMPHOCYTES # BLD AUTO: 0.85 K/UL (ref 1–4.8)
LYMPHOCYTES NFR BLD: 8.2 % (ref 22–41)
MCH RBC QN AUTO: 29.3 PG (ref 27–33)
MCHC RBC AUTO-ENTMCNC: 33.5 G/DL (ref 33.6–35)
MCV RBC AUTO: 87.4 FL (ref 81.4–97.8)
MICRO URNS: ABNORMAL
MONOCYTES # BLD AUTO: 0.67 K/UL (ref 0–0.85)
MONOCYTES NFR BLD AUTO: 6.5 % (ref 0–13.4)
NEUTROPHILS # BLD AUTO: 8.44 K/UL (ref 2–7.15)
NEUTROPHILS NFR BLD: 81.7 % (ref 44–72)
NITRITE UR QL STRIP.AUTO: NEGATIVE
NRBC # BLD AUTO: 0 K/UL
NRBC BLD-RTO: 0 /100 WBC
PH UR STRIP.AUTO: 6 [PH] (ref 5–8)
PLATELET # BLD AUTO: 225 K/UL (ref 164–446)
PMV BLD AUTO: 10.9 FL (ref 9–12.9)
POTASSIUM SERPL-SCNC: 3 MMOL/L (ref 3.6–5.5)
PROT SERPL-MCNC: 8 G/DL (ref 6–8.2)
PROT UR QL STRIP: NEGATIVE MG/DL
RBC # BLD AUTO: 4.51 M/UL (ref 4.2–5.4)
RBC # URNS HPF: ABNORMAL /HPF
RBC UR QL AUTO: ABNORMAL
SIGNIFICANT IND 70042: NORMAL
SITE SITE: NORMAL
SODIUM SERPL-SCNC: 137 MMOL/L (ref 135–145)
SOURCE SOURCE: NORMAL
SP GR UR STRIP.AUTO: 1.03
UROBILINOGEN UR STRIP.AUTO-MCNC: 1 MG/DL
WBC # BLD AUTO: 10.3 K/UL (ref 4.8–10.8)
WBC #/AREA URNS HPF: ABNORMAL /HPF

## 2019-09-18 PROCEDURE — 81001 URINALYSIS AUTO W/SCOPE: CPT

## 2019-09-18 PROCEDURE — 87040 BLOOD CULTURE FOR BACTERIA: CPT

## 2019-09-18 PROCEDURE — 80053 COMPREHEN METABOLIC PANEL: CPT

## 2019-09-18 PROCEDURE — 700102 HCHG RX REV CODE 250 W/ 637 OVERRIDE(OP): Performed by: EMERGENCY MEDICINE

## 2019-09-18 PROCEDURE — 83605 ASSAY OF LACTIC ACID: CPT

## 2019-09-18 PROCEDURE — 85025 COMPLETE CBC W/AUTO DIFF WBC: CPT

## 2019-09-18 PROCEDURE — 87591 N.GONORRHOEAE DNA AMP PROB: CPT

## 2019-09-18 PROCEDURE — 83735 ASSAY OF MAGNESIUM: CPT

## 2019-09-18 PROCEDURE — 93005 ELECTROCARDIOGRAM TRACING: CPT

## 2019-09-18 PROCEDURE — 700105 HCHG RX REV CODE 258: Performed by: EMERGENCY MEDICINE

## 2019-09-18 PROCEDURE — 84703 CHORIONIC GONADOTROPIN ASSAY: CPT

## 2019-09-18 PROCEDURE — 93005 ELECTROCARDIOGRAM TRACING: CPT | Performed by: EMERGENCY MEDICINE

## 2019-09-18 PROCEDURE — 87086 URINE CULTURE/COLONY COUNT: CPT

## 2019-09-18 PROCEDURE — 99285 EMERGENCY DEPT VISIT HI MDM: CPT

## 2019-09-18 PROCEDURE — 84439 ASSAY OF FREE THYROXINE: CPT

## 2019-09-18 PROCEDURE — 84443 ASSAY THYROID STIM HORMONE: CPT

## 2019-09-18 PROCEDURE — A9270 NON-COVERED ITEM OR SERVICE: HCPCS | Performed by: EMERGENCY MEDICINE

## 2019-09-18 PROCEDURE — 87491 CHLMYD TRACH DNA AMP PROBE: CPT

## 2019-09-18 PROCEDURE — 87502 INFLUENZA DNA AMP PROBE: CPT

## 2019-09-18 RX ORDER — ONDANSETRON 2 MG/ML
4 INJECTION INTRAMUSCULAR; INTRAVENOUS ONCE
Status: COMPLETED | OUTPATIENT
Start: 2019-09-19 | End: 2019-09-19

## 2019-09-18 RX ORDER — ACETAMINOPHEN 325 MG/1
650 TABLET ORAL ONCE
Status: COMPLETED | OUTPATIENT
Start: 2019-09-18 | End: 2019-09-18

## 2019-09-18 RX ORDER — SODIUM CHLORIDE, SODIUM LACTATE, POTASSIUM CHLORIDE, AND CALCIUM CHLORIDE .6; .31; .03; .02 G/100ML; G/100ML; G/100ML; G/100ML
1000 INJECTION, SOLUTION INTRAVENOUS ONCE
Status: COMPLETED | OUTPATIENT
Start: 2019-09-18 | End: 2019-09-19

## 2019-09-18 RX ORDER — POTASSIUM CHLORIDE 20 MEQ/1
40 TABLET, EXTENDED RELEASE ORAL ONCE
Status: COMPLETED | OUTPATIENT
Start: 2019-09-19 | End: 2019-09-19

## 2019-09-18 RX ADMIN — ACETAMINOPHEN 650 MG: 325 TABLET, FILM COATED ORAL at 23:26

## 2019-09-18 RX ADMIN — SODIUM CHLORIDE, POTASSIUM CHLORIDE, SODIUM LACTATE AND CALCIUM CHLORIDE 1000 ML: 600; 310; 30; 20 INJECTION, SOLUTION INTRAVENOUS at 23:00

## 2019-09-18 ASSESSMENT — ENCOUNTER SYMPTOMS
VOMITING: 1
DIARRHEA: 0
FEVER: 1
SORE THROAT: 0
CHILLS: 1

## 2019-09-19 ENCOUNTER — PATIENT OUTREACH (OUTPATIENT)
Dept: HEALTH INFORMATION MANAGEMENT | Facility: OTHER | Age: 24
End: 2019-09-19

## 2019-09-19 ENCOUNTER — APPOINTMENT (OUTPATIENT)
Dept: RADIOLOGY | Facility: MEDICAL CENTER | Age: 24
End: 2019-09-19
Attending: EMERGENCY MEDICINE
Payer: MEDICAID

## 2019-09-19 VITALS
DIASTOLIC BLOOD PRESSURE: 63 MMHG | HEART RATE: 84 BPM | WEIGHT: 125 LBS | TEMPERATURE: 97.4 F | BODY MASS INDEX: 22.15 KG/M2 | SYSTOLIC BLOOD PRESSURE: 115 MMHG | RESPIRATION RATE: 16 BRPM | HEIGHT: 63 IN | OXYGEN SATURATION: 99 %

## 2019-09-19 PROBLEM — N12 PYELONEPHRITIS: Status: ACTIVE | Noted: 2019-09-19

## 2019-09-19 PROBLEM — E86.0 DEHYDRATION: Status: ACTIVE | Noted: 2019-09-19

## 2019-09-19 PROBLEM — E03.9 HYPOTHYROIDISM: Status: ACTIVE | Noted: 2019-09-19

## 2019-09-19 LAB
ANION GAP SERPL CALC-SCNC: 14 MMOL/L (ref 0–11.9)
BASOPHILS # BLD AUTO: 0.6 % (ref 0–1.8)
BASOPHILS # BLD: 0.05 K/UL (ref 0–0.12)
BUN SERPL-MCNC: 10 MG/DL (ref 8–22)
C TRACH DNA SPEC QL NAA+PROBE: NEGATIVE
CALCIUM SERPL-MCNC: 9 MG/DL (ref 8.5–10.5)
CHLORIDE SERPL-SCNC: 104 MMOL/L (ref 96–112)
CO2 SERPL-SCNC: 20 MMOL/L (ref 20–33)
CREAT SERPL-MCNC: 0.58 MG/DL (ref 0.5–1.4)
EKG IMPRESSION: NORMAL
EOSINOPHIL # BLD AUTO: 0.4 K/UL (ref 0–0.51)
EOSINOPHIL NFR BLD: 4.8 % (ref 0–6.9)
ERYTHROCYTE [DISTWIDTH] IN BLOOD BY AUTOMATED COUNT: 43.9 FL (ref 35.9–50)
GLUCOSE SERPL-MCNC: 99 MG/DL (ref 65–99)
HCT VFR BLD AUTO: 37.5 % (ref 37–47)
HGB BLD-MCNC: 11.9 G/DL (ref 12–16)
IMM GRANULOCYTES # BLD AUTO: 0.01 K/UL (ref 0–0.11)
IMM GRANULOCYTES NFR BLD AUTO: 0.1 % (ref 0–0.9)
LACTATE BLD-SCNC: 1.1 MMOL/L (ref 0.5–2)
LYMPHOCYTES # BLD AUTO: 1.32 K/UL (ref 1–4.8)
LYMPHOCYTES NFR BLD: 16 % (ref 22–41)
MAGNESIUM SERPL-MCNC: 1.6 MG/DL (ref 1.5–2.5)
MAGNESIUM SERPL-MCNC: 1.9 MG/DL (ref 1.5–2.5)
MCH RBC QN AUTO: 28.1 PG (ref 27–33)
MCHC RBC AUTO-ENTMCNC: 31.7 G/DL (ref 33.6–35)
MCV RBC AUTO: 88.7 FL (ref 81.4–97.8)
MONOCYTES # BLD AUTO: 0.62 K/UL (ref 0–0.85)
MONOCYTES NFR BLD AUTO: 7.5 % (ref 0–13.4)
N GONORRHOEA DNA SPEC QL NAA+PROBE: NEGATIVE
NEUTROPHILS # BLD AUTO: 5.86 K/UL (ref 2–7.15)
NEUTROPHILS NFR BLD: 71 % (ref 44–72)
NRBC # BLD AUTO: 0 K/UL
NRBC BLD-RTO: 0 /100 WBC
PHOSPHATE SERPL-MCNC: 3.7 MG/DL (ref 2.5–4.5)
PLATELET # BLD AUTO: 223 K/UL (ref 164–446)
PMV BLD AUTO: 11.6 FL (ref 9–12.9)
POTASSIUM SERPL-SCNC: 4.1 MMOL/L (ref 3.6–5.5)
RBC # BLD AUTO: 4.23 M/UL (ref 4.2–5.4)
SODIUM SERPL-SCNC: 138 MMOL/L (ref 135–145)
SPECIMEN SOURCE: NORMAL
T4 FREE SERPL-MCNC: 1.75 NG/DL (ref 0.53–1.43)
TSH SERPL DL<=0.005 MIU/L-ACNC: 0.28 UIU/ML (ref 0.38–5.33)
WBC # BLD AUTO: 8.3 K/UL (ref 4.8–10.8)

## 2019-09-19 PROCEDURE — G0378 HOSPITAL OBSERVATION PER HR: HCPCS

## 2019-09-19 PROCEDURE — 700102 HCHG RX REV CODE 250 W/ 637 OVERRIDE(OP): Performed by: INTERNAL MEDICINE

## 2019-09-19 PROCEDURE — 74177 CT ABD & PELVIS W/CONTRAST: CPT

## 2019-09-19 PROCEDURE — 96375 TX/PRO/DX INJ NEW DRUG ADDON: CPT | Mod: XU

## 2019-09-19 PROCEDURE — 96365 THER/PROPH/DIAG IV INF INIT: CPT | Mod: XU

## 2019-09-19 PROCEDURE — 700102 HCHG RX REV CODE 250 W/ 637 OVERRIDE(OP): Performed by: EMERGENCY MEDICINE

## 2019-09-19 PROCEDURE — 700111 HCHG RX REV CODE 636 W/ 250 OVERRIDE (IP): Performed by: EMERGENCY MEDICINE

## 2019-09-19 PROCEDURE — 83735 ASSAY OF MAGNESIUM: CPT

## 2019-09-19 PROCEDURE — 700111 HCHG RX REV CODE 636 W/ 250 OVERRIDE (IP): Performed by: INTERNAL MEDICINE

## 2019-09-19 PROCEDURE — 84100 ASSAY OF PHOSPHORUS: CPT

## 2019-09-19 PROCEDURE — 700117 HCHG RX CONTRAST REV CODE 255: Performed by: EMERGENCY MEDICINE

## 2019-09-19 PROCEDURE — 83605 ASSAY OF LACTIC ACID: CPT

## 2019-09-19 PROCEDURE — A9270 NON-COVERED ITEM OR SERVICE: HCPCS | Performed by: INTERNAL MEDICINE

## 2019-09-19 PROCEDURE — 80048 BASIC METABOLIC PNL TOTAL CA: CPT

## 2019-09-19 PROCEDURE — A9270 NON-COVERED ITEM OR SERVICE: HCPCS | Performed by: EMERGENCY MEDICINE

## 2019-09-19 PROCEDURE — 99236 HOSP IP/OBS SAME DATE HI 85: CPT | Performed by: INTERNAL MEDICINE

## 2019-09-19 PROCEDURE — 85025 COMPLETE CBC W/AUTO DIFF WBC: CPT

## 2019-09-19 PROCEDURE — 700101 HCHG RX REV CODE 250: Performed by: INTERNAL MEDICINE

## 2019-09-19 PROCEDURE — 700105 HCHG RX REV CODE 258: Performed by: EMERGENCY MEDICINE

## 2019-09-19 RX ORDER — LEVOTHYROXINE SODIUM 0.15 MG/1
150 TABLET ORAL
Qty: 30 TAB | Refills: 2 | Status: SHIPPED | OUTPATIENT
Start: 2019-09-19 | End: 2019-11-11 | Stop reason: SDUPTHER

## 2019-09-19 RX ORDER — AMOXICILLIN 250 MG
2 CAPSULE ORAL 2 TIMES DAILY
Status: DISCONTINUED | OUTPATIENT
Start: 2019-09-19 | End: 2019-09-19 | Stop reason: HOSPADM

## 2019-09-19 RX ORDER — PROCHLORPERAZINE EDISYLATE 5 MG/ML
5-10 INJECTION INTRAMUSCULAR; INTRAVENOUS EVERY 4 HOURS PRN
Status: DISCONTINUED | OUTPATIENT
Start: 2019-09-19 | End: 2019-09-19 | Stop reason: HOSPADM

## 2019-09-19 RX ORDER — LEVOTHYROXINE SODIUM 112 UG/1
112 TABLET ORAL
Status: DISCONTINUED | OUTPATIENT
Start: 2019-09-20 | End: 2019-09-19 | Stop reason: HOSPADM

## 2019-09-19 RX ORDER — ONDANSETRON 4 MG/1
4 TABLET, ORALLY DISINTEGRATING ORAL EVERY 4 HOURS PRN
Status: DISCONTINUED | OUTPATIENT
Start: 2019-09-19 | End: 2019-09-19 | Stop reason: HOSPADM

## 2019-09-19 RX ORDER — POTASSIUM CHLORIDE 20 MEQ/1
40 TABLET, EXTENDED RELEASE ORAL EVERY 4 HOURS
Status: DISCONTINUED | OUTPATIENT
Start: 2019-09-19 | End: 2019-09-19

## 2019-09-19 RX ORDER — ONDANSETRON 2 MG/ML
4 INJECTION INTRAMUSCULAR; INTRAVENOUS EVERY 4 HOURS PRN
Status: DISCONTINUED | OUTPATIENT
Start: 2019-09-19 | End: 2019-09-19 | Stop reason: HOSPADM

## 2019-09-19 RX ORDER — LEVOTHYROXINE SODIUM 0.15 MG/1
150 TABLET ORAL
Status: DISCONTINUED | OUTPATIENT
Start: 2019-09-19 | End: 2019-09-19

## 2019-09-19 RX ORDER — DOXYCYCLINE 100 MG/1
100 TABLET ORAL EVERY 12 HOURS
Status: DISCONTINUED | OUTPATIENT
Start: 2019-09-19 | End: 2019-09-19 | Stop reason: HOSPADM

## 2019-09-19 RX ORDER — CEFDINIR 300 MG/1
300 CAPSULE ORAL 2 TIMES DAILY
Qty: 20 CAP | Refills: 0 | Status: SHIPPED | OUTPATIENT
Start: 2019-09-19 | End: 2019-09-29

## 2019-09-19 RX ORDER — ACETAMINOPHEN 325 MG/1
650 TABLET ORAL EVERY 6 HOURS PRN
Status: DISCONTINUED | OUTPATIENT
Start: 2019-09-19 | End: 2019-09-19 | Stop reason: HOSPADM

## 2019-09-19 RX ORDER — POLYETHYLENE GLYCOL 3350 17 G/17G
1 POWDER, FOR SOLUTION ORAL
Status: DISCONTINUED | OUTPATIENT
Start: 2019-09-19 | End: 2019-09-19 | Stop reason: HOSPADM

## 2019-09-19 RX ORDER — PROMETHAZINE HYDROCHLORIDE 25 MG/1
12.5-25 SUPPOSITORY RECTAL EVERY 4 HOURS PRN
Status: DISCONTINUED | OUTPATIENT
Start: 2019-09-19 | End: 2019-09-19 | Stop reason: HOSPADM

## 2019-09-19 RX ORDER — POTASSIUM CHLORIDE 20 MEQ/1
40 TABLET, EXTENDED RELEASE ORAL EVERY 4 HOURS
Status: COMPLETED | OUTPATIENT
Start: 2019-09-19 | End: 2019-09-19

## 2019-09-19 RX ORDER — SODIUM CHLORIDE AND POTASSIUM CHLORIDE 150; 900 MG/100ML; MG/100ML
INJECTION, SOLUTION INTRAVENOUS CONTINUOUS
Status: DISCONTINUED | OUTPATIENT
Start: 2019-09-19 | End: 2019-09-19

## 2019-09-19 RX ORDER — BISACODYL 10 MG
10 SUPPOSITORY, RECTAL RECTAL
Status: DISCONTINUED | OUTPATIENT
Start: 2019-09-19 | End: 2019-09-19 | Stop reason: HOSPADM

## 2019-09-19 RX ORDER — CEFDINIR 300 MG/1
300 CAPSULE ORAL 2 TIMES DAILY
Qty: 14 CAP | Refills: 0 | Status: SHIPPED | OUTPATIENT
Start: 2019-09-19 | End: 2019-09-26

## 2019-09-19 RX ORDER — IBUPROFEN 600 MG/1
600 TABLET ORAL EVERY 6 HOURS PRN
Status: DISCONTINUED | OUTPATIENT
Start: 2019-09-19 | End: 2019-09-19 | Stop reason: HOSPADM

## 2019-09-19 RX ORDER — PROMETHAZINE HYDROCHLORIDE 25 MG/1
12.5-25 TABLET ORAL EVERY 4 HOURS PRN
Status: DISCONTINUED | OUTPATIENT
Start: 2019-09-19 | End: 2019-09-19 | Stop reason: HOSPADM

## 2019-09-19 RX ADMIN — ONDANSETRON 4 MG: 2 INJECTION INTRAMUSCULAR; INTRAVENOUS at 01:31

## 2019-09-19 RX ADMIN — CEFTRIAXONE SODIUM 2 G: 2 INJECTION, POWDER, FOR SOLUTION INTRAMUSCULAR; INTRAVENOUS at 02:04

## 2019-09-19 RX ADMIN — DOXYCYCLINE 100 MG: 100 TABLET, FILM COATED ORAL at 05:26

## 2019-09-19 RX ADMIN — POTASSIUM CHLORIDE 40 MEQ: 20 TABLET, EXTENDED RELEASE ORAL at 01:31

## 2019-09-19 RX ADMIN — IBUPROFEN 600 MG: 600 TABLET ORAL at 05:26

## 2019-09-19 RX ADMIN — POTASSIUM CHLORIDE AND SODIUM CHLORIDE: 900; 150 INJECTION, SOLUTION INTRAVENOUS at 03:30

## 2019-09-19 RX ADMIN — PROCHLORPERAZINE EDISYLATE 5 MG: 5 INJECTION INTRAMUSCULAR; INTRAVENOUS at 03:17

## 2019-09-19 RX ADMIN — LEVOTHYROXINE SODIUM 150 MCG: 150 TABLET ORAL at 05:26

## 2019-09-19 RX ADMIN — IOHEXOL 100 ML: 350 INJECTION, SOLUTION INTRAVENOUS at 00:35

## 2019-09-19 RX ADMIN — POTASSIUM CHLORIDE 40 MEQ: 1500 TABLET, EXTENDED RELEASE ORAL at 10:08

## 2019-09-19 ASSESSMENT — PATIENT HEALTH QUESTIONNAIRE - PHQ9
1. LITTLE INTEREST OR PLEASURE IN DOING THINGS: NOT AT ALL
SUM OF ALL RESPONSES TO PHQ9 QUESTIONS 1 AND 2: 0
1. LITTLE INTEREST OR PLEASURE IN DOING THINGS: NOT AT ALL
SUM OF ALL RESPONSES TO PHQ9 QUESTIONS 1 AND 2: 0
2. FEELING DOWN, DEPRESSED, IRRITABLE, OR HOPELESS: NOT AT ALL
2. FEELING DOWN, DEPRESSED, IRRITABLE, OR HOPELESS: NOT AT ALL
SUM OF ALL RESPONSES TO PHQ9 QUESTIONS 1 AND 2: 0

## 2019-09-19 ASSESSMENT — LIFESTYLE VARIABLES
TOTAL SCORE: 0
HAVE YOU EVER FELT YOU SHOULD CUT DOWN ON YOUR DRINKING: NO
CONSUMPTION TOTAL: NEGATIVE
EVER FELT BAD OR GUILTY ABOUT YOUR DRINKING: NO
EVER_SMOKED: NEVER
ON A TYPICAL DAY WHEN YOU DRINK ALCOHOL HOW MANY DRINKS DO YOU HAVE: 0
TOTAL SCORE: 0
HOW MANY TIMES IN THE PAST YEAR HAVE YOU HAD 5 OR MORE DRINKS IN A DAY: 0
SUBSTANCE_ABUSE: 0
ALCOHOL_USE: NO
TOTAL SCORE: 0
HAVE PEOPLE ANNOYED YOU BY CRITICIZING YOUR DRINKING: NO
EVER_SMOKED: NEVER
EVER HAD A DRINK FIRST THING IN THE MORNING TO STEADY YOUR NERVES TO GET RID OF A HANGOVER: NO
AVERAGE NUMBER OF DAYS PER WEEK YOU HAVE A DRINK CONTAINING ALCOHOL: 0

## 2019-09-19 ASSESSMENT — ENCOUNTER SYMPTOMS
DIARRHEA: 0
VOMITING: 1
MYALGIAS: 0
CHILLS: 1
BLOOD IN STOOL: 0
NAUSEA: 1
DIAPHORESIS: 0
DIZZINESS: 0
SPUTUM PRODUCTION: 0
BACK PAIN: 0
ABDOMINAL PAIN: 1
NECK PAIN: 0
BLURRED VISION: 0
SORE THROAT: 0
FLANK PAIN: 1
HEADACHES: 0
WHEEZING: 0
PALPITATIONS: 0
WEAKNESS: 0
BRUISES/BLEEDS EASILY: 0
FEVER: 1
FOCAL WEAKNESS: 0
COUGH: 0
SEIZURES: 0
SHORTNESS OF BREATH: 0

## 2019-09-19 ASSESSMENT — COPD QUESTIONNAIRES
DURING THE PAST 4 WEEKS HOW MUCH DID YOU FEEL SHORT OF BREATH: SOME OF THE TIME
DO YOU EVER COUGH UP ANY MUCUS OR PHLEGM?: YES, A FEW DAYS A WEEK OR MONTH
DURING THE PAST 4 WEEKS HOW MUCH DID YOU FEEL SHORT OF BREATH: SOME OF THE TIME
IN THE PAST 12 MONTHS DO YOU DO LESS THAN YOU USED TO BECAUSE OF YOUR BREATHING PROBLEMS: DISAGREE/UNSURE
HAVE YOU SMOKED AT LEAST 100 CIGARETTES IN YOUR ENTIRE LIFE: NO/DON'T KNOW
DO YOU EVER COUGH UP ANY MUCUS OR PHLEGM?: YES, A FEW DAYS A WEEK OR MONTH
HAVE YOU SMOKED AT LEAST 100 CIGARETTES IN YOUR ENTIRE LIFE: NO/DON'T KNOW
COPD SCREENING SCORE: 2
COPD SCREENING SCORE: 2

## 2019-09-19 NOTE — ED PROVIDER NOTES
ED Provider Note    Scribed for ANDREA Schilling II* by Prashanth Gamboa. 9/18/2019  10:39 PM    Means of Arrival: Walk in  History obtained by: Patient  Limitations: None     CHIEF COMPLAINT  Chief Complaint   Patient presents with   • Low Back Pain     Patient ambulatory to triage for above complaint, c/o fever all day with associated with body aches and low back pain, patient denies taking tylenol at home. Is currently on abx for a UTI. Patient placed in mask.    • Fever   • Nausea       HPI  Ivis Klein is a 23 y.o. female who presents to the Emergency Department for bilateral back pain onset 8 days ago. Ivis states that she has here the 10th and 16th for similar symptoms and was told that she has had an ovarian cyst that burst as well as a UTI. She was sent home with antibiotics (macrobid) which she has been taking. She endorses associated fever, chills, vomiting. Earlier today she had a period where she felt dizzy and generally fatigued.  but denies any abnormal vaginal discharge, diarrhea, sore throat, mosquito bites or sick contact. She has not taken any medications.     Review of old records show that she was seen on the 16th. UA showed blood and leukocytes, but no bacteria. She was prescribed Macrobid from the ED. Cultures grew back mixed soto. . Ultrasound on the 16th showed partially collapsed right ovary cyst. Ivis was also seen on the 10th of this month. She had a normal CBC and CMP. Pregnancy negative. Urine negative. She says she has also had pelvic exams and was told they were normal.     REVIEW OF SYSTEMS  Review of Systems   Constitutional: Positive for chills and fever.   HENT: Negative for sore throat.    Respiratory: Negative for cough, shortness of breath and wheezing.    Cardiovascular: Negative for chest pain.   Gastrointestinal: Positive for vomiting. Negative for diarrhea.   Genitourinary:        Negative for abnormal vaginal discharge.    Musculoskeletal: Negative  "for joint pain.   Neurological: Negative for weakness and headaches.   Endo/Heme/Allergies:        Negative for mosquito bites.    Psychiatric/Behavioral: Negative for substance abuse.   All other systems reviewed and are negative.    See HPI for further details.    PAST MEDICAL HISTORY   has a past medical history of Anxiety (2/16/2017), Arrhythmia, Breath shortness, Graves disease (12/5/2016), Heart valve disease, Hyperthyroidism (6/29/2016), Menarche, Migraine, Pericarditis (06/2016), Pregnancy, Psychiatric problem, Supervision of normal first teen pregnancy, and Vomiting (6/29/2016).    SOCIAL HISTORY  Social History     Tobacco Use   • Smoking status: Never Smoker   • Smokeless tobacco: Never Used   • Tobacco comment: quit in 2012   Substance and Sexual Activity   • Alcohol use: No   • Drug use: Yes     Types: Marijuana, Inhaled     Comment: marijuana weekly   • Sexual activity: Yes     Partners: Male     Birth control/protection: Abstinence     Comment: single       SURGICAL HISTORY   has a past surgical history that includes primary c section (9/8/2013) and thyroidectomy total (Bilateral, 3/22/2017).    CURRENT MEDICATIONS  Home Medications     Reviewed by Samara Murrell R.N. (Registered Nurse) on 09/18/19 at 1939  Med List Status: <None>   Medication Last Dose Status   levothyroxine (SYNTHROID) 150 MCG Tab  Active   naproxen (NAPROSYN) 500 MG Tab  Active   nitrofurantoin monohyd macro (MACROBID) 100 MG Cap  Active                ALLERGIES  Allergies   Allergen Reactions   • Nkda [No Known Drug Allergy]        PHYSICAL EXAM  VITAL SIGNS: /66   Pulse (!) 132   Temp 37.9 °C (100.3 °F) (Oral)   Resp 18   Ht 1.6 m (5' 3\")   Wt 57.4 kg (126 lb 8.7 oz)   LMP 09/18/2019 (Exact Date)   SpO2 99%   BMI 22.42 kg/m²     Pulse ox interpretation: I interpret this pulse ox as normal.  Constitutional: Alert in no apparent distress. Fatigued appearing 23 year old.   HENT: Slightly dry mucus membranes. Mild " tenderness to left lateral neck. No matroid tenderness. No ear pain. No signs of trauma, Bilateral external ears normal, Nose normal.   Eyes: Pupils are equal, Conjunctiva normal, Non-icteric.   Neck: Normal range of motion, Mild tenderness to left lateral neck. No lymphadenopathy. No stiffness or meningeal signs. No stridor.   Lymphatic: No lymphadenopathy.   Cardiovascular: Increased rate and regular rhythm, no murmurs. Symmetric distal pulses. No cyanosis of extremities. No peripheral edema of extremities.  Thorax & Lungs: Normal breath sounds, No respiratory distress, No wheezing, No chest tenderness.   Abdomen: Lower abdominal tenderness with mild guarding. Bowel sounds normal, Soft, No masses, No pulsatile masses. No peritoneal signs.  Skin: Warm, Dry, No erythema, No rash.   Back: Bilateral CVA tenderness. No midline spine tenderness,  Musculoskeletal: Good range of motion in all major joints. No tenderness to palpation or major deformities noted.   Neurologic: Alert , Normal motor function, Normal sensory function, No focal deficits noted. Clear speech. No facial droop.   Psychiatric: Affect normal, Judgment normal, Mood normal.     DIAGNOSTIC STUDIES / PROCEDURES    EKG Interpretation:  Results for orders placed or performed during the hospital encounter of 19   EKG   Result Value Ref Range    Report       [facility]    Test Date:  2019  Pt Name:    MICHELLE MENARD               Department: ER  MRN:        [id number]                      Room:  Gender:     Female                       Technician: [id number]  :        [id number]                   Requested By:ER TRIAGE PROTOCOL  Order #:    [id number]                    Reading MD: Kain Ta II, MD    Measurements  Intervals                                Axis  Rate:       119                          P:          45  OK:         172                          QRS:        57  QRSD:       70                            T:          -4  QT:         284  QTc:        400    Interpretive Statements  SINUS Rhythm  Rate of 19  Normal intervals  No ST elevation or depression. Normal twaves  Impression: Sinus Tachycardia    Electronically Signed On 9- 2:45:57 PDT by Kain Ta II, MD          LABS  Pertinent Labs & Imaging studies reviewed. (See chart for details)    RADIOLOGY  CT-ABDOMEN-PELVIS WITH   Final Result      1.  No acute abnormality.   2.  The previously seen left ovarian cyst is not seen.        Pertinent Labs & Imaging studies reviewed. (See chart for details)    COURSE & MEDICAL DECISION MAKING  Pertinent Labs & Imaging studies reviewed. (See chart for details)      10:39 PM This is a 23 y.o. female who presents with bilateral back pain and the differential diagnosis includes but is not limited to influenza, other viral infection, intraabdominal infection,sepsis and UTI.  Ordered for EKG, influenza A/B, blood culture, urine culture, UA, lactic acid, CMP, CBC with differential, beta-hcg qualitative serum, and ct-abdomen to evaluate. Patient will be treated with lactated ringers infusion for tachycardia and Tylenol 650 mg for fever.      1:09 AM After fluids patients heart rate is less than 100. Initial lactic acid was 1.3 and has a white blood cell count of 10 with left shift. Urine has 5-10 red blood cells and few bacteria (pateint is currently on menstrual period). Potassium was low at 3.0. We have replaced potassium with oral potassium chloride. Her ct-abdomen was normal.     1:27 AM Patient was reevaluated at bedside. She states that she is still not feeling well. I informed her that we have no yet found the cause of her fever yet. I recommend that she stay the night for continued evaluation. Worriesome that she has come 3 times since 9/10 with worsening symptoms. This could be progressive viral illness. I considered meningitis and pneumonia but physical findings not consistent with these causes. Patient  verbally understand and agrees to plan of care.     1:29 AM I discussed the patient's case and the above findings with Dr. Menard (hospitalist) who agrees to admit the patient.      HYDRATION: Based on the patient's presentation of Tachycardia the patient was given IV fluids. IV Hydration was used because oral hydration was not adequate alone. Upon recheck following hydration, the patient was heart rate improved to less than 100 bpm..       CRITICAL CARE  The very real possibilty of a deterioration of this patient's condition required the highest level of my preparedness for sudden, emergent intervention.  I provided critical care services, which included medication orders, frequent reevaluations of the patient's condition and response to treatment, ordering and reviewing test results, and discussing the case with various consultants.  The critical care time associated with the care of the patient was 30 minutes. Review chart for interventions. This time is exclusive of any other billable procedures.      DISPOSITION:  Patient will be admitted to Dr. Menard in guarded condition.     FINAL IMPRESSION  1. Sepsis, due to unspecified organism (HCC)    2. Fever, unspecified fever cause          Prashanth HARRIS (Scribe), am scribing for, and in the presence of, DIPTI Schilling II.    Electronically signed by: Prashanth Gamboa (Scribe), 9/18/2019    Kain HARRIS II, M* personally performed the services described in this documentation, as scribed by Prashanth Gamboa in my presence, and it is both accurate and complete.  C  The note accurately reflects work and decisions made by me.  Kain Ta II  9/19/2019  4:36 AM

## 2019-09-19 NOTE — PROGRESS NOTES
Pt arrived to unit via transport on [facility] at 0320. Pt oriented to room, unit, and plan of care. Admit profile and assessment completed. VSS. AOx4, no neuro deficit. 7/10 bilateral flank pain at this time,declines tx at this time just rest. Unlabored and even breathing,RA. Medicated per nausea. Swallow eval passed. Skin intact. Extra pillows and blankets. IV infusing per MAR. Whiteboard updated. All questions answered at this time. Call light within reach; fall precautions in place. MD notifed of patients arrival. Sister bedside.

## 2019-09-19 NOTE — DISCHARGE PLANNING
Care Transition Team Assessment    Spoke with patient at bedside. Anticipate no needs @ present time. Boyfriend will be ride @ D/C.    Information Source  Orientation : Oriented x 4  Information Given By: Patient    Readmission Evaluation  Is this a readmission?: No    Interdisciplinary Discharge Planning  Does Admitting Nurse Feel This Could be a Complex Discharge?: No  Primary Care Physician: Has appoint to establish 09/27 with [facility]  Lives with - Patient's Self Care Capacity: Unrelated Adult  Patient or legal guardian wants to designate a caregiver (see row info): No  Support Systems: Family Member(s), Spouse / Significant Other  Housing / Facility: Mobile Home  Do You Take your Prescribed Medications Regularly: Yes  Able to Return to Previous ADL's: Yes  Mobility Issues: No  Prior Services: Home-Independent  Patient Expects to be Discharged to:: Home  Assistance Needed: No  Durable Medical Equipment: Not Applicable    Discharge Preparedness  What are your discharge supports?: Other (comment)(Family, Boyfriend )  Prior Functional Level: Ambulatory    Functional Assesment  Prior Functional Level: Ambulatory    Finances  Prescription Coverage: Yes    Anticipated Discharge Information  Anticipated discharge disposition: Home  Discharge Address: [facility] [address]  Discharge Contact Phone Number: [phone]

## 2019-09-19 NOTE — DISCHARGE INSTRUCTIONS
Discharge Instructions    Discharged to home by car with relative. Discharged via walking, hospital escort: Yes.  Special equipment needed: Not Applicable    Be sure to schedule a follow-up appointment with your primary care doctor or any specialists as instructed.     Discharge Plan:   Diet Plan: Discussed  Activity Level: Discussed  Confirmed Follow up Appointment: Patient to Call and Schedule Appointment  Confirmed Symptoms Management: Discussed  Medication Reconciliation Updated: Yes  Influenza Vaccine Indication: Indicated: 9 to 64 years of age    I understand that a diet low in cholesterol, fat, and sodium is recommended for good health. Unless I have been given specific instructions below for another diet, I accept this instruction as my diet prescription.   Other diet: Heart Healthy     Special Instructions: None    · Is patient discharged on Warfarin / Coumadin?   No     Depression / Suicide Risk    As you are discharged from this [facility], it is important to learn how to keep safe from harming yourself.    Recognize the warning signs:  · Abrupt changes in personality, positive or negative- including increase in energy   · Giving away possessions  · Change in eating patterns- significant weight changes-  positive or negative  · Change in sleeping patterns- unable to sleep or sleeping all the time   · Unwillingness or inability to communicate  · Depression  · Unusual sadness, discouragement and loneliness  · Talk of wanting to die  · Neglect of personal appearance   · Rebelliousness- reckless behavior  · Withdrawal from people/activities they love  · Confusion- inability to concentrate     If you or a loved one observes any of these behaviors or has concerns about self-harm, here's what you can do:  · Talk about it- your feelings and reasons for harming yourself  · Remove any means that you might use to hurt yourself (examples: pills, rope, extension cords, firearm)  · Get professional help  from the community (Mental Health, Substance Abuse, psychological counseling)  · Do not be alone:Call your Safe Contact- someone whom you trust who will be there for you.  · Call your local CRISIS HOTLINE 041-5882 or 318-475-4490  · Call your local Children's Mobile Crisis Response Team [place] (565) 600-9253 or www.Infer  · Call the toll free National Suicide Prevention Hotlines   · National Suicide Prevention Lifeline 336-292-BWEX (9482)  · National Hope Line Network 800-SUICIDE (516-6260)      Discharge Instructions per TAHIRA StanleyRFilemonN.    -Follow-up with PCP  -STI prevention with condoms  -TSH draw in 2 to 4 weeks.  Follow-up with PCP for further levothyroxine dosing adjustments.    DIET: As tolerated    ACTIVITY: As tolerated    DIAGNOSIS: Urinary tract infection/pyelonephritis    Return to ER if your symptoms return/worsen and include but are not limited to chest pain, shortness of breath, palpitations, weakness, dizziness, vaginal discharge, pelvic pain.

## 2019-09-19 NOTE — DISCHARGE SUMMARY
Discharge Summary    CHIEF COMPLAINT ON ADMISSION  Chief Complaint   Patient presents with   • Low Back Pain     Patient ambulatory to triage for above complaint, c/o fever all day with associated with body aches and low back pain, patient denies taking tylenol at home. Is currently on abx for a UTI. Patient placed in mask.    • Fever   • Nausea       Reason for Admission  Fever     Admission Date  9/18/2019    CODE STATUS  Full Code    HPI & HOSPITAL COURSE  This is a 23 y.o. female who presented with bilateral flank pain on 9/18/2019. Patient had recently been to ER twice with symptoms of lower abdominal and bilateral flank pain. She was then diagnosed with UTI and a ruptured ovarian cyst and discharged home on Microbid. She presented to our ER with worsening lower abdomen pain and bilateral flank pain with nausea/vomitting, along with fever and chills. She report being sexually active and does not use protection. She denies any HA, SOB, cough, chest pain or diarrhea. During the course of this stay, she received a pelvic ultrasound which reported a 2.9 x 2.2 x 2.1 cm partially collapsed right ovarian cyst. A pelvic examination was performed and was unremarkable. Urine culture and swab culture were obtained; gonorrhea and chlamydia results pending at the time of discharge and she will be notified of the results.     I personally have seen and examined the patient prior to discharge. She denies any pain and discomfort. Patient was given education in regards to STI and birth control along with risks. Patient verbalized understanding and plans to get pregnant in the near future with her current partner. Patient was also given education in regards to antibiotic use and encouraged her to use probiotic if GI symptoms arise.       Therefore, she is discharged in good and stable condition to home with close outpatient follow-up.    The patient recovered much more quickly than anticipated on admission.    Discharge  Date  09/19/19    FOLLOW UP ITEMS POST DISCHARGE  -Follow-up with PCP  -STI prevention with condoms  -TSH draw in 2 to 4 weeks.  Follow-up with PCP for further levothyroxine dosing adjustments.    DIET: As tolerated    ACTIVITY: As tolerated    DIAGNOSIS: Urinary tract infection/pyelonephritis    Return to ER if your symptoms return/worsen and include but are not limited to chest pain, shortness of breath, palpitations, weakness, dizziness, vaginal discharge, pelvic pain.      DISCHARGE DIAGNOSES  Active Problems:    Pyelonephritis POA: Yes    Hypokalemia POA: Yes    Nausea and vomiting POA: Yes    Dehydration POA: Yes    Hypothyroidism POA: Yes  Resolved Problems:    * No resolved hospital problems. *      FOLLOW UP  Future Appointments   Date Time Provider Department Center   9/25/2019  1:30 PM FADY SMITH     [address] [id number] [phone]    Please call to establish care with a provider.Thank you      MEDICATIONS ON DISCHARGE     Medication List      START taking these medications      Instructions       * cefdinir 300 MG Caps  Commonly known as:  OMNICEF   Take 1 Cap by mouth 2 times a day for 10 days.  Dose:  300 mg                    CONTINUE taking these medications      Instructions   levothyroxine 150 MCG Tabs  Commonly known as:  SYNTHROID   Take 1 Tab by mouth Every morning on an empty stomach.  Dose:  150 mcg     naproxen 500 MG Tabs  Commonly known as:  NAPROSYN   Take 1 Tab by mouth 2 times a day, with meals.  Dose:  500 mg        STOP taking these medications    nitrofurantoin monohyd macro 100 MG Caps  Commonly known as:  MACROBID            Allergies  Allergies   Allergen Reactions   • Nkda [No Known Drug Allergy]        DIET  Orders Placed This Encounter   Procedures   • Diet Order Regular     Standing Status:   Standing     Number of Occurrences:   1     Order Specific Question:   Diet:     Answer:   Regular [1]       ACTIVITY  As  tolerated.  Weight bearing as tolerated    LABORATORY  Lab Results   Component Value Date    SODIUM 138 09/19/2019    POTASSIUM 4.1 09/19/2019    CHLORIDE 104 09/19/2019    CO2 20 09/19/2019    GLUCOSE 99 09/19/2019    BUN 10 09/19/2019    CREATININE 0.58 09/19/2019    CREATININE 0.6 01/31/2009        Lab Results   Component Value Date    WBC 8.3 09/19/2019    HEMOGLOBIN 11.9 (L) 09/19/2019    HEMATOCRIT 37.5 09/19/2019    PLATELETCT 223 09/19/2019

## 2019-09-19 NOTE — PROGRESS NOTES
I have personally seen and examined / evaluated the patient, discussed the patient's evaluation & management plan with DARY Payne and I have reviewed the note below.     I agree with the findings, history, examination and assessment / plan as listed below with changes/addendum as listed below by me in my addendum / attestation separetely.     Patient admitted with flank pain.  Diagnosis of pyelonephritis, hypokalemia.  Urinalysis revealing occult blood, leukocyte esterase positivity.  Of note, patient has been complaining of pelvic pain, abdominal pain since September 10, 2019.  At that time presented to the emergency department.  At that time she was given a diagnosis of acute pelvic pain, prescribed NSAIDs and discharged.  Of note, has had multiple pelvic ultrasounds in the past.  Presented to the emergency department again on September 16, 2019, complaints of abdominal pain, dysuria and urinary frequency.  Diagnosed with a ruptured ovarian cyst, urinary tract infection without hematuria.  Discharged home with oral antibiotics.  At that time ultrasound pelvis revealing 2.9 x 2.2 x 2.1 cm partially collapsed right ovarian cyst. Return to the emergency department on September 18, 2019 complaining of low back pain, fever, nausea.  CT abdomen and pelvis did not reveal any acute concerns.  Ovarian cyst not seen.  Patient noted to have persistently stable hemoglobin, no evidence of leukocytosis.  Stable chemistry panel apart from findings of hypokalemia.  No evidence of lactic acidosis.  Iatrogenic hyperthyroidism, on significant amounts of Synthroid dosage.  Decrease Synthroid dosage to 112 mcg from 150 mcg and repeat in 2 to 4 weeks with PCP following discharge.  Negative chlamydia/gonorrhea evaluation in June 2019.  Potassium has been replaced.    Patient feels significantly better.  She is on ceftriaxone and doxycycline.  At this time she is eager to discharge home.  Recommend completion of pelvic exam prior  to discharge and evaluation for chlamydia and gonorrhea.  Discussed with Melisa, this will be facilitated by Melisa, to send evaluation for chlamydia, gonorrhea, wet prep/trichomoniasis.    No evidence of PID, can discharge home on oral Omnicef to complete a total of 7 days of antibiotic therapy with outpatient follow-up with gynecology, PCP.    Keshia Chambers M.D.  09/19/19  8:51 AM

## 2019-09-19 NOTE — PROGRESS NOTES
Pelvic Exam Documentation    External Genitalia:   Ample amount of hair distribution; labia majora and minor are unremarkable; negative for Bartholin's and Skenes glands; perineum is unremarkable.   No masses or lesion seen or palpated; negative for excoriation and erythremia; patient denies any tenderness; small amount of bloody discharge due to day 4 of menses.     Internal Genitalia:   Vagina: pink with good tone; no cystocele or rectocele seen; small bloody discharge due to menses.  Cervix: cervix is pink; within normal size cervical os, position is within normal parameters; cervical texture is smooth with no lesions or masses seen; small amount of bloody discharge due to menses.  Uterus: Position is anteverted, within normal size; no masses or lesion noted; patient denies tenderness.  Adnexae: LEFT adnexa is within normal limits; RIGHT adnexa - tenderness noted from patient, small lesion palpated as noted in the pelvic US (2.9 x 2.2 x 2.1 cm partially collapsed right ovarian cyst).      Samples obtained to rule out chlamydia and gonorrhea; pending culture results.

## 2019-09-19 NOTE — H&P
Hospital Medicine History & Physical Note    Date of Service  9/19/2019    Primary Care Physician  Pcp Pt States None    Consultants  None    Code Status  Full code    Chief Complaint  Flank pain    History of Presenting Illness  23 y.o. female who presented 9/18/2019 with bilateral flank pain for the past week.  The patient has presented to the ER twice with symptoms of lower abdominal pain and bilateral flank pain.  She was diagnosed with a UTI and ruptured ovarian cyst and was discharged on Macrobid.  She returns with ongoing symptoms.  She reports worsening bilateral flank pain and lower abdominal pain associated with nausea and vomiting.  She also reports fevers and chills.  She is sexually active and does not use protection.  She denies dysuria however she does report dyspareunia.  She states she is currently on her period.  She denies any headache, shortness of breath, cough, chest pain or diarrhea.      Review of Systems  Review of Systems   Constitutional: Positive for chills, fever and malaise/fatigue. Negative for diaphoresis.   HENT: Negative for hearing loss and sore throat.    Eyes: Negative for blurred vision.   Respiratory: Negative for cough, sputum production, shortness of breath and wheezing.    Cardiovascular: Negative for chest pain, palpitations and leg swelling.   Gastrointestinal: Positive for abdominal pain, nausea and vomiting. Negative for blood in stool and diarrhea.   Genitourinary: Positive for flank pain. Negative for dysuria and urgency.        Dyspareunia   Musculoskeletal: Negative for back pain, joint pain, myalgias and neck pain.   Skin: Negative for rash.   Neurological: Negative for dizziness, focal weakness, seizures and headaches.   Endo/Heme/Allergies: Does not bruise/bleed easily.   Psychiatric/Behavioral: Negative for suicidal ideas.   All other systems reviewed and are negative.      Past Medical History   has a past medical history of Anxiety (2/16/2017), Arrhythmia,  Breath shortness, Graves disease (12/5/2016), Heart valve disease, Hyperthyroidism (6/29/2016), Menarche, Migraine, Pericarditis (06/2016), Pregnancy, Psychiatric problem, Supervision of normal first teen pregnancy, and Vomiting (6/29/2016). She also has no past medical history of Addisons disease (HCC), Adrenal disorder (HCC), Allergy, Anemia, Blood transfusion, Clotting disorder (HCC), COPD, Cushings syndrome (HCC), Hyperlipidemia, IBD (inflammatory bowel disease), Meningitis, Muscle disorder, OSTEOPOROSIS, Parathyroid disorder (HCC), Pituitary disease (HCC), Sickle cell disease (HCC), Substance abuse (HCC), or Ulcer.    Surgical History   has a past surgical history that includes primary c section (9/8/2013) and thyroidectomy total (Bilateral, 3/22/2017).     Family History  family history includes Cancer (age of onset: 56) in her paternal grandmother; Hyperlipidemia in her father.     Social History   reports that she has never smoked. She has never used smokeless tobacco. She reports that she has current or past drug history. Drugs: Marijuana and Inhaled. She reports that she does not drink alcohol.    Allergies  Allergies   Allergen Reactions   • Nkda [No Known Drug Allergy]        Medications  Prior to Admission Medications   Prescriptions Last Dose Informant Patient Reported? Taking?   levothyroxine (SYNTHROID) 150 MCG Tab   No No   Sig: Take 1 Tab by mouth Every morning on an empty stomach.   naproxen (NAPROSYN) 500 MG Tab   No No   Sig: Take 1 Tab by mouth 2 times a day, with meals.   nitrofurantoin monohyd macro (MACROBID) 100 MG Cap   No No   Sig: Take 1 Cap by mouth 2 times a day for 7 days.      Facility-Administered Medications: None       Physical Exam  Temp:  [37.9 °C (100.3 °F)] 37.9 °C (100.3 °F)  Pulse:  [100-132] 100  Resp:  [16-18] 16  BP: (117-132)/(64-72) 117/64  SpO2:  [98 %-99 %] 99 %    Physical Exam   Constitutional: She is oriented to person, place, and time. She appears well-developed  and well-nourished. No distress.   HENT:   Head: Normocephalic and atraumatic.   Dry oral mucous membranes   Eyes: Pupils are equal, round, and reactive to light. Conjunctivae are normal. Right eye exhibits no discharge. Left eye exhibits no discharge. No scleral icterus.   Neck: Normal range of motion. Neck supple. No tracheal deviation present.   Cardiovascular: Normal rate, regular rhythm and normal heart sounds.   Pulmonary/Chest: Effort normal and breath sounds normal. No stridor. No respiratory distress. She has no wheezes. She has no rales.   Abdominal: Soft. Bowel sounds are normal. She exhibits no distension. There is no tenderness. There is no rebound and no guarding.   Genitourinary:   Genitourinary Comments: Bilateral CVA tenderness   Musculoskeletal: Normal range of motion. She exhibits no edema, tenderness or deformity.   Lymphadenopathy:     She has no cervical adenopathy.   Neurological: She is alert and oriented to person, place, and time. No cranial nerve deficit. Coordination normal.   Skin: Skin is warm and dry. No rash noted. She is not diaphoretic. No erythema.   Psychiatric: She has a normal mood and affect. Her behavior is normal.   Nursing note and vitals reviewed.      Laboratory:  Recent Labs     09/18/19 2315   WBC 10.3   RBC 4.51   HEMOGLOBIN 13.2   HEMATOCRIT 39.4   MCV 87.4   MCH 29.3   MCHC 33.5*   RDW 42.8   PLATELETCT 225   MPV 10.9     Recent Labs     09/18/19  2315   SODIUM 137   POTASSIUM 3.0*   CHLORIDE 102   CO2 24   GLUCOSE 106*   BUN 11   CREATININE 0.70   CALCIUM 9.4     Recent Labs     09/18/19  2315   ALTSGPT 7   ASTSGOT 11*   ALKPHOSPHAT 55   TBILIRUBIN 0.9   GLUCOSE 106*         No results for input(s): NTPROBNP in the last 72 hours.      No results for input(s): TROPONINT in the last 72 hours.    Urinalysis:    Recent Labs     09/18/19  2315   SPECGRAVITY 1.026   GLUCOSEUR Negative   KETONES >=160   NITRITE Negative   LEUKESTERAS Trace*   WBCURINE 5-10*   RBCURINE  *   BACTERIA Few*   EPITHELCELL Few        Imaging:  CT-ABDOMEN-PELVIS WITH   Final Result      1.  No acute abnormality.   2.  The previously seen left ovarian cyst is not seen.            Assessment/Plan:  I anticipate this patient is appropriate for observation status at this time.    Pyelonephritis- (present on admission)  Assessment & Plan  Started on IV ceftriaxone  Follow urine cultures  I will start the patient empirically on doxycycline for concern of PID. Check for chlamydia and gonorrhea    Hypokalemia- (present on admission)  Assessment & Plan  Replete with K. Dur and IV KCl  Check mag  Monitor BMP    Hypothyroidism- (present on admission)  Assessment & Plan  Check TSH  Continue Synthroid 150    Dehydration- (present on admission)  Assessment & Plan  IV fluid hydration with LR    Nausea and vomiting- (present on admission)  Assessment & Plan  IV Zofran and Compazine      VTE prophylaxis: SCD

## 2019-09-19 NOTE — DISCHARGE PLANNING
Patient is eligible for Medicaid Meds to Beds at discharge Monday-Friday 8 a.m. - 4 p.m. Preferred pharmacy changed to [facility]. Please call [phone] prior to discharge.

## 2019-09-19 NOTE — CARE PLAN
Problem: Safety  Goal: Will remain free from injury  Outcome: PROGRESSING AS EXPECTED  Goal: Will remain free from falls  Outcome: PROGRESSING AS EXPECTED     Problem: Pain Management  Goal: Pain level will decrease to patient's comfort goal  Outcome: PROGRESSING AS EXPECTED     Problem: Knowledge Deficit  Goal: Knowledge of disease process/condition, treatment plan, diagnostic tests, and medications will improve  Outcome: PROGRESSING AS EXPECTED  Goal: Knowledge of the prescribed therapeutic regimen will improve  Outcome: PROGRESSING AS EXPECTED

## 2019-09-19 NOTE — CARE PLAN
Fall precautions in place, educated to call for assistance. POC discussed, verbalized understanding. Pain assessed, pain medication on board.

## 2019-09-19 NOTE — ED TRIAGE NOTES
"Chief Complaint   Patient presents with   • Low Back Pain     Patient ambulatory to triage for above complaint, c/o fever all day with associated with body aches and low back pain, patient denies taking tylenol at home. Is currently on abx for a UTI. Patient placed in mask.    • Fever   • Nausea     /66   Pulse (!) 132   Temp 37.9 °C (100.3 °F) (Oral)   Resp 18   Ht 1.6 m (5' 3\")   Wt 57.4 kg (126 lb 8.7 oz)   SpO2 99%     Patient educated on ed triage process, instructed to notify staff of any new or worsening symptoms. Placed in mask, apologized for wait times.   "

## 2019-09-19 NOTE — ASSESSMENT & PLAN NOTE
Started on IV ceftriaxone  Follow urine cultures  I will start the patient empirically on doxycycline for concern of PID. Check for chlamydia and gonorrhea

## 2019-09-19 NOTE — ED NOTES
Assist RN:  Report called to Braeden ROBERTO. Pt aware of POC, transported to floor with transport tech.

## 2019-09-20 ENCOUNTER — TELEPHONE (OUTPATIENT)
Dept: HOSPITALIST | Facility: MEDICAL CENTER | Age: 24
End: 2019-09-20

## 2019-09-20 NOTE — TELEPHONE ENCOUNTER
Received results of urine and swab cx for Trich/Dario/Chlam, which are all negative. No new orders at this time. Patient was notified via phone by myself. No questions or concerns at this time.

## 2019-09-21 LAB
BACTERIA UR CULT: NORMAL
SIGNIFICANT IND 70042: NORMAL
SITE SITE: NORMAL
SOURCE SOURCE: NORMAL

## 2019-09-24 LAB
BACTERIA BLD CULT: NORMAL
BACTERIA BLD CULT: NORMAL
SIGNIFICANT IND 70042: NORMAL
SIGNIFICANT IND 70042: NORMAL
SITE SITE: NORMAL
SITE SITE: NORMAL
SOURCE SOURCE: NORMAL
SOURCE SOURCE: NORMAL

## 2019-10-01 ENCOUNTER — HOSPITAL ENCOUNTER (EMERGENCY)
Facility: MEDICAL CENTER | Age: 24
End: 2019-10-01
Attending: EMERGENCY MEDICINE
Payer: MEDICAID

## 2019-10-01 VITALS
WEIGHT: 123.46 LBS | HEIGHT: 63 IN | SYSTOLIC BLOOD PRESSURE: 107 MMHG | OXYGEN SATURATION: 100 % | DIASTOLIC BLOOD PRESSURE: 70 MMHG | HEART RATE: 87 BPM | BODY MASS INDEX: 21.88 KG/M2 | TEMPERATURE: 97.9 F | RESPIRATION RATE: 16 BRPM

## 2019-10-01 DIAGNOSIS — B37.31 CANDIDA VAGINITIS: ICD-10-CM

## 2019-10-01 DIAGNOSIS — N83.201 CYST OF RIGHT OVARY: ICD-10-CM

## 2019-10-01 LAB
APPEARANCE UR: ABNORMAL
BACTERIA #/AREA URNS HPF: NEGATIVE /HPF
BILIRUB UR QL STRIP.AUTO: NEGATIVE
COLOR UR: ABNORMAL
EPI CELLS #/AREA URNS HPF: ABNORMAL /HPF
GLUCOSE UR STRIP.AUTO-MCNC: NEGATIVE MG/DL
HCG UR QL: NEGATIVE
KETONES UR STRIP.AUTO-MCNC: 15 MG/DL
LEUKOCYTE ESTERASE UR QL STRIP.AUTO: ABNORMAL
MICRO URNS: ABNORMAL
MUCOUS THREADS #/AREA URNS HPF: ABNORMAL /HPF
NITRITE UR QL STRIP.AUTO: NEGATIVE
PH UR STRIP.AUTO: 5.5 [PH] (ref 5–8)
PROT UR QL STRIP: NEGATIVE MG/DL
RBC # URNS HPF: ABNORMAL /HPF
RBC UR QL AUTO: NEGATIVE
SP GR UR STRIP.AUTO: 1.04
UROBILINOGEN UR STRIP.AUTO-MCNC: 1 MG/DL
WBC #/AREA URNS HPF: ABNORMAL /HPF

## 2019-10-01 PROCEDURE — 99284 EMERGENCY DEPT VISIT MOD MDM: CPT

## 2019-10-01 PROCEDURE — 81025 URINE PREGNANCY TEST: CPT

## 2019-10-01 PROCEDURE — 81001 URINALYSIS AUTO W/SCOPE: CPT

## 2019-10-01 RX ORDER — FLUCONAZOLE 200 MG/1
200 TABLET ORAL ONCE
Qty: 1 TAB | Refills: 0 | Status: SHIPPED | OUTPATIENT
Start: 2019-10-01 | End: 2019-10-01

## 2019-10-01 NOTE — ED PROVIDER NOTES
ED Provider Note    Scribed for Chilango Montilla M.D. by Nicolasa Colunga. 10/1/2019, 10:35 AM.    Primary care provider: Pcp Pt States None  Means of arrival: Walk-In  History obtained from: Patient  History limited by: None    CHIEF COMPLAINT  Chief Complaint   Patient presents with   • Back Pain     more on right side, recent dx of UTI 10 days ago with abx treatment. pt reporting to not be feeling better.    • Painful Urination   • Abdominal Pain   • Fatigue     HPI  Ivis Klein is a 24 y.o. female with a history of a UTI who presents to the Emergency Department for evaluation of persistent lower right sided back pain. Patient was seen in the ED 10 days ago with the same complaint and diagnosed with a UTI. She was treated with Omnicef which she finished yesterday without complications, however, she does not believe her UTI has resolved. She reports associated vaginal pain and discharge, external vaginal cuts from dryness, dysuria and abdominal pain, but denies any vomiting, nausea, or fever. Patient took Tylenol for her pain just PTA. She is not currently sexually active and her LMP was September 11th. The patient was tested for gonorrhea and chlamydia on September 18th, both of which were negative. Denies a past medical history of yeast infection.     REVIEW OF SYSTEMS  Pertinent positives include vaginal discharge, vaginal pain, vaginal cuts, back pain, dysuria, and abdominal pain. Pertinent negatives include no vomiting, nausea, or fever.   See HPI for further details.     PAST MEDICAL HISTORY   has a past medical history of Anxiety (2/16/2017), Arrhythmia, Breath shortness, Graves disease (12/5/2016), Heart valve disease, Hyperthyroidism (6/29/2016), Menarche, Migraine, Pericarditis (06/2016), Pregnancy, Psychiatric problem, Supervision of normal first teen pregnancy, and Vomiting (6/29/2016).    SURGICAL HISTORY   has a past surgical history that includes primary c section (9/8/2013) and  "thyroidectomy total (Bilateral, 3/22/2017).    SOCIAL HISTORY  Social History     Tobacco Use   • Smoking status: Never Smoker   • Smokeless tobacco: Never Used   • Tobacco comment: quit in 2012   Substance Use Topics   • Alcohol use: No   • Drug use: Yes     Types: Marijuana, Inhaled     Comment: marijuana weekly      Social History     Substance and Sexual Activity   Drug Use Yes   • Types: Marijuana, Inhaled    Comment: marijuana weekly       FAMILY HISTORY  Family History   Problem Relation Age of Onset   • Cancer Paternal Grandmother 56        breast cancer   • Hyperlipidemia Father        CURRENT MEDICATIONS  No current facility-administered medications on file prior to encounter.      Current Outpatient Medications on File Prior to Encounter   Medication Sig Dispense Refill   • levothyroxine (SYNTHROID) 150 MCG Tab Take 1 Tab by mouth Every morning on an empty stomach. 30 Tab 2   • naproxen (NAPROSYN) 500 MG Tab Take 1 Tab by mouth 2 times a day, with meals. 20 Tab 0         ALLERGIES  Allergies   Allergen Reactions   • Nkda [No Known Drug Allergy]        PHYSICAL EXAM  VITAL SIGNS: /69   Pulse (!) 107   Temp 36.6 °C (97.9 °F) (Temporal)   Resp 16   Ht 1.6 m (5' 3\")   Wt 56 kg (123 lb 7.3 oz)   LMP 09/18/2019 (Exact Date)   SpO2 100%   BMI 21.87 kg/m²   Vitals reviewed.    Constitutional: Alert in no apparent distress.  HENT: No signs of trauma, Bilateral external ears normal, Nose normal.   Eyes: Pupils are equal and reactive, Conjunctiva normal, Non-icteric.   Neck: Normal range of motion, No tenderness, Supple, No stridor.   Lymphatic: No lymphadenopathy noted.   Cardiovascular: Regular rate and rhythm, no murmurs.   Thorax & Lungs: Normal breath sounds, No respiratory distress, No wheezing, No chest tenderness.   Abdomen:  Bowel sounds normal, Soft, No peritoneal signs, No masses, No pulsatile masses. No tenderness.  Pelvic: With female chaperone, Nunu ROBERTO, small amount of white vaginal " discharge consistent with Candida infection.   Skin: Warm, Dry, No erythema, No rash.    Back: Right cva tenderness.  No bony tenderness.   Extremities: Intact distal pulses, No edema, No tenderness, No cyanosis  Musculoskeletal: Good range of motion in all major joints. No tenderness to palpation or major deformities noted.   Neurologic: Alert , Normal motor function, Normal sensory function, No focal deficits noted.   Psychiatric: Affect normal, Judgment normal, Mood normal.       DIAGNOSTIC STUDIES / PROCEDURES    LABS  Labs Reviewed   URINALYSIS,CULTURE IF INDICATED - Abnormal; Notable for the following components:       Result Value    Character Cloudy (*)     Ketones 15 (*)     Leukocyte Esterase Small (*)     All other components within normal limits   URINE MICROSCOPIC (W/UA) - Abnormal; Notable for the following components:    WBC 5-10 (*)     RBC 2-5 (*)     All other components within normal limits   HCG QUALITATIVE UR   REFRACTOMETER SG      All labs reviewed by me.      COURSE & MEDICAL DECISION MAKING  Nursing notes, VS, PMSFHx reviewed in chart.  Differential diagnoses include but not limited to: Candida, UTI, and ectopic pregnancy.     Obtained and reviewed past medical records from September 18th which indicated that she was admitted for hypokalemia and pyelonephritis. Urine was positive for UTI, urine culture was negative. Tested for gonorrhea and chlamydia which were negative. Patient had a pregnancy test at that time which was negative. She was treated with Omicef for symptoms. Patient has not had any controlled substances in the last year.     10:35 AM - Patient seen and examined at bedside. Plan of care was discussed with the patient which includes doing a pelvic exam. Ordered for urine microscopic, urinalysis with culture, Beta-HCG Qualitative Urine, and Refractometer SG to evaluate. Patient will be treated with Diflucan 200 mg tablet for her symptoms.        11:10 AM - Reviewed medical  records also indicating that she had an ultrasound on 9/16/19 which showed a right ovarian cyst.     11:13 AM - Performed pelvic exam with female nurse, Nunu, at bedside.    11:45 AM - Reviewed the lab results from today showing that she has 5-10 white blood cells. At this time we will treat her for candida which is likely the cause of her symptoms, she completed a full course of antibiotics, the urine is nitrite negative.  Culture is pending.  Antibiotics will worsen her Candida infection.  We will wait for pending urine culture results to see if she is positive for UTI.     11:55 AM - Informed patient of the above findings. Strict ED return precautions discussed and she will be discharged home at this time. Patient was agreeable and compliant with discharge instructions.     The patient will return for new or worsening symptoms and is stable at the time of discharge.      DISPOSITION:  Patient will be discharged home in stable condition.    FOLLOW UP:  [facility], Emergency Dept  [address] [id number] [phone]    If symptoms worsen    [facility]  [address] [place] [id number] [phone]    as scheduled next week      OUTPATIENT MEDICATIONS:  Discharge Medication List as of 10/1/2019 11:49 AM      START taking these medications    Details   fluconazole (DIFLUCAN) 200 MG Tab Take 1 Tab by mouth Once for 1 dose., Disp-1 Tab, R-0, Print Rx Paper               FINAL IMPRESSION  1. Candida vaginitis    2. Cyst of right ovary          Nicolasa HARRIS (Kamini), am scribing for, and in the presence of, Chilango Montilla M.D..    Electronically signed by: Nicolasa Colunga (Scribe), 10/1/2019    Chilango HARRIS M.D. personally performed the services described in this documentation, as scribed by Nicolasa Colunga in my presence, and it is both accurate and complete. E.     The note accurately reflects work and decisions made by me.  Chilango  JIMBO Montilla  10/1/2019  2:18 PM

## 2019-10-01 NOTE — ED NOTES
Patient received discharged instructions. Given paper prescriptions. Verbalizes understanding of discharge instructions. Vitals stable. Steady gait to lobby.

## 2019-10-01 NOTE — DISCHARGE INSTRUCTIONS
Candidal Vulvovaginitis  Candidal vulvovaginitis is an infection of the vagina and vulva. The vulva is the skin around the opening of the vagina. This may cause itching and discomfort in and around the vagina.   HOME CARE  · Only take medicine as told by your doctor.  · Do not have sex (intercourse) until the infection is healed or as told by your doctor.  · Practice safe sex.  · Tell your sex partner about your infection.  · Do not douche or use tampons.  · Wear cotton underwear. Do not wear tight pants or panty hose.  · Eat yogurt. This may help treat and prevent yeast infections.  GET HELP RIGHT AWAY IF:   · You have a fever.  · Your problems get worse during treatment or do not get better in 3 days.  · You have discomfort, irritation, or itching in your vagina or vulva area.  · You have pain after sex.  · You start to get belly (abdominal) pain.  MAKE SURE YOU:  · Understand these instructions.  · Will watch your condition.  · Will get help right away if you are not doing well or get worse.  Document Released: 03/16/2010 Document Revised: 03/11/2013 Document Reviewed: 03/16/2010  [employer]® Patient Information ©2014 [employer].    Ovarian Cyst  An ovarian cyst is a fluid-filled sac on an ovary. The ovaries are organs that make eggs in women. Most ovarian cysts go away on their own and are not cancerous (are benign). Some cysts need treatment.  Follow these instructions at home:  · Take over-the-counter and prescription medicines only as told by your doctor.  · Do not drive or use heavy machinery while taking prescription pain medicine.  · Get pelvic exams and Pap tests as often as told by your doctor.  · Return to your normal activities as told by your doctor. Ask your doctor what activities are safe for you.  · Do not use any products that contain nicotine or tobacco, such as cigarettes and e-cigarettes. If you need help quitting, ask your doctor.  · Keep all follow-up visits as told by your doctor. This is  important.  Contact a doctor if:  · Your periods are:  ¨ Late.  ¨ Irregular.  ¨ Painful.  · Your periods stop.  · You have pelvic pain that does not go away.  · You have pressure on your bladder.  · You have trouble making your bladder empty when you pee (urinate).  · You have pain during sex.  · You have any of the following in your belly (abdomen):  ¨ A feeling of fullness.  ¨ Pressure.  ¨ Discomfort.  ¨ Pain that does not go away.  ¨ Swelling.  · You feel sick most of the time.  · You have trouble pooping (have constipation).  · You are not as hungry as usual (you lose your appetite).  · You get very bad acne.  · You start to have more hair on your body and face.  · You are gaining weight or losing weight without changing your exercise and eating habits.  · You think you may be pregnant.  Get help right away if:  · You have belly pain that is very bad or gets worse.  · You cannot eat or drink without throwing up (vomiting).  · You suddenly get a fever.  · Your period is a lot heavier than usual.  This information is not intended to replace advice given to you by your health care provider. Make sure you discuss any questions you have with your health care provider.  Document Released: 06/05/2009 Document Revised: 07/07/2017 Document Reviewed: 05/21/2017  [employer] Patient Education © 2017 [employer]

## 2019-10-01 NOTE — ED NOTES
Patient c/o continued back pain, urinary frequency despite abx treatment for UTI. Patient also reports feeling fatigued.

## 2019-10-01 NOTE — ED TRIAGE NOTES
Chief Complaint   Patient presents with   • Back Pain     more on right side, recent dx of UTI 10 days ago with abx treatment. pt reporting to not be feeling better.    • Painful Urination   • Abdominal Pain   • Fatigue     Explained to pt triage process, made pt aware to tell this RN/staff of any changes/concerns, pt verbalized understanding of process and instructions given. Pt to ER lobby.

## 2019-11-03 ENCOUNTER — HOSPITAL ENCOUNTER (EMERGENCY)
Facility: MEDICAL CENTER | Age: 24
End: 2019-11-03
Attending: EMERGENCY MEDICINE
Payer: MEDICAID

## 2019-11-03 VITALS
RESPIRATION RATE: 19 BRPM | BODY MASS INDEX: 22.97 KG/M2 | TEMPERATURE: 98.8 F | SYSTOLIC BLOOD PRESSURE: 121 MMHG | WEIGHT: 129.63 LBS | OXYGEN SATURATION: 98 % | DIASTOLIC BLOOD PRESSURE: 59 MMHG | HEIGHT: 63 IN | HEART RATE: 81 BPM

## 2019-11-03 DIAGNOSIS — N30.01 ACUTE CYSTITIS WITH HEMATURIA: ICD-10-CM

## 2019-11-03 LAB
APPEARANCE UR: ABNORMAL
APPEARANCE UR: ABNORMAL
BACTERIA #/AREA URNS HPF: ABNORMAL /HPF
BILIRUB UR QL STRIP.AUTO: NEGATIVE
COLOR UR AUTO: YELLOW
COLOR UR: YELLOW
EPI CELLS #/AREA URNS HPF: ABNORMAL /HPF
GLUCOSE UR QL STRIP.AUTO: NEGATIVE MG/DL
GLUCOSE UR STRIP.AUTO-MCNC: NEGATIVE MG/DL
HCG UR QL: NEGATIVE
HCG UR QL: NEGATIVE
HYALINE CASTS #/AREA URNS LPF: ABNORMAL /LPF
KETONES UR QL STRIP.AUTO: ABNORMAL MG/DL
KETONES UR STRIP.AUTO-MCNC: ABNORMAL MG/DL
LEUKOCYTE ESTERASE UR QL STRIP.AUTO: ABNORMAL
LEUKOCYTE ESTERASE UR QL STRIP.AUTO: ABNORMAL
MICRO URNS: ABNORMAL
NITRITE UR QL STRIP.AUTO: NEGATIVE
NITRITE UR QL STRIP.AUTO: NEGATIVE
PH UR STRIP.AUTO: 5.5 [PH] (ref 5–8)
PH UR STRIP.AUTO: 6 [PH] (ref 5–8)
PROT UR QL STRIP: ABNORMAL MG/DL
PROT UR QL STRIP: NEGATIVE MG/DL
RBC # URNS HPF: ABNORMAL /HPF
RBC UR QL AUTO: ABNORMAL
RBC UR QL AUTO: ABNORMAL
SP GR UR REFRACTOMETRY: >=1.03
SP GR UR: >=1.03 (ref 1–1.03)
UROBILINOGEN UR STRIP.AUTO-MCNC: 1 MG/DL
WBC #/AREA URNS HPF: ABNORMAL /HPF

## 2019-11-03 PROCEDURE — A9270 NON-COVERED ITEM OR SERVICE: HCPCS | Performed by: EMERGENCY MEDICINE

## 2019-11-03 PROCEDURE — 81025 URINE PREGNANCY TEST: CPT | Mod: 91

## 2019-11-03 PROCEDURE — 700102 HCHG RX REV CODE 250 W/ 637 OVERRIDE(OP): Performed by: EMERGENCY MEDICINE

## 2019-11-03 PROCEDURE — 81002 URINALYSIS NONAUTO W/O SCOPE: CPT

## 2019-11-03 PROCEDURE — 81001 URINALYSIS AUTO W/SCOPE: CPT

## 2019-11-03 PROCEDURE — 99284 EMERGENCY DEPT VISIT MOD MDM: CPT

## 2019-11-03 RX ORDER — PHENAZOPYRIDINE HYDROCHLORIDE 200 MG/1
100 TABLET, FILM COATED ORAL ONCE
Status: COMPLETED | OUTPATIENT
Start: 2019-11-03 | End: 2019-11-03

## 2019-11-03 RX ORDER — NITROFURANTOIN 25; 75 MG/1; MG/1
100 CAPSULE ORAL ONCE
Status: COMPLETED | OUTPATIENT
Start: 2019-11-03 | End: 2019-11-03

## 2019-11-03 RX ORDER — PHENAZOPYRIDINE HYDROCHLORIDE 200 MG/1
200 TABLET, FILM COATED ORAL 3 TIMES DAILY PRN
Qty: 6 TAB | Refills: 0 | Status: SHIPPED | OUTPATIENT
Start: 2019-11-03 | End: 2019-11-05

## 2019-11-03 RX ORDER — NITROFURANTOIN 25; 75 MG/1; MG/1
100 CAPSULE ORAL 2 TIMES DAILY
Qty: 14 CAP | Refills: 0 | Status: SHIPPED | OUTPATIENT
Start: 2019-11-03 | End: 2019-11-10

## 2019-11-03 RX ADMIN — NITROFURANTOIN MONOHYDRATE/MACROCRYSTALLINE 100 MG: 25; 75 CAPSULE ORAL at 09:29

## 2019-11-03 RX ADMIN — PHENAZOPYRIDINE 100 MG: 200 TABLET ORAL at 09:29

## 2019-11-03 ASSESSMENT — LIFESTYLE VARIABLES: DO YOU DRINK ALCOHOL: NO

## 2019-11-03 NOTE — ED PROVIDER NOTES
"ED Provider Note    Scribed for Christen Knight M.D. by Ealr Powell. 11/3/2019  8:51 AM    Primary care provider: Pcp Pt States None  Means of arrival: Walk-in  History obtained from: Patient  History limited by: None  CHIEF COMPLAINT  Chief Complaint   Patient presents with   • Urinary Frequency     x2 days       HPI  Ivis Klein is a 24 y.o. female who presents complaining of lower abdominal pain and urinary frequency that began yesterday afternoon. The urinary frequency began after the abdominal pain. She describes pressure-like pain in her bladder area that worsens with urination. She complains of cramping pain when urinating and only urinates a little bit at a time. Her urine is cloudy, but not malodorous. The patient complains of associated fatigue. She denies fever, flank pain, back pain, nausea, vomiting, diarrhea, hematuria, dysuria, or vaginal discharge. She denies a history of STD. Her symptoms are similar to those associated with prior UTIs. Patient has a history of hyperthyroidism. She denies a history of diabetes mellitus. She has no known drug allergies.    REVIEW OF SYSTEMS  Pertinent positives include abdominal pain, urinary frequency, cloudy urine, fatigue. Pertinent negatives include no fever, flank pain, back pain, nausea, vomiting, diarrhea, hematuria, dysuria, or vaginal discharge.  See HPI for further details. All other systems are negative.    PAST MEDICAL HISTORY  Past Medical History:   Diagnosis Date   • Anxiety 2/16/2017   • Arrhythmia     tachycardia \"pt reports d/t thryoid\"   • Breath shortness     no c/o at this time; c/o sob at night unsure if it is d/t anxiety   • Graves disease 12/5/2016   • Heart valve disease    • Hyperthyroidism 6/29/2016   • Menarche     started at age 14   • Migraine    • Pericarditis 06/2016   • Pregnancy     delivered 9/8/2013, 3/16/17 pt reports that she is not pregnant at this time   • Psychiatric problem     anxiety, depression   • " "Supervision of normal first teen pregnancy    • Vomiting 6/29/2016       FAMILY HISTORY  Family History   Problem Relation Age of Onset   • Cancer Paternal Grandmother 56        breast cancer   • Hyperlipidemia Father        SOCIAL HISTORY  Social History     Tobacco Use   • Smoking status: Never Smoker   • Smokeless tobacco: Never Used   • Tobacco comment: quit in 2012   Substance Use Topics   • Alcohol use: No   • Drug use: Yes     Types: Marijuana, Inhaled     Comment: marijuana weekly       SURGICAL HISTORY  Past Surgical History:   Procedure Laterality Date   • THYROIDECTOMY TOTAL Bilateral 3/22/2017    Procedure: THYROIDECTOMY TOTAL -NIMS RECURRENT LARYNGEAL NERVE MONITORING;  Surgeon: Vicente Eldridge M.D.;  Location: SURGERY SAME DAY [facility];  Service:    • PRIMARY C SECTION  9/8/2013    Performed by Melisa Wright M.D. at LABOR AND DELIVERY       CURRENT MEDICATIONS  Home Medications     Reviewed by Anne Mac R.N. (Registered Nurse) on 11/03/19 at 0823  Med List Status: Complete   Medication Last Dose Status   levothyroxine (SYNTHROID) 150 MCG Tab 11/3/2019 Active                ALLERGIES  Allergies   Allergen Reactions   • Nkda [No Known Drug Allergy]        PHYSICAL EXAM  VITAL SIGNS: /62   Pulse 89   Temp 37.1 °C (98.8 °F) (Temporal)   Resp 16   Ht 1.6 m (5' 3\")   Wt 58.8 kg (129 lb 10.1 oz)   LMP 10/15/2019 (Exact Date)   SpO2 99%   BMI 22.96 kg/m²      Constitutional: Well developed, well nourished; Mild distress; Non-toxic appearance.   HENT: Normocephalic, atraumatic; Bilateral external ears normal; Oropharynx with moist mucous membranes; No erythema or exudates in the posterior oropharynx.   Eyes: PERRL, EOMI, Conjunctiva normal. No discharge.   Neck:  Supple, nontender midline; No stridor; No nuchal rigidity.   Lymphatic: No cervical lymphadenopathy noted.   Cardiovascular: Regular rate and rhythm without murmurs, rubs, or gallop.   Thorax & Lungs: No " respiratory distress, breath sounds clear to auscultation bilaterally without wheezing, rales or rhonchi. Nontender chest wall. No crepitus or subcutaneous air  Abdomen: Soft, Tender superpubic region otherwise normal, bowel sounds normal. No obvious masses; No pulsatile masses; no rebound, guarding, or peritoneal signs.   Skin: Good color; warm and dry without rash or petechia.  Back: Nontender, No CVA tenderness.   Extremities: Distal dorsalis pedis, posterior tibial pulses are equal bilaterally; No edema; Nontender calves or saphenous, No cyanosis, No clubbing.   Musculoskeletal: Good range of motion in all major joints. No tenderness to palpation or major deformities noted.   Neurologic: Alert & oriented x 4, clear speech    LABS/RADIOLOGY/PROCEDURES  Results for orders placed or performed during the hospital encounter of 11/03/19   BETA-HCG QUALITATIVE URINE   Result Value Ref Range    Beta-Hcg Urine Negative Negative   URINALYSIS   Result Value Ref Range    Color Yellow     Character Cloudy (A)     Ph 5.5 5.0 - 8.0    Glucose Negative Negative mg/dL    Ketones Trace (A) Negative mg/dL    Protein Negative Negative mg/dL    Bilirubin Negative Negative    Urobilinogen, Urine 1.0 Negative    Nitrite Negative Negative    Leukocyte Esterase Moderate (A) Negative    Occult Blood Trace (A) Negative    Micro Urine Req Microscopic    URINE MICROSCOPIC (W/UA)   Result Value Ref Range    -150 (A) /hpf    RBC 10-20 (A) /hpf    Bacteria Few (A) None /hpf    Epithelial Cells Few /hpf    Hyaline Cast 6-10 (A) /lpf   REFRACTOMETER SG   Result Value Ref Range    Specific Gravity >=1.030    POC UA   Result Value Ref Range    POC Color Yellow     POC Appearance Slightly Cloudy (A)     POC Glucose Negative Negative mg/dL    POC Ketones Trace (A) Negative mg/dL    POC Specific Gravity >=1.030 (A) 1.005 - 1.030    POC Blood Small (A) Negative    POC Urine PH 6.0 5.0 - 8.0    POC Protein Trace (A) Negative mg/dL    POC  Nitrites Negative Negative    POC Leukocyte Esterase Small (A) Negative   POC URINE PREGNANCY   Result Value Ref Range    POC Urine Pregnancy Test Negative Negative     COURSE & MEDICAL DECISION MAKING  Pertinent Labs & Imaging studies reviewed. (See chart for details)    Reviewed patient's old medical records which showed been here nine times since May, most recently for UTI and abdominal pain. She has had two negative CTs in the last six months, both of which were negative. Her last CT was on 9/19/2019 and 9/16/2019 was a pelvic ultrasound. Both her ultrasounds demonstrated ovarian cysts. She had  urine culture positive for yeast in May. She has had negative chlamydia and gonorrhea tests. She had a negative urine culture in September. She was seen here on 10/1/2019 and had a yeast infection with negative U/A and urine culture. Today's urine looks more infected than previous U/As.    8:51 AM - Patient seen and examined at bedside. Ordered for POC urine pregnancy, urine microscopic with U/A, refractometer SG, POC U/A, beta-HCG qualitative urine, and U/A to evaluate her symptoms.  I explained her lab results. Discussed plan of care, including the need to treat for UTI with Macrobid and Pyridum. I discussed the potential medication side effects. She was given a referral to [facility] to establish a primary care and instructed to return to the ED if her symptoms worsen or if she experiences shaking chills, fever, flank pain, or nausea. I recommened copious fluids. Patient agrees to the plan of care.    Patient presents to the ER complaining of suprapubic pain and increased frequency and urgency of urination.  Symptoms began yesterday afternoon.  Patient states when she urinates it causes increased cramping in the suprapubic region.  She states when she tries to urinate only small amounts come out and she has to urinate again a few minutes later.  She was tender in the suprapubic area on abdominal examination.  No fevers or  chills.  No back pain.  No nausea or vomiting.  Vital signs stable.  No CVA tenderness.  At this time no concern for pyelonephritis.  The patient's urine is positive for infection.  Culture is pending.  No vaginal discharge.  She denies possibility of STDs.  She had STD testing done within the last few months and that was negative.  Her pregnancy test is negative.  She is well-appearing overall.  She is not septic or toxic.  She also had several CT scans and several ultrasounds performed within the last few months for abdominal pain and those were also fairly unremarkable except for some ovarian cyst.  Symptoms are very consistent with cystitis.  I will send her home with Pyridium and Macrobid.  She has been given very strict return precautions and discharge instructions for urinary tract infection and she understands treatment plan and follow-up.    The patient will return for new or worsening symptoms and is stable at the time of discharge.      DISPOSITION:  Patient will be discharged home in stable condition.    FOLLOW UP:  [address] [address] [phone]  Schedule an appointment as soon as possible for a visit in 1 day  If symptoms worsen return to ER      OUTPATIENT MEDICATIONS:  New Prescriptions    NITROFURANTOIN MONOHYD MACRO (MACROBID) 100 MG CAP    Take 1 Cap by mouth 2 times a day for 7 days.    PHENAZOPYRIDINE (PYRIDIUM) 200 MG TAB    Take 1 Tab by mouth 3 times a day as needed for up to 2 days.         FINAL IMPRESSION  1. Acute cystitis with hematuria       This dictation has been created using voice recognition software. The accuracy of the dictation is limited by the abilities of the software. I expect there may be some errors of grammar and possibly content. I made every attempt to manually correct the errors within my dictation. However, errors related to voice recognition software may still exist and should be interpreted within the  appropriate context.     IEarl (Scribe), am scribing for, and in the presence of, Christen Knight M.D..    Electronically signed by: Earl Powell (Scribe), 11/3/2019    IChristen M.D. personally performed the services described in this documentation, as scribed by Earl Powell in my presence, and it is both accurate and complete.    C    The note accurately reflects work and decisions made by me.  Christen Knight  11/3/2019  4:01 PM

## 2019-11-03 NOTE — ED TRIAGE NOTES
Ambulates to triage  Chief Complaint   Patient presents with   • Urinary Frequency     x2 days     Urine sample sent to lab.

## 2019-11-03 NOTE — DISCHARGE INSTRUCTIONS
Return to the ER for any worsening abdominal pain/cramping, back pain, fevers over 100.4, shaking chills, nausea, vomiting, muscle aches/body aches, blood in urine, worsening pain with urination, or for any concerns.    Drink plenty of fluids!    Follow-up with the [facility] within the next 1 to 2 days.  Please call for appointment.

## 2019-11-06 ENCOUNTER — APPOINTMENT (OUTPATIENT)
Dept: RADIOLOGY | Facility: MEDICAL CENTER | Age: 24
End: 2019-11-06
Attending: EMERGENCY MEDICINE
Payer: MEDICAID

## 2019-11-06 ENCOUNTER — HOSPITAL ENCOUNTER (EMERGENCY)
Facility: MEDICAL CENTER | Age: 24
End: 2019-11-06
Attending: EMERGENCY MEDICINE
Payer: MEDICAID

## 2019-11-06 VITALS
BODY MASS INDEX: 22.34 KG/M2 | HEIGHT: 63 IN | DIASTOLIC BLOOD PRESSURE: 68 MMHG | TEMPERATURE: 98.1 F | HEART RATE: 88 BPM | WEIGHT: 126.1 LBS | OXYGEN SATURATION: 98 % | SYSTOLIC BLOOD PRESSURE: 112 MMHG | RESPIRATION RATE: 16 BRPM

## 2019-11-06 DIAGNOSIS — F12.90 MARIJUANA USE: ICD-10-CM

## 2019-11-06 DIAGNOSIS — R11.2 NON-INTRACTABLE VOMITING WITH NAUSEA, UNSPECIFIED VOMITING TYPE: ICD-10-CM

## 2019-11-06 DIAGNOSIS — R10.84 GENERALIZED ABDOMINAL PAIN: ICD-10-CM

## 2019-11-06 LAB
ALBUMIN SERPL BCP-MCNC: 4.6 G/DL (ref 3.2–4.9)
ALBUMIN/GLOB SERPL: 1.4 G/DL
ALP SERPL-CCNC: 54 U/L (ref 30–99)
ALT SERPL-CCNC: 8 U/L (ref 2–50)
ANION GAP SERPL CALC-SCNC: 11 MMOL/L (ref 0–11.9)
APPEARANCE UR: CLEAR
AST SERPL-CCNC: 8 U/L (ref 12–45)
BASOPHILS # BLD AUTO: 0.4 % (ref 0–1.8)
BASOPHILS # BLD: 0.03 K/UL (ref 0–0.12)
BILIRUB SERPL-MCNC: 0.5 MG/DL (ref 0.1–1.5)
BILIRUB UR QL STRIP.AUTO: NEGATIVE
BUN SERPL-MCNC: 10 MG/DL (ref 8–22)
CALCIUM SERPL-MCNC: 9.7 MG/DL (ref 8.5–10.5)
CHLORIDE SERPL-SCNC: 102 MMOL/L (ref 96–112)
CO2 SERPL-SCNC: 27 MMOL/L (ref 20–33)
COLOR UR: YELLOW
CREAT SERPL-MCNC: 0.72 MG/DL (ref 0.5–1.4)
EKG IMPRESSION: NORMAL
EOSINOPHIL # BLD AUTO: 0.39 K/UL (ref 0–0.51)
EOSINOPHIL NFR BLD: 5.3 % (ref 0–6.9)
ERYTHROCYTE [DISTWIDTH] IN BLOOD BY AUTOMATED COUNT: 49.1 FL (ref 35.9–50)
FLUAV RNA SPEC QL NAA+PROBE: NEGATIVE
FLUBV RNA SPEC QL NAA+PROBE: NEGATIVE
GLOBULIN SER CALC-MCNC: 3.2 G/DL (ref 1.9–3.5)
GLUCOSE SERPL-MCNC: 88 MG/DL (ref 65–99)
GLUCOSE UR STRIP.AUTO-MCNC: NEGATIVE MG/DL
HCG SERPL QL: NEGATIVE
HCT VFR BLD AUTO: 40.8 % (ref 37–47)
HGB BLD-MCNC: 12.6 G/DL (ref 12–16)
IMM GRANULOCYTES # BLD AUTO: 0.02 K/UL (ref 0–0.11)
IMM GRANULOCYTES NFR BLD AUTO: 0.3 % (ref 0–0.9)
KETONES UR STRIP.AUTO-MCNC: NEGATIVE MG/DL
LEUKOCYTE ESTERASE UR QL STRIP.AUTO: NEGATIVE
LIPASE SERPL-CCNC: 10 U/L (ref 11–82)
LYMPHOCYTES # BLD AUTO: 2.41 K/UL (ref 1–4.8)
LYMPHOCYTES NFR BLD: 32.9 % (ref 22–41)
MCH RBC QN AUTO: 28.1 PG (ref 27–33)
MCHC RBC AUTO-ENTMCNC: 30.9 G/DL (ref 33.6–35)
MCV RBC AUTO: 91.1 FL (ref 81.4–97.8)
MICRO URNS: NORMAL
MONOCYTES # BLD AUTO: 0.47 K/UL (ref 0–0.85)
MONOCYTES NFR BLD AUTO: 6.4 % (ref 0–13.4)
NEUTROPHILS # BLD AUTO: 4.01 K/UL (ref 2–7.15)
NEUTROPHILS NFR BLD: 54.7 % (ref 44–72)
NITRITE UR QL STRIP.AUTO: NEGATIVE
NRBC # BLD AUTO: 0 K/UL
NRBC BLD-RTO: 0 /100 WBC
PH UR STRIP.AUTO: 6.5 [PH] (ref 5–8)
PLATELET # BLD AUTO: 278 K/UL (ref 164–446)
PMV BLD AUTO: 10.9 FL (ref 9–12.9)
POTASSIUM SERPL-SCNC: 3.5 MMOL/L (ref 3.6–5.5)
PROT SERPL-MCNC: 7.8 G/DL (ref 6–8.2)
PROT UR QL STRIP: NEGATIVE MG/DL
RBC # BLD AUTO: 4.48 M/UL (ref 4.2–5.4)
RBC UR QL AUTO: NEGATIVE
SODIUM SERPL-SCNC: 140 MMOL/L (ref 135–145)
SP GR UR STRIP.AUTO: 1.03
TROPONIN T SERPL-MCNC: <6 NG/L (ref 6–19)
UROBILINOGEN UR STRIP.AUTO-MCNC: 1 MG/DL
WBC # BLD AUTO: 7.3 K/UL (ref 4.8–10.8)

## 2019-11-06 PROCEDURE — 36415 COLL VENOUS BLD VENIPUNCTURE: CPT

## 2019-11-06 PROCEDURE — 99284 EMERGENCY DEPT VISIT MOD MDM: CPT

## 2019-11-06 PROCEDURE — 700111 HCHG RX REV CODE 636 W/ 250 OVERRIDE (IP): Performed by: EMERGENCY MEDICINE

## 2019-11-06 PROCEDURE — 84703 CHORIONIC GONADOTROPIN ASSAY: CPT

## 2019-11-06 PROCEDURE — 83690 ASSAY OF LIPASE: CPT

## 2019-11-06 PROCEDURE — 700105 HCHG RX REV CODE 258: Performed by: EMERGENCY MEDICINE

## 2019-11-06 PROCEDURE — 93005 ELECTROCARDIOGRAM TRACING: CPT

## 2019-11-06 PROCEDURE — 85025 COMPLETE CBC W/AUTO DIFF WBC: CPT

## 2019-11-06 PROCEDURE — 71045 X-RAY EXAM CHEST 1 VIEW: CPT

## 2019-11-06 PROCEDURE — 93005 ELECTROCARDIOGRAM TRACING: CPT | Performed by: EMERGENCY MEDICINE

## 2019-11-06 PROCEDURE — 96374 THER/PROPH/DIAG INJ IV PUSH: CPT

## 2019-11-06 PROCEDURE — 87502 INFLUENZA DNA AMP PROBE: CPT

## 2019-11-06 PROCEDURE — 84484 ASSAY OF TROPONIN QUANT: CPT

## 2019-11-06 PROCEDURE — 80053 COMPREHEN METABOLIC PANEL: CPT

## 2019-11-06 PROCEDURE — 96375 TX/PRO/DX INJ NEW DRUG ADDON: CPT

## 2019-11-06 PROCEDURE — 81003 URINALYSIS AUTO W/O SCOPE: CPT

## 2019-11-06 RX ORDER — ONDANSETRON 4 MG/1
4 TABLET, ORALLY DISINTEGRATING ORAL EVERY 6 HOURS PRN
Qty: 8 TAB | Refills: 0 | Status: SHIPPED
Start: 2019-11-06 | End: 2020-01-14

## 2019-11-06 RX ORDER — PROCHLORPERAZINE EDISYLATE 5 MG/ML
10 INJECTION INTRAMUSCULAR; INTRAVENOUS ONCE
Status: COMPLETED | OUTPATIENT
Start: 2019-11-06 | End: 2019-11-06

## 2019-11-06 RX ORDER — DIPHENHYDRAMINE HYDROCHLORIDE 50 MG/ML
50 INJECTION INTRAMUSCULAR; INTRAVENOUS ONCE
Status: COMPLETED | OUTPATIENT
Start: 2019-11-06 | End: 2019-11-06

## 2019-11-06 RX ORDER — SODIUM CHLORIDE 9 MG/ML
1000 INJECTION, SOLUTION INTRAVENOUS ONCE
Status: COMPLETED | OUTPATIENT
Start: 2019-11-06 | End: 2019-11-06

## 2019-11-06 RX ORDER — KETOROLAC TROMETHAMINE 30 MG/ML
15 INJECTION, SOLUTION INTRAMUSCULAR; INTRAVENOUS ONCE
Status: COMPLETED | OUTPATIENT
Start: 2019-11-06 | End: 2019-11-06

## 2019-11-06 RX ADMIN — SODIUM CHLORIDE 1000 ML: 9 INJECTION, SOLUTION INTRAVENOUS at 19:12

## 2019-11-06 RX ADMIN — PROCHLORPERAZINE EDISYLATE 10 MG: 5 INJECTION INTRAMUSCULAR; INTRAVENOUS at 19:11

## 2019-11-06 RX ADMIN — KETOROLAC TROMETHAMINE 15 MG: 30 INJECTION, SOLUTION INTRAMUSCULAR; INTRAVENOUS at 19:24

## 2019-11-06 RX ADMIN — DIPHENHYDRAMINE HYDROCHLORIDE 50 MG: 50 INJECTION INTRAMUSCULAR; INTRAVENOUS at 19:11

## 2019-11-06 NOTE — ED TRIAGE NOTES
"Pt ambs to room  Chief Complaint   Patient presents with   • Low Back Pain     Dx UTI 3 days ago didn't get her antibiotics filled   • Chills   • Vomiting   • Chest Pressure     with SOB, thinks her thyroid is \"acting up\"  pt is hyperthyroid   pt in triage 1 for EKG    "

## 2019-11-06 NOTE — ED NOTES
Pt complaining of left sided weakness and says she feels like she is going to faint. No facial droop or obvious left sided hand or leg weakness. RN notified.

## 2019-11-07 NOTE — ED NOTES
Pt discharged home. Explained discharge and medication instructions. Questions and comments addressed. Pt verbalized understanding of instructions. Pt advised to follow-up with PCP or return to ED for any new or worsening of symptoms. Pt is ambulating well and steady on feet. VS stable. Pt's friend at bedside and will be driving pt home. PIV removed.

## 2019-11-07 NOTE — ED PROVIDER NOTES
"ED Provider Note    CHIEF COMPLAINT  Chief Complaint   Patient presents with   • Low Back Pain     Dx UTI 3 days ago didn't get her antibiotics filled   • Chills   • Vomiting   • Chest Pressure     with SOB, thinks her thyroid is \"acting up\"  pt is hyperthyroid       HPI  Ivis Klein is a 24 y.o. female who presents to the emergency department with multiple complaints.  Patient states she is having generalized abdominal pain nausea and vomiting and low back pain.  Patient was seen here 3 days ago and diagnosed with a possible urinary tract infection there was moderate leuks but there was epithelial cells and few bacteria but currently today there is no evidence of urinary tract infection and she did not  her antibiotics.  She states her symptoms are not improved she is been vomiting since 3 AM this morning.  No fevers or chills.  When I walk in the room the patient is extremely anxious and hyperventilating stating \"I feel like Im going to die\".  When asked about her habits at home the patient does smoke marijuana every day and has done so for an extended period of time.  She is also been evaluated multiple times for abdominal pain without a source beyond some small ovarian cyst.  Pain is currently generalized with associated nausea and cramping in nature and a 5 out of 10  She is also endorsing a headache that she is had for the last few days with a history of migraines states is similar to her prior migraines.    REVIEW OF SYSTEMS  Positives as above. Pertinent negatives include fevers hematemesis diarrhea constipation vaginal discharge vaginal bleeding weakness numbness tingling sore throat nasal congestion  All other review of systems are negative    PAST MEDICAL HISTORY   has a past medical history of Anxiety (2/16/2017), Arrhythmia, Breath shortness, Graves disease (12/5/2016), Heart valve disease, Hyperthyroidism (6/29/2016), Menarche, Migraine, Pericarditis (06/2016), Pregnancy, " "Psychiatric problem, Supervision of normal first teen pregnancy, and Vomiting (6/29/2016).    SOCIAL HISTORY  Social History     Tobacco Use   • Smoking status: Never Smoker   • Smokeless tobacco: Never Used   • Tobacco comment: quit in 2012   Substance and Sexual Activity   • Alcohol use: No   • Drug use: Yes     Types: Marijuana, Inhaled     Comment: marijuana weekly   • Sexual activity: Yes     Partners: Male     Birth control/protection: Abstinence     Comment: single       SURGICAL HISTORY   has a past surgical history that includes primary c section (9/8/2013) and thyroidectomy total (Bilateral, 3/22/2017).    CURRENT MEDICATIONS  Home Medications    **Home medications have not yet been reviewed for this encounter**         ALLERGIES  Allergies   Allergen Reactions   • Nkda [No Known Drug Allergy]        PHYSICAL EXAM  VITAL SIGNS: /98   Pulse 89   Temp 36.7 °C (98.1 °F) (Temporal)   Resp 16   Ht 1.6 m (5' 3\")   Wt 57.2 kg (126 lb 1.7 oz)   LMP 10/15/2019 (Exact Date)   SpO2 100%   BMI 22.34 kg/m²   Pulse ox interpretation: I interpret this pulse ox as normal.  Constitutional: Alert in mild distress, extremely anxious   HENT: Normocephalic atraumatic, dry MM  Eyes: PER, Conjunctiva normal, Non-icteric.   Neck: Normal range of motion, No tenderness, Supple, No stridor.   Cardiovascular: Regular rate and rhythm, no murmurs.   Thorax & Lungs: Normal breath sounds, No respiratory distress, No wheezing, No chest tenderness.   Abdomen: Bowel sounds normal, Soft, generalized abdominal tenderness no pinpoint tenderness, No pulsatile masses. No peritoneal signs.  Skin: Warm, Dry, No erythema, No rash.   Back: No bony tenderness, No CVA tenderness.   Extremities: Intact distal pulses, No edema, No tenderness, No cyanosis  Neurologic: Alert and oriented x3, No focal deficits noted.   Psychiatric: Extremely anxious and irritated      DIFFERENTIAL DIAGNOSIS AND WORK UP PLAN    This is a 24 y.o. female who " presents with acute on chronic abdominal discomfort and urinalysis today is within normal limits and no evidence of infection based on her prior culture, patient does appear dehydrated on examination and is actively mildly retching she is extremely anxious and hyperventilating concerning history most likely significant for possible cyclic vomiting syndrome versus dehydration.  Due to her active retching and dehydration she is not a candidate for oral hydration at this time she will receive antiemetics and IV fluids as well as Compazine Benadryl and Toradol to help with her discomfort and symptoms.  Due to her extensive work-up in the past for her chronic abdominal pain normal vital signs normal laboratory analysis I do not believe she requires imaging today including a CT scan    DIAGNOSTIC STUDIES / PROCEDURES    EKG  Results for orders placed or performed during the hospital encounter of 19   EKG   Result Value Ref Range    Report       [facility] Emergency Dept.    Test Date:  2019  Pt Name:    MICHELLE MENARD               Department: ER  MRN:        [id number]                      Room:  Gender:     Female                       Technician: [id number]  :        1995                   Requested By:ER TRIAGE PROTOCOL  Order #:    [id number]                    Reading MD: Caitlin Norton MD    Measurements  Intervals                                Axis  Rate:       71                           P:          33  ID:         [id number]                          QRS:        62  QRSD:       72                           T:          38  QT:         352  QTc:        383    Interpretive Statements  Sinus rhythm at a rate of 71 no ST elevations or ST depressions no abnormal T    wave inversions no pathognomonic Q waves normal intervals normal axis  Compared to ECG 2019 19:50:37  No longer sinus tach otherwise no extreme changes  Electronically Signed On 2019 18:31:55 PST by Caitlin Norton  "MD         LABS  Pertinent Lab Findings  CBC within normal limits CMP within normal limits lipase normal troponin negative patient is not pregnant urinalysis normal influenza negative      RADIOLOGY  DX-CHEST-PORTABLE (1 VIEW)   Final Result      No acute cardiac or pulmonary abnormality is noted.        The radiologist's interpretation of all radiological studies have been reviewed by me.      COURSE & MEDICAL DECISION MAKING  Pertinent Labs & Imaging studies reviewed. (See chart for details)    7:12 PM  They are having difficulty getting access I placed a peripheral IV  IV Placement Procedure Note: (with ultrasound guidance)   The patient was consented all risks benefits and alternatives were discussed.   LOCATION: L AC  POSITION: trendelenburg.   PROCEDURE NOTE: Indication, poor access.   Sterile prep, gown, and drape protocols were used. Using ultrasound guidance, IV was placed with ultrasound guidance successfully by cannulating the ac vein with ultrasound guidance. We used a 20-gauge IV and had good flash and was able to cannulate fully and normal saline flowed easily. There is no localized infiltration. The line was secured by nursing staff.    8:02 PM  I reassessed patient at the bedside had a positive response to the IV fluids and after the medication she started to feel a lot better.  Again I believe most of her symptoms related related to her heavy marijuana use and possible cyclic vomiting.  She will be sent home with Zofran ODT and a gentle diet over the next 48 hours.  She was given strict return precautions for any new or worsening severe pain fevers and uncontrolled vomiting.  She understands feels comfortable going home and we discussed the importance of cessation of marijuana    /68   Pulse 88   Temp 36.7 °C (98.1 °F) (Temporal)   Resp 16   Ht 1.6 m (5' 3\")   Wt 57.2 kg (126 lb 1.7 oz)   LMP 10/15/2019 (Exact Date)   SpO2 98%   BMI 22.34 kg/m²      The patient will return for new or " worsening symptoms and is stable at the time of discharge.    The patient is referred to a primary physician for blood pressure management, diabetic screening, and for all other preventative health concerns.    DISPOSITION:  Patient will be discharged home in stable condition.    FOLLOW UP:  [facility], Emergency Dept  [address]  [place] [id number] [phone]    If symptoms worsen      OUTPATIENT MEDICATIONS:  Discharge Medication List as of 11/6/2019  8:48 PM      START taking these medications    Details   ondansetron (ZOFRAN ODT) 4 MG TABLET DISPERSIBLE Take 1 Tab by mouth every 6 hours as needed., Disp-8 Tab, R-0, Normal               FINAL IMPRESSION  1. Non-intractable vomiting with nausea, unspecified vomiting type     2. Generalized abdominal pain     3. Marijuana use             Electronically signed by: Caitlin Norton, 11/6/2019 6:04 PM    This dictation has been created using voice recognition software and/or scribes. The accuracy of the dictation is limited by the abilities of the software and the expertise of the scribes. I expect there may be some errors of grammar and possibly content. I made every attempt to manually correct the errors within my dictation. However, errors related to voice recognition software and/or scribes may still exist and should be interpreted within the appropriate context.

## 2019-11-07 NOTE — DISCHARGE INSTRUCTIONS
Please stop smoking marijuana, use a gentle diet over the next 24 hours, mostly liquids   Use the zofran as needed

## 2019-11-07 NOTE — ED NOTES
Pt w/c to room. Placed on monitor, in [facility], rails up x2, call light provided, educated on plan of care, erp to see.   Pt writhing in pain

## 2019-11-11 ENCOUNTER — HOSPITAL ENCOUNTER (EMERGENCY)
Facility: MEDICAL CENTER | Age: 24
End: 2019-11-11
Attending: EMERGENCY MEDICINE
Payer: MEDICAID

## 2019-11-11 VITALS
SYSTOLIC BLOOD PRESSURE: 118 MMHG | BODY MASS INDEX: 22.81 KG/M2 | RESPIRATION RATE: 16 BRPM | DIASTOLIC BLOOD PRESSURE: 69 MMHG | HEART RATE: 70 BPM | TEMPERATURE: 97.5 F | WEIGHT: 128.75 LBS | OXYGEN SATURATION: 100 % | HEIGHT: 63 IN

## 2019-11-11 DIAGNOSIS — E05.00 GRAVES DISEASE: ICD-10-CM

## 2019-11-11 DIAGNOSIS — E89.0 POSTOPERATIVE HYPOTHYROIDISM: ICD-10-CM

## 2019-11-11 DIAGNOSIS — N30.01 ACUTE CYSTITIS WITH HEMATURIA: ICD-10-CM

## 2019-11-11 DIAGNOSIS — R79.89 TSH ELEVATION: ICD-10-CM

## 2019-11-11 LAB
APPEARANCE UR: CLEAR
BACTERIA #/AREA URNS HPF: NEGATIVE /HPF
BILIRUB UR QL STRIP.AUTO: NEGATIVE
COLOR UR: YELLOW
EPI CELLS #/AREA URNS HPF: NEGATIVE /HPF
GLUCOSE UR STRIP.AUTO-MCNC: NEGATIVE MG/DL
HCG UR QL: NEGATIVE
HYALINE CASTS #/AREA URNS LPF: ABNORMAL /LPF
KETONES UR STRIP.AUTO-MCNC: NEGATIVE MG/DL
LEUKOCYTE ESTERASE UR QL STRIP.AUTO: ABNORMAL
MICRO URNS: ABNORMAL
NITRITE UR QL STRIP.AUTO: NEGATIVE
PH UR STRIP.AUTO: 8 [PH] (ref 5–8)
PROT UR QL STRIP: NEGATIVE MG/DL
RBC # URNS HPF: ABNORMAL /HPF
RBC UR QL AUTO: ABNORMAL
SP GR UR REFRACTOMETRY: 1.02
UROBILINOGEN UR STRIP.AUTO-MCNC: 0.2 MG/DL
WBC #/AREA URNS HPF: ABNORMAL /HPF

## 2019-11-11 PROCEDURE — 87086 URINE CULTURE/COLONY COUNT: CPT

## 2019-11-11 PROCEDURE — 81025 URINE PREGNANCY TEST: CPT

## 2019-11-11 PROCEDURE — 700111 HCHG RX REV CODE 636 W/ 250 OVERRIDE (IP): Performed by: EMERGENCY MEDICINE

## 2019-11-11 PROCEDURE — 700102 HCHG RX REV CODE 250 W/ 637 OVERRIDE(OP): Performed by: EMERGENCY MEDICINE

## 2019-11-11 PROCEDURE — 81001 URINALYSIS AUTO W/SCOPE: CPT

## 2019-11-11 PROCEDURE — A9270 NON-COVERED ITEM OR SERVICE: HCPCS | Performed by: EMERGENCY MEDICINE

## 2019-11-11 PROCEDURE — 99284 EMERGENCY DEPT VISIT MOD MDM: CPT

## 2019-11-11 RX ORDER — IBUPROFEN 600 MG/1
600 TABLET ORAL ONCE
Status: COMPLETED | OUTPATIENT
Start: 2019-11-11 | End: 2019-11-11

## 2019-11-11 RX ORDER — ONDANSETRON 4 MG/1
4 TABLET, ORALLY DISINTEGRATING ORAL ONCE
Status: COMPLETED | OUTPATIENT
Start: 2019-11-11 | End: 2019-11-11

## 2019-11-11 RX ORDER — LEVOTHYROXINE SODIUM 0.15 MG/1
150 TABLET ORAL
Qty: 30 TAB | Refills: 2 | Status: SHIPPED | OUTPATIENT
Start: 2019-11-11 | End: 2022-04-25 | Stop reason: SDUPTHER

## 2019-11-11 RX ORDER — NITROFURANTOIN 25; 75 MG/1; MG/1
100 CAPSULE ORAL 2 TIMES DAILY
Qty: 10 CAP | Refills: 0 | Status: SHIPPED | OUTPATIENT
Start: 2019-11-11 | End: 2019-11-16

## 2019-11-11 RX ADMIN — ONDANSETRON 4 MG: 4 TABLET, ORALLY DISINTEGRATING ORAL at 08:00

## 2019-11-11 RX ADMIN — IBUPROFEN 600 MG: 600 TABLET ORAL at 08:00

## 2019-11-11 NOTE — ED NOTES
Pt ambulatory to room, changing into gown, urine sent. Pt reports she was diagnosed with a UTI about a week ago, but never picked up her prescription. Pt says she then came in a few days later for nausea/vomitting and was told she no longer has a UTI and not to  her prescription.

## 2019-11-11 NOTE — ED TRIAGE NOTES
".Ivis Klein  .  Chief Complaint   Patient presents with   • Flank Pain     c/o left flank pain x 1 day   • Painful Urination     x 2 weeks   • Chills     Patient ambulatory to triage with above complaint. Patient has been seen at [facility] x 2 for same, states \"I'm still not feeling better\". ./68   Pulse 81   Temp 36.4 °C (97.5 °F) (Temporal)   Resp 12   Ht 1.6 m (5' 3\")   Wt 58.4 kg (128 lb 12 oz)   LMP 10/15/2019 (Exact Date)   SpO2 98%   BMI 22.81 kg/m²     Patient given urine specimen supplies.   Patient to lobby and instructed to inform staff of any needs.   "

## 2019-11-13 LAB
BACTERIA UR CULT: NORMAL
SIGNIFICANT IND 70042: NORMAL
SITE SITE: NORMAL
SOURCE SOURCE: NORMAL

## 2019-12-26 ENCOUNTER — APPOINTMENT (OUTPATIENT)
Dept: RADIOLOGY | Facility: MEDICAL CENTER | Age: 24
End: 2019-12-26
Attending: EMERGENCY MEDICINE
Payer: MEDICAID

## 2019-12-26 ENCOUNTER — HOSPITAL ENCOUNTER (EMERGENCY)
Facility: MEDICAL CENTER | Age: 24
End: 2019-12-26
Attending: EMERGENCY MEDICINE
Payer: MEDICAID

## 2019-12-26 VITALS
BODY MASS INDEX: 24.38 KG/M2 | HEIGHT: 62 IN | DIASTOLIC BLOOD PRESSURE: 49 MMHG | TEMPERATURE: 98.6 F | RESPIRATION RATE: 18 BRPM | SYSTOLIC BLOOD PRESSURE: 107 MMHG | WEIGHT: 132.5 LBS | OXYGEN SATURATION: 100 % | HEART RATE: 81 BPM

## 2019-12-26 DIAGNOSIS — R10.9 ABDOMINAL PAIN, UNSPECIFIED ABDOMINAL LOCATION: ICD-10-CM

## 2019-12-26 DIAGNOSIS — Z3A.01 LESS THAN 8 WEEKS GESTATION OF PREGNANCY: ICD-10-CM

## 2019-12-26 LAB
ALBUMIN SERPL BCP-MCNC: 4.5 G/DL (ref 3.2–4.9)
ALBUMIN/GLOB SERPL: 1.3 G/DL
ALP SERPL-CCNC: 45 U/L (ref 30–99)
ALT SERPL-CCNC: 9 U/L (ref 2–50)
ANION GAP SERPL CALC-SCNC: 9 MMOL/L (ref 0–11.9)
APPEARANCE UR: CLEAR
AST SERPL-CCNC: 15 U/L (ref 12–45)
B-HCG SERPL-ACNC: 8249.5 MIU/ML (ref 0–5)
BASOPHILS # BLD AUTO: 0.7 % (ref 0–1.8)
BASOPHILS # BLD: 0.06 K/UL (ref 0–0.12)
BILIRUB SERPL-MCNC: 0.8 MG/DL (ref 0.1–1.5)
BILIRUB UR QL STRIP.AUTO: NEGATIVE
BUN SERPL-MCNC: 7 MG/DL (ref 8–22)
CALCIUM SERPL-MCNC: 9 MG/DL (ref 8.5–10.5)
CHLORIDE SERPL-SCNC: 104 MMOL/L (ref 96–112)
CO2 SERPL-SCNC: 24 MMOL/L (ref 20–33)
COLOR UR: YELLOW
CREAT SERPL-MCNC: 0.62 MG/DL (ref 0.5–1.4)
EOSINOPHIL # BLD AUTO: 0.04 K/UL (ref 0–0.51)
EOSINOPHIL NFR BLD: 0.5 % (ref 0–6.9)
ERYTHROCYTE [DISTWIDTH] IN BLOOD BY AUTOMATED COUNT: 48.5 FL (ref 35.9–50)
GLOBULIN SER CALC-MCNC: 3.4 G/DL (ref 1.9–3.5)
GLUCOSE SERPL-MCNC: 86 MG/DL (ref 65–99)
GLUCOSE UR STRIP.AUTO-MCNC: NEGATIVE MG/DL
HCG SERPL QL: POSITIVE
HCT VFR BLD AUTO: 40.4 % (ref 37–47)
HGB BLD-MCNC: 13 G/DL (ref 12–16)
IMM GRANULOCYTES # BLD AUTO: 0.01 K/UL (ref 0–0.11)
IMM GRANULOCYTES NFR BLD AUTO: 0.1 % (ref 0–0.9)
KETONES UR STRIP.AUTO-MCNC: 80 MG/DL
LEUKOCYTE ESTERASE UR QL STRIP.AUTO: NEGATIVE
LIPASE SERPL-CCNC: 8 U/L (ref 11–82)
LYMPHOCYTES # BLD AUTO: 2.22 K/UL (ref 1–4.8)
LYMPHOCYTES NFR BLD: 27.1 % (ref 22–41)
MCH RBC QN AUTO: 28 PG (ref 27–33)
MCHC RBC AUTO-ENTMCNC: 32.2 G/DL (ref 33.6–35)
MCV RBC AUTO: 86.9 FL (ref 81.4–97.8)
MICRO URNS: ABNORMAL
MONOCYTES # BLD AUTO: 0.46 K/UL (ref 0–0.85)
MONOCYTES NFR BLD AUTO: 5.6 % (ref 0–13.4)
NEUTROPHILS # BLD AUTO: 5.41 K/UL (ref 2–7.15)
NEUTROPHILS NFR BLD: 66 % (ref 44–72)
NITRITE UR QL STRIP.AUTO: NEGATIVE
NRBC # BLD AUTO: 0 K/UL
NRBC BLD-RTO: 0 /100 WBC
PH UR STRIP.AUTO: 6 [PH] (ref 5–8)
PLATELET # BLD AUTO: 280 K/UL (ref 164–446)
PMV BLD AUTO: 10.9 FL (ref 9–12.9)
POTASSIUM SERPL-SCNC: 3.5 MMOL/L (ref 3.6–5.5)
PROT SERPL-MCNC: 7.9 G/DL (ref 6–8.2)
PROT UR QL STRIP: NEGATIVE MG/DL
RBC # BLD AUTO: 4.65 M/UL (ref 4.2–5.4)
RBC UR QL AUTO: NEGATIVE
SODIUM SERPL-SCNC: 137 MMOL/L (ref 135–145)
SP GR UR STRIP.AUTO: 1.02
UROBILINOGEN UR STRIP.AUTO-MCNC: 0.2 MG/DL
WBC # BLD AUTO: 8.2 K/UL (ref 4.8–10.8)

## 2019-12-26 PROCEDURE — 83690 ASSAY OF LIPASE: CPT

## 2019-12-26 PROCEDURE — 99285 EMERGENCY DEPT VISIT HI MDM: CPT

## 2019-12-26 PROCEDURE — 36415 COLL VENOUS BLD VENIPUNCTURE: CPT

## 2019-12-26 PROCEDURE — 700105 HCHG RX REV CODE 258: Performed by: EMERGENCY MEDICINE

## 2019-12-26 PROCEDURE — 80053 COMPREHEN METABOLIC PANEL: CPT

## 2019-12-26 PROCEDURE — 84703 CHORIONIC GONADOTROPIN ASSAY: CPT

## 2019-12-26 PROCEDURE — 85025 COMPLETE CBC W/AUTO DIFF WBC: CPT

## 2019-12-26 PROCEDURE — 76801 OB US < 14 WKS SINGLE FETUS: CPT

## 2019-12-26 PROCEDURE — 84702 CHORIONIC GONADOTROPIN TEST: CPT

## 2019-12-26 PROCEDURE — 81003 URINALYSIS AUTO W/O SCOPE: CPT

## 2019-12-26 RX ORDER — SODIUM CHLORIDE 9 MG/ML
1000 INJECTION, SOLUTION INTRAVENOUS ONCE
Status: COMPLETED | OUTPATIENT
Start: 2019-12-26 | End: 2019-12-26

## 2019-12-26 RX ADMIN — SODIUM CHLORIDE 1000 ML: 9 INJECTION, SOLUTION INTRAVENOUS at 18:49

## 2019-12-26 ASSESSMENT — LIFESTYLE VARIABLES: DO YOU DRINK ALCOHOL: NO

## 2019-12-26 NOTE — ED TRIAGE NOTES
"Chief Complaint   Patient presents with   • Abdominal Pain       Patient ambulatory to triage with above complaint for the last week. LMP 11/22/19  Complaint of sharp pain in lower abdomen.     Blood Pressure: 118/61, Pulse: 77, Respiration: 14, Temperature: 37 °C (98.6 °F), Height: 157.5 cm (5' 2\"), Weight: 60.1 kg (132 lb 7.9 oz), BMI (Calculated): 24.23, BSA (Calculated): 1.6, Pulse Oximetry: 99 %, O2 Delivery: None (Room Air)    "

## 2019-12-27 NOTE — ED PROVIDER NOTES
ED Provider Note    Scribed for Leonel Gooden M.D. by Ganesh Noriega. 12/26/2019  6:18 PM    Primary care provider: Pcp Pt States None  Means of arrival: Walk-in  History obtained from: Patient  History limited by: None    CHIEF COMPLAINT  Chief Complaint   Patient presents with   • Abdominal Pain       HPI  Ivis Klein is a 24 y.o. female who presents to the Emergency Department for evaluation of abdominal pain onset 1 month ago. Her pain is described as sharp and intermittent and is primarily located to her lower abdomen. The patient affirms an additional symptom of vaginal discharge. 4 days ago, the patient had new symptoms of nausea and vomiting. The patient denies any alleviating factors. The patient's last normal menstrual period was November 22nd. The patient affirms having recent unprotected sex but denies trying to become pregnant. The patient denies any fevers, chills, or hematuria. The patient has a medical history that includes graves disease and hyperthyroidism.     REVIEW OF SYSTEMS  Pertinent positives include: abdominal pain, vaginal discharge, nausea, and vomiting. Pertinent negatives include: fevers, chills, or hematuria. See history of present illness. All other systems are negative.     PAST MEDICAL HISTORY   has a past medical history of Anxiety (2/16/2017), Arrhythmia, Breath shortness, Graves disease (12/5/2016), Heart valve disease, Hyperthyroidism (6/29/2016), Menarche, Migraine, Pericarditis (06/2016), Pregnancy, Psychiatric problem, Supervision of normal first teen pregnancy, and Vomiting (6/29/2016).    SURGICAL HISTORY   has a past surgical history that includes primary c section (9/8/2013) and thyroidectomy total (Bilateral, 3/22/2017).    SOCIAL HISTORY  Social History     Tobacco Use   • Smoking status: Never Smoker   • Smokeless tobacco: Never Used   • Tobacco comment: quit in 2012   Substance Use Topics   • Alcohol use: No   • Drug use: Yes     Types: Marijuana,  "Inhaled     Comment: marijuana weekly      Social History     Substance and Sexual Activity   Drug Use Yes   • Types: Marijuana, Inhaled    Comment: marijuana weekly       FAMILY HISTORY  Family History   Problem Relation Age of Onset   • Cancer Paternal Grandmother 56        breast cancer   • Hyperlipidemia Father        CURRENT MEDICATIONS  Home Medications     Reviewed by Elisabeth Samuels R.N. (Registered Nurse) on 12/26/19 at 1526  Med List Status: Complete   Medication Last Dose Status   levothyroxine (SYNTHROID) 150 MCG Tab 12/26/2019 Active   ondansetron (ZOFRAN ODT) 4 MG TABLET DISPERSIBLE not taking Active                ALLERGIES  Allergies   Allergen Reactions   • Nkda [No Known Drug Allergy]        PHYSICAL EXAM  VITAL SIGNS: /66   Pulse 88   Temp 37 °C (98.6 °F) (Temporal)   Resp 16   Ht 1.575 m (5' 2\")   Wt 60.1 kg (132 lb 7.9 oz)   LMP 11/22/2019   SpO2 100%   BMI 24.23 kg/m²     Constitutional: Alert in no apparent distress.  HENT: No signs of trauma, Bilateral external ears normal, Nose normal. Uvula midline. Mucous membranes dry.  Eyes: Pupils are equal and reactive, Conjunctiva normal, Non-icteric.   Neck: Normal range of motion, No tenderness, Supple, No stridor.   Lymphatic: No lymphadenopathy noted.   Cardiovascular: Regular rate and rhythm, no murmurs.   Thorax & Lungs: Normal breath sounds, No respiratory distress, No wheezing, No chest tenderness.   Abdomen:  Mild suprapubic tenderness. Soft, No peritoneal signs, No masses, No pulsatile masses.   Skin: Warm, Dry, No erythema, No rash.   Back: No bony tenderness, No CVA tenderness.   Extremities: Intact distal pulses, No edema, No tenderness, No cyanosis.  Musculoskeletal: Good range of motion in all major joints. No tenderness to palpation or major deformities noted.   Neurologic: Alert , Normal motor function, Normal sensory function, No focal deficits noted.   Psychiatric: Affect normal, Judgment normal, Mood normal. "     DIAGNOSTIC STUDIES / PROCEDURES    LABS  Labs Reviewed   CBC WITH DIFFERENTIAL - Abnormal; Notable for the following components:       Result Value    MCHC 32.2 (*)     All other components within normal limits   COMP METABOLIC PANEL - Abnormal; Notable for the following components:    Potassium 3.5 (*)     Bun 7 (*)     All other components within normal limits   LIPASE - Abnormal; Notable for the following components:    Lipase 8 (*)     All other components within normal limits   HCG QUAL SERUM - Abnormal; Notable for the following components:    Beta-Hcg Qualitative Serum Positive (*)     All other components within normal limits   URINALYSIS,CULTURE IF INDICATED - Abnormal; Notable for the following components:    Ketones 80 (*)     All other components within normal limits   HCG QUANTITATIVE - Abnormal; Notable for the following components:    Bhcg 8249.5 (*)     All other components within normal limits   ESTIMATED GFR      All labs reviewed by me.    RADIOLOGY  US-OB 1ST TRIMESTER WITH TRANSVAGINAL (COMBO)   Final Result      Single intrauterine gestation measures 5 weeks 4 days      6 cm left ovarian and/or paraovarian simple cyst. Recommend follow-up to resolution        The radiologist's interpretation of all radiological studies have been reviewed by me.    COURSE & MEDICAL DECISION MAKING  Nursing notes, VS, PMSFHx reviewed in chart.    24 y.o. female p/w chief complaint of abdominal pain.  There is some concern that this could represent pregnancy versus pancreatitis versus hepatitis versus cholecystitis.  I will reassess the patient..    6:18 PM Patient seen and examined at bedside.  Patient will be treated with 1,000 mL normal saline. Ordered US-OB 1st semester with transvaginal, HCG Quant, CBC with diff, CMP, lipase, HCG Qual serum, and urinalysis. I informed the patient that some of her lab work is back and it indicates she is pregnant. I informed the patient of my plan for ultrasound and  further blood work. Patient verbalizes understanding and agreement with my plan for discharge.         HYDRATION: Based on the patient's presentation of Dehydration the patient was given IV fluids. IV Hydration was used because oral hydration was not adequate alone. Upon recheck following hydration, the patient was slightly improved.    8:40 PM - I reevaluated the patient at bedside. The patient informs me they feel improved following IV fluid administration. I discussed the patient's diagnostic study results which show a less than 8 week pregnancy.      Patient has a lipase is negative.  She is hCG of 8200.  She has an IUP.  I believe this is likely causing the patient's discomfort.  She has no infection in her urine.     The patient verbalizes they feel comfortable going home. The patient is stable for discharge at this time and will return for any new or worsening symptoms. I discussed plan for discharge and follow up as outlined below. Patient verbalizes understanding and support with my plan for discharge.     The patient will return for new or worsening symptoms and is stable at the time of discharge.    DISPOSITION:  Patient will be discharged home in stable condition.    FOLLOW UP:  [facility]  [address] [place] [id number]  [phone]    In 2 days        OUTPATIENT MEDICATIONS:  Discharge Medication List as of 12/26/2019  8:30 PM          FINAL IMPRESSION  1. Abdominal pain, unspecified abdominal location    2. Less than 8 weeks gestation of pregnancy          Ganesh HARRIS (Kamini), am scribing for, and in the presence of, Leonel Gooden M.D..    Electronically signed by: Ganesh Aguilar), 12/26/2019    Leonel HARRIS M.D. personally performed the services described in this documentation, as scribed by Ganesh Noriega in my presence, and it is both accurate and complete.    The note accurately reflects work and decisions made by me.  Leonel Gooden  12/26/2019   11:49 PM

## 2019-12-27 NOTE — ED NOTES
ERP at bedside, lab values resulted.  Wants IV access and IVF, will reattempt IV.  Plan for US. Pt made aware of plan of care, will continue to work through orders.

## 2019-12-27 NOTE — ED NOTES
Pt ambulated with steady gait to ED room 56. Introduced self, gown given to patient.  Pt reports having vaginal discharge with abd pain, desccribes as sharp and intermittent. Pt does not appear in pain at this time. Recent menstrual x1 wk ago.  Awaiting ERP eval. Urine cup given for urine sample.

## 2019-12-27 NOTE — ED NOTES
Break RN  Discharge instructions given to patient, a verbal understanding of all instructions was stated. IV removed, cathlon intact, site without s/s of infection. Pt preferred to walk out accompanied by family VSS, all belongings accounted for.

## 2020-01-02 ENCOUNTER — APPOINTMENT (OUTPATIENT)
Dept: RADIOLOGY | Facility: MEDICAL CENTER | Age: 25
End: 2020-01-02
Attending: EMERGENCY MEDICINE
Payer: MEDICAID

## 2020-01-02 ENCOUNTER — HOSPITAL ENCOUNTER (EMERGENCY)
Facility: MEDICAL CENTER | Age: 25
End: 2020-01-02
Attending: EMERGENCY MEDICINE
Payer: MEDICAID

## 2020-01-02 VITALS
RESPIRATION RATE: 20 BRPM | OXYGEN SATURATION: 100 % | TEMPERATURE: 98.6 F | WEIGHT: 130.73 LBS | SYSTOLIC BLOOD PRESSURE: 123 MMHG | DIASTOLIC BLOOD PRESSURE: 68 MMHG | HEIGHT: 63 IN | BODY MASS INDEX: 23.16 KG/M2 | HEART RATE: 87 BPM

## 2020-01-02 DIAGNOSIS — O20.0 ABORTION, THREATENED: ICD-10-CM

## 2020-01-02 DIAGNOSIS — Z76.0 MEDICATION REFILL: ICD-10-CM

## 2020-01-02 DIAGNOSIS — E03.9 HYPOTHYROIDISM, UNSPECIFIED TYPE: ICD-10-CM

## 2020-01-02 LAB
APPEARANCE UR: CLEAR
B-HCG SERPL-ACNC: ABNORMAL MIU/ML (ref 0–5)
BILIRUB UR QL STRIP.AUTO: NEGATIVE
CANDIDA DNA VAG QL PROBE+SIG AMP: NEGATIVE
COLOR UR: YELLOW
EKG IMPRESSION: NORMAL
G VAGINALIS DNA VAG QL PROBE+SIG AMP: NEGATIVE
GLUCOSE UR STRIP.AUTO-MCNC: NEGATIVE MG/DL
KETONES UR STRIP.AUTO-MCNC: NEGATIVE MG/DL
LEUKOCYTE ESTERASE UR QL STRIP.AUTO: NEGATIVE
MICRO URNS: NORMAL
NITRITE UR QL STRIP.AUTO: NEGATIVE
NUMBER OF RH DOSES IND 8505RD: NORMAL
PH UR STRIP.AUTO: 6 [PH] (ref 5–8)
PROT UR QL STRIP: NEGATIVE MG/DL
RBC UR QL AUTO: NEGATIVE
RH BLD: NORMAL
SP GR UR STRIP.AUTO: 1.01
T VAGINALIS DNA VAG QL PROBE+SIG AMP: NEGATIVE
UROBILINOGEN UR STRIP.AUTO-MCNC: 1 MG/DL

## 2020-01-02 PROCEDURE — 86901 BLOOD TYPING SEROLOGIC RH(D): CPT

## 2020-01-02 PROCEDURE — 87591 N.GONORRHOEAE DNA AMP PROB: CPT

## 2020-01-02 PROCEDURE — 87660 TRICHOMONAS VAGIN DIR PROBE: CPT

## 2020-01-02 PROCEDURE — 87510 GARDNER VAG DNA DIR PROBE: CPT

## 2020-01-02 PROCEDURE — 71045 X-RAY EXAM CHEST 1 VIEW: CPT

## 2020-01-02 PROCEDURE — 81003 URINALYSIS AUTO W/O SCOPE: CPT

## 2020-01-02 PROCEDURE — 87480 CANDIDA DNA DIR PROBE: CPT

## 2020-01-02 PROCEDURE — 76801 OB US < 14 WKS SINGLE FETUS: CPT

## 2020-01-02 PROCEDURE — 700102 HCHG RX REV CODE 250 W/ 637 OVERRIDE(OP): Performed by: EMERGENCY MEDICINE

## 2020-01-02 PROCEDURE — 36415 COLL VENOUS BLD VENIPUNCTURE: CPT

## 2020-01-02 PROCEDURE — 93005 ELECTROCARDIOGRAM TRACING: CPT | Performed by: EMERGENCY MEDICINE

## 2020-01-02 PROCEDURE — 87491 CHLMYD TRACH DNA AMP PROBE: CPT

## 2020-01-02 PROCEDURE — 99285 EMERGENCY DEPT VISIT HI MDM: CPT

## 2020-01-02 PROCEDURE — 84702 CHORIONIC GONADOTROPIN TEST: CPT

## 2020-01-02 PROCEDURE — A9270 NON-COVERED ITEM OR SERVICE: HCPCS | Performed by: EMERGENCY MEDICINE

## 2020-01-02 PROCEDURE — 93005 ELECTROCARDIOGRAM TRACING: CPT

## 2020-01-02 RX ORDER — LEVOTHYROXINE SODIUM 0.15 MG/1
150 TABLET ORAL
Qty: 30 TAB | Refills: 0 | Status: SHIPPED
Start: 2020-01-02 | End: 2020-01-14

## 2020-01-02 RX ORDER — LEVOTHYROXINE SODIUM 0.15 MG/1
150 TABLET ORAL ONCE
Status: COMPLETED | OUTPATIENT
Start: 2020-01-02 | End: 2020-01-02

## 2020-01-02 RX ADMIN — LEVOTHYROXINE SODIUM 150 MCG: 150 TABLET ORAL at 15:24

## 2020-01-02 ASSESSMENT — LIFESTYLE VARIABLES: DO YOU DRINK ALCOHOL: NO

## 2020-01-02 NOTE — ED NOTES
Pelvic exam completed by ERP with female RN assist.  Swabs collected by ERP, samples sent to lab.  Pt medicated per MAR.

## 2020-01-02 NOTE — ED PROVIDER NOTES
"ED Provider Note    Scribed for Arcenio Harrell D.O. by Latrell Dejesus. 2020  2:27 PM    Primary care provider: Pcp Pt States None  Means of arrival: Walk In  History obtained from: Patient  History limited by: None    CHIEF COMPLAINT  Chief Complaint   Patient presents with   • Medication Refill     thyroid medications    • Chest Pain     worse while lying down and with inspiration        HPI  Ivis Klein is a 24 y.o. female who presents to the Emergency Department for evaluation of fatigue, chest pressure and shortness of breath onset two days ago. Patient reports a history of hypothyroidism and states that she has been off her thyroid medications lately and suspects that it has been causing her to feel fatigued and weak. She is also complaining of chest pressure described as someone pushing down on her chest makes it more difficult for her to breathe and is causing her to feel short of breath. Her chest pressure is worsened with deep breathing and no factors are identified which alleviates her symptoms. The patient is also six weeks pregnant at this time with a pregnancy history of . She was seen here on  and had an ultrasound at that time which showed a single intrauterine gestation which measured 5 weeks 4 days. At this time she denies any abdominal pain or pelvic pain but does note that she is having vaginal discharge. She denies any fevers, chills, nausea or vomiting.    REVIEW OF SYSTEMS  Pertinent positives include fatigue, chest pressure, shortness of breath, pregnancy, vaginal discharge. Pertinent negatives include no fevers, chills, nausea, vomiting, abdominal pain.  All other systems reviewed and negative.    PAST MEDICAL HISTORY  Past Medical History:   Diagnosis Date   • Anxiety 2017   • Arrhythmia     tachycardia \"pt reports d/t thryoid\"   • Breath shortness     no c/o at this time; c/o sob at night unsure if it is d/t anxiety   • Graves disease 2016   • " "Heart valve disease    • Hyperthyroidism 6/29/2016   • Menarche     started at age 14   • Migraine    • Pericarditis 06/2016   • Pregnancy     delivered 9/8/2013, 3/16/17 pt reports that she is not pregnant at this time   • Psychiatric problem     anxiety, depression   • Supervision of normal first teen pregnancy    • Vomiting 6/29/2016       SURGICAL HISTORY  Past Surgical History:   Procedure Laterality Date   • THYROIDECTOMY TOTAL Bilateral 3/22/2017    Procedure: THYROIDECTOMY TOTAL -NIMS RECURRENT LARYNGEAL NERVE MONITORING;  Surgeon: Vicente Eldridge M.D.;  Location: SURGERY SAME DAY [facility];  Service:    • PRIMARY C SECTION  9/8/2013    Performed by Melisa Wright M.D. at LABOR AND DELIVERY        SOCIAL HISTORY  Social History     Tobacco Use   • Smoking status: Never Smoker   • Smokeless tobacco: Never Used   • Tobacco comment: quit in 2012   Substance Use Topics   • Alcohol use: No   • Drug use: Yes     Types: Marijuana, Inhaled     Comment: marijuana weekly      Social History     Substance and Sexual Activity   Drug Use Yes   • Types: Marijuana, Inhaled    Comment: marijuana weekly       FAMILY HISTORY  Family History   Problem Relation Age of Onset   • Cancer Paternal Grandmother 56        breast cancer   • Hyperlipidemia Father        CURRENT MEDICATIONS  No current facility-administered medications on file prior to encounter.      Current Outpatient Medications on File Prior to Encounter   Medication Sig Dispense Refill   • levothyroxine (SYNTHROID) 150 MCG Tab Take 1 Tab by mouth Every morning on an empty stomach. 30 Tab 2   • ondansetron (ZOFRAN ODT) 4 MG TABLET DISPERSIBLE Take 1 Tab by mouth every 6 hours as needed. 8 Tab 0       ALLERGIES  Allergies   Allergen Reactions   • Nkda [No Known Drug Allergy]        PHYSICAL EXAM  VITAL SIGNS: /66   Pulse 70   Temp 37 °C (98.6 °F) (Temporal)   Resp 20   Ht 1.6 m (5' 3\")   Wt 59.3 kg (130 lb 11.7 oz)   LMP 11/22/2019 " (Approximate)   SpO2 99%   BMI 23.16 kg/m²     Nursing notes and vitals reviewed.  Constitutional: Well developed, Well nourished, No acute distress, Non-toxic appearance.   Eyes: PERRLA, EOMI, Conjunctiva normal, No discharge.   Cardiovascular: Normal heart rate, Normal rhythm, No murmurs, No rubs, No gallops.   Thorax & Lungs: No respiratory distress, No rales, No rhonchi, No wheezing, No chest tenderness.   Abdomen: Bowel sounds normal, Soft, No tenderness, No guarding, No rebound, No masses, No pulsatile masses.   Skin: Warm, Dry, No erythema, No rash.   Musculoskeletal: Intact distal pulses, No edema, No cyanosis, No clubbing. Good range of motion in all major joints. No tenderness to palpation or major deformities noted, no CVA tenderness, no midline back tenderness.   Neurologic: Alert & oriented x 3, Normal motor function, Normal sensory function, No focal deficits noted.  Psychiatric: Affect normal for clinical presentation.  Pelvic: In the presence of a female nurse, external examination no lesion, no bleeding, pelvic examination no discharge, no mass, no lesion, no adnexal mass or tenderness.    DIAGNOSTIC STUDIES/PROCEDURES    LABS  Results for orders placed or performed during the hospital encounter of 01/02/20   BETA-HCG QUANTITATIVE SERUM   Result Value Ref Range    Bhcg 45256.9 (H) 0.0 - 5.0 mIU/mL   CHLAMYDIA & GC BY PCR   Result Value Ref Range    Source Vaginal    VAGINAL PATHOGENS DNA PANEL   Result Value Ref Range    Candida species DNA Probe Negative Negative    Trichamonas vaginalis DNA Probe Negative Negative    Gardnerella vaginalis DNA Probe Negative Negative   URINALYSIS,CULTURE IF INDICATED   Result Value Ref Range    Color Yellow     Character Clear     Specific Gravity 1.012 <1.035    Ph 6.0 5.0 - 8.0    Glucose Negative Negative mg/dL    Ketones Negative Negative mg/dL    Protein Negative Negative mg/dL    Bilirubin Negative Negative    Urobilinogen, Urine 1.0 Negative    Nitrite  Negative Negative    Leukocyte Esterase Negative Negative    Occult Blood Negative Negative    Micro Urine Req see below    RH TYPE FOR RHOGAM FROM E.D.   Result Value Ref Range    Emergency Department Rh Typing POS     Number Of Rh Doses Indicated ZERO    EKG   Result Value Ref Range    Report       [facility] Emergency Dept.    Test Date:  2020  Pt Name:    MICHELLE MENARD               Department: ER  MRN:        [id number]                      Room:  Gender:     Female                       Technician: [id number]  :        1995                   Requested By:ER TRIAGE PROTOCOL  Order #:    [id number]                    Reading MD: FELIX DE JESUS DO    Measurements  Intervals                                Axis  Rate:       71                           P:          39  WA:         174                          QRS:        63  QRSD:       79                           T:          26  QT:         372  QTc:        405    Interpretive Statements  Sinus rhythm  Compared to ECG 2019 14:15:36  ST (T wave) deviation no longer present  T-wave abnormality no longer present  Q waves no longer present  Electronically Signed On 2020 17:47:42 PST by FELIX DE JESUS DO         All labs reviewed by me.    RADIOLOGY  US-OB 1ST TRIMESTER WITH TRANSVAGINAL (COMBO)   Final Result      1.  Viable single intrauterine gestation of an estimated gestational age of 6 weeks 3 days.   2.  Small subchronic hematoma.   3.  A 6 cm right adnexal cyst.   4.  Small amount of pelvic free fluid.      DX-CHEST-LIMITED (1 VIEW)   Final Result      No acute cardiopulmonary disease.           The radiologist's interpretation of all radiological studies have been reviewed by me.    COURSE & MEDICAL DECISION MAKING  Pertinent Labs & Imaging studies reviewed. (See chart for details)    2:27 PM - Patient seen and examined at bedside. Patient will be treated with levothyroxine 150 mg. Ordered DX-Chest 1 view,  US-OB 1st trimester with transvaginal, Wet Prep, Chlamydia & GC by PCR, Beta-HCG Quant, EKG to evaluate her symptoms. Discussed with the patient that I will plan to check her blood work and get imaging of her chest as well as an ultrasound and perform a pelvic exam to further evaluate her pregnancy and check for any complications. Otherwise she will also be treated with her thyroid medication. She understands and agrees to the plan of care.    This is a charming 24 y.o. female that presents with threatened .  She has intrauterine pregnancy is 6 weeks and 3 days with a slight subchorionic hemorrhage.  The patient is Rh+ does not require RhoGam.  Female examination reveals no significant discharge, adnexal tenderness or mass.  The patient does not evidence of ectopic pregnancy.  As for her chest pressure, she states this happens when she does not take her thyroid medication she is out of primary occasions patient has chest pain, difficulty breathing, I do not suspect pulmonary embolism, blood clot DVT, x-rays negative for pneumonia.  The patient's not tachycardic, tachypneic, hypoxic and with skin do not suspect pulmonary embolism.  Reevaluation, she is speaking full sentences, she states that since she is received her levothyroxine she is feeling much better.  The patient be following with gynecology and strict return precautions have been given for significant hemorrhage, abdominal pain, vaginal bleeding or tissue.        DISPOSITION:  Patient will be discharged home in stable condition.    FOLLOW UP:  [facility], Emergency Dept  [address] [facility] [address] [phone]    If symptoms worsen      OUTPATIENT MEDICATIONS:  New Prescriptions    LEVOTHYROXINE (SYNTHROID) 150 MCG TAB    Take 1 Tab by mouth Every morning on an empty stomach.       FINAL IMPRESSION  1. , threatened    2. Hypothyroidism, unspecified type    3. Medication refill          Latrell HARRIS  (Scribe), am scribing for, and in the presence of, Arcenio Harrell D.O    Electronically signed by: Latrell Dejesus (Scribe), 1/2/2020    I, Arcenio Harrell D.O. personally performed the services described in this documentation, as scribed by Latrell Dejesus in my presence, and it is both accurate and complete. C.    The note accurately reflects work and decisions made by me.  Arcenio Harrell  1/2/2020  5:50 PM

## 2020-01-02 NOTE — ED NOTES
Pt also reports pregnancy 6 wks, no vaginal bleeding or abd cramping.    H/o graves disease with thyroid removal.

## 2020-01-02 NOTE — ED TRIAGE NOTES
"PT ambulated to triage c/o chest pain worse with inspiration and while lying down flat, and requesting a med refill for her thyroid medications  Chief Complaint   Patient presents with   • Medication Refill     thyroid medications    • Chest Pain     worse while lying down and with inspiration      /73   Pulse 77   Temp 37 °C (98.6 °F) (Temporal)   Resp 16   Ht 1.6 m (5' 3\")   Wt 59.3 kg (130 lb 11.7 oz)   SpO2 99%      "

## 2020-01-14 ENCOUNTER — HOSPITAL ENCOUNTER (EMERGENCY)
Facility: MEDICAL CENTER | Age: 25
End: 2020-01-14
Attending: EMERGENCY MEDICINE
Payer: MEDICAID

## 2020-01-14 ENCOUNTER — APPOINTMENT (OUTPATIENT)
Dept: RADIOLOGY | Facility: MEDICAL CENTER | Age: 25
End: 2020-01-14
Attending: EMERGENCY MEDICINE
Payer: MEDICAID

## 2020-01-14 VITALS
RESPIRATION RATE: 15 BRPM | HEART RATE: 89 BPM | TEMPERATURE: 97.7 F | OXYGEN SATURATION: 99 % | DIASTOLIC BLOOD PRESSURE: 52 MMHG | HEIGHT: 61 IN | WEIGHT: 128.09 LBS | BODY MASS INDEX: 24.18 KG/M2 | SYSTOLIC BLOOD PRESSURE: 93 MMHG

## 2020-01-14 DIAGNOSIS — O21.9 VOMITING DURING PREGNANCY: ICD-10-CM

## 2020-01-14 LAB
ALBUMIN SERPL BCP-MCNC: 4.3 G/DL (ref 3.2–4.9)
ALBUMIN/GLOB SERPL: 1.3 G/DL
ALP SERPL-CCNC: 41 U/L (ref 30–99)
ALT SERPL-CCNC: 7 U/L (ref 2–50)
ANION GAP SERPL CALC-SCNC: 9 MMOL/L (ref 0–11.9)
APPEARANCE UR: CLEAR
AST SERPL-CCNC: 12 U/L (ref 12–45)
B-HCG SERPL-ACNC: ABNORMAL MIU/ML (ref 0–5)
BASOPHILS # BLD AUTO: 0.6 % (ref 0–1.8)
BASOPHILS # BLD: 0.05 K/UL (ref 0–0.12)
BILIRUB SERPL-MCNC: 0.5 MG/DL (ref 0.1–1.5)
BILIRUB UR QL STRIP.AUTO: NEGATIVE
BUN SERPL-MCNC: 7 MG/DL (ref 8–22)
CALCIUM SERPL-MCNC: 8.9 MG/DL (ref 8.5–10.5)
CHLORIDE SERPL-SCNC: 100 MMOL/L (ref 96–112)
CO2 SERPL-SCNC: 25 MMOL/L (ref 20–33)
COLOR UR: YELLOW
CREAT SERPL-MCNC: 0.58 MG/DL (ref 0.5–1.4)
EOSINOPHIL # BLD AUTO: 0.01 K/UL (ref 0–0.51)
EOSINOPHIL NFR BLD: 0.1 % (ref 0–6.9)
ERYTHROCYTE [DISTWIDTH] IN BLOOD BY AUTOMATED COUNT: 48.4 FL (ref 35.9–50)
GLOBULIN SER CALC-MCNC: 3.4 G/DL (ref 1.9–3.5)
GLUCOSE SERPL-MCNC: 112 MG/DL (ref 65–99)
GLUCOSE UR STRIP.AUTO-MCNC: NEGATIVE MG/DL
HCT VFR BLD AUTO: 40.8 % (ref 37–47)
HGB BLD-MCNC: 13.3 G/DL (ref 12–16)
IMM GRANULOCYTES # BLD AUTO: 0.02 K/UL (ref 0–0.11)
IMM GRANULOCYTES NFR BLD AUTO: 0.3 % (ref 0–0.9)
KETONES UR STRIP.AUTO-MCNC: 15 MG/DL
LEUKOCYTE ESTERASE UR QL STRIP.AUTO: NEGATIVE
LIPASE SERPL-CCNC: 22 U/L (ref 11–82)
LYMPHOCYTES # BLD AUTO: 1.63 K/UL (ref 1–4.8)
LYMPHOCYTES NFR BLD: 20.5 % (ref 22–41)
MCH RBC QN AUTO: 28.4 PG (ref 27–33)
MCHC RBC AUTO-ENTMCNC: 32.6 G/DL (ref 33.6–35)
MCV RBC AUTO: 87.2 FL (ref 81.4–97.8)
MICRO URNS: ABNORMAL
MONOCYTES # BLD AUTO: 0.33 K/UL (ref 0–0.85)
MONOCYTES NFR BLD AUTO: 4.1 % (ref 0–13.4)
NEUTROPHILS # BLD AUTO: 5.92 K/UL (ref 2–7.15)
NEUTROPHILS NFR BLD: 74.4 % (ref 44–72)
NITRITE UR QL STRIP.AUTO: NEGATIVE
NRBC # BLD AUTO: 0 K/UL
NRBC BLD-RTO: 0 /100 WBC
PH UR STRIP.AUTO: 7 [PH] (ref 5–8)
PLATELET # BLD AUTO: 238 K/UL (ref 164–446)
PMV BLD AUTO: 11.2 FL (ref 9–12.9)
POTASSIUM SERPL-SCNC: 3.6 MMOL/L (ref 3.6–5.5)
PROT SERPL-MCNC: 7.7 G/DL (ref 6–8.2)
PROT UR QL STRIP: NEGATIVE MG/DL
RBC # BLD AUTO: 4.68 M/UL (ref 4.2–5.4)
RBC UR QL AUTO: NEGATIVE
SODIUM SERPL-SCNC: 134 MMOL/L (ref 135–145)
SP GR UR STRIP.AUTO: 1.01
T4 FREE SERPL-MCNC: 1.22 NG/DL (ref 0.53–1.43)
TSH SERPL DL<=0.005 MIU/L-ACNC: 5.28 UIU/ML (ref 0.38–5.33)
UROBILINOGEN UR STRIP.AUTO-MCNC: 0.2 MG/DL
WBC # BLD AUTO: 8 K/UL (ref 4.8–10.8)

## 2020-01-14 PROCEDURE — 84443 ASSAY THYROID STIM HORMONE: CPT

## 2020-01-14 PROCEDURE — 96374 THER/PROPH/DIAG INJ IV PUSH: CPT

## 2020-01-14 PROCEDURE — 84702 CHORIONIC GONADOTROPIN TEST: CPT

## 2020-01-14 PROCEDURE — 700105 HCHG RX REV CODE 258: Performed by: EMERGENCY MEDICINE

## 2020-01-14 PROCEDURE — 99285 EMERGENCY DEPT VISIT HI MDM: CPT

## 2020-01-14 PROCEDURE — 85025 COMPLETE CBC W/AUTO DIFF WBC: CPT

## 2020-01-14 PROCEDURE — 83690 ASSAY OF LIPASE: CPT

## 2020-01-14 PROCEDURE — 84439 ASSAY OF FREE THYROXINE: CPT

## 2020-01-14 PROCEDURE — 96375 TX/PRO/DX INJ NEW DRUG ADDON: CPT

## 2020-01-14 PROCEDURE — 700111 HCHG RX REV CODE 636 W/ 250 OVERRIDE (IP): Performed by: EMERGENCY MEDICINE

## 2020-01-14 PROCEDURE — 80053 COMPREHEN METABOLIC PANEL: CPT

## 2020-01-14 PROCEDURE — 76801 OB US < 14 WKS SINGLE FETUS: CPT

## 2020-01-14 PROCEDURE — 81003 URINALYSIS AUTO W/O SCOPE: CPT

## 2020-01-14 RX ORDER — METOCLOPRAMIDE 10 MG/1
10 TABLET ORAL 4 TIMES DAILY
Qty: 12 TAB | Refills: 0 | Status: SHIPPED | OUTPATIENT
Start: 2020-01-14 | End: 2020-02-20

## 2020-01-14 RX ORDER — SODIUM CHLORIDE, SODIUM LACTATE, POTASSIUM CHLORIDE, CALCIUM CHLORIDE 600; 310; 30; 20 MG/100ML; MG/100ML; MG/100ML; MG/100ML
2000 INJECTION, SOLUTION INTRAVENOUS ONCE
Status: COMPLETED | OUTPATIENT
Start: 2020-01-14 | End: 2020-01-14

## 2020-01-14 RX ORDER — METOCLOPRAMIDE HYDROCHLORIDE 5 MG/ML
10 INJECTION INTRAMUSCULAR; INTRAVENOUS ONCE
Status: COMPLETED | OUTPATIENT
Start: 2020-01-14 | End: 2020-01-14

## 2020-01-14 RX ORDER — DIPHENHYDRAMINE HYDROCHLORIDE 50 MG/ML
25 INJECTION INTRAMUSCULAR; INTRAVENOUS ONCE
Status: COMPLETED | OUTPATIENT
Start: 2020-01-14 | End: 2020-01-14

## 2020-01-14 RX ADMIN — DIPHENHYDRAMINE HYDROCHLORIDE 25 MG: 50 INJECTION INTRAMUSCULAR; INTRAVENOUS at 14:23

## 2020-01-14 RX ADMIN — SODIUM CHLORIDE, POTASSIUM CHLORIDE, SODIUM LACTATE AND CALCIUM CHLORIDE 2000 ML: 600; 310; 30; 20 INJECTION, SOLUTION INTRAVENOUS at 14:22

## 2020-01-14 RX ADMIN — METOCLOPRAMIDE 10 MG: 5 INJECTION, SOLUTION INTRAMUSCULAR; INTRAVENOUS at 14:23

## 2020-01-14 NOTE — ED TRIAGE NOTES
Chief Complaint   Patient presents with   • Pregnancy     x2 months   • N/V     x1 day   • Chills   • Migraine     gradual onset at 0400 today   • Abdominal Pain     cramping and pressure     Patient to triage via ambulation, with a steady gait, patient A&O x4.      Explained wait time and triage process to pt. Pt placed back in lobby, told to notify ED tech or triage RN of any changes, verbalized understanding.

## 2020-01-14 NOTE — ED PROVIDER NOTES
ED Provider Note    Scribed for Oleg Richter M.D. by Nicolasa Vega. 2020  1:19 PM    Primary care provider: Pcp Pt States None  Means of arrival: Walk-In  History obtained from: Patient  History limited by: None    CHIEF COMPLAINT  Chief Complaint   Patient presents with   • Pregnancy     x2 months   • N/V     x1 day   • Chills   • Migraine     gradual onset at 0400 today   • Abdominal Pain     cramping and pressure       HPI  Ivis Klein is a 24 y.o. female, with a history of hypothyroidism, who presents to the Emergency Department for worsening nausea, vomiting, and abdominal pain onset 4 AM today. She additionally endorses dizziness, near syncopal episode, chills, migraine, and lower back pain. Patient reports being approximately 2 months pregnant and states her last menstrual period was in late November. She states she has vomited throughout the entire pregnancy but this episode has been more severe. She notes [facility]. She denies any dysuria. She reports a history of migraines and notes she is not currently medicated and has never seen a specialist. Patient is A0.    REVIEW OF SYSTEMS  Pertinent positives include nausea, vomiting, abdominal pain, dizziness, near syncopal episode, chills, migraine, and lower back pain. Pertinent negatives include no dysuria.  All other systems reviewed and negative.    PAST MEDICAL HISTORY   has a past medical history of Anxiety (2017), Arrhythmia, Breath shortness, Graves disease (2016), Heart valve disease, Hyperthyroidism (2016), Menarche, Migraine, Pericarditis (2016), Pregnancy, Psychiatric problem, Supervision of normal first teen pregnancy, and Vomiting (2016).    SURGICAL HISTORY   has a past surgical history that includes primary c section (2013) and thyroidectomy total (Bilateral, 3/22/2017).    SOCIAL HISTORY  Social History     Tobacco Use   • Smoking  "status: Never Smoker   • Smokeless tobacco: Never Used   • Tobacco comment: quit in 2012   Substance Use Topics   • Alcohol use: No   • Drug use: Not Currently     Types: Marijuana, Inhaled     Comment: marijuana weekly      Social History     Substance and Sexual Activity   Drug Use Not Currently   • Types: Marijuana, Inhaled    Comment: marijuana weekly       FAMILY HISTORY  Family History   Problem Relation Age of Onset   • Cancer Paternal Grandmother 56        breast cancer   • Hyperlipidemia Father        CURRENT MEDICATIONS  Home Medications     Reviewed by Lynne Verduzco (Pharmacy Tech) on 01/14/20 at 1453  Med List Status: Complete   Medication Last Dose Status   levothyroxine (SYNTHROID) 150 MCG Tab 1/14/2020 Active                ALLERGIES  Allergies   Allergen Reactions   • Nkda [No Known Drug Allergy]        PHYSICAL EXAM  VITAL SIGNS: /68   Pulse 79   Temp 36.5 °C (97.7 °F) (Temporal)   Resp 16   Ht 1.549 m (5' 1\")   Wt 58.1 kg (128 lb 1.4 oz)   LMP 11/22/2019 (Approximate)   SpO2 100%   BMI 24.20 kg/m²   Pulse ox interpretation: Normal  Constitutional: Well developed, Well nourished, No acute distress, Non-toxic appearance.   HENT: Normocephalic, Atraumatic, Bilateral external ears normal, Oropharynx dry, No oral exudates, Nose normal.   Eyes: PERRLA, EOMI, Conjunctiva normal, No discharge.   Neck: Normal range of motion, No tenderness, Supple, No stridor.   Cardiovascular: Normal heart rate, Normal rhythm  Thorax & Lungs: Normal breath sounds, No respiratory distress  Abdomen: Soft, mild lower abdominal pain, No masses, No pulsatile masses.   Skin: Warm, Dry, No erythema, No rash.   Back: No tenderness, No CVA tenderness.   Extremities: Intact distal pulses, No edema, No tenderness, No cyanosis, No clubbing.   Neurologic: Alert & oriented, No focal deficits noted.       LABS  Labs Reviewed   CBC WITH DIFFERENTIAL - Abnormal; Notable for the following components:       Result " Value    MCHC 32.6 (*)     Neutrophils-Polys 74.40 (*)     Lymphocytes 20.50 (*)     All other components within normal limits   COMP METABOLIC PANEL - Abnormal; Notable for the following components:    Sodium 134 (*)     Glucose 112 (*)     Bun 7 (*)     All other components within normal limits   HCG QUANTITATIVE - Abnormal; Notable for the following components:    Bhcg 346545.1 (*)     All other components within normal limits   URINALYSIS,CULTURE IF INDICATED - Abnormal; Notable for the following components:    Ketones 15 (*)     All other components within normal limits   LIPASE   ESTIMATED GFR   TSH   FREE THYROXINE     All labs reviewed by me.    RADIOLOGY  US-OB 1ST TRIMESTER WITH TRANSVAGINAL (COMBO)   Final Result      1.  Single live intrauterine gestation of an estimated 8/24/2020.  Dating is concordant with prior exam.   2.  Previously described small subchronic hemorrhage is not definitely seen.   3.  Large RIGHT ovarian cyst again noted, without associated torsion.   4.  LEFT ovary is not visualized.   5.  Small amount of free fluid.        The radiologist's interpretation of all radiological studies have been reviewed by me.    COURSE & MEDICAL DECISION MAKING  Pertinent Labs & Imaging studies reviewed. (See chart for details)    1:19 PM - Patient seen and examined at bedside. I informed the patient the need for labs and radiology to rule out any emergent processes. Currently awaiting results before deciding if intervention is necessary. Patient verbalizes understanding and agreement to this plan of care. Patient will be treated with lactated ringers infusion, Reglan injection 10 mg, and benadryl injection 25 mg. Ordered US-OB limited with transvaginal, TSH, estimated GFR, CBC with diff, CMP, lipase, hCG quant, UA with culture if indicated, and free thyroxine to evaluate her symptoms.     3:30 PM - Patient was reevaluated at bedside. Discussed lab and radiology results with the patient and informed  them that they were reassuring. Will discharge patient home with strict ED precautions. Patient verbalizes understanding and agreement to this plan of care.        HYDRATION: Based on the patient's presentation of Acute Vomiting the patient was given IV fluids. IV Hydration was used because oral hydration was not adequate alone. Upon recheck following hydration, the patient was improved.      Decision Making:  This is a 24 y.o. year old female who presents with nausea and vomiting in the setting of pregnancy.  Was here a few days ago for similar symptoms.  No active fevers.  Benign abdominal exam with mild suprapubic discomfort on palpation.  I reviewed the patient's prior studies from a few days ago when she was evaluated here in the emergency department.  Repeat evaluation was performed here.  She is Rh+.  No active vaginal bleeding by history.  Pelvic ultrasound is unremarkable for any acute findings.  Suspect that the patient's vomiting symptoms are result of hyperemesis gravidarum.  She was treated with Reglan.  Given IV fluid hydration.  Laboratory studies are largely unremarkable.  No significant metabolic abnormalities.  No evidence of infection.  She will be treated symptomatically with antiemetic therapy on an outpatient basis.  The patient did have some concerns regarding her thyroid function as she does have a history of hypothyroidism.  She is concerned that she might be receiving too much thyroid medication.  TSH is normal along with free thyroxine.  Thyroid function appears to be appropriate.    The patient will return for new or worsening symptoms and is stable at the time of discharge. Patient was given return precautions. Patient and/or family member verbalizes understanding and will comply.    DISPOSITION:  Patient will be discharged home in stable condition.    FOLLOW UP:  [facility], Emergency Dept  [facility] [facility] [address] [phone]    As needed, If  symptoms worsen    Primary care doctor    Schedule an appointment as soon as possible for a visit         OUTPATIENT MEDICATIONS:  Discharge Medication List as of 1/14/2020  4:37 PM      START taking these medications    Details   metoclopramide (REGLAN) 10 MG Tab Take 1 Tab by mouth 4 times a day., Disp-12 Tab, R-0, Print Rx Paper             FINAL IMPRESSION  1. Vomiting during pregnancy         This dictation has been created using voice recognition software and/or scribes. The accuracy of the dictation is limited by the abilities of the software and the expertise of the scribes. I expect there may be some errors of grammar and possibly content. I made every attempt to manually correct the errors within my dictation. However, errors related to voice recognition software and/or scribes may still exist and should be interpreted within the appropriate context. C.    The note accurately reflects work and decisions made by me.  Oleg Richter M.D.  1/14/2020  9:44 PM

## 2020-01-31 ENCOUNTER — HOSPITAL ENCOUNTER (OUTPATIENT)
Facility: MEDICAL CENTER | Age: 25
End: 2020-01-31
Attending: NURSE PRACTITIONER
Payer: MEDICAID

## 2020-01-31 ENCOUNTER — INITIAL PRENATAL (OUTPATIENT)
Dept: OBGYN | Facility: CLINIC | Age: 25
End: 2020-01-31
Payer: MEDICAID

## 2020-01-31 VITALS
DIASTOLIC BLOOD PRESSURE: 60 MMHG | WEIGHT: 127.6 LBS | HEIGHT: 61 IN | BODY MASS INDEX: 24.09 KG/M2 | SYSTOLIC BLOOD PRESSURE: 110 MMHG

## 2020-01-31 DIAGNOSIS — O09.899 SUPERVISION OF OTHER HIGH RISK PREGNANCY, ANTEPARTUM: Primary | ICD-10-CM

## 2020-01-31 DIAGNOSIS — O09.899 SUPERVISION OF OTHER HIGH RISK PREGNANCY, ANTEPARTUM: ICD-10-CM

## 2020-01-31 DIAGNOSIS — E89.0 HISTORY OF THYROIDECTOMY: ICD-10-CM

## 2020-01-31 DIAGNOSIS — E03.9 HYPOTHYROIDISM, UNSPECIFIED TYPE: ICD-10-CM

## 2020-01-31 PROBLEM — Z90.89 HISTORY OF THYROIDECTOMY: Status: ACTIVE | Noted: 2020-01-31

## 2020-01-31 PROBLEM — E86.0 DEHYDRATION: Status: RESOLVED | Noted: 2019-09-19 | Resolved: 2020-01-31

## 2020-01-31 PROBLEM — F12.91 HISTORY OF MARIJUANA USE: Status: ACTIVE | Noted: 2020-01-31

## 2020-01-31 PROBLEM — Z98.890 HISTORY OF THYROIDECTOMY: Status: ACTIVE | Noted: 2020-01-31

## 2020-01-31 LAB
APPEARANCE UR: NORMAL
BILIRUB UR STRIP-MCNC: NORMAL MG/DL
COLOR UR AUTO: NORMAL
GLUCOSE UR STRIP.AUTO-MCNC: NEGATIVE MG/DL
KETONES UR STRIP.AUTO-MCNC: NORMAL MG/DL
LEUKOCYTE ESTERASE UR QL STRIP.AUTO: NEGATIVE
NITRITE UR QL STRIP.AUTO: NEGATIVE
PH UR STRIP.AUTO: 6 [PH] (ref 5–8)
PROT UR QL STRIP: 30 MG/DL
RBC UR QL AUTO: NEGATIVE
SP GR UR STRIP.AUTO: 1.03
UROBILINOGEN UR STRIP-MCNC: NORMAL MG/DL

## 2020-01-31 PROCEDURE — 88175 CYTOPATH C/V AUTO FLUID REDO: CPT

## 2020-01-31 PROCEDURE — 87624 HPV HI-RISK TYP POOLED RSLT: CPT

## 2020-01-31 PROCEDURE — 59402 PR NEW OB HIGH RISK: CPT | Performed by: NURSE PRACTITIONER

## 2020-01-31 PROCEDURE — 87591 N.GONORRHOEAE DNA AMP PROB: CPT

## 2020-01-31 PROCEDURE — 87491 CHLMYD TRACH DNA AMP PROBE: CPT

## 2020-01-31 PROCEDURE — 81002 URINALYSIS NONAUTO W/O SCOPE: CPT | Performed by: NURSE PRACTITIONER

## 2020-01-31 ASSESSMENT — ENCOUNTER SYMPTOMS
RESPIRATORY NEGATIVE: 1
NEUROLOGICAL NEGATIVE: 1
CONSTITUTIONAL NEGATIVE: 1
CARDIOVASCULAR NEGATIVE: 1
MUSCULOSKELETAL NEGATIVE: 1
PSYCHIATRIC NEGATIVE: 1
EYES NEGATIVE: 1
GASTROINTESTINAL NEGATIVE: 1

## 2020-01-31 NOTE — PROGRESS NOTES
"S:  Ivis Klein is a 24 y.o.  who presents for her new OB exam.  She is 10w0d with and NERIS of Estimated Date of Delivery: 20 based off of LMP which is c/w previous early US. She has no complaints.  She is currently not working. Discussed heavy lifting and chemical exposure. Multiple ER visits, US done, no abnormalities. Did have UTI early in pregnancy. No other care in this pregnancy.     Unsure about AFP.  Declines CF.  Denies VB, LOF, or cramping.  Denies dysuria, vaginal DC. Reports no fetal movement.     Pt is single and lives with parents and child.  Pregnancy is planned and desired.  She has a history of full term  section for breech presentation. Discussed delivery options, desires repeat at this time.    She has a history of partial thyroidectomy resulting in hypothyroidism. She takes daily Synthroid, and is followed by Dr Meier at [facility]. Next appt is in the next few weeks. TSH/T4 levels ordered today.      Discussed diet and exercise during pregnancy. Encouraged good nutrition, and daily exercise including walking or swimming. Discussed expected weight gain during pregnancy. Discussed adequate hydration during pregnancy.    Past Medical History:   Diagnosis Date   • Anxiety 2017   • Arrhythmia     tachycardia \"pt reports d/t thryoid\"   • Breath shortness     no c/o at this time; c/o sob at night unsure if it is d/t anxiety   • Graves disease 2016   • Head ache    • Heart valve disease    • Hyperthyroidism 2016   • Menarche     started at age 14   • Migraine    • Pericarditis 2016   • Pregnancy     delivered 2013, 3/16/17 pt reports that she is not pregnant at this time   • Psychiatric problem     anxiety, depression   • Supervision of normal first teen pregnancy    • Vomiting 2016     Family History   Problem Relation Age of Onset   • Cancer Paternal Grandmother 56        breast cancer   • No Known Problems Mother    • Hyperlipidemia Father    • No " Known Problems Sister    • No Known Problems Brother      Social History     Socioeconomic History   • Marital status: Single     Spouse name: Not on file   • Number of children: Not on file   • Years of education: Not on file   • Highest education level: Not on file   Occupational History   • Not on file   Social Needs   • Financial resource strain: Not on file   • Food insecurity:     Worry: Not on file     Inability: Not on file   • Transportation needs:     Medical: Not on file     Non-medical: Not on file   Tobacco Use   • Smoking status: Never Smoker   • Smokeless tobacco: Never Used   • Tobacco comment: quit in    Substance and Sexual Activity   • Alcohol use: No   • Drug use: Not Currently     Types: Marijuana, Inhaled     Comment: marijuana weekly   • Sexual activity: Yes     Partners: Male     Birth control/protection: Abstinence     Comment: single   Lifestyle   • Physical activity:     Days per week: Not on file     Minutes per session: Not on file   • Stress: Not on file   Relationships   • Social connections:     Talks on phone: Not on file     Gets together: Not on file     Attends [employer] service: Not on file     Active member of club or organization: Not on file     Attends meetings of clubs or organizations: Not on file     Relationship status: Not on file   • Intimate partner violence:     Fear of current or ex partner: Not on file     Emotionally abused: Not on file     Physically abused: Not on file     Forced sexual activity: Not on file   Other Topics Concern   • Not on file   Social History Narrative   • Not on file     OB History    Para Term  AB Living   2 1 1     1   SAB TAB Ectopic Molar Multiple Live Births             1      # Outcome Date GA Lbr Lai/2nd Weight Sex Delivery Anes PTL Lv   2 Current            1 Term 13 38w2d  2.58 kg (5 lb 11 oz) M CS-LTranv Spinal N MY      Birth Comments: C/S due to breech       History of Varicella Virus: yes  History of HSV  "I or II in self or partner: no  History of Thyroid problems: no    O:  /60   Ht 1.549 m (5' 1\")   Wt 57.9 kg (127 lb 9.6 oz)    See Prenatal Physical.    Wet mount: deferred, no s/sx  Chaperone offered: yes    A:   1.  IUP @ 10w0d per LMP, c/w US        2.  S=D        3.  See problem list below             Patient Active Problem List    Diagnosis Date Noted   • Pyelonephritis 09/19/2019     Priority: High   • Postoperative hypothyroidism 09/12/2018     Priority: High   • Frequent hospital admissions 09/13/2018     Priority: Medium   • Hypokalemia 07/04/2017     Priority: Medium   • Anxiety 02/16/2017     Priority: Medium   • Myalgia 09/12/2018     Priority: Low   • Supervision of other high risk pregnancy, antepartum 01/31/2020   • History of thyroidectomy 01/31/2020   • Hypothyroidism 09/19/2019   • Hemorrhagic cysts of both ovaries 05/23/2019   • Elevated antinuclear antibody (DEJON) level 05/23/2019   • Panic attack 03/11/2019   • Noncompliance with medication regimen 07/04/2017   • TSH elevation 07/04/2017   • Microcytosis 02/16/2017   • Hyperthyroidism 12/05/2016   • Graves disease 12/05/2016   • History of pericarditis 12/05/2016   • Mood disorder (HCC) 12/05/2016   • Migraine with aura, not intractable 09/28/2010         P:  1.  GC/CT & pap done        2.  Prenatal labs ordered - lab slip given        3.  Discussed PNV, diet, avoidances and adequate water intake        4.  NOB packet given        5.  Return to office in 4 wks        6.  Complete OB US in 10 wks        7.  TSH/T4 now        8.  AFP next visit    Orders Placed This Encounter   • US-OB 2ND 3RD TRI COMPLETE   • PREG CNTR PRENATAL PN   • THINPREP PAP W/HPV AND CTNG   • URINE DRUG SCREEN W/CONF (AR)   • TSH   • FREE THYROXINE   • POCT Urinalysis   • Prenatal MV-Min-Fe Fum-FA-DHA (PRENATAL 1 PO)       HPI    Review of Systems   Constitutional: Negative.    HENT: Negative.    Eyes: Negative.    Respiratory: Negative.    Cardiovascular: " "Negative.    Gastrointestinal: Negative.    Genitourinary: Negative.    Musculoskeletal: Negative.    Skin: Negative.    Neurological: Negative.    Endo/Heme/Allergies: Negative.    Psychiatric/Behavioral: Negative.    All other systems reviewed and are negative.         Objective:     /60   Ht 1.549 m (5' 1\")   Wt 57.9 kg (127 lb 9.6 oz)   LMP 11/22/2019 (Approximate)   BMI 24.11 kg/m²      Physical Exam  Vitals signs and nursing note reviewed.   Constitutional:       Appearance: Normal appearance. She is normal weight.   HENT:      Head: Normocephalic.      Nose: Nose normal.   Eyes:      Conjunctiva/sclera: Conjunctivae normal.   Neck:      Musculoskeletal: Normal range of motion and neck supple.   Cardiovascular:      Rate and Rhythm: Normal rate and regular rhythm.      Pulses: Normal pulses.      Heart sounds: Normal heart sounds.   Pulmonary:      Effort: Pulmonary effort is normal.      Breath sounds: Normal breath sounds.   Abdominal:      General: Bowel sounds are normal.      Comments: Gravid   Genitourinary:     General: Normal vulva.      Comments: Uterus approx 10 weeks size  Musculoskeletal: Normal range of motion.   Skin:     General: Skin is warm and dry.   Neurological:      General: No focal deficit present.      Mental Status: She is alert and oriented to person, place, and time.   Psychiatric:         Mood and Affect: Mood normal.         Behavior: Behavior normal.         Thought Content: Thought content normal.         Judgment: Judgment normal.          Assessment/Plan:       1. Supervision of other high risk pregnancy, antepartum  NERIS 8/28/20 per LMP  - PREG CNTR PRENATAL PN; Future  - US-OB 2ND 3RD TRI COMPLETE; Future  - THINPREP PAP W/HPV AND CTNG; Future  - URINE DRUG SCREEN W/CONF (AR); Future  - POCT Urinalysis    2. History of thyroidectomy  Partial- now hypothyroid. TSH and T4 now    "

## 2020-01-31 NOTE — LETTER
Cystic Fibrosis Carrier Testing  Ivis Klein    The following information is about a blood test that can be done to determine if you and/or your partner carry the gene for cystic fibrosis.    WHAT IS CYSTIC FIBROSIS?  · Cystic fibrosis (CF) is an inherited disease that affects more than 25,000 [place] children and young adults.  · Symptoms of CF vary but include lung congestion, pneumonia, diarrhea and poor growth.  Most people with CF have severe medical problems and some die at a young age.  Others have so few symptoms they are unaware they have CF.  · CF does not affect intelligence.  · Although there is no cure for CF at this time, scientists are making progress in improving treatment and in searching for a cure.  In the past many people with CF  at a very young age.  Today, many are living into their 20’s and 30’s.    IS THERE A CHANCE MY BABY COULD HAVE CYSTIC FIBROSIS?  · You can have a child with CF even if there is no history in your family (see chart below).  · CF testing can help determine if you are a carrier and at risk to have a child with CF.  Note: if both parents are carriers, there is a 1 in 4 (25%) chance with each pregnancy that they will have a child with CF.  · Carriers have one normal CF gene and one altered CF gene.  · People with CF have two altered CF genes.  · Most people have two normal copies of the CF gene.    Approximate risk that a couple with no family history of cystic fibrosis will have a child with cystic fibrosis:    Ethnic background / Risk     couple:  1 in 2,500   couple:  1 in 15,000            couple:  1 in 8,000     American couple:  1 in 32,000     WHAT TESTING IS AVAILABLE?  · There is a blood test that can be done to find out if you or your partner is a carrier.  · It is important to understand that CF carrier testing does not detect all CF carriers.  · If the test shows that you are both CF carriers, you unborn  baby can be tested to find out if the baby has CF.    HOW MUCH DOES IT COST TO HAVE CYSTIC FIBROSIS CARRIER TESTING?  · Cost and insurance coverage for CF carrier testing vary depending upon the laboratory used and your insurance policy.  · The average cost for CF carrier testing is $300 per person.  · Your genetic counselor can provide you with more information about cystic fibrosis carrier testing.    _____  Yes, I am interested in discussing carrier testing with a genetic counselor.    _____  No, I am not interested in CF carrier testing or in receiving more information about CF carrier testing.      Client signature: ________________________________________  1/31/2020

## 2020-02-02 LAB
C TRACH DNA GENITAL QL NAA+PROBE: NEGATIVE
CYTOLOGY REG CYTOL: NORMAL
HPV HR 12 DNA CVX QL NAA+PROBE: NEGATIVE
HPV16 DNA SPEC QL NAA+PROBE: NEGATIVE
HPV18 DNA SPEC QL NAA+PROBE: NEGATIVE
N GONORRHOEA DNA GENITAL QL NAA+PROBE: NEGATIVE
SPECIMEN SOURCE: NORMAL
SPECIMEN SOURCE: NORMAL

## 2020-02-19 ENCOUNTER — TELEPHONE (OUTPATIENT)
Dept: OBGYN | Facility: CLINIC | Age: 25
End: 2020-02-19

## 2020-02-19 NOTE — TELEPHONE ENCOUNTER
Pt called states she receive a call from her PCP and was told that she needs to f/u w/us.  Pt advised to get records to us to be able to see new results.  Also advised to have lab work done ASAP.    Verbalized understanding

## 2020-02-20 ENCOUNTER — HOSPITAL ENCOUNTER (EMERGENCY)
Facility: MEDICAL CENTER | Age: 25
End: 2020-02-20
Attending: EMERGENCY MEDICINE
Payer: MEDICAID

## 2020-02-20 ENCOUNTER — HOSPITAL ENCOUNTER (OUTPATIENT)
Dept: LAB | Facility: MEDICAL CENTER | Age: 25
End: 2020-02-20
Attending: NURSE PRACTITIONER
Payer: MEDICAID

## 2020-02-20 ENCOUNTER — APPOINTMENT (OUTPATIENT)
Dept: RADIOLOGY | Facility: MEDICAL CENTER | Age: 25
End: 2020-02-20
Attending: EMERGENCY MEDICINE
Payer: MEDICAID

## 2020-02-20 VITALS
WEIGHT: 131.17 LBS | HEART RATE: 85 BPM | TEMPERATURE: 98.4 F | SYSTOLIC BLOOD PRESSURE: 104 MMHG | BODY MASS INDEX: 24.14 KG/M2 | HEIGHT: 62 IN | RESPIRATION RATE: 18 BRPM | DIASTOLIC BLOOD PRESSURE: 60 MMHG | OXYGEN SATURATION: 100 %

## 2020-02-20 DIAGNOSIS — O09.899 SUPERVISION OF OTHER HIGH RISK PREGNANCY, ANTEPARTUM: ICD-10-CM

## 2020-02-20 DIAGNOSIS — E03.9 HYPOTHYROIDISM, UNSPECIFIED TYPE: ICD-10-CM

## 2020-02-20 DIAGNOSIS — R53.1 WEAKNESS: ICD-10-CM

## 2020-02-20 DIAGNOSIS — E89.0 HISTORY OF THYROIDECTOMY: ICD-10-CM

## 2020-02-20 DIAGNOSIS — Z3A.14 14 WEEKS GESTATION OF PREGNANCY: ICD-10-CM

## 2020-02-20 LAB
ABO GROUP BLD: NORMAL
ALBUMIN SERPL BCP-MCNC: 4.1 G/DL (ref 3.2–4.9)
ALBUMIN/GLOB SERPL: 1.2 G/DL
ALP SERPL-CCNC: 41 U/L (ref 30–99)
ALT SERPL-CCNC: 7 U/L (ref 2–50)
ANION GAP SERPL CALC-SCNC: 6 MMOL/L (ref 0–11.9)
APPEARANCE UR: CLEAR
AST SERPL-CCNC: 13 U/L (ref 12–45)
BACTERIA #/AREA URNS HPF: ABNORMAL /HPF
BASOPHILS # BLD AUTO: 0.3 % (ref 0–1.8)
BASOPHILS # BLD: 0.03 K/UL (ref 0–0.12)
BILIRUB SERPL-MCNC: 0.2 MG/DL (ref 0.1–1.5)
BILIRUB UR QL STRIP.AUTO: NEGATIVE
BLD GP AB SCN SERPL QL: NORMAL
BUN SERPL-MCNC: 9 MG/DL (ref 8–22)
CALCIUM SERPL-MCNC: 8.9 MG/DL (ref 8.5–10.5)
CHLORIDE SERPL-SCNC: 103 MMOL/L (ref 96–112)
CO2 SERPL-SCNC: 24 MMOL/L (ref 20–33)
COLOR UR: YELLOW
CREAT SERPL-MCNC: 0.51 MG/DL (ref 0.5–1.4)
EOSINOPHIL # BLD AUTO: 0.03 K/UL (ref 0–0.51)
EOSINOPHIL NFR BLD: 0.3 % (ref 0–6.9)
EPI CELLS #/AREA URNS HPF: ABNORMAL /HPF
ERYTHROCYTE [DISTWIDTH] IN BLOOD BY AUTOMATED COUNT: 46 FL (ref 35.9–50)
GLOBULIN SER CALC-MCNC: 3.4 G/DL (ref 1.9–3.5)
GLUCOSE SERPL-MCNC: 84 MG/DL (ref 65–99)
GLUCOSE UR STRIP.AUTO-MCNC: NEGATIVE MG/DL
HCT VFR BLD AUTO: 36.2 % (ref 37–47)
HGB BLD-MCNC: 12.1 G/DL (ref 12–16)
HYALINE CASTS #/AREA URNS LPF: ABNORMAL /LPF
IMM GRANULOCYTES # BLD AUTO: 0.03 K/UL (ref 0–0.11)
IMM GRANULOCYTES NFR BLD AUTO: 0.3 % (ref 0–0.9)
KETONES UR STRIP.AUTO-MCNC: NEGATIVE MG/DL
LEUKOCYTE ESTERASE UR QL STRIP.AUTO: NEGATIVE
LIPASE SERPL-CCNC: 26 U/L (ref 11–82)
LYMPHOCYTES # BLD AUTO: 2.27 K/UL (ref 1–4.8)
LYMPHOCYTES NFR BLD: 26 % (ref 22–41)
MCH RBC QN AUTO: 29.4 PG (ref 27–33)
MCHC RBC AUTO-ENTMCNC: 33.4 G/DL (ref 33.6–35)
MCV RBC AUTO: 87.9 FL (ref 81.4–97.8)
MICRO URNS: NORMAL
MONOCYTES # BLD AUTO: 0.52 K/UL (ref 0–0.85)
MONOCYTES NFR BLD AUTO: 5.9 % (ref 0–13.4)
NEUTROPHILS # BLD AUTO: 5.86 K/UL (ref 2–7.15)
NEUTROPHILS NFR BLD: 67.2 % (ref 44–72)
NITRITE UR QL STRIP.AUTO: NEGATIVE
NRBC # BLD AUTO: 0 K/UL
NRBC BLD-RTO: 0 /100 WBC
PH UR STRIP.AUTO: 7 [PH] (ref 5–8)
PLATELET # BLD AUTO: 234 K/UL (ref 164–446)
PMV BLD AUTO: 11.1 FL (ref 9–12.9)
POTASSIUM SERPL-SCNC: 3.8 MMOL/L (ref 3.6–5.5)
PROT SERPL-MCNC: 7.5 G/DL (ref 6–8.2)
PROT UR QL STRIP: NEGATIVE MG/DL
RBC # BLD AUTO: 4.12 M/UL (ref 4.2–5.4)
RBC # URNS HPF: ABNORMAL /HPF
RBC UR QL AUTO: NEGATIVE
RH BLD: NORMAL
SODIUM SERPL-SCNC: 133 MMOL/L (ref 135–145)
SP GR UR STRIP.AUTO: 1.02
T4 FREE SERPL-MCNC: 1.22 NG/DL (ref 0.53–1.43)
TSH SERPL DL<=0.005 MIU/L-ACNC: 0.48 UIU/ML (ref 0.38–5.33)
UROBILINOGEN UR STRIP.AUTO-MCNC: 0.2 MG/DL
WBC # BLD AUTO: 8.7 K/UL (ref 4.8–10.8)
WBC #/AREA URNS HPF: ABNORMAL /HPF

## 2020-02-20 PROCEDURE — 80053 COMPREHEN METABOLIC PANEL: CPT

## 2020-02-20 PROCEDURE — 80307 DRUG TEST PRSMV CHEM ANLYZR: CPT

## 2020-02-20 PROCEDURE — 86762 RUBELLA ANTIBODY: CPT

## 2020-02-20 PROCEDURE — 81001 URINALYSIS AUTO W/SCOPE: CPT | Mod: XU

## 2020-02-20 PROCEDURE — 84443 ASSAY THYROID STIM HORMONE: CPT

## 2020-02-20 PROCEDURE — 700105 HCHG RX REV CODE 258: Performed by: EMERGENCY MEDICINE

## 2020-02-20 PROCEDURE — 86900 BLOOD TYPING SEROLOGIC ABO: CPT

## 2020-02-20 PROCEDURE — 76801 OB US < 14 WKS SINGLE FETUS: CPT

## 2020-02-20 PROCEDURE — 86850 RBC ANTIBODY SCREEN: CPT

## 2020-02-20 PROCEDURE — 86901 BLOOD TYPING SEROLOGIC RH(D): CPT

## 2020-02-20 PROCEDURE — 85025 COMPLETE CBC W/AUTO DIFF WBC: CPT

## 2020-02-20 PROCEDURE — 36415 COLL VENOUS BLD VENIPUNCTURE: CPT

## 2020-02-20 PROCEDURE — 86780 TREPONEMA PALLIDUM: CPT

## 2020-02-20 PROCEDURE — 83690 ASSAY OF LIPASE: CPT

## 2020-02-20 PROCEDURE — 87389 HIV-1 AG W/HIV-1&-2 AB AG IA: CPT

## 2020-02-20 PROCEDURE — 84439 ASSAY OF FREE THYROXINE: CPT

## 2020-02-20 PROCEDURE — 87340 HEPATITIS B SURFACE AG IA: CPT

## 2020-02-20 PROCEDURE — 81003 URINALYSIS AUTO W/O SCOPE: CPT

## 2020-02-20 PROCEDURE — 99284 EMERGENCY DEPT VISIT MOD MDM: CPT

## 2020-02-20 PROCEDURE — 85025 COMPLETE CBC W/AUTO DIFF WBC: CPT | Mod: 91

## 2020-02-20 RX ORDER — SODIUM CHLORIDE 9 MG/ML
1000 INJECTION, SOLUTION INTRAVENOUS ONCE
Status: COMPLETED | OUTPATIENT
Start: 2020-02-20 | End: 2020-02-20

## 2020-02-20 RX ADMIN — SODIUM CHLORIDE 1000 ML: 9 INJECTION, SOLUTION INTRAVENOUS at 13:12

## 2020-02-20 ASSESSMENT — LIFESTYLE VARIABLES: DO YOU DRINK ALCOHOL: NO

## 2020-02-20 NOTE — ED PROVIDER NOTES
ED Provider Note    Scribed for Jared Díaz M.D. by Ailyn Grimes. 2020, 12:51 PM.    Primary care provider: Pcp Pt States None  Means of arrival: Walk in  History obtained from: Patient  History limited by: None    CHIEF COMPLAINT  Chief Complaint   Patient presents with   • Dizziness   • Abdominal Pain     13 weeks pregnant   • Abnormal Labs       HPI  Ivis Klein is a 24 y.o. gravid female who presents to the Emergency Department for evaluation of abdominal pain and pressure that began a few days ago. Patient has additional complaints of chills, dizziness, and fatigue. She was called by her PCP and told her thyroid levels were high. She endorses thyroid issues for which she takes medication. She was told by her PCP to follow up with her OBG/YN for her thyroid during her next appointment but felt like that would be to much time to wait prompting her visit to the ED. Patient denies any fever, nausea, vomiting, or dysuria. Patient is 3 months pregnant but has not received any ultrasound. Her obstetrics history is .     REVIEW OF SYSTEMS  As above otherwise all other systems are negative    PAST MEDICAL HISTORY   has a past medical history of Anxiety (2017), Arrhythmia, Breath shortness, Graves disease (2016), Head ache, Heart valve disease, Hyperthyroidism (2016), Menarche, Migraine, Pericarditis (2016), Pregnancy, Psychiatric problem, Supervision of normal first teen pregnancy, and Vomiting (2016).    SURGICAL HISTORY   has a past surgical history that includes primary c section (2013) and thyroidectomy total (Bilateral, 3/22/2017).    SOCIAL HISTORY  Social History     Tobacco Use   • Smoking status: Never Smoker   • Smokeless tobacco: Never Used   • Tobacco comment: quit in    Substance Use Topics   • Alcohol use: No   • Drug use: Not Currently     Types: Marijuana, Inhaled     Comment: marijuana weekly      Social History     Substance and Sexual  "Activity   Drug Use Not Currently   • Types: Marijuana, Inhaled    Comment: marijuana weekly       FAMILY HISTORY  Family History   Problem Relation Age of Onset   • Cancer Paternal Grandmother 56        breast cancer   • No Known Problems Mother    • Hyperlipidemia Father    • No Known Problems Sister    • No Known Problems Brother        CURRENT MEDICATIONS  Home Medications     Reviewed by Lynne Hayes (Pharmacy Tech) on 02/20/20 at 1454  Med List Status: Complete   Medication Last Dose Status   levothyroxine (SYNTHROID) 150 MCG Tab 2/20/2020 Active   Prenatal MV-Min-Fe Fum-FA-DHA (PRENATAL 1 PO) 2/19/2020 Active                ALLERGIES  No Known Allergies    PHYSICAL EXAM  VITAL SIGNS: /68   Pulse 98   Temp 36.9 °C (98.4 °F) (Temporal)   Resp 16   Ht 1.575 m (5' 2\")   Wt 59.5 kg (131 lb 2.8 oz)   LMP 11/22/2019 (Approximate)   SpO2 99%   BMI 23.99 kg/m²     Constitutional: Well developed, Well nourished, No acute distress, Non-toxic appearance.   HENT: Normocephalic, Atraumatic, Bilateral external ears normal, dry mucus membranes, No oral exudates, Nose normal.   Eyes:conjunctiva is normal, there are no signs of exudate.   Neck: Supple, no meningeal signs.  Lymphatic: No lymphadenopathy noted.   Cardiovascular: Regular rate and rhythm without gallops or rubs. 3/6 flow murmur   Thorax & Lungs: No respiratory distress. Breathing comfortably. Lungs are clear to auscultation bilaterally, there are no wheezes no rales. Chest wall is nontender.  Abdomen: Soft, no guarding or rebound, nondistended. Bowel sounds are present. Tenderness to the suprapubic area  Skin: Warm, Dry, No erythema,   Back: No tenderness, No CVA tenderness.  Musculoskeletal: Good range of motion in all major joints. No tenderness to palpation or major deformities noted. Intact distal pulses, no clubbing, no cyanosis, no edema,   Neurologic: Alert & oriented x 3, Moving all extremities. No gross abnormalities.    Psychiatric: " Affect normal, Judgment normal, Mood normal.     LABS  Results for orders placed or performed during the hospital encounter of 02/20/20   CBC WITH DIFFERENTIAL   Result Value Ref Range    WBC 8.7 4.8 - 10.8 K/uL    RBC 4.12 (L) 4.20 - 5.40 M/uL    Hemoglobin 12.1 12.0 - 16.0 g/dL    Hematocrit 36.2 (L) 37.0 - 47.0 %    MCV 87.9 81.4 - 97.8 fL    MCH 29.4 27.0 - 33.0 pg    MCHC 33.4 (L) 33.6 - 35.0 g/dL    RDW 46.0 35.9 - 50.0 fL    Platelet Count 234 164 - 446 K/uL    MPV 11.1 9.0 - 12.9 fL    Neutrophils-Polys 67.20 44.00 - 72.00 %    Lymphocytes 26.00 22.00 - 41.00 %    Monocytes 5.90 0.00 - 13.40 %    Eosinophils 0.30 0.00 - 6.90 %    Basophils 0.30 0.00 - 1.80 %    Immature Granulocytes 0.30 0.00 - 0.90 %    Nucleated RBC 0.00 /100 WBC    Neutrophils (Absolute) 5.86 2.00 - 7.15 K/uL    Lymphs (Absolute) 2.27 1.00 - 4.80 K/uL    Monos (Absolute) 0.52 0.00 - 0.85 K/uL    Eos (Absolute) 0.03 0.00 - 0.51 K/uL    Baso (Absolute) 0.03 0.00 - 0.12 K/uL    Immature Granulocytes (abs) 0.03 0.00 - 0.11 K/uL    NRBC (Absolute) 0.00 K/uL   COMP METABOLIC PANEL   Result Value Ref Range    Sodium 133 (L) 135 - 145 mmol/L    Potassium 3.8 3.6 - 5.5 mmol/L    Chloride 103 96 - 112 mmol/L    Co2 24 20 - 33 mmol/L    Anion Gap 6.0 0.0 - 11.9    Glucose 84 65 - 99 mg/dL    Bun 9 8 - 22 mg/dL    Creatinine 0.51 0.50 - 1.40 mg/dL    Calcium 8.9 8.5 - 10.5 mg/dL    AST(SGOT) 13 12 - 45 U/L    ALT(SGPT) 7 2 - 50 U/L    Alkaline Phosphatase 41 30 - 99 U/L    Total Bilirubin 0.2 0.1 - 1.5 mg/dL    Albumin 4.1 3.2 - 4.9 g/dL    Total Protein 7.5 6.0 - 8.2 g/dL    Globulin 3.4 1.9 - 3.5 g/dL    A-G Ratio 1.2 g/dL   LIPASE   Result Value Ref Range    Lipase 26 11 - 82 U/L   URINALYSIS CULTURE, IF INDICATED   Result Value Ref Range    Color Yellow     Character Clear     Specific Gravity 1.016 <1.035    Ph 7.0 5.0 - 8.0    Glucose Negative Negative mg/dL    Ketones Negative Negative mg/dL    Protein Negative Negative mg/dL    Bilirubin  Negative Negative    Urobilinogen, Urine 0.2 Negative    Nitrite Negative Negative    Leukocyte Esterase Negative Negative    Occult Blood Negative Negative    Micro Urine Req see below    ESTIMATED GFR   Result Value Ref Range    GFR If African American >60 >60 mL/min/1.73 m 2    GFR If Non African American >60 >60 mL/min/1.73 m 2     All labs reviewed by me.      RADIOLOGY  US-OB 1ST TRIMESTER WITH TRANSVAGINAL (COMBO)   Final Result      Viable single intrauterine gestation of an estimated gestational age of 14 weeks and 4 days with estimated delivery date 8/16/2020..        The radiologist's interpretation of all radiological studies have been reviewed by me.    COURSE & MEDICAL DECISION MAKING  Pertinent Labs & Imaging studies reviewed. (See chart for details)    12:51 PM - Patient seen and examined at bedside. Patient will be treated with NS Infusion 1000 mL. Ordered US OB 1st trimester w/ transvaginal, Urinalysis Culture, CBC w/ differential, CMP, Lipase to evaluate her symptoms. The differential diagnoses include but are not limited to: Bennie tract infection electrolyte abnormality. I informed the patient that I will use labs and ultrasound to evaluate her symptoms.   HYDRATION: Based on the patient's presentation of dehydration,  the patient was given IV fluids. IV Hydration was used because oral hydration is unable to be done due to the patient's symptoms. Upon recheck following hydration, the patient was improved.    2:47 PM Patient was reevaluated at bedside. Discussed lab and radiology results with the patient and informed them that she needs to follow up with her PCP and obstetrician. I let her know she may be discharged at this time. She verbalizes understanding to the plan for care.     Decision Making:  Patient presents for evaluation.  Clinically the patient otherwise appears well.  I could not find any thyroid labs that were ordered so I feel that the primary care physician must have those from a  different lab.  At this point I did evaluate for the possibility of electrolyte disorders urinary tract infection urine appears normal patient has a slightly low sodium 133.  At this point upon reevaluation the patient is feeling improved just from the time of sitting here.  I recommended for the patient to follow-up with her primary care physician for recheck and further outpatient treatment care for hypothyroidism.  Patient should return as needed.    The patient will return for new or worsening symptoms and is stable at the time of discharge.    The patient is referred to a primary physician for blood pressure management, diabetic screening, and for all other preventative health concerns.    DISPOSITION:  Patient will be discharged home in stable condition.    FOLLOW UP:  Your OB/Gyn    Schedule an appointment as soon as possible for a visit in 1 week  For re-check, Return if any symptoms worsen      OUTPATIENT MEDICATIONS:  Discharge Medication List as of 2/20/2020  2:52 PM           FINAL IMPRESSION  1. 14 weeks gestation of pregnancy    2. Weakness    3. Hypothyroidism, unspecified type          I, Ailyn Grimes (Kamini), am scribing for, and in the presence of, Jared Díaz M.D..    Electronically signed by: Ailyn Grimes (Bethany[place]boston), 2/20/2020    IJared M.D. personally performed the services described in this documentation, as scribed by Ailyn Grimes in my presence, and it is both accurate and complete.    C    The note accurately reflects work and decisions made by me.  Jared Díaz M.D.  2/20/2020  4:44 PM

## 2020-02-20 NOTE — ED NOTES
Pharmacy Medication Reconciliation      Medication reconciliation updated and complete per pt at bedside  Allergies have been verified   No oral ABX within the last 14 days  Pt home pharmacy:[facility]

## 2020-02-20 NOTE — ED NOTES
Pt dc'd given papers and instructed to follow up with pcp, no change in epic assessment. Steady gate

## 2020-02-20 NOTE — ED TRIAGE NOTES
Pt to triage, c/o being 13 weeks pregnant and states her doctor called her and told her that her thyroid levels were high. Pt c/o feeling tired and dizzy . Also c/o abd pains

## 2020-02-21 LAB
APPEARANCE UR: ABNORMAL
BASOPHILS # BLD AUTO: 0.7 % (ref 0–1.8)
BASOPHILS # BLD: 0.05 K/UL (ref 0–0.12)
BILIRUB UR QL STRIP.AUTO: NEGATIVE
COLOR UR: YELLOW
EOSINOPHIL # BLD AUTO: 0.04 K/UL (ref 0–0.51)
EOSINOPHIL NFR BLD: 0.5 % (ref 0–6.9)
ERYTHROCYTE [DISTWIDTH] IN BLOOD BY AUTOMATED COUNT: 46.8 FL (ref 35.9–50)
GLUCOSE UR STRIP.AUTO-MCNC: NEGATIVE MG/DL
HBV SURFACE AG SER QL: NEGATIVE
HCT VFR BLD AUTO: 36.6 % (ref 37–47)
HGB BLD-MCNC: 11.9 G/DL (ref 12–16)
HIV 1+2 AB+HIV1 P24 AG SERPL QL IA: NON REACTIVE
IMM GRANULOCYTES # BLD AUTO: 0.03 K/UL (ref 0–0.11)
IMM GRANULOCYTES NFR BLD AUTO: 0.4 % (ref 0–0.9)
KETONES UR STRIP.AUTO-MCNC: NEGATIVE MG/DL
LEUKOCYTE ESTERASE UR QL STRIP.AUTO: NEGATIVE
LYMPHOCYTES # BLD AUTO: 1.96 K/UL (ref 1–4.8)
LYMPHOCYTES NFR BLD: 25.6 % (ref 22–41)
MCH RBC QN AUTO: 28.6 PG (ref 27–33)
MCHC RBC AUTO-ENTMCNC: 32.5 G/DL (ref 33.6–35)
MCV RBC AUTO: 88 FL (ref 81.4–97.8)
MICRO URNS: ABNORMAL
MONOCYTES # BLD AUTO: 0.47 K/UL (ref 0–0.85)
MONOCYTES NFR BLD AUTO: 6.1 % (ref 0–13.4)
NEUTROPHILS # BLD AUTO: 5.1 K/UL (ref 2–7.15)
NEUTROPHILS NFR BLD: 66.7 % (ref 44–72)
NITRITE UR QL STRIP.AUTO: NEGATIVE
NRBC # BLD AUTO: 0 K/UL
NRBC BLD-RTO: 0 /100 WBC
PH UR STRIP.AUTO: 6 [PH] (ref 5–8)
PLATELET # BLD AUTO: 229 K/UL (ref 164–446)
PMV BLD AUTO: 11.2 FL (ref 9–12.9)
PROT UR QL STRIP: NEGATIVE MG/DL
RBC # BLD AUTO: 4.16 M/UL (ref 4.2–5.4)
RBC UR QL AUTO: NEGATIVE
RUBV AB SER QL: 28.5 IU/ML
SP GR UR STRIP.AUTO: 1.03
TREPONEMA PALLIDUM IGG+IGM AB [PRESENCE] IN SERUM OR PLASMA BY IMMUNOASSAY: NON REACTIVE
UROBILINOGEN UR STRIP.AUTO-MCNC: 1 MG/DL
WBC # BLD AUTO: 7.7 K/UL (ref 4.8–10.8)

## 2020-02-22 LAB
AMPHET CTO UR CFM-MCNC: NEGATIVE NG/ML
BARBITURATES CTO UR CFM-MCNC: NEGATIVE NG/ML
BENZODIAZ CTO UR CFM-MCNC: NEGATIVE NG/ML
CANNABINOIDS CTO UR CFM-MCNC: POSITIVE NG/ML
COCAINE CTO UR CFM-MCNC: NEGATIVE NG/ML
DRUG COMMENT 753798: NORMAL
METHADONE CTO UR CFM-MCNC: NEGATIVE NG/ML
OPIATES CTO UR CFM-MCNC: NEGATIVE NG/ML
PCP CTO UR CFM-MCNC: NEGATIVE NG/ML
PROPOXYPH CTO UR CFM-MCNC: NEGATIVE NG/ML

## 2020-02-23 LAB — THC UR CFM-MCNC: 30 NG/ML

## 2020-02-26 ENCOUNTER — ROUTINE PRENATAL (OUTPATIENT)
Dept: OBGYN | Facility: CLINIC | Age: 25
End: 2020-02-26
Payer: MEDICAID

## 2020-02-26 VITALS — WEIGHT: 130 LBS | DIASTOLIC BLOOD PRESSURE: 70 MMHG | BODY MASS INDEX: 23.78 KG/M2 | SYSTOLIC BLOOD PRESSURE: 120 MMHG

## 2020-02-26 DIAGNOSIS — O09.899 SUPERVISION OF OTHER HIGH RISK PREGNANCY, ANTEPARTUM: Primary | ICD-10-CM

## 2020-02-26 PROCEDURE — 90040 PR PRENATAL FOLLOW UP: CPT | Performed by: NURSE PRACTITIONER

## 2020-02-26 NOTE — PROGRESS NOTES
Pt. Here for OB/FU.   U/S on  4/10/2020  Good # [phone]  Pt was seen at [facility] ER on 2/20/20 for abdominal pain and pressure.   Pt states still having pain after sitting and standing denies bleeding.   Pharmacy verified.

## 2020-02-26 NOTE — PROGRESS NOTES
S) Pt is a 24 y.o.   at 13w5d  gestation. Routine prenatal care today. No complaints today. Was seen in the ED for cramping on 20 and all was WNL. She does still endorse some cramping, but upon further discussion, it sounds more like round ligament pain when she moves or adjusts. Discussed normalcy of this and reassured that her US looks very normal. Discussed dating discrepancy, and reviewed dating with MD in clinic today. Will keep early US dating which was consistent with her LMP dating. Patient aware. Reviewed lab work results. Has anatomy scan scheduled for 4/10/20.  labor/SAB precautions reviewed, all questions answered.    Fetal movement Normal  Cramping no  VB no  LOF no   Denies dysuria. Generally feels well today. Good self-care activities identified. Denies headaches, swelling, visual changes, or epigastric pain .     O) /70   Wt 59 kg (130 lb)         Labs:       PNL: WNL, positive UDS (marijuana)       GCT: Too early        AFP: Will discuss next visit       GBS: N/A       Pertinent ultrasound -        Scheduled for 4/10/20    A) IUP at 13w5d       S=D         Patient Active Problem List    Diagnosis Date Noted   • Pyelonephritis 2019     Priority: High   • Postoperative hypothyroidism 2018     Priority: High   • Frequent hospital admissions 2018     Priority: Medium   • Hypokalemia 2017     Priority: Medium   • Anxiety 2017     Priority: Medium   • Myalgia 2018     Priority: Low   • Supervision of other high risk pregnancy, antepartum 2020   • History of thyroidectomy 2020   • History of marijuana use 2020   • Hypothyroidism 2019   • Hemorrhagic cysts of both ovaries 2019   • Elevated antinuclear antibody (DEJON) level 2019   • Panic attack 2019   • Noncompliance with medication regimen 2017   • TSH elevation 2017   • Microcytosis 2017   • Hyperthyroidism 2016   • Graves disease  2016   • History of pericarditis 2016   • Mood disorder (HCC) 2016   • Migraine with aura, not intractable 2010          SVE: deferred       Chaperone offered: n/a         TDAP: no       FLU: no        BTL: no       : no       C/S Consent: yes       IOL or C/S scheduled: no       LAST PAP: 20- negative         P) s/s ptl vs general discomforts. Fetal movements reviewed. General ed and anticipatory guidance. Nutrition/exercise/vitamin. Plans breast Plans pp contraception- unsure  Continue PNV.

## 2020-04-09 PROBLEM — F12.90 MARIJUANA USE: Status: ACTIVE | Noted: 2020-04-09

## 2020-04-09 PROBLEM — Z86.59 HISTORY OF PANIC ATTACKS: Status: ACTIVE | Noted: 2020-04-09

## 2020-04-09 PROBLEM — Z98.891 HISTORY OF CESAREAN DELIVERY: Status: ACTIVE | Noted: 2020-04-09

## 2020-04-10 ENCOUNTER — ROUTINE PRENATAL (OUTPATIENT)
Dept: OBGYN | Facility: CLINIC | Age: 25
End: 2020-04-10
Payer: MEDICAID

## 2020-04-10 ENCOUNTER — APPOINTMENT (OUTPATIENT)
Dept: RADIOLOGY | Facility: IMAGING CENTER | Age: 25
End: 2020-04-10
Attending: NURSE PRACTITIONER
Payer: MEDICAID

## 2020-04-10 VITALS — DIASTOLIC BLOOD PRESSURE: 74 MMHG | BODY MASS INDEX: 25.61 KG/M2 | WEIGHT: 140 LBS | SYSTOLIC BLOOD PRESSURE: 106 MMHG

## 2020-04-10 DIAGNOSIS — Z98.891 HISTORY OF CESAREAN DELIVERY: ICD-10-CM

## 2020-04-10 DIAGNOSIS — O09.899 SUPERVISION OF OTHER HIGH RISK PREGNANCY, ANTEPARTUM: ICD-10-CM

## 2020-04-10 DIAGNOSIS — E89.0 HISTORY OF THYROIDECTOMY: ICD-10-CM

## 2020-04-10 DIAGNOSIS — Z86.59 HISTORY OF PANIC ATTACKS: ICD-10-CM

## 2020-04-10 DIAGNOSIS — F12.90 MARIJUANA USE: ICD-10-CM

## 2020-04-10 DIAGNOSIS — O35.BXX0 ECHOGENIC FOCUS OF HEART OF FETUS AFFECTING ANTEPARTUM CARE OF MOTHER, SINGLE OR UNSPECIFIED FETUS: ICD-10-CM

## 2020-04-10 PROCEDURE — 90040 PR PRENATAL FOLLOW UP: CPT | Performed by: NURSE PRACTITIONER

## 2020-04-10 PROCEDURE — 76805 OB US >/= 14 WKS SNGL FETUS: CPT | Mod: TC | Performed by: OBSTETRICS & GYNECOLOGY

## 2020-04-10 ASSESSMENT — FIBROSIS 4 INDEX: FIB4 SCORE: 0.46

## 2020-04-10 NOTE — PROGRESS NOTES
SUBJECTIVE:  Pt is a 24 y.o.   at 20w0d  gestation. Presents today for follow-up prenatal care. Reports no issues at this time.  Reports fetal movement. Denies regular cramping/contractions, bleeding or leaking of fluid. Denies dysuria, headaches, N/V. Generally feels well today except lower abdominal cramping randomly, similar to last time when she was told was it was growing pains. Is hydrated at about 8 cups of water a day. Continuing thyroid medication. Reports moods as stable.     OBJECTIVE:  - See prenatal vitals flow  -   Vitals:    04/10/20 0806   BP: 106/74   Weight: 63.5 kg (140 lb)                 ASSESSMENT:   - IUP at 20w0d    - S=D  Patient Active Problem List    Diagnosis Date Noted   • Anxiety 2017     Priority: Medium   • Marijuana use 2020   • History of  delivery x 1 for breech 2020   • History of panic attack 2020   • Supervision of other high risk pregnancy, antepartum 2020   • History of thyroidectomy 2020   • Elevated antinuclear antibody (DEJON) level 2019   • Graves disease 2016   • History of pericarditis 2016   • Migraine with aura, not intractable 2010         PLAN:  - S/sx pregnancy and labor warning signs vs general discomforts discussed  - Fetal movements and/or kick counts reviewed   - Adequate hydration reinforced  - Nutrition/exercise/vitamin education; continue PNV  - US after this appt  - AFP ordered   - Can increase water intake   - Anticipatory guidance given  - RTC in 6 weeks for follow-up prenatal care

## 2020-04-10 NOTE — PROGRESS NOTES
Pt here today for OB follow up  Reports +FM  WT: 140 lb  BP: 106/74  Pt states has been having lower abdominal pain off and on. States no other complaints.   US today   Desires AFP  Good # [phone]

## 2020-04-15 ENCOUNTER — TELEPHONE (OUTPATIENT)
Dept: OBGYN | Facility: CLINIC | Age: 25
End: 2020-04-15

## 2020-04-15 ENCOUNTER — HOSPITAL ENCOUNTER (OUTPATIENT)
Dept: LAB | Facility: MEDICAL CENTER | Age: 25
End: 2020-04-15
Attending: NURSE PRACTITIONER
Payer: MEDICAID

## 2020-04-15 DIAGNOSIS — Z98.891 HISTORY OF CESAREAN DELIVERY: ICD-10-CM

## 2020-04-15 PROCEDURE — 36415 COLL VENOUS BLD VENIPUNCTURE: CPT

## 2020-04-15 PROCEDURE — 81511 FTL CGEN ABNOR FOUR ANAL: CPT

## 2020-04-15 NOTE — TELEPHONE ENCOUNTER
----- Message from LYRIC Dunn sent at 4/10/2020 10:25 AM PDT -----  Call pt to let her know we will do another US of heart because there were some parts we were not able to see. Also please make sure to get your genetic testing done. Please schedule another US for four weeks.  4/15/20@9:55am. Patient notified, she will schedule appt for lab ASAP. US scheduled for 5/13/20

## 2020-04-18 LAB
# FETUSES US: NORMAL
AFP MOM SERPL: 1.37
AFP SERPL-MCNC: 91 NG/ML
AGE - REPORTED: 22.9 YR
CURRENT SMOKER: NO
FAMILY MEMBER DISEASES HX: NO
GA METHOD: NORMAL
GA: NORMAL WK
HCG MOM SERPL: 1.91
HCG SERPL-ACNC: NORMAL IU/L
HX OF HEREDITARY DISORDERS: NO
IDDM PATIENT QL: NO
INHIBIN A MOM SERPL: 0.9
INHIBIN A SERPL-MCNC: 159 PG/ML
INTEGRATED SCN PATIENT-IMP: NORMAL
PATHOLOGY STUDY: NORMAL
SPECIMEN DRAWN SERPL: NORMAL
U ESTRIOL MOM SERPL: 1.08
U ESTRIOL SERPL-MCNC: 2.96 NG/ML

## 2020-05-13 ENCOUNTER — APPOINTMENT (OUTPATIENT)
Dept: RADIOLOGY | Facility: IMAGING CENTER | Age: 25
End: 2020-05-13
Attending: NURSE PRACTITIONER
Payer: MEDICAID

## 2020-05-13 DIAGNOSIS — O35.BXX0 ECHOGENIC FOCUS OF HEART OF FETUS AFFECTING ANTEPARTUM CARE OF MOTHER, SINGLE OR UNSPECIFIED FETUS: ICD-10-CM

## 2020-05-13 PROCEDURE — 76815 OB US LIMITED FETUS(S): CPT | Mod: TC | Performed by: OBSTETRICS & GYNECOLOGY

## 2020-05-22 ENCOUNTER — ROUTINE PRENATAL (OUTPATIENT)
Dept: OBGYN | Facility: CLINIC | Age: 25
End: 2020-05-22
Payer: MEDICAID

## 2020-05-22 VITALS — DIASTOLIC BLOOD PRESSURE: 60 MMHG | SYSTOLIC BLOOD PRESSURE: 112 MMHG | BODY MASS INDEX: 27.25 KG/M2 | WEIGHT: 149 LBS

## 2020-05-22 DIAGNOSIS — Z98.891 HISTORY OF CESAREAN DELIVERY: ICD-10-CM

## 2020-05-22 PROCEDURE — 90040 PR PRENATAL FOLLOW UP: CPT | Performed by: NURSE PRACTITIONER

## 2020-05-22 ASSESSMENT — FIBROSIS 4 INDEX: FIB4 SCORE: 0.46

## 2020-05-22 NOTE — PROGRESS NOTES
Pt here today for OB follow up  Pt states she has been experiencing raquel rashid   Reports +  Good # [phone]  Pharmacy Confirmed.  Chaperone offered and not indicated.

## 2020-05-22 NOTE — PROGRESS NOTES
SUBJECTIVE:  Pt is a 24 y.o.   at 26w0d  gestation. Presents today for follow-up prenatal care. Reports no issues at this time.  Reports good fetal movement. Denies regular cramping/contractions, bleeding or leaking of fluid. Denies dysuria, headaches, N/V. Generally feels well today except a sharp pain on either side of lower stomach randomly. Reports bump in vagina for one week that is sensitive to touch but not painful outside of touching, no tingling, itching, body symptoms. Has never had a bump like this before and wanted to see what it might be. Reports moods as stable. Continuing synthroid. Reports desires repeat c/s but unsure about her tubes, does not think she wants more kids. Reports stopped marijuana use when she found out she was pregnant.     OBJECTIVE:  - See prenatal vitals flow  -   Vitals:    20 1001   BP: 112/60   Weight: 67.6 kg (149 lb)                 ASSESSMENT:   - IUP at 26w0d    - S=D   - Small 2mm bump near left labia submucousal and not visible, mobile and non tender, no sign of redness, infection, d/c, skin lesion   Patient Active Problem List    Diagnosis Date Noted   • Anxiety 2017     Priority: Medium   • Echogenic focus of heart of fetus affecting antepartum care of mother 04/10/2020   • History of  delivery x 1 for breech 2020   • History of panic attack 2020   • Supervision of other high risk pregnancy, antepartum 2020   • History of thyroidectomy 2020   • Graves disease 2016   • History of pericarditis 2016   • Migraine with aura, not intractable 2010         PLAN:  - S/sx pregnancy and labor warning signs vs general discomforts discussed  - Fetal movements and/or kick counts reviewed   - Adequate hydration reinforced  - Nutrition/exercise/vitamin education; continue PNV  - GCT ordered along with thyroid labs   - Pt to try warm compresses and soaks and let us know if gets bigger or has other symptoms with bump,  no swab done as nothing visible on mucosa   - Anticipatory guidance given  - RTC in 4 weeks for follow-up prenatal care

## 2020-05-28 ENCOUNTER — HOSPITAL ENCOUNTER (OUTPATIENT)
Dept: LAB | Facility: MEDICAL CENTER | Age: 25
End: 2020-05-28
Attending: NURSE PRACTITIONER
Payer: MEDICAID

## 2020-05-28 DIAGNOSIS — Z98.891 HISTORY OF CESAREAN DELIVERY: ICD-10-CM

## 2020-05-28 LAB
ERYTHROCYTE [DISTWIDTH] IN BLOOD BY AUTOMATED COUNT: 40.4 FL (ref 35.9–50)
GLUCOSE 1H P 50 G GLC PO SERPL-MCNC: 103 MG/DL (ref 70–139)
HCT VFR BLD AUTO: 37.7 % (ref 37–47)
HGB BLD-MCNC: 11.9 G/DL (ref 12–16)
MCH RBC QN AUTO: 27.6 PG (ref 27–33)
MCHC RBC AUTO-ENTMCNC: 31.6 G/DL (ref 33.6–35)
MCV RBC AUTO: 87.5 FL (ref 81.4–97.8)
PLATELET # BLD AUTO: 279 K/UL (ref 164–446)
PMV BLD AUTO: 11.6 FL (ref 9–12.9)
RBC # BLD AUTO: 4.31 M/UL (ref 4.2–5.4)
WBC # BLD AUTO: 9 K/UL (ref 4.8–10.8)

## 2020-05-28 PROCEDURE — 86780 TREPONEMA PALLIDUM: CPT

## 2020-05-28 PROCEDURE — 84443 ASSAY THYROID STIM HORMONE: CPT

## 2020-05-28 PROCEDURE — 84481 FREE ASSAY (FT-3): CPT

## 2020-05-28 PROCEDURE — 82950 GLUCOSE TEST: CPT

## 2020-05-28 PROCEDURE — 36415 COLL VENOUS BLD VENIPUNCTURE: CPT

## 2020-05-28 PROCEDURE — 85027 COMPLETE CBC AUTOMATED: CPT

## 2020-05-28 PROCEDURE — 84439 ASSAY OF FREE THYROXINE: CPT

## 2020-05-29 LAB
T3FREE SERPL-MCNC: 2.12 PG/ML (ref 2–4.4)
T4 FREE SERPL-MCNC: 1.41 NG/DL (ref 0.93–1.7)
TREPONEMA PALLIDUM IGG+IGM AB [PRESENCE] IN SERUM OR PLASMA BY IMMUNOASSAY: NORMAL
TSH SERPL DL<=0.005 MIU/L-ACNC: 0.53 UIU/ML (ref 0.38–5.33)

## 2020-06-07 ENCOUNTER — HOSPITAL ENCOUNTER (OUTPATIENT)
Facility: MEDICAL CENTER | Age: 25
End: 2020-06-07
Attending: OBSTETRICS & GYNECOLOGY | Admitting: OBSTETRICS & GYNECOLOGY
Payer: MEDICAID

## 2020-06-07 VITALS — SYSTOLIC BLOOD PRESSURE: 115 MMHG | DIASTOLIC BLOOD PRESSURE: 65 MMHG | HEART RATE: 88 BPM

## 2020-06-07 LAB
APPEARANCE UR: CLEAR
COLOR UR AUTO: YELLOW
GLUCOSE UR QL STRIP.AUTO: NEGATIVE MG/DL
KETONES UR QL STRIP.AUTO: NEGATIVE MG/DL
LEUKOCYTE ESTERASE UR QL STRIP.AUTO: NEGATIVE
NITRITE UR QL STRIP.AUTO: NEGATIVE
PH UR STRIP.AUTO: 6.5 [PH] (ref 5–8)
PROT UR QL STRIP: NEGATIVE MG/DL
RBC UR QL AUTO: NEGATIVE
SP GR UR: >=1.03 (ref 1–1.03)

## 2020-06-07 PROCEDURE — 59025 FETAL NON-STRESS TEST: CPT

## 2020-06-07 PROCEDURE — 81002 URINALYSIS NONAUTO W/O SCOPE: CPT

## 2020-06-08 NOTE — DISCHARGE INSTRUCTIONS
Pelvic rest for 3 days, no intercourse.  Follow up appt scheduled on Thursday 6/11/2020 at 10:30am.  Go to that appointment as scheduled.  Increase your fluid intake to 10-12 12 oz glasses of water per day.  Return to the hospital if you feel you are having any signs of Pre Term labor, contractions, bleeding or leaking.  Return if you are having decreased fetal movement.  
none

## 2020-06-08 NOTE — PROGRESS NOTES
2125  Pt into triage c/o round ligament pain and having some tightening in her belly today.  Monitors placed at this time.  Pt has no report of bleeding or leaking and reports positive fetal movement.  2200  Report to RENARD Patel and orders for hydration received at this time.  2315  Report to RENARD Patel and DC orders received at this time.  DC instructions given and pt understands to go to her newly scheduled appointment this coming Thursday.  Pt understands to be on pelvic rest until that time.  Pt understands to increase her fluid intake.  Pt into regular clothing and to home at this time with her friend via ambulation.

## 2020-06-11 ENCOUNTER — ROUTINE PRENATAL (OUTPATIENT)
Dept: OBGYN | Facility: CLINIC | Age: 25
End: 2020-06-11
Payer: MEDICAID

## 2020-06-11 VITALS — WEIGHT: 152 LBS | DIASTOLIC BLOOD PRESSURE: 58 MMHG | SYSTOLIC BLOOD PRESSURE: 110 MMHG | BODY MASS INDEX: 27.8 KG/M2

## 2020-06-11 DIAGNOSIS — Z3A.28 28 WEEKS GESTATION OF PREGNANCY: ICD-10-CM

## 2020-06-11 PROCEDURE — 90715 TDAP VACCINE 7 YRS/> IM: CPT | Performed by: NURSE PRACTITIONER

## 2020-06-11 PROCEDURE — 90471 IMMUNIZATION ADMIN: CPT | Performed by: NURSE PRACTITIONER

## 2020-06-11 PROCEDURE — 90040 PR PRENATAL FOLLOW UP: CPT | Performed by: NURSE PRACTITIONER

## 2020-06-11 ASSESSMENT — FIBROSIS 4 INDEX: FIB4 SCORE: 0.39

## 2020-06-11 NOTE — PROGRESS NOTES
Pt here today for OB follow up  Reports +FM  WT: 152 lb  BP: 110/58   Preferred pharmacy verified with pt.  THU sheet given and explained today  Desires Tdap vaccine  BTL declined   Pt states she was seen at [facility] on 06/07 due to belly tightness and frequent contractions.   Pt states she still has the tightening. States no other complaints.   Good # [phone]    Tdap vaccine given today. Right Deltoid. VIS given and screening check list reviewed with pt.  Tdap vaccine verified by Heidy GUNTER

## 2020-06-11 NOTE — PROGRESS NOTES
SUBJECTIVE:  Pt is a 24 y.o.   at 28w6d  gestation. Presents today for follow-up prenatal care. Reports no issues at this time.  Reports good  fetal movement. Denies regular cramping/contractions, bleeding or leaking of fluid. Denies dysuria, headaches, N/V. Generally feels well today except belly tightening that feels painful sorta of likew cramping not able to describe very well and lasts for a longer period of time. Reports she is hydrating well.      OBJECTIVE:  - See prenatal vitals flow  Vitals:    20 1024   BP: 110/58   Weight: 68.9 kg (152 lb)                 ASSESSMENT:   - IUP at 28w6d    - S=D  Patient Active Problem List    Diagnosis Date Noted   • Anxiety 2017     Priority: Medium   • Echogenic focus of heart of fetus affecting antepartum care of mother 04/10/2020   • History of  delivery x 1 for breech 2020   • History of panic attack 2020   • Supervision of other high risk pregnancy, antepartum 2020   • History of thyroidectomy 2020   • Graves disease 2016   • History of pericarditis 2016   • Migraine with aura, not intractable 2010       PLAN:  - S/sx pregnancy and labor warning signs vs general discomforts discussed  - Fetal movements and/or kick counts reviewed   - Adequate hydration reinforced  - Nutrition/exercise/vitamin education; continue PNV  - S/p Tdap vacc today   - Declines BTL  - Reviewed [facility]  - Increase water, preg support belt, warm / cold therapy; to advise us if painful contractions coming and going four in an hour   - Anticipatory guidance given  - RTC in 2 weeks for follow-up prenatal care

## 2020-06-11 NOTE — LETTER
"Count Your Baby's Movements  Another step to a healthy delivery    Ivis Klein             Dept: [phone]    How Many Weeks Pregnant? 28W6D    Date to Begin Counting: Today 06/11/2020              How to use this chart    One way for your physician to keep track of your baby's health is by knowing how often the baby moves (or \"kicks\") in your womb.  You can help your physician to do this by using this chart every day.    Every day, you should see how many hours it takes for your baby to move 10 times.  Start in the morning, as soon as you get up.    · First, write down the time your baby moves until you get to 10.  · Check off one box every time your baby moves until you get to 10.  · Write down the time you finished counting in the last column.  · Total how long it took to count up all 10 movements.  · Finally, fill in the box that shows how long this took.  After counting 10 movements, you no longer have to count any more that day.  The next morning, just start counting again as soon as you get up.    What should you call a \"movement\"?  It is hard to say, because it will feel different from one mother to another and from one pregnancy to the next.  The important thing is that you count the movements the same way throughout your pregnancy.  If you have more questions, you should ask your physician.    Count carefully every day!  SAMPLE:  Week 28    How many hours did it take to feel 10 movements?       Start  Time     1     2     3     4     5     6     7     8     9     10   Finish Time   Mon 8:20 ·  ·  ·  ·  ·  ·  ·  ·  ·  ·  11:40   Tue Wed Thu Fri               Sat               Sun                 IMPORTANT: You should contact your physician if it takes more than two hours for you to feel 10 movements.  Each morning, write down the time and start to count the movements of your baby.  Keep track by checking off one box every time you feel one movement.  When you " "have felt 10 \"kicks\", write down the time you finished counting in the last column.  Then fill in the   box (over the check dori) for the number of hours it took.  Be sure to read the complete instructions on the previous page.            "

## 2020-06-25 ENCOUNTER — APPOINTMENT (OUTPATIENT)
Dept: OBGYN | Facility: CLINIC | Age: 25
End: 2020-06-25

## 2020-06-25 ENCOUNTER — ROUTINE PRENATAL (OUTPATIENT)
Dept: OBGYN | Facility: CLINIC | Age: 25
End: 2020-06-25
Payer: MEDICAID

## 2020-06-25 VITALS — SYSTOLIC BLOOD PRESSURE: 122 MMHG | BODY MASS INDEX: 28.35 KG/M2 | WEIGHT: 155 LBS | DIASTOLIC BLOOD PRESSURE: 60 MMHG

## 2020-06-25 DIAGNOSIS — Z98.891 HISTORY OF CESAREAN DELIVERY: ICD-10-CM

## 2020-06-25 PROCEDURE — 90040 PR PRENATAL FOLLOW UP: CPT | Performed by: NURSE PRACTITIONER

## 2020-06-25 ASSESSMENT — FIBROSIS 4 INDEX: FIB4 SCORE: 0.39

## 2020-06-25 NOTE — PROGRESS NOTES
Pt here today for OB follow up  Reports +FM  WT: 155 lb  BP: 122/60  Preferred pharmacy verified with pt.  Pt states she has been cramps and a lot of vaginal pressure. States no other complaints.   [place] # [phone]

## 2020-06-25 NOTE — PROGRESS NOTES
SUBJECTIVE:  Pt is a 24 y.o.   at 30w6d  gestation. Presents today for follow-up prenatal care. Reports no issues at this time.  Reports good  fetal movement. Denies regular cramping/contractions, bleeding or leaking of fluid. Denies dysuria, headaches, N/V. Generally feels well today except continued light cramping and just feels pressure down below. They do not feel like strong contractions. Also reports two days of feeling like her uterus is tight.     OBJECTIVE:  - See prenatal vitals flow  -   Vitals:    20 1028   BP: 122/60   Weight: 70.3 kg (155 lb)                 ASSESSMENT:   - IUP at 30w6d    - S=D   -   Patient Active Problem List    Diagnosis Date Noted   • Anxiety 2017     Priority: Medium   • Echogenic focus of heart of fetus affecting antepartum care of mother 04/10/2020   • History of  delivery x 1 for breech 2020   • History of panic attack 2020   • Supervision of other high risk pregnancy, antepartum 2020   • History of thyroidectomy 2020   • Graves disease 2016   • History of pericarditis 2016   • Migraine with aura, not intractable 2010         PLAN:  - S/sx pregnancy and labor warning signs vs general discomforts discussed  - Fetal movements and/or kick counts reviewed   - Adequate hydration reinforced  - Nutrition/exercise/vitamin education; continue PNV  - Plans IUD for contraception Pp: unsure what she used before for 6 years and will decide about hormonal or non-hormonal   - S/p Tdap vacc   - Anticipatory guidance given  - RTC in 2 weeks for follow-up prenatal care

## 2020-07-09 ENCOUNTER — ROUTINE PRENATAL (OUTPATIENT)
Dept: OBGYN | Facility: CLINIC | Age: 25
End: 2020-07-09
Payer: MEDICAID

## 2020-07-09 VITALS — DIASTOLIC BLOOD PRESSURE: 68 MMHG | SYSTOLIC BLOOD PRESSURE: 114 MMHG | BODY MASS INDEX: 29.63 KG/M2 | WEIGHT: 162 LBS

## 2020-07-09 DIAGNOSIS — O09.899 SUPERVISION OF OTHER HIGH RISK PREGNANCY, ANTEPARTUM: Primary | ICD-10-CM

## 2020-07-09 PROCEDURE — 90040 PR PRENATAL FOLLOW UP: CPT | Performed by: NURSE PRACTITIONER

## 2020-07-09 ASSESSMENT — FIBROSIS 4 INDEX: FIB4 SCORE: 0.39

## 2020-07-09 NOTE — PROGRESS NOTES
Pt. Here for OB/FU. Reports Good FM.   Good # [phone]  Pt. Denies VB, LOF, or UC's.   Pharmacy verified.   Chaperone offered and not indicated  Pt states having some contractions and a lot of pressure in her vaginal area.

## 2020-07-09 NOTE — PROGRESS NOTES
S) Pt is a 24 y.o.   at 32w6d  gestation. Routine prenatal care today. Complains of raquel rashid and pelvic/hip pressure. Nothing painful, and pressure just sporadically comes and goes with activity. Does notice some cramping at night when she first lays down. Discussed normalcy of this, and  labor precautions reviewed. Discussed GBS at 36 weeks, all questions answered. Also discussed delivery mode with this pregnancy. She has a history of c/s x 1 for breech. States that no one has gone over possibility of TOLAC with her.  Will do research and discuss further at next visit.  Fetal movement Normal  Cramping no  VB no  LOF no   Denies dysuria. Generally feels well today. Good self-care activities identified. Denies headaches, swelling, visual changes, or epigastric pain .     O) /68   Wt 73.5 kg (162 lb)         Labs:       PNL: WNL       GCT: 103        AFP: normal       GBS: N/A       Pertinent ultrasound -        4/10/20- Survey WNL, but not able to see heart well, EIF noted. MARIYA 22.49cm, c/w prev dating. Needs repeat US to recheck heart  20- Recheck structures not previously seen- Heart well visualized this time, EIF noted, MARIYA 19.13cm, c/w prev dating. AFP negative      A) IUP at 32w6d       S=D         Patient Active Problem List    Diagnosis Date Noted   • Anxiety 2017     Priority: Medium   • Echogenic focus of heart of fetus affecting antepartum care of mother 04/10/2020   • History of  delivery x 1 for breech 2020   • History of panic attack 2020   • Supervision of other high risk pregnancy, antepartum 2020   • History of thyroidectomy 2020   • Graves disease 2016   • History of pericarditis 2016   • Migraine with aura, not intractable 2010          SVE: deferred       Chaperone offered: n/a         TDAP: yes       FLU: no        BTL: no       : no       C/S Consent: yes       IOL or C/S scheduled: no       LAST PAP:  1/31/20- negative         P) s/s ptl vs general discomforts. Fetal movements reviewed. General ed and anticipatory guidance. Nutrition/exercise/vitamin. Plans breast Plans pp contraception- unsure  Continue PNV.

## 2020-07-11 ENCOUNTER — HOSPITAL ENCOUNTER (OUTPATIENT)
Facility: MEDICAL CENTER | Age: 25
End: 2020-07-12
Attending: OBSTETRICS & GYNECOLOGY | Admitting: OBSTETRICS & GYNECOLOGY
Payer: MEDICAID

## 2020-07-11 VITALS
HEIGHT: 62 IN | HEART RATE: 107 BPM | WEIGHT: 162 LBS | BODY MASS INDEX: 29.81 KG/M2 | SYSTOLIC BLOOD PRESSURE: 134 MMHG | TEMPERATURE: 97.4 F | DIASTOLIC BLOOD PRESSURE: 75 MMHG

## 2020-07-11 PROBLEM — U07.1 COVID-19 VIRUS INFECTION: Status: ACTIVE | Noted: 2020-07-11

## 2020-07-11 LAB
ALBUMIN SERPL BCP-MCNC: 3.3 G/DL (ref 3.2–4.9)
ALBUMIN/GLOB SERPL: 1.1 G/DL
ALP SERPL-CCNC: 96 U/L (ref 30–99)
ALT SERPL-CCNC: 12 U/L (ref 2–50)
ANION GAP SERPL CALC-SCNC: 11 MMOL/L (ref 7–16)
APPEARANCE UR: CLEAR
AST SERPL-CCNC: 18 U/L (ref 12–45)
BASOPHILS # BLD AUTO: 0.5 % (ref 0–1.8)
BASOPHILS # BLD: 0.04 K/UL (ref 0–0.12)
BILIRUB SERPL-MCNC: <0.2 MG/DL (ref 0.1–1.5)
BUN SERPL-MCNC: 10 MG/DL (ref 8–22)
CALCIUM SERPL-MCNC: 8.9 MG/DL (ref 8.5–10.5)
CHLORIDE SERPL-SCNC: 101 MMOL/L (ref 96–112)
CO2 SERPL-SCNC: 21 MMOL/L (ref 20–33)
COLOR UR AUTO: YELLOW
COVID ORDER STATUS COVID19: NORMAL
CREAT SERPL-MCNC: 0.7 MG/DL (ref 0.5–1.4)
EOSINOPHIL # BLD AUTO: 0.03 K/UL (ref 0–0.51)
EOSINOPHIL NFR BLD: 0.4 % (ref 0–6.9)
ERYTHROCYTE [DISTWIDTH] IN BLOOD BY AUTOMATED COUNT: 39.8 FL (ref 35.9–50)
GLOBULIN SER CALC-MCNC: 2.9 G/DL (ref 1.9–3.5)
GLUCOSE SERPL-MCNC: 88 MG/DL (ref 65–99)
GLUCOSE UR QL STRIP.AUTO: 250 MG/DL
HCT VFR BLD AUTO: 30.3 % (ref 37–47)
HGB BLD-MCNC: 9.5 G/DL (ref 12–16)
IMM GRANULOCYTES # BLD AUTO: 0.05 K/UL (ref 0–0.11)
IMM GRANULOCYTES NFR BLD AUTO: 0.6 % (ref 0–0.9)
KETONES UR QL STRIP.AUTO: ABNORMAL MG/DL
LEUKOCYTE ESTERASE UR QL STRIP.AUTO: NEGATIVE
LYMPHOCYTES # BLD AUTO: 1.19 K/UL (ref 1–4.8)
LYMPHOCYTES NFR BLD: 13.9 % (ref 22–41)
MCH RBC QN AUTO: 25.5 PG (ref 27–33)
MCHC RBC AUTO-ENTMCNC: 31.4 G/DL (ref 33.6–35)
MCV RBC AUTO: 81.2 FL (ref 81.4–97.8)
MONOCYTES # BLD AUTO: 0.71 K/UL (ref 0–0.85)
MONOCYTES NFR BLD AUTO: 8.3 % (ref 0–13.4)
NEUTROPHILS # BLD AUTO: 6.54 K/UL (ref 2–7.15)
NEUTROPHILS NFR BLD: 76.3 % (ref 44–72)
NITRITE UR QL STRIP.AUTO: NEGATIVE
NRBC # BLD AUTO: 0 K/UL
NRBC BLD-RTO: 0 /100 WBC
PH UR STRIP.AUTO: 6 [PH] (ref 5–8)
PLATELET # BLD AUTO: 210 K/UL (ref 164–446)
PMV BLD AUTO: 12 FL (ref 9–12.9)
POTASSIUM SERPL-SCNC: 4 MMOL/L (ref 3.6–5.5)
PROT SERPL-MCNC: 6.2 G/DL (ref 6–8.2)
PROT UR QL STRIP: NEGATIVE MG/DL
RBC # BLD AUTO: 3.73 M/UL (ref 4.2–5.4)
RBC UR QL AUTO: NEGATIVE
SARS-COV-2 RNA RESP QL NAA+PROBE: DETECTED
SODIUM SERPL-SCNC: 133 MMOL/L (ref 135–145)
SP GR UR STRIP.AUTO: >=1.03 (ref 1–1.03)
SPECIMEN SOURCE: ABNORMAL
WBC # BLD AUTO: 8.6 K/UL (ref 4.8–10.8)

## 2020-07-11 PROCEDURE — 36415 COLL VENOUS BLD VENIPUNCTURE: CPT

## 2020-07-11 PROCEDURE — 80053 COMPREHEN METABOLIC PANEL: CPT

## 2020-07-11 PROCEDURE — 99213 OFFICE O/P EST LOW 20 MIN: CPT | Mod: CS | Performed by: NURSE PRACTITIONER

## 2020-07-11 PROCEDURE — 81002 URINALYSIS NONAUTO W/O SCOPE: CPT

## 2020-07-11 PROCEDURE — C9803 HOPD COVID-19 SPEC COLLECT: HCPCS | Performed by: NURSE PRACTITIONER

## 2020-07-11 PROCEDURE — 59025 FETAL NON-STRESS TEST: CPT

## 2020-07-11 PROCEDURE — 85025 COMPLETE CBC W/AUTO DIFF WBC: CPT

## 2020-07-11 PROCEDURE — U0004 COV-19 TEST NON-CDC HGH THRU: HCPCS

## 2020-07-11 ASSESSMENT — FIBROSIS 4 INDEX: FIB4 SCORE: 0.39

## 2020-07-12 NOTE — PROGRESS NOTES
2135-Assumed pt care. Pt presents to L&D c/o cramping x3 days, nauseau and new onset cough x1 day. COVID swab already collected  2150-Updated RENARD Mcqueen CNM on pt arrival/complaint/status  2200-Call from lab, positive COVID results. Updated RENARD Mcqueen CNM on results. CBC and CMP orders received. Discharge pt home after lab results with Rx for Lovenox.  2230-reactive NST obtained  0011-Pt discharged home with FOB, ambulatory and undelivered. Provided pt with general instructions, PTL precautions, kick count instructions, and COVID-19 discharge instructions, understanding verbalized

## 2020-07-12 NOTE — PROGRESS NOTES
"S) Patient is a 24 yoa G 2 P 1001 at 33 1/7 week gestation. She presents today for mild uterine cramping x 3 days, nausea with no vomit and a mild cough x 1 day. She states she feels \"not like myself\". She endorses that her son is sick but he has swabbed negative for COVID.     O) cx deferred                VSS /75. Pulse ox 97-98%. Afebile 97.4. Pulse 107         FHT Baseline 140, pos accels, no decels, moderate variability. No UC's or uterine irrit noted.         Covid 19 positive lab        Results for MICHELLE MENARD (MRN [id number]) as of 7/11/2020 23:55   Ref. Range 7/11/2020 20:40 7/11/2020 22:26 7/11/2020 22:37   WBC Latest Ref Range: 4.8 - 10.8 K/uL   8.6   RBC Latest Ref Range: 4.20 - 5.40 M/uL   3.73 (L)   Hemoglobin Latest Ref Range: 12.0 - 16.0 g/dL   9.5 (L)   Hematocrit Latest Ref Range: 37.0 - 47.0 %   30.3 (L)   MCV Latest Ref Range: 81.4 - 97.8 fL   81.2 (L)   MCH Latest Ref Range: 27.0 - 33.0 pg   25.5 (L)   MCHC Latest Ref Range: 33.6 - 35.0 g/dL   31.4 (L)   RDW Latest Ref Range: 35.9 - 50.0 fL   39.8   Platelet Count Latest Ref Range: 164 - 446 K/uL   210   MPV Latest Ref Range: 9.0 - 12.9 fL   12.0   Neutrophils-Polys Latest Ref Range: 44.00 - 72.00 %   76.30 (H)   Neutrophils (Absolute) Latest Ref Range: 2.00 - 7.15 K/uL   6.54   Lymphocytes Latest Ref Range: 22.00 - 41.00 %   13.90 (L)   Lymphs (Absolute) Latest Ref Range: 1.00 - 4.80 K/uL   1.19   Monocytes Latest Ref Range: 0.00 - 13.40 %   8.30   Monos (Absolute) Latest Ref Range: 0.00 - 0.85 K/uL   0.71   Eosinophils Latest Ref Range: 0.00 - 6.90 %   0.40   Eos (Absolute) Latest Ref Range: 0.00 - 0.51 K/uL   0.03   Basophils Latest Ref Range: 0.00 - 1.80 %   0.50   Baso (Absolute) Latest Ref Range: 0.00 - 0.12 K/uL   0.04   Immature Granulocytes Latest Ref Range: 0.00 - 0.90 %   0.60   Immature Granulocytes (abs) Latest Ref Range: 0.00 - 0.11 K/uL   0.05   Nucleated RBC Latest Units: /100 WBC   0.00   NRBC (Absolute) Latest " Units: K/uL   0.00   Sodium Latest Ref Range: 135 - 145 mmol/L   133 (L)   Potassium Latest Ref Range: 3.6 - 5.5 mmol/L   4.0   Chloride Latest Ref Range: 96 - 112 mmol/L   101   Co2 Latest Ref Range: 20 - 33 mmol/L   21   Anion Gap Latest Ref Range: 7.0 - 16.0    11.0   Glucose Latest Ref Range: 65 - 99 mg/dL   88   Bun Latest Ref Range: 8 - 22 mg/dL   10   Creatinine Latest Ref Range: 0.50 - 1.40 mg/dL   0.70   GFR If  Latest Ref Range: >60 mL/min/1.73 m 2   >60   GFR If Non  Latest Ref Range: >60 mL/min/1.73 m 2   >60   Calcium Latest Ref Range: 8.5 - 10.5 mg/dL   8.9   AST(SGOT) Latest Ref Range: 12 - 45 U/L   18   ALT(SGPT) Latest Ref Range: 2 - 50 U/L   12   Alkaline Phosphatase Latest Ref Range: 30 - 99 U/L   96   Total Bilirubin Latest Ref Range: 0.1 - 1.5 mg/dL   <0.2   Albumin Latest Ref Range: 3.2 - 4.9 g/dL   3.3   Total Protein Latest Ref Range: 6.0 - 8.2 g/dL   6.2   Globulin Latest Ref Range: 1.9 - 3.5 g/dL   2.9   A-G Ratio Latest Units: g/dL   1.1   POC Color Unknown  Yellow    POC Appearance Unknown  Clear    POC Specific Gravity Latest Ref Range: 1.005 - 1.030   >=1.030 (A)    POC Urine PH Latest Ref Range: 5.0 - 8.0   6.0    POC Glucose Latest Ref Range: Negative mg/dL  250 (A)    POC Ketones Latest Ref Range: Negative mg/dL  Trace (A)    POC Protein Latest Ref Range: Negative mg/dL  Negative    POC Nitrites Latest Ref Range: Negative   Negative    POC Leukocyte Esterase Latest Ref Range: Negative   Negative    POC Blood Latest Ref Range: Negative   Negative    COVID Order Status Unknown Received     SARS-CoV-2 by PCR Unknown DETECTED (AA)     SARS-CoV-2 Source Unknown NP Swab          A) Covid 19 positive       Stable maternal/fetal status       Category one nst    P) discussed POC with Dr. Bush. We have given her a script for Lovenox. She will bring to appt that she will make as last patient of the day in the clinic on Monday. S/s of worsening infection  reviewed. All in the family/household need to isolate.

## 2020-07-13 ENCOUNTER — APPOINTMENT (OUTPATIENT)
Dept: RADIOLOGY | Facility: MEDICAL CENTER | Age: 25
End: 2020-07-13
Attending: OBSTETRICS & GYNECOLOGY
Payer: MEDICAID

## 2020-07-13 ENCOUNTER — HOSPITAL ENCOUNTER (OUTPATIENT)
Facility: MEDICAL CENTER | Age: 25
End: 2020-07-14
Attending: OBSTETRICS & GYNECOLOGY | Admitting: OBSTETRICS & GYNECOLOGY
Payer: MEDICAID

## 2020-07-13 LAB
ALBUMIN SERPL BCP-MCNC: 3.3 G/DL (ref 3.2–4.9)
ALBUMIN/GLOB SERPL: 1.1 G/DL
ALP SERPL-CCNC: 102 U/L (ref 30–99)
ALT SERPL-CCNC: 15 U/L (ref 2–50)
ANION GAP SERPL CALC-SCNC: 12 MMOL/L (ref 7–16)
APPEARANCE UR: CLEAR
APTT PPP: 27 SEC (ref 24.7–36)
AST SERPL-CCNC: 18 U/L (ref 12–45)
BASOPHILS # BLD AUTO: 0.6 % (ref 0–1.8)
BASOPHILS # BLD: 0.04 K/UL (ref 0–0.12)
BILIRUB SERPL-MCNC: 0.2 MG/DL (ref 0.1–1.5)
BUN SERPL-MCNC: 6 MG/DL (ref 8–22)
CALCIUM SERPL-MCNC: 8.6 MG/DL (ref 8.5–10.5)
CHLORIDE SERPL-SCNC: 104 MMOL/L (ref 96–112)
CK SERPL-CCNC: 29 U/L (ref 0–154)
CO2 SERPL-SCNC: 21 MMOL/L (ref 20–33)
COLOR UR AUTO: ABNORMAL
CREAT SERPL-MCNC: 0.57 MG/DL (ref 0.5–1.4)
CRP SERPL HS-MCNC: 1.14 MG/DL (ref 0–0.75)
EOSINOPHIL # BLD AUTO: 0.01 K/UL (ref 0–0.51)
EOSINOPHIL NFR BLD: 0.1 % (ref 0–6.9)
ERYTHROCYTE [DISTWIDTH] IN BLOOD BY AUTOMATED COUNT: 39.7 FL (ref 35.9–50)
FERRITIN SERPL-MCNC: 9.5 NG/ML (ref 10–291)
FIBRINOGEN PPP-MCNC: 419 MG/DL (ref 215–460)
FIBRONECTIN FETAL SPEC QL: NEGATIVE
GLOBULIN SER CALC-MCNC: 3 G/DL (ref 1.9–3.5)
GLUCOSE SERPL-MCNC: 99 MG/DL (ref 65–99)
GLUCOSE UR QL STRIP.AUTO: NEGATIVE MG/DL
HCT VFR BLD AUTO: 30.7 % (ref 37–47)
HGB BLD-MCNC: 9.7 G/DL (ref 12–16)
IMM GRANULOCYTES # BLD AUTO: 0.05 K/UL (ref 0–0.11)
IMM GRANULOCYTES NFR BLD AUTO: 0.7 % (ref 0–0.9)
INR PPP: 0.86 (ref 0.87–1.13)
KETONES UR QL STRIP.AUTO: 15 MG/DL
LDH SERPL L TO P-CCNC: 168 U/L (ref 107–266)
LEUKOCYTE ESTERASE UR QL STRIP.AUTO: NEGATIVE
LYMPHOCYTES # BLD AUTO: 1.06 K/UL (ref 1–4.8)
LYMPHOCYTES NFR BLD: 14.9 % (ref 22–41)
MCH RBC QN AUTO: 25.3 PG (ref 27–33)
MCHC RBC AUTO-ENTMCNC: 31.6 G/DL (ref 33.6–35)
MCV RBC AUTO: 79.9 FL (ref 81.4–97.8)
MONOCYTES # BLD AUTO: 0.44 K/UL (ref 0–0.85)
MONOCYTES NFR BLD AUTO: 6.2 % (ref 0–13.4)
NEUTROPHILS # BLD AUTO: 5.51 K/UL (ref 2–7.15)
NEUTROPHILS NFR BLD: 77.5 % (ref 44–72)
NITRITE UR QL STRIP.AUTO: NEGATIVE
NRBC # BLD AUTO: 0 K/UL
NRBC BLD-RTO: 0 /100 WBC
PH UR STRIP.AUTO: 6 [PH] (ref 5–8)
PLATELET # BLD AUTO: 188 K/UL (ref 164–446)
PMV BLD AUTO: 11.7 FL (ref 9–12.9)
POTASSIUM SERPL-SCNC: 3.7 MMOL/L (ref 3.6–5.5)
PROT SERPL-MCNC: 6.3 G/DL (ref 6–8.2)
PROT UR QL STRIP: NEGATIVE MG/DL
PROTHROMBIN TIME: 12 SEC (ref 12–14.6)
RBC # BLD AUTO: 3.84 M/UL (ref 4.2–5.4)
RBC UR QL AUTO: NEGATIVE
SODIUM SERPL-SCNC: 137 MMOL/L (ref 135–145)
SP GR UR STRIP.AUTO: 1.02 (ref 1–1.03)
TRIGL SERPL-MCNC: 306 MG/DL (ref 0–149)
TROPONIN T SERPL-MCNC: <6 NG/L (ref 6–19)
WBC # BLD AUTO: 7.1 K/UL (ref 4.8–10.8)

## 2020-07-13 PROCEDURE — 76816 OB US FOLLOW-UP PER FETUS: CPT

## 2020-07-13 PROCEDURE — 83615 LACTATE (LD) (LDH) ENZYME: CPT

## 2020-07-13 PROCEDURE — A9270 NON-COVERED ITEM OR SERVICE: HCPCS | Performed by: FAMILY MEDICINE

## 2020-07-13 PROCEDURE — 80053 COMPREHEN METABOLIC PANEL: CPT

## 2020-07-13 PROCEDURE — 82550 ASSAY OF CK (CPK): CPT

## 2020-07-13 PROCEDURE — 85384 FIBRINOGEN ACTIVITY: CPT

## 2020-07-13 PROCEDURE — 700105 HCHG RX REV CODE 258

## 2020-07-13 PROCEDURE — 82731 ASSAY OF FETAL FIBRONECTIN: CPT

## 2020-07-13 PROCEDURE — 85730 THROMBOPLASTIN TIME PARTIAL: CPT

## 2020-07-13 PROCEDURE — 84484 ASSAY OF TROPONIN QUANT: CPT

## 2020-07-13 PROCEDURE — 36415 COLL VENOUS BLD VENIPUNCTURE: CPT | Mod: XU

## 2020-07-13 PROCEDURE — 700111 HCHG RX REV CODE 636 W/ 250 OVERRIDE (IP): Performed by: OBSTETRICS & GYNECOLOGY

## 2020-07-13 PROCEDURE — 71045 X-RAY EXAM CHEST 1 VIEW: CPT

## 2020-07-13 PROCEDURE — 81002 URINALYSIS NONAUTO W/O SCOPE: CPT

## 2020-07-13 PROCEDURE — 85025 COMPLETE CBC W/AUTO DIFF WBC: CPT

## 2020-07-13 PROCEDURE — 85610 PROTHROMBIN TIME: CPT

## 2020-07-13 PROCEDURE — 59025 FETAL NON-STRESS TEST: CPT

## 2020-07-13 PROCEDURE — 84478 ASSAY OF TRIGLYCERIDES: CPT

## 2020-07-13 PROCEDURE — 86140 C-REACTIVE PROTEIN: CPT

## 2020-07-13 PROCEDURE — 700102 HCHG RX REV CODE 250 W/ 637 OVERRIDE(OP): Performed by: FAMILY MEDICINE

## 2020-07-13 PROCEDURE — 700102 HCHG RX REV CODE 250 W/ 637 OVERRIDE(OP): Performed by: OBSTETRICS & GYNECOLOGY

## 2020-07-13 PROCEDURE — 82728 ASSAY OF FERRITIN: CPT

## 2020-07-13 PROCEDURE — A9270 NON-COVERED ITEM OR SERVICE: HCPCS | Performed by: OBSTETRICS & GYNECOLOGY

## 2020-07-13 RX ORDER — SODIUM CHLORIDE, SODIUM LACTATE, POTASSIUM CHLORIDE, CALCIUM CHLORIDE 600; 310; 30; 20 MG/100ML; MG/100ML; MG/100ML; MG/100ML
INJECTION, SOLUTION INTRAVENOUS
Status: COMPLETED
Start: 2020-07-13 | End: 2020-07-13

## 2020-07-13 RX ORDER — LEVOTHYROXINE SODIUM 300 UG/1
150 TABLET ORAL
Status: DISCONTINUED | OUTPATIENT
Start: 2020-07-14 | End: 2020-07-14 | Stop reason: HOSPADM

## 2020-07-13 RX ORDER — SODIUM CHLORIDE, SODIUM LACTATE, POTASSIUM CHLORIDE, CALCIUM CHLORIDE 600; 310; 30; 20 MG/100ML; MG/100ML; MG/100ML; MG/100ML
INJECTION, SOLUTION INTRAVENOUS CONTINUOUS
Status: DISCONTINUED | OUTPATIENT
Start: 2020-07-13 | End: 2020-07-14 | Stop reason: HOSPADM

## 2020-07-13 RX ORDER — ACETAMINOPHEN 325 MG/1
650 TABLET ORAL EVERY 6 HOURS PRN
Status: DISCONTINUED | OUTPATIENT
Start: 2020-07-13 | End: 2020-07-14 | Stop reason: HOSPADM

## 2020-07-13 RX ORDER — FERROUS SULFATE 325(65) MG
325 TABLET ORAL
Status: DISCONTINUED | OUTPATIENT
Start: 2020-07-13 | End: 2020-07-14 | Stop reason: HOSPADM

## 2020-07-13 RX ADMIN — FERROUS SULFATE TAB 325 MG (65 MG ELEMENTAL FE) 325 MG: 325 (65 FE) TAB at 18:46

## 2020-07-13 RX ADMIN — SODIUM CHLORIDE, POTASSIUM CHLORIDE, SODIUM LACTATE AND CALCIUM CHLORIDE 1000 ML: 600; 310; 30; 20 INJECTION, SOLUTION INTRAVENOUS at 14:33

## 2020-07-13 RX ADMIN — ENOXAPARIN SODIUM 40 MG: 100 INJECTION SUBCUTANEOUS at 14:38

## 2020-07-13 RX ADMIN — ACETAMINOPHEN 650 MG: 325 TABLET, FILM COATED ORAL at 17:00

## 2020-07-13 ASSESSMENT — PATIENT HEALTH QUESTIONNAIRE - PHQ9
1. LITTLE INTEREST OR PLEASURE IN DOING THINGS: NOT AT ALL
2. FEELING DOWN, DEPRESSED, IRRITABLE, OR HOPELESS: NOT AT ALL
2. FEELING DOWN, DEPRESSED, IRRITABLE, OR HOPELESS: NOT AT ALL
SUM OF ALL RESPONSES TO PHQ9 QUESTIONS 1 AND 2: 0
SUM OF ALL RESPONSES TO PHQ9 QUESTIONS 1 AND 2: 0
1. LITTLE INTEREST OR PLEASURE IN DOING THINGS: NOT AT ALL

## 2020-07-13 ASSESSMENT — LIFESTYLE VARIABLES
ALCOHOL_USE: NO
EVER_SMOKED: NEVER

## 2020-07-13 ASSESSMENT — FIBROSIS 4 INDEX: FIB4 SCORE: 0.54

## 2020-07-13 NOTE — PROGRESS NOTES
1530- Report received from OLE Garcia RN.  1630- Dr. Bundy at the bedside, SVE unchanged. Orders for LR bolus, food and tylenol. If pt has cramping then Dr. Bundy to order procardia.  1635- LR bolus started.  1700- tylenol administered, per Dr. Bundy.  1715- portable chest x-ray performed.  1730- pt sitting up to eat.  1800- chest x-ray WDL.  1900- Report given to ZION Garza RN.

## 2020-07-13 NOTE — PROGRESS NOTES
" at 33-3, hx c/s for breech, Graves disease, Covid + as of     Pt presents with c/o worsening uterine tightening and cramping, and pressure with urination (although no pain) since . Denies LOF and VB. Reports +FM. Denies recent intercourse.     Additionally, pt reports \"I don't feel good at all\" stating when she tested pos for Covid she only felt pain with swallowing. She now reports symptoms including: bone pain, HA, loss of taste and smell, nasal congestion, chills and sweats, pressure in her chest, and \"it's hard to take a deep breath.\" Reports an infrequent cough, mostly at night.     Taking PNV, Levothyroxine 150 mg, and has virtual appt today at 1630 to teach her to administer Enoxaparin 40 mg daily.    Monitors placed. Vitals and Physical assessment as charted. No cough noted. Lungs clear to ausculation. POC discussed.     1215- Report to Dr. Lopez. Orders received for CBC, fFN, and cervical exam.  1250- fFN collected. SVE 1/thick/floating, anterior, medium. Water and juice provided with encouragement to increase oral fluids.   1305- Report to Dr. Lopez.   1415- Orders received from Dr. Bundy. Will start fluids and adminster Lovenox.   1530- Report to DAYNE Lima RN.         "

## 2020-07-14 VITALS
TEMPERATURE: 98 F | BODY MASS INDEX: 29.81 KG/M2 | WEIGHT: 162 LBS | RESPIRATION RATE: 17 BRPM | SYSTOLIC BLOOD PRESSURE: 107 MMHG | OXYGEN SATURATION: 99 % | DIASTOLIC BLOOD PRESSURE: 55 MMHG | HEIGHT: 62 IN | HEART RATE: 92 BPM

## 2020-07-14 PROCEDURE — 59025 FETAL NON-STRESS TEST: CPT

## 2020-07-14 PROCEDURE — 700102 HCHG RX REV CODE 250 W/ 637 OVERRIDE(OP): Performed by: OBSTETRICS & GYNECOLOGY

## 2020-07-14 PROCEDURE — 700105 HCHG RX REV CODE 258: Performed by: OBSTETRICS & GYNECOLOGY

## 2020-07-14 PROCEDURE — 700102 HCHG RX REV CODE 250 W/ 637 OVERRIDE(OP): Performed by: FAMILY MEDICINE

## 2020-07-14 PROCEDURE — A9270 NON-COVERED ITEM OR SERVICE: HCPCS | Performed by: FAMILY MEDICINE

## 2020-07-14 PROCEDURE — 700111 HCHG RX REV CODE 636 W/ 250 OVERRIDE (IP): Performed by: OBSTETRICS & GYNECOLOGY

## 2020-07-14 PROCEDURE — 99213 OFFICE O/P EST LOW 20 MIN: CPT | Performed by: OBSTETRICS & GYNECOLOGY

## 2020-07-14 PROCEDURE — A9270 NON-COVERED ITEM OR SERVICE: HCPCS | Performed by: OBSTETRICS & GYNECOLOGY

## 2020-07-14 RX ADMIN — FERROUS SULFATE TAB 325 MG (65 MG ELEMENTAL FE) 325 MG: 325 (65 FE) TAB at 08:48

## 2020-07-14 RX ADMIN — LEVOTHYROXINE SODIUM 150 MCG: 0.3 TABLET ORAL at 06:46

## 2020-07-14 RX ADMIN — FERROUS SULFATE TAB 325 MG (65 MG ELEMENTAL FE) 325 MG: 325 (65 FE) TAB at 14:06

## 2020-07-14 RX ADMIN — ENOXAPARIN SODIUM 40 MG: 100 INJECTION SUBCUTANEOUS at 06:46

## 2020-07-14 RX ADMIN — SODIUM CHLORIDE, POTASSIUM CHLORIDE, SODIUM LACTATE AND CALCIUM CHLORIDE: 600; 310; 30; 20 INJECTION, SOLUTION INTRAVENOUS at 02:29

## 2020-07-14 RX ADMIN — ACETAMINOPHEN 650 MG: 325 TABLET, FILM COATED ORAL at 00:12

## 2020-07-14 NOTE — PROGRESS NOTES
190) Report received from DAYNE Lima RN, POC discussed, assumed care of patient at this time  ) Report to Dr. Bundy, orders received to take patient off continuous fetal monitoring and to switch to NST q shift but to keep patient on continuous TOCO monitoring, orders verified with provider.  ) Assessment performed. Patient is a  EDC  which makes her 33.3 weeks. Patient denies any vaginal bleeding or LOF. Patient states she is having intermittent contractions at this time (states maybe has 1 every 15 minutes). Patient has no abdomen tenderness. States positive fetal movement. Patient educated concerning lovenox and administration, this RN will reeducate this patient in AM to ensure understanding.  ) TOCO repositioned, patient denies any needs or complaints and states she was able to take a nap after this RN's initial assessment  0229) IVF restarted, patient has felt three painful contractions in the last hour, TOCO repositioned, this RN palpated one mild contraction during repositioning.   0345) Patient laying comfortably in bed, patient states she has not felt any more contractions since fluids and voiding. This RN will continue to monitor.   0646) Patient re-educated concerning lovenox subcutaneous administration, and demonstrated administration of Lovenox to herself, patient verbalizes understanding of cleaning site, letting it dry, pinching subcutaneous tissue and then administering medication. Patient verbalizes entire administration and understanding of all the steps.   0720) Report to DAYNE Lima RN, POC discussed

## 2020-07-14 NOTE — DISCHARGE INSTRUCTIONS
Pre-term Labor (<37 weeks):  Call your physician or return to the hospital if:  · You have painless regular contractions more than 4 in one hour.  · Your water breaks (remember time and color).  · You have menstrual-like cramps, a low dull backache or pressure in your pelvis or back.  · Your baby does not move enough to complete the daily kick count (10 movements in 2 hours).  · Your baby moves much less often than on the days before or you have not felt your baby move all day.  · Please review the MEDICATION LIST section of your AFTER VISIT SUMMARY document.  · Take your medication as prescribed  General Instructions:  · If you think you are in labor, time contractions (lying on your left side) from the beginning of one contraction to the beginning of the next contraction for at least one hour.  · Increase fluid intake: you should consume 10-12 8 oz glasses of non-caffeinated fluid per day.  · Report any pressure or burning on urination to your physician.  · Monitor fetal movement: If you notice an absence or decrease in fetal movement, drink a large glass of water and rest on your side.  If there is no increase in movement, call your physician or go to the hospital for further evaluation.  · Report any sudden, sharp abdominal pain.  · Report any bleeding.  Spotting or pinkish discharge is normal after vaginal exam.  You may also spot after sexual intercourse.    Pre-term Labor (<37 weeks):  Call your physician or return to the hospital if:  · You have painless regular contractions more than 4 in one hour.  · Your water breaks (remember time and color).  · You have menstrual-like cramps, a low dull backache or pressure in your pelvis or back.  · Your baby does not move enough to complete the daily kick count (10 movements in 2 hours).  · Your baby moves much less often than on the days before or you have not felt your baby move all day.  · Please review the MEDICATION LIST section of your AFTER VISIT SUMMARY  document.  · Take your medication as prescribed      Other Instructions:  Please carefully review your entire AFTER VISIT SUMMARY document for all discharge instructions.COVID-19 Frequently Asked Questions  COVID-19 (coronavirus disease) is an infection that is caused by a large family of viruses. Some viruses cause illness in people and others cause illness in animals like camels, cats, and bats. In some cases, the viruses that cause illness in animals can spread to humans.  Where did the coronavirus come from?  In December 2019, [place] told the World Health Organization (WHO) of several cases of lung disease (human respiratory illness). These cases were linked to an open seafood and livestock market in the [facility]. The link to the seafood and livestock market suggests that the virus may have spread from animals to humans. However, since that first outbreak in December, the virus has also been shown to spread from person to person.  What is the name of the disease and the virus?  Disease name  Early on, this disease was called novel coronavirus. This is because scientists determined that the disease was caused by a new (novel) respiratory virus. The World Health Organization (WHO) has now named the disease COVID-19, or coronavirus disease.  Virus name  The virus that causes the disease is called severe acute respiratory syndrome coronavirus 2 (SARS-CoV-2).  More information on disease and virus naming  World Health Organization (WHO): www.who.int/emergencies/diseases/novel-coronavirus-2019/technical-guidance/naming-the-coronavirus-disease-(covid-2019)-and-the-virus-that-causes-it  Who is at risk for complications from coronavirus disease?  Some people may be at higher risk for complications from coronavirus disease. This includes older adults and people who have chronic diseases, such as heart disease, diabetes, and lung disease.  If you are at higher risk for complications, take these extra  precautions:  · Avoid close contact with people who are sick or have a fever or cough. Stay at least 3-6 ft (1-2 m) away from them, if possible.  · Wash your hands often with soap and water for at least 20 seconds.  · Avoid touching your face, mouth, nose, or eyes.  · Keep supplies on hand at home, such as food, medicine, and cleaning supplies.  · Stay home as much as possible.  · Avoid social gatherings and travel.  How does coronavirus disease spread?  The virus that causes coronavirus disease spreads easily from person to person (is contagious). There are also cases of community-spread disease. This means the disease has spread to:  · People who have no known contact with other infected people.  · People who have not traveled to areas where there are known cases.  It appears to spread from one person to another through droplets from coughing or sneezing.  Can I get the virus from touching surfaces or objects?  There is still a lot that we do not know about the virus that causes coronavirus disease. Scientists are basing a lot of information on what they know about similar viruses, such as:  · Viruses cannot generally survive on surfaces for long. They need a human body (host) to survive.  · It is more likely that the virus is spread by close contact with people who are sick (direct contact), such as through:  ? Shaking hands or hugging.  ? Breathing in respiratory droplets that travel through the air. This can happen when an infected person coughs or sneezes on or near other people.  · It is less likely that the virus is spread when a person touches a surface or object that has the virus on it (indirect contact). The virus may be able to enter the body if the person touches a surface or object and then touches his or her face, eyes, nose, or mouth.  Can a person spread the virus without having symptoms of the disease?  It may be possible for the virus to spread before a person has symptoms of the disease, but  this is most likely not the main way the virus is spreading. It is more likely for the virus to spread by being in close contact with people who are sick and breathing in the respiratory droplets of a sick person's cough or sneeze.  What are the symptoms of coronavirus disease?  Symptoms vary from person to person and can range from mild to severe. Symptoms may include:  · Fever.  · Cough.  · Tiredness, weakness, or fatigue.  · Fast breathing or feeling short of breath.  These symptoms can appear anywhere from 2 to 14 days after you have been exposed to the virus. If you develop symptoms, call your health care provider. People with severe symptoms may need hospital care.  If I am exposed to the virus, how long does it take before symptoms start?  Symptoms of coronavirus disease may appear anywhere from 2 to 14 days after a person has been exposed to the virus. If you develop symptoms, call your health care provider.  Should I be tested for this virus?  Your health care provider will decide whether to test you based on your symptoms, history of exposure, and your risk factors.  How does a health care provider test for this virus?  Health care providers will collect samples to send for testing. Samples may include:  · Taking a swab of fluid from the nose.  · Taking fluid from the lungs by having you cough up mucus (sputum) into a sterile cup.  · Taking a blood sample.  · Taking a stool or urine sample.  Is there a treatment or vaccine for this virus?  Currently, there is no vaccine to prevent coronavirus disease. Also, there are no medicines like antibiotics or antivirals to treat the virus. A person who becomes sick is given supportive care, which means rest and fluids. A person may also relieve his or her symptoms by using over-the-counter medicines that treat sneezing, coughing, and runny nose. These are the same medicines that a person takes for the common cold.  If you develop symptoms, call your health care  provider. People with severe symptoms may need hospital care.  What can I do to protect myself and my family from this virus?         You can protect yourself and your family by taking the same actions that you would take to prevent the spread of other viruses. Take the following actions:  · Wash your hands often with soap and water for at least 20 seconds. If soap and water are not available, use alcohol-based hand .  · Avoid touching your face, mouth, nose, or eyes.  · Cough or sneeze into a tissue, sleeve, or elbow. Do not cough or sneeze into your hand or the air.  ? If you cough or sneeze into a tissue, throw it away immediately and wash your hands.  · Disinfect objects and surfaces that you frequently touch every day.  · Avoid close contact with people who are sick or have a fever or cough. Stay at least 3-6 ft (1-2 m) away from them, if possible.  · Stay home if you are sick, except to get medical care. Call your health care provider before you get medical care.  · Make sure your vaccines are up to date. Ask your health care provider what vaccines you need.  What should I do if I need to travel?  Follow travel recommendations from your local health authority, the CDC, and WHO.  Travel information and advice  · Centers for Disease Control and Prevention (CDC): www.cdc.gov/coronavirus/2019-ncov/travelers/index.html  · World Health Organization (WHO): www.who.int/emergencies/diseases/novel-coronavirus-2019/travel-advice  Know the risks and take action to protect your health  · You are at higher risk of getting coronavirus disease if you are traveling to areas with an outbreak or if you are exposed to travelers from areas with an outbreak.  · Wash your hands often and practice good hygiene to lower the risk of catching or spreading the virus.  What should I do if I am sick?  General instructions to stop the spread of infection  · Wash your hands often with soap and water for at least 20 seconds. If soap  and water are not available, use alcohol-based hand .  · Cough or sneeze into a tissue, sleeve, or elbow. Do not cough or sneeze into your hand or the air.  · If you cough or sneeze into a tissue, throw it away immediately and wash your hands.  · Stay home unless you must get medical care. Call your health care provider or local health authority before you get medical care.  · Avoid public areas. Do not take public transportation, if possible.  · If you can, wear a mask if you must go out of the house or if you are in close contact with someone who is not sick.  Keep your home clean  · Disinfect objects and surfaces that are frequently touched every day. This may include:  ? Counters and tables.  ? Doorknobs and light switches.  ? Sinks and faucets.  ? Electronics such as phones, remote controls, keyboards, computers, and tablets.  · Wash dishes in hot, soapy water or use a . Air-dry your dishes.  · Wash laundry in hot water.  Prevent infecting other household members  · Let healthy household members care for children and pets, if possible. If you have to care for children or pets, wash your hands often and wear a mask.  · Sleep in a different bedroom or bed, if possible.  · Do not share personal items, such as razors, toothbrushes, deodorant, adams, brushes, towels, and washcloths.  Where to find more information  Centers for Disease Control and Prevention (CDC)  · Information and news updates: www.cdc.gov/coronavirus/2019-ncov  World Health Organization (WHO)  · Information and news updates: www.who.int/emergencies/diseases/novel-coronavirus-2019  · Coronavirus health topic: www.who.int/health-topics/coronavirus  · Questions and answers on COVID-19: www.who.int/news-room/q-a-detail/u-o-xksvstwxidutm  · Global tracker: who.[employer]  American Academy of Pediatrics (AAP)  · Information for families:  www.healthychildren.org/English/health-issues/conditions/chest-lungs/Pages/2019-Novel-Coronavirus.aspx  The coronavirus situation is changing rapidly. Check your local health authority website or the CDC and WHO websites for updates and news.  When should I contact a health care provider?  · Contact your health care provider if you have symptoms of an infection, such as fever or cough, and you:  ? Have been near anyone who is known to have coronavirus disease.  ? Have come into contact with a person who is suspected to have coronavirus disease.  ? Have traveled outside of the country.  When should I get emergency medical care?  · Get help right away by calling your local emergency services (911 in the [place] if you have:  ? Trouble breathing.  ? Pain or pressure in your chest.  ? Confusion.  ? Blue-tinged lips and fingernails.  ? Difficulty waking from sleep.  ? Symptoms that get worse.  Let the emergency medical personnel know if you think you have coronavirus disease.  Summary  · A new respiratory virus is spreading from person to person and causing COVID-19 (coronavirus disease).  · The virus that causes COVID-19 appears to spread easily. It spreads from one person to another through droplets from coughing or sneezing.  · Older adults and those with chronic diseases are at higher risk of disease. If you are at higher risk for complications, take extra precautions.  · There is currently no vaccine to prevent coronavirus disease. There are no medicines, such as antibiotics or antivirals, to treat the virus.  · You can protect yourself and your family by washing your hands often, avoiding touching your face, and covering your coughs and sneezes.  This information is not intended to replace advice given to you by your health care provider. Make sure you discuss any questions you have with your health care provider.  Document Released: 04/14/2020 Document Revised: 04/14/2020 Document Reviewed: 04/14/2020  [employer]  Patient Education © 2020 [employer]

## 2020-07-14 NOTE — CARE PLAN
Problem: Risk for Infection, Impaired Wound Healing  Goal: Remain free from signs and symptoms of infection  Outcome: PROGRESSING AS EXPECTED  Note: Assess pt for S/S of infection.     Problem: Fatigue  Goal: Patient will use techniques to conserve energy  Outcome: PROGRESSING AS EXPECTED  Intervention: Plan care to limit interruptions  Note: Care clustered to limit interruptions to promote rest and combat fatigue.

## 2020-07-14 NOTE — PROGRESS NOTES
IV removed, discharge instructions given, pt verbalized understanding. Pt ambulated off unit in stable condition.

## 2020-07-14 NOTE — PROGRESS NOTES
0700- Report received from ZION Garza RN.  Pt denies LOF or VB. Pt reports occasional, mild cramping. Pt reports +FM. Pt requesting to rest.  Pt denies SOB, pulse ox in place.  Ewen in place.  ZION Garza RN taught pt about lovenox administration, pt verbalizes understanding  0900- pt having UC's on monitor, pt denies UC's.  1050- pt has occasional, mild UC's with discomfort level remaining below 4/10 pain, per pt.  US and toco on, NST obtained.  1400- pt denies UC's. Pt has occasional mild cramping with 1/10 discomfort. Pt has HA, but is declining tylenol at this time.  1450- RN attempted to discuss POC with MD ANYA to get back to RN soon.  1530- Report given to LEMUEL Maynard RN.  1545- Dr. Collins at the bedside to assess pt.

## 2020-07-15 NOTE — DISCHARGE SUMMARY
"Discharge summary     Admit dx: covid+   Discharge dx: same     HPI: 25 yo  who was originally diagnosed with coronavirus on .  She was discharged home in stable condition.  She was readmitted on  with worsening symptoms including malaise, anosmia, contractions, and pelvic cramping.  She never had a fever but her T-max was 99.5.  Patient was not found to be in labor.  Growth ultrasound performed demonstrated estimated fetal weight in the 53rd percentile.  She did have elevated triglycerides in the 300s and her CRP was 1.14.  Patient maintained her oxygen saturation above 97% throughout the entire day.  She remained afebrile.  Did not complain of worsening symptoms.  Expressed desire to go home.    ROS:   Gen: denies fevers, general concerns  Abd: denies abd pain, N/V, constipation  : denies vaginal bleeding, discharge    /75   Pulse 65   Temp 36.4 °C (97.5 °F) (Temporal)   Resp 17   Ht 1.676 m (5' 6\")   Wt 85.1 kg (187 lb 9.8 oz)   LMP 2010   SpO2 98%   Breastfeeding? No   BMI 30.28 kg/m²      Lab Results   Component Value Date/Time    WBC 7.1 2020 12:41 PM    RBC 3.84 (L) 2020 12:41 PM    HEMOGLOBIN 9.7 (L) 2020 12:41 PM    HEMATOCRIT 30.7 (L) 2020 12:41 PM    MCV 79.9 (L) 2020 12:41 PM    MCH 25.3 (L) 2020 12:41 PM    MCHC 31.6 (L) 2020 12:41 PM    MPV 11.7 2020 12:41 PM    NEUTSPOLYS 77.50 (H) 2020 12:41 PM    LYMPHOCYTES 14.90 (L) 2020 12:41 PM    MONOCYTES 6.20 2020 12:41 PM    EOSINOPHILS 0.10 2020 12:41 PM    BASOPHILS 0.60 2020 12:41 PM    ANISOCYTOSIS 1+ 2016 03:22 PM        Gen: AAO, NAD      A/P:25 yo  with +COVID    Patient has prescription for Lovenox and understands that she will continue this until 36 weeks and then switch to heparin afterwards.  She knows that she needs to make an appointment with the office within a week for the end of the day for an NST and for evaluation " with the physician.    Malini Collins, DO   [facility], Women's Health

## 2020-07-21 ENCOUNTER — TELEPHONE (OUTPATIENT)
Dept: OBGYN | Facility: CLINIC | Age: 25
End: 2020-07-21

## 2020-07-21 NOTE — TELEPHONE ENCOUNTER
Called patient to check in with pregnancy and positive COVID status.  The patient is feeling better, denies any fever, chills, shortness of breath, headache, vision changes, any edema.  She states she is having contractions at night, but they resolve during the day.  Instructed that if they persist she should go to labor and delivery for evaluation.  Patient is continuing to use her Lovenox, will switch to heparin at 36 weeks.  Is to have a weekly appointment with NST at the end of the day with the physician.  Will change appointment time and see Dr. Collins tomorrow on 7/22 with NST.

## 2020-07-22 ENCOUNTER — APPOINTMENT (OUTPATIENT)
Dept: RADIOLOGY | Facility: MEDICAL CENTER | Age: 25
End: 2020-07-22
Attending: OBSTETRICS & GYNECOLOGY
Payer: MEDICAID

## 2020-07-22 ENCOUNTER — ROUTINE PRENATAL (OUTPATIENT)
Dept: OBGYN | Facility: CLINIC | Age: 25
End: 2020-07-22
Payer: MEDICAID

## 2020-07-22 ENCOUNTER — ROUTINE PRENATAL (OUTPATIENT)
Dept: OBGYN | Facility: CLINIC | Age: 25
End: 2020-07-22

## 2020-07-22 ENCOUNTER — HOSPITAL ENCOUNTER (OUTPATIENT)
Facility: MEDICAL CENTER | Age: 25
End: 2020-07-22
Attending: OBSTETRICS & GYNECOLOGY | Admitting: OBSTETRICS & GYNECOLOGY
Payer: MEDICAID

## 2020-07-22 VITALS
HEART RATE: 84 BPM | SYSTOLIC BLOOD PRESSURE: 121 MMHG | TEMPERATURE: 98 F | RESPIRATION RATE: 18 BRPM | BODY MASS INDEX: 29.44 KG/M2 | WEIGHT: 160 LBS | OXYGEN SATURATION: 98 % | HEIGHT: 62 IN | DIASTOLIC BLOOD PRESSURE: 70 MMHG

## 2020-07-22 VITALS — SYSTOLIC BLOOD PRESSURE: 121 MMHG | BODY MASS INDEX: 29.26 KG/M2 | WEIGHT: 160 LBS | DIASTOLIC BLOOD PRESSURE: 70 MMHG

## 2020-07-22 DIAGNOSIS — U07.1 COVID-19 VIRUS INFECTION: ICD-10-CM

## 2020-07-22 LAB
NST ACOUSTIC STIMULATION: NORMAL
NST ACTION NECESSARY: NORMAL
NST ASSESSMENT: NORMAL
NST BASELINE: NORMAL
NST INDICATIONS: NORMAL
NST OTHER DATA: NORMAL
NST READ BY: NORMAL
NST RETURN: NORMAL
NST UTERINE ACTIVITY: NORMAL

## 2020-07-22 PROCEDURE — 59025 FETAL NON-STRESS TEST: CPT | Performed by: OBSTETRICS & GYNECOLOGY

## 2020-07-22 PROCEDURE — 76819 FETAL BIOPHYS PROFIL W/O NST: CPT

## 2020-07-22 PROCEDURE — 90040 PR PRENATAL FOLLOW UP: CPT | Performed by: OBSTETRICS & GYNECOLOGY

## 2020-07-22 PROCEDURE — 59025 FETAL NON-STRESS TEST: CPT | Mod: XU

## 2020-07-22 ASSESSMENT — PAIN SCALES - GENERAL: PAINLEVEL: 0 - NO PAIN

## 2020-07-22 ASSESSMENT — FIBROSIS 4 INDEX
FIB4 SCORE: 0.54
FIB4 SCORE: 0.54

## 2020-07-22 NOTE — PROGRESS NOTES
S: Pt presents for routine OB follow up. Good fetal movement.  No vaginal bleeding, or leakage of fluid.  Feeling irregular contractions. Feels as though baby is not moving as much as normal.   Questions answered.    O: Wt 72.6 kg (160 lb)   LMP 11/22/2019 (Approximate)   BMI 29.26 kg/m²   Patients' weight gain, fluid intake and exercise level discussed.  Vitals, fundal height , fetal position, and FHR reviewed on flowsheet    Lab:  Recent Results (from the past 336 hour(s))   COVID/SARS CoV-2 PCR    Collection Time: 07/11/20  8:40 PM    Specimen: Nasopharyngeal; Respirate   Result Value Ref Range    COVID Order Status Received    SARS-CoV-2, PCR (In-House)    Collection Time: 07/11/20  8:40 PM   Result Value Ref Range    SARS-CoV-2 Source NP Swab     SARS-CoV-2 by PCR DETECTED (AA)    POC UA    Collection Time: 07/11/20 10:26 PM   Result Value Ref Range    POC Color Yellow     POC Appearance Clear     POC Glucose 250 (A) Negative mg/dL    POC Ketones Trace (A) Negative mg/dL    POC Specific Gravity >=1.030 (A) 1.005 - 1.030    POC Blood Negative Negative    POC Urine PH 6.0 5.0 - 8.0    POC Protein Negative Negative mg/dL    POC Nitrites Negative Negative    POC Leukocyte Esterase Negative Negative   CBC WITH DIFFERENTIAL    Collection Time: 07/11/20 10:37 PM   Result Value Ref Range    WBC 8.6 4.8 - 10.8 K/uL    RBC 3.73 (L) 4.20 - 5.40 M/uL    Hemoglobin 9.5 (L) 12.0 - 16.0 g/dL    Hematocrit 30.3 (L) 37.0 - 47.0 %    MCV 81.2 (L) 81.4 - 97.8 fL    MCH 25.5 (L) 27.0 - 33.0 pg    MCHC 31.4 (L) 33.6 - 35.0 g/dL    RDW 39.8 35.9 - 50.0 fL    Platelet Count 210 164 - 446 K/uL    MPV 12.0 9.0 - 12.9 fL    Neutrophils-Polys 76.30 (H) 44.00 - 72.00 %    Lymphocytes 13.90 (L) 22.00 - 41.00 %    Monocytes 8.30 0.00 - 13.40 %    Eosinophils 0.40 0.00 - 6.90 %    Basophils 0.50 0.00 - 1.80 %    Immature Granulocytes 0.60 0.00 - 0.90 %    Nucleated RBC 0.00 /100 WBC    Neutrophils (Absolute) 6.54 2.00 - 7.15 K/uL    Lymphs  (Absolute) 1.19 1.00 - 4.80 K/uL    Monos (Absolute) 0.71 0.00 - 0.85 K/uL    Eos (Absolute) 0.03 0.00 - 0.51 K/uL    Baso (Absolute) 0.04 0.00 - 0.12 K/uL    Immature Granulocytes (abs) 0.05 0.00 - 0.11 K/uL    NRBC (Absolute) 0.00 K/uL   Comp Metabolic Panel    Collection Time: 07/11/20 10:37 PM   Result Value Ref Range    Sodium 133 (L) 135 - 145 mmol/L    Potassium 4.0 3.6 - 5.5 mmol/L    Chloride 101 96 - 112 mmol/L    Co2 21 20 - 33 mmol/L    Anion Gap 11.0 7.0 - 16.0    Glucose 88 65 - 99 mg/dL    Bun 10 8 - 22 mg/dL    Creatinine 0.70 0.50 - 1.40 mg/dL    Calcium 8.9 8.5 - 10.5 mg/dL    AST(SGOT) 18 12 - 45 U/L    ALT(SGPT) 12 2 - 50 U/L    Alkaline Phosphatase 96 30 - 99 U/L    Total Bilirubin <0.2 0.1 - 1.5 mg/dL    Albumin 3.3 3.2 - 4.9 g/dL    Total Protein 6.2 6.0 - 8.2 g/dL    Globulin 2.9 1.9 - 3.5 g/dL    A-G Ratio 1.1 g/dL   ESTIMATED GFR    Collection Time: 07/11/20 10:37 PM   Result Value Ref Range    GFR If African American >60 >60 mL/min/1.73 m 2    GFR If Non African American >60 >60 mL/min/1.73 m 2   POC UA    Collection Time: 07/13/20 11:56 AM   Result Value Ref Range    POC Color Felisha     POC Appearance Clear     POC Glucose Negative Negative mg/dL    POC Ketones 15 (A) Negative mg/dL    POC Specific Gravity 1.020 1.005 - 1.030    POC Blood Negative Negative    POC Urine PH 6.0 5.0 - 8.0    POC Protein Negative Negative mg/dL    POC Nitrites Negative Negative    POC Leukocyte Esterase Negative Negative   fFN if less than 34 wks gestation - must be prior to vaginal exam    Collection Time: 07/13/20 12:30 PM   Result Value Ref Range    Fetal Fibronectin Negative    CBC WITH DIFFERENTIAL    Collection Time: 07/13/20 12:41 PM   Result Value Ref Range    WBC 7.1 4.8 - 10.8 K/uL    RBC 3.84 (L) 4.20 - 5.40 M/uL    Hemoglobin 9.7 (L) 12.0 - 16.0 g/dL    Hematocrit 30.7 (L) 37.0 - 47.0 %    MCV 79.9 (L) 81.4 - 97.8 fL    MCH 25.3 (L) 27.0 - 33.0 pg    MCHC 31.6 (L) 33.6 - 35.0 g/dL    RDW 39.7 35.9  - 50.0 fL    Platelet Count 188 164 - 446 K/uL    MPV 11.7 9.0 - 12.9 fL    Neutrophils-Polys 77.50 (H) 44.00 - 72.00 %    Lymphocytes 14.90 (L) 22.00 - 41.00 %    Monocytes 6.20 0.00 - 13.40 %    Eosinophils 0.10 0.00 - 6.90 %    Basophils 0.60 0.00 - 1.80 %    Immature Granulocytes 0.70 0.00 - 0.90 %    Nucleated RBC 0.00 /100 WBC    Neutrophils (Absolute) 5.51 2.00 - 7.15 K/uL    Lymphs (Absolute) 1.06 1.00 - 4.80 K/uL    Monos (Absolute) 0.44 0.00 - 0.85 K/uL    Eos (Absolute) 0.01 0.00 - 0.51 K/uL    Baso (Absolute) 0.04 0.00 - 0.12 K/uL    Immature Granulocytes (abs) 0.05 0.00 - 0.11 K/uL    NRBC (Absolute) 0.00 K/uL   Comp Metabolic Panel    Collection Time: 07/13/20  1:52 PM   Result Value Ref Range    Sodium 137 135 - 145 mmol/L    Potassium 3.7 3.6 - 5.5 mmol/L    Chloride 104 96 - 112 mmol/L    Co2 21 20 - 33 mmol/L    Anion Gap 12.0 7.0 - 16.0    Glucose 99 65 - 99 mg/dL    Bun 6 (L) 8 - 22 mg/dL    Creatinine 0.57 0.50 - 1.40 mg/dL    Calcium 8.6 8.5 - 10.5 mg/dL    AST(SGOT) 18 12 - 45 U/L    ALT(SGPT) 15 2 - 50 U/L    Alkaline Phosphatase 102 (H) 30 - 99 U/L    Total Bilirubin 0.2 0.1 - 1.5 mg/dL    Albumin 3.3 3.2 - 4.9 g/dL    Total Protein 6.3 6.0 - 8.2 g/dL    Globulin 3.0 1.9 - 3.5 g/dL    A-G Ratio 1.1 g/dL   Prothrombin Time    Collection Time: 07/13/20  1:52 PM   Result Value Ref Range    PT 12.0 12.0 - 14.6 sec    INR 0.86 (L) 0.87 - 1.13   APTT    Collection Time: 07/13/20  1:52 PM   Result Value Ref Range    APTT 27.0 24.7 - 36.0 sec   FIBRINOGEN    Collection Time: 07/13/20  1:52 PM   Result Value Ref Range    Fibrinogen 419 215 - 460 mg/dL   LDH    Collection Time: 07/13/20  1:52 PM   Result Value Ref Range    LDH Total 168 107 - 266 U/L   CREATINE KINASE    Collection Time: 07/13/20  1:52 PM   Result Value Ref Range    CPK Total 29 0 - 154 U/L   TROPONIN    Collection Time: 07/13/20  1:52 PM   Result Value Ref Range    Troponin T <6 6 - 19 ng/L   CRP QUANTITIVE (NON-CARDIAC)     Collection Time: 20  1:52 PM   Result Value Ref Range    Stat C-Reactive Protein 1.14 (H) 0.00 - 0.75 mg/dL   FERRITIN    Collection Time: 20  1:52 PM   Result Value Ref Range    Ferritin 9.5 (L) 10.0 - 291.0 ng/mL   Triglyceride    Collection Time: 20  1:52 PM   Result Value Ref Range    Triglycerides 306 (H) 0 - 149 mg/dL   ESTIMATED GFR    Collection Time: 20  1:52 PM   Result Value Ref Range    GFR If African American >60 >60 mL/min/1.73 m 2    GFR If Non African American >60 >60 mL/min/1.73 m 2       A/P:  24 y.o.  at 34w5d presents for routine obstetric follow-up.  Size equals dates and/or scan    1.  Continue prenatal vitamins.  2.  Fetal kick counts.  3.  Exercise at least 30 minutes daily.  4.  Drink at least 2L of water daily  5.  Labor precautions educated.  6.  Follow-up in 2 weeks.  7.  Covid+: FU in one week for weekly NST  8.  Irregular contractions- cervix closed  9.  NST: Baseline 145 with moderate variability.  Accelerations noted into the 160s.  1 variable deceleration noted lasting approximately 30 seconds down to the 90s that occurred with a contraction.  1 contraction noted over a 20-minute time span.  Indication is coronavirus.  Patient sent to labor and delivery for biophysical profile secondary to variable deceleration.  If BPP is reassuring, she is to follow-up in 1 week for an NST and again with the physician in 2 weeks for 36-week visit.  We discussed that retesting for coronavirus is available through the county to see if she is still positive.  As of right now, she is not having any symptoms.

## 2020-07-23 NOTE — DISCHARGE INSTRUCTIONS
General Instructions:  · If you think you are in labor, time contractions (lying on your left side) from the beginning of one contraction to the beginning of the next contraction for at least one hour.  · Increase fluid intake: you should consume 10-12 8 oz glasses of non-caffeinated fluid per day.  · Report any pressure or burning on urination to your physician.  · Monitor fetal movement: If you notice an absence or decrease in fetal movement, drink a large glass of water and rest on your side.  If there is no increase in movement, call your physician or go to the hospital for further evaluation.  · Report any sudden, sharp abdominal pain.  · Report any bleeding.  Spotting or pinkish discharge is normal after vaginal exam.  You may also spot after sexual intercourse.    Pre-term Labor (<37 weeks):  Call your physician or return to the hospital if:  · You have painless regular contractions more than 4 in one hour.  · Your water breaks (remember time and color).  · You have menstrual-like cramps, a low dull backache or pressure in your pelvis or back.  · Your baby does not move enough to complete the daily kick count (10 movements in 2 hours).  · Your baby moves much less often than on the days before or you have not felt your baby move all day.  · Please review the MEDICATION LIST section of your AFTER VISIT SUMMARY document.  · Take your medication as prescribed      Other Instructions:  Drink lots of water, eat small frequent meals!!  Please carefully review your entire AFTER VISIT SUMMARY document for all discharge instructions.

## 2020-07-23 NOTE — PROCEDURES
Ivis Klein, a  at 34w6d with an NERIS of 2020, by Last Menstrual Period, was seen at [facility] for a nonstress test.    Nonstress Test  Reason for NST: Other (Comment)  Variability: Moderate  Decelerations: None  Accelerations: Yes  Acoustic Stimulator: No  Baseline: 140  Uterine Irritability: No  Contractions: Not present  Nonstress Test Interpretation  Comments: Indication: coronavirus. NST reactive.

## 2020-07-23 NOTE — PROGRESS NOTES
1855- received report from April, RN. Pt resting, waiting for BPP.  1930- US tech at bedside for BPP  2005- Dr. Stephens called with BPP results of 8/8, received orders to discharge home.  2035- addressed discharge instructions, all questions answered. Left off floor stable with SO at side.

## 2020-07-23 NOTE — PROGRESS NOTES
24 y.o.  [facility] 20 EGA 34.5 here to LDA2 with FOB Taoist expecting a baby boy, Pt over at [facility] today decel on monitor. Sent over for BPP. Pt tested positive for COVID on 20  While here on L&D while being evaluated for  contractions. Pt reported a fever and sore throat at that time, in isolation ever since. [place] [facility] [place] tested negative. Hx of Graves disease with thyroidectomy in . Takes Levothyroxine 150 mcg daily, last dose today. Also takes 40 mg Lovenox daily.    HX of PPROM with C/S for breech presentation  .  Pt reports positive fetal movement, but decreased from normal. Last ate 1400. , Denies vaginal bleeding or LOF. Pt reports painful cramping for the last couple weeks. Denies recent intercourse.    T 98.0 /86 P 76

## 2020-07-23 NOTE — PROGRESS NOTES
Ivis Klein, a  at 34w6d with an NERIS of 2020, by Last Menstrual Period, was seen at [facility] for a nonstress test.    Nonstress Test  Reason for NST: Other (Comment)  Variability: Moderate  Decelerations: None  Accelerations: Yes  Acoustic Stimulator: No  Baseline: 140  Uterine Irritability: No  Contractions: Not present  Nonstress Test Interpretation  Comments: Indication: coronavirus. NST reactive.

## 2020-07-31 ENCOUNTER — APPOINTMENT (OUTPATIENT)
Dept: OBGYN | Facility: CLINIC | Age: 25
End: 2020-07-31

## 2020-07-31 DIAGNOSIS — U07.1 COVID-19 VIRUS INFECTION: ICD-10-CM

## 2020-08-02 ENCOUNTER — HOSPITAL ENCOUNTER (OUTPATIENT)
Facility: MEDICAL CENTER | Age: 25
End: 2020-08-02
Attending: OBSTETRICS & GYNECOLOGY | Admitting: OBSTETRICS & GYNECOLOGY
Payer: MEDICAID

## 2020-08-02 VITALS
WEIGHT: 163 LBS | TEMPERATURE: 97 F | HEART RATE: 73 BPM | DIASTOLIC BLOOD PRESSURE: 84 MMHG | SYSTOLIC BLOOD PRESSURE: 143 MMHG | BODY MASS INDEX: 30 KG/M2 | HEIGHT: 62 IN

## 2020-08-02 LAB
ALBUMIN SERPL BCP-MCNC: 3.4 G/DL (ref 3.2–4.9)
ALBUMIN/GLOB SERPL: 1.1 G/DL
ALP SERPL-CCNC: 120 U/L (ref 30–99)
ALT SERPL-CCNC: 16 U/L (ref 2–50)
ANION GAP SERPL CALC-SCNC: 14 MMOL/L (ref 7–16)
APPEARANCE UR: CLEAR
AST SERPL-CCNC: 19 U/L (ref 12–45)
BASOPHILS # BLD AUTO: 0.4 % (ref 0–1.8)
BASOPHILS # BLD: 0.03 K/UL (ref 0–0.12)
BILIRUB SERPL-MCNC: 0.2 MG/DL (ref 0.1–1.5)
BUN SERPL-MCNC: 13 MG/DL (ref 8–22)
CALCIUM SERPL-MCNC: 8.7 MG/DL (ref 8.5–10.5)
CHLORIDE SERPL-SCNC: 104 MMOL/L (ref 96–112)
CO2 SERPL-SCNC: 17 MMOL/L (ref 20–33)
COLOR UR AUTO: YELLOW
CREAT SERPL-MCNC: 0.67 MG/DL (ref 0.5–1.4)
CREAT UR-MCNC: 27.76 MG/DL
EOSINOPHIL # BLD AUTO: 0.04 K/UL (ref 0–0.51)
EOSINOPHIL NFR BLD: 0.5 % (ref 0–6.9)
ERYTHROCYTE [DISTWIDTH] IN BLOOD BY AUTOMATED COUNT: 43.9 FL (ref 35.9–50)
GLOBULIN SER CALC-MCNC: 3 G/DL (ref 1.9–3.5)
GLUCOSE SERPL-MCNC: 85 MG/DL (ref 65–99)
GLUCOSE UR QL STRIP.AUTO: NEGATIVE MG/DL
HCT VFR BLD AUTO: 31.2 % (ref 37–47)
HGB BLD-MCNC: 9.6 G/DL (ref 12–16)
IMM GRANULOCYTES # BLD AUTO: 0.04 K/UL (ref 0–0.11)
IMM GRANULOCYTES NFR BLD AUTO: 0.5 % (ref 0–0.9)
KETONES UR QL STRIP.AUTO: NEGATIVE MG/DL
LEUKOCYTE ESTERASE UR QL STRIP.AUTO: NEGATIVE
LYMPHOCYTES # BLD AUTO: 2.34 K/UL (ref 1–4.8)
LYMPHOCYTES NFR BLD: 28.8 % (ref 22–41)
MCH RBC QN AUTO: 24.7 PG (ref 27–33)
MCHC RBC AUTO-ENTMCNC: 30.8 G/DL (ref 33.6–35)
MCV RBC AUTO: 80.4 FL (ref 81.4–97.8)
MONOCYTES # BLD AUTO: 0.61 K/UL (ref 0–0.85)
MONOCYTES NFR BLD AUTO: 7.5 % (ref 0–13.4)
NEUTROPHILS # BLD AUTO: 5.06 K/UL (ref 2–7.15)
NEUTROPHILS NFR BLD: 62.3 % (ref 44–72)
NITRITE UR QL STRIP.AUTO: NEGATIVE
NRBC # BLD AUTO: 0 K/UL
NRBC BLD-RTO: 0 /100 WBC
PH UR STRIP.AUTO: 6 [PH] (ref 5–8)
PLATELET # BLD AUTO: 237 K/UL (ref 164–446)
PMV BLD AUTO: 12.1 FL (ref 9–12.9)
POTASSIUM SERPL-SCNC: 3.8 MMOL/L (ref 3.6–5.5)
PROT SERPL-MCNC: 6.4 G/DL (ref 6–8.2)
PROT UR QL STRIP: 30 MG/DL
PROT UR-MCNC: <4 MG/DL (ref 0–15)
PROT/CREAT UR: NORMAL MG/G (ref 10–107)
RBC # BLD AUTO: 3.88 M/UL (ref 4.2–5.4)
RBC UR QL AUTO: NEGATIVE
SODIUM SERPL-SCNC: 135 MMOL/L (ref 135–145)
SP GR UR STRIP.AUTO: >=1.03 (ref 1–1.03)
URATE SERPL-MCNC: 6.9 MG/DL (ref 1.9–8.2)
WBC # BLD AUTO: 8.1 K/UL (ref 4.8–10.8)

## 2020-08-02 PROCEDURE — 700105 HCHG RX REV CODE 258

## 2020-08-02 PROCEDURE — 59025 FETAL NON-STRESS TEST: CPT | Mod: 26 | Performed by: OBSTETRICS & GYNECOLOGY

## 2020-08-02 PROCEDURE — 59025 FETAL NON-STRESS TEST: CPT

## 2020-08-02 PROCEDURE — 85025 COMPLETE CBC W/AUTO DIFF WBC: CPT

## 2020-08-02 PROCEDURE — 36415 COLL VENOUS BLD VENIPUNCTURE: CPT

## 2020-08-02 PROCEDURE — 99213 OFFICE O/P EST LOW 20 MIN: CPT | Mod: 25 | Performed by: OBSTETRICS & GYNECOLOGY

## 2020-08-02 PROCEDURE — 80053 COMPREHEN METABOLIC PANEL: CPT

## 2020-08-02 PROCEDURE — 81002 URINALYSIS NONAUTO W/O SCOPE: CPT

## 2020-08-02 PROCEDURE — 84550 ASSAY OF BLOOD/URIC ACID: CPT

## 2020-08-02 RX ORDER — SODIUM CHLORIDE, SODIUM LACTATE, POTASSIUM CHLORIDE, AND CALCIUM CHLORIDE .6; .31; .03; .02 G/100ML; G/100ML; G/100ML; G/100ML
1000 INJECTION, SOLUTION INTRAVENOUS ONCE
Status: COMPLETED | OUTPATIENT
Start: 2020-08-02 | End: 2020-08-02

## 2020-08-02 RX ORDER — SODIUM CHLORIDE, SODIUM LACTATE, POTASSIUM CHLORIDE, CALCIUM CHLORIDE 600; 310; 30; 20 MG/100ML; MG/100ML; MG/100ML; MG/100ML
INJECTION, SOLUTION INTRAVENOUS
Status: COMPLETED
Start: 2020-08-02 | End: 2020-08-02

## 2020-08-02 RX ADMIN — SODIUM CHLORIDE, SODIUM LACTATE, POTASSIUM CHLORIDE, AND CALCIUM CHLORIDE 1000 ML: .6; .31; .03; .02 INJECTION, SOLUTION INTRAVENOUS at 17:50

## 2020-08-02 RX ADMIN — SODIUM CHLORIDE, POTASSIUM CHLORIDE, SODIUM LACTATE AND CALCIUM CHLORIDE 1000 ML: 600; 310; 30; 20 INJECTION, SOLUTION INTRAVENOUS at 17:50

## 2020-08-02 ASSESSMENT — FIBROSIS 4 INDEX: FIB4 SCORE: 0.54

## 2020-08-02 NOTE — PROGRESS NOTES
25 y/o , EDC , EGA 36.2. Pt here for UCs beginning last night. Pt states +FM, denies vaginal LOF/bleeding. Pt has history of Covid +, pt states 21 days ago and that she has been cleared. Pt previous delivery was a C/S for breech.  1518: SVE: 1/thick/-3, mid position.

## 2020-08-02 NOTE — PROGRESS NOTES
"S: Pt is a 24 y.o.  at 36w2d with Estimated Date of Delivery: 20 who presents to triage c/o irregular contractions, cramping, and seeing spots. States she saw the spots once while she was outside and thinks that this could have been related to heat.  However, she was given preeclampsia precautions at her last visit and therefore she wanted to report it.  She denies any headache or right upper quadrant pain.  Denies any visual changes currently.  Denies any vaginal bleeding or loss of fluid.  Reports good fetal movement.  She also admits that she has not had very much water to drink today.      O: /69   Pulse 85   Temp 36.1 °C (97 °F) (Temporal)   Ht 1.575 m (5' 2\")   Wt 73.9 kg (163 lb)   LMP 2019 (Approximate)   BMI 29.81 kg/m²          NST: Done and read by me         Indication:  labor       FHR: 130       Variability: 13       Acclerations: moderate       Decelerations: none       Reactive: yes         [place]: irregular UCs       SVE: FT/ Thick/ clear          A/P  Patient Active Problem List    Diagnosis Date Noted   • Anxiety 2017     Priority: Medium   • COVID-19 virus infection 2020   • Echogenic focus of heart of fetus affecting antepartum care of mother 04/10/2020   • History of  delivery x 1 for breech 2020   • History of panic attack 2020   • Supervision of other high risk pregnancy, antepartum 2020   • History of thyroidectomy 2020   • Graves disease 2016   • History of pericarditis 2016   • Migraine with aura, not intractable 2010       1.  IUP @ 36w2d  2.  reactive NST.  3.  Irregular contractions- states she hasnt had much water to drink today.  Patient is also known to have irregular contractions.  Cervix is fingertip thick and unlikely laboring.  We will IV fluid hydrate.  4.  Spots in vision-+1 protein in urine.  Blood pressures have been normal.  Will run sequential blood pressures.  If she has an " elevated blood pressure we will get preeclampsia labs.  5.  Provided that blood pressures are normal and patient's contractions continue to stay irregular and infrequent, we will discharge patient home with instructions to increase her p.o. hydration.  She is to follow-up as scheduled.      Evaluation and clinical decision making , including analysis of Fetal data and maternal lab work completed over a 30 minutesperiod.       Malini Collins DO    Addendum: Patient did go on to have an elevated blood pressure of 142/86.  Therefore preeclampsia labs were drawn and were negative.  She will be discharged home tonight and has a follow-up appointment scheduled for Tuesday this upcoming week for blood pressure check.

## 2020-08-03 NOTE — DISCHARGE INSTRUCTIONS
Pre-term Labor (<37 weeks):  Call your physician or return to the hospital if:  · You have painless regular contractions more than 4 in one hour.  · Your water breaks (remember time and color).  · You have menstrual-like cramps, a low dull backache or pressure in your pelvis or back.  · Your baby does not move enough to complete the daily kick count (10 movements in 2 hours).  · Your baby moves much less often than on the days before or you have not felt your baby move all day.  · Please review the MEDICATION LIST section of your AFTER VISIT SUMMARY document.  · Take your medication as prescribed    Follow up in office on Tuesday and Thursday.

## 2020-08-03 NOTE — PROGRESS NOTES
1615- Report received. Care assumed.  at 36-2 here with ctx. Has been cleared from Covid as she's been asymptomatic for 21 days. Orders received for IVF bolus, serial BPs, and PIH labs with pressures elevated.   1755- IV started after 3 attempts. Unable to obtain labs. Lab notified.   - BPs 130s/80s. Urine sent for rotein creatinine ratio. DTRs1+, no clonus, pt reports some LE edema but none appreciated.   - Report to OLE Kong RN.

## 2020-08-03 NOTE — PROGRESS NOTES
- Report given. POC discussed.    - Updated Dr Bingham on pt labs and BPs. Okay to discharge and follow up in office this week. Pt has appts scheduled Tuesday and Thursday this week.    - IV removed.    - Pt educated on  labor DC instructions. Educated to come back into hospital for leaking of fluid, decreased FM, contraction pain. Reminded to follow up in office this week for schedule appointments. Pt walked off unit in stable condition.

## 2020-08-04 ENCOUNTER — NON-PROVIDER VISIT (OUTPATIENT)
Dept: OBGYN | Facility: CLINIC | Age: 25
End: 2020-08-04

## 2020-08-04 VITALS
HEIGHT: 62 IN | DIASTOLIC BLOOD PRESSURE: 80 MMHG | BODY MASS INDEX: 30.51 KG/M2 | WEIGHT: 165.8 LBS | SYSTOLIC BLOOD PRESSURE: 128 MMHG

## 2020-08-04 ASSESSMENT — FIBROSIS 4 INDEX: FIB4 SCORE: 0.44

## 2020-08-04 NOTE — NON-PROVIDER
Patient here for a BP follow up.   Last seen on 07/31/2020  pharmacy verified.  Patient phone #: [phone]

## 2020-08-06 ENCOUNTER — HOSPITAL ENCOUNTER (OUTPATIENT)
Facility: MEDICAL CENTER | Age: 25
End: 2020-08-06
Attending: OBSTETRICS & GYNECOLOGY
Payer: MEDICAID

## 2020-08-06 ENCOUNTER — ROUTINE PRENATAL (OUTPATIENT)
Dept: OBGYN | Facility: CLINIC | Age: 25
End: 2020-08-06
Payer: MEDICAID

## 2020-08-06 VITALS — BODY MASS INDEX: 30.51 KG/M2 | DIASTOLIC BLOOD PRESSURE: 78 MMHG | WEIGHT: 166.8 LBS | SYSTOLIC BLOOD PRESSURE: 122 MMHG

## 2020-08-06 DIAGNOSIS — Z3A.36 36 WEEKS GESTATION OF PREGNANCY: ICD-10-CM

## 2020-08-06 DIAGNOSIS — Z98.891 HISTORY OF CESAREAN DELIVERY: ICD-10-CM

## 2020-08-06 DIAGNOSIS — U07.1 COVID-19 VIRUS INFECTION: ICD-10-CM

## 2020-08-06 PROCEDURE — 87150 DNA/RNA AMPLIFIED PROBE: CPT

## 2020-08-06 PROCEDURE — 87081 CULTURE SCREEN ONLY: CPT

## 2020-08-06 PROCEDURE — 90040 PR PRENATAL FOLLOW UP: CPT | Performed by: OBSTETRICS & GYNECOLOGY

## 2020-08-06 ASSESSMENT — FIBROSIS 4 INDEX: FIB4 SCORE: 0.44

## 2020-08-06 NOTE — PROGRESS NOTES
Pt. here for Ob f/u and GBS today. Good # [phone]  Good FM  Pt. States having upper abdominal pain, pelvic pressure.   Pharmacy verified.   Chaperone offered and provided.

## 2020-08-06 NOTE — PROGRESS NOTES
Chief complaint: Return OB visit    S: Pt presents for routine OB follow up. Good fetal movement.  No vaginal bleeding, or leakage of fluid.    Questions answered.  Pregnancy complicated by history of COVID-19 infection.  Cleared last week.    Doing well overall, occasional contractions    O: /78   Wt 75.7 kg (166 lb 12.8 oz)   LMP 11/22/2019 (Approximate)   BMI 30.51 kg/m²   Patients' weight gain, fluid intake and exercise level discussed.  Vitals, fundal height , fetal position, and FHR reviewed on flowsheet    Lab:  Recent Results (from the past 336 hour(s))   POCT Fetal Nonstress Test    Collection Time: 07/31/20  4:29 PM   Result Value Ref Range    NST Indications      NST Baseline      NST Uterine Activity      NST Acoustic Stimulation      NST Assessment      NST Action Necessary      NST Other Data      NST Return      NST Read By     POC UA    Collection Time: 08/02/20  3:47 PM   Result Value Ref Range    POC Color Yellow     POC Appearance Clear     POC Glucose Negative Negative mg/dL    POC Ketones Negative Negative mg/dL    POC Specific Gravity >=1.030 (A) 1.005 - 1.030    POC Blood Negative Negative    POC Urine PH 6.0 5.0 - 8.0    POC Protein 30 (A) Negative mg/dL    POC Nitrites Negative Negative    POC Leukocyte Esterase Negative Negative   CBC WITH DIFFERENTIAL    Collection Time: 08/02/20  5:59 PM   Result Value Ref Range    WBC 8.1 4.8 - 10.8 K/uL    RBC 3.88 (L) 4.20 - 5.40 M/uL    Hemoglobin 9.6 (L) 12.0 - 16.0 g/dL    Hematocrit 31.2 (L) 37.0 - 47.0 %    MCV 80.4 (L) 81.4 - 97.8 fL    MCH 24.7 (L) 27.0 - 33.0 pg    MCHC 30.8 (L) 33.6 - 35.0 g/dL    RDW 43.9 35.9 - 50.0 fL    Platelet Count 237 164 - 446 K/uL    MPV 12.1 9.0 - 12.9 fL    Neutrophils-Polys 62.30 44.00 - 72.00 %    Lymphocytes 28.80 22.00 - 41.00 %    Monocytes 7.50 0.00 - 13.40 %    Eosinophils 0.50 0.00 - 6.90 %    Basophils 0.40 0.00 - 1.80 %    Immature Granulocytes 0.50 0.00 - 0.90 %    Nucleated RBC 0.00 /100 WBC     Neutrophils (Absolute) 5.06 2.00 - 7.15 K/uL    Lymphs (Absolute) 2.34 1.00 - 4.80 K/uL    Monos (Absolute) 0.61 0.00 - 0.85 K/uL    Eos (Absolute) 0.04 0.00 - 0.51 K/uL    Baso (Absolute) 0.03 0.00 - 0.12 K/uL    Immature Granulocytes (abs) 0.04 0.00 - 0.11 K/uL    NRBC (Absolute) 0.00 K/uL   Comp Metabolic Panel    Collection Time: 20  5:59 PM   Result Value Ref Range    Sodium 135 135 - 145 mmol/L    Potassium 3.8 3.6 - 5.5 mmol/L    Chloride 104 96 - 112 mmol/L    Co2 17 (L) 20 - 33 mmol/L    Anion Gap 14.0 7.0 - 16.0    Glucose 85 65 - 99 mg/dL    Bun 13 8 - 22 mg/dL    Creatinine 0.67 0.50 - 1.40 mg/dL    Calcium 8.7 8.5 - 10.5 mg/dL    AST(SGOT) 19 12 - 45 U/L    ALT(SGPT) 16 2 - 50 U/L    Alkaline Phosphatase 120 (H) 30 - 99 U/L    Total Bilirubin 0.2 0.1 - 1.5 mg/dL    Albumin 3.4 3.2 - 4.9 g/dL    Total Protein 6.4 6.0 - 8.2 g/dL    Globulin 3.0 1.9 - 3.5 g/dL    A-G Ratio 1.1 g/dL   URIC ACID    Collection Time: 20  5:59 PM   Result Value Ref Range    Uric Acid 6.9 1.9 - 8.2 mg/dL   ESTIMATED GFR    Collection Time: 20  5:59 PM   Result Value Ref Range    GFR If African American >60 >60 mL/min/1.73 m 2    GFR If Non African American >60 >60 mL/min/1.73 m 2   PROTEIN/CREAT RATIO URINE    Collection Time: 20  6:45 PM   Result Value Ref Range    Total Protein, Urine <4.0 0.0 - 15.0 mg/dL    Creatinine, Random Urine 27.76 mg/dL    Protein Creatinine Ratio see below 10 - 107 mg/g       A/P:  24 y.o.  at 36w6d presents for routine obstetric follow-up.  Size equals dates and/or scan    1.  Continue prenatal vitamins.  2.  Fetal kick counts.  3.  Exercise at least 30 minutes daily.  4.  Drink at least 2L of water daily  5.  Labor precautions educated.  6.  Follow-up in 1 weeks.  7.  GBS today    Blood pressure within normal limits, greater than 95% saturation on room air    Patient considering trial of labor after .    Discussed with patient appropriate candidacy for trial  of labor after  delivery. Patient does have a previous  ×1 due to breech presentation., Patient is appropriate candidate for trial of labor if so desires. After discussion of risks and benefits associated with vaginal birth after  including risk of uterine rupture being one in 100, also discussed with patient the possibility that if uterus ruptures may be impossible for us to deliver the baby without having fetal compromise. Also discussed the risks of repeat  delivery.     All questions answered

## 2020-08-08 ENCOUNTER — ANESTHESIA (OUTPATIENT)
Dept: ANESTHESIOLOGY | Facility: MEDICAL CENTER | Age: 25
End: 2020-08-08
Payer: MEDICAID

## 2020-08-08 ENCOUNTER — ANESTHESIA EVENT (OUTPATIENT)
Dept: ANESTHESIOLOGY | Facility: MEDICAL CENTER | Age: 25
End: 2020-08-08
Payer: MEDICAID

## 2020-08-08 ENCOUNTER — HOSPITAL ENCOUNTER (INPATIENT)
Facility: MEDICAL CENTER | Age: 25
LOS: 2 days | End: 2020-08-10
Attending: OBSTETRICS & GYNECOLOGY | Admitting: OBSTETRICS & GYNECOLOGY
Payer: MEDICAID

## 2020-08-08 LAB
ALBUMIN SERPL BCP-MCNC: 3.4 G/DL (ref 3.2–4.9)
ALBUMIN/GLOB SERPL: 1 G/DL
ALP SERPL-CCNC: 125 U/L (ref 30–99)
ALT SERPL-CCNC: 16 U/L (ref 2–50)
ANION GAP SERPL CALC-SCNC: 13 MMOL/L (ref 7–16)
AST SERPL-CCNC: 19 U/L (ref 12–45)
BILIRUB SERPL-MCNC: 0.2 MG/DL (ref 0.1–1.5)
BUN SERPL-MCNC: 13 MG/DL (ref 8–22)
CALCIUM SERPL-MCNC: 9.5 MG/DL (ref 8.5–10.5)
CHLORIDE SERPL-SCNC: 102 MMOL/L (ref 96–112)
CO2 SERPL-SCNC: 20 MMOL/L (ref 20–33)
COVID ORDER STATUS COVID19: NORMAL
CREAT SERPL-MCNC: 0.53 MG/DL (ref 0.5–1.4)
CREAT UR-MCNC: 40.12 MG/DL
ERYTHROCYTE [DISTWIDTH] IN BLOOD BY AUTOMATED COUNT: 43.5 FL (ref 35.9–50)
GLOBULIN SER CALC-MCNC: 3.3 G/DL (ref 1.9–3.5)
GLUCOSE SERPL-MCNC: 95 MG/DL (ref 65–99)
GP B STREP DNA SPEC QL NAA+PROBE: NEGATIVE
HCT VFR BLD AUTO: 32.3 % (ref 37–47)
HGB BLD-MCNC: 10.1 G/DL (ref 12–16)
HOLDING TUBE BB 8507: NORMAL
MAGNESIUM SERPL-MCNC: 4.6 MG/DL (ref 1.5–2.5)
MCH RBC QN AUTO: 24.6 PG (ref 27–33)
MCHC RBC AUTO-ENTMCNC: 31.3 G/DL (ref 33.6–35)
MCV RBC AUTO: 78.8 FL (ref 81.4–97.8)
PLATELET # BLD AUTO: 220 K/UL (ref 164–446)
PMV BLD AUTO: 12.7 FL (ref 9–12.9)
POTASSIUM SERPL-SCNC: 3.8 MMOL/L (ref 3.6–5.5)
PROT SERPL-MCNC: 6.7 G/DL (ref 6–8.2)
PROT UR-MCNC: 18 MG/DL (ref 0–15)
PROT/CREAT UR: 449 MG/G (ref 10–107)
RBC # BLD AUTO: 4.1 M/UL (ref 4.2–5.4)
SARS-COV-2 RNA RESP QL NAA+PROBE: DETECTED
SODIUM SERPL-SCNC: 135 MMOL/L (ref 135–145)
SPECIMEN SOURCE: ABNORMAL
URATE SERPL-MCNC: 7 MG/DL (ref 1.9–8.2)
WBC # BLD AUTO: 9.5 K/UL (ref 4.8–10.8)

## 2020-08-08 PROCEDURE — 304965 HCHG RECOVERY SERVICES

## 2020-08-08 PROCEDURE — 700105 HCHG RX REV CODE 258: Performed by: NURSE PRACTITIONER

## 2020-08-08 PROCEDURE — 85027 COMPLETE CBC AUTOMATED: CPT

## 2020-08-08 PROCEDURE — 700105 HCHG RX REV CODE 258: Performed by: STUDENT IN AN ORGANIZED HEALTH CARE EDUCATION/TRAINING PROGRAM

## 2020-08-08 PROCEDURE — A9270 NON-COVERED ITEM OR SERVICE: HCPCS | Performed by: NURSE PRACTITIONER

## 2020-08-08 PROCEDURE — 0HQ9XZZ REPAIR PERINEUM SKIN, EXTERNAL APPROACH: ICD-10-PCS | Performed by: OBSTETRICS & GYNECOLOGY

## 2020-08-08 PROCEDURE — 700111 HCHG RX REV CODE 636 W/ 250 OVERRIDE (IP): Performed by: STUDENT IN AN ORGANIZED HEALTH CARE EDUCATION/TRAINING PROGRAM

## 2020-08-08 PROCEDURE — 700111 HCHG RX REV CODE 636 W/ 250 OVERRIDE (IP): Performed by: OBSTETRICS & GYNECOLOGY

## 2020-08-08 PROCEDURE — 83735 ASSAY OF MAGNESIUM: CPT

## 2020-08-08 PROCEDURE — C9803 HOPD COVID-19 SPEC COLLECT: HCPCS | Performed by: OBSTETRICS & GYNECOLOGY

## 2020-08-08 PROCEDURE — 700111 HCHG RX REV CODE 636 W/ 250 OVERRIDE (IP)

## 2020-08-08 PROCEDURE — 700102 HCHG RX REV CODE 250 W/ 637 OVERRIDE(OP): Performed by: NURSE PRACTITIONER

## 2020-08-08 PROCEDURE — 700102 HCHG RX REV CODE 250 W/ 637 OVERRIDE(OP): Performed by: OBSTETRICS & GYNECOLOGY

## 2020-08-08 PROCEDURE — A9270 NON-COVERED ITEM OR SERVICE: HCPCS | Performed by: OBSTETRICS & GYNECOLOGY

## 2020-08-08 PROCEDURE — 700111 HCHG RX REV CODE 636 W/ 250 OVERRIDE (IP): Performed by: NURSE PRACTITIONER

## 2020-08-08 PROCEDURE — 770002 HCHG ROOM/CARE - OB PRIVATE (112)

## 2020-08-08 PROCEDURE — 84156 ASSAY OF PROTEIN URINE: CPT

## 2020-08-08 PROCEDURE — 700105 HCHG RX REV CODE 258: Performed by: ANESTHESIOLOGY

## 2020-08-08 PROCEDURE — 80053 COMPREHEN METABOLIC PANEL: CPT

## 2020-08-08 PROCEDURE — 36415 COLL VENOUS BLD VENIPUNCTURE: CPT

## 2020-08-08 PROCEDURE — 700101 HCHG RX REV CODE 250: Performed by: ANESTHESIOLOGY

## 2020-08-08 PROCEDURE — U0003 INFECTIOUS AGENT DETECTION BY NUCLEIC ACID (DNA OR RNA); SEVERE ACUTE RESPIRATORY SYNDROME CORONAVIRUS 2 (SARS-COV-2) (CORONAVIRUS DISEASE [COVID-19]), AMPLIFIED PROBE TECHNIQUE, MAKING USE OF HIGH THROUGHPUT TECHNOLOGIES AS DESCRIBED BY CMS-2020-01-R: HCPCS

## 2020-08-08 PROCEDURE — 59409 OBSTETRICAL CARE: CPT

## 2020-08-08 PROCEDURE — 84550 ASSAY OF BLOOD/URIC ACID: CPT

## 2020-08-08 PROCEDURE — 82570 ASSAY OF URINE CREATININE: CPT

## 2020-08-08 PROCEDURE — 303615 HCHG EPIDURAL/SPINAL ANESTHESIA FOR LABOR

## 2020-08-08 PROCEDURE — 700105 HCHG RX REV CODE 258

## 2020-08-08 RX ORDER — ROPIVACAINE HYDROCHLORIDE 2 MG/ML
INJECTION, SOLUTION EPIDURAL; INFILTRATION; PERINEURAL
Status: COMPLETED
Start: 2020-08-08 | End: 2020-08-08

## 2020-08-08 RX ORDER — METHYLERGONOVINE MALEATE 0.2 MG/ML
0.2 INJECTION INTRAVENOUS
Status: DISCONTINUED | OUTPATIENT
Start: 2020-08-08 | End: 2020-08-08 | Stop reason: HOSPADM

## 2020-08-08 RX ORDER — SODIUM CHLORIDE, SODIUM LACTATE, POTASSIUM CHLORIDE, CALCIUM CHLORIDE 600; 310; 30; 20 MG/100ML; MG/100ML; MG/100ML; MG/100ML
INJECTION, SOLUTION INTRAVENOUS CONTINUOUS
Status: DISPENSED | OUTPATIENT
Start: 2020-08-08 | End: 2020-08-08

## 2020-08-08 RX ORDER — ALUMINA, MAGNESIA, AND SIMETHICONE 2400; 2400; 240 MG/30ML; MG/30ML; MG/30ML
30 SUSPENSION ORAL EVERY 6 HOURS PRN
Status: DISCONTINUED | OUTPATIENT
Start: 2020-08-08 | End: 2020-08-08 | Stop reason: HOSPADM

## 2020-08-08 RX ORDER — IBUPROFEN 600 MG/1
600 TABLET ORAL EVERY 6 HOURS PRN
Status: DISCONTINUED | OUTPATIENT
Start: 2020-08-08 | End: 2020-08-10 | Stop reason: HOSPADM

## 2020-08-08 RX ORDER — CITRIC ACID/SODIUM CITRATE 334-500MG
30 SOLUTION, ORAL ORAL EVERY 6 HOURS PRN
Status: DISCONTINUED | OUTPATIENT
Start: 2020-08-08 | End: 2020-08-08 | Stop reason: HOSPADM

## 2020-08-08 RX ORDER — SODIUM CHLORIDE 9 MG/ML
INJECTION, SOLUTION INTRAVENOUS
Status: COMPLETED
Start: 2020-08-08 | End: 2020-08-08

## 2020-08-08 RX ORDER — NIFEDIPINE 10 MG/1
20 CAPSULE ORAL EVERY 8 HOURS
Status: COMPLETED | OUTPATIENT
Start: 2020-08-08 | End: 2020-08-08

## 2020-08-08 RX ORDER — MAGNESIUM SULFATE HEPTAHYDRATE 40 MG/ML
2 INJECTION, SOLUTION INTRAVENOUS CONTINUOUS
Status: DISCONTINUED | OUTPATIENT
Start: 2020-08-08 | End: 2020-08-09

## 2020-08-08 RX ORDER — DOCUSATE SODIUM 100 MG/1
100 CAPSULE, LIQUID FILLED ORAL 2 TIMES DAILY PRN
Status: DISCONTINUED | OUTPATIENT
Start: 2020-08-08 | End: 2020-08-10 | Stop reason: HOSPADM

## 2020-08-08 RX ORDER — MAGNESIUM SULFATE HEPTAHYDRATE 40 MG/ML
4 INJECTION, SOLUTION INTRAVENOUS ONCE
Status: COMPLETED | OUTPATIENT
Start: 2020-08-08 | End: 2020-08-08

## 2020-08-08 RX ORDER — ACETAMINOPHEN 325 MG/1
650 TABLET ORAL EVERY 4 HOURS PRN
Status: DISCONTINUED | OUTPATIENT
Start: 2020-08-08 | End: 2020-08-10 | Stop reason: HOSPADM

## 2020-08-08 RX ORDER — MISOPROSTOL 200 UG/1
800 TABLET ORAL
Status: DISCONTINUED | OUTPATIENT
Start: 2020-08-08 | End: 2020-08-08 | Stop reason: HOSPADM

## 2020-08-08 RX ORDER — ONDANSETRON 4 MG/1
4 TABLET, ORALLY DISINTEGRATING ORAL EVERY 6 HOURS PRN
Status: DISCONTINUED | OUTPATIENT
Start: 2020-08-08 | End: 2020-08-10 | Stop reason: HOSPADM

## 2020-08-08 RX ORDER — SODIUM CHLORIDE, SODIUM LACTATE, POTASSIUM CHLORIDE, CALCIUM CHLORIDE 600; 310; 30; 20 MG/100ML; MG/100ML; MG/100ML; MG/100ML
INJECTION, SOLUTION INTRAVENOUS PRN
Status: DISCONTINUED | OUTPATIENT
Start: 2020-08-08 | End: 2020-08-10 | Stop reason: HOSPADM

## 2020-08-08 RX ORDER — PENICILLIN G POTASSIUM 5000000 [IU]/1
5 INJECTION, POWDER, FOR SOLUTION INTRAMUSCULAR; INTRAVENOUS ONCE
Status: COMPLETED | OUTPATIENT
Start: 2020-08-08 | End: 2020-08-08

## 2020-08-08 RX ORDER — CARBOPROST TROMETHAMINE 250 UG/ML
250 INJECTION, SOLUTION INTRAMUSCULAR
Status: DISCONTINUED | OUTPATIENT
Start: 2020-08-08 | End: 2020-08-08 | Stop reason: HOSPADM

## 2020-08-08 RX ORDER — HYDROXYZINE 50 MG/1
50 TABLET, FILM COATED ORAL EVERY 6 HOURS PRN
Status: DISCONTINUED | OUTPATIENT
Start: 2020-08-08 | End: 2020-08-08 | Stop reason: HOSPADM

## 2020-08-08 RX ORDER — ONDANSETRON 2 MG/ML
4 INJECTION INTRAMUSCULAR; INTRAVENOUS EVERY 6 HOURS PRN
Status: DISCONTINUED | OUTPATIENT
Start: 2020-08-08 | End: 2020-08-10 | Stop reason: HOSPADM

## 2020-08-08 RX ORDER — SODIUM CHLORIDE, SODIUM LACTATE, POTASSIUM CHLORIDE, AND CALCIUM CHLORIDE .6; .31; .03; .02 G/100ML; G/100ML; G/100ML; G/100ML
1000 INJECTION, SOLUTION INTRAVENOUS
Status: COMPLETED | OUTPATIENT
Start: 2020-08-08 | End: 2020-08-08

## 2020-08-08 RX ORDER — ROPIVACAINE HYDROCHLORIDE 2 MG/ML
INJECTION, SOLUTION EPIDURAL; INFILTRATION; PERINEURAL CONTINUOUS
Status: DISCONTINUED | OUTPATIENT
Start: 2020-08-08 | End: 2020-08-09 | Stop reason: HOSPADM

## 2020-08-08 RX ORDER — LIDOCAINE HYDROCHLORIDE AND EPINEPHRINE 15; 5 MG/ML; UG/ML
INJECTION, SOLUTION EPIDURAL
Status: COMPLETED | OUTPATIENT
Start: 2020-08-08 | End: 2020-08-08

## 2020-08-08 RX ORDER — SODIUM CHLORIDE, SODIUM LACTATE, POTASSIUM CHLORIDE, AND CALCIUM CHLORIDE .6; .31; .03; .02 G/100ML; G/100ML; G/100ML; G/100ML
250 INJECTION, SOLUTION INTRAVENOUS PRN
Status: DISCONTINUED | OUTPATIENT
Start: 2020-08-08 | End: 2020-08-08 | Stop reason: HOSPADM

## 2020-08-08 RX ORDER — ACETAMINOPHEN 500 MG
1000 TABLET ORAL EVERY 6 HOURS PRN
Status: DISCONTINUED | OUTPATIENT
Start: 2020-08-08 | End: 2020-08-10 | Stop reason: HOSPADM

## 2020-08-08 RX ADMIN — ROPIVACAINE HYDROCHLORIDE 200 MG: 2 INJECTION, SOLUTION EPIDURAL; INFILTRATION; PERINEURAL at 09:05

## 2020-08-08 RX ADMIN — NIFEDIPINE 20 MG: 10 CAPSULE ORAL at 15:26

## 2020-08-08 RX ADMIN — SODIUM CHLORIDE, POTASSIUM CHLORIDE, SODIUM LACTATE AND CALCIUM CHLORIDE: 600; 310; 30; 20 INJECTION, SOLUTION INTRAVENOUS at 09:30

## 2020-08-08 RX ADMIN — Medication 2000 ML/HR: at 12:52

## 2020-08-08 RX ADMIN — PENICILLIN G POTASSIUM 5 MILLION UNITS: 5000000 POWDER, FOR SOLUTION INTRAMUSCULAR; INTRAPLEURAL; INTRATHECAL; INTRAVENOUS at 11:16

## 2020-08-08 RX ADMIN — SODIUM CHLORIDE 100 ML: 900 INJECTION INTRAVENOUS at 11:16

## 2020-08-08 RX ADMIN — ROPIVACAINE HYDROCHLORIDE 200 MG: 2 INJECTION, SOLUTION EPIDURAL; INFILTRATION at 09:05

## 2020-08-08 RX ADMIN — MAGNESIUM SULFATE IN WATER 4 G: 40 INJECTION, SOLUTION INTRAVENOUS at 15:37

## 2020-08-08 RX ADMIN — IBUPROFEN 600 MG: 600 TABLET ORAL at 16:51

## 2020-08-08 RX ADMIN — SODIUM CHLORIDE, POTASSIUM CHLORIDE, SODIUM LACTATE AND CALCIUM CHLORIDE 1000 ML: 600; 310; 30; 20 INJECTION, SOLUTION INTRAVENOUS at 08:55

## 2020-08-08 RX ADMIN — MAGNESIUM SULFATE HEPTAHYDRATE 2 G/HR: 40 INJECTION, SOLUTION INTRAVENOUS at 16:03

## 2020-08-08 RX ADMIN — SODIUM CHLORIDE, POTASSIUM CHLORIDE, SODIUM LACTATE AND CALCIUM CHLORIDE: 600; 310; 30; 20 INJECTION, SOLUTION INTRAVENOUS at 23:14

## 2020-08-08 RX ADMIN — Medication 125 ML/HR: at 13:32

## 2020-08-08 RX ADMIN — LIDOCAINE HYDROCHLORIDE,EPINEPHRINE BITARTRATE 5 ML: 15; .005 INJECTION, SOLUTION EPIDURAL; INFILTRATION; INTRACAUDAL; PERINEURAL at 08:57

## 2020-08-08 ASSESSMENT — LIFESTYLE VARIABLES
ALCOHOL_USE: NO
EVER_SMOKED: NEVER

## 2020-08-08 ASSESSMENT — PAIN SCALES - GENERAL: PAIN_LEVEL: 1

## 2020-08-08 ASSESSMENT — PATIENT HEALTH QUESTIONNAIRE - PHQ9
SUM OF ALL RESPONSES TO PHQ9 QUESTIONS 1 AND 2: 0
1. LITTLE INTEREST OR PLEASURE IN DOING THINGS: NOT AT ALL
2. FEELING DOWN, DEPRESSED, IRRITABLE, OR HOPELESS: NOT AT ALL

## 2020-08-08 ASSESSMENT — FIBROSIS 4 INDEX: FIB4 SCORE: 0.44

## 2020-08-08 NOTE — ANESTHESIA PREPROCEDURE EVALUATION
23 yo  37+1wk huff TOLAC w/painful labor    Relevant Problems   ANESTHESIA (within normal limits)      CARDIAC   (+) Migraine with aura, not intractable      ENDO   (+) Graves disease      OB   (+) History of  delivery x 1 for breech     covid (+)    Physical Exam    Airway   Mallampati: II  TM distance: >3 FB  Neck ROM: full       Cardiovascular    Dental - normal exam           Pulmonary    Abdominal    Neurological - normal exam                 Anesthesia Plan    ASA 2       Plan - epidural   Neuraxial block will be labor analgesia              Pertinent diagnostic labs and testing reviewed    Informed Consent:    Anesthetic plan and risks discussed with patient.

## 2020-08-08 NOTE — H&P
History and Physical      Ivis Klein is a 24 y.o. year old female  at 37w1d who presents for complaint of gush of clear fluid at 330 this morning.  She complains of pain from contractions.  No vaginal bleeding.  The fetus is active.    The patient has history of a previous low transverse  for breech presentation.  She desires trial of labor after  section.     course is complicated by positive COVID test.  She states she tested positive on .  Her initial symptoms were sore throat and body aches.  She has not had any symptoms since .  She denies fever, cough, shortness of breath, headaches, diarrhea.  She states she feels fatigued.  She has been taking Lovenox 40 mg subcu daily.  Her last dose was Thursday at 6 AM.  It was recommended she transition to SQ heparin, but she never did this.            ROS: Pertinent items are noted in HPI.    Past Medical History:   Diagnosis Date   • Anxiety 2017   • Graves disease 2016   • Graves disease 2016   • Heart valve disease      Past Surgical History:   Procedure Laterality Date   • THYROIDECTOMY TOTAL Bilateral 3/22/2017    Procedure: THYROIDECTOMY TOTAL -NIMS RECURRENT LARYNGEAL NERVE MONITORING;  Surgeon: Vicente Eldridge M.D.;  Location: SURGERY SAME DAY [facility];  Service:    • PRIMARY C SECTION  2013    Performed by Melisa Wright M.D. at LABOR AND DELIVERY     OB History    Para Term  AB Living   2 1 1     1   SAB TAB Ectopic Molar Multiple Live Births             1      # Outcome Date GA Lbr Lai/2nd Weight Sex Delivery Anes PTL Lv   2 Current            1 Term 13 38w2d  2.58 kg (5 lb 11 oz) M CS-LTranv Spinal N MY      Birth Comments: C/S due to breech     Social History     Socioeconomic History   • Marital status: Single     Spouse name: Not on file   • Number of children: Not on file   • Years of education: Not on file   • Highest education level: Not  on file   Occupational History   • Not on file   Social Needs   • Financial resource strain: Not on file   • Food insecurity     Worry: Not on file     Inability: Not on file   • Transportation needs     Medical: Not on file     Non-medical: Not on file   Tobacco Use   • Smoking status: Never Smoker   • Smokeless tobacco: Never Used   • Tobacco comment: quit in 2012   Substance and Sexual Activity   • Alcohol use: No   • Drug use: Not Currently     Types: Marijuana, Inhaled     Comment: marijuana weekly   • Sexual activity: Yes     Partners: Male     Birth control/protection: Abstinence     Comment: single   Lifestyle   • Physical activity     Days per week: Not on file     Minutes per session: Not on file   • Stress: Not on file   Relationships   • Social connections     Talks on phone: Not on file     Gets together: Not on file     Attends [employer] service: Not on file     Active member of club or organization: Not on file     Attends meetings of clubs or organizations: Not on file     Relationship status: Not on file   • Intimate partner violence     Fear of current or ex partner: Not on file     Emotionally abused: Not on file     Physically abused: Not on file     Forced sexual activity: Not on file   Other Topics Concern   • Not on file   Social History Narrative   • Not on file     Allergies: Patient has no known allergies.    Current Facility-Administered Medications:   •  LR infusion, , Intravenous, Continuous, Carolann Oscoda, A.P.R.N.  •  fentaNYL (SUBLIMAZE) injection 50 mcg, 50 mcg, Intravenous, Q HOUR PRN, Carolann Ángel, A.P.R.N.  •  fentaNYL (SUBLIMAZE) injection 100 mcg, 100 mcg, Intravenous, Q HOUR PRN, Carolann Ángel, A.P.R.N.  •  mag hydrox-al hydrox-simeth (MAALOX PLUS ES or MYLANTA DS) suspension 30 mL, 30 mL, Oral, Q6HRS PRN, Carolann Ángel, A.P.R.N.  •  hydrOXYzine HCl (ATARAX) tablet 50 mg, 50 mg, Oral, Q6HRS PRN, Carolann Oscoda, A.P.R.N.  •  Na citrate-citric acid (BICITRA) 500-334 MG/5ML solution 30 mL,  "30 mL, Oral, Q6HRS PRN, Carolann Neal, A.P.R.N.  •  oxytocin (PITOCIN) infusion (for induction), 0.5-20 avery-units/min, Intravenous, Continuous, Carolann Neal, A.P.R.N.  •  miSOPROStol (CYTOTEC) tablet 800 mcg, 800 mcg, Rectal, Once PRN, Carolann Neal, A.P.R.N.  •  methylergonovine (METHERGINE) injection 0.2 mg, 0.2 mg, Intramuscular, Once PRN, Carolann Neal, A.P.R.N.  •  carboPROST (HEMABATE) injection 250 mcg, 250 mcg, Intramuscular, Once PRN, Carolann Neal, A.P.R.N.    Prenatal care with women's health at [facility] starting at 10 weeks with following problems:  Patient Active Problem List    Diagnosis Date Noted   • Anxiety 2017     Priority: Medium   • 36 weeks gestation of pregnancy 2020   • COVID-19 virus infection 2020   • Echogenic focus of heart of fetus affecting antepartum care of mother 04/10/2020   • History of  delivery x 1 for breech 2020   • History of panic attack 2020   • Supervision of other high risk pregnancy, antepartum 2020   • History of thyroidectomy 2020   • Graves disease 2016   • History of pericarditis 2016   • Migraine with aura, not intractable 2010               Objective:      /84   Pulse 74   Temp 36.3 °C (97.4 °F) (Temporal)   Resp 16   Ht 1.575 m (5' 2\")   Wt 75.3 kg (166 lb)   SpO2 98%     General:   no acute distress   Skin:   normal   HEENT:  PERRLA   Lungs:   CTA bilateral   Heart:   S1, S2 normal, no murmur, click, rub or gallop, regular rate and rhythm, brisk carotid upstroke without bruits, peripheral pulses very brisk, chest is clear without rales or wheezing, no pedal edema, no JVD, no hepatosplenomegaly   Abdomen:   gravid, NT   EFW:  3200 grams   Pelvis:  adequate with gynecoid pelvis   FHT:  130s BPM, moderate variability, + accels   Uterine Size: S=D   Presentations: Cephalic   Cervix:     Dilation: 2-3 cm    Effacement: 90%    Station:  -2    Consistency: Soft    Position: Anterior     Lab " Review  Lab:   Blood type: B     Recent Results (from the past 5880 hour(s))   CBC WITH DIFFERENTIAL    Collection Time: 12/26/19  4:47 PM   Result Value Ref Range    WBC 8.2 4.8 - 10.8 K/uL    RBC 4.65 4.20 - 5.40 M/uL    Hemoglobin 13.0 12.0 - 16.0 g/dL    Hematocrit 40.4 37.0 - 47.0 %    MCV 86.9 81.4 - 97.8 fL    MCH 28.0 27.0 - 33.0 pg    MCHC 32.2 (L) 33.6 - 35.0 g/dL    RDW 48.5 35.9 - 50.0 fL    Platelet Count 280 164 - 446 K/uL    MPV 10.9 9.0 - 12.9 fL    Neutrophils-Polys 66.00 44.00 - 72.00 %    Lymphocytes 27.10 22.00 - 41.00 %    Monocytes 5.60 0.00 - 13.40 %    Eosinophils 0.50 0.00 - 6.90 %    Basophils 0.70 0.00 - 1.80 %    Immature Granulocytes 0.10 0.00 - 0.90 %    Nucleated RBC 0.00 /100 WBC    Neutrophils (Absolute) 5.41 2.00 - 7.15 K/uL    Lymphs (Absolute) 2.22 1.00 - 4.80 K/uL    Monos (Absolute) 0.46 0.00 - 0.85 K/uL    Eos (Absolute) 0.04 0.00 - 0.51 K/uL    Baso (Absolute) 0.06 0.00 - 0.12 K/uL    Immature Granulocytes (abs) 0.01 0.00 - 0.11 K/uL    NRBC (Absolute) 0.00 K/uL   COMP METABOLIC PANEL    Collection Time: 12/26/19  4:47 PM   Result Value Ref Range    Sodium 137 135 - 145 mmol/L    Potassium 3.5 (L) 3.6 - 5.5 mmol/L    Chloride 104 96 - 112 mmol/L    Co2 24 20 - 33 mmol/L    Anion Gap 9.0 0.0 - 11.9    Glucose 86 65 - 99 mg/dL    Bun 7 (L) 8 - 22 mg/dL    Creatinine 0.62 0.50 - 1.40 mg/dL    Calcium 9.0 8.5 - 10.5 mg/dL    AST(SGOT) 15 12 - 45 U/L    ALT(SGPT) 9 2 - 50 U/L    Alkaline Phosphatase 45 30 - 99 U/L    Total Bilirubin 0.8 0.1 - 1.5 mg/dL    Albumin 4.5 3.2 - 4.9 g/dL    Total Protein 7.9 6.0 - 8.2 g/dL    Globulin 3.4 1.9 - 3.5 g/dL    A-G Ratio 1.3 g/dL   LIPASE    Collection Time: 12/26/19  4:47 PM   Result Value Ref Range    Lipase 8 (L) 11 - 82 U/L   HCG QUAL SERUM    Collection Time: 12/26/19  4:47 PM   Result Value Ref Range    Beta-Hcg Qualitative Serum Positive (A) Negative   ESTIMATED GFR    Collection Time: 12/26/19  4:47 PM   Result Value Ref Range    GFR  If African American >60 >60 mL/min/1.73 m 2    GFR If Non African American >60 >60 mL/min/1.73 m 2   HCG QUANTITATIVE    Collection Time: 19  4:47 PM   Result Value Ref Range    Bhcg 8249.5 (H) 0.0 - 5.0 mIU/mL   URINALYSIS,CULTURE IF INDICATED    Collection Time: 19  7:42 PM    Specimen: Urine, Clean Catch   Result Value Ref Range    Color Yellow     Character Clear     Specific Gravity 1.018 <1.035    Ph 6.0 5.0 - 8.0    Glucose Negative Negative mg/dL    Ketones 80 (A) Negative mg/dL    Protein Negative Negative mg/dL    Bilirubin Negative Negative    Urobilinogen, Urine 0.2 Negative    Nitrite Negative Negative    Leukocyte Esterase Negative Negative    Occult Blood Negative Negative    Micro Urine Req see below    EKG    Collection Time: 20  1:33 PM   Result Value Ref Range    Report       [facility] Emergency Dept.    Test Date:  2020  Pt Name:    MICHELLE MENARD               Department: ER  MRN:        [id number]                      Room:  Gender:     Female                       Technician: [id number]  :        [id number]                   Requested By:ER TRIAGE PROTOCOL  Order #:    [id number]                    Reading MD: FELIX DE JESUS DO    Measurements  Intervals                                Axis  Rate:       71                           P:          39  MA:         174                          QRS:        63  QRSD:       79                           T:          26  QT:         372  QTc:        405    Interpretive Statements  Sinus rhythm  Compared to ECG 2019 14:15:36  ST (T wave) deviation no longer present  T-wave abnormality no longer present  Q waves no longer present  Electronically Signed On 2020 17:47:42 PST by FELIX DE JESUS DO     BETA-HCG QUANTITATIVE SERUM    Collection Time: 20  2:35 PM   Result Value Ref Range    Bhcg 43144.9 (H) 0.0 - 5.0 mIU/mL   RH TYPE FOR RHOGAM FROM E.D.    Collection Time: 20  2:35 PM    Result Value Ref Range    Emergency Department Rh Typing POS     Number Of Rh Doses Indicated ZERO    URINALYSIS,CULTURE IF INDICATED    Collection Time: 01/02/20  3:10 PM    Specimen: Urine   Result Value Ref Range    Color Yellow     Character Clear     Specific Gravity 1.012 <1.035    Ph 6.0 5.0 - 8.0    Glucose Negative Negative mg/dL    Ketones Negative Negative mg/dL    Protein Negative Negative mg/dL    Bilirubin Negative Negative    Urobilinogen, Urine 1.0 Negative    Nitrite Negative Negative    Leukocyte Esterase Negative Negative    Occult Blood Negative Negative    Micro Urine Req see below    CHLAMYDIA & GC BY PCR    Collection Time: 01/02/20  3:20 PM    Specimen: Genital   Result Value Ref Range    C. trachomatis by PCR Negative Negative    N. gonorrhoeae by PCR Negative Negative    Source Vaginal    VAGINAL PATHOGENS DNA PANEL    Collection Time: 01/02/20  3:20 PM   Result Value Ref Range    Candida species DNA Probe Negative Negative    Trichamonas vaginalis DNA Probe Negative Negative    Gardnerella vaginalis DNA Probe Negative Negative   CBC WITH DIFFERENTIAL    Collection Time: 01/14/20 12:57 PM   Result Value Ref Range    WBC 8.0 4.8 - 10.8 K/uL    RBC 4.68 4.20 - 5.40 M/uL    Hemoglobin 13.3 12.0 - 16.0 g/dL    Hematocrit 40.8 37.0 - 47.0 %    MCV 87.2 81.4 - 97.8 fL    MCH 28.4 27.0 - 33.0 pg    MCHC 32.6 (L) 33.6 - 35.0 g/dL    RDW 48.4 35.9 - 50.0 fL    Platelet Count 238 164 - 446 K/uL    MPV 11.2 9.0 - 12.9 fL    Neutrophils-Polys 74.40 (H) 44.00 - 72.00 %    Lymphocytes 20.50 (L) 22.00 - 41.00 %    Monocytes 4.10 0.00 - 13.40 %    Eosinophils 0.10 0.00 - 6.90 %    Basophils 0.60 0.00 - 1.80 %    Immature Granulocytes 0.30 0.00 - 0.90 %    Nucleated RBC 0.00 /100 WBC    Neutrophils (Absolute) 5.92 2.00 - 7.15 K/uL    Lymphs (Absolute) 1.63 1.00 - 4.80 K/uL    Monos (Absolute) 0.33 0.00 - 0.85 K/uL    Eos (Absolute) 0.01 0.00 - 0.51 K/uL    Baso (Absolute) 0.05 0.00 - 0.12 K/uL    Immature  Granulocytes (abs) 0.02 0.00 - 0.11 K/uL    NRBC (Absolute) 0.00 K/uL   COMP METABOLIC PANEL    Collection Time: 01/14/20 12:57 PM   Result Value Ref Range    Sodium 134 (L) 135 - 145 mmol/L    Potassium 3.6 3.6 - 5.5 mmol/L    Chloride 100 96 - 112 mmol/L    Co2 25 20 - 33 mmol/L    Anion Gap 9.0 0.0 - 11.9    Glucose 112 (H) 65 - 99 mg/dL    Bun 7 (L) 8 - 22 mg/dL    Creatinine 0.58 0.50 - 1.40 mg/dL    Calcium 8.9 8.5 - 10.5 mg/dL    AST(SGOT) 12 12 - 45 U/L    ALT(SGPT) 7 2 - 50 U/L    Alkaline Phosphatase 41 30 - 99 U/L    Total Bilirubin 0.5 0.1 - 1.5 mg/dL    Albumin 4.3 3.2 - 4.9 g/dL    Total Protein 7.7 6.0 - 8.2 g/dL    Globulin 3.4 1.9 - 3.5 g/dL    A-G Ratio 1.3 g/dL   LIPASE    Collection Time: 01/14/20 12:57 PM   Result Value Ref Range    Lipase 22 11 - 82 U/L   HCG QUANTITATIVE    Collection Time: 01/14/20 12:57 PM   Result Value Ref Range    Bhcg 201608.1 (H) 0.0 - 5.0 mIU/mL   URINALYSIS,CULTURE IF INDICATED    Collection Time: 01/14/20 12:57 PM    Specimen: Urine   Result Value Ref Range    Color Yellow     Character Clear     Specific Gravity 1.015 <1.035    Ph 7.0 5.0 - 8.0    Glucose Negative Negative mg/dL    Ketones 15 (A) Negative mg/dL    Protein Negative Negative mg/dL    Bilirubin Negative Negative    Urobilinogen, Urine 0.2 Negative    Nitrite Negative Negative    Leukocyte Esterase Negative Negative    Occult Blood Negative Negative    Micro Urine Req see below    ESTIMATED GFR    Collection Time: 01/14/20 12:57 PM   Result Value Ref Range    GFR If African American >60 >60 mL/min/1.73 m 2    GFR If Non African American >60 >60 mL/min/1.73 m 2   TSH    Collection Time: 01/14/20 12:57 PM   Result Value Ref Range    TSH 5.280 0.380 - 5.330 uIU/mL   FREE THYROXINE    Collection Time: 01/14/20 12:57 PM   Result Value Ref Range    Free T-4 1.22 0.53 - 1.43 ng/dL   POCT Urinalysis    Collection Time: 01/31/20 10:05 AM   Result Value Ref Range    POC Color Felisha Negative    POC Appearance  Cloudy Negative    POC Leukocyte Esterase Negative Negative    POC Nitrites Negative Negative    POC Urobiligen n/a Negative (0.2) mg/dL    POC Protein 30 Negative mg/dL    POC Urine PH 6.0 5.0 - 8.0    POC Blood Negative Negative    POC Specific Gravity 1.030 <1.005 - >1.030    POC Ketones Trace Negative mg/dL    POC Bilirubin n/a Negative mg/dL    POC Glucose Negative Negative mg/dL   THINPREP PAP W/HPV AND CTNG    Collection Time: 01/31/20 10:52 AM   Result Value Ref Range    Cytology Reg See Path Report     Source Cervical     HPV Genotype 16 Negative Negative    HPV Genotype 18 Negative Negative    HPV Other High Risk Genotypes Negative Negative    C. trachomatis by PCR Negative Negative    N. gonorrhoeae by PCR Negative Negative    Source Cervical    FREE THYROXINE    Collection Time: 02/20/20 10:13 AM   Result Value Ref Range    Free T-4 1.22 0.53 - 1.43 ng/dL   TSH    Collection Time: 02/20/20 10:13 AM   Result Value Ref Range    TSH 0.480 0.380 - 5.330 uIU/mL   URINE DRUG SCREEN W/CONF (AR)    Collection Time: 02/20/20 10:13 AM   Result Value Ref Range    Urine Amphetamine-Methamphetam Negative Cutoff 300 ng/mL    Barbiturates Negative Cutoff 200 ng/mL    Benzodiazepines Negative Cutoff 200 ng/mL    Propoxyphene Negative Cutoff 300 ng/mL    Cocaine Metabolite Negative Cutoff 150 ng/mL    Methadone Negative Cutoff 150 ng/mL    Codeine-Morphine Negative Cutoff 300 ng/mL    Phencyclidine -Pcp Negative Cutoff 25 ng/mL    Cannabinoid Metab Positive Cutoff 20 ng/mL    Drug Comment Urine Drugs See Note    PREG CNTR PRENATAL PN    Collection Time: 02/20/20 10:13 AM   Result Value Ref Range    Color Yellow     Character Cloudy (A)     Specific Gravity 1.030 <1.035    Ph 6.0 5.0 - 8.0    Glucose Negative Negative mg/dL    Ketones Negative Negative mg/dL    Protein Negative Negative mg/dL    Bilirubin Negative Negative    Urobilinogen, Urine 1.0 Negative    Nitrite Negative Negative    Leukocyte Esterase Negative  Negative    Occult Blood Negative Negative    Micro Urine Req Microscopic     WBC 7.7 4.8 - 10.8 K/uL    RBC 4.16 (L) 4.20 - 5.40 M/uL    Hemoglobin 11.9 (L) 12.0 - 16.0 g/dL    Hematocrit 36.6 (L) 37.0 - 47.0 %    MCV 88.0 81.4 - 97.8 fL    MCH 28.6 27.0 - 33.0 pg    MCHC 32.5 (L) 33.6 - 35.0 g/dL    RDW 46.8 35.9 - 50.0 fL    Platelet Count 229 164 - 446 K/uL    MPV 11.2 9.0 - 12.9 fL    Neutrophils-Polys 66.70 44.00 - 72.00 %    Lymphocytes 25.60 22.00 - 41.00 %    Monocytes 6.10 0.00 - 13.40 %    Eosinophils 0.50 0.00 - 6.90 %    Basophils 0.70 0.00 - 1.80 %    Immature Granulocytes 0.40 0.00 - 0.90 %    Nucleated RBC 0.00 /100 WBC    Neutrophils (Absolute) 5.10 2.00 - 7.15 K/uL    Lymphs (Absolute) 1.96 1.00 - 4.80 K/uL    Monos (Absolute) 0.47 0.00 - 0.85 K/uL    Eos (Absolute) 0.04 0.00 - 0.51 K/uL    Baso (Absolute) 0.05 0.00 - 0.12 K/uL    Immature Granulocytes (abs) 0.03 0.00 - 0.11 K/uL    NRBC (Absolute) 0.00 K/uL    Hepatitis B Surface Antigen Negative Negative    Rubella IgG Antibody 28.50 IU/mL    Syphilis, Treponemal Qual Non Reactive Non-Reactive   HIV AG/AB COMBO ASSAY SCREENING    Collection Time: 02/20/20 10:13 AM   Result Value Ref Range    HIV Ag/Ab Combo Assay Non Reactive Non Reactive   OP Prenatal Panel-Blood Bank    Collection Time: 02/20/20 10:13 AM   Result Value Ref Range    ABO Grouping Only B     Rh Grouping Only POS     Antibody Screen Scrn NEG    URINE MICROSCOPIC (W/UA)    Collection Time: 02/20/20 10:13 AM   Result Value Ref Range    WBC 0-2 /hpf    RBC 0-2 /hpf    Bacteria Few (A) None /hpf    Epithelial Cells Few /hpf    Hyaline Cast 0-2 /lpf   CANNABINOIDS, UR CONFIRM    Collection Time: 02/20/20 10:13 AM   Result Value Ref Range    Cannabinoids, Ur Drug Confirmation 30 ng/mL   CBC WITH DIFFERENTIAL    Collection Time: 02/20/20 12:46 PM   Result Value Ref Range    WBC 8.7 4.8 - 10.8 K/uL    RBC 4.12 (L) 4.20 - 5.40 M/uL    Hemoglobin 12.1 12.0 - 16.0 g/dL    Hematocrit 36.2 (L)  37.0 - 47.0 %    MCV 87.9 81.4 - 97.8 fL    MCH 29.4 27.0 - 33.0 pg    MCHC 33.4 (L) 33.6 - 35.0 g/dL    RDW 46.0 35.9 - 50.0 fL    Platelet Count 234 164 - 446 K/uL    MPV 11.1 9.0 - 12.9 fL    Neutrophils-Polys 67.20 44.00 - 72.00 %    Lymphocytes 26.00 22.00 - 41.00 %    Monocytes 5.90 0.00 - 13.40 %    Eosinophils 0.30 0.00 - 6.90 %    Basophils 0.30 0.00 - 1.80 %    Immature Granulocytes 0.30 0.00 - 0.90 %    Nucleated RBC 0.00 /100 WBC    Neutrophils (Absolute) 5.86 2.00 - 7.15 K/uL    Lymphs (Absolute) 2.27 1.00 - 4.80 K/uL    Monos (Absolute) 0.52 0.00 - 0.85 K/uL    Eos (Absolute) 0.03 0.00 - 0.51 K/uL    Baso (Absolute) 0.03 0.00 - 0.12 K/uL    Immature Granulocytes (abs) 0.03 0.00 - 0.11 K/uL    NRBC (Absolute) 0.00 K/uL   COMP METABOLIC PANEL    Collection Time: 02/20/20 12:46 PM   Result Value Ref Range    Sodium 133 (L) 135 - 145 mmol/L    Potassium 3.8 3.6 - 5.5 mmol/L    Chloride 103 96 - 112 mmol/L    Co2 24 20 - 33 mmol/L    Anion Gap 6.0 0.0 - 11.9    Glucose 84 65 - 99 mg/dL    Bun 9 8 - 22 mg/dL    Creatinine 0.51 0.50 - 1.40 mg/dL    Calcium 8.9 8.5 - 10.5 mg/dL    AST(SGOT) 13 12 - 45 U/L    ALT(SGPT) 7 2 - 50 U/L    Alkaline Phosphatase 41 30 - 99 U/L    Total Bilirubin 0.2 0.1 - 1.5 mg/dL    Albumin 4.1 3.2 - 4.9 g/dL    Total Protein 7.5 6.0 - 8.2 g/dL    Globulin 3.4 1.9 - 3.5 g/dL    A-G Ratio 1.2 g/dL   LIPASE    Collection Time: 02/20/20 12:46 PM   Result Value Ref Range    Lipase 26 11 - 82 U/L   ESTIMATED GFR    Collection Time: 02/20/20 12:46 PM   Result Value Ref Range    GFR If African American >60 >60 mL/min/1.73 m 2    GFR If Non African American >60 >60 mL/min/1.73 m 2   URINALYSIS CULTURE, IF INDICATED    Collection Time: 02/20/20  1:10 PM    Specimen: Blood   Result Value Ref Range    Color Yellow     Character Clear     Specific Gravity 1.016 <1.035    Ph 7.0 5.0 - 8.0    Glucose Negative Negative mg/dL    Ketones Negative Negative mg/dL    Protein Negative Negative mg/dL     Bilirubin Negative Negative    Urobilinogen, Urine 0.2 Negative    Nitrite Negative Negative    Leukocyte Esterase Negative Negative    Occult Blood Negative Negative    Micro Urine Req see below    AFP TETRA    Collection Time: 04/15/20 12:20 PM   Result Value Ref Range    AFP Value -Eia 91 ng/mL    AFP MOM Value 1.37     Ue3 Value 2.96 ng/mL    Ue3 Mom 1.08     Patient's hCG, 2nd Trimester 45466 IU/L    hCG MoM, 2nd Trimester 1.91     Elissa Value -Eia 159 pg/mL    Elissa Mom Value 0.90     Interpretation Screen Neg     M[age]ERIS [age] yr    Maternal Weight 140.0 lbs.     Gest. Age on Collection Date 20 wks, 5 days     Gestational Age Based On Other     Multiple Pregnancy Coronel     Race Nonblack     Insulin Dependent Diabetes No     Smoking No     Family Hx NTD No     Family Hx of Aneuploidy No     Specimen See Note     EER Quad, Maternal Serum See Note    T3 FREE    Collection Time: 05/28/20 10:03 AM   Result Value Ref Range    T3,Free 2.12 2.00 - 4.40 pg/mL   FREE THYROXINE    Collection Time: 05/28/20 10:03 AM   Result Value Ref Range    Free T-4 1.41 0.93 - 1.70 ng/dL   TSH    Collection Time: 05/28/20 10:03 AM   Result Value Ref Range    TSH 0.526 0.380 - 5.330 uIU/mL   T.PALLIDUM AB EIA    Collection Time: 05/28/20 10:03 AM   Result Value Ref Range    Syphilis, Treponemal Qual Non-Reactive Non-Reactive   CBC WITHOUT DIFFERENTIAL    Collection Time: 05/28/20 10:03 AM   Result Value Ref Range    WBC 9.0 4.8 - 10.8 K/uL    RBC 4.31 4.20 - 5.40 M/uL    Hemoglobin 11.9 (L) 12.0 - 16.0 g/dL    Hematocrit 37.7 37.0 - 47.0 %    MCV 87.5 81.4 - 97.8 fL    MCH 27.6 27.0 - 33.0 pg    MCHC 31.6 (L) 33.6 - 35.0 g/dL    RDW 40.4 35.9 - 50.0 fL    Platelet Count 279 164 - 446 K/uL    MPV 11.6 9.0 - 12.9 fL   GLUCOSE 1HR GESTATIONAL    Collection Time: 05/28/20 10:03 AM   Result Value Ref Range    Glucose, Post Dose 103 70 - 139 mg/dL   POC UA    Collection Time: 06/07/20 10:11 PM   Result Value Ref Range    POC  Color Yellow     POC Appearance Clear     POC Glucose Negative Negative mg/dL    POC Ketones Negative Negative mg/dL    POC Specific Gravity >=1.030 (A) 1.005 - 1.030    POC Blood Negative Negative    POC Urine PH 6.5 5.0 - 8.0    POC Protein Negative Negative mg/dL    POC Nitrites Negative Negative    POC Leukocyte Esterase Negative Negative   COVID/SARS CoV-2 PCR    Collection Time: 07/11/20  8:40 PM    Specimen: Nasopharyngeal; Respirate   Result Value Ref Range    COVID Order Status Received    SARS-CoV-2, PCR (In-House)    Collection Time: 07/11/20  8:40 PM   Result Value Ref Range    SARS-CoV-2 Source NP Swab     SARS-CoV-2 by PCR DETECTED (AA)    POC UA    Collection Time: 07/11/20 10:26 PM   Result Value Ref Range    POC Color Yellow     POC Appearance Clear     POC Glucose 250 (A) Negative mg/dL    POC Ketones Trace (A) Negative mg/dL    POC Specific Gravity >=1.030 (A) 1.005 - 1.030    POC Blood Negative Negative    POC Urine PH 6.0 5.0 - 8.0    POC Protein Negative Negative mg/dL    POC Nitrites Negative Negative    POC Leukocyte Esterase Negative Negative   CBC WITH DIFFERENTIAL    Collection Time: 07/11/20 10:37 PM   Result Value Ref Range    WBC 8.6 4.8 - 10.8 K/uL    RBC 3.73 (L) 4.20 - 5.40 M/uL    Hemoglobin 9.5 (L) 12.0 - 16.0 g/dL    Hematocrit 30.3 (L) 37.0 - 47.0 %    MCV 81.2 (L) 81.4 - 97.8 fL    MCH 25.5 (L) 27.0 - 33.0 pg    MCHC 31.4 (L) 33.6 - 35.0 g/dL    RDW 39.8 35.9 - 50.0 fL    Platelet Count 210 164 - 446 K/uL    MPV 12.0 9.0 - 12.9 fL    Neutrophils-Polys 76.30 (H) 44.00 - 72.00 %    Lymphocytes 13.90 (L) 22.00 - 41.00 %    Monocytes 8.30 0.00 - 13.40 %    Eosinophils 0.40 0.00 - 6.90 %    Basophils 0.50 0.00 - 1.80 %    Immature Granulocytes 0.60 0.00 - 0.90 %    Nucleated RBC 0.00 /100 WBC    Neutrophils (Absolute) 6.54 2.00 - 7.15 K/uL    Lymphs (Absolute) 1.19 1.00 - 4.80 K/uL    Monos (Absolute) 0.71 0.00 - 0.85 K/uL    Eos (Absolute) 0.03 0.00 - 0.51 K/uL    Baso (Absolute)  0.04 0.00 - 0.12 K/uL    Immature Granulocytes (abs) 0.05 0.00 - 0.11 K/uL    NRBC (Absolute) 0.00 K/uL   Comp Metabolic Panel    Collection Time: 07/11/20 10:37 PM   Result Value Ref Range    Sodium 133 (L) 135 - 145 mmol/L    Potassium 4.0 3.6 - 5.5 mmol/L    Chloride 101 96 - 112 mmol/L    Co2 21 20 - 33 mmol/L    Anion Gap 11.0 7.0 - 16.0    Glucose 88 65 - 99 mg/dL    Bun 10 8 - 22 mg/dL    Creatinine 0.70 0.50 - 1.40 mg/dL    Calcium 8.9 8.5 - 10.5 mg/dL    AST(SGOT) 18 12 - 45 U/L    ALT(SGPT) 12 2 - 50 U/L    Alkaline Phosphatase 96 30 - 99 U/L    Total Bilirubin <0.2 0.1 - 1.5 mg/dL    Albumin 3.3 3.2 - 4.9 g/dL    Total Protein 6.2 6.0 - 8.2 g/dL    Globulin 2.9 1.9 - 3.5 g/dL    A-G Ratio 1.1 g/dL   ESTIMATED GFR    Collection Time: 07/11/20 10:37 PM   Result Value Ref Range    GFR If African American >60 >60 mL/min/1.73 m 2    GFR If Non African American >60 >60 mL/min/1.73 m 2   POC UA    Collection Time: 07/13/20 11:56 AM   Result Value Ref Range    POC Color Felisha     POC Appearance Clear     POC Glucose Negative Negative mg/dL    POC Ketones 15 (A) Negative mg/dL    POC Specific Gravity 1.020 1.005 - 1.030    POC Blood Negative Negative    POC Urine PH 6.0 5.0 - 8.0    POC Protein Negative Negative mg/dL    POC Nitrites Negative Negative    POC Leukocyte Esterase Negative Negative   fFN if less than 34 wks gestation - must be prior to vaginal exam    Collection Time: 07/13/20 12:30 PM   Result Value Ref Range    Fetal Fibronectin Negative    CBC WITH DIFFERENTIAL    Collection Time: 07/13/20 12:41 PM   Result Value Ref Range    WBC 7.1 4.8 - 10.8 K/uL    RBC 3.84 (L) 4.20 - 5.40 M/uL    Hemoglobin 9.7 (L) 12.0 - 16.0 g/dL    Hematocrit 30.7 (L) 37.0 - 47.0 %    MCV 79.9 (L) 81.4 - 97.8 fL    MCH 25.3 (L) 27.0 - 33.0 pg    MCHC 31.6 (L) 33.6 - 35.0 g/dL    RDW 39.7 35.9 - 50.0 fL    Platelet Count 188 164 - 446 K/uL    MPV 11.7 9.0 - 12.9 fL    Neutrophils-Polys 77.50 (H) 44.00 - 72.00 %     Lymphocytes 14.90 (L) 22.00 - 41.00 %    Monocytes 6.20 0.00 - 13.40 %    Eosinophils 0.10 0.00 - 6.90 %    Basophils 0.60 0.00 - 1.80 %    Immature Granulocytes 0.70 0.00 - 0.90 %    Nucleated RBC 0.00 /100 WBC    Neutrophils (Absolute) 5.51 2.00 - 7.15 K/uL    Lymphs (Absolute) 1.06 1.00 - 4.80 K/uL    Monos (Absolute) 0.44 0.00 - 0.85 K/uL    Eos (Absolute) 0.01 0.00 - 0.51 K/uL    Baso (Absolute) 0.04 0.00 - 0.12 K/uL    Immature Granulocytes (abs) 0.05 0.00 - 0.11 K/uL    NRBC (Absolute) 0.00 K/uL   Comp Metabolic Panel    Collection Time: 07/13/20  1:52 PM   Result Value Ref Range    Sodium 137 135 - 145 mmol/L    Potassium 3.7 3.6 - 5.5 mmol/L    Chloride 104 96 - 112 mmol/L    Co2 21 20 - 33 mmol/L    Anion Gap 12.0 7.0 - 16.0    Glucose 99 65 - 99 mg/dL    Bun 6 (L) 8 - 22 mg/dL    Creatinine 0.57 0.50 - 1.40 mg/dL    Calcium 8.6 8.5 - 10.5 mg/dL    AST(SGOT) 18 12 - 45 U/L    ALT(SGPT) 15 2 - 50 U/L    Alkaline Phosphatase 102 (H) 30 - 99 U/L    Total Bilirubin 0.2 0.1 - 1.5 mg/dL    Albumin 3.3 3.2 - 4.9 g/dL    Total Protein 6.3 6.0 - 8.2 g/dL    Globulin 3.0 1.9 - 3.5 g/dL    A-G Ratio 1.1 g/dL   Prothrombin Time    Collection Time: 07/13/20  1:52 PM   Result Value Ref Range    PT 12.0 12.0 - 14.6 sec    INR 0.86 (L) 0.87 - 1.13   APTT    Collection Time: 07/13/20  1:52 PM   Result Value Ref Range    APTT 27.0 24.7 - 36.0 sec   FIBRINOGEN    Collection Time: 07/13/20  1:52 PM   Result Value Ref Range    Fibrinogen 419 215 - 460 mg/dL   LDH    Collection Time: 07/13/20  1:52 PM   Result Value Ref Range    LDH Total 168 107 - 266 U/L   CREATINE KINASE    Collection Time: 07/13/20  1:52 PM   Result Value Ref Range    CPK Total 29 0 - 154 U/L   TROPONIN    Collection Time: 07/13/20  1:52 PM   Result Value Ref Range    Troponin T <6 6 - 19 ng/L   CRP QUANTITIVE (NON-CARDIAC)    Collection Time: 07/13/20  1:52 PM   Result Value Ref Range    Stat C-Reactive Protein 1.14 (H) 0.00 - 0.75 mg/dL   FERRITIN     Collection Time: 07/13/20  1:52 PM   Result Value Ref Range    Ferritin 9.5 (L) 10.0 - 291.0 ng/mL   Triglyceride    Collection Time: 07/13/20  1:52 PM   Result Value Ref Range    Triglycerides 306 (H) 0 - 149 mg/dL   ESTIMATED GFR    Collection Time: 07/13/20  1:52 PM   Result Value Ref Range    GFR If African American >60 >60 mL/min/1.73 m 2    GFR If Non African American >60 >60 mL/min/1.73 m 2   POCT Fetal Nonstress Test    Collection Time: 07/22/20  4:36 PM   Result Value Ref Range    NST Indications      NST Baseline      NST Uterine Activity      NST Acoustic Stimulation      NST Assessment      NST Action Necessary      NST Other Data      NST Return      NST Read By     POCT Fetal Nonstress Test    Collection Time: 07/31/20  4:29 PM   Result Value Ref Range    NST Indications      NST Baseline      NST Uterine Activity      NST Acoustic Stimulation      NST Assessment      NST Action Necessary      NST Other Data      NST Return      NST Read By     POC UA    Collection Time: 08/02/20  3:47 PM   Result Value Ref Range    POC Color Yellow     POC Appearance Clear     POC Glucose Negative Negative mg/dL    POC Ketones Negative Negative mg/dL    POC Specific Gravity >=1.030 (A) 1.005 - 1.030    POC Blood Negative Negative    POC Urine PH 6.0 5.0 - 8.0    POC Protein 30 (A) Negative mg/dL    POC Nitrites Negative Negative    POC Leukocyte Esterase Negative Negative   CBC WITH DIFFERENTIAL    Collection Time: 08/02/20  5:59 PM   Result Value Ref Range    WBC 8.1 4.8 - 10.8 K/uL    RBC 3.88 (L) 4.20 - 5.40 M/uL    Hemoglobin 9.6 (L) 12.0 - 16.0 g/dL    Hematocrit 31.2 (L) 37.0 - 47.0 %    MCV 80.4 (L) 81.4 - 97.8 fL    MCH 24.7 (L) 27.0 - 33.0 pg    MCHC 30.8 (L) 33.6 - 35.0 g/dL    RDW 43.9 35.9 - 50.0 fL    Platelet Count 237 164 - 446 K/uL    MPV 12.1 9.0 - 12.9 fL    Neutrophils-Polys 62.30 44.00 - 72.00 %    Lymphocytes 28.80 22.00 - 41.00 %    Monocytes 7.50 0.00 - 13.40 %    Eosinophils 0.50 0.00 - 6.90 %     Basophils 0.40 0.00 - 1.80 %    Immature Granulocytes 0.50 0.00 - 0.90 %    Nucleated RBC 0.00 /100 WBC    Neutrophils (Absolute) 5.06 2.00 - 7.15 K/uL    Lymphs (Absolute) 2.34 1.00 - 4.80 K/uL    Monos (Absolute) 0.61 0.00 - 0.85 K/uL    Eos (Absolute) 0.04 0.00 - 0.51 K/uL    Baso (Absolute) 0.03 0.00 - 0.12 K/uL    Immature Granulocytes (abs) 0.04 0.00 - 0.11 K/uL    NRBC (Absolute) 0.00 K/uL   Comp Metabolic Panel    Collection Time: 08/02/20  5:59 PM   Result Value Ref Range    Sodium 135 135 - 145 mmol/L    Potassium 3.8 3.6 - 5.5 mmol/L    Chloride 104 96 - 112 mmol/L    Co2 17 (L) 20 - 33 mmol/L    Anion Gap 14.0 7.0 - 16.0    Glucose 85 65 - 99 mg/dL    Bun 13 8 - 22 mg/dL    Creatinine 0.67 0.50 - 1.40 mg/dL    Calcium 8.7 8.5 - 10.5 mg/dL    AST(SGOT) 19 12 - 45 U/L    ALT(SGPT) 16 2 - 50 U/L    Alkaline Phosphatase 120 (H) 30 - 99 U/L    Total Bilirubin 0.2 0.1 - 1.5 mg/dL    Albumin 3.4 3.2 - 4.9 g/dL    Total Protein 6.4 6.0 - 8.2 g/dL    Globulin 3.0 1.9 - 3.5 g/dL    A-G Ratio 1.1 g/dL   URIC ACID    Collection Time: 08/02/20  5:59 PM   Result Value Ref Range    Uric Acid 6.9 1.9 - 8.2 mg/dL   ESTIMATED GFR    Collection Time: 08/02/20  5:59 PM   Result Value Ref Range    GFR If African American >60 >60 mL/min/1.73 m 2    GFR If Non African American >60 >60 mL/min/1.73 m 2   PROTEIN/CREAT RATIO URINE    Collection Time: 08/02/20  6:45 PM   Result Value Ref Range    Total Protein, Urine <4.0 0.0 - 15.0 mg/dL    Creatinine, Random Urine 27.76 mg/dL    Protein Creatinine Ratio see below 10 - 107 mg/g   COVID/SARS CoV-2 PCR    Collection Time: 08/08/20  5:39 AM    Specimen: Nasopharyngeal; Respirate   Result Value Ref Range    COVID Order Status Received    SARS-CoV-2, PCR (In-House)    Collection Time: 08/08/20  5:39 AM   Result Value Ref Range    SARS-CoV-2 Source NP Swab     SARS-CoV-2 by PCR DETECTED (AA)    CBC WITHOUT DIFFERENTIAL    Collection Time: 08/08/20  5:45 AM   Result Value Ref Range     WBC 9.5 4.8 - 10.8 K/uL    RBC 4.10 (L) 4.20 - 5.40 M/uL    Hemoglobin 10.1 (L) 12.0 - 16.0 g/dL    Hematocrit 32.3 (L) 37.0 - 47.0 %    MCV 78.8 (L) 81.4 - 97.8 fL    MCH 24.6 (L) 27.0 - 33.0 pg    MCHC 31.3 (L) 33.6 - 35.0 g/dL    RDW 43.5 35.9 - 50.0 fL    Platelet Count 220 164 - 446 K/uL    MPV 12.7 9.0 - 12.9 fL   HOLD BLOOD BANK SPECIMEN (NOT TESTED)    Collection Time: 20  5:45 AM   Result Value Ref Range    Holding Tube - Bb DONE    Comp Metabolic Panel    Collection Time: 20  5:45 AM   Result Value Ref Range    Sodium 135 135 - 145 mmol/L    Potassium 3.8 3.6 - 5.5 mmol/L    Chloride 102 96 - 112 mmol/L    Co2 20 20 - 33 mmol/L    Anion Gap 13.0 7.0 - 16.0    Glucose 95 65 - 99 mg/dL    Bun 13 8 - 22 mg/dL    Creatinine 0.53 0.50 - 1.40 mg/dL    Calcium 9.5 8.5 - 10.5 mg/dL    AST(SGOT) 19 12 - 45 U/L    ALT(SGPT) 16 2 - 50 U/L    Alkaline Phosphatase 125 (H) 30 - 99 U/L    Total Bilirubin 0.2 0.1 - 1.5 mg/dL    Albumin 3.4 3.2 - 4.9 g/dL    Total Protein 6.7 6.0 - 8.2 g/dL    Globulin 3.3 1.9 - 3.5 g/dL    A-G Ratio 1.0 g/dL   URIC ACID    Collection Time: 20  5:45 AM   Result Value Ref Range    Uric Acid 7.0 1.9 - 8.2 mg/dL   ESTIMATED GFR    Collection Time: 20  5:45 AM   Result Value Ref Range    GFR If African American >60 >60 mL/min/1.73 m 2    GFR If Non African American >60 >60 mL/min/1.73 m 2        Assessment:   Ivis Klein at 37w1d  Labor status: SROM  Positive COVID test today  Hypothyroidism      Patient has a history of previous  delivery and is desiring trial of labor after . Discussed with patient today are the risks of trial of labor after  which include uterine rupture risk of one in 1000. Discussed with patient in the event of uterine rupture, immediate emergency  delivery will be performed. There is no guarantee that  can be performed an adequate amount of time to prevent fetal brain damage or death. Also  discussed with patient increase risks of hysterectomy and blood transfusion associated with failed vaginal birth after . Patient understands risks as described and agrees to continue at this time.  An IUPC was placed.  Labor is progressing spontaneously at this time.  Fetal status is reassuring.        Obstetrical history significant for   Patient Active Problem List    Diagnosis Date Noted   • Anxiety 2017     Priority: Medium   • 36 weeks gestation of pregnancy 2020   • COVID-19 virus infection 2020   • Echogenic focus of heart of fetus affecting antepartum care of mother 04/10/2020   • History of  delivery x 1 for breech 2020   • History of panic attack 2020   • Supervision of other high risk pregnancy, antepartum 2020   • History of thyroidectomy 2020   • Graves disease 2016   • History of pericarditis 2016   • Migraine with aura, not intractable 2010   .      Plan:     Admit to L&D  GBS -unknown and pending.  Obtained on 2020.  Will treat based risk factors.  Positive COVID test -isolation measures ordered.  Elevated blood pressure upon admission -we will obtain PIH labs.  Previously on Lovenox -noncompliant, last dose was Thursday at 6 AM.  Plan for postpartum DVT prophylaxis.  Anticipate .

## 2020-08-08 NOTE — PROGRESS NOTES
0500:  Patient of [facility] presents with complaints of SROM. Coronel Gestation today at 37.1 weeks.     Patient tested positive for COVID on 2020; pt re-swabbed and sent to lab.     SVE: /-2.     Admit profile complete, IV started, labs drawn.     Patient has hx of C/S x1 due to breech presentation; Pt. Desires to TOLAC; consents signed.

## 2020-08-08 NOTE — ANESTHESIA PROCEDURE NOTES
Epidural Block    Date/Time: 8/8/2020 8:57 AM  Performed by: Katia Arreguin M.D.  Authorized by: Katia Arreguin M.D.     Patient Location:  OB  Start Time:  8/8/2020 8:57 AM  End Time:  8/8/2020 9:06 AM  Reason for Block: labor analgesia    patient identified, IV checked, site marked, risks and benefits discussed, surgical consent, monitors and equipment checked, pre-op evaluation and timeout performed    Patient Position:  Sitting  Prep: ChloraPrep, patient draped and sterile technique    Monitoring:  Blood pressure, continuous pulse oximetry and heart rate  Approach:  Midline  Location:  L3-L4  Injection Technique:  MCKAY saline  Skin infiltration:  Lidocaine  Strength:  1%  Dose:  3ml  Needle Type:  Tuohy  Needle Gauge:  17 G  Needle Length:  3.5 in  Loss of resistance::  5  Catheter Size:  19 G  Catheter at Skin Depth:  9  Test Dose Result:  Negative  Events: paresthesia-transient/resolved    Events comment:  Left sided   Patient more comfortable after epidural

## 2020-08-09 LAB
ERYTHROCYTE [DISTWIDTH] IN BLOOD BY AUTOMATED COUNT: 42.9 FL (ref 35.9–50)
HCT VFR BLD AUTO: 31.4 % (ref 37–47)
HGB BLD-MCNC: 10 G/DL (ref 12–16)
MAGNESIUM SERPL-MCNC: 5.8 MG/DL (ref 1.5–2.5)
MAGNESIUM SERPL-MCNC: 6.4 MG/DL (ref 1.5–2.5)
MCH RBC QN AUTO: 24.7 PG (ref 27–33)
MCHC RBC AUTO-ENTMCNC: 31.8 G/DL (ref 33.6–35)
MCV RBC AUTO: 77.5 FL (ref 81.4–97.8)
PLATELET # BLD AUTO: 217 K/UL (ref 164–446)
PMV BLD AUTO: 12.4 FL (ref 9–12.9)
RBC # BLD AUTO: 4.05 M/UL (ref 4.2–5.4)
WBC # BLD AUTO: 14.8 K/UL (ref 4.8–10.8)

## 2020-08-09 PROCEDURE — 83735 ASSAY OF MAGNESIUM: CPT

## 2020-08-09 PROCEDURE — 700105 HCHG RX REV CODE 258: Performed by: STUDENT IN AN ORGANIZED HEALTH CARE EDUCATION/TRAINING PROGRAM

## 2020-08-09 PROCEDURE — 700102 HCHG RX REV CODE 250 W/ 637 OVERRIDE(OP): Performed by: STUDENT IN AN ORGANIZED HEALTH CARE EDUCATION/TRAINING PROGRAM

## 2020-08-09 PROCEDURE — A9270 NON-COVERED ITEM OR SERVICE: HCPCS | Performed by: STUDENT IN AN ORGANIZED HEALTH CARE EDUCATION/TRAINING PROGRAM

## 2020-08-09 PROCEDURE — 85027 COMPLETE CBC AUTOMATED: CPT

## 2020-08-09 PROCEDURE — 770002 HCHG ROOM/CARE - OB PRIVATE (112)

## 2020-08-09 PROCEDURE — 36415 COLL VENOUS BLD VENIPUNCTURE: CPT

## 2020-08-09 PROCEDURE — 700111 HCHG RX REV CODE 636 W/ 250 OVERRIDE (IP): Performed by: OBSTETRICS & GYNECOLOGY

## 2020-08-09 PROCEDURE — 700102 HCHG RX REV CODE 250 W/ 637 OVERRIDE(OP): Performed by: NURSE PRACTITIONER

## 2020-08-09 PROCEDURE — A9270 NON-COVERED ITEM OR SERVICE: HCPCS | Performed by: NURSE PRACTITIONER

## 2020-08-09 RX ORDER — LEVOTHYROXINE SODIUM 0.15 MG/1
150 TABLET ORAL
Status: DISCONTINUED | OUTPATIENT
Start: 2020-08-09 | End: 2020-08-10 | Stop reason: HOSPADM

## 2020-08-09 RX ADMIN — SODIUM CHLORIDE, POTASSIUM CHLORIDE, SODIUM LACTATE AND CALCIUM CHLORIDE: 600; 310; 30; 20 INJECTION, SOLUTION INTRAVENOUS at 08:38

## 2020-08-09 RX ADMIN — ACETAMINOPHEN 1000 MG: 500 TABLET ORAL at 05:46

## 2020-08-09 RX ADMIN — IBUPROFEN 600 MG: 600 TABLET ORAL at 18:47

## 2020-08-09 RX ADMIN — LEVOTHYROXINE SODIUM 150 MCG: 0.15 TABLET ORAL at 07:54

## 2020-08-09 RX ADMIN — ENOXAPARIN SODIUM 40 MG: 40 INJECTION SUBCUTANEOUS at 19:54

## 2020-08-09 RX ADMIN — ACETAMINOPHEN 1000 MG: 500 TABLET ORAL at 12:35

## 2020-08-09 RX ADMIN — IBUPROFEN 600 MG: 600 TABLET ORAL at 05:47

## 2020-08-09 RX ADMIN — ACETAMINOPHEN 1000 MG: 500 TABLET ORAL at 18:47

## 2020-08-09 ASSESSMENT — EDINBURGH POSTNATAL DEPRESSION SCALE (EPDS)
I HAVE FELT SCARED OR PANICKY FOR NO GOOD REASON: NO, NOT AT ALL
I HAVE BLAMED MYSELF UNNECESSARILY WHEN THINGS WENT WRONG: NO, NEVER
THE THOUGHT OF HARMING MYSELF HAS OCCURRED TO ME: NEVER
I HAVE BEEN SO UNHAPPY THAT I HAVE BEEN CRYING: NO, NEVER
I HAVE BEEN ANXIOUS OR WORRIED FOR NO GOOD REASON: NO, NOT AT ALL
I HAVE BEEN SO UNHAPPY THAT I HAVE HAD DIFFICULTY SLEEPING: NOT AT ALL
THINGS HAVE BEEN GETTING ON TOP OF ME: NO, I HAVE BEEN COPING AS WELL AS EVER
I HAVE BEEN ABLE TO LAUGH AND SEE THE FUNNY SIDE OF THINGS: AS MUCH AS I ALWAYS COULD
I HAVE FELT SAD OR MISERABLE: NO, NOT AT ALL
I HAVE LOOKED FORWARD WITH ENJOYMENT TO THINGS: AS MUCH AS I EVER DID

## 2020-08-09 NOTE — PROGRESS NOTES
Pt. Admitted to floor by Paradise L&JARAD ROBERTO. Pt. Assessed at this time. Vital signs WDL. Fundus firm, lochia light. Pt. Educated on infant safety and use of call light and when to call for assistance. Pt. Oriented to room. Will continue to monitor.

## 2020-08-09 NOTE — PROGRESS NOTES
Assumed care. Assessment completed, VSS. Fundus firm, lochia light. Pt. ambulating and voiding. Pt. requests pain medication as needed.POC discussed, all questions answered. Encouraged to call with needs.

## 2020-08-09 NOTE — CARE PLAN
Problem: Pain Management  Goal: Pain level will decrease to patient's comfort goal  Outcome: PROGRESSING AS EXPECTED  Note: Pt requests pain meds as needed.      Problem: Altered physiologic condition related to immediate post-delivery state and potential for bleeding/hemorrhage  Goal: Patient physiologically stable as evidenced by normal lochia, palpable uterine involution and vital signs within normal limits  Outcome: PROGRESSING AS EXPECTED  Note: Fundus firm and lochia light, VSS.

## 2020-08-09 NOTE — PROGRESS NOTES
1900: Resumed care of patient from OLE Garcia RN. Infant in panda warmer with Mother in bed and FOB at BS.     Patient's fundus firm/light and @ U. VSS    2200: Patient ambulated, voided, annabella care completed. Patient tolerated well. Patient and infant transferred to  via wheelchair. Report gricelda Sandoval PP RN. Patient in stable condition with firm fundus, and light lochia. ID bands verified.

## 2020-08-09 NOTE — ANESTHESIA TIME REPORT
Anesthesia Start and Stop Event Times     Date Time Event    8/8/2020 0855 Ready for Procedure     0855 Anesthesia Start     1248 Anesthesia Stop        Responsible Staff  08/08/20    Name Role Begin End    Katia Arreguin M.D. Anesth 0855 1248        Preop Diagnosis (Free Text):  Pre-op Diagnosis             Preop Diagnosis (Codes):    Post op Diagnosis  Distress from pain in labor      Premium Reason  E. Weekend    Comments:

## 2020-08-09 NOTE — PROGRESS NOTES
[facility] POSTPARTUM PROGRESS NOTE    PATIENT ID:  NAME:  Ivis Klein  MRN:               [id number]  YOB: 1995     24 y.o. female  at 37w2d PPD#1 s/p     Subjective:   Breastfeeding:Yes, baby having difficulty latching on. Diet:  Bowel Movement:No  Voiding:Yes  Flatus:Yes  Pain Control:Yes  Bleeding:Light Lochia  Contraception:unsure  Denies HA, RUQ Pain, Swelling    Objective:    Vitals:    20 0200 20 0300 20 0400 20 0600   BP: 111/72 107/69 108/71 110/67   Pulse: 88 90 86 80   Resp: 16 16 16 16   Temp:       TempSrc:       SpO2: 98% 98% 98% 98%   Weight:       Height:         General: No acute distress, resting comfortably in bed.  HEENT: normocephalic, nontraumatic, PERRLA, EOMI  Cardiovascular: Heart RRR with no murmurs, rubs or gallops. Distal Pulses 2+  Respiratory: symmetric chest expansion, lungs CTA bilaterally with no wheezes rales or rhonci  Abdomen: soft, mildly tender, fundus firm, +BS  Genitourinary: lochia light, denies excessive vaginal bleeding  Musculoskeletal: strength 5/5 in four extremities  Neuro: non focal with no numbness, tingling or changes in sensation    Recent Labs     20  0545 20  0239   WBC 9.5 14.8*   RBC 4.10* 4.05*   HEMOGLOBIN 10.1* 10.0*   HEMATOCRIT 32.3* 31.4*   MCV 78.8* 77.5*   MCH 24.6* 24.7*   RDW 43.5 42.9   PLATELETCT 220 217   MPV 12.7 12.4     Recent Labs     20  0545   SODIUM 135   POTASSIUM 3.8   CHLORIDE 102   CO2 20   GLUCOSE 95   BUN 13       Current Meds:   Current Facility-Administered Medications   Medication Dose Frequency Provider Last Rate Last Dose   • levothyroxine (SYNTHROID) tablet 150 mcg  150 mcg AM BILLIE Sifuentes M.D.       • ondansetron (ZOFRAN ODT) dispertab 4 mg  4 mg Q6HRS PRN YUN RaymundoP.EFRAÍNN.        Or   • ondansetron (ZOFRAN) syringe/vial injection 4 mg  4 mg Q6HRS PRN YUN RaymundoPFilemonRFilemonN.       • oxytocin (PITOCIN) infusion (for induction)  0.5-20 avery-units/min Continuous  Carolann Neal, A.P.R.N.   Stopped at 20 0600   • oxytocin (PITOCIN) infusion (for postpartum)   mL/hr Continuous Carolann Neal, A.P.R.N. 125 mL/hr at 20 1332 125 mL/hr at 20 1332   • ibuprofen (MOTRIN) tablet 600 mg  600 mg Q6HRS PRN Carolann Neal, A.P.R.N.   600 mg at 20 1651   • acetaminophen (TYLENOL) tablet 650 mg  650 mg Q4HRS PRN Carolann Neal, A.P.R.N.       • ropivacaine (NAROPIN) injection   Continuous Katia Arreguin M.D.   200 mg at 20 0905   • LR infusion   PRN Komal Sifuentes M.D. 125 mL/hr at 20 0607     • tetanus-dipth-acell pertussis (Tdap) inj 0.5 mL  0.5 mL Once PRN Komal Sifuentes M.D.       • measles, mumps and rubella vaccine (MMR) injection 0.5 mL  0.5 mL Once PRN Komal Sifuentes M.D.       • PRN oxytocin (PITOCIN) (20 Units/1000 mL) PRN for excessive uterine bleeding - See Admin Instr  125-999 mL/hr Once PRN Komal Sifuentes M.D.       • docusate sodium (COLACE) capsule 100 mg  100 mg BID PRN Komal Sifuentes M.D.       • ibuprofen (MOTRIN) tablet 600 mg  600 mg Q6HRS PRN Komal Sifuentes M.D.   600 mg at 20 0547   • acetaminophen (TYLENOL) tablet 1,000 mg  1,000 mg Q6HRS PRN Komal Sifuentes M.D.   1,000 mg at 20 0546   • magnesium sulfate 40 g/1000mL infusion  2 g/hr Continuous Jared Bush M.D. 50 mL/hr at 20 0607 2 g/hr at 20 0607   Last reviewed on 2020  6:23 AM by Paradise Morales, RCHAKA       Assessment:  24 y.o. female  at 37w2d PPD#1 s/p     Plan:   1. Encourage ambulating  2. Encourage breastfeeding  3. Disposition: Baby needs to get a covid test done. Will likely discharge ppd#2.      Komal Sifuentes MD  PGY-1  [facility]

## 2020-08-09 NOTE — CONSULTS
Spoke with FELISA Patrick. He has provided breastfeeding education for this mother-to include normal  feeding behaviors and normal course of breastfeeding at 12-24-48 hours, and what to expect. Discussed importance of offering breast with feeding cues or at least every 3-4 hours, at least 8 or more feedings in a 24 hour period. If infant shows no interest, can do hand expression into infant's lips. Mother able to return demonstrate hand expression of colostrum. Encouraged to continue doing skin to skin. Discussed indicators of an ideal latch, voiding and stooling patterns, feeding cues, stomach size, and importance of establishing milk supply with frequency of feedings.    Plan for tonight is to continue to attempt to latch every 3 hours, offer breast first, if not latching well, can hand express colostrum, and refeed to infant.    Encouraged her to continue to work on deep latch, and skin 2 skin, with hand expression.     InJoy Lactation dilip/code provided  To patient, and was encouraged to download and view latching and positioning videos.     MOB has no other questions or concerns regarding breastfeeding. Encouraged to call for assistance as needed.

## 2020-08-09 NOTE — ANESTHESIA POSTPROCEDURE EVALUATION
Patient: Ivis Klein    Procedure Summary     Date: 08/08/20 Room / Location:     Anesthesia Start: 0855 Anesthesia Stop: 1248    Procedure: Labor Epidural Diagnosis:     Scheduled Providers:  Responsible Provider: Katia Arreguin M.D.    Anesthesia Type: epidural ASA Status: 2          Final Anesthesia Type: epidural  Last vitals  BP   Blood Pressure: (!) 171/86    Temp   36.1 °C (97 °F)    Pulse   Pulse: 63   Resp   16    SpO2   98 %      Anesthesia Post Evaluation    Patient location during evaluation: bedside  Patient participation: complete - patient participated  Level of consciousness: awake  Pain score: 1    Airway patency: patent  Anesthetic complications: no  Cardiovascular status: adequate  Respiratory status: acceptable  Hydration status: acceptable    PONV: none           Nurse Pain Score: 1 (NPRS)

## 2020-08-09 NOTE — LACTATION NOTE
This note was copied from a baby's chart.  Spoke with FELISA Patrick. He has provided breastfeeding education for this mother-to include normal  feeding behaviors and normal course of breastfeeding at 12-24-48 hours, and what to expect. Discussed importance of offering breast with feeding cues or at least every 3-4 hours, at least 8 or more feedings in a 24 hour period. If infant shows no interest, can do hand expression into infant's lips. Mother able to return demonstrate hand expression of colostrum. Encouraged to continue doing skin to skin. Discussed indicators of an ideal latch, voiding and stooling patterns, feeding cues, stomach size, and importance of establishing milk supply with frequency of feedings.    Plan for tonight is to continue to attempt to latch every 3 hours, offer breast first, if not latching well, can hand express colostrum, and refeed to infant.    Encouraged her to continue to work on deep latch, and skin 2 skin, with hand expression.     InJoy Lactation dilip/code provided  To patient, and was encouraged to download and view latching and positioning videos.     MOB has no other questions or concerns regarding breastfeeding. Encouraged to call for assistance as needed.

## 2020-08-09 NOTE — PROGRESS NOTES
Late entry/Summary Report:  Report received at 0700.  at 37-1 admitted on night shift for SROM at 0445 and labor. Desires TOLAC. Covid retest pending. Pt is saline locked, getting uncomfortable but coping. POC discussed. SVE and IUPC placed at 0805 by Dr. Yañez. Covid test still positive. Pt still asymptomatic. Reassurance provided. Pt and FOB state they were cleared by the [facility] Department without retesting and now want to know if the family should get retested again. FOB encouraged to call Health Department on Monday for further guidance. Pt received epidural without complications in 0900 hour. Remained comfortable with use of PCEA button. Unknown GBS (collected ) status discussed with Dr. Sifuentes who consulted with Dr. Bush. PCN ordered. See MAR. Pt dilated quickly to complete. MD notified. MD in another delivery so held off pushing until MD available. Pt pushed 25 mins with MD present the entire time and delivered a vigorous male infant with nuchal x 1 at 1248, with 8/9 apgars.Placenta delivered intact. Elevated BPs noted after delivery. Pt denied HA/Visual disturbances/RUQ pain. Felt a little tired and dizzy, admitted she hadn't slept much. BPs reported to Dr. Bush. Procardia and Mag ordered. See MAR. BPs improved. Pt able to void on bedpan (okay by MD to leave lepe out) and then was able to ambulate to bathroom with standby assist.  Pads placed and gown changed. Lunch and dinner provided. Infant remained in room with mother. + . Report to JASON Morales RN at 1900.

## 2020-08-10 ENCOUNTER — PHARMACY VISIT (OUTPATIENT)
Dept: PHARMACY | Facility: MEDICAL CENTER | Age: 25
End: 2020-08-10
Payer: COMMERCIAL

## 2020-08-10 VITALS
WEIGHT: 166 LBS | SYSTOLIC BLOOD PRESSURE: 129 MMHG | OXYGEN SATURATION: 97 % | HEIGHT: 62 IN | TEMPERATURE: 97.6 F | DIASTOLIC BLOOD PRESSURE: 78 MMHG | RESPIRATION RATE: 16 BRPM | HEART RATE: 89 BPM | BODY MASS INDEX: 30.55 KG/M2

## 2020-08-10 PROCEDURE — A9270 NON-COVERED ITEM OR SERVICE: HCPCS | Performed by: NURSE PRACTITIONER

## 2020-08-10 PROCEDURE — 700102 HCHG RX REV CODE 250 W/ 637 OVERRIDE(OP): Performed by: NURSE PRACTITIONER

## 2020-08-10 PROCEDURE — 700102 HCHG RX REV CODE 250 W/ 637 OVERRIDE(OP): Performed by: STUDENT IN AN ORGANIZED HEALTH CARE EDUCATION/TRAINING PROGRAM

## 2020-08-10 PROCEDURE — A9270 NON-COVERED ITEM OR SERVICE: HCPCS | Performed by: STUDENT IN AN ORGANIZED HEALTH CARE EDUCATION/TRAINING PROGRAM

## 2020-08-10 PROCEDURE — RXMED WILLOW AMBULATORY MEDICATION CHARGE: Performed by: FAMILY MEDICINE

## 2020-08-10 RX ORDER — ACETAMINOPHEN AND CODEINE PHOSPHATE 120; 12 MG/5ML; MG/5ML
1 SOLUTION ORAL DAILY
Qty: 84 TAB | Refills: 4 | Status: SHIPPED | OUTPATIENT
Start: 2020-08-10 | End: 2020-08-10 | Stop reason: RX

## 2020-08-10 RX ORDER — IBUPROFEN 600 MG/1
600 TABLET ORAL EVERY 6 HOURS PRN
Qty: 30 TAB | Refills: 0 | Status: SHIPPED | OUTPATIENT
Start: 2020-08-10 | End: 2022-02-17

## 2020-08-10 RX ADMIN — ACETAMINOPHEN 1000 MG: 500 TABLET ORAL at 13:01

## 2020-08-10 RX ADMIN — ACETAMINOPHEN 1000 MG: 500 TABLET ORAL at 01:11

## 2020-08-10 RX ADMIN — LEVOTHYROXINE SODIUM 150 MCG: 0.15 TABLET ORAL at 06:23

## 2020-08-10 RX ADMIN — ACETAMINOPHEN 1000 MG: 500 TABLET ORAL at 06:22

## 2020-08-10 RX ADMIN — IBUPROFEN 600 MG: 600 TABLET ORAL at 13:01

## 2020-08-10 RX ADMIN — IBUPROFEN 600 MG: 600 TABLET ORAL at 06:22

## 2020-08-10 RX ADMIN — IBUPROFEN 600 MG: 600 TABLET ORAL at 01:11

## 2020-08-10 NOTE — PROGRESS NOTES
Assumed care. Assessment completed, VSS. Fundus firm, lochia light. Pt. ambulating and voiding. Pt. requests pain medication as scheduled.POC discussed, all questions answered. Encouraged to call with needs. MOB c/o sore nipples this morning, provided lanolin and encouraged to apply colostrum to breasts, MOB verbalized understanding and will call this RN for assistance with latching.

## 2020-08-10 NOTE — DISCHARGE SUMMARY
VAGINAL BIRTH AFTER  DISCHARGE SUMMARY    PATIENT ID:  NAME:  Ivis Klein  MRN:               [id number]  YOB: 1995    DATE OF ADMISSION: 2020    DATE OF DISCHARGE: 8/10/2020     ADMITTING DIAGNOSIS: Intrauterine pregnancy at 37w3d.    DISCHARGE DIAGNOSIS: s/p     HOSPITAL COURSE: This is a 24 y.o. year old female admitted at [place] [address] at 37w3d who presented with contractions, no LOF, no vaginal bleeding, normal FM and in active labor. Pt was 2-3 cm dilated, 90% effaced and at  -2 station on sterile vaginal exam. Pregnancy was complicated by graves disease and anxiety. The patient had a good labor pattern after admission and proceeded to deliver a viable male infant. Infants Apgars scores were 8 and 9 at one and five minutes. The patients postpartum course was uncomplicated and she was discharged home in stable condition on postpartum day #2.    PROCEDURES PERFORMED: Normal spontaneous vaginal delivery.    COMPLICATIONS: None    DIET: Regular    ACTIVITY: No intercourse and nothing inserted into the vagina for 6 weeks.    MEDICATIONS:  Current Outpatient Medications   Medication Sig Dispense Refill   • ibuprofen (MOTRIN) 600 MG Tab Take 1 Tab by mouth every 6 hours as needed (For cramping after delivery; do not give if patient is receiving ketorolac (Toradol)). 30 Tab 0   • norethindrone (MICRONOR) 0.35 MG tablet Take 1 Tab by mouth every day. 84 Tab 4         FOLLOWUP:  1) The pregnancy center in 3 weeks  2) Return to the hospital if copious vaginal bleeding or foul smelling discharge is noted    Nathan Lopez M.D.

## 2020-08-10 NOTE — DISCHARGE PLANNING
Medication reconcilliation completed. Medications delivered to patient at bedside. Patient counseled.       Ivis Klein   Home Medication Instructions EDIE:[id number]    Printed on:08/10/20 1214   Medication Information                      ibuprofen (MOTRIN) 600 MG Tab  Take 1 Tab by mouth every 6 hours as needed (For cramping after delivery; do not give if patient is receiving ketorolac (Toradol)).

## 2020-08-10 NOTE — CARE PLAN
Problem: Pain Management  Goal: Pain level will decrease to patient's comfort goal  Note: Pt requests tylenol and motrin around the clock for pain.      Problem: Altered physiologic condition related to immediate post-delivery state and potential for bleeding/hemorrhage  Goal: Patient physiologically stable as evidenced by normal lochia, palpable uterine involution and vital signs within normal limits  Outcome: PROGRESSING AS EXPECTED  Note: Fundus firm and lochia light, VSS.

## 2020-08-10 NOTE — DISCHARGE INSTRUCTIONS
POSTPARTUM DISCHARGE INSTRUCTIONS FOR MOM    YOB: 1995   Age: 24 y.o.               Admit Date: 2020     Discharge Date: 8/10/2020  Attending Doctor:  Maggie Yañez M.D.                  Allergies:  Patient has no known allergies.    Discharged to home by car. Discharged via wheelchair, hospital escort: Yes.  Special equipment needed: Not Applicable  Belongings with: Personal  Be sure to schedule a follow-up appointment with your primary care doctor or any specialists as instructed.     Discharge Plan:   Diet Plan: Discussed  Activity Level: Discussed  Confirmed Follow up Appointment: Patient to Call and Schedule Appointment  Confirmed Symptoms Management: Discussed  Medication Reconciliation Updated: Yes    REASONS TO CALL YOUR OBSTETRICIAN:  1.   Persistent fever or shaking chills (Temperature higher than 100.4)  2.   Heavy bleeding (soaking more than 1 pad per hour); Passing clots  3.   Foul odor from vagina  4.   Mastitis (Breast infection; breast pain, chills, fever, redness)  5.   Urinary pain, burning or frequency  6.   Episiotomy infection  7.   Abdominal incision infection  8.   Severe depression longer than 24 hours    HAND WASHING  · Prior to handling the baby.  · Before breastfeeding or bottle feeding baby.  · After using the bathroom or changing the baby's diaper.      Ask your physician for additional care instructions.  In general:    ·  Incision:      · Keep clean and dry.    · Do NOT lift anything heavier than your baby for up to 6 weeks.    · There should not be any opening or pus.      VAGINAL CARE  · Nothing inside vagina for 6 weeks: no sexual intercourse, tampons or douching.  · Bleeding may continue for 2-4 weeks.  Amount may vary.    · Call your physician for heavy bleeding which means soaking more than 1 pad per hour    BIRTH CONTROL  · It is possible to become pregnant at any time after delivery and while breastfeeding.  · Plan to discuss a method of birth control  "with your physician at your follow up visit. visit.    DIET AND ELIMINATION  · Eating more fiber (bran cereal, fruits, and vegetables) and drinking plenty of fluids will help to avoid constipation.  · Urinary frequency after childbirth is normal.    POSTPARTUM BLUES  During the first few days after birth, you may experience a sense of the \"blues\" which may include impatience, irritability or even crying.  These feeling come and go quickly.  However, as many as 1 in 10 women experience emotional symptoms known as postpartum depression.    Postpartum depression:  May start as early as the second or third day after delivery or take several weeks or months to develop.  Symptoms of \"blues\" are present, but are more intense:  Crying spells; loss of appetite; feelings of hopelessness or loss of control; fear of touching the baby; over concern or no concern at all about the baby; little or no concern about your own appearance/caring for yourself; and/or inability to sleep or excessive sleeping.  Contact your physician if you are experiencing any of these symptoms.    Crisis Hotline:  · [place] Crisis Hotline:  5-383-EAUKMSJ  Or 1-719.516.2481  · [place] Crisis Hotline:  1-378.805.9713  Or 909-556-9081    PREVENTING SHAKEN BABY:  If you are angry or stressed, PUT THE BABY IN THE CRIB, step away, take some deep breaths, and wait until you are calm to care for the baby.  DO NOT SHAKE THE BABY.  You are not alone, call a supporter for help.    · Crisis Call Center 24/7 crisis line 725-889-6487 or 1-605.516.8148  · You can also text them, text \"ANSWER\" to 709207    QUIT SMOKING/TOBACCO USE:  I understand the use of any tobacco products increases my chance of suffering from future heart disease and could cause other illnesses which may shorten my life. Quitting the use of tobacco products is the single most important thing I can do to improve my health. For further information on smoking / tobacco cessation call a Toll Free Quit " Line at 1-636.230.8223 (*[employer] [place]) or 1-337.884.2165 (American Lung Association) or you can access the web based program at www.lungusa.org.    · [place] Tobacco Users Help Line:  (949) 107-2696       Toll Free: 1-352.316.2071  · Quit Tobacco Program [facility] (437)120-9248    DEPRESSION / SUICIDE RISK:  As you are discharged from this [facility], it is important to learn how to keep safe from harming yourself.    Recognize the warning signs:  · Abrupt changes in personality, positive or negative- including increase in energy   · Giving away possessions  · Change in eating patterns- significant weight changes-  positive or negative  · Change in sleeping patterns- unable to sleep or sleeping all the time   · Unwillingness or inability to communicate  · Depression  · Unusual sadness, discouragement and loneliness  · Talk of wanting to die  · Neglect of personal appearance   · Rebelliousness- reckless behavior  · Withdrawal from people/activities they love  · Confusion- inability to concentrate     If you or a loved one observes any of these behaviors or has concerns about self-harm, here's what you can do:  · Talk about it- your feelings and reasons for harming yourself  · Remove any means that you might use to hurt yourself (examples: pills, rope, extension cords, firearm)  · Get professional help from the community (Mental Health, Substance Abuse, psychological counseling)  · Do not be alone:Call your Safe Contact- someone whom you trust who will be there for you.  · Call your local CRISIS HOTLINE 369-0615 or 738-648-5354  · Call your local Children's Mobile Crisis Response Team [place] (903) 211-0270 or www.[employer]  · Call the toll free National Suicide Prevention Hotlines   · National Suicide Prevention Lifeline 758-265-ILEK (2913)  · National Hope Line Network 800-SUICIDE (688-4039)    DISCHARGE SURVEY:  Thank you for choosing [facility].  We  hope we provided you with very good care.  You may be receiving a survey in the mail.  Please fill it out.  Your opinion is valuable to us.    ADDITIONAL EDUCATIONAL MATERIALS GIVEN TO PATIENT:    Strict pelvic rest for 6 weeks, this means no sexual intercourse, no tampon use, no vaginal douching. Nothing in the vagina until cleared by physician at 6 week postpartum visit.   Please walk daily as pain and comfort permit.   Encourage breastfeeding, please contact [facility], the Lactation connection, or your PCP/OBGYN if you have questions or need assistance         My signature on this form indicates that:  1.  I have reviewed and understand the above information  2.  My questions regarding this information have been answered to my satisfaction.  3.  I have formulated a plan with my discharge nurse to obtain my prescribed medication for home.

## 2020-08-10 NOTE — PROGRESS NOTES
Received on her room, lying on her bed breastfeeding her NB with skin to skin technique. Pt have an oral T=99.5F, but room temp is 85 degrees. Turn it down the room temperature to 70 degrees. POC updated.  @ bedside involved in the care. Instructed on hand hygiene and wearing mask when staff is doing procedure or assessment. Tucks , ice and spray given. Needs attended. Call light within reach.

## 2020-08-10 NOTE — LACTATION NOTE
Spoke to RN, Nara Kiser.  She stated currently, MOB is feeding infant both breast milk and formula (per the 10-20-30 supplementation guidelines) and is pumping to help bring in her secondary milk supply sooner.  MOB with a history of a total thyroidectomy in 2017 which occurred approximately 4 years after the birth of her first baby, Graves disease, and pre-eclampsia with this pregnancy.  RN reported infant is latching onto the breast without difficulty and is feeding well.  RN stated MOB was reporting pain with latch, but stated she was able to help MOB obtain a deeper latch and MOB stated immediate increase in comfort at the breast with latch.    Feeding Plan:  1.  Continue to offer infant the breast first at every feed to build and maintain milk supply.  2.  MOB has chosen to supplement infant with formula after feedings, if infant still appears hungry.  RN stated MOB was provided with a copy of the 10-20-30 supplementation guidelines along with instructions on use.  3.  Pump as instructed to build and protect milk supply.    RN agreed to inform MOB that feeding plan for infant will remain the same once at home.    RN provided MOB with a sore nipple/breast care treatment plan that included applying colostrum and Lanolin cream to sore breasts as instructed.    MOB has [facility] and RN was instructed to have MOB follow up with [facility] to obtain a personal breast pump from [facility] due to history of total thyroidectomy and Graves disease and to follow up with the [facility] Lactation Counselor with any lactation questions and/or concerns that arise post discharge.    RN was encouraged to have MOB take all pump parts home with her.    RN verbalized understanding of information that was provided to her and denied the need for this LC to see MOB at this time.  RN was encouraged to consult with LC as needed prior to MOB discharging home.    1112- Called into room to inquire if MOB had any last minute lactation questions or if she needed  any clarification on feeding plan, but MOB did not answer phone.

## 2020-08-10 NOTE — CARE PLAN
Problem: Venous Thromboembolism (VTW)/Deep Vein Thrombosis (DVT) Prevention:  Goal: Patient will participate in Venous Thrombosis (VTE)/Deep Vein Thrombosis (DVT)Prevention Measures  Outcome: PROGRESSING AS EXPECTED  Denies any pain on her bilateral calf when ambulating or walking. Lovenox given per order.       Problem: Alteration in comfort related to episiotomy, vaginal repair and/or after birth pains  Goal: Patient verbalizes acceptable pain level  Outcome: PROGRESSING AS EXPECTED  Pt have 1st degree laceration on her perineum, tucks , ice and spray given. Pain well controlled with Tylenol and Ibuprofen given @ scheduled time per pt request.

## 2020-08-10 NOTE — DISCHARGE PLANNING
Discharge Planning Assessment Post Partum    Reason for Referral: Hx of THC use, Hx of Anxiety and Panic Attacks.   Address: [facility] [address], [place] [address]  Type of Living Situation: House with FOB and other child  Mom Diagnosis: Pregnancy   Baby Diagnosis:    Primary Language: English     Name of Baby: Mikey Serna   Father of the Baby: Arcenio Serna   Involved in baby’s care? Yes  Contact Information: [phone]    Prenatal Care: Yes  Mom's PCP: Kia Flores  PCP for new baby: Tatiana Xiong     Support System: Yes  Coping/Bonding between mother & baby: Yes  Source of Feeding: Breast  Supplies for Infant: Prepared    Mom's Insurance: [place] Medicaid   Baby Covered on Insurance: Pending Medicaid   Mother Employed/School: No  Other children in the home/names & ages: Dennis-6    Financial Hardship/Income: No  Mom's Mental status: Alert and Oriented x 4  Services used prior to admit: Medicaid    CPS History: No  Psychiatric History: Yes, hx of anxiety. MOB stated she is doing well right now and does not feel anxious at all. LSW explained the difference between post partum depression and baby blues. MOB agreed to reach out if she is experiencing any signs or symptoms of post partum depression.   Domestic Violence History: No  Drug/ETOH History: THC use prior to getting pregnant. UDS is negative.     Resources Provided: None  Referrals Made: None     Clearance for Discharge: Baby is clear to discharge home with MOB/FOB upon medical clearance.     Ongoing Plan: No further social work needs at this time.

## 2020-08-11 NOTE — PROGRESS NOTES
Discharge instructions and follow up information discussed with patient, all questions answered. Pt. Discharge home in stable condition with all their belongings.

## 2020-08-18 NOTE — L&D DELIVERY NOTE
DATE OF SERVICE:  2020    This patient is a 24-year-old female,  2, para 1, at 37 and 1/7 weeks   intrauterine pregnancy.  The patient's OB history is complicated by previous   documented low transverse uterine  section and the patient requested a   .  The patient had all counseling performed for  and consents were   signed.  The patient was admitted at 2-3 cm dilatation with spontaneous   rupture of membranes, unknown GBS status.  The patient was also COVID   positive, but slightly over 3 weeks out from her positive COVID test.  The   patient was asymptomatic on admission.  The patient was started on Pitocin   augmentation of labor and she dilated to complete and complete and delivered   via normal spontaneous vaginal delivery with a small second-degree laceration   repaired with 3-0 Vicryl suture.  Placenta delivered spontaneously intact.    Mother and baby are doing well postpartum.  Apgar scores 8 and 9.  The patient   did receive antibiotics as noted due to unknown GBS status in addition to   blood pressure spiked shortly after delivery.  She was started on magnesium   sulfate as well as 1 dose of Procardia to bring her blood pressures down.       ____________________________________     MD LIVIA CONNOR / COURTNEY    DD:  2020 06:51:10  DT:  2020 07:05:38    D#:  [id number]  Job#:  [id number]

## 2020-08-31 ENCOUNTER — POST PARTUM (OUTPATIENT)
Dept: OBGYN | Facility: CLINIC | Age: 25
End: 2020-08-31
Payer: MEDICAID

## 2020-08-31 PROBLEM — Z3A.36 36 WEEKS GESTATION OF PREGNANCY: Status: RESOLVED | Noted: 2020-08-06 | Resolved: 2020-08-31

## 2020-08-31 PROBLEM — Z98.891 HISTORY OF CESAREAN DELIVERY: Status: RESOLVED | Noted: 2020-04-09 | Resolved: 2020-08-31

## 2020-08-31 PROBLEM — O09.899 SUPERVISION OF OTHER HIGH RISK PREGNANCY, ANTEPARTUM: Status: RESOLVED | Noted: 2020-01-31 | Resolved: 2020-08-31

## 2020-08-31 PROBLEM — O35.BXX0 ECHOGENIC FOCUS OF HEART OF FETUS AFFECTING ANTEPARTUM CARE OF MOTHER: Status: RESOLVED | Noted: 2020-04-10 | Resolved: 2020-08-31

## 2020-08-31 PROCEDURE — 0503F POSTPARTUM CARE VISIT: CPT | Performed by: PHYSICIAN ASSISTANT

## 2020-08-31 ASSESSMENT — ENCOUNTER SYMPTOMS
RESPIRATORY NEGATIVE: 1
NEUROLOGICAL NEGATIVE: 1
CARDIOVASCULAR NEGATIVE: 1
MUSCULOSKELETAL NEGATIVE: 1
DEPRESSION: 0
EYES NEGATIVE: 1
GASTROINTESTINAL NEGATIVE: 1
CONSTITUTIONAL NEGATIVE: 1
PSYCHIATRIC NEGATIVE: 1

## 2020-08-31 NOTE — PROGRESS NOTES
Subjective:      Ivis Klein is a 24 y.o. female who presents with Postpartum visit today. 4 wk s/p / without complications. Pt has no complaints- denies heavy vaginal bleeding, depression, intercourse, pain or problems with BF. PAP wnl  - repeat . BCM desired is IUD, but unsure when she wants it, so will talk with partner and call for appt.            HPI    Review of Systems   Constitutional: Negative.    HENT: Negative.    Eyes: Negative.    Respiratory: Negative.    Cardiovascular: Negative.    Gastrointestinal: Negative.    Genitourinary: Negative.    Musculoskeletal: Negative.    Skin: Negative.    Neurological: Negative.    Endo/Heme/Allergies: Negative.    Psychiatric/Behavioral: Negative.  Negative for depression.   All other systems reviewed and are negative.         Objective:     LMP 2019 (Approximate)      Physical Exam  Vitals signs reviewed.   Constitutional:       Appearance: She is well-developed.   HENT:      Head: Normocephalic and atraumatic.   Eyes:      Pupils: Pupils are equal, round, and reactive to light.   Neck:      Musculoskeletal: Normal range of motion and neck supple.      Thyroid: No thyromegaly.   Cardiovascular:      Rate and Rhythm: Normal rate and regular rhythm.      Heart sounds: Normal heart sounds.   Pulmonary:      Effort: Pulmonary effort is normal. No respiratory distress.      Breath sounds: Normal breath sounds.   Abdominal:      General: Bowel sounds are normal. There is no distension.      Palpations: Abdomen is soft.      Tenderness: There is no abdominal tenderness.   Genitourinary:     Exam position: Supine.      Labia:         Right: No rash or tenderness.         Left: No rash or tenderness.       Vagina: Normal. No signs of injury and foreign body. No vaginal discharge or erythema.      Cervix: No cervical motion tenderness.      Uterus: Not deviated, not enlarged and not tender.       Adnexa:         Right: No mass or tenderness.           Left: No mass or tenderness.     Skin:     General: Skin is warm and dry.      Findings: No erythema.   Neurological:      Mental Status: She is alert.      Deep Tendon Reflexes: Reflexes are normal and symmetric.   Psychiatric:         Behavior: Behavior normal.         Thought Content: Thought content normal.                 Assessment/Plan:        1. Postpartum care following vaginal delivery  - PAP wnl 1/20 - repeat 3 yrs  - Pt to call for IUD appt, as all options d/w pt but pt unsure what she wants today     physical therapy/social work

## 2020-08-31 NOTE — PROGRESS NOTES
Pt here today for postpartum exam.  Delivery Date: 08/08/2020  Breast feeding only  BCM: unsure of BC   LMP: not yet  WT: 148 lb  BP: 116/70  Last pap: 01/31/2020 negative  Pt states she has been feeling uterine cramping. States no other complaints or concerns.   Good ph: [phone]

## 2020-12-06 ENCOUNTER — HOSPITAL ENCOUNTER (EMERGENCY)
Facility: MEDICAL CENTER | Age: 25
End: 2020-12-06
Attending: EMERGENCY MEDICINE
Payer: MEDICAID

## 2020-12-06 VITALS
RESPIRATION RATE: 18 BRPM | BODY MASS INDEX: 27.46 KG/M2 | TEMPERATURE: 98.2 F | HEART RATE: 80 BPM | WEIGHT: 155 LBS | DIASTOLIC BLOOD PRESSURE: 64 MMHG | HEIGHT: 63 IN | SYSTOLIC BLOOD PRESSURE: 113 MMHG | OXYGEN SATURATION: 99 %

## 2020-12-06 DIAGNOSIS — Z20.822 SUSPECTED 2019-NCOV INFECTION: ICD-10-CM

## 2020-12-06 DIAGNOSIS — J02.9 PHARYNGITIS, UNSPECIFIED ETIOLOGY: ICD-10-CM

## 2020-12-06 LAB
COVID ORDER STATUS COVID19: NORMAL
S PYO DNA SPEC NAA+PROBE: NOT DETECTED
SARS-COV-2 RNA RESP QL NAA+PROBE: NOTDETECTED
SPECIMEN SOURCE: NORMAL

## 2020-12-06 PROCEDURE — C9803 HOPD COVID-19 SPEC COLLECT: HCPCS | Performed by: EMERGENCY MEDICINE

## 2020-12-06 PROCEDURE — U0003 INFECTIOUS AGENT DETECTION BY NUCLEIC ACID (DNA OR RNA); SEVERE ACUTE RESPIRATORY SYNDROME CORONAVIRUS 2 (SARS-COV-2) (CORONAVIRUS DISEASE [COVID-19]), AMPLIFIED PROBE TECHNIQUE, MAKING USE OF HIGH THROUGHPUT TECHNOLOGIES AS DESCRIBED BY CMS-2020-01-R: HCPCS

## 2020-12-06 PROCEDURE — 99284 EMERGENCY DEPT VISIT MOD MDM: CPT

## 2020-12-06 PROCEDURE — 87651 STREP A DNA AMP PROBE: CPT

## 2020-12-06 ASSESSMENT — LIFESTYLE VARIABLES
HAVE PEOPLE ANNOYED YOU BY CRITICIZING YOUR DRINKING: NO
HAVE YOU EVER FELT YOU SHOULD CUT DOWN ON YOUR DRINKING: NO
EVER HAD A DRINK FIRST THING IN THE MORNING TO STEADY YOUR NERVES TO GET RID OF A HANGOVER: NO
TOTAL SCORE: 0
TOTAL SCORE: 0
AVERAGE NUMBER OF DAYS PER WEEK YOU HAVE A DRINK CONTAINING ALCOHOL: 0
EVER FELT BAD OR GUILTY ABOUT YOUR DRINKING: NO
CONSUMPTION TOTAL: NEGATIVE
TOTAL SCORE: 0
ON A TYPICAL DAY WHEN YOU DRINK ALCOHOL HOW MANY DRINKS DO YOU HAVE: 0
DO YOU DRINK ALCOHOL: NO
HOW MANY TIMES IN THE PAST YEAR HAVE YOU HAD 5 OR MORE DRINKS IN A DAY: 0

## 2020-12-06 ASSESSMENT — FIBROSIS 4 INDEX: FIB4 SCORE: 0.5

## 2020-12-06 NOTE — ED TRIAGE NOTES
Ivis Klein  Chief Complaint   Patient presents with   • Sore Throat     x 2 days     Pt ambulatory to triage with above complaint.  VSS, no acute distress.   Pt states sx x 2 days.  Pt/staff masked and in appropriate PPE during encounter.   Pt in senior lounge, educated on triage process, and to inform staff of any changes or concerns.

## 2020-12-06 NOTE — ED PROVIDER NOTES
ED Provider Note    Scribed for Ortega Hartman M.D. by Kristian Alexandra. 12/6/2020  10:18 AM    Primary care provider: Kia Flores M.D.  Means of arrival: Private vehicle  History obtained from: Patient  History limited by: None    CHIEF COMPLAINT  Chief Complaint   Patient presents with   • Sore Throat     x 2 days       HPI  Ivis Klein is a 25 y.o. female who presents to the Emergency Department for evaluation of a sore throat onset 2 days ago. Patient also reports some left eye watering and left nostril drainage. She denies any fever, chest pain, or difficulty breathing. Patient denies any known COVID-19 exposure. 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    I verified that the patient was wearing a mask and I was wearing appropriate PPE every time I entered the room. The patient's mask was on the patient at all times during my encounter except for a brief view of the oropharynx.    REVIEW OF SYSTEMS  Pertinent negatives include no fever, chest pain, or difficulty breathing.  All other systems reviewed and negative.    PAST MEDICAL HISTORY   has a past medical history of Anxiety (2/16/2017), Graves disease (12/5/2016), Graves disease (01/2016), and Heart valve disease.    SURGICAL HISTORY   has a past surgical history that includes primary c section (9/8/2013) and thyroidectomy total (Bilateral, 3/22/2017).    SOCIAL HISTORY  Social History     Tobacco Use   • Smoking status: Never Smoker   • Smokeless tobacco: Never Used   • Tobacco comment: quit in 2012   Substance Use Topics   • Alcohol use: No   • Drug use: Not Currently     Types: Marijuana, Inhaled     Comment: marijuana weekly      Social History     Substance and Sexual Activity   Drug Use Not Currently   • Types: Marijuana, Inhaled    Comment: marijuana weekly       FAMILY HISTORY  Family History   Problem Relation Age of Onset   • Cancer Paternal Grandmother 56        breast cancer   • No  "Known Problems Mother    • Hyperlipidemia Father    • No Known Problems Sister    • No Known Problems Brother        CURRENT MEDICATIONS  Current Outpatient Medications   Medication Instructions   • ibuprofen (MOTRIN) 600 mg, Oral, EVERY 6 HOURS PRN   • levothyroxine (SYNTHROID) 150 mcg, Oral, EACH MORNING ON EMPTY STOMACH   • Prenatal MV-Min-Fe Fum-FA-DHA (PRENATAL 1 PO) 2 Tabs, Oral, EVERY EVENING     ALLERGIES  No Known Allergies    PHYSICAL EXAM  VITAL SIGNS: /86   Pulse 95   Temp 36.4 °C (97.5 °F) (Temporal)   Resp 18   Ht 1.6 m (5' 3\")   Wt 70.3 kg (155 lb)   SpO2 96%   BMI 27.46 kg/m²   Constitutional: Well developed, Well nourished, No acute distress, Non-toxic appearance.   HENT: Erythema and stippling of the oropharynx. No pustules on the oropharynx.  Eyes:   Neck: Supple, no stridor  Lymphatic: No lymphadenopathy  Cardiovascular: Regular rate and rhythm without murmur  Neurologic:   Psychiatric:     LABS  Labs Reviewed   GROUP A STREP BY PCR    Narrative:     Droplet, Contact, and Eye Protection   COVID/SARS COV-2    Narrative:     Droplet, Contact, and Eye Protection  Is patient being admitted?->No  Is the patient a resident in a congregate care setting?->No  Expected turn around time?->Routine (In-House PCR up to 24  hours)  Have you been in close contact with a person who is suspected  or known to be positive for COVID-19 within the last 30 days  (e.g. last seen that person < 30 days ago)->No   SARS-COV-2, PCR (IN-HOUSE)    Narrative:     Droplet, Contact, and Eye Protection  Is patient being admitted?->No  Is the patient a resident in a congregate care setting?->No  Expected turn around time?->Routine (In-House PCR up to 24  hours)  Have you been in close contact with a person who is suspected  or known to be positive for COVID-19 within the last 30 days  (e.g. last seen that person < 30 days ago)->No      All labs reviewed by me.    COURSE & MEDICAL DECISION MAKING  Nursing notes, VS, " PMSFHx reviewed in chart.    10:18 AM Patient seen and examined at bedside. Ordered labs to evaluate.     11:22 AM - Patient was reevaluated at bedside.  There is no evidence of strep pharyngitis and I favor early viral pharyngitis versus coronavirus which is still pending.  She understands the need to self isolate until she is proven negative.  She will push fluids return if any significant change in symptoms or functional decline     discussed lab results with the patient and informed them of the plan for discharge. The patient will return for new or worsening symptoms and is stable at the time of discharge. Patient verbalizes understanding and agreement to this plan of care.     DISPOSITION:  Patient will be discharged home in stable condition.    FINAL IMPRESSION  1. Pharyngitis, unspecified etiology    2. Suspected 2019-nCoV infection        Kristian HARRIS (Kamini), am scribing for, and in the presence of, Ortega Hartman M.D..    Electronically signed by: Kristian Alexandra (Kamini), 12/6/2020    Ortega HARRIS M.D. personally performed the services described in this documentation, as scribed by Kristian Alexandra in my presence, and it is both accurate and complete. C.    The note accurately reflects work and decisions made by me.  Ortega Hartman M.D.  12/6/2020  11:27 AM

## 2020-12-06 NOTE — ED NOTES
Pt resting in bed, VSS on RA, alert/oriented, no distress, c/o continued sore throat, MD at bedside, pt placed in isolation, will continue to monitor.

## 2020-12-06 NOTE — DISCHARGE INSTRUCTIONS
Please self quarantine until the results of your coronavirus test are known likely by tomorrow.  You may have coronavirus but do not have strep pharyngitis  This may be a viral pharyngitis so drink plenty of fluids

## 2020-12-06 NOTE — ED NOTES
Pt resting in bed, VSS on RA, no distress, updated POC to wait for results, will continue to monitor.

## 2021-01-05 NOTE — PROGRESS NOTES
Pt ambulated to bathroom,still very weak,one person assist,pt encouraged ambulation,family at bedside.   Pt left a vm stating that she is wanting to speak to Dr Ellis about blood sugar issues she has been having.

## 2021-02-03 NOTE — ED PROVIDER NOTES
"ED Provider Note    CHIEF COMPLAINT  Chief Complaint   Patient presents with   • Cold Symptoms       HPI  Ivis Klein is a 22 y.o. female who presents to the emergency department for cough, congestion, fever, body aches.  Patient describes just over 24 hours of nasal congestion, nonproductive cough, body aches and tactile fever.  Decreased appetite but tolerating food and fluids.  One episode of posttussive emesis.  2 loose stools, nonbloody or mucoid.  Similar sick contacts at home.    REVIEW OF SYSTEMS  See HPI for further details.     PAST MEDICAL HISTORY   has a past medical history of Anxiety (2/16/2017); Arrhythmia; Breath shortness; Graves disease (12/5/2016); Heart valve disease; Hyperthyroidism (6/29/2016); Menarche; Migraine; Pericarditis (06/2016); Pregnancy; Psychiatric problem; Supervision of normal first teen pregnancy; and Vomiting (6/29/2016).    SOCIAL HISTORY  Social History     Social History Main Topics   • Smoking status: Never Smoker   • Smokeless tobacco: Never Used      Comment: quit in 2012   • Alcohol use No      Comment: occ   • Drug use: Yes     Types: Marijuana      Comment: marijuana,   • Sexual activity: Yes     Partners: Male     Birth control/ protection: Abstinence      Comment: single       SURGICAL HISTORY   has a past surgical history that includes primary c section (9/8/2013) and thyroidectomy total (Bilateral, 3/22/2017).    CURRENT MEDICATIONS  Home Medications     Reviewed by Karina Langfodr R.N. (Registered Nurse) on 06/12/18 at 0749  Med List Status: Partial   Medication Last Dose Status   levothyroxine (SYNTHROID) 137 MCG Tab  Active                ALLERGIES  Allergies   Allergen Reactions   • Nkda [No Known Drug Allergy]        PHYSICAL EXAM  VITAL SIGNS: /59   Pulse 91   Temp 36.9 °C (98.4 °F)   Resp 14   Ht 1.6 m (5' 3\")   Wt 65.7 kg (144 lb 13.5 oz)   SpO2 99%   BMI 25.66 kg/m²   Pulse ox interpretation: I interpret this pulse ox as " Sent GI Contact Letter   normal.  Constitutional: Alert in no apparent distress.  HENT: Normocephalic, atraumatic. Bilateral external ears normal, TMs clear bilaterally.  Nose normal. Moist mucous membranes.  Oropharynx within normal limits, no erythema, edema or exudate.  Eyes: Pupils are equal and reactive, Conjunctiva normal.   Neck: Normal range of motion, Supple.  No stridor or dysphonia.  No meningeal irritation.  Lymphatic: Mild bilateral submandibular lymphadenopathy.  Cardiovascular: Regular rate and rhythm, no murmurs. Distal pulses intact.    Thorax & Lungs: Normal breath sounds.  No wheezing/rales/ronchi. No increased work of breathing, clipped speech or retractions.   Skin: Warm, Dry  Musculoskeletal: Good range of motion in all major joints.    Neurologic: Alert , ambulates independently.  Psychiatric: Affect normal, Judgment normal, Mood normal.     COURSE & MEDICAL DECISION MAKING  Nursing notes and vital signs were reviewed. (See chart for details)  The patients records were reviewed, history was obtained from the patient;     ED evaluation was consistent with upper respiratory infection, likely viral etiology.  Viral syndrome.  No further clinical evidence for otitis media, pharyngitis, sinusitis, meningitis or pneumonia.  Vital signs are stable without fever or tachycardia.  Patient was never hypotensive or hypoxic.  No clinical evidence for sepsis.  Otherwise well-appearing, nontoxic.    Patient is stable for discharge at this time, anticipatory guidance provided, close follow-up is encouraged, and strict ED return instructions have been detailed. Patient is agreeable to the disposition and plan.    Patient's blood pressure was elevated in the emergency department, and has been referred to primary care for close monitoring.    FINAL IMPRESSION  (J06.9) Upper respiratory tract infection, unspecified type  (primary encounter diagnosis)      Electronically signed by: Caitlin Tate, 6/12/2018 8:18 AM      This dictation  was created using voice recognition software. The accuracy of the dictation is limited to the abilities of the software. I expect there may be some errors of grammar and possibly content. The nursing notes were reviewed and certain aspects of this information were incorporated into this note.

## 2021-04-09 ENCOUNTER — GYNECOLOGY VISIT (OUTPATIENT)
Dept: OBGYN | Facility: CLINIC | Age: 26
End: 2021-04-09
Payer: MEDICAID

## 2021-04-09 VITALS
HEIGHT: 63 IN | BODY MASS INDEX: 29.18 KG/M2 | WEIGHT: 164.7 LBS | SYSTOLIC BLOOD PRESSURE: 100 MMHG | DIASTOLIC BLOOD PRESSURE: 50 MMHG

## 2021-04-09 DIAGNOSIS — Z30.430 ENCOUNTER FOR INSERTION OF MIRENA IUD: ICD-10-CM

## 2021-04-09 DIAGNOSIS — Z30.430 ENCOUNTER FOR INSERTION OF INTRAUTERINE CONTRACEPTIVE DEVICE (IUD): ICD-10-CM

## 2021-04-09 PROCEDURE — 58300 INSERT INTRAUTERINE DEVICE: CPT | Performed by: OBSTETRICS & GYNECOLOGY

## 2021-04-09 RX ORDER — COPPER 313.4 MG/1
1 INTRAUTERINE DEVICE INTRAUTERINE ONCE
Status: COMPLETED | OUTPATIENT
Start: 2021-04-09 | End: 2021-04-09

## 2021-04-09 RX ADMIN — COPPER 1 EACH: 313.4 INTRAUTERINE DEVICE INTRAUTERINE at 15:01

## 2021-04-09 ASSESSMENT — FIBROSIS 4 INDEX: FIB4 SCORE: 0.5

## 2021-04-09 NOTE — PROGRESS NOTES
Patient initially scheduled for DUB  Not pregnant and does not desire pregnancy  Negative UPT  Desires Paragard  See insertion note

## 2021-04-09 NOTE — PROCEDURES
IUD Insertion    Date/Time: 4/9/2021 2:53 PM  Performed by: Michelle Stephens D.O.  Authorized by: Michelle Stephens D.O.     Consent:     Consent obtained:  Written    Procedure risks and benefits discussed: yes      Patient questions answered: yes      Patient agrees, verbalizes understanding, and wants to proceed: yes      Educational handouts given: yes      Instructions and paperwork completed: yes    Pre-procedure details:     Negative GC/chlamydia test: no      Negative urine pregnancy test: yes      Negative serum pregnancy test: no    Procedure:     Pelvic exam performed: yes      Sterile speculum placed in vagina: yes      Cervix visualized: yes      Cervix cleaned and prepped in sterile fashion: yes      Tenaculum applied to cervix: yes      Dilation needed: no      Uterus sounded: yes      Uterus sound depth (cm):  7    IUD inserted with no complications: yes      IUD type:  ParaGard    Strings trimmed: yes    Post-procedure:     Patient tolerated procedure well: yes      Patient will follow up after next period: yes    Comments:      LOT # [id number]

## 2021-04-09 NOTE — PROGRESS NOTES
Patient here for a GYN follow up.   Last seen on 08/31/2020  Here for [facility]   pharmacy verified.  Patient phone #: [phone]  UPT Negative

## 2021-06-04 ENCOUNTER — GYNECOLOGY VISIT (OUTPATIENT)
Dept: OBGYN | Facility: CLINIC | Age: 26
End: 2021-06-04
Payer: MEDICAID

## 2021-06-04 VITALS
SYSTOLIC BLOOD PRESSURE: 108 MMHG | DIASTOLIC BLOOD PRESSURE: 62 MMHG | HEIGHT: 63 IN | WEIGHT: 161 LBS | BODY MASS INDEX: 28.53 KG/M2

## 2021-06-04 DIAGNOSIS — Z30.431 IUD CHECK UP: ICD-10-CM

## 2021-06-04 PROCEDURE — 99212 OFFICE O/P EST SF 10 MIN: CPT | Performed by: NURSE PRACTITIONER

## 2021-06-04 ASSESSMENT — FIBROSIS 4 INDEX: FIB4 SCORE: 0.5

## 2021-06-04 NOTE — PROGRESS NOTES
Pt here for IUD check. She is happy with the method. Reports some pain on her lower left side that comes around time of her period. Reports she has had two periods since insertion with one that had spotting for a week leading up to her period. Reports no issues with sex. She has not tried to check her strings yet.    1.5cm paragard strings noted at cervical os    Reviewed how to check strings  Reviewed normalcy of spotting and some abdominal discomfort in adjustment period of first few months but if continuing or getting worse to return for assessment  RTC in one year or PRN

## 2021-06-04 NOTE — PROGRESS NOTES
Subjective:      Ivis Klein is a 25 y.o. female who presents with Gynecologic Exam (String check)    IUD inserted 4/9/21        Gynecologic Exam        ROS       Objective:     There were no vitals taken for this visit.     Physical Exam                   Assessment/Plan:        There are no diagnoses linked to this encounter.

## 2021-08-11 ENCOUNTER — HOSPITAL ENCOUNTER (OUTPATIENT)
Dept: LAB | Facility: MEDICAL CENTER | Age: 26
End: 2021-08-11
Attending: STUDENT IN AN ORGANIZED HEALTH CARE EDUCATION/TRAINING PROGRAM
Payer: MEDICAID

## 2021-08-11 LAB
ALBUMIN SERPL BCP-MCNC: 4.3 G/DL (ref 3.2–4.9)
ALBUMIN/GLOB SERPL: 1.4 G/DL
ALP SERPL-CCNC: 93 U/L (ref 30–99)
ALT SERPL-CCNC: 11 U/L (ref 2–50)
ANION GAP SERPL CALC-SCNC: 11 MMOL/L (ref 7–16)
ANISOCYTOSIS BLD QL SMEAR: ABNORMAL
AST SERPL-CCNC: 15 U/L (ref 12–45)
BASOPHILS # BLD AUTO: 0.6 % (ref 0–1.8)
BASOPHILS # BLD: 0.04 K/UL (ref 0–0.12)
BILIRUB SERPL-MCNC: 0.5 MG/DL (ref 0.1–1.5)
BUN SERPL-MCNC: 10 MG/DL (ref 8–22)
CALCIUM SERPL-MCNC: 9.1 MG/DL (ref 8.5–10.5)
CHLORIDE SERPL-SCNC: 103 MMOL/L (ref 96–112)
CO2 SERPL-SCNC: 24 MMOL/L (ref 20–33)
COMMENT 1642: NORMAL
CREAT SERPL-MCNC: 0.59 MG/DL (ref 0.5–1.4)
EOSINOPHIL # BLD AUTO: 0.06 K/UL (ref 0–0.51)
EOSINOPHIL NFR BLD: 0.9 % (ref 0–6.9)
ERYTHROCYTE [DISTWIDTH] IN BLOOD BY AUTOMATED COUNT: 45.3 FL (ref 35.9–50)
GIANT PLATELETS BLD QL SMEAR: NORMAL
GLOBULIN SER CALC-MCNC: 3.1 G/DL (ref 1.9–3.5)
GLUCOSE SERPL-MCNC: 89 MG/DL (ref 65–99)
HCT VFR BLD AUTO: 37.6 % (ref 37–47)
HGB BLD-MCNC: 11.2 G/DL (ref 12–16)
IMM GRANULOCYTES # BLD AUTO: 0.02 K/UL (ref 0–0.11)
IMM GRANULOCYTES NFR BLD AUTO: 0.3 % (ref 0–0.9)
LYMPHOCYTES # BLD AUTO: 2.48 K/UL (ref 1–4.8)
LYMPHOCYTES NFR BLD: 37.2 % (ref 22–41)
MCH RBC QN AUTO: 24.9 PG (ref 27–33)
MCHC RBC AUTO-ENTMCNC: 29.8 G/DL (ref 33.6–35)
MCV RBC AUTO: 83.6 FL (ref 81.4–97.8)
MICROCYTES BLD QL SMEAR: ABNORMAL
MONOCYTES # BLD AUTO: 0.45 K/UL (ref 0–0.85)
MONOCYTES NFR BLD AUTO: 6.8 % (ref 0–13.4)
MORPHOLOGY BLD-IMP: NORMAL
NEUTROPHILS # BLD AUTO: 3.61 K/UL (ref 2–7.15)
NEUTROPHILS NFR BLD: 54.2 % (ref 44–72)
NRBC # BLD AUTO: 0 K/UL
NRBC BLD-RTO: 0 /100 WBC
OVALOCYTES BLD QL SMEAR: NORMAL
PLATELET # BLD AUTO: 268 K/UL (ref 164–446)
PLATELET BLD QL SMEAR: NORMAL
PMV BLD AUTO: 12.1 FL (ref 9–12.9)
POTASSIUM SERPL-SCNC: 3.7 MMOL/L (ref 3.6–5.5)
PROT SERPL-MCNC: 7.4 G/DL (ref 6–8.2)
RBC # BLD AUTO: 4.5 M/UL (ref 4.2–5.4)
RBC BLD AUTO: PRESENT
SODIUM SERPL-SCNC: 138 MMOL/L (ref 135–145)
T4 FREE SERPL-MCNC: 1 NG/DL (ref 0.93–1.7)
TSH SERPL DL<=0.005 MIU/L-ACNC: 0.96 UIU/ML (ref 0.38–5.33)
VIT B12 SERPL-MCNC: 478 PG/ML (ref 211–911)
WBC # BLD AUTO: 6.7 K/UL (ref 4.8–10.8)

## 2021-08-11 PROCEDURE — 84439 ASSAY OF FREE THYROXINE: CPT

## 2021-08-11 PROCEDURE — 84443 ASSAY THYROID STIM HORMONE: CPT

## 2021-08-11 PROCEDURE — 85025 COMPLETE CBC W/AUTO DIFF WBC: CPT

## 2021-08-11 PROCEDURE — 80053 COMPREHEN METABOLIC PANEL: CPT

## 2021-08-11 PROCEDURE — 36415 COLL VENOUS BLD VENIPUNCTURE: CPT

## 2021-08-11 PROCEDURE — 82607 VITAMIN B-12: CPT

## 2021-12-02 ENCOUNTER — HOSPITAL ENCOUNTER (OUTPATIENT)
Facility: MEDICAL CENTER | Age: 26
End: 2021-12-02
Attending: NURSE PRACTITIONER
Payer: MEDICAID

## 2021-12-02 ENCOUNTER — GYNECOLOGY VISIT (OUTPATIENT)
Dept: OBGYN | Facility: CLINIC | Age: 26
End: 2021-12-02
Payer: MEDICAID

## 2021-12-02 VITALS — SYSTOLIC BLOOD PRESSURE: 122 MMHG | BODY MASS INDEX: 27.32 KG/M2 | WEIGHT: 154.2 LBS | DIASTOLIC BLOOD PRESSURE: 62 MMHG

## 2021-12-02 DIAGNOSIS — N89.8 VAGINAL DISCHARGE: Primary | ICD-10-CM

## 2021-12-02 DIAGNOSIS — Z30.431 CONTRACEPTIVE, SURVEILLANCE, INTRAUTERINE DEVICE: ICD-10-CM

## 2021-12-02 DIAGNOSIS — N89.8 VAGINAL DISCHARGE: ICD-10-CM

## 2021-12-02 PROCEDURE — 87510 GARDNER VAG DNA DIR PROBE: CPT

## 2021-12-02 PROCEDURE — 87480 CANDIDA DNA DIR PROBE: CPT

## 2021-12-02 PROCEDURE — 99213 OFFICE O/P EST LOW 20 MIN: CPT | Performed by: NURSE PRACTITIONER

## 2021-12-02 PROCEDURE — 87660 TRICHOMONAS VAGIN DIR PROBE: CPT

## 2021-12-02 ASSESSMENT — FIBROSIS 4 INDEX: FIB4 SCORE: 0.41

## 2021-12-02 NOTE — PROGRESS NOTES
HPI Comments:  Ivis Klein is a 26 y.o. y.o. female who presents for problem gyn visit: Pt reports discharge with itching andsmell. No LMP recorded.    Pt has Paragard IUD in place. No problems    Review of Systems :  Constitutional:   ROS: Patient is feeling well.   No dyspnea or chest pain on exertion.   No Abdominal pain, change in bowel habits, black or bloody stools.   No urinary sx.   GYN ROS:normal menses, no abnormal bleeding, pelvic pain or discharge, no breast pain or new or enlarging lumps on self exam. Denies breast tenderness, mass, discharge, changes in size or contour, or abnormal cyclic discomfort.   No neurological complaints.  Wet mount: positive hyphae, no clue, no whiff    Past Medical History:   Diagnosis Date   • Anxiety 2017   • Graves disease 2016   • Heart valve disease      OB HX:   x1,  x1  Allergy:   Patient has no known allergies.  Pertinent positives documented in HPI and all other systems reviewed & are negative    All PMH, PSH, allergies, social history and FH reviewed and updated today:  Past Medical History:   Diagnosis Date   • Anxiety 2017   • Graves disease 2016   • Heart valve disease      Past Surgical History:   Procedure Laterality Date   • THYROIDECTOMY TOTAL Bilateral 3/22/2017    Procedure: THYROIDECTOMY TOTAL -NIMS RECURRENT LARYNGEAL NERVE MONITORING;  Surgeon: Vicente Eldridge M.D.;  Location: SURGERY SAME DAY [facility];  Service:    • PRIMARY C SECTION  2013    Performed by Melisa Wright M.D. at LABOR AND DELIVERY     Patient has no known allergies.  Social History     Socioeconomic History   • Marital status: Single     Spouse name: Not on file   • Number of children: Not on file   • Years of education: Not on file   • Highest education level: Not on file   Occupational History   • Not on file   Tobacco Use   • Smoking status: Never Smoker   • Smokeless tobacco: Never Used   • Tobacco comment: quit in  2012   Vaping Use   • Vaping Use: Never used   Substance and Sexual Activity   • Alcohol use: No   • Drug use: Not Currently     Types: Marijuana, Inhaled     Comment: marijuana weekly   • Sexual activity: Yes     Partners: Male     Birth control/protection: Abstinence     Comment: single   Other Topics Concern   • Not on file   Social History Narrative   • Not on file     Social Determinants of Health     Financial Resource Strain:    • Difficulty of Paying Living Expenses: Not on file   Food Insecurity:    • Worried About Running Out of Food in the Last Year: Not on file   • Ran Out of Food in the Last Year: Not on file   Transportation Needs:    • Lack of Transportation (Medical): Not on file   • Lack of Transportation (Non-Medical): Not on file   Physical Activity:    • Days of Exercise per Week: Not on file   • Minutes of Exercise per Session: Not on file   Stress:    • Feeling of Stress : Not on file   Social Connections:    • Frequency of Communication with Friends and Family: Not on file   • Frequency of Social Gatherings with Friends and Family: Not on file   • Attends Cheondoism Services: Not on file   • Active Member of Clubs or Organizations: Not on file   • Attends Club or Organization Meetings: Not on file   • Marital Status: Not on file   Intimate Partner Violence:    • Fear of Current or Ex-Partner: Not on file   • Emotionally Abused: Not on file   • Physically Abused: Not on file   • Sexually Abused: Not on file   Housing Stability:    • Unable to Pay for Housing in the Last Year: Not on file   • Number of Places Lived in the Last Year: Not on file   • Unstable Housing in the Last Year: Not on file     Family History   Problem Relation Age of Onset   • Cancer Paternal Grandmother 56        breast cancer   • No Known Problems Mother    • Hyperlipidemia Father    • No Known Problems Sister    • No Known Problems Brother      Medications:   Current Outpatient Medications Ordered in Epic   Medication Sig  Dispense Refill   • ibuprofen (MOTRIN) 600 MG Tab Take 1 Tab by mouth every 6 hours as needed (For cramping after delivery; do not give if patient is receiving ketorolac (Toradol)). 30 Tab 0   • levothyroxine (SYNTHROID) 150 MCG Tab Take 1 Tab by mouth Every morning on an empty stomach. 30 Tab 2   • Prenatal MV-Min-Fe Fum-FA-DHA (PRENATAL 1 PO) Take 2 Tabs by mouth every evening. (Patient not taking: Reported on 12/2/2021)       No current [facility]-ordered facility-administered medications on file.          Objective:   Vital measurements:  /62   Wt 69.9 kg (154 lb 3.2 oz)   Body mass index is 27.32 kg/m². (Goal BM I>18 <25)    Physical Exam   Nursing note and vitals reviewed.  Constitutional: She is oriented to person, place, and time. She appears well-developed and well-nourished. No distress.     Abdominal: Soft. Bowel sounds are normal. She exhibits no distension and no mass. No tenderness. She has no rebound and no guarding.     Breast:  Exam deferred    Genitourinary:  Pelvic exam was performed with patient supine.  External genitalia with no abnormal pigmentation, labial fusion,rash, tenderness, lesion or injury to the labia bilaterally.  Vagina is moist with no lesions, foul discharge, erythema, tenderness or bleeding. No foreign body around the vagina or signs of injury.   Cervix exhibits no motion tenderness, no discharge and no friability.   Uterus is nidline not deviated, not enlarged, not fixed and not tender.  Right adnexum displays no mass, no tenderness and no fullness. Left adnexum displays no mass, no tenderness and no fullness.     Neurological: She is alert and oriented to person, place, and time. She exhibits normal muscle tone.     Skin: Skin is warm and dry. No rash noted. She is not diaphoretic. No erythema. No pallor.     Psychiatric: She has a normal mood and affect. Her behavior is normal. Judgment and thought content normal.        Assessment:     1. Vaginal discharge  VAGINAL  PATHOGENS DNA PANEL   2. Contraceptive, surveillance, intrauterine device  VAGINAL PATHOGENS DNA PANEL         Plan:   Gyn exam performed   Monthly SBE.  Vaginal pathogen sent  Information on Monistat 7 given    No follow-ups on file.

## 2021-12-02 NOTE — PROGRESS NOTES
Patient here for GYN for abdominal pain.  Last seen on: 6/4/2021  Last pap smear 1/31/2020 WNL  Pt had Paragard insertion on 4/9/2021  Pt states having an increase in discharge and itches.   Pharmacy verified:  # [phone]

## 2021-12-03 LAB
CANDIDA DNA VAG QL PROBE+SIG AMP: NEGATIVE
G VAGINALIS DNA VAG QL PROBE+SIG AMP: NEGATIVE
T VAGINALIS DNA VAG QL PROBE+SIG AMP: NEGATIVE

## 2022-01-06 ENCOUNTER — HOSPITAL ENCOUNTER (EMERGENCY)
Facility: MEDICAL CENTER | Age: 27
End: 2022-01-06
Attending: EMERGENCY MEDICINE
Payer: MEDICAID

## 2022-01-06 VITALS
BODY MASS INDEX: 27.6 KG/M2 | WEIGHT: 150 LBS | RESPIRATION RATE: 18 BRPM | HEIGHT: 62 IN | HEART RATE: 94 BPM | OXYGEN SATURATION: 98 % | DIASTOLIC BLOOD PRESSURE: 73 MMHG | TEMPERATURE: 98.1 F | SYSTOLIC BLOOD PRESSURE: 121 MMHG

## 2022-01-06 DIAGNOSIS — J06.9 UPPER RESPIRATORY TRACT INFECTION, UNSPECIFIED TYPE: ICD-10-CM

## 2022-01-06 LAB
EKG IMPRESSION: NORMAL
SARS-COV-2 RNA RESP QL NAA+PROBE: NOTDETECTED
SPECIMEN SOURCE: NORMAL

## 2022-01-06 PROCEDURE — 93005 ELECTROCARDIOGRAM TRACING: CPT | Performed by: EMERGENCY MEDICINE

## 2022-01-06 PROCEDURE — U0003 INFECTIOUS AGENT DETECTION BY NUCLEIC ACID (DNA OR RNA); SEVERE ACUTE RESPIRATORY SYNDROME CORONAVIRUS 2 (SARS-COV-2) (CORONAVIRUS DISEASE [COVID-19]), AMPLIFIED PROBE TECHNIQUE, MAKING USE OF HIGH THROUGHPUT TECHNOLOGIES AS DESCRIBED BY CMS-2020-01-R: HCPCS

## 2022-01-06 PROCEDURE — 99283 EMERGENCY DEPT VISIT LOW MDM: CPT

## 2022-01-06 PROCEDURE — 93005 ELECTROCARDIOGRAM TRACING: CPT

## 2022-01-06 PROCEDURE — U0005 INFEC AGEN DETEC AMPLI PROBE: HCPCS

## 2022-01-06 ASSESSMENT — FIBROSIS 4 INDEX: FIB4 SCORE: 0.41

## 2022-01-06 ASSESSMENT — LIFESTYLE VARIABLES
DOES PATIENT WANT TO STOP DRINKING: NO
DO YOU DRINK ALCOHOL: NO

## 2022-01-06 NOTE — ED PROVIDER NOTES
ED Provider Note    CHIEF COMPLAINT  Chief Complaint   Patient presents with   • Cold Symptoms     Son tested positive for RSV       HPI  Ivis Klein is a 26 y.o. female who presents with a cough and sore throat.  The patient states has been sick over the last couple of days.  She states her son recently tested positive for RSV.  She is unaware of any COVID-19 exposure and she is vaccinated.  She has not had any vomiting or diarrhea.    REVIEW OF SYSTEMS  See HPI for further details. All other systems are negative.     PAST MEDICAL HISTORY  Past Medical History:   Diagnosis Date   • Anxiety 2/16/2017   • Graves disease 12/5/2016   • Heart valve disease        FAMILY HISTORY  [unfilled]    SOCIAL HISTORY  Social History     Socioeconomic History   • Marital status: Single     Spouse name: Not on file   • Number of children: Not on file   • Years of education: Not on file   • Highest education level: Not on file   Occupational History   • Not on file   Tobacco Use   • Smoking status: Never Smoker   • Smokeless tobacco: Never Used   • Tobacco comment: quit in 2012   Vaping Use   • Vaping Use: Never used   Substance and Sexual Activity   • Alcohol use: No   • Drug use: Not Currently     Types: Marijuana, Inhaled     Comment: marijuana weekly   • Sexual activity: Yes     Partners: Male     Birth control/protection: Abstinence     Comment: single   Other Topics Concern   • Not on file   Social History Narrative   • Not on file     Social Determinants of Health     Financial Resource Strain:    • Difficulty of Paying Living Expenses: Not on file   Food Insecurity:    • Worried About Running Out of Food in the Last Year: Not on file   • Ran Out of Food in the Last Year: Not on file   Transportation Needs:    • Lack of Transportation (Medical): Not on file   • Lack of Transportation (Non-Medical): Not on file   Physical Activity:    • Days of Exercise per Week: Not on file   • Minutes of Exercise per Session: Not  "on file   Stress:    • Feeling of Stress : Not on file   Social Connections:    • Frequency of Communication with Friends and Family: Not on file   • Frequency of Social Gatherings with Friends and Family: Not on file   • Attends Oriental orthodox Services: Not on file   • Active Member of Clubs or Organizations: Not on file   • Attends Club or Organization Meetings: Not on file   • Marital Status: Not on file   Intimate Partner Violence:    • Fear of Current or Ex-Partner: Not on file   • Emotionally Abused: Not on file   • Physically Abused: Not on file   • Sexually Abused: Not on file   Housing Stability:    • Unable to Pay for Housing in the Last Year: Not on file   • Number of Places Lived in the Last Year: Not on file   • Unstable Housing in the Last Year: Not on file       SURGICAL HISTORY  Past Surgical History:   Procedure Laterality Date   • THYROIDECTOMY TOTAL Bilateral 3/22/2017    Procedure: THYROIDECTOMY TOTAL -NIMS RECURRENT LARYNGEAL NERVE MONITORING;  Surgeon: Vicente Eldridge M.D.;  Location: SURGERY SAME DAY [facility];  Service:    • PRIMARY C SECTION  9/8/2013    Performed by Melisa Wright M.D. at LABOR AND DELIVERY       CURRENT MEDICATIONS  Home Medications     Reviewed by Nai Lisa R.N. (Registered Nurse) on 01/06/22 at 1051  Med List Status: Not Addressed   Medication Last Dose Status   ibuprofen (MOTRIN) 600 MG Tab  Active   levothyroxine (SYNTHROID) 150 MCG Tab  Active   Prenatal MV-Min-Fe Fum-FA-DHA (PRENATAL 1 PO)  Active                ALLERGIES  No Known Allergies    PHYSICAL EXAM  VITAL SIGNS: /77   Pulse 93   Temp 36.8 °C (98.3 °F) (Temporal)   Resp 16   Ht 1.575 m (5' 2\")   Wt 68 kg (150 lb)   SpO2 100%   BMI 27.44 kg/m²       Constitutional: Well developed, Well nourished, No acute distress, Non-toxic appearance.   HENT: Normocephalic, Atraumatic, Bilateral external ears normal, Oropharynx moist, No oral exudates, Nose swollen turbinates.   Eyes: " PERRLA, EOMI, Conjunctiva normal, No discharge.   Neck: Normal range of motion, No tenderness, Supple, No stridor.   Lymphatic: No lymphadenopathy noted.   Cardiovascular: Normal heart rate, Normal rhythm, No murmurs, No rubs, No gallops.   Thorax & Lungs: Normal breath sounds, No respiratory distress, No wheezing, No chest tenderness.   Abdomen: Bowel sounds normal, Soft, No tenderness, No masses, No pulsatile masses.   Skin: Warm, Dry, No erythema, No rash.   Back: No tenderness, No CVA tenderness.    Extremities: Intact distal pulses, No edema, No tenderness, No cyanosis, No clubbing.   Neurologic: Alert & oriented x 3, Normal motor function, Normal sensory function, No focal deficits noted.   Psychiatric: Affect normal, Judgment normal, Mood normal.     COURSE & MEDICAL DECISION MAKING  Pertinent Labs & Imaging studies reviewed. (See chart for details)  This a 26-year-old female who presents the emerge department signs and symptoms consistent with a viral upper respiratory infection.  The patient does not appear toxic.  Clinically I do not appreciate any evidence of a focal bacterial infection.  The patient will be treated supportively.  I will send down a Covid swab and the patient will follow up on the results.  She will return for any signs of toxicity or increased work of breathing.  FINAL IMPRESSION  1.  Viral illness    Disposition  The patient will be discharged in stable condition         Electronically signed by: Venkatesh Mistry M.D., 1/6/2022 12:57 PM    Of note after the dictation I noticed an EKG was performed via protocol in triage.  Her presenting symptoms do not sound cardiac in nature.  Results for orders placed or performed during the hospital encounter of 01/06/22   EKG   Result Value Ref Range    Report       [facility] Emergency Dept.    Test Date:  2022-01-06  Pt Name:    MICHELLE MENARD               Department: ER  MRN:        [id number]                       Room:  Gender:     Female                       Technician: [id number]  :        1995                   Requested By:ER TRIAGE PROTOCOL  Order #:    [id number]                    Reading MD: PATY HOLLOWAY MD    Measurements  Intervals                                Axis  Rate:       86                           P:          30  OK:         164                          QRS:        50  QRSD:       76                           T:          33  QT:         348  QTc:        417    Interpretive Statements  Twelve-lead EKG shows a normal sinus rhythm with a ventricular to 86, normal  QRS, normal intervals, no ST segment elevation or depression  Electronically Signed On 2022 12:58:52 PST by PATY HOLLOWAY MD

## 2022-01-06 NOTE — ED TRIAGE NOTES
"Chief Complaint   Patient presents with   • Cold Symptoms     Son tested positive for RSV     /77   Pulse 93   Temp 36.8 °C (98.3 °F) (Temporal)   Resp 16   Ht 1.575 m (5' 2\")   Wt 68 kg (150 lb)   SpO2 100%   Pt informed of wait times. Educated on triage process.  Asked to return to triage RN for any new or worsening of symptoms. Thanked for patience.        "

## 2022-02-08 NOTE — PATIENT INSTRUCTIONS
10:52 AM Recheck on patient. Discussed with patient ED findings and plan for discharge. Patient was given ED warnings, discharge instructions, and follow up information to go home with. Patient understands and agrees with plan for discharge. Any questions have been answered.     Anemia, Nonspecific  Anemia is a condition in which the concentration of red blood cells or hemoglobin in the blood is below normal. Hemoglobin is a substance in red blood cells that carries oxygen to the tissues of the body. Anemia results in not enough oxygen reaching these tissues.  What are the causes?  Common causes of anemia include:  · Excessive bleeding. Bleeding may be internal or external. This includes excessive bleeding from periods (in women) or from the intestine.  · Poor nutrition.  · Chronic kidney, thyroid, and liver disease.  · Bone marrow disorders that decrease red blood cell production.  · Cancer and treatments for cancer.  · HIV, AIDS, and their treatments.  · Spleen problems that increase red blood cell destruction.  · Blood disorders.  · Excess destruction of red blood cells due to infection, medicines, and autoimmune disorders.  What are the signs or symptoms?  · Minor weakness.  · Dizziness.  · Headache.  · Palpitations.  · Shortness of breath, especially with exercise.  · Paleness.  · Cold sensitivity.  · Indigestion.  · Nausea.  · Difficulty sleeping.  · Difficulty concentrating.  Symptoms may occur suddenly or they may develop slowly.  How is this diagnosed?  Additional blood tests are often needed. These help your health care provider determine the best treatment. Your health care provider will check your stool for blood and look for other causes of blood loss.  How is this treated?  Treatment varies depending on the cause of the anemia. Treatment can include:  · Supplements of iron, vitamin B12, or folic acid.  · Hormone medicines.  · A blood transfusion. This may be needed if blood loss is severe.  · Hospitalization. This may be needed if there is significant continual blood loss.  · Dietary changes.  · Spleen removal.  Follow these instructions at home:  Keep all follow-up appointments. It often takes many weeks to correct anemia, and having your health care provider check on your  condition and your response to treatment is very important.  Get help right away if:  · You develop extreme weakness, shortness of breath, or chest pain.  · You become dizzy or have trouble concentrating.  · You develop heavy vaginal bleeding.  · You develop a rash.  · You have bloody or black, tarry stools.  · You faint.  · You vomit up blood.  · You vomit repeatedly.  · You have abdominal pain.  · You have a fever or persistent symptoms for more than 2-3 days.  · You have a fever and your symptoms suddenly get worse.  · You are dehydrated.  This information is not intended to replace advice given to you by your health care provider. Make sure you discuss any questions you have with your health care provider.  Document Released: 01/25/2006 Document Revised: 05/31/2017 Document Reviewed: 06/13/2014  [employer] Patient Education © 2017 [employer]

## 2022-02-15 RX ORDER — SERTRALINE HYDROCHLORIDE 25 MG/1
TABLET, FILM COATED ORAL
COMMUNITY
Start: 2021-05-24 | End: 2022-02-17

## 2022-02-15 RX ORDER — HYDROXYZINE HYDROCHLORIDE 25 MG/1
TABLET, FILM COATED ORAL
COMMUNITY
Start: 2021-05-24 | End: 2022-02-17

## 2022-02-16 ENCOUNTER — APPOINTMENT (OUTPATIENT)
Dept: RADIOLOGY | Facility: MEDICAL CENTER | Age: 27
End: 2022-02-16
Attending: EMERGENCY MEDICINE
Payer: MEDICAID

## 2022-02-16 ENCOUNTER — HOSPITAL ENCOUNTER (EMERGENCY)
Facility: MEDICAL CENTER | Age: 27
End: 2022-02-16
Attending: EMERGENCY MEDICINE
Payer: MEDICAID

## 2022-02-16 VITALS
RESPIRATION RATE: 14 BRPM | HEIGHT: 63 IN | TEMPERATURE: 97.8 F | SYSTOLIC BLOOD PRESSURE: 120 MMHG | OXYGEN SATURATION: 97 % | BODY MASS INDEX: 27.89 KG/M2 | DIASTOLIC BLOOD PRESSURE: 64 MMHG | WEIGHT: 157.41 LBS | HEART RATE: 79 BPM

## 2022-02-16 DIAGNOSIS — R07.9 CHEST PAIN, UNSPECIFIED TYPE: ICD-10-CM

## 2022-02-16 DIAGNOSIS — D50.9 MICROCYTIC ANEMIA: ICD-10-CM

## 2022-02-16 DIAGNOSIS — R51.9 ACUTE NONINTRACTABLE HEADACHE, UNSPECIFIED HEADACHE TYPE: ICD-10-CM

## 2022-02-16 LAB
ALBUMIN SERPL BCP-MCNC: 4.3 G/DL (ref 3.2–4.9)
ALBUMIN/GLOB SERPL: 1.3 G/DL
ALP SERPL-CCNC: 79 U/L (ref 30–99)
ALT SERPL-CCNC: 9 U/L (ref 2–50)
ANION GAP SERPL CALC-SCNC: 14 MMOL/L (ref 7–16)
ANISOCYTOSIS BLD QL SMEAR: ABNORMAL
AST SERPL-CCNC: 16 U/L (ref 12–45)
BASOPHILS # BLD AUTO: 0 % (ref 0–1.8)
BASOPHILS # BLD: 0 K/UL (ref 0–0.12)
BILIRUB SERPL-MCNC: 0.3 MG/DL (ref 0.1–1.5)
BUN SERPL-MCNC: 12 MG/DL (ref 8–22)
CALCIUM SERPL-MCNC: 8.8 MG/DL (ref 8.5–10.5)
CHLORIDE SERPL-SCNC: 104 MMOL/L (ref 96–112)
CO2 SERPL-SCNC: 21 MMOL/L (ref 20–33)
CREAT SERPL-MCNC: 0.6 MG/DL (ref 0.5–1.4)
EKG IMPRESSION: NORMAL
EOSINOPHIL # BLD AUTO: 0.05 K/UL (ref 0–0.51)
EOSINOPHIL NFR BLD: 0.9 % (ref 0–6.9)
ERYTHROCYTE [DISTWIDTH] IN BLOOD BY AUTOMATED COUNT: 43.7 FL (ref 35.9–50)
GLOBULIN SER CALC-MCNC: 3.2 G/DL (ref 1.9–3.5)
GLUCOSE SERPL-MCNC: 84 MG/DL (ref 65–99)
HCG SERPL QL: NEGATIVE
HCT VFR BLD AUTO: 34.1 % (ref 37–47)
HGB BLD-MCNC: 10.6 G/DL (ref 12–16)
LYMPHOCYTES # BLD AUTO: 2.52 K/UL (ref 1–4.8)
LYMPHOCYTES NFR BLD: 43.5 % (ref 22–41)
MANUAL DIFF BLD: NORMAL
MCH RBC QN AUTO: 24.9 PG (ref 27–33)
MCHC RBC AUTO-ENTMCNC: 31.1 G/DL (ref 33.6–35)
MCV RBC AUTO: 80 FL (ref 81.4–97.8)
MICROCYTES BLD QL SMEAR: ABNORMAL
MONOCYTES # BLD AUTO: 0.45 K/UL (ref 0–0.85)
MONOCYTES NFR BLD AUTO: 7.8 % (ref 0–13.4)
MORPHOLOGY BLD-IMP: NORMAL
NEUTROPHILS # BLD AUTO: 2.77 K/UL (ref 2–7.15)
NEUTROPHILS NFR BLD: 47.8 % (ref 44–72)
NRBC # BLD AUTO: 0 K/UL
NRBC BLD-RTO: 0 /100 WBC
PLATELET # BLD AUTO: 210 K/UL (ref 164–446)
PLATELET BLD QL SMEAR: NORMAL
PMV BLD AUTO: 11.1 FL (ref 9–12.9)
POIKILOCYTOSIS BLD QL SMEAR: NORMAL
POTASSIUM SERPL-SCNC: 3.7 MMOL/L (ref 3.6–5.5)
PROT SERPL-MCNC: 7.5 G/DL (ref 6–8.2)
RBC # BLD AUTO: 4.26 M/UL (ref 4.2–5.4)
RBC BLD AUTO: PRESENT
SODIUM SERPL-SCNC: 139 MMOL/L (ref 135–145)
T4 FREE SERPL-MCNC: 1.59 NG/DL (ref 0.93–1.7)
TROPONIN T SERPL-MCNC: <6 NG/L (ref 6–19)
TSH SERPL DL<=0.005 MIU/L-ACNC: 1.64 UIU/ML (ref 0.38–5.33)
WBC # BLD AUTO: 5.8 K/UL (ref 4.8–10.8)

## 2022-02-16 PROCEDURE — 96375 TX/PRO/DX INJ NEW DRUG ADDON: CPT

## 2022-02-16 PROCEDURE — 93005 ELECTROCARDIOGRAM TRACING: CPT

## 2022-02-16 PROCEDURE — 85007 BL SMEAR W/DIFF WBC COUNT: CPT

## 2022-02-16 PROCEDURE — 80053 COMPREHEN METABOLIC PANEL: CPT

## 2022-02-16 PROCEDURE — 84443 ASSAY THYROID STIM HORMONE: CPT

## 2022-02-16 PROCEDURE — 700105 HCHG RX REV CODE 258: Performed by: EMERGENCY MEDICINE

## 2022-02-16 PROCEDURE — 700111 HCHG RX REV CODE 636 W/ 250 OVERRIDE (IP): Performed by: EMERGENCY MEDICINE

## 2022-02-16 PROCEDURE — 99285 EMERGENCY DEPT VISIT HI MDM: CPT

## 2022-02-16 PROCEDURE — 70450 CT HEAD/BRAIN W/O DYE: CPT

## 2022-02-16 PROCEDURE — 71045 X-RAY EXAM CHEST 1 VIEW: CPT

## 2022-02-16 PROCEDURE — 85027 COMPLETE CBC AUTOMATED: CPT

## 2022-02-16 PROCEDURE — 84484 ASSAY OF TROPONIN QUANT: CPT

## 2022-02-16 PROCEDURE — 84439 ASSAY OF FREE THYROXINE: CPT

## 2022-02-16 PROCEDURE — 93005 ELECTROCARDIOGRAM TRACING: CPT | Performed by: EMERGENCY MEDICINE

## 2022-02-16 PROCEDURE — 84703 CHORIONIC GONADOTROPIN ASSAY: CPT

## 2022-02-16 PROCEDURE — 96374 THER/PROPH/DIAG INJ IV PUSH: CPT

## 2022-02-16 PROCEDURE — 36415 COLL VENOUS BLD VENIPUNCTURE: CPT

## 2022-02-16 RX ORDER — DEXAMETHASONE SODIUM PHOSPHATE 4 MG/ML
4 INJECTION, SOLUTION INTRA-ARTICULAR; INTRALESIONAL; INTRAMUSCULAR; INTRAVENOUS; SOFT TISSUE ONCE
Status: COMPLETED | OUTPATIENT
Start: 2022-02-16 | End: 2022-02-16

## 2022-02-16 RX ORDER — DIPHENHYDRAMINE HYDROCHLORIDE 50 MG/ML
25 INJECTION INTRAMUSCULAR; INTRAVENOUS ONCE
Status: COMPLETED | OUTPATIENT
Start: 2022-02-16 | End: 2022-02-16

## 2022-02-16 RX ORDER — KETOROLAC TROMETHAMINE 30 MG/ML
30 INJECTION, SOLUTION INTRAMUSCULAR; INTRAVENOUS ONCE
Status: COMPLETED | OUTPATIENT
Start: 2022-02-16 | End: 2022-02-16

## 2022-02-16 RX ORDER — ONDANSETRON 2 MG/ML
4 INJECTION INTRAMUSCULAR; INTRAVENOUS ONCE
Status: COMPLETED | OUTPATIENT
Start: 2022-02-16 | End: 2022-02-16

## 2022-02-16 RX ORDER — SODIUM CHLORIDE 9 MG/ML
1000 INJECTION, SOLUTION INTRAVENOUS ONCE
Status: COMPLETED | OUTPATIENT
Start: 2022-02-16 | End: 2022-02-16

## 2022-02-16 RX ORDER — FERROUS SULFATE 325(65) MG
325 TABLET ORAL DAILY
Qty: 30 TABLET | Refills: 0 | Status: SHIPPED | OUTPATIENT
Start: 2022-02-16 | End: 2022-04-25 | Stop reason: SDUPTHER

## 2022-02-16 RX ADMIN — DEXAMETHASONE SODIUM PHOSPHATE 4 MG: 4 INJECTION INTRA-ARTICULAR; INTRALESIONAL; INTRAMUSCULAR; INTRAVENOUS; SOFT TISSUE at 14:44

## 2022-02-16 RX ADMIN — ONDANSETRON 4 MG: 2 INJECTION INTRAMUSCULAR; INTRAVENOUS at 14:44

## 2022-02-16 RX ADMIN — SODIUM CHLORIDE 1000 ML: 9 INJECTION, SOLUTION INTRAVENOUS at 14:44

## 2022-02-16 RX ADMIN — DIPHENHYDRAMINE HYDROCHLORIDE 25 MG: 50 INJECTION INTRAMUSCULAR; INTRAVENOUS at 14:44

## 2022-02-16 RX ADMIN — KETOROLAC TROMETHAMINE 30 MG: 30 INJECTION, SOLUTION INTRAMUSCULAR at 15:44

## 2022-02-16 ASSESSMENT — PAIN SCALES - WONG BAKER: WONGBAKER_NUMERICALRESPONSE: HURTS A LITTLE MORE

## 2022-02-16 ASSESSMENT — FIBROSIS 4 INDEX: FIB4 SCORE: 0.41

## 2022-02-16 NOTE — ED NOTES
Orthostatics:    Supine:  88, 113/68    Sitting feet on floor:   90, 119/70    Standin:  89, 114/77

## 2022-02-16 NOTE — ED NOTES
Back from CT, medicated per mar order. Medications verified w/pharmacist that they are safe to administer for lactating pt

## 2022-02-16 NOTE — ED PROVIDER NOTES
ED Provider Note    CHIEF COMPLAINT  Chief Complaint   Patient presents with   • Headache     Pt states Migraine for 5 days with medications around the clock.    • Chest Pain     Started Sunday and been having lightheadedness with it.    • Thyroid Problem     Patient states she has had a thyroid storm before and feels similar.        Patient here with her mom    IVANIA Klein is a 26 y.o. female with history of migraines and thyroid disease who presents complaining of migraine headache and chest pain.  Patient states the symptoms are similar to symptoms she has had with prior thyroid storm episodes.  Her last thyroid storm episode was 2 years ago.    Patient states she has had a migraine since Saturday at 2:30 PM.  This was not thunderclap in nature.  It has been constant and unrelieved by Excedrin which she is taken 2 times a day since onset.  Typically, her headaches do not last this long and they respond more to the Excedrin.  She feels generally weak and like she wants to faint when she gets up to walk around.  She is compliant with her thyroid medication and reports no dosing changes.  She also reports associated chest pressure and shortness of breath.    Patient reports a history of DVT but is not currently on anticoagulants.  She has had no recent surgery/immobilization, is not using OCPs/HRT, and denies calf pain, leg swelling, hemoptysis.    Pt had a thyroidectomy in 2017 by [facility].      ALLERGIES  No Known Allergies    CURRENT MEDICATIONS  Home Medications     Reviewed by Millie Sharma R.N. (Registered Nurse) on 02/16/22 at 1019  Med List Status: Complete   Medication Last Dose Status   hydrOXYzine HCl (ATARAX) 25 MG Tab  Active   ibuprofen (MOTRIN) 600 MG Tab  Active   levothyroxine (SYNTHROID) 150 MCG Tab taking Active   Prenatal MV-Min-Fe Fum-FA-DHA (PRENATAL 1 PO)  Active   sertraline (ZOLOFT) 25 MG tablet  Active                PAST MEDICAL HISTORY   has a past medical history of  "Anxiety (2/16/2017), Graves disease (12/5/2016), and Heart valve disease.    SURGICAL HISTORY   has a past surgical history that includes primary c section (9/8/2013) and thyroidectomy total (Bilateral, 3/22/2017).    SOCIAL HISTORY  Social History     Tobacco Use   • Smoking status: Never Smoker   • Smokeless tobacco: Never Used   • Tobacco comment: quit in 2012   Vaping Use   • Vaping Use: Never used   Substance and Sexual Activity   • Alcohol use: No   • Drug use: Not Currently     Types: Marijuana, Inhaled     Comment: marijuana weekly   • Sexual activity: Yes     Partners: Male     Birth control/protection: Abstinence     Comment: single       Family Hx:  Diabetes, dyslipidemia, breast cancer, leukemia      REVIEW OF SYSTEMS  See HPI for further details.  All other systems are negative except as above in HPI.    PHYSICAL EXAM  VITAL SIGNS: /73   Pulse 78   Temp 36.6 °C (97.8 °F) (Temporal)   Resp 13   Ht 1.6 m (5' 3\")   Wt 71.4 kg (157 lb 6.5 oz)   SpO2 100%   BMI 27.88 kg/m²     General:  WDWN female, nontoxic appearing in NAD; A+Ox3; V/S as above; afebrile, not hypoxic or tachycardic  Skin: warm and dry; good color; no rash  HEENT: NCAT; EOMs intact; PERRL; no scleral icterus   Neck: FROM; soft  Cardiovascular: Regular heart rate and rhythm.  No murmurs, rubs, or gallops; pulses 2+ bilaterally radially and DP areas  Lungs: Clear to auscultation with good air movement bilaterally.  No wheezes, rhonchi, or rales.   Abdomen: BS present; soft; NTND; no rebound, guarding, or rigidity.  No organomegaly or pulsatile mass  Extremities: MENSAH x 4; no e/o trauma; no pedal edema; neg Dwayne's  Neurologic: CNs III-XII grossly intact; speech clear; distal sensation intact; strength 5/5 UE/LEs  Psychiatric: Appropriate affect, normal mood    LABS  Results for orders placed or performed during the hospital encounter of 02/16/22   CBC WITH DIFFERENTIAL   Result Value Ref Range    WBC 5.8 4.8 - 10.8 K/uL    RBC 4.26 " 4.20 - 5.40 M/uL    Hemoglobin 10.6 (L) 12.0 - 16.0 g/dL    Hematocrit 34.1 (L) 37.0 - 47.0 %    MCV 80.0 (L) 81.4 - 97.8 fL    MCH 24.9 (L) 27.0 - 33.0 pg    MCHC 31.1 (L) 33.6 - 35.0 g/dL    RDW 43.7 35.9 - 50.0 fL    Platelet Count 210 164 - 446 K/uL    MPV 11.1 9.0 - 12.9 fL    Neutrophils-Polys 47.80 44.00 - 72.00 %    Lymphocytes 43.50 (H) 22.00 - 41.00 %    Monocytes 7.80 0.00 - 13.40 %    Eosinophils 0.90 0.00 - 6.90 %    Basophils 0.00 0.00 - 1.80 %    Nucleated RBC 0.00 /100 WBC    Neutrophils (Absolute) 2.77 2.00 - 7.15 K/uL    Lymphs (Absolute) 2.52 1.00 - 4.80 K/uL    Monos (Absolute) 0.45 0.00 - 0.85 K/uL    Eos (Absolute) 0.05 0.00 - 0.51 K/uL    Baso (Absolute) 0.00 0.00 - 0.12 K/uL    NRBC (Absolute) 0.00 K/uL    Anisocytosis 2+ (A)     Microcytosis 2+ (A)    CMP   Result Value Ref Range    Sodium 139 135 - 145 mmol/L    Potassium 3.7 3.6 - 5.5 mmol/L    Chloride 104 96 - 112 mmol/L    Co2 21 20 - 33 mmol/L    Anion Gap 14.0 7.0 - 16.0    Glucose 84 65 - 99 mg/dL    Bun 12 8 - 22 mg/dL    Creatinine 0.60 0.50 - 1.40 mg/dL    Calcium 8.8 8.5 - 10.5 mg/dL    AST(SGOT) 16 12 - 45 U/L    ALT(SGPT) 9 2 - 50 U/L    Alkaline Phosphatase 79 30 - 99 U/L    Total Bilirubin 0.3 0.1 - 1.5 mg/dL    Albumin 4.3 3.2 - 4.9 g/dL    Total Protein 7.5 6.0 - 8.2 g/dL    Globulin 3.2 1.9 - 3.5 g/dL    A-G Ratio 1.3 g/dL   TROPONIN   Result Value Ref Range    Troponin T <6 6 - 19 ng/L   BETA-HCG QUALITATIVE SERUM   Result Value Ref Range    Beta-Hcg Qualitative Serum Negative Negative   TSH   Result Value Ref Range    TSH 1.640 0.380 - 5.330 uIU/mL   FREE THYROXINE   Result Value Ref Range    Free T-4 1.59 0.93 - 1.70 ng/dL   DIFFERENTIAL MANUAL   Result Value Ref Range    Manual Diff Status PERFORMED    PERIPHERAL SMEAR REVIEW   Result Value Ref Range    Peripheral Smear Review see below    PLATELET ESTIMATE   Result Value Ref Range    Plt Estimation Normal    MORPHOLOGY   Result Value Ref Range    RBC Morphology  Present     Poikilocytosis 1+    ESTIMATED GFR   Result Value Ref Range    GFR If African American >60 >60 mL/min/1.73 m 2    GFR If Non African American >60 >60 mL/min/1.73 m 2   EKG   Result Value Ref Range    Report       [facility]    Test Date:  2022  Pt Name:    MICHELLE MENARD               Department: ER  MRN:        [id number]                      Room:  Gender:     Female                       Technician: [id number]  :        [id number]                   Requested By:ER TRIAGE PROTOCOL  Order #:    [id number]                    Reading MD: ED VIDAL MD    Measurements  Intervals                                Axis  Rate:       74                           P:          45  AL:         180                          QRS:        57  QRSD:       74                           T:          33  QT:         364  QTc:        404    Interpretive Statements  SINUS RHYTHM  Compared to ECG 2022 10:37:34  ST (T wave) deviation no longer present  Electronically Signed On 2022 13:10:15 PST by ED VIDAL MD           IMAGING  CT-HEAD W/O   Final Result         NO ACUTE ABNORMALITIES ARE NOTED ON CT SCAN OF THE HEAD.      Findings suggest sphenoid sinusitis.         DX-CHEST-PORTABLE (1 VIEW)   Final Result      No acute cardiopulmonary process is seen.          MEDICAL RECORD  I have reviewed patient's medical record and pertinent results are listed below.    Patient's chart is reviewed.  Patient last seen here in the ER on 2020 for sore throat.  Prior to that, pt seen in ER on 2020, 2020, 20, 2019, 2019, 2019, 11/3/2019, 10/1/2019, 2019, 2018, 9/10/2019, 2019, among other dates.  Some of these visits were for similar symptoms to today.      COURSE & MEDICAL DECISION MAKING  I have reviewed any medical record information, laboratory studies and radiographic results as noted.    Michelle Menard is a 26 y.o.  "female with history of migraines and surgically induced hypothyroidism who presents complaining of chest pain, headache, and concern for thyroid storm.  Patient is afebrile and nontoxic-appearing.  She is not tachycardic or hypertensive.  She is not hypoxic.  EKG demonstrates no acute ST changes or arrhythmia.    Patient states she is experiencing a migraine headache which is lasting longer and is more intense than her usual/typical migraines which she has had since she was age 9 or 11.  Patient has no meningismus.  No neurologic deficits and denies head trauma.  She has not had a CT head, however, in 6 years.  CT head ordered and revealed no acute pathology.  I do not feel she requires an LP to look for meningitis or occult intracranial hemorrhage.    Appropriate PPE was worn at all times while interacting with the patient.    NS bolus was ordered for possible dehydration related to patient's sxs of headache/migraine.     Patient reports history of DVT diagnosed here at [facility].  However, I do not find any lower extremity ultrasound results or PE studies with results confirming this.  She also mentioned a \"heart condition\" but I do not see reports of arrhythmia.  She inquires if this may be a murmur but states whenever it was was diagnosed on an EKG rather than something audible on her physical exam.    When the patient's labs were returned I noted a microcytic anemia.  I feel this may be responsible for her constellation of symptoms.  hCG is negative.  Thyroid function studies are negative/normal, troponin is negative, and chemistry panel is within normal limits.    HEART score zero    Pt was re-evaluated at 4:00 PM  Patient has not received Toradol and still has half a bag of normal saline but states she feels improved.  She requests a note for work.  I advised her of her testing results today and the fact that she has a microcytic anemia that may be causing the symptoms.  She has an appointment with her PCP " tomorrow which I encouraged her to keep.  She should return to the ER for worsening symptoms such as fever, vomiting, or other concerns.  Patient had no further questions.    FINAL IMPRESSION  1. Microcytic anemia  ferrous sulfate 325 (65 Fe) MG tablet   2. Chest pain, unspecified type     3. Acute nonintractable headache, unspecified headache type       Electronically signed by: Ami Fitzpatrick M.D., 2/16/2022 1:10 PM

## 2022-02-16 NOTE — ED TRIAGE NOTES
"Chief Complaint   Patient presents with   • Headache     Pt states Migraine for 5 days with medications around the clock.    • Chest Pain     Started Sunday and been having lightheadedness with it.    • Thyroid Problem     Patient states she has had a thyroid storm before and feels similar.      EKG done prior to triage.  /78   Pulse 77   Temp 36.6 °C (97.8 °F) (Temporal)   Resp 16   Ht 1.6 m (5' 3\")   Wt 71.4 kg (157 lb 6.5 oz)   SpO2 99%   BMI 27.88 kg/m²   Pt placed back in [facility], educated on triage process, and told to inform staff of any change in condition.     "

## 2022-02-17 ENCOUNTER — OFFICE VISIT (OUTPATIENT)
Dept: INTERNAL MEDICINE | Facility: OTHER | Age: 27
End: 2022-02-17
Payer: MEDICAID

## 2022-02-17 VITALS
HEIGHT: 63 IN | HEART RATE: 88 BPM | BODY MASS INDEX: 27.64 KG/M2 | OXYGEN SATURATION: 97 % | TEMPERATURE: 98.4 F | SYSTOLIC BLOOD PRESSURE: 125 MMHG | DIASTOLIC BLOOD PRESSURE: 81 MMHG | WEIGHT: 156 LBS

## 2022-02-17 DIAGNOSIS — E05.00 GRAVES DISEASE: ICD-10-CM

## 2022-02-17 DIAGNOSIS — E89.0 POSTSURGICAL HYPOTHYROIDISM: ICD-10-CM

## 2022-02-17 DIAGNOSIS — F39 MOOD DISORDER (HCC): ICD-10-CM

## 2022-02-17 DIAGNOSIS — E78.5 HYPERLIPIDEMIA, UNSPECIFIED HYPERLIPIDEMIA TYPE: ICD-10-CM

## 2022-02-17 DIAGNOSIS — D64.9 ANEMIA, UNSPECIFIED TYPE: ICD-10-CM

## 2022-02-17 DIAGNOSIS — F32.A DEPRESSION, UNSPECIFIED DEPRESSION TYPE: ICD-10-CM

## 2022-02-17 DIAGNOSIS — F41.9 ANXIETY: ICD-10-CM

## 2022-02-17 DIAGNOSIS — E66.3 OVERWEIGHT (BMI 25.0-29.9): ICD-10-CM

## 2022-02-17 DIAGNOSIS — G43.109 MIGRAINE WITH AURA AND WITHOUT STATUS MIGRAINOSUS, NOT INTRACTABLE: Chronic | ICD-10-CM

## 2022-02-17 DIAGNOSIS — N83.209 CYST OF OVARY, UNSPECIFIED LATERALITY: ICD-10-CM

## 2022-02-17 PROBLEM — N83.201 HEMORRHAGIC CYSTS OF BOTH OVARIES: Status: ACTIVE | Noted: 2019-05-23

## 2022-02-17 PROBLEM — N83.201 HEMORRHAGIC CYSTS OF BOTH OVARIES: Status: RESOLVED | Noted: 2019-05-23 | Resolved: 2022-02-17

## 2022-02-17 PROBLEM — R76.8 ELEVATED ANTINUCLEAR ANTIBODY (ANA) LEVEL: Status: RESOLVED | Noted: 2019-05-23 | Resolved: 2022-02-17

## 2022-02-17 PROBLEM — E66.9 OBESITY: Status: ACTIVE | Noted: 2021-07-15

## 2022-02-17 PROBLEM — Z98.890 HISTORY OF THYROIDECTOMY: Status: RESOLVED | Noted: 2020-01-31 | Resolved: 2022-02-17

## 2022-02-17 PROBLEM — N89.8 VAGINAL DISCHARGE: Status: RESOLVED | Noted: 2021-12-02 | Resolved: 2022-02-17

## 2022-02-17 PROBLEM — F41.0 PANIC ATTACK: Status: ACTIVE | Noted: 2019-03-11

## 2022-02-17 PROBLEM — F41.0 PANIC ATTACK: Status: RESOLVED | Noted: 2019-03-11 | Resolved: 2022-02-17

## 2022-02-17 PROBLEM — N83.202 HEMORRHAGIC CYSTS OF BOTH OVARIES: Status: ACTIVE | Noted: 2019-05-23

## 2022-02-17 PROBLEM — E03.9 HYPOTHYROIDISM: Status: ACTIVE | Noted: 2021-07-15

## 2022-02-17 PROBLEM — Z86.59 HISTORY OF PANIC ATTACKS: Status: RESOLVED | Noted: 2020-04-09 | Resolved: 2022-02-17

## 2022-02-17 PROBLEM — R76.8 ELEVATED ANTINUCLEAR ANTIBODY (ANA) LEVEL: Status: ACTIVE | Noted: 2019-05-23

## 2022-02-17 PROBLEM — Z90.89 HISTORY OF THYROIDECTOMY: Status: RESOLVED | Noted: 2020-01-31 | Resolved: 2022-02-17

## 2022-02-17 PROBLEM — N83.202 HEMORRHAGIC CYSTS OF BOTH OVARIES: Status: RESOLVED | Noted: 2019-05-23 | Resolved: 2022-02-17

## 2022-02-17 PROCEDURE — 99204 OFFICE O/P NEW MOD 45 MIN: CPT | Mod: GC | Performed by: INTERNAL MEDICINE

## 2022-02-17 RX ORDER — ESCITALOPRAM OXALATE 10 MG/1
10 TABLET ORAL DAILY
Qty: 60 TABLET | Refills: 1 | Status: SHIPPED | OUTPATIENT
Start: 2022-02-17 | End: 2022-04-25 | Stop reason: SDUPTHER

## 2022-02-17 RX ORDER — HYDROXYZINE HYDROCHLORIDE 25 MG/1
TABLET, FILM COATED ORAL
Qty: 60 TABLET | Refills: 1 | Status: SHIPPED | OUTPATIENT
Start: 2022-02-17 | End: 2022-02-28 | Stop reason: SDUPTHER

## 2022-02-17 RX ORDER — SUMATRIPTAN 25 MG/1
50 TABLET, FILM COATED ORAL 2 TIMES DAILY PRN
Qty: 60 TABLET | Refills: 1 | Status: SHIPPED | OUTPATIENT
Start: 2022-02-17 | End: 2022-03-19

## 2022-02-17 ASSESSMENT — ENCOUNTER SYMPTOMS
COUGH: 0
FEVER: 0
HEARTBURN: 0
EYE DISCHARGE: 0
HEMOPTYSIS: 0
SPUTUM PRODUCTION: 0
VOMITING: 0
DIZZINESS: 1
EYE PAIN: 1
MYALGIAS: 0
HEADACHES: 1
DEPRESSION: 1
CHILLS: 1
NAUSEA: 1
EYE REDNESS: 0

## 2022-02-17 ASSESSMENT — PATIENT HEALTH QUESTIONNAIRE - PHQ9: CLINICAL INTERPRETATION OF PHQ2 SCORE: 0

## 2022-02-17 ASSESSMENT — FIBROSIS 4 INDEX: FIB4 SCORE: 0.61

## 2022-02-17 NOTE — ED NOTES
Pt verbalized relief of head ache, tingling in right 3rd and 4th digit resolved.  Aaox4. Neuro intact.   Discharge instructions given to pt including follow up with pcp or returning if no improvement of symptoms or to return if worse. Prescription x 1 provided to pt. Questions answered by RN. Denies any new complaints. Discharged w/stable vitals and able to ambulate to the lobby w/steady gait. Pt's mom to pick pt up

## 2022-02-17 NOTE — LETTER
February 17, 2022    To Whom It May Concern:         This is confirmation that Ivis Klein attended her scheduled appointment with Roel Padilla M.D. on 2/17/22. Please approve her sick leave for Sunday, the 13th of Feburary and Tuesday, the 15th of Feburary.          If you have any questions please do not hesitate to call me at the phone number listed below.    Sincerely,          Roel Padilla M.D.  [phone]

## 2022-02-17 NOTE — ASSESSMENT & PLAN NOTE
-Patient has a history of anxiety and depression.  She does have a history of postpartum depression as well  -She was currently not taking any of the prescribed outpatient medications, describes she got anxiety with taking sertraline    -Escitalopram has been not ordered, 10 mg to be taken daily.  Patient has been requested/advised to take half a tablet for a few days and then increase to 10 mg tablet once daily  -Hydroxyzine has been ordered as needed for anxiety  -After discussing with patient, referral has been placed for counseling  -Early follow-up within 5 weeks

## 2022-02-17 NOTE — ASSESSMENT & PLAN NOTE
-Chart review shows that it has been ongoing since he was a child/10 years old  -Patient describes that her episodes have been well controlled with as needed Excedrin migraine  -Describes that her last episode of migraine was in summer, however describes that the recent episode lasted 5 days, ended yesterday.  Still endorsed some eye discomfort today.  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    -Sumatriptan as needed has been ordered  -Continue as needed Excedrin Migraine

## 2022-02-17 NOTE — ASSESSMENT & PLAN NOTE
-Patient on thyroid supplementation, levothyroxine 150 secondary to postsurgical hypothyroidism.  -History of Graves' disease s/p bilateral total thyroidectomy 03/22/2017  -Patient's recent thyroid panel reviewed, within normal limits  -Continue current regimen

## 2022-02-17 NOTE — ASSESSMENT & PLAN NOTE
-Chart review shows a history of elevated triglycerides in the 300s (ED visit summary 07/22/2020)  -Patient's previous labs have been reviewed in detail  -We will revisit this discussion at the next appointment in 5 weeks and order follow-up labs as well

## 2022-02-17 NOTE — ASSESSMENT & PLAN NOTE
-Chart review shows a history of ovarian cyst-right, left ovary was not visualized at that time  -Patient follows up with OB/GYN outpatient and describes that her gynecologist told her that that she does not have any cyst at her recent appointment when an IUD was also inserted

## 2022-02-17 NOTE — PROGRESS NOTES
Subjective:   Patient describes that she had an episode of migraine that lasted 5 days, started this Saturday and is now settling. She took 1000mg of excedrin migraine tiwce daily during this time to help with the headaches. Describes this is the 1st time an episode has last this long, endorses that her previous episode was in summer.  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 The thought of leaving her kids at someone's care bothers her and increases her anxiety.   Describes that she has not been able to sleep well.  Describes that she has not been feeling like herself, feels tired, cannot sleep at night, her eyes hurt, gets chest pain/pressure which has been going on for the past 1 week.  Denies fevers, did endorse some chills for the last 2 days.  Does endorse some financial insecurities.  However, lives with her mother and sister and accepts a stable household.    CC:  Diagnoses of Depression, unspecified depression type, Anxiety, Migraine with aura and without status migrainosus, not intractable, Graves disease, Postsurgical hypothyroidism, Overweight (BMI 25.0-29.9), Postpartum depression, Mood disorder (HCC), Cyst of ovary, unspecified laterality, Hyperlipidemia, unspecified hyperlipidemia type, and Anemia, unspecified type were pertinent to this visit.    HISTORY OF THE PRESENT ILLNESS: Patient is a 26 y.o. female. This pleasant patient is here today to establish care and discuss her medical issues.  She has a past medical history of Graves' disease s/p thyroidectomy in 2017 with postsurgical hypothyroidism, on supplementation,  GERD without documented esophagitis, history of mood disorder/anxiety/depression.  She had postpartum depression after her child as well.  Chart reviewed worse show a history of ovarian cyst-right, however patient describes that she follows up with OB/GYN and her OB/GYN updated her about no cysts at her recent visit and IUD  "insertion appointment.  Chart review does show a history of elevated triglycerides, present on ED visit summary 2020.  Will discuss and repeat labs at next visit.    Problem   Postsurgical Hypothyroidism   Ovarian Cyst   Hyperlipidemia   Anemia   Hypothyroidism   Overweight (Bmi 25.0-29.9)   Graves Disease   Mood Disorder (Hcc)   Migraine With Aura, Not Intractable    Ongoing since age 10     Postpartum Depression (Resolved)   Vaginal Discharge (Resolved)   History of panic attack (Resolved)   History of Thyroidectomy (Resolved)    Partial --> hypothyroidism      Hemorrhagic Cysts of Both Ovaries (Resolved)   Elevated Antinuclear Antibody (Bijal) Level (Resolved)   Panic Attack (Resolved)   Anxiety (Resolved)   History of Pericarditis (Resolved)       Health Maintenance: Completed    ROS:   Review of Systems   Constitutional: Positive for chills and malaise/fatigue. Negative for fever.   HENT: Negative for ear discharge, ear pain, hearing loss and tinnitus.    Eyes: Positive for pain. Negative for discharge and redness.   Respiratory: Negative for cough, hemoptysis and sputum production.    Cardiovascular: Positive for chest pain.   Gastrointestinal: Positive for nausea. Negative for heartburn and vomiting.   Genitourinary: Negative for dysuria, frequency and urgency.   Musculoskeletal: Negative for myalgias.   Skin: Negative for itching and rash.   Neurological: Positive for dizziness and headaches.   Psychiatric/Behavioral: Positive for depression.         Objective:       Exam: /81 (BP Location: Right arm, Patient Position: Sitting, BP Cuff Size: Adult)   Pulse 88   Temp 36.9 °C (98.4 °F) (Temporal)   Ht 1.6 m (5' 3\")   Wt 70.8 kg (156 lb)   SpO2 97%  Body mass index is 27.63 kg/m².    Physical Exam  Vitals and nursing note reviewed.   Constitutional:       General: She is not in acute distress.     Appearance: She is not toxic-appearing.   HENT:      Head: Normocephalic and atraumatic.      Right Ear: " External ear normal.      Left Ear: External ear normal.      Nose: Nose normal. No congestion.      Mouth/Throat:      Mouth: Mucous membranes are moist.   Eyes:      Extraocular Movements: Extraocular movements intact.      Pupils: Pupils are equal, round, and reactive to light.   Cardiovascular:      Rate and Rhythm: Normal rate and regular rhythm.      Pulses: Normal pulses.      Heart sounds: Normal heart sounds.   Pulmonary:      Effort: Pulmonary effort is normal.      Breath sounds: Normal breath sounds.   Abdominal:      General: Bowel sounds are normal.      Palpations: Abdomen is soft.   Musculoskeletal:      Right lower leg: No edema.      Left lower leg: No edema.   Skin:     General: Skin is warm.      Capillary Refill: Capillary refill takes less than 2 seconds.   Neurological:      Mental Status: She is alert and oriented to person, place, and time.   Psychiatric:         Thought Content: Thought content normal.         A chaperone was offered to the patient during today's exam. Chaperone name: Dr. Joy was present.    Labs: Patient's recent labs were reviewed    Assessment & Plan:   26 y.o. female with past medical history of postpartum depression, anxiety/depression/mood disorder, Graves' disease s/p thyroidectomy 2017, postsurgical hypothyroidism presents today to establish care    Problem List Items Addressed This Visit     Migraine with aura, not intractable (Chronic)     -Chart review shows that it has been ongoing since he was a child/10 years old  -Patient describes that her episodes have been well controlled with as needed Excedrin migraine  -Describes that her last episode of migraine was in summer, however describes that the recent episode lasted 5 days, ended yesterday.  Still endorsed some eye discomfort today.  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    -Sumatriptan as needed has been ordered  -Continue as needed Excedrin Migraine         Relevant Medications    escitalopram (LEXAPRO) 10 MG Tab    SUMAtriptan (IMITREX) 25 MG Tab tablet    Graves disease     Patient has a history of Graves' disease s/p bilateral total thyroidectomy 03/22/2017             Mood disorder (HCC)     -Patient has a history of anxiety and depression.  She does have a history of postpartum depression as well  -She was currently not taking any of the prescribed outpatient medications, describes she got anxiety with taking sertraline    -Escitalopram has been not ordered, 10 mg to be taken daily.  Patient has been requested/advised to take half a tablet for a few days and then increase to 10 mg tablet once daily  -Hydroxyzine has been ordered as needed for anxiety  -After discussing with patient, referral has been placed for counseling  -Early follow-up within 5 weeks         Overweight (BMI 25.0-29.9)     -Counseled on healthy diet and regular exercise          Postsurgical hypothyroidism     -Patient on thyroid supplementation, levothyroxine 150 secondary to postsurgical hypothyroidism.  -History of Graves' disease s/p bilateral total thyroidectomy 03/22/2017  -Patient's recent thyroid panel reviewed, within normal limits  -Continue current regimen         Ovarian cyst     -Chart review shows a history of ovarian cyst-right, left ovary was not visualized at that time  -Patient follows up with OB/GYN outpatient and describes that her gynecologist told her that that she does not have any cyst at her recent appointment when an IUD was also inserted         Hyperlipidemia     -Chart review shows a history of elevated triglycerides in the 300s (ED visit summary 07/22/2020)  -Patient's previous labs have been reviewed in detail  -We will revisit this discussion at the next appointment in 5 weeks and order follow-up labs as well          Anemia     -As evident by recent labs  -Chronic, per chart review    -Iron supplementation, every other day per guidelines          RESOLVED: Anxiety    Relevant Medications    escitalopram (LEXAPRO) 10 MG Tab    hydrOXYzine HCl (ATARAX) 25 MG Tab    Other Relevant Orders    Referral to Psychology    RESOLVED: Postpartum depression    Relevant Medications    escitalopram (LEXAPRO) 10 MG Tab    hydrOXYzine HCl (ATARAX) 25 MG Tab      Other Visit Diagnoses     Depression, unspecified depression type        Relevant Medications    escitalopram (LEXAPRO) 10 MG Tab    hydrOXYzine HCl (ATARAX) 25 MG Tab    Other Relevant Orders    Referral to Psychology          I spent a total of 40-45 minutes with record review, exam, communication with the patient, communication with other providers, and documentation of this encounter.    Return in about 5 weeks (around 3/24/2022).    Please note that this dictation was created using voice recognition software. I have made every reasonable attempt to correct obvious errors, but I expect that there are errors of grammar and possibly content that I did not discover before finalizing the note.

## 2022-02-17 NOTE — DISCHARGE INSTRUCTIONS
Please go to your primary care doctor as scheduled tomorrow.  Let them know you are in the emergency department today with your symptoms and that we found that you have small red blood cells.  You may need additional testing to figure out why this is the case.  Please take iron tablets 3 times daily with food for your anemia.    Take stool softeners while taking iron tablets as they cause constipation    Return to the ER for worsening symptoms, vomiting, fever, or other concerns.

## 2022-02-17 NOTE — PATIENT INSTRUCTIONS
-A medicine called imitrex (sumatriptan) has been added for you for migraines. Please take it as discussed at today's visit   -A medicine has been added for the depression/anxiety as well called escitalopram (lexapro) - start by taking half a pill a day and then in a few days start taking 1 pill daily   -Hydroxyzine has been refilled for you, please take 1 tablet when you get anxious as needed  -A referral for counseling has also been placed for you to help with the anxiety

## 2022-02-17 NOTE — ASSESSMENT & PLAN NOTE
-As evident by recent labs  -Chronic, per chart review    -Iron supplementation, every other day per guidelines

## 2022-02-24 ENCOUNTER — TELEPHONE (OUTPATIENT)
Dept: INTERNAL MEDICINE | Facility: OTHER | Age: 27
End: 2022-02-24
Payer: MEDICAID

## 2022-02-24 NOTE — TELEPHONE ENCOUNTER
Received message from pharmacy regarding the Hydroxyzine you prescribed there was no direction on that,  Please update the RX with the correct directions and quantity, then resend RX to pharmacy.

## 2022-02-28 RX ORDER — HYDROXYZINE HYDROCHLORIDE 25 MG/1
TABLET, FILM COATED ORAL
Qty: 60 TABLET | Refills: 1 | Status: SHIPPED | OUTPATIENT
Start: 2022-02-28 | End: 2022-04-25

## 2022-03-01 ENCOUNTER — HOSPITAL ENCOUNTER (EMERGENCY)
Facility: MEDICAL CENTER | Age: 27
End: 2022-03-01
Attending: EMERGENCY MEDICINE
Payer: MEDICAID

## 2022-03-01 VITALS
HEART RATE: 82 BPM | SYSTOLIC BLOOD PRESSURE: 115 MMHG | RESPIRATION RATE: 16 BRPM | DIASTOLIC BLOOD PRESSURE: 75 MMHG | WEIGHT: 150 LBS | BODY MASS INDEX: 26.57 KG/M2 | TEMPERATURE: 97.5 F | OXYGEN SATURATION: 97 %

## 2022-03-01 DIAGNOSIS — N30.00 ACUTE CYSTITIS WITHOUT HEMATURIA: ICD-10-CM

## 2022-03-01 LAB
APPEARANCE UR: ABNORMAL
BILIRUB UR QL STRIP.AUTO: NEGATIVE
COLOR UR: YELLOW
GLUCOSE UR STRIP.AUTO-MCNC: NEGATIVE MG/DL
KETONES UR STRIP.AUTO-MCNC: NEGATIVE MG/DL
LEUKOCYTE ESTERASE UR QL STRIP.AUTO: ABNORMAL
MICRO URNS: ABNORMAL
NITRITE UR QL STRIP.AUTO: POSITIVE
PH UR STRIP.AUTO: 6 [PH] (ref 5–8)
PROT UR QL STRIP: 30 MG/DL
RBC UR QL AUTO: ABNORMAL
SP GR UR STRIP.AUTO: >=1.03
UROBILINOGEN UR STRIP.AUTO-MCNC: 0.2 MG/DL

## 2022-03-01 PROCEDURE — 87086 URINE CULTURE/COLONY COUNT: CPT

## 2022-03-01 PROCEDURE — 99284 EMERGENCY DEPT VISIT MOD MDM: CPT

## 2022-03-01 PROCEDURE — 81001 URINALYSIS AUTO W/SCOPE: CPT

## 2022-03-01 RX ORDER — NITROFURANTOIN 25; 75 MG/1; MG/1
100 CAPSULE ORAL 2 TIMES DAILY
Qty: 10 CAPSULE | Refills: 0 | Status: SHIPPED | OUTPATIENT
Start: 2022-03-01 | End: 2022-03-06

## 2022-03-01 RX ORDER — PHENAZOPYRIDINE HYDROCHLORIDE 200 MG/1
200 TABLET, FILM COATED ORAL 3 TIMES DAILY PRN
Qty: 6 TABLET | Refills: 0 | Status: SHIPPED | OUTPATIENT
Start: 2022-03-01 | End: 2022-04-25

## 2022-03-01 ASSESSMENT — PAIN DESCRIPTION - DESCRIPTORS: DESCRIPTORS: CRAMPING

## 2022-03-01 ASSESSMENT — FIBROSIS 4 INDEX: FIB4 SCORE: 0.61

## 2022-03-01 NOTE — ED PROVIDER NOTES
ED Provider Note    CHIEF COMPLAINT  Chief Complaint   Patient presents with   • Abdominal Pain   • Blood in Urine       HPI  Ivis Klein is a 26 y.o. female who presents complaining of painful urination.  This began last night.  She was up about every minute to 5 minutes through the night with urinary frequency.  Little to pain when she urinates.  Is perhaps a trace of blood when she wipes with toilet paper.  That she has had a UTI in the past but this feels similar although somewhat worse than previously.  She has had normal periods.  She has a an IUD.  Denies possibly pregnancy with both of these things.  She also is a pressure sensation in her lower abdomen.  There is been no new vaginal discharge.  No stool changes.  There is been no fever although she has felt chilled.  No back pain.  No nausea or vomiting.  There is no other complaint.  She is breast-feeding    PAST MEDICAL HISTORY  Past Medical History:   Diagnosis Date   • Anemia 2/17/2022   • Anxiety 2/16/2017   • Anxiety 2/16/2017   • Elevated antinuclear antibody (DEJON) level 5/23/2019   • Graves disease 12/5/2016   • Heart valve disease    • Hemorrhagic cysts of both ovaries 5/23/2019   • History of panic attack 4/9/2020   • History of pericarditis 12/5/2016   • History of thyroidectomy 1/31/2020    Partial --> hypothyroidism    • Hyperlipidemia 2/17/2022   • Panic attack 3/11/2019   • Postpartum depression 2/17/2022   • Postpartum depression 2/17/2022   • Vaginal discharge 12/2/2021       FAMILY HISTORY  Family History   Problem Relation Age of Onset   • Cancer Paternal Grandmother 56        breast cancer   • No Known Problems Mother    • Hyperlipidemia Father    • No Known Problems Sister    • No Known Problems Brother        SOCIAL HISTORY  Social History     Tobacco Use   • Smoking status: Never Smoker   • Smokeless tobacco: Never Used   • Tobacco comment: quit in 2012   Vaping Use   • Vaping Use: Never used   Substance Use Topics   •  Alcohol use: No   • Drug use: Not Currently     Types: Marijuana, Inhaled     Comment: marijuana weekly         SURGICAL HISTORY  Past Surgical History:   Procedure Laterality Date   • THYROIDECTOMY TOTAL Bilateral 3/22/2017    Procedure: THYROIDECTOMY TOTAL -NIMS RECURRENT LARYNGEAL NERVE MONITORING;  Surgeon: Vicente Eldridge M.D.;  Location: SURGERY SAME DAY [facility];  Service:    • PRIMARY C SECTION  9/8/2013    Performed by Melisa Wright M.D. at LABOR AND DELIVERY       CURRENT MEDICATIONS  Home Medications     Reviewed by Vikki Garcia R.N. (Registered Nurse) on 03/01/22 at 1037  Med List Status: Partial   Medication Last Dose Status   escitalopram (LEXAPRO) 10 MG Tab  Active   ferrous sulfate 325 (65 Fe) MG tablet  Active   hydrOXYzine HCl (ATARAX) 25 MG Tab  Active   levothyroxine (SYNTHROID) 150 MCG Tab 3/1/2022 Active   SUMAtriptan (IMITREX) 25 MG Tab tablet  Active                I have reviewed the nurses notes and/or the list brought with the patient.    ALLERGIES  No Known Allergies    REVIEW OF SYSTEMS  See HPI for further details. Review of systems as above, otherwise all other systems are negative.     PHYSICAL EXAM  VITAL SIGNS: /75   Pulse 82   Temp 36.4 °C (97.5 °F) (Tympanic)   Resp 16   Wt 68 kg (150 lb)   LMP 02/15/2022   SpO2 97%   BMI 26.57 kg/m²     Constitutional: Well appearing patient in no acute distress.  Not toxic, nor ill in appearance.  HENT: Mucus membranes moist.  Eyes: Pupils equally round.  No scleral icterus.   Neck: Full nontender range of motion.  Cardiovascular: Regular heart rate and rhythm.  No murmurs, rubs, nor gallop appreciated.   Thorax & Lungs: Chest is nontender.  Lungs are clear to auscultation with good air movement bilaterally.  No wheeze, rhonchi, nor rales.   Abdomen: Soft, with no tenderness, rebound nor guarding.  No mass, pulsatile mass, nor hepatosplenomegaly appreciated.  No CVA tenderness.  Skin: No purpura nor petechia  noted.  Extremities/Musculoskeletal: No sign of trauma  Neurologic: Alert & oriented.  Strength and sensation is intact all around.  Gait is normal.  Psychiatric: Normal affect appropriate for the clinical situation.  LABS  Labs Reviewed   URINALYSIS,CULTURE IF INDICATED - Abnormal; Notable for the following components:       Result Value    Character Cloudy (*)     Protein 30 (*)     Nitrite Positive (*)     Leukocyte Esterase Small (*)     Occult Blood Large (*)     All other components within normal limits    Narrative:     Indication for culture:->Patient WITHOUT an indwelling Ferro  catheter in place with new onset of Dysuria, Frequency,  Urgency, and/or Suprapubic pain   URINE MICROSCOPIC (W/UA) - Abnormal; Notable for the following components:    WBC 5-10 (*)     RBC 10-20 (*)     Bacteria Moderate (*)     Budding Yeast Present (*)     All other components within normal limits    Narrative:     Indication for culture:->Patient WITHOUT an indwelling Ferro  catheter in place with new onset of Dysuria, Frequency,  Urgency, and/or Suprapubic pain   URINE CULTURE-EXISTING-LESS THAN 48 HOURS         MEDICAL RECORD  I have reviewed patient's medical record and pertinent results are listed above.    COURSE & MEDICAL DECISION MAKING  I have reviewed any medical record information, laboratory studies and radiographic results as noted above.  This patient presents with dysuria suggestive evening.  No clinical suspicion for upper tract infection.  This seems to be a cystitis.  We will go ahead and put her on Macrobid.  Pyridium for bladder spasm.  Both of these are generally felt to be safe in breast-feeding.  Instructions on UTI.  I did go ahead and send off a culture.    FINAL IMPRESSION  1. Acute cystitis without hematuria           This dictation was created using voice recognition software.    Electronically signed by: Shawn Zurita M.D., 3/1/2022 12:07 PM

## 2022-03-01 NOTE — ED TRIAGE NOTES
Pt ambulatory to triage c/o pain increased urination blood in urine and abdominal pain since yesterday. Pt urine sent to lab

## 2022-03-02 ENCOUNTER — APPOINTMENT (OUTPATIENT)
Dept: RADIOLOGY | Facility: MEDICAL CENTER | Age: 27
End: 2022-03-02
Attending: EMERGENCY MEDICINE
Payer: MEDICAID

## 2022-03-02 ENCOUNTER — HOSPITAL ENCOUNTER (EMERGENCY)
Facility: MEDICAL CENTER | Age: 27
End: 2022-03-02
Attending: EMERGENCY MEDICINE
Payer: MEDICAID

## 2022-03-02 ENCOUNTER — APPOINTMENT (OUTPATIENT)
Dept: RADIOLOGY | Facility: MEDICAL CENTER | Age: 27
End: 2022-03-02
Payer: MEDICAID

## 2022-03-02 VITALS
TEMPERATURE: 98.6 F | BODY MASS INDEX: 26.58 KG/M2 | HEART RATE: 83 BPM | HEIGHT: 63 IN | RESPIRATION RATE: 16 BRPM | OXYGEN SATURATION: 96 % | DIASTOLIC BLOOD PRESSURE: 63 MMHG | SYSTOLIC BLOOD PRESSURE: 113 MMHG | WEIGHT: 150 LBS

## 2022-03-02 DIAGNOSIS — G43.109 MIGRAINE WITH AURA AND WITHOUT STATUS MIGRAINOSUS, NOT INTRACTABLE: ICD-10-CM

## 2022-03-02 DIAGNOSIS — R07.9 CHEST PAIN, UNSPECIFIED TYPE: ICD-10-CM

## 2022-03-02 LAB
ALBUMIN SERPL BCP-MCNC: 4.3 G/DL (ref 3.2–4.9)
ALBUMIN/GLOB SERPL: 1.4 G/DL
ALP SERPL-CCNC: 90 U/L (ref 30–99)
ALT SERPL-CCNC: 9 U/L (ref 2–50)
ANION GAP SERPL CALC-SCNC: 12 MMOL/L (ref 7–16)
AST SERPL-CCNC: 13 U/L (ref 12–45)
BASOPHILS # BLD AUTO: 0.3 % (ref 0–1.8)
BASOPHILS # BLD: 0.03 K/UL (ref 0–0.12)
BILIRUB SERPL-MCNC: 0.3 MG/DL (ref 0.1–1.5)
BUN SERPL-MCNC: 8 MG/DL (ref 8–22)
CALCIUM SERPL-MCNC: 8.9 MG/DL (ref 8.5–10.5)
CHLORIDE SERPL-SCNC: 104 MMOL/L (ref 96–112)
CO2 SERPL-SCNC: 23 MMOL/L (ref 20–33)
CREAT SERPL-MCNC: 0.6 MG/DL (ref 0.5–1.4)
D DIMER PPP IA.FEU-MCNC: <0.27 UG/ML (FEU) (ref 0–0.5)
EKG IMPRESSION: NORMAL
EOSINOPHIL # BLD AUTO: 0.17 K/UL (ref 0–0.51)
EOSINOPHIL NFR BLD: 1.8 % (ref 0–6.9)
ERYTHROCYTE [DISTWIDTH] IN BLOOD BY AUTOMATED COUNT: 48.6 FL (ref 35.9–50)
GLOBULIN SER CALC-MCNC: 3.1 G/DL (ref 1.9–3.5)
GLUCOSE SERPL-MCNC: 117 MG/DL (ref 65–99)
HCT VFR BLD AUTO: 36.9 % (ref 37–47)
HGB BLD-MCNC: 11.2 G/DL (ref 12–16)
IMM GRANULOCYTES # BLD AUTO: 0.04 K/UL (ref 0–0.11)
IMM GRANULOCYTES NFR BLD AUTO: 0.4 % (ref 0–0.9)
LACTATE BLD-SCNC: 1.1 MMOL/L (ref 0.5–2)
LYMPHOCYTES # BLD AUTO: 2.24 K/UL (ref 1–4.8)
LYMPHOCYTES NFR BLD: 23.7 % (ref 22–41)
MCH RBC QN AUTO: 25 PG (ref 27–33)
MCHC RBC AUTO-ENTMCNC: 30.4 G/DL (ref 33.6–35)
MCV RBC AUTO: 82.4 FL (ref 81.4–97.8)
MONOCYTES # BLD AUTO: 0.5 K/UL (ref 0–0.85)
MONOCYTES NFR BLD AUTO: 5.3 % (ref 0–13.4)
NEUTROPHILS # BLD AUTO: 6.48 K/UL (ref 2–7.15)
NEUTROPHILS NFR BLD: 68.5 % (ref 44–72)
NRBC # BLD AUTO: 0 K/UL
NRBC BLD-RTO: 0 /100 WBC
PLATELET # BLD AUTO: 260 K/UL (ref 164–446)
PMV BLD AUTO: 10.8 FL (ref 9–12.9)
POTASSIUM SERPL-SCNC: 3.3 MMOL/L (ref 3.6–5.5)
PROT SERPL-MCNC: 7.4 G/DL (ref 6–8.2)
RBC # BLD AUTO: 4.48 M/UL (ref 4.2–5.4)
SODIUM SERPL-SCNC: 139 MMOL/L (ref 135–145)
TROPONIN T SERPL-MCNC: <6 NG/L (ref 6–19)
WBC # BLD AUTO: 9.5 K/UL (ref 4.8–10.8)

## 2022-03-02 PROCEDURE — 96374 THER/PROPH/DIAG INJ IV PUSH: CPT

## 2022-03-02 PROCEDURE — 85379 FIBRIN DEGRADATION QUANT: CPT

## 2022-03-02 PROCEDURE — 700105 HCHG RX REV CODE 258: Performed by: EMERGENCY MEDICINE

## 2022-03-02 PROCEDURE — 80053 COMPREHEN METABOLIC PANEL: CPT

## 2022-03-02 PROCEDURE — 99285 EMERGENCY DEPT VISIT HI MDM: CPT

## 2022-03-02 PROCEDURE — 96375 TX/PRO/DX INJ NEW DRUG ADDON: CPT

## 2022-03-02 PROCEDURE — 71045 X-RAY EXAM CHEST 1 VIEW: CPT

## 2022-03-02 PROCEDURE — 93005 ELECTROCARDIOGRAM TRACING: CPT

## 2022-03-02 PROCEDURE — 85025 COMPLETE CBC W/AUTO DIFF WBC: CPT

## 2022-03-02 PROCEDURE — 93005 ELECTROCARDIOGRAM TRACING: CPT | Performed by: EMERGENCY MEDICINE

## 2022-03-02 PROCEDURE — 700111 HCHG RX REV CODE 636 W/ 250 OVERRIDE (IP): Performed by: EMERGENCY MEDICINE

## 2022-03-02 PROCEDURE — 83605 ASSAY OF LACTIC ACID: CPT

## 2022-03-02 PROCEDURE — 84484 ASSAY OF TROPONIN QUANT: CPT

## 2022-03-02 RX ORDER — KETOROLAC TROMETHAMINE 30 MG/ML
15 INJECTION, SOLUTION INTRAMUSCULAR; INTRAVENOUS ONCE
Status: COMPLETED | OUTPATIENT
Start: 2022-03-02 | End: 2022-03-02

## 2022-03-02 RX ORDER — PROCHLORPERAZINE EDISYLATE 5 MG/ML
10 INJECTION INTRAMUSCULAR; INTRAVENOUS ONCE
Status: COMPLETED | OUTPATIENT
Start: 2022-03-02 | End: 2022-03-02

## 2022-03-02 RX ORDER — ONDANSETRON 2 MG/ML
4 INJECTION INTRAMUSCULAR; INTRAVENOUS ONCE
Status: COMPLETED | OUTPATIENT
Start: 2022-03-02 | End: 2022-03-02

## 2022-03-02 RX ORDER — SODIUM CHLORIDE, SODIUM LACTATE, POTASSIUM CHLORIDE, CALCIUM CHLORIDE 600; 310; 30; 20 MG/100ML; MG/100ML; MG/100ML; MG/100ML
1000 INJECTION, SOLUTION INTRAVENOUS ONCE
Status: COMPLETED | OUTPATIENT
Start: 2022-03-02 | End: 2022-03-02

## 2022-03-02 RX ADMIN — ONDANSETRON 4 MG: 2 INJECTION INTRAMUSCULAR; INTRAVENOUS at 18:47

## 2022-03-02 RX ADMIN — KETOROLAC TROMETHAMINE 15 MG: 30 INJECTION, SOLUTION INTRAMUSCULAR at 18:48

## 2022-03-02 RX ADMIN — SODIUM CHLORIDE, POTASSIUM CHLORIDE, SODIUM LACTATE AND CALCIUM CHLORIDE 1000 ML: 600; 310; 30; 20 INJECTION, SOLUTION INTRAVENOUS at 18:40

## 2022-03-02 RX ADMIN — PROCHLORPERAZINE EDISYLATE 10 MG: 5 INJECTION INTRAMUSCULAR; INTRAVENOUS at 18:49

## 2022-03-02 ASSESSMENT — FIBROSIS 4 INDEX: FIB4 SCORE: 0.61

## 2022-03-03 NOTE — DISCHARGE INSTRUCTIONS
Return if you have new or different headache, confusion, weakness, new chest pain, cough up blood or pass out.

## 2022-03-03 NOTE — ED NOTES
Pt to room from lob. Changed into gown. Connected to monitor. Agree with triage note. Chart up for ERP. Call light in reach.

## 2022-03-03 NOTE — ED PROVIDER NOTES
"ED Provider Note    Scribed for Jay Caicedo M.D. by Mamta Henson. 3/2/2022, 6:01 PM.    Primary care provider: Roel Padilla M.D.  Means of arrival: walk-in  History obtained from: patient  History limited by: none    CHIEF COMPLAINT  Chief Complaint   Patient presents with    Migraine     Pt states she has had a migraine since yesterday. Pt states pain is 10/10 and generalized.     Chest Pain     Since yesterday, 8/10 L sided \"pressure\"       HPI  Ivis Klein is a 26 y.o. female who presents to the Emergency Department for evaluation of waxing and waning chest pain onset yesterday. She states she hasn't been feeling herself recently. She rates her chest pain as 8/10 and describes it as a stabbing left sided pressure. She reports her pain worsens upon taking a deep breath. She additionally reports her chest pain coincides with migraine onset yesterday. She endorses history of migraine. She states she saw flashes and \"white squiggly lines\" and her migraine is on both sides of her head today, but usually her migraines are only on one side. She reports treating her migraine with Tylenol or Excedrin, which she did take today. She admits to associated symptoms of near syncopal episodes (twice today, coincided with her chest pain), right leg pain and mouth twitching, but denies difficulty speaking or finding words, fever, chills, or recent travel. She reports she had a blood clot 5 years ago, but denies taking any daily blood thinners. She states she was diagnosed with a UTI yesterday and has been taking prescribed antibiotics. She reports she had a baby 2 years ago and is still breastfeeding. She denies family history of heart attacks at a young age. She denies smoking or drug use. She further denies any known drug allergies.     REVIEW OF SYSTEMS  Pertinent positives include chest pain, migraine, near syncopal episode, right leg pain, and mouth twitching. Pertinent negatives include difficulty " speaking or finding words, fever, chills, or recent travel. All other systems negative.    PAST MEDICAL HISTORY   has a past medical history of Anemia (2/17/2022), Anxiety (2/16/2017), Anxiety (2/16/2017), Elevated antinuclear antibody (DEJON) level (5/23/2019), Graves disease (12/5/2016), Heart valve disease, Hemorrhagic cysts of both ovaries (5/23/2019), History of panic attack (4/9/2020), History of pericarditis (12/5/2016), History of thyroidectomy (1/31/2020), Hyperlipidemia (2/17/2022), Panic attack (3/11/2019), Postpartum depression (2/17/2022), Postpartum depression (2/17/2022), and Vaginal discharge (12/2/2021).    SURGICAL HISTORY   has a past surgical history that includes primary c section (9/8/2013) and thyroidectomy total (Bilateral, 3/22/2017).    SOCIAL HISTORY  Social History     Tobacco Use    Smoking status: Never Smoker    Smokeless tobacco: Never Used    Tobacco comment: quit in 2012   Vaping Use    Vaping Use: Never used   Substance Use Topics    Alcohol use: No    Drug use: Not Currently     Types: Marijuana, Inhaled     Comment: marijuana weekly      Social History     Substance and Sexual Activity   Drug Use Not Currently    Types: Marijuana, Inhaled    Comment: marijuana weekly       FAMILY HISTORY  Family History   Problem Relation Age of Onset    Cancer Paternal Grandmother 56        breast cancer    No Known Problems Mother     Hyperlipidemia Father     No Known Problems Sister     No Known Problems Brother      CURRENT MEDICATIONS  Current Outpatient Medications   Medication Instructions    escitalopram (LEXAPRO) 10 mg, Oral, DAILY    ferrous sulfate 325 mg, Oral, DAILY    hydrOXYzine HCl (ATARAX) 25 MG Tab HYDROXYZINE HCL 25 MG TABS    levothyroxine (SYNTHROID) 150 mcg, Oral, EACH MORNING ON EMPTY STOMACH    nitrofurantoin (MACROBID) 100 mg, Oral, 2 TIMES DAILY    phenazopyridine (PYRIDIUM) 200 mg, Oral, 3 TIMES DAILY PRN    SUMAtriptan (IMITREX) 50 mg, Oral, 2 TIMES DAILY PRN  "    ALLERGIES  No Known Allergies    PHYSICAL EXAM  VITAL SIGNS: /72   Pulse 96   Temp 36.6 °C (97.8 °F) (Temporal)   Resp 16   Ht 1.6 m (5' 3\")   Wt 68 kg (150 lb)   LMP 02/15/2022   SpO2 100%   BMI 26.57 kg/m²     Constitutional: Well developed, Well nourished, mild distress.   HENT: Normocephalic, Atraumatic, mask in place.  Eyes: Conjunctiva normal, No discharge.   Neck: Supple, No stridor   Cardiovascular: Normal heart rate, Normal rhythm, No murmurs, equal pulses.   Pulmonary: Normal breath sounds, No respiratory distress, No wheezing, No rales, No rhonchi.  Chest: No chest wall tenderness or deformity.   Abdomen:Soft, No tenderness, No masses, no rebound, no guarding.   Back: No CVA tenderness.   Musculoskeletal: No major deformities noted, No tenderness. No calf tenderness, no chords in her legs, they appear symmetric.   Skin: Warm, Dry, No erythema, No rash.   Neurologic: Alert & oriented x 3, Normal motor function,  No focal deficits noted. Normal finger to nose, Normal cranial nerves II-XII, No pronator drift. Equal strength in upper and lower extremities bilaterally.  Psychiatric: Affect normal, Judgment normal, Mood normal.     LABS  Results for orders placed or performed during the hospital encounter of 03/02/22   CBC with Differential   Result Value Ref Range    WBC 9.5 4.8 - 10.8 K/uL    RBC 4.48 4.20 - 5.40 M/uL    Hemoglobin 11.2 (L) 12.0 - 16.0 g/dL    Hematocrit 36.9 (L) 37.0 - 47.0 %    MCV 82.4 81.4 - 97.8 fL    MCH 25.0 (L) 27.0 - 33.0 pg    MCHC 30.4 (L) 33.6 - 35.0 g/dL    RDW 48.6 35.9 - 50.0 fL    Platelet Count 260 164 - 446 K/uL    MPV 10.8 9.0 - 12.9 fL    Neutrophils-Polys 68.50 44.00 - 72.00 %    Lymphocytes 23.70 22.00 - 41.00 %    Monocytes 5.30 0.00 - 13.40 %    Eosinophils 1.80 0.00 - 6.90 %    Basophils 0.30 0.00 - 1.80 %    Immature Granulocytes 0.40 0.00 - 0.90 %    Nucleated RBC 0.00 /100 WBC    Neutrophils (Absolute) 6.48 2.00 - 7.15 K/uL    Lymphs (Absolute) " 2.24 1.00 - 4.80 K/uL    Monos (Absolute) 0.50 0.00 - 0.85 K/uL    Eos (Absolute) 0.17 0.00 - 0.51 K/uL    Baso (Absolute) 0.03 0.00 - 0.12 K/uL    Immature Granulocytes (abs) 0.04 0.00 - 0.11 K/uL    NRBC (Absolute) 0.00 K/uL   Complete Metabolic Panel (CMP)   Result Value Ref Range    Sodium 139 135 - 145 mmol/L    Potassium 3.3 (L) 3.6 - 5.5 mmol/L    Chloride 104 96 - 112 mmol/L    Co2 23 20 - 33 mmol/L    Anion Gap 12.0 7.0 - 16.0    Glucose 117 (H) 65 - 99 mg/dL    Bun 8 8 - 22 mg/dL    Creatinine 0.60 0.50 - 1.40 mg/dL    Calcium 8.9 8.5 - 10.5 mg/dL    AST(SGOT) 13 12 - 45 U/L    ALT(SGPT) 9 2 - 50 U/L    Alkaline Phosphatase 90 30 - 99 U/L    Total Bilirubin 0.3 0.1 - 1.5 mg/dL    Albumin 4.3 3.2 - 4.9 g/dL    Total Protein 7.4 6.0 - 8.2 g/dL    Globulin 3.1 1.9 - 3.5 g/dL    A-G Ratio 1.4 g/dL   Troponin   Result Value Ref Range    Troponin T <6 6 - 19 ng/L   D-DIMER   Result Value Ref Range    D-Dimer Screen <0.27 0.00 - 0.50 ug/mL (FEU)   LACTIC ACID   Result Value Ref Range    Lactic Acid 1.1 0.5 - 2.0 mmol/L   ESTIMATED GFR   Result Value Ref Range    GFR If African American >60 >60 mL/min/1.73 m 2    GFR If Non African American >60 >60 mL/min/1.73 m 2   EKG   Result Value Ref Range    Report       [facility] Emergency Dept.    Test Date:  2022  Pt Name:    MICHELLE MENARD               Department: ER  MRN:        [id number]                      Room:  Gender:     Female                       Technician: [id number]  :        [id number]                   Requested By:ER TRIAGE PROTOCOL  Order #:    [id number]                    Demond MD: AYDE LIVINGSTON MD    Measurements  Intervals                                Axis  Rate:       101                          P:          43  MI:         176                          QRS:        53  QRSD:       78                           T:          25  QT:         340  QTc:        441    Interpretive Statements  SINUS TACHYCARDIA, rate of  101, normal axis, NO st elevation  Compared to ECG 02/16/2022 10:06:22  Sinus rhythm no longer present  Electronically Signed On 3-2-2022 18:00:31 PST by AYDE LIVINGSTON MD         All labs reviewed by me.    EKG  12 Lead EKG: interpreted by me as above.    RADIOLOGY  DX-CHEST-PORTABLE (1 VIEW)   Final Result      No acute cardiac or pulmonary abnormalities are identified.        The radiologist's interpretation of all radiological studies have been reviewed by me.    COURSE & MEDICAL DECISION MAKING  Pertinent Labs & Imaging studies reviewed. (See chart for details)    Review of patient medical records reveal she was seen here yesterday for UTI.    6:01 PM - Patient seen and examined at bedside. Ordered EKG, DX-Chest, troponin, CMP, CBC w/ diff, eGFR,  D-dimer, and Lactic Acid to evaluate her symptoms. The differential diagnoses include but are not limited to: Migraine headache, tension headache, myocardial infarction, pulmonary embolism, chest pain, anxiety    6:25 PM - Patient will be medicated with Compazine 10 mg injection, Zofran 4 mg injection, Toradol 15 mg injection, and LR infusion (bolus).    7:38 PM - I reevaluated the patient at bedside. The patient informs me they feel improved following medication administration. I discussed the patient's diagnostic study results which show no evidence of acute cardiopulmonary abnormalities. I discussed plan for discharge and follow up as outlined below. The patient verbalizes they feel comfortable going home. The patient is stable for discharge at this time and will return for any new or worsening symptoms. Patient verbalizes understanding and support with my plan for discharge.     HYDRATION: Based on the patient's presentation of Other Migraine the patient was given IV fluids. IV Hydration was used because oral hydration was not adequate alone. Upon recheck following hydration, the patient was improved.    Medical Decision Making: At this point time I think  the patient's headache was her migraine at home with her typical aura.  I think her chest pain may be secondary to anxiety with this.  Her EKG is unremarkable she has a negative troponin.  D-dimer is negative she is not tachycardic or hypoxic I do not think she has a pulmonary embolism.  Chest x-ray does not show any signs of pneumonia.  The pain is not ripping or tearing did not put her back I do not think she has an aortic dissection.    The patient will return for new or worsening symptoms and is stable at the time of discharge.    The patient is referred to a primary physician for blood pressure management, diabetic screening, and for all other preventative health concerns.    DISPOSITION:  Patient will be discharged home in stable condition.    FOLLOW UP:  Roel Padilla M.D.  [address] [facility] [id number] [phone]    Schedule an appointment as soon as possible for a visit in 1 week    FINAL IMPRESSION  1. Chest pain, unspecified type    2. Migraine with aura and without status migrainosus, not intractable        Mamta HARRIS (Kamini), am scribing for, and in the presence of, Jay Caicedo M.D.    Electronically signed by: Mamta Henson (Kamini), 3/2/2022    Jay HARRIS M.D. personally performed the services described in this documentation, as scribed by Mamta Henson in my presence, and it is both accurate and complete.    The note accurately reflects work and decisions made by me.  Jay Caicedo M.D.  3/2/2022  11:33 PM

## 2022-03-05 LAB
BACTERIA UR CULT: NORMAL
SIGNIFICANT IND 70042: NORMAL
SITE SITE: NORMAL
SOURCE SOURCE: NORMAL

## 2022-03-23 LAB
BACTERIA #/AREA URNS HPF: ABNORMAL /HPF
EPI CELLS #/AREA URNS HPF: ABNORMAL /HPF
RBC # URNS HPF: ABNORMAL /HPF
WBC #/AREA URNS HPF: ABNORMAL /HPF
YEAST BUDDING URNS QL: PRESENT /HPF

## 2022-04-25 ENCOUNTER — OFFICE VISIT (OUTPATIENT)
Dept: INTERNAL MEDICINE | Facility: OTHER | Age: 27
End: 2022-04-25
Payer: MEDICAID

## 2022-04-25 VITALS
OXYGEN SATURATION: 97 % | HEIGHT: 63 IN | TEMPERATURE: 98 F | HEART RATE: 83 BPM | DIASTOLIC BLOOD PRESSURE: 79 MMHG | BODY MASS INDEX: 28.56 KG/M2 | WEIGHT: 161.2 LBS | SYSTOLIC BLOOD PRESSURE: 115 MMHG

## 2022-04-25 DIAGNOSIS — E05.00 GRAVES DISEASE: ICD-10-CM

## 2022-04-25 DIAGNOSIS — D50.9 MICROCYTIC ANEMIA: ICD-10-CM

## 2022-04-25 DIAGNOSIS — E89.0 POSTOPERATIVE HYPOTHYROIDISM: ICD-10-CM

## 2022-04-25 DIAGNOSIS — F32.A DEPRESSION, UNSPECIFIED DEPRESSION TYPE: ICD-10-CM

## 2022-04-25 DIAGNOSIS — F41.9 ANXIETY: ICD-10-CM

## 2022-04-25 DIAGNOSIS — R79.89 TSH ELEVATION: ICD-10-CM

## 2022-04-25 PROCEDURE — 99213 OFFICE O/P EST LOW 20 MIN: CPT | Mod: GE | Performed by: INTERNAL MEDICINE

## 2022-04-25 RX ORDER — FERROUS SULFATE 325(65) MG
325 TABLET ORAL DAILY
Qty: 90 TABLET | Refills: 0 | Status: SHIPPED | OUTPATIENT
Start: 2022-04-25 | End: 2022-07-01

## 2022-04-25 RX ORDER — ESCITALOPRAM OXALATE 10 MG/1
10 TABLET ORAL DAILY
Qty: 90 TABLET | Refills: 1 | Status: SHIPPED | OUTPATIENT
Start: 2022-04-25 | End: 2022-07-01

## 2022-04-25 RX ORDER — LEVOTHYROXINE SODIUM 0.15 MG/1
150 TABLET ORAL
Qty: 90 TABLET | Refills: 3 | Status: SHIPPED | OUTPATIENT
Start: 2022-04-25 | End: 2023-05-08 | Stop reason: SDUPTHER

## 2022-04-25 ASSESSMENT — ENCOUNTER SYMPTOMS
PHOTOPHOBIA: 0
HEARTBURN: 0
DIZZINESS: 0
MYALGIAS: 0
FOCAL WEAKNESS: 0
VOMITING: 1
NAUSEA: 1
SPEECH CHANGE: 0
WEAKNESS: 0
PALPITATIONS: 0
FEVER: 0
HEADACHES: 1
HEMOPTYSIS: 0
COUGH: 0
CHILLS: 0
SENSORY CHANGE: 0
EYE PAIN: 0
BLURRED VISION: 1
DOUBLE VISION: 0

## 2022-04-25 ASSESSMENT — FIBROSIS 4 INDEX: FIB4 SCORE: .4

## 2022-04-25 NOTE — PROGRESS NOTES
Teaching Physician Attestation      Level of Participation    I discussed with the resident physician the patient's history, exam, assessment and plan in detail.  Topics listed in my addendum were the focus of the visit.  Healthcare maintenance was not addressed this visit unless listed as a topic in my addendum.  I agree with plan as written along with the following additions/modifications:      Migraine headaches  -Meets at least 4 of the 5 pound pneumonic.  Has been present since she is a kid.  More pronounced with anxiety, she notes significant stressors recently.  No trauma, no fevers, headaches do not wake up from sleep, benign neuro exam.    Plan  -1000 mg 3 times daily Tylenol (LFTs normal), as regular dosing was not fully effective  -Extensively counseled on lifestyle interventions and link to anxiety, anxiety management techniques discussed  -Follow-up closely in 2 weeks    Anxiety  -Not fully addressed, improving on Lexapro, likely linked above problem.  Follow-up closely in 2 weeks.    Globus sensation and throat  -Not fully addressed.  Could also be related to anxiety.  Clear oropharynx and no masses on the throat, she is able to eat and drink normally.  We will follow-up closely again in 2 weeks as patient's anxiety is better controlled.

## 2022-04-25 NOTE — PATIENT INSTRUCTIONS
-He is dehydrated and regularly 8 glasses of water daily  -Improve the diet, healthy food choices, please stay mindful of any specific food is causing the headaches and try to avoid that  -Please exercise daily, at least 30 minutes  -Please work on meditation like we discussed today.  Please install the preop called [facility] mindfulness that can help you with meditation as well  -Please follow-up for counseling, referral has been placed  -Please take 1000 mg of Tylenol 3 times daily as needed for migraines  -Follow-up in 5 weeks

## 2022-04-25 NOTE — PROGRESS NOTES
Subjective:   ##Migraines  Patient describes that she has been getting migraines more frequently.  She stopped taking Excedrin Migraine because it did not make the migraines go away completely.  She now uses Tylenol extra strength as needed For her migraines.   Describes that she is currently having a migraine as well.  She has been missing her work a lot, works as a  and has to be in front of a monitor/screen that increases the headache.  -Endorses pulsatile head ache  -Gets little flickers of light before/with the headache  -Sometimes associated with blurred vision  -Usually unilateral, temple. Occasionally bilaterally. Headache does not encircle the head, no occipital headache  -Ass with nausea and vomiting  -Disabling-Can't go to work   -No neuro symptoms appreciated by patient with headaches, no weakness, sensory loss   -Headaches last 8-12 hours usually.  -Sleeps- which helps   ##Depression/anxiety   Did not take hydroxyzine that was prescribed at the last visit.  Started taking the escitalopram and describes that the depression/anxiety is better controlled now.  Has not followed up with psychology for counseling yet.  Gets anxious when she has to leave her kids behind to work, the oldest 1 goes to school and the younger child stays with grandmother.  Occasionally gets anxious and overwhelmed at work as well.  ##Postsurgical hypothyroidism  Endorses feeling exhausted and fatigued.  Has been experiencing some trouble in swallowing for the last 2 weeks.  ##Heavy menstruation  Has an IUD in place.  Follows up with OB/GYN outpatient.  Describes that her periods last at least 7 days, she uses at least 8 pads per day and sometimes several pads as well.  The periods are heavy the first 3 days and then the flow gets a little better.      CC: Follow-up visit      HPI:   Ivis presents today for follow-up visit of her chronic problems.  She has a history of migraines, Graves' disease s/p thyroidectomy in 2017,  "now with postsurgical hypothyroidism, history of mood disorder/anxiety/depression/postpartum depression.  Chart review showed she had a history of ovarian cyst-right, however patient describes that she follows up with OB/GYN and her gynecologist updated her the no cyst were present at her recent visit and IUD insertion appointment.  Chart review also shows a history of elevated triglycerides present on ED visit summary 2020.  Per patient she does not have a history of hyperlipidemia and has never been told about high triglycerides.      Migraine with aura  Graves Disease s/p thyroidectomy   Post-surgical hypothyroidism   Heavy menstruation-Anemia       Health Maintenance: Completed    ROS:  Review of Systems   Constitutional: Positive for malaise/fatigue. Negative for chills and fever.   HENT: Negative for ear discharge, ear pain, hearing loss and tinnitus.    Eyes: Positive for blurred vision. Negative for double vision, photophobia and pain.        Blurred vision with headaches    Respiratory: Negative for cough and hemoptysis.    Cardiovascular: Negative for chest pain and palpitations.   Gastrointestinal: Positive for nausea and vomiting. Negative for heartburn.        N,V with migraines    Genitourinary: Negative for dysuria and urgency.   Musculoskeletal: Negative for myalgias.   Skin: Negative for itching and rash.   Neurological: Positive for headaches. Negative for dizziness, sensory change, speech change, focal weakness and weakness.   Psychiatric/Behavioral:        + h/o depression        Objective:     Exam:  /79 (BP Location: Right arm, Patient Position: Sitting)   Pulse 83   Temp 36.7 °C (98 °F) (Temporal)   Ht 1.6 m (5' 3\")   Wt 73.1 kg (161 lb 3.2 oz)   SpO2 97%   BMI 28.56 kg/m²  Body mass index is 28.56 kg/m².    Physical Exam  Constitutional:       General: She is not in acute distress.     Appearance: She is not ill-appearing, toxic-appearing or diaphoretic.   HENT:      Head: " Normocephalic and atraumatic.      Right Ear: External ear normal.      Left Ear: External ear normal.      Nose: Nose normal. No congestion.      Mouth/Throat:      Mouth: Mucous membranes are moist.      Pharynx: No oropharyngeal exudate.   Eyes:      Extraocular Movements: Extraocular movements intact.      Pupils: Pupils are equal, round, and reactive to light.      Comments: Xanthelesma visible on both eyes    Cardiovascular:      Rate and Rhythm: Normal rate and regular rhythm.      Pulses: Normal pulses.      Heart sounds: Normal heart sounds.   Pulmonary:      Effort: Pulmonary effort is normal.      Breath sounds: Normal breath sounds.   Abdominal:      General: Bowel sounds are normal.      Palpations: Abdomen is soft.   Musculoskeletal:         General: No swelling.      Right lower leg: No edema.      Left lower leg: No edema.   Skin:     General: Skin is warm.      Capillary Refill: Capillary refill takes less than 2 seconds.   Neurological:      General: No focal deficit present.      Mental Status: She is alert and oriented to person, place, and time.      Cranial Nerves: No cranial nerve deficit.      Sensory: No sensory deficit.      Motor: No weakness.      Gait: Gait normal.   Psychiatric:         Mood and Affect: Mood normal.         A chaperone was offered to the patient during today's exam. Chaperone name: Dr. Prince was present.    Labs: Reviewed prior labs     Assessment & Plan:     26 y.o. female with h/o migraines, postsurgical hypothyroidism, depression/anxiety is here today for a follow up visit and med refill.     ##Migraine headaches  -Patient meets 4 out of the 5 pound pneumonics  -Going on since she was 12 years old, more pronounced with anxiety, patient does endorse significant stressors recently with new job and not being able to stabilize her kids all the time.  -Pulsatile headache, usually unilateral, sometimes bilateral, associated with nausea, vomiting, flickers of light,  sometimes blurred vision, no fevers, no trauma, headaches do not wake her up from sleep.  -Neuro exam was benign  -Currently taking 400 mg Tylenol 1-2 times with migraines.  Plan:  -Tylenol 1000 mg 3 times daily (patient's LFTs were normal) with migraines.  -Extensive counseling provided on lifestyle interventions including staying hydrated, regular exercise, healthy diet, meditation.  Anxiety management techniques discussed  -Referral for counseling placed  -Follow-up in 5 weeks    ##Depression/anxiety  -Likely related to migraines, however, improving with escitalopram  Plan:  -Counseling referral  -Early follow-up in 5 weeks    ##Globus sensation in the throat  -Could be secondary to anxiety.  Oropharynx was clear, no masses appreciated.  -Patient denies any symptoms related to food/no difficulty  -Early follow-up has been planned    ##Mild Anemia  -Secondary to heavy menstruation  -Follows up with obgyn outpatient, recently had an IUD implanted to decrease the menstrual blood flow as well as for contraception  -Recent CBC with Hgb 11.2  -Will address in more detail at next visit including ordering an iron panel   Plan:  -Cont iron supplementation    I spent a total of 30 minutes with record review, exam, communication with the patient, communication with other providers, and documentation of this encounter.      Return in about 5 weeks (around 5/30/2022).    Please note that this dictation was created using voice recognition software. I have made every reasonable attempt to correct obvious errors, but I expect that there are errors of grammar and possibly content that I did not discover before finalizing the note.

## 2022-04-25 NOTE — LETTER
April 25, 2022    To Whom It May Concern:         This is confirmation that Ivis Jayna Klein attended her scheduled appointment with Roel Padilla M.D. on 4/25/22. Please excuse her from work for todat 4/25/2022 due to medical condition.          If you have any questions please do not hesitate to call me at the phone number listed below.    Sincerely,          Roel Padilla M.D.  [phone]

## 2022-05-17 ENCOUNTER — HOSPITAL ENCOUNTER (EMERGENCY)
Facility: MEDICAL CENTER | Age: 27
End: 2022-05-17
Attending: EMERGENCY MEDICINE
Payer: MEDICAID

## 2022-05-17 VITALS
RESPIRATION RATE: 16 BRPM | BODY MASS INDEX: 30.1 KG/M2 | HEART RATE: 80 BPM | HEIGHT: 62 IN | TEMPERATURE: 97.7 F | SYSTOLIC BLOOD PRESSURE: 152 MMHG | DIASTOLIC BLOOD PRESSURE: 83 MMHG | WEIGHT: 163.58 LBS | OXYGEN SATURATION: 96 %

## 2022-05-17 DIAGNOSIS — R10.2 PELVIC PAIN: ICD-10-CM

## 2022-05-17 LAB
APPEARANCE UR: CLEAR
BACTERIA GENITAL QL WET PREP: NORMAL
BILIRUB UR QL STRIP.AUTO: NEGATIVE
C TRACH DNA GENITAL QL NAA+PROBE: NEGATIVE
COLOR UR: YELLOW
GLUCOSE UR STRIP.AUTO-MCNC: NEGATIVE MG/DL
HCG UR QL: NEGATIVE
HIV 1+2 AB+HIV1 P24 AG SERPL QL IA: NORMAL
KETONES UR STRIP.AUTO-MCNC: NEGATIVE MG/DL
LEUKOCYTE ESTERASE UR QL STRIP.AUTO: NEGATIVE
MICRO URNS: NORMAL
N GONORRHOEA DNA GENITAL QL NAA+PROBE: NEGATIVE
NITRITE UR QL STRIP.AUTO: NEGATIVE
PH UR STRIP.AUTO: 7.5 [PH] (ref 5–8)
PROT UR QL STRIP: NEGATIVE MG/DL
RBC UR QL AUTO: NEGATIVE
SIGNIFICANT IND 70042: NORMAL
SITE SITE: NORMAL
SOURCE SOURCE: NORMAL
SP GR UR STRIP.AUTO: 1.03
SPECIMEN SOURCE: NORMAL
T PALLIDUM AB SER QL IA: NORMAL
UROBILINOGEN UR STRIP.AUTO-MCNC: 1 MG/DL

## 2022-05-17 PROCEDURE — 81025 URINE PREGNANCY TEST: CPT

## 2022-05-17 PROCEDURE — 36415 COLL VENOUS BLD VENIPUNCTURE: CPT

## 2022-05-17 PROCEDURE — 86780 TREPONEMA PALLIDUM: CPT

## 2022-05-17 PROCEDURE — 87491 CHLMYD TRACH DNA AMP PROBE: CPT

## 2022-05-17 PROCEDURE — 87389 HIV-1 AG W/HIV-1&-2 AB AG IA: CPT

## 2022-05-17 PROCEDURE — 81003 URINALYSIS AUTO W/O SCOPE: CPT

## 2022-05-17 PROCEDURE — 99284 EMERGENCY DEPT VISIT MOD MDM: CPT

## 2022-05-17 PROCEDURE — 87591 N.GONORRHOEAE DNA AMP PROB: CPT

## 2022-05-17 RX ORDER — IBUPROFEN 600 MG/1
600 TABLET ORAL
Qty: 20 TABLET | Refills: 0 | Status: SHIPPED | OUTPATIENT
Start: 2022-05-17 | End: 2022-07-01

## 2022-05-17 ASSESSMENT — FIBROSIS 4 INDEX: FIB4 SCORE: .4

## 2022-05-17 NOTE — ED NOTES
Patient discharged per order. Oral and written discharge instructions reviewed. Medications sent to home pharmacy. New medications reviewed. All belongings accounted for and taken with patient. Questions answered, and patient agrees with discharge plan. Patient aware she needs to follow up on mychart for the rest of her results.

## 2022-05-17 NOTE — ED PROVIDER NOTES
ED Provider Note    CHIEF COMPLAINT  Chief Complaint   Patient presents with   • Exposure to STD     Vaginal discharge, itchiness, x 2 weeks.    • Abdominal Pain     With low back pain       HPI  Ivis Klein is a 26 y.o. female who presents complaining of a yellowish discharge.  This is unusual for her.  She also has some some dryness over the outside part of her labia.  She does want to make sure there is nothing going on with that.  She does not think she is pregnant she has an IUD.  She also is little bit of suprapubic pain.  She denies any stan dysuria however.  No increased frequency or urgency.  No fever or chills.  No nausea or vomiting.  There is no other complaint.    PAST MEDICAL HISTORY  Past Medical History:   Diagnosis Date   • Anemia 2/17/2022   • Anxiety 2/16/2017   • Anxiety 2/16/2017   • Elevated antinuclear antibody (DEJON) level 5/23/2019   • Graves disease 12/5/2016   • Heart valve disease    • Hemorrhagic cysts of both ovaries 5/23/2019   • History of panic attack 4/9/2020   • History of pericarditis 12/5/2016   • History of thyroidectomy 1/31/2020    Partial --> hypothyroidism    • Hyperlipidemia 2/17/2022   • Panic attack 3/11/2019   • Postpartum depression 2/17/2022   • Postpartum depression 2/17/2022   • Vaginal discharge 12/2/2021       FAMILY HISTORY  Family History   Problem Relation Age of Onset   • Cancer Paternal Grandmother 56        breast cancer   • No Known Problems Mother    • Hyperlipidemia Father    • No Known Problems Sister    • No Known Problems Brother        SOCIAL HISTORY  Social History     Tobacco Use   • Smoking status: Never Smoker   • Smokeless tobacco: Never Used   • Tobacco comment: quit in 2012   Vaping Use   • Vaping Use: Never used   Substance Use Topics   • Alcohol use: No   • Drug use: Not Currently     Types: Marijuana, Inhaled     Comment: marijuana weekly         SURGICAL HISTORY  Past Surgical History:   Procedure Laterality Date   •  "THYROIDECTOMY TOTAL Bilateral 3/22/2017    Procedure: THYROIDECTOMY TOTAL -NIMS RECURRENT LARYNGEAL NERVE MONITORING;  Surgeon: Vicente Eldridge M.D.;  Location: SURGERY SAME DAY [facility];  Service:    • PRIMARY C SECTION  9/8/2013    Performed by Melisa Wright M.D. at [facility] DELIVERY       CURRENT MEDICATIONS  Home Medications     Reviewed by Flora Dasilva R.N. (Registered Nurse) on 05/17/22 at 1247  Med List Status: Complete   Medication Last Dose Status   escitalopram (LEXAPRO) 10 MG Tab  Active   ferrous sulfate 325 (65 Fe) MG tablet  Active   levothyroxine (SYNTHROID) 150 MCG Tab 5/17/2022 Active                I have reviewed the nurses notes and/or the list brought with the patient.    ALLERGIES  No Known Allergies    REVIEW OF SYSTEMS  See HPI for further details. Review of systems as above, otherwise all other systems are negative.     PHYSICAL EXAM  VITAL SIGNS: /76   Pulse 88   Temp 37.7 °C (99.8 °F) (Temporal)   Resp 16   Ht 1.575 m (5' 2\")   Wt 74.2 kg (163 lb 9.3 oz)   LMP 04/21/2022   SpO2 99%   BMI 29.92 kg/m²     Constitutional: Well appearing patient in no acute distress.  Not toxic, nor ill in appearance.  HENT: Mucus membranes moist.  Oropharynx is clear.  Eyes: Pupils equally round.  No scleral icterus.   Neck: Full nontender range of motion.  Lymphatic: No cervical lymphadenopathy noted.   Cardiovascular: Regular heart rate and rhythm.  No murmurs, rubs, nor gallop appreciated.   Thorax & Lungs: Chest is nontender.  Lungs are clear to auscultation with good air movement bilaterally.  No wheeze, rhonchi, nor rales.   Abdomen: Soft, with no tenderness, rebound nor guarding.  No mass, pulsatile mass, nor hepatosplenomegaly appreciated.  : Registered nurse, [employer], as chaperone.  Normal external genitalia.  There is scant, likely physiologic discharge in the vaginal vault.  The cervix and vagina otherwise normal to inspection.  There is no cervical motion " tenderness.  No  Mass or tenderness.  Skin: Few scattered bruises on the anterior legs.  She states that these are from bumping into things, not from someone harming her.  Extremities/Musculoskeletal: No sign of bony trauma  Neurologic: Alert & oriented.  Strength and sensation is intact all around.  Gait is normal.  Psychiatric: Normal affect appropriate for the clinical situation.    LABS  Labs Reviewed   CHLAMYDIA/GC, PCR (URINE)   T.PALLIDUM AB EIA   URINALYSIS    Narrative:     genital   HCG QUALITATIVE UR    Narrative:     genital   WET PREP    Narrative:     genital   CHLAMYDIA/GC, PCR (URINE)    Narrative:     genital   CHLAMYDIA/GC, PCR (GENITAL/ANAL SWAB)   HIV AG/AB COMBO ASSAY SCREENING           MEDICAL RECORD  I have reviewed patient's medical record and pertinent results are listed above.    COURSE & MEDICAL DECISION MAKING  I have reviewed any medical record information, laboratory studies and radiographic results as noted above.  Patient presents with some lower abdominal/pelvic pain, with some discharge.  Her examination is reassuring, however.  Specifically, this does not have the appearance of GC nor chlamydia.  We did go ahead and send swabs for this.  She is not pregnant.  As I discussed with her the urinalysis is normal showing no evidence of bilirubin, protein, infection or blood.  She is not pregnant.  GC and Chlamydia are still pending as is her HIV test.  She should be called with the results of this if abnormal, however, I did ask her to check on this herself to ensure that nothing was missed.    Instructions on pelvic pain  FINAL IMPRESSION  1. Pelvic pain        This dictation was created using voice recognition software.    Electronically signed by: Shawn Zurita M.D., 5/17/2022 2:23 PM

## 2022-05-17 NOTE — DISCHARGE INSTRUCTIONS
Some of your lab tests are still pending.  You should be called if you need any treatment for this, however, to be sure I recommend that you check your self on MyChart.    For now, ibuprofen for pain.    I do recommend following up at [facility] for further evaluation if you are not feeling back to normal by the beginning of the week.

## 2022-06-01 ENCOUNTER — APPOINTMENT (OUTPATIENT)
Dept: INTERNAL MEDICINE | Facility: OTHER | Age: 27
End: 2022-06-01
Payer: MEDICAID

## 2022-07-01 ENCOUNTER — OFFICE VISIT (OUTPATIENT)
Dept: INTERNAL MEDICINE | Facility: OTHER | Age: 27
End: 2022-07-01
Payer: MEDICAID

## 2022-07-01 VITALS
TEMPERATURE: 97.9 F | DIASTOLIC BLOOD PRESSURE: 78 MMHG | HEART RATE: 78 BPM | OXYGEN SATURATION: 97 % | BODY MASS INDEX: 29.16 KG/M2 | SYSTOLIC BLOOD PRESSURE: 113 MMHG | WEIGHT: 164.6 LBS | HEIGHT: 63 IN

## 2022-07-01 DIAGNOSIS — E87.6 HYPOKALEMIA: ICD-10-CM

## 2022-07-01 DIAGNOSIS — D50.9 IRON DEFICIENCY ANEMIA, UNSPECIFIED IRON DEFICIENCY ANEMIA TYPE: ICD-10-CM

## 2022-07-01 DIAGNOSIS — E89.0 POSTSURGICAL HYPOTHYROIDISM: ICD-10-CM

## 2022-07-01 DIAGNOSIS — E78.1 HYPERTRIGLYCERIDEMIA: ICD-10-CM

## 2022-07-01 DIAGNOSIS — Z78.9 BREASTFEEDING (INFANT): ICD-10-CM

## 2022-07-01 PROCEDURE — 99214 OFFICE O/P EST MOD 30 MIN: CPT | Mod: GC | Performed by: INTERNAL MEDICINE

## 2022-07-01 RX ORDER — FERROUS SULFATE 325(65) MG
325 TABLET ORAL
Qty: 90 TABLET | Refills: 3 | Status: SHIPPED | OUTPATIENT
Start: 2022-07-02 | End: 2022-10-11

## 2022-07-01 RX ORDER — SUMATRIPTAN 25 MG/1
TABLET, FILM COATED ORAL
Qty: 90 TABLET | Refills: 3 | Status: SHIPPED | OUTPATIENT
Start: 2022-07-01 | End: 2023-07-18

## 2022-07-01 ASSESSMENT — FIBROSIS 4 INDEX: FIB4 SCORE: .4

## 2022-07-01 NOTE — PATIENT INSTRUCTIONS
-Thank you for coming in today. It was great seeing you today    -Please call the number below to get set up for counseling services     [facility]  [address] [place] [id number]  Phone: [phone]  Fax: [phone]    -Please get the lab work done prior to your next visit     -I will see you again in 5-6 weeks

## 2022-07-01 NOTE — PROGRESS NOTES
Ivis Klein is a 26 y.o. female who presents today with the following:    CC: Follow up visit     HPI:    1. Insomnia   2.  Anxiety  3.  Depression  Patient describes that she has been struggling with difficulty sleeping and staying asleep at night for the last 2-3 weeks.  She has 2 kids, 8 and almost 2 years old.  She is still breast-feeding her younger son.  She is working as a  and recently changed her working hours to the evenings from 5-10 PM because of having issues with childcare ().  She leaves her kids with their father or her mother while she is at work, describes that she has a good support system and both, her kids father and her mother take good care for kids and that her kids are safe. Describes it is still hard for her to leave her kids behind.   She comes home around 10 PM, puts her kids to sleep and then tries to go to bed around 11 PM-12 AM, if she is not able to sleep right away she watches television, some movie, uses her telephone so she is very tired and is finally able to sleep around 2-3 AM.  The sleep is not restful and she continues to toss and turn throughout the night when she is finally awake at 8 AM when her kids wake up    4.  New onset hirsutism  She started to notice hairs around her chin and upper lip area.  Endorses thick hair at the chin.  Describes she first noticed them a few weeks ago.    5. Iron deficiency anemia, unspecified iron deficiency anemia type  Describes she has not been taking the iron supplementation    6. Breastfeeding (infant)  She is currently breast-feeding her younger child    7. Hypokalemia  As evident by last labs.  Denies any leg cramping or weakness symptoms    8.  Migraines  Describes has been very well controlled.  Denies any recent similar episode of migraines    ROS:       General: No fevers, chills, night sweats, weight loss.  Endorses weight gain  HEENT: No hearing changes, vision changes, eye pain, ear pain, nasal  discharge, sore throat  Neck: No swelling in neck  Pulmonary: No shortness of breath, cough, sputum, or hemoptysis  Cardiovascular: No chest pain, palpitations, or LE swelling  GI: No nausea, vomiting, diarrhea, constipation, abdominal pain, hematochezia or melena  : No dysuria or frequency  Neuro: No focal weakness, no general weakness, no headaches, no lightheadedness, no dizziness  Psych: Positive for anxiety, insomnia    Past Medical History:   Diagnosis Date   • Anemia 2/17/2022   • Anxiety 2/16/2017   • Anxiety 2/16/2017   • Elevated antinuclear antibody (DEJON) level 5/23/2019   • Graves disease 12/5/2016   • Heart valve disease    • Hemorrhagic cysts of both ovaries 5/23/2019   • History of panic attack 4/9/2020   • History of pericarditis 12/5/2016   • History of thyroidectomy 1/31/2020    Partial --> hypothyroidism    • Hyperlipidemia 2/17/2022   • Panic attack 3/11/2019   • Postpartum depression 2/17/2022   • Postpartum depression 2/17/2022   • Vaginal discharge 12/2/2021       Past Surgical History:   Procedure Laterality Date   • THYROIDECTOMY TOTAL Bilateral 3/22/2017    Procedure: THYROIDECTOMY TOTAL -NIMS RECURRENT LARYNGEAL NERVE MONITORING;  Surgeon: Vicente Eldridge M.D.;  Location: SURGERY SAME DAY [facility];  Service:    • PRIMARY C SECTION  9/8/2013    Performed by Melisa Wright M.D. at [facility]       Family History   Problem Relation Age of Onset   • Cancer Paternal Grandmother 56        breast cancer   • No Known Problems Mother    • Hyperlipidemia Father    • No Known Problems Sister    • No Known Problems Brother        Social History     Tobacco Use   • Smoking status: Never Smoker   • Smokeless tobacco: Never Used   • Tobacco comment: quit in 2012   Vaping Use   • Vaping Use: Never used   Substance Use Topics   • Alcohol use: No   • Drug use: Not Currently     Types: Marijuana, Inhaled     Comment: marijuana weekly     Occupation: Works as a     Current  "Outpatient Medications   Medication Sig Dispense Refill   • [START ON 7/2/2022] ferrous sulfate 325 (65 Fe) MG tablet Take 1 Tablet by mouth in the PM on Mon, Tues, Thurs, and Sat. 90 Tablet 3   • SUMAtriptan (IMITREX) 25 MG Tab tablet Take 1 tablet for as needed for migraines up to a maximum of 2 tablets 90 Tablet 3   • levothyroxine (SYNTHROID) 150 MCG Tab Take 1 Tablet by mouth every morning on an empty stomach. 90 Tablet 3     No current facility-administered medications for this visit.       Physical Exam:  /78 (BP Location: Left arm, Patient Position: Sitting, BP Cuff Size: Adult)   Pulse 78   Temp 36.6 °C (97.9 °F) (Temporal)   Ht 1.6 m (5' 3\")   Wt 74.7 kg (164 lb 9.6 oz)   SpO2 97%   BMI 29.16 kg/m²   General: Well developed, well nourished female, in no distress.  HEENT: NC/AT, PERRL, EOMI, no scleral icterus or conjunctival pallor.  Few thick hair appreciated at the chin  Neck: Supple, No cervical or supraclavicular LAD  CV:RRR, no murmurs gallops or Rubs, no JVD  Pulm: LCAB, no crackles, rales, rhonchi, or wheezing  GI: Normal bowel sounds, abdomen soft, nontender, nondistended to deep or light palpation in all 4 quadrants, no HSM.  MSK: Radial and dorsalis pedis pulses 2+ and equal bilaterally, respectively.  Strength 5 out of 5 in upper and lower extremities.  No lower extremity edema  Neuro: Patient is alert and oriented x3, no focal deficits  Psych: Seems a little anxious      Assessment and Plan:     1. Anxiety      Depression      Anxiety attacks at work    Patient with ongoing insomnia for the last 2 weeks since she changed her working hours to 5-10 PM.  RAMON score at today's visit 8, PHQ-9 score at today's visit 7    Patient was prescribed Lexapro, describes she took it for couple days and then stopped because she was concerned we will get in her breastmilk and affect her baby.  She also noticed palpitations with Lexapro.  Counseling referral was placed, however she has not been able " to follow-up with them yet.  Endorses being sad and anxious when she leaves her kids behind to work although she does understand that they are in good hands with her mother or their father will take good care of them.  Endorses getting anxiety attacks at work.      Plan:  -Counseled on relaxation techniques to help calm down her anxiety  -Provided patient with information on counseling services (referral was processed at last visit).  She was more concerned about being unable to go attend the appointment in person, advised her to call the facility and discuss if telehealth visits will be possible.  Encouraged on the importance of following up for counseling services.  -Labs including TSH with reflex to T4 has been ordered to rule out hypo-/hyperthyroidism with current supplementation as a cause of her anxiety/depression  -Early follow-up within 5 weeks      2. Insomnia    Since changing her working hours.  Related to #1    Plan:  -Counseled on sleep hygiene.  Avoid looking at phones, computer screens ordered reading devices like the books that give him of light before bed.  Keep your bedroom dark, cool, quiet and free of reminders of regular the things that can cause stress.  Avoid caffeine/alcohol/smoking/energy drinks in the evening.  Try to go to bed and get up at the same time every day.  Sleep long enough to feel rested.  -Counseling services as mentioned in #1  -Early follow-up in 5 weeks        3. Hirsutism  4. Obesity     Endorses a few thick hair at her chin and around the upper lip area, that she first noticed a few weeks ago and has been very concerning for her.  Also endorses weight gain over the last few months.    Plan:  -Educated on the importance of weight loss and healthy diet to avoid androgen excess   -TSH with reflex to T4 has been ordered to rule out thyroid issues as a cause of hirsutism.    -Educated on OB/GYN follow-up (established with OB/GYN and is due for follow-up appointment) to r/o  PCOS    No signs of virilization including frontal balding, acne, deepening of voice.  No reported clitorimegaly per patient    -Early follow up in 5 weeks to re-assess      5. Postsurgical hypothyroidism  Patient has a history of Graves' disease s/p bilateral total thyroidectomy 3/22/2017.  Currently on thyroid supplementation.  Endorses ongoing anxiety, depression.  Plan:  -Continue current thyroid supplementation  -Labs ordered  - TSH WITH REFLEX TO FT4; Future  - Comp Metabolic Panel; Future  - Lipid Profile; Future      6. Hypertriglyceridemia  As evident by after visit summary 7/22/2020.  Plan:  Repeat labs of been ordered  - Lipid Profile; Future  - CBC WITHOUT DIFFERENTIAL; Future      7. Iron deficiency anemia, unspecified iron deficiency anemia type  Patient is currently breast-feeding, has 2 kids.  History of anemia, has not been using iron supplementation.  Plan:  -Iron supplementation ordered, to be taken every other day for maximum suction  Labs to review have been ordered as well  - CBC WITHOUT DIFFERENTIAL; Future  - IRON/TOTAL IRON BIND; Future      8. Breastfeeding (infant)  As mentioned above.  Advised to continue iron supplementation  - IRON/TOTAL IRON BIND; Future      9. Hypokalemia  As evident by labs from earlier this year.  Currently not on any supplementation.  No symptoms suggestive of hypokalemia today.  Plan:  -Repeat potassium levels have been ordered to assess for need for supplementation  -Encouraged about healthy/high potassium diet, educated on having bananas and other potassium rich foods  - Comp Metabolic Panel; Future      Return in about 5 weeks (around 8/5/2022).    Patient Instructions   -Thank you for coming in today. It was great seeing you today    -Please call the number below to get set up for counseling services     [facility]  [address] [place] [id number]  Phone: [phone]  Fax: [phone]    -Please get the lab work done prior to your  next visit     -I will see you again in 5-6 weeks       Signed by: Roel Padilla M.D.

## 2022-08-19 ENCOUNTER — APPOINTMENT (OUTPATIENT)
Dept: INTERNAL MEDICINE | Facility: OTHER | Age: 27
End: 2022-08-19
Payer: MEDICAID

## 2022-09-23 ENCOUNTER — HOSPITAL ENCOUNTER (OUTPATIENT)
Dept: LAB | Facility: MEDICAL CENTER | Age: 27
End: 2022-09-23
Attending: INTERNAL MEDICINE
Payer: MEDICAID

## 2022-09-23 DIAGNOSIS — E87.6 HYPOKALEMIA: ICD-10-CM

## 2022-09-23 DIAGNOSIS — E78.1 HYPERTRIGLYCERIDEMIA: ICD-10-CM

## 2022-09-23 DIAGNOSIS — Z78.9 BREASTFEEDING (INFANT): ICD-10-CM

## 2022-09-23 DIAGNOSIS — E89.0 POSTSURGICAL HYPOTHYROIDISM: ICD-10-CM

## 2022-09-23 DIAGNOSIS — D50.9 IRON DEFICIENCY ANEMIA, UNSPECIFIED IRON DEFICIENCY ANEMIA TYPE: ICD-10-CM

## 2022-09-23 LAB
ALBUMIN SERPL BCP-MCNC: 4.5 G/DL (ref 3.2–4.9)
ALBUMIN/GLOB SERPL: 1.5 G/DL
ALP SERPL-CCNC: 67 U/L (ref 30–99)
ALT SERPL-CCNC: 11 U/L (ref 2–50)
ANION GAP SERPL CALC-SCNC: 11 MMOL/L (ref 7–16)
AST SERPL-CCNC: 16 U/L (ref 12–45)
BILIRUB SERPL-MCNC: 0.3 MG/DL (ref 0.1–1.5)
BUN SERPL-MCNC: 12 MG/DL (ref 8–22)
CALCIUM SERPL-MCNC: 9.6 MG/DL (ref 8.5–10.5)
CHLORIDE SERPL-SCNC: 100 MMOL/L (ref 96–112)
CHOLEST SERPL-MCNC: 207 MG/DL (ref 100–199)
CO2 SERPL-SCNC: 25 MMOL/L (ref 20–33)
CREAT SERPL-MCNC: 0.71 MG/DL (ref 0.5–1.4)
ERYTHROCYTE [DISTWIDTH] IN BLOOD BY AUTOMATED COUNT: 45.6 FL (ref 35.9–50)
FASTING STATUS PATIENT QL REPORTED: NORMAL
GFR SERPLBLD CREATININE-BSD FMLA CKD-EPI: 121 ML/MIN/1.73 M 2
GLOBULIN SER CALC-MCNC: 3.1 G/DL (ref 1.9–3.5)
GLUCOSE SERPL-MCNC: 93 MG/DL (ref 65–99)
HCT VFR BLD AUTO: 39.4 % (ref 37–47)
HDLC SERPL-MCNC: 45 MG/DL
HGB BLD-MCNC: 12.2 G/DL (ref 12–16)
IRON SATN MFR SERPL: 13 % (ref 15–55)
IRON SERPL-MCNC: 47 UG/DL (ref 40–170)
LDLC SERPL CALC-MCNC: 126 MG/DL
MCH RBC QN AUTO: 26.3 PG (ref 27–33)
MCHC RBC AUTO-ENTMCNC: 31 G/DL (ref 33.6–35)
MCV RBC AUTO: 84.9 FL (ref 81.4–97.8)
PLATELET # BLD AUTO: 275 K/UL (ref 164–446)
PMV BLD AUTO: 11.8 FL (ref 9–12.9)
POTASSIUM SERPL-SCNC: 4.2 MMOL/L (ref 3.6–5.5)
PROT SERPL-MCNC: 7.6 G/DL (ref 6–8.2)
RBC # BLD AUTO: 4.64 M/UL (ref 4.2–5.4)
SODIUM SERPL-SCNC: 136 MMOL/L (ref 135–145)
TIBC SERPL-MCNC: 368 UG/DL (ref 250–450)
TRIGL SERPL-MCNC: 179 MG/DL (ref 0–149)
TSH SERPL DL<=0.005 MIU/L-ACNC: 5.21 UIU/ML (ref 0.38–5.33)
UIBC SERPL-MCNC: 321 UG/DL (ref 110–370)
WBC # BLD AUTO: 6.8 K/UL (ref 4.8–10.8)

## 2022-09-23 PROCEDURE — 36415 COLL VENOUS BLD VENIPUNCTURE: CPT

## 2022-09-23 PROCEDURE — 83550 IRON BINDING TEST: CPT

## 2022-09-23 PROCEDURE — 80053 COMPREHEN METABOLIC PANEL: CPT

## 2022-09-23 PROCEDURE — 85027 COMPLETE CBC AUTOMATED: CPT

## 2022-09-23 PROCEDURE — 84443 ASSAY THYROID STIM HORMONE: CPT

## 2022-09-23 PROCEDURE — 80061 LIPID PANEL: CPT

## 2022-09-23 PROCEDURE — 83540 ASSAY OF IRON: CPT

## 2022-09-26 ENCOUNTER — APPOINTMENT (OUTPATIENT)
Dept: INTERNAL MEDICINE | Facility: OTHER | Age: 27
End: 2022-09-26
Payer: MEDICAID

## 2022-10-04 ENCOUNTER — APPOINTMENT (OUTPATIENT)
Dept: INTERNAL MEDICINE | Facility: OTHER | Age: 27
End: 2022-10-04
Payer: MEDICAID

## 2022-10-10 ENCOUNTER — TELEPHONE (OUTPATIENT)
Dept: INTERNAL MEDICINE | Facility: OTHER | Age: 27
End: 2022-10-10
Payer: MEDICAID

## 2022-10-10 NOTE — TELEPHONE ENCOUNTER
Called to discuss lab results.     Patient describes how she hasn't been feeling very well for the last 2 weeks, feeling more tired and with more anxiety than usual.      She is requesting to be seen early-tomorrow if possible at out clinic to discuss her symptoms inperson in detail      Requesting scheduling to set up an appt for her.

## 2022-10-11 ENCOUNTER — OFFICE VISIT (OUTPATIENT)
Dept: INTERNAL MEDICINE | Facility: OTHER | Age: 27
End: 2022-10-11
Payer: MEDICAID

## 2022-10-11 VITALS
OXYGEN SATURATION: 100 % | BODY MASS INDEX: 28.38 KG/M2 | TEMPERATURE: 98.3 F | DIASTOLIC BLOOD PRESSURE: 83 MMHG | HEIGHT: 64 IN | SYSTOLIC BLOOD PRESSURE: 121 MMHG | WEIGHT: 166.2 LBS | HEART RATE: 76 BPM

## 2022-10-11 DIAGNOSIS — E66.3 OVERWEIGHT (BMI 25.0-29.9): ICD-10-CM

## 2022-10-11 DIAGNOSIS — F41.8 DEPRESSION WITH ANXIETY: ICD-10-CM

## 2022-10-11 DIAGNOSIS — R53.83 OTHER FATIGUE: ICD-10-CM

## 2022-10-11 PROCEDURE — 99213 OFFICE O/P EST LOW 20 MIN: CPT | Mod: GE | Performed by: GENERAL PRACTICE

## 2022-10-11 RX ORDER — ESCITALOPRAM OXALATE 10 MG/1
10 TABLET ORAL DAILY
COMMUNITY
Start: 2022-07-06 | End: 2023-07-18

## 2022-10-11 RX ORDER — LANOLIN ALCOHOL/MO/W.PET/CERES
CREAM (GRAM) TOPICAL
COMMUNITY
Start: 2022-07-01 | End: 2022-10-11

## 2022-10-11 RX ORDER — LEVOTHYROXINE SODIUM 0.15 MG/1
150 TABLET ORAL DAILY
COMMUNITY
Start: 2022-04-25 | End: 2022-10-11

## 2022-10-11 ASSESSMENT — ENCOUNTER SYMPTOMS
FEVER: 0
MYALGIAS: 0
WEIGHT LOSS: 0
DOUBLE VISION: 0
HEADACHES: 0
WEAKNESS: 0
PALPITATIONS: 0
WHEEZING: 0
HEARTBURN: 0
CHILLS: 0
DEPRESSION: 1
FALLS: 0
SORE THROAT: 0
SHORTNESS OF BREATH: 0
DIZZINESS: 0
COUGH: 0
NERVOUS/ANXIOUS: 1
NAUSEA: 0
DIARRHEA: 0
BLURRED VISION: 0
CONSTIPATION: 0
ABDOMINAL PAIN: 0
VOMITING: 0

## 2022-10-11 ASSESSMENT — PATIENT HEALTH QUESTIONNAIRE - PHQ9
5. POOR APPETITE OR OVEREATING: 1 - SEVERAL DAYS
CLINICAL INTERPRETATION OF PHQ2 SCORE: 4
SUM OF ALL RESPONSES TO PHQ QUESTIONS 1-9: 7

## 2022-10-11 ASSESSMENT — ANXIETY QUESTIONNAIRES
GAD7 TOTAL SCORE: 4
6. BECOMING EASILY ANNOYED OR IRRITABLE: SEVERAL DAYS
2. NOT BEING ABLE TO STOP OR CONTROL WORRYING: NOT AT ALL
4. TROUBLE RELAXING: SEVERAL DAYS
5. BEING SO RESTLESS THAT IT IS HARD TO SIT STILL: NOT AT ALL
1. FEELING NERVOUS, ANXIOUS, OR ON EDGE: SEVERAL DAYS
7. FEELING AFRAID AS IF SOMETHING AWFUL MIGHT HAPPEN: NOT AT ALL
3. WORRYING TOO MUCH ABOUT DIFFERENT THINGS: SEVERAL DAYS

## 2022-10-11 ASSESSMENT — LIFESTYLE VARIABLES: SUBSTANCE_ABUSE: 0

## 2022-10-11 ASSESSMENT — FIBROSIS 4 INDEX: FIB4 SCORE: .4385619557494743602

## 2022-10-11 NOTE — LETTER
October 11, 2022    To Whom It May Concern:         This is confirmation that Ivis Klein attended her scheduled appointment with Phuc Pedersen Jr., M.D. on 10/11/22.         If you have any questions please do not hesitate to call me at the phone number listed below.    Sincerely,          Phuc Pedersen Jr., M.D.  [phone]

## 2022-10-11 NOTE — PROGRESS NOTES
"      Established Patient    Ivis presents today with the following:    CC: acute appointment    HPI: 27 year old female, presents to the clinic for an acute appointment. PCP is Dr. Padilla.    Has a history of hypothyroidism due to grave's disease with partial thyroidectomy 7.5 years ago. TSH 5.210 on 9/23/22. And taking Synthroid. The patient reports she has felt more tired anxious.     The patient reports she has a history of Depression and anxiety.   PHQ-9 score: 7, RAMON-7 score: 4. Prescribed Lexapro 10mg daily and stopped taking it after one week due to \"makes me more tired\" no reported Suicidal thoughts.The patient reports she is stressed about finding a  while she works at [employer] and has had trouble losing weight by going to the gym. The patient reports she feels she overeats. The patient goes to the gym 3x week for 60 minutes and does mostly cardiovascular exercise. Mood is improved afterwards. Next appt with PCP is 11/8/2022.     H/H 12/39, reports heaving menstrual periods in the past, but not more than normal recently.    Denies insomnia, snoring, apneic events morning headaches.       Patient Active Problem List    Diagnosis Date Noted    Depression with anxiety 10/11/2022    Postsurgical hypothyroidism 02/17/2022    Ovarian cyst 02/17/2022    Hyperlipidemia 02/17/2022    Anemia 02/17/2022    Hypothyroidism 07/15/2021    Overweight (BMI 25.0-29.9) 07/15/2021    Graves disease 12/05/2016    Migraine with aura, not intractable 09/28/2010       Social History     Tobacco Use    Smoking status: Never    Smokeless tobacco: Never    Tobacco comments:     quit in 2012   Vaping Use    Vaping Use: Never used   Substance Use Topics    Alcohol use: No    Drug use: Not Currently     Types: Marijuana, Inhaled     Comment: marijuana weekly       Current Outpatient Medications   Medication Sig Dispense Refill    escitalopram (LEXAPRO) 10 MG Tab Take 10 mg by mouth every day.      SUMAtriptan (IMITREX) 25 " "MG Tab tablet Take 1 tablet for as needed for migraines up to a maximum of 2 tablets 90 Tablet 3    levothyroxine (SYNTHROID) 150 MCG Tab Take 1 Tablet by mouth every morning on an empty stomach. 90 Tablet 3     No current facility-administered medications for this visit.       Review of Systems   Constitutional:  Positive for malaise/fatigue. Negative for chills, fever and weight loss.   HENT:  Negative for congestion and sore throat.    Eyes:  Negative for blurred vision and double vision.   Respiratory:  Negative for cough, shortness of breath and wheezing.    Cardiovascular:  Negative for chest pain and palpitations.   Gastrointestinal:  Negative for abdominal pain, constipation, diarrhea, heartburn, nausea and vomiting.   Genitourinary:  Negative for dysuria, frequency and urgency.   Musculoskeletal:  Negative for falls, joint pain and myalgias.   Skin:  Negative for rash.   Neurological:  Negative for dizziness, weakness and headaches.   Psychiatric/Behavioral:  Positive for depression. Negative for substance abuse and suicidal ideas. The patient is nervous/anxious.        /83 (BP Location: Left arm, Patient Position: Sitting, BP Cuff Size: Adult)   Pulse 76   Temp 36.8 °C (98.3 °F) (Temporal)   Ht 1.626 m (5' 4\")   Wt 75.4 kg (166 lb 3.2 oz)   SpO2 100%   BMI 28.53 kg/m²       PHYSICAL EXAM:  Physical Exam  Vitals and nursing note reviewed.   Constitutional:       General: She is not in acute distress.     Appearance: Normal appearance. She is not ill-appearing.   HENT:      Head: Normocephalic and atraumatic.      Mouth/Throat:      Mouth: Mucous membranes are moist.      Pharynx: Oropharynx is clear. No oropharyngeal exudate or posterior oropharyngeal erythema.      Comments:   Mallampati class II  Eyes:      Conjunctiva/sclera: Conjunctivae normal.   Neck:      Comments: Neck circumference: 14 inches    Cardiovascular:      Rate and Rhythm: Normal rate and regular rhythm.      Pulses: Normal " pulses.      Heart sounds: Normal heart sounds. No murmur heard.    No friction rub. No gallop.   Pulmonary:      Effort: Pulmonary effort is normal. No respiratory distress.      Breath sounds: Normal breath sounds. No wheezing, rhonchi or rales.   Musculoskeletal:      Cervical back: Neck supple. No rigidity or tenderness.   Lymphadenopathy:      Cervical: No cervical adenopathy.   Neurological:      Mental Status: She is alert and oriented to person, place, and time. Mental status is at baseline.   Psychiatric:         Mood and Affect: Mood normal.         Behavior: Behavior normal.       Note: I have reviewed all pertinent labs and diagnostic tests associated with this visit with specific comments listed under the assessment and plan below    Assessment and Plan     1. Depression with anxiety  -restart Lexapro  -patient given number to Psychology to set up appointment  -follow up with PCP as scheduled 11/8/22 for reevaluation    2. Overweight (BMI 25.0-29.9)  - Nutrition (Dietary) Consult    3. Other Fatigue  -H/H wnl last month, stop bang low so do not suspect sleep apnea. No infectious etiology suspected. Could be due to depression anxiety. TSH wnl September 2022.       Followup: Return in about 4 weeks (around 11/8/2022), or if symptoms worsen or fail to improve.      Signed by: Phuc Pedersen Jr., M.D.

## 2022-10-11 NOTE — PATIENT INSTRUCTIONS
-start taking Lexpro again  -call to schedule Psychology appointment  -call [phone] to setup nutrition appointment

## 2023-02-17 ENCOUNTER — HOSPITAL ENCOUNTER (EMERGENCY)
Facility: MEDICAL CENTER | Age: 28
End: 2023-02-17
Attending: EMERGENCY MEDICINE
Payer: MEDICAID

## 2023-02-17 VITALS
SYSTOLIC BLOOD PRESSURE: 128 MMHG | BODY MASS INDEX: 33.55 KG/M2 | TEMPERATURE: 98 F | WEIGHT: 177.69 LBS | HEIGHT: 61 IN | RESPIRATION RATE: 16 BRPM | DIASTOLIC BLOOD PRESSURE: 84 MMHG | HEART RATE: 85 BPM | OXYGEN SATURATION: 98 %

## 2023-02-17 DIAGNOSIS — R30.0 DYSURIA: ICD-10-CM

## 2023-02-17 LAB
AMORPH CRY #/AREA URNS HPF: PRESENT /HPF
APPEARANCE UR: ABNORMAL
BACTERIA #/AREA URNS HPF: ABNORMAL /HPF
BACTERIA GENITAL QL WET PREP: NORMAL
BILIRUB UR QL STRIP.AUTO: NEGATIVE
COLOR UR: YELLOW
EPI CELLS #/AREA URNS HPF: ABNORMAL /HPF
GLUCOSE UR STRIP.AUTO-MCNC: NEGATIVE MG/DL
HCG UR QL: NEGATIVE
KETONES UR STRIP.AUTO-MCNC: NEGATIVE MG/DL
LEUKOCYTE ESTERASE UR QL STRIP.AUTO: NEGATIVE
MICRO URNS: ABNORMAL
NITRITE UR QL STRIP.AUTO: NEGATIVE
PH UR STRIP.AUTO: 6 [PH] (ref 5–8)
PROT UR QL STRIP: NEGATIVE MG/DL
RBC # URNS HPF: ABNORMAL /HPF
RBC UR QL AUTO: ABNORMAL
SIGNIFICANT IND 70042: NORMAL
SITE SITE: NORMAL
SOURCE SOURCE: NORMAL
SP GR UR STRIP.AUTO: <=1.005
WBC #/AREA URNS HPF: ABNORMAL /HPF

## 2023-02-17 PROCEDURE — 99284 EMERGENCY DEPT VISIT MOD MDM: CPT

## 2023-02-17 PROCEDURE — 87591 N.GONORRHOEAE DNA AMP PROB: CPT

## 2023-02-17 PROCEDURE — 81001 URINALYSIS AUTO W/SCOPE: CPT

## 2023-02-17 PROCEDURE — 81025 URINE PREGNANCY TEST: CPT

## 2023-02-17 PROCEDURE — 87491 CHLMYD TRACH DNA AMP PROBE: CPT

## 2023-02-17 ASSESSMENT — FIBROSIS 4 INDEX: FIB4 SCORE: .4385619557494743602

## 2023-02-17 NOTE — ED NOTES
Vaginal specimens to lab patient updated on plan of care    Benzoyl Peroxide Counseling: Patient counseled that medicine may cause skin irritation and bleach clothing.  In the event of skin irritation, the patient was advised to reduce the amount of the drug applied or use it less frequently.   The patient verbalized understanding of the proper use and possible adverse effects of benzoyl peroxide.  All of the patient's questions and concerns were addressed.

## 2023-02-17 NOTE — ED TRIAGE NOTES
Pt amb to triage c/o dysuria and painful urination since yesterday. Pt reports + blood in urine, incr frequency and hesitation.

## 2023-02-17 NOTE — ED PROVIDER NOTES
ER Provider Note    Scribed for Ace Becker M.d. by Funmi Lai. 2/17/2023  11:21 AM    Primary Care Provider: Roel Padilla M.D.    CHIEF COMPLAINT  Chief Complaint   Patient presents with    Painful Urination     EXTERNAL RECORDS REVIEWED  Other ED notes - patient has numerous prior negative vaginal pathogen tests    HPI/ROS  LIMITATION TO HISTORY   Select: : None  OUTSIDE HISTORIAN(S):  none    Ivis Klein is a 27 y.o. female who presents to the ED complaining of dysuria onset yesterday. She has associated chills, urinary frequency, fatigue, and vaginal discharge. Patient has a history of UTIs and states her symptoms feel similar to then, but more severe. She endorses a new sexual partner. Denies taking any medications for her symptoms.     PAST MEDICAL HISTORY  Past Medical History:   Diagnosis Date    Anemia 2/17/2022    Anxiety 2/16/2017    Anxiety 2/16/2017    Elevated antinuclear antibody (DEJON) level 5/23/2019    Graves disease 12/5/2016    Heart valve disease     Hemorrhagic cysts of both ovaries 5/23/2019    History of panic attack 4/9/2020    History of pericarditis 12/5/2016    History of thyroidectomy 1/31/2020    Partial --> hypothyroidism     Hyperlipidemia 2/17/2022    Panic attack 3/11/2019    Postpartum depression 2/17/2022    Postpartum depression 2/17/2022    Vaginal discharge 12/2/2021       SURGICAL HISTORY  Past Surgical History:   Procedure Laterality Date    THYROIDECTOMY TOTAL Bilateral 3/22/2017    Procedure: THYROIDECTOMY TOTAL -NIMS RECURRENT LARYNGEAL NERVE MONITORING;  Surgeon: Vicente Eldridge M.D.;  Location: SURGERY SAME DAY [facility];  Service:     PRIMARY C SECTION  9/8/2013    Performed by Melisa Wright M.D. at LABOR AND DELIVERY       FAMILY HISTORY  Family History   Problem Relation Age of Onset    Cancer Paternal Grandmother 56        breast cancer    No Known Problems Mother     Hyperlipidemia Father     No Known Problems Sister     No  "Known Problems Brother        SOCIAL HISTORY   reports that she has never smoked. She has never used smokeless tobacco. She reports that she does not currently use drugs after having used the following drugs: Marijuana and Inhaled. She reports that she does not drink alcohol.    CURRENT MEDICATIONS  Previous Medications    ESCITALOPRAM (LEXAPRO) 10 MG TAB    Take 10 mg by mouth every day.    LEVOTHYROXINE (SYNTHROID) 150 MCG TAB    Take 1 Tablet by mouth every morning on an empty stomach.    SUMATRIPTAN (IMITREX) 25 MG TAB TABLET    Take 1 tablet for as needed for migraines up to a maximum of 2 tablets       ALLERGIES  Patient has no known allergies.    PHYSICAL EXAM  /81   Pulse 72   Temp 36.6 °C (97.9 °F) (Temporal)   Resp 16   Ht 1.549 m (5' 1\")   Wt 80.6 kg (177 lb 11.1 oz)   SpO2 98%   BMI 33.57 kg/m²   Nursing note and vitals reviewed.  Constitutional: Well-developed and well-nourished. No distress.   HENT: Head is normocephalic and atraumatic. Oropharynx is clear and moist without exudate or erythema.   Eyes: Pupils are equal, round, and reactive to light. Conjunctiva are normal.   Cardiovascular: Normal rate and regular rhythm. No murmur heard. Normal radial pulses.  Pulmonary/Chest: Breath sounds normal. No wheezes or rales.   Abdominal: Soft and non-tender. No distention    Musculoskeletal: Extremities exhibit normal range of motion without edema or tenderness.   Neurological: Awake, alert and oriented to person, place, and time. No focal deficits noted.  Skin: Skin is warm and dry. No rash.   Psychiatric: Normal mood and affect. Appropriate for clinical situation     DIAGNOSTIC STUDIES    Labs:   Results for orders placed or performed during the hospital encounter of 02/17/23   Urinalysis, Culture if Indicated    Specimen: Urine   Result Value Ref Range    Color Yellow     Character Hazy (A)     Specific Gravity <=1.005 <1.035    Ph 6.0 5.0 - 8.0    Glucose Negative Negative mg/dL    Ketones " Negative Negative mg/dL    Protein Negative Negative mg/dL    Bilirubin Negative Negative    Nitrite Negative Negative    Leukocyte Esterase Negative Negative    Occult Blood Trace (A) Negative    Micro Urine Req Microscopic    URINE MICROSCOPIC (W/UA)   Result Value Ref Range    WBC 0-2 /hpf    RBC 0-2 /hpf    Bacteria Rare (A) None /hpf    Epithelial Cells Rare Few /hpf    Amorphous Crystal Present /hpf   BETA-HCG QUALITATIVE URINE   Result Value Ref Range    Beta-Hcg Urine Negative Negative   WET PREP    Specimen: Vaginal; Genital   Result Value Ref Range    Significant Indicator NEG     Source GEN     Site VAGINAL     Wet Prep For Parasites       No yeast.  No motile Trichomonas seen.  Few clue cells seen.  Few WBCs seen.          COURSE & MEDICAL DECISION MAKING     ED Observation Status? No; Patient does not meet criteria for ED Observation.     INITIAL ASSESSMENT, COURSE AND PLAN  11:22 AM - Patient seen and examined at bedside. UA and pregnancy obtained in triage. Reviewed her UA and informed her it is negative for infection. Discussed testing for STDs and running a urine culture to rule out infection. Recommended taking OTC Azo for her symptoms until a further cause is determined. Discussed plan for discharge and return precautions. We will follow up with her if she has any positive results. She verbalizes agreement with discharge and plan of care.     Care Narrative: Patient presents today with dysuria.  She was suspicious that she had a urinary tract infection.  However urinalysis was not consistent with infection.  Sample sent for culture.  Vaginal probes are remarkable for no yeast, no trichomonas, few clue cells.  Gonorrhea and Chlamydia PCR pending.  Pharmacy to follow on these results    Considered CT scan however the patient does not have substantial abdominal pain or tenderness to make me concerned for intra-abdominal pathology.  Symptoms are primarily related to dysuria.  We will follow-up on the  results of the gonorrhea chlamydia PCR to see if antibiotics are indicated.    DISPOSITION AND DISCUSSIONS  I have discussed management of the patient with the following physicians and JUSTIN's:  none    Discussion of management with other Q or appropriate source(s): None     Escalation of care considered, and ultimately not performed: acute inpatient care management, however at this time, the patient is most appropriate for outpatient management.    Barriers to care at this time, including but not limited to:  none .     Decision tools and prescription drugs considered including, but not limited to: Antibiotics   .    The patient will return for new or worsening symptoms and is stable at the time of discharge.    DISPOSITION:  Patient will be discharged home in stable condition.    FOLLOW UP:  Roel Padilla M.D.  [address] [facility] [id number] [phone]    Schedule an appointment as soon as possible for a visit       [facility], Emergency Dept  [address]  [facility] [id number] [phone]    If symptoms worsen      FINAL DIANGOSIS  1. Dysuria          Funmi HARRIS (Kamini), am scribing for, and in the presence of, Ace Becker M.D..    Electronically signed by: Funmi Lai (Kamini), 2/17/2023    Ace HARRIS M.D. personally performed the services described in this documentation, as scribed by Funmi Lai in my presence, and it is both accurate and complete.   The note accurately reflects work and decisions made by me.  Ace Becker M.D.  2/17/2023  1:33 PM

## 2023-04-24 ENCOUNTER — OFFICE VISIT (OUTPATIENT)
Dept: INTERNAL MEDICINE | Facility: OTHER | Age: 28
End: 2023-04-24
Payer: MEDICAID

## 2023-04-24 VITALS
HEIGHT: 62 IN | BODY MASS INDEX: 31.47 KG/M2 | SYSTOLIC BLOOD PRESSURE: 125 MMHG | RESPIRATION RATE: 14 BRPM | DIASTOLIC BLOOD PRESSURE: 80 MMHG | TEMPERATURE: 97.2 F | OXYGEN SATURATION: 98 % | WEIGHT: 171 LBS

## 2023-04-24 DIAGNOSIS — E89.0 POSTSURGICAL HYPOTHYROIDISM: ICD-10-CM

## 2023-04-24 DIAGNOSIS — R53.83 FATIGUE, UNSPECIFIED TYPE: ICD-10-CM

## 2023-04-24 DIAGNOSIS — N92.0 MENORRHAGIA WITH REGULAR CYCLE: ICD-10-CM

## 2023-04-24 DIAGNOSIS — N92.1 MENORRHAGIA WITH IRREGULAR CYCLE: ICD-10-CM

## 2023-04-24 PROCEDURE — 99214 OFFICE O/P EST MOD 30 MIN: CPT | Mod: GC | Performed by: INTERNAL MEDICINE

## 2023-04-24 ASSESSMENT — ENCOUNTER SYMPTOMS
VOMITING: 0
CHILLS: 0
PALPITATIONS: 0
COUGH: 0
DIZZINESS: 0
NAUSEA: 0
HEARTBURN: 0
BLURRED VISION: 0
DOUBLE VISION: 0
HEMOPTYSIS: 0
WEIGHT LOSS: 0
SPUTUM PRODUCTION: 0
ABDOMINAL PAIN: 0
NERVOUS/ANXIOUS: 0
PHOTOPHOBIA: 0
FEVER: 0
BACK PAIN: 0
MYALGIAS: 0
ORTHOPNEA: 0
HEADACHES: 0
NECK PAIN: 0

## 2023-04-24 ASSESSMENT — PATIENT HEALTH QUESTIONNAIRE - PHQ9: CLINICAL INTERPRETATION OF PHQ2 SCORE: 0

## 2023-04-24 ASSESSMENT — FIBROSIS 4 INDEX: FIB4 SCORE: .4385619557494743602

## 2023-04-24 NOTE — PROGRESS NOTES
CC: Fatigue, neck rash     HPI:   Ivis presents today with PMHx of graves disease s/p partial thyroidectomy, now with post surgical hypothyroidism (on levothyroxine-endorses compliance), menorrhagia with regular cycles, migraines (prn imitrex), depression and anxiety (on lexapro-compliant per patient) who presents today for an acute visit with chief complaints of      1- Neck rash   Right side of the neck   Started 2 weeks ago, denies wearing tight clothing around the neck or necklaces, denies trauma. Describes noticing it incidentally. No pain. Was itching initially, but started using hydrocortisone OTC anti-itch cream about 3 days ago that has helped.    2- Menorrhagia with regular cycles   3- Fatigue  4- Post surgical hypothyroidism   5- Depression and anxiety   Endorses being very tired all the time, increased sleep, sleeping however is not restful to her.  Has had menorrhagia since her cycles started (regular, monthly cycles- last around 7 days), however her last cycle lasted about 10-14 days- 7 days of heavy bleeding followed by 7 days of spotting. Was previously on iron supplementation but currently off of it.   Has been compliant to her meds, synthyroid as well as the lexapro.   Migraines only occasional and imitrex helps     No problems updated.    Health Maintenance: Completed    ROS:  Review of Systems   Constitutional:  Positive for malaise/fatigue. Negative for chills, fever and weight loss.   HENT:  Negative for ear pain, hearing loss and tinnitus.    Eyes:  Negative for blurred vision, double vision and photophobia.   Respiratory:  Negative for cough, hemoptysis and sputum production.    Cardiovascular:  Negative for chest pain, palpitations and orthopnea.   Gastrointestinal:  Negative for abdominal pain, heartburn, nausea and vomiting.   Genitourinary:  Negative for dysuria and urgency.   Musculoskeletal:  Negative for back pain, myalgias and neck pain.   Skin:  Positive for itching and rash.  "  Neurological:  Negative for dizziness and headaches.   Psychiatric/Behavioral:  The patient is not nervous/anxious.      Objective:     Exam:  /80   Temp 36.2 °C (97.2 °F) (Temporal)   Resp 14   Ht 1.575 m (5' 2\")   Wt 77.6 kg (171 lb)   SpO2 98%   BMI 31.28 kg/m²  Body mass index is 31.28 kg/m².    Physical Exam  Constitutional:       General: She is not in acute distress.     Appearance: She is not ill-appearing.   HENT:      Head: Normocephalic and atraumatic.      Right Ear: External ear normal.      Left Ear: External ear normal.      Nose: Nose normal. No congestion.      Mouth/Throat:      Mouth: Mucous membranes are moist.      Pharynx: No oropharyngeal exudate.   Eyes:      Extraocular Movements: Extraocular movements intact.      Pupils: Pupils are equal, round, and reactive to light.   Cardiovascular:      Rate and Rhythm: Normal rate.      Pulses: Normal pulses.   Pulmonary:      Effort: Pulmonary effort is normal.   Musculoskeletal:         General: Normal range of motion.   Skin:     General: Skin is warm.      Capillary Refill: Capillary refill takes less than 2 seconds.      Coloration: Skin is not jaundiced.      Findings: No bruising.      Comments: About 3 cm horizontal rash at the right neck crease   + erythema and mild excoriations, dry skin    Neurological:      Mental Status: She is alert and oriented to person, place, and time.   Psychiatric:         Mood and Affect: Mood normal.       A chaperone was offered to the patient during today's exam.: Chaperone Dr. Ziegler was present.    Labs: Reviewed and ordered     Assessment & Plan:     27 y.o. female with the following -       1- Neck rash   Right side of the neck   Started 2 weeks ago, denies wearing tight clothing around the neck or necklaces, denies trauma. Describes noticing it incidentally. No pain. Was itching initially, but started using hydrocortisone OTC anti-itch cream about 3 days ago that has helped.  Plan:  - Barrier " protection with cetaphil or cerave creams along with vaseline to prevent transepidermal water loss, alternating with bid hydrocortisone ointment      2- Menorrhagia with regular cycles   3- Fatigue  4- Post surgical hypothyroidism   5- Depression and anxiety   Endorses being very tired all the time, increased sleep, sleeping however is not restful to her.  Has had menorrhagia since her cycles started (regular, monthly cycles- last around 7 days), however her last cycle lasted about 10-14 days- 7 days of heavy bleeding followed by 7 days of spotting. Was previously on iron supplementation but currently off of it.   Has been compliant to her meds, synthyroid as well as the lexapro.   Migraines only occasional and imitrex helps   No alarm symptoms, SI/HI   Plan:  - Will order basic anemia workup, check CBC, screen for iron deficiency   - Get TSH with T4 as thyroid derangements can also cause fatigue and loss of appetite   - Check vit D levels   - Early follow up in 1 week to discuss labs and re-assess if lexapro needs to be uptitrated     6- Letter for work  Requesting a letter to confirm she attended her appointment today.  Plan:  - Letter provided         I spent a total of 30 minutes with record review, exam, communication with the patient, communication with other providers, and documentation of this encounter.      Return in about 1 week (around 5/1/2023).    Please note that this dictation was created using voice recognition software. I have made every reasonable attempt to correct obvious errors, but I expect that there are errors of grammar and possibly content that I did not discover before finalizing the note.

## 2023-04-24 NOTE — PATIENT INSTRUCTIONS
Thank you for visiting today!    Please follow-up in 1 week     Please get lab work done at your earliest so we may review together at your next appointment     Please try and eat healthy, get at least 30 minutes of cardiovascular exercise a day to help keep your health as best as it can be.    If you have any questions or concerns please feel free to contact us at [phone].    If you feel like you are experiencing a medical emergency please seek immediate medical attention at an urgent care or in the emergency department.

## 2023-04-24 NOTE — LETTER
April 24, 2023    To Whom It May Concern:         This is confirmation that Ivis Klein attended her scheduled appointment with Roel Padilla M.D. on 4/24/23.          If you have any questions please do not hesitate to call me at the phone number listed below.    Sincerely,          Roel Padilla M.D.  [phone]

## 2023-05-08 DIAGNOSIS — E05.00 GRAVES DISEASE: ICD-10-CM

## 2023-05-08 DIAGNOSIS — R79.89 TSH ELEVATION: ICD-10-CM

## 2023-05-08 DIAGNOSIS — E89.0 POSTOPERATIVE HYPOTHYROIDISM: ICD-10-CM

## 2023-05-11 ENCOUNTER — HOSPITAL ENCOUNTER (EMERGENCY)
Facility: MEDICAL CENTER | Age: 28
End: 2023-05-11
Attending: EMERGENCY MEDICINE
Payer: MEDICAID

## 2023-05-11 VITALS
DIASTOLIC BLOOD PRESSURE: 71 MMHG | RESPIRATION RATE: 16 BRPM | OXYGEN SATURATION: 98 % | BODY MASS INDEX: 29.18 KG/M2 | HEIGHT: 63 IN | HEART RATE: 76 BPM | SYSTOLIC BLOOD PRESSURE: 115 MMHG | TEMPERATURE: 98.4 F | WEIGHT: 164.68 LBS

## 2023-05-11 DIAGNOSIS — H66.90 ACUTE OTITIS MEDIA, UNSPECIFIED OTITIS MEDIA TYPE: ICD-10-CM

## 2023-05-11 DIAGNOSIS — E05.00 GRAVES DISEASE: ICD-10-CM

## 2023-05-11 DIAGNOSIS — J02.0 STREP PHARYNGITIS: ICD-10-CM

## 2023-05-11 DIAGNOSIS — E03.9 HYPOTHYROIDISM, UNSPECIFIED TYPE: ICD-10-CM

## 2023-05-11 DIAGNOSIS — E89.0 POSTOPERATIVE HYPOTHYROIDISM: ICD-10-CM

## 2023-05-11 DIAGNOSIS — R79.89 TSH ELEVATION: ICD-10-CM

## 2023-05-11 PROCEDURE — 99283 EMERGENCY DEPT VISIT LOW MDM: CPT

## 2023-05-11 PROCEDURE — A9270 NON-COVERED ITEM OR SERVICE: HCPCS | Performed by: EMERGENCY MEDICINE

## 2023-05-11 PROCEDURE — 700102 HCHG RX REV CODE 250 W/ 637 OVERRIDE(OP): Performed by: EMERGENCY MEDICINE

## 2023-05-11 RX ORDER — AMOXICILLIN 250 MG/1
1000 CAPSULE ORAL ONCE
Status: COMPLETED | OUTPATIENT
Start: 2023-05-11 | End: 2023-05-11

## 2023-05-11 RX ORDER — LEVOTHYROXINE SODIUM 0.15 MG/1
150 TABLET ORAL
Qty: 30 TABLET | Refills: 0 | Status: SHIPPED | OUTPATIENT
Start: 2023-05-11 | End: 2023-06-26 | Stop reason: SDUPTHER

## 2023-05-11 RX ORDER — AMOXICILLIN 500 MG/1
1000 CAPSULE ORAL DAILY
Qty: 20 CAPSULE | Refills: 0 | Status: SHIPPED | OUTPATIENT
Start: 2023-05-11 | End: 2023-05-21

## 2023-05-11 RX ORDER — DEXAMETHASONE 4 MG/1
8 TABLET ORAL ONCE
Status: COMPLETED | OUTPATIENT
Start: 2023-05-11 | End: 2023-05-11

## 2023-05-11 RX ORDER — PENICILLIN V POTASSIUM 500 MG/1
500 TABLET ORAL ONCE
Status: DISCONTINUED | OUTPATIENT
Start: 2023-05-11 | End: 2023-05-11

## 2023-05-11 RX ADMIN — AMOXICILLIN 1000 MG: 250 CAPSULE ORAL at 19:25

## 2023-05-11 RX ADMIN — DEXAMETHASONE 8 MG: 4 TABLET ORAL at 19:25

## 2023-05-11 ASSESSMENT — FIBROSIS 4 INDEX: FIB4 SCORE: .4385619557494743602

## 2023-05-11 NOTE — TELEPHONE ENCOUNTER
[facility] She is completely out.

## 2023-05-11 NOTE — TELEPHONE ENCOUNTER
Received request via: Patient has no pills    Was the patient seen in the last year in this department? Yes    Does the patient have an active prescription (recently filled or refills available) for medication(s) requested?  yes    Does the patient have snf Plus and need 100 day supply (blood pressure, diabetes and cholesterol meds only)? Patient does not have SCP

## 2023-05-12 RX ORDER — LEVOTHYROXINE SODIUM 0.15 MG/1
150 TABLET ORAL
Qty: 90 TABLET | Refills: 3 | Status: SHIPPED | OUTPATIENT
Start: 2023-05-12 | End: 2023-06-22

## 2023-05-12 RX ORDER — LEVOTHYROXINE SODIUM 0.15 MG/1
150 TABLET ORAL
Qty: 90 TABLET | Refills: 3 | OUTPATIENT
Start: 2023-05-12

## 2023-05-12 NOTE — ED TRIAGE NOTES
Chief Complaint   Patient presents with    Earache     2 days of right ear pain and a sore throat, baby recently DX with strep.    Reports fever last night

## 2023-05-12 NOTE — ED PROVIDER NOTES
ED Provider Note    CHIEF COMPLAINT  Chief Complaint   Patient presents with    Earache     2 days of right ear pain and a sore throat, baby recently DX with strep.       EXTERNAL RECORDS REVIEWED  Outpatient Notes primary care physician's note on 4/25-20 22 by Dr. Prince for migraine headaches, anxiety,  globus sensation and throat    HPI/ROS    Ivis Klein is a 27 y.o. female who presents with complaint of sore throat and ear pain on the right.  The patient states that her son was recently diagnosed with strep throat 2 days ago and now she has profound strep throat and discharge on her tonsils as well as right ear pain.  Pain increases with mowing her nose and coughing and decreased rest.  Denies difficulty swallowing, fever, profound neck pain but her she states her lymph nodes are swollen.    PAST MEDICAL HISTORY   has a past medical history of Anemia (2/17/2022), Anxiety (2/16/2017), Anxiety (2/16/2017), Elevated antinuclear antibody (DEJON) level (5/23/2019), Graves disease (12/5/2016), Heart valve disease, Hemorrhagic cysts of both ovaries (5/23/2019), History of panic attack (4/9/2020), History of pericarditis (12/5/2016), History of thyroidectomy (1/31/2020), Hyperlipidemia (2/17/2022), Panic attack (3/11/2019), Postpartum depression (2/17/2022), Postpartum depression (2/17/2022), and Vaginal discharge (12/2/2021).    SURGICAL HISTORY   has a past surgical history that includes primary c section (9/8/2013) and thyroidectomy total (Bilateral, 3/22/2017).    FAMILY HISTORY  Family History   Problem Relation Age of Onset    Cancer Paternal Grandmother 56        breast cancer    No Known Problems Mother     Hyperlipidemia Father     No Known Problems Sister     No Known Problems Brother        SOCIAL HISTORY  Social History     Tobacco Use    Smoking status: Never    Smokeless tobacco: Never    Tobacco comments:     quit in 2012   Vaping Use    Vaping Use: Never used   Substance and Sexual Activity  "   Alcohol use: No    Drug use: Not Currently     Types: Marijuana, Inhaled     Comment: marijuana weekly    Sexual activity: Yes     Partners: Male     Birth control/protection: Abstinence     Comment: single       CURRENT MEDICATIONS  Home Medications       Reviewed by Madisyn Pa R.N. (Registered Nurse) on 05/11/23 at 1939  Med List Status: Not Addressed     Medication Last Dose Status   escitalopram (LEXAPRO) 10 MG Tab  Active   levothyroxine (SYNTHROID) 150 MCG Tab 5/10/2023 Active   SUMAtriptan (IMITREX) 25 MG Tab tablet  Active                    ALLERGIES  No Known Allergies    PHYSICAL EXAM  VITAL SIGNS: /71   Pulse 76   Temp 36.9 °C (98.4 °F) (Temporal)   Resp 16   Ht 1.6 m (5' 3\")   Wt 74.7 kg (164 lb 10.9 oz)   LMP 05/04/2023 (Exact Date)   SpO2 98%   BMI 29.17 kg/m²      Nursing notes and vitals reviewed.  Constitutional: Well developed, Well nourished, No acute distress, Non-toxic appearance.   Eyes: PERRLA, EOMI, Conjunctiva normal, No discharge.   HENT: Pharyngeal erythema, slight exudate, no abscess, body 1, right tympanic membrane erythematous, tympanic membrane's intact, left tympanic membrane dull   Neck: Bilateral anterior cervical lymphadenopathy, no meningismus  Skin: Warm, Dry, No erythema, No rash.   Neurologic: Alert & oriented x 3, no focal abnormalities noted, acting appropriately on examination  Psychiatric: Affect normal for clinical presentation.     DIAGNOSTIC STUDIES / PROCEDURES      COURSE & MEDICAL DECISION MAKING    ED Observation Status? No; Patient does not meet criteria for ED Observation.     INITIAL ASSESSMENT, COURSE AND PLAN  Care Narrative: This is a [facility] 27-year-old female who presents with pharyngitis as well as otitis media.  Her son was is positive for strep throat on testing and she has evidence otitis therefore I will pretreating her with amoxicillin as well as 1 dose of Decadron here.  She has a very faint otitis but I do believe she " has a strep throat therefore she received medications for this.  The patient has no evidence of surgical condition here in the emergency department.  In addition, she has chronic hypothyroid takes Synthroid she does not for medication refill as she cannot make it to her primary care doctor's office as they are out of town for a bit.  For this reason she received Synthroid 150 mcg tab #30.  Strict return precautions have been given for increasing swelling, difficulty swallowing or she may form an abscess in the future.        ADDITIONAL PROBLEM LIST  Hypothyroid: Prescription of Synthroid has been given.  DISPOSITION AND DISCUSSIONS  I  FINAL DIAGNOSIS  1. Strep pharyngitis Active   2. Acute otitis media, unspecified otitis media type Active   3. Hypothyroidism, unspecified type        DISPOSITION:  Patient will be discharged home in stable condition.    FOLLOW UP:  [facility], Emergency Dept  [address]  [facility] [address]  [phone]    If symptoms worsen    Roel Padilla M.D.  [address] [facility] [id number] [phone]    Schedule an appointment as soon as possible for a visit in 1 week  As needed      OUTPATIENT MEDICATIONS:  Discharge Medication List as of 5/11/2023  7:47 PM        START taking these medications    Details   amoxicillin (AMOXIL) 500 MG Cap Take 2 Capsules by mouth every day for 10 days., Disp-20 Capsule, R-0, Normal      !! levothyroxine (SYNTHROID) 150 MCG Tab Take 1 Tablet by mouth every morning on an empty stomach., Disp-30 Tablet, R-0, Normal       !! - Potential duplicate medications found. Please discuss with provider.            Electronically signed by: Arcenio Harrell D.O., 5/11/2023 9:39 PM

## 2023-05-12 NOTE — ED NOTES
Pt cleared for Dc home education for RX provided pt verbalized understanding to f/u with pcp advised to return to closest ED for any worsening symptoms pt verbalized understanding ambulated out of ED w/steady gait

## 2023-05-12 NOTE — DISCHARGE INSTRUCTIONS
Turn to the emergency department if you have severe pain, difficulty swallowing, shortness of breath, nausea or vomiting, formation of an abscess in your throat.  I do recommend Flonase to help with eustachian tube and decrease your ear pain.  Ibuprofen and Tylenol for pain control.

## 2023-06-22 ENCOUNTER — HOSPITAL ENCOUNTER (EMERGENCY)
Facility: MEDICAL CENTER | Age: 28
End: 2023-06-22
Attending: EMERGENCY MEDICINE
Payer: MEDICAID

## 2023-06-22 ENCOUNTER — APPOINTMENT (OUTPATIENT)
Dept: RADIOLOGY | Facility: MEDICAL CENTER | Age: 28
End: 2023-06-22
Attending: EMERGENCY MEDICINE
Payer: MEDICAID

## 2023-06-22 VITALS
HEIGHT: 63 IN | WEIGHT: 168.43 LBS | HEART RATE: 80 BPM | TEMPERATURE: 98.8 F | DIASTOLIC BLOOD PRESSURE: 76 MMHG | SYSTOLIC BLOOD PRESSURE: 124 MMHG | BODY MASS INDEX: 29.84 KG/M2 | OXYGEN SATURATION: 98 % | RESPIRATION RATE: 16 BRPM

## 2023-06-22 DIAGNOSIS — J06.9 VIRAL URI: ICD-10-CM

## 2023-06-22 LAB
ALBUMIN SERPL BCP-MCNC: 4.3 G/DL (ref 3.2–4.9)
ALBUMIN/GLOB SERPL: 1.4 G/DL
ALP SERPL-CCNC: 74 U/L (ref 30–99)
ALT SERPL-CCNC: 9 U/L (ref 2–50)
ANION GAP SERPL CALC-SCNC: 13 MMOL/L (ref 7–16)
AST SERPL-CCNC: 16 U/L (ref 12–45)
BASOPHILS # BLD AUTO: 0.7 % (ref 0–1.8)
BASOPHILS # BLD: 0.06 K/UL (ref 0–0.12)
BILIRUB SERPL-MCNC: 0.2 MG/DL (ref 0.1–1.5)
BUN SERPL-MCNC: 13 MG/DL (ref 8–22)
CALCIUM ALBUM COR SERPL-MCNC: 8.8 MG/DL (ref 8.5–10.5)
CALCIUM SERPL-MCNC: 9 MG/DL (ref 8.4–10.2)
CHLORIDE SERPL-SCNC: 104 MMOL/L (ref 96–112)
CO2 SERPL-SCNC: 21 MMOL/L (ref 20–33)
CREAT SERPL-MCNC: 0.69 MG/DL (ref 0.5–1.4)
D DIMER PPP IA.FEU-MCNC: <0.27 UG/ML (FEU) (ref 0–0.5)
EOSINOPHIL # BLD AUTO: 0.23 K/UL (ref 0–0.51)
EOSINOPHIL NFR BLD: 2.7 % (ref 0–6.9)
ERYTHROCYTE [DISTWIDTH] IN BLOOD BY AUTOMATED COUNT: 47.9 FL (ref 35.9–50)
GFR SERPLBLD CREATININE-BSD FMLA CKD-EPI: 123 ML/MIN/1.73 M 2
GLOBULIN SER CALC-MCNC: 3.1 G/DL (ref 1.9–3.5)
GLUCOSE SERPL-MCNC: 87 MG/DL (ref 65–99)
HCT VFR BLD AUTO: 36.3 % (ref 37–47)
HGB BLD-MCNC: 11.3 G/DL (ref 12–16)
IMM GRANULOCYTES # BLD AUTO: 0.03 K/UL (ref 0–0.11)
IMM GRANULOCYTES NFR BLD AUTO: 0.4 % (ref 0–0.9)
LYMPHOCYTES # BLD AUTO: 2.33 K/UL (ref 1–4.8)
LYMPHOCYTES NFR BLD: 27.3 % (ref 22–41)
MCH RBC QN AUTO: 25.7 PG (ref 27–33)
MCHC RBC AUTO-ENTMCNC: 31.1 G/DL (ref 32.2–35.5)
MCV RBC AUTO: 82.7 FL (ref 81.4–97.8)
MONOCYTES # BLD AUTO: 0.44 K/UL (ref 0–0.85)
MONOCYTES NFR BLD AUTO: 5.2 % (ref 0–13.4)
NEUTROPHILS # BLD AUTO: 5.45 K/UL (ref 1.82–7.42)
NEUTROPHILS NFR BLD: 63.7 % (ref 44–72)
NRBC # BLD AUTO: 0 K/UL
NRBC BLD-RTO: 0 /100 WBC (ref 0–0.2)
PLATELET # BLD AUTO: 246 K/UL (ref 164–446)
PMV BLD AUTO: 11 FL (ref 9–12.9)
POTASSIUM SERPL-SCNC: 4.1 MMOL/L (ref 3.6–5.5)
PROT SERPL-MCNC: 7.4 G/DL (ref 6–8.2)
RBC # BLD AUTO: 4.39 M/UL (ref 4.2–5.4)
SODIUM SERPL-SCNC: 138 MMOL/L (ref 135–145)
TROPONIN T SERPL-MCNC: 7 NG/L (ref 6–19)
WBC # BLD AUTO: 8.5 K/UL (ref 4.8–10.8)

## 2023-06-22 PROCEDURE — 85379 FIBRIN DEGRADATION QUANT: CPT

## 2023-06-22 PROCEDURE — 84484 ASSAY OF TROPONIN QUANT: CPT

## 2023-06-22 PROCEDURE — 36415 COLL VENOUS BLD VENIPUNCTURE: CPT

## 2023-06-22 PROCEDURE — 71045 X-RAY EXAM CHEST 1 VIEW: CPT

## 2023-06-22 PROCEDURE — 99283 EMERGENCY DEPT VISIT LOW MDM: CPT | Mod: 25

## 2023-06-22 PROCEDURE — 85025 COMPLETE CBC W/AUTO DIFF WBC: CPT

## 2023-06-22 PROCEDURE — 80053 COMPREHEN METABOLIC PANEL: CPT

## 2023-06-22 RX ORDER — FLUTICASONE PROPIONATE 50 MCG
1 SPRAY, SUSPENSION (ML) NASAL DAILY
Qty: 16 G | Refills: 0 | Status: SHIPPED | OUTPATIENT
Start: 2023-06-22 | End: 2023-07-18

## 2023-06-22 RX ORDER — LORATADINE 10 MG/1
10 TABLET ORAL DAILY
Qty: 30 TABLET | Refills: 0 | Status: SHIPPED | OUTPATIENT
Start: 2023-06-22 | End: 2023-07-18

## 2023-06-22 ASSESSMENT — FIBROSIS 4 INDEX: FIB4 SCORE: .4385619557494743602

## 2023-06-22 NOTE — ED TRIAGE NOTES
Pt ambulates back to room  Pt changed into gown and placed on monitor    Chief Complaint   Patient presents with    Weakness    Chest Pain     X 3 days, intermittent pressure, middle of chest    Body Aches    Congestion     Pt resting on gurney, pt in no acute distress, pt provided call light, instructed to call if needing any assistance, instructed not to get up by self, gurney in lowest position.

## 2023-06-22 NOTE — ED PROVIDER NOTES
ED Provider Note    CHIEF COMPLAINT  No chief complaint on file.      EXTERNAL RECORDS REVIEWED  Multiple emergency department notes for various complaints including recurrent UTIs and vaginal discharge with negative vaginal pathogen testing, most recent primary care note 4/24/2023 for malaise and menorrhagia.    HPI/BHAVESH Staleyadriana Klein is a 27 y.o. female who presents with chief complaint of cough, congestion, mild chest pain.  Patient reports the chest pain comes and goes, she reports it generally occurs while she is sitting down relaxing.  She denies any associated palpitations.  She describes the pain as a heaviness.  She reports associated runny nose and some nasal congestion.  She also reports some body aches.  Patient denies any stan dyspnea or decreased exertional tolerance but does report she feels like she just cannot open her lungs of wide enough.  She denies any recent lower extremity edema or recent hospitalization or broken bones.  She does have a history of DVT, in the setting of pregnancy and COVID.    PAST MEDICAL HISTORY   has a past medical history of Anemia (2/17/2022), Anxiety (2/16/2017), Anxiety (2/16/2017), Elevated antinuclear antibody (DEJON) level (5/23/2019), Graves disease (12/5/2016), Heart valve disease, Hemorrhagic cysts of both ovaries (5/23/2019), History of panic attack (4/9/2020), History of pericarditis (12/5/2016), History of thyroidectomy (1/31/2020), Hyperlipidemia (2/17/2022), Panic attack (3/11/2019), Postpartum depression (2/17/2022), Postpartum depression (2/17/2022), and Vaginal discharge (12/2/2021).    SURGICAL HISTORY   has a past surgical history that includes primary c section (9/8/2013) and thyroidectomy total (Bilateral, 3/22/2017).    FAMILY HISTORY  Family History   Problem Relation Age of Onset    Cancer Paternal Grandmother 56        breast cancer    No Known Problems Mother     Hyperlipidemia Father     No Known Problems Sister     No Known  Problems Brother        SOCIAL HISTORY  Social History     Tobacco Use    Smoking status: Never    Smokeless tobacco: Never    Tobacco comments:     quit in 2012   Vaping Use    Vaping Use: Never used   Substance and Sexual Activity    Alcohol use: No    Drug use: Not Currently     Types: Marijuana, Inhaled     Comment: marijuana weekly    Sexual activity: Yes     Partners: Male     Birth control/protection: Abstinence     Comment: single       CURRENT MEDICATIONS  Home Medications    **Home medications have not yet been reviewed for this encounter**         ALLERGIES  No Known Allergies    PHYSICAL EXAM  VITAL SIGNS: There were no vitals taken for this visit.   Physical Exam  Constitutional:       Appearance: She is well-developed.   HENT:      Head: Normocephalic and atraumatic.   Eyes:      Pupils: Pupils are equal, round, and reactive to light.   Cardiovascular:      Rate and Rhythm: Normal rate and regular rhythm.      Comments: Tenderness palpation of left chest wall  Pulmonary:      Effort: Pulmonary effort is normal. No accessory muscle usage or respiratory distress.      Breath sounds: Normal breath sounds.   Abdominal:      General: Bowel sounds are normal.      Palpations: Abdomen is soft. There is no mass.      Tenderness: There is no abdominal tenderness.   Musculoskeletal:         General: Normal range of motion.   Skin:     General: Skin is warm.      Capillary Refill: Capillary refill takes less than 2 seconds.   Neurological:      General: No focal deficit present.      Mental Status: She is alert.   Psychiatric:         Mood and Affect: Mood normal. Mood is not anxious.           DIAGNOSTIC STUDIES / PROCEDURES  EKG  I have independently interpreted this EKG  EKG is normal sinus rhythm, normal axis normal intervals no ST changes consistent with acute regional ischemia    LABS  Results for orders placed or performed during the hospital encounter of 06/22/23   CBC WITH DIFFERENTIAL   Result Value Ref  Range    WBC 8.5 4.8 - 10.8 K/uL    RBC 4.39 4.20 - 5.40 M/uL    Hemoglobin 11.3 (L) 12.0 - 16.0 g/dL    Hematocrit 36.3 (L) 37.0 - 47.0 %    MCV 82.7 81.4 - 97.8 fL    MCH 25.7 (L) 27.0 - 33.0 pg    MCHC 31.1 (L) 32.2 - 35.5 g/dL    RDW 47.9 35.9 - 50.0 fL    Platelet Count 246 164 - 446 K/uL    MPV 11.0 9.0 - 12.9 fL    Neutrophils-Polys 63.70 44.00 - 72.00 %    Lymphocytes 27.30 22.00 - 41.00 %    Monocytes 5.20 0.00 - 13.40 %    Eosinophils 2.70 0.00 - 6.90 %    Basophils 0.70 0.00 - 1.80 %    Immature Granulocytes 0.40 0.00 - 0.90 %    Nucleated RBC 0.00 0.00 - 0.20 /100 WBC    Neutrophils (Absolute) 5.45 1.82 - 7.42 K/uL    Lymphs (Absolute) 2.33 1.00 - 4.80 K/uL    Monos (Absolute) 0.44 0.00 - 0.85 K/uL    Eos (Absolute) 0.23 0.00 - 0.51 K/uL    Baso (Absolute) 0.06 0.00 - 0.12 K/uL    Immature Granulocytes (abs) 0.03 0.00 - 0.11 K/uL    NRBC (Absolute) 0.00 K/uL   CMP   Result Value Ref Range    Sodium 138 135 - 145 mmol/L    Potassium 4.1 3.6 - 5.5 mmol/L    Chloride 104 96 - 112 mmol/L    Co2 21 20 - 33 mmol/L    Anion Gap 13.0 7.0 - 16.0    Glucose 87 65 - 99 mg/dL    Bun 13 8 - 22 mg/dL    Creatinine 0.69 0.50 - 1.40 mg/dL    Calcium 9.0 8.4 - 10.2 mg/dL    AST(SGOT) 16 12 - 45 U/L    ALT(SGPT) 9 2 - 50 U/L    Alkaline Phosphatase 74 30 - 99 U/L    Total Bilirubin 0.2 0.1 - 1.5 mg/dL    Albumin 4.3 3.2 - 4.9 g/dL    Total Protein 7.4 6.0 - 8.2 g/dL    Globulin 3.1 1.9 - 3.5 g/dL    A-G Ratio 1.4 g/dL   TROPONIN   Result Value Ref Range    Troponin T 7 6 - 19 ng/L   D-DIMER   Result Value Ref Range    D-Dimer <0.27 0.00 - 0.50 ug/mL (FEU)   CORRECTED CALCIUM   Result Value Ref Range    Correct Calcium 8.8 8.5 - 10.5 mg/dL   ESTIMATED GFR   Result Value Ref Range    GFR (CKD-EPI) 123 >60 mL/min/1.73 m 2         RADIOLOGY  I have independently interpreted the diagnostic imaging associated with this visit and am waiting the final reading from the radiologist.   My preliminary interpretation is as follows:  Unremarkable  Radiologist interpretation:   DX-CHEST-PORTABLE (1 VIEW)   Final Result      Negative single view of the chest.            COURSE & MEDICAL DECISION MAKING      INITIAL ASSESSMENT, COURSE AND PLAN  Care Narrative: Patient here with symptoms most consistent with likely viral illness.  She has a very reassuring exam with reproducible chest pain on exam.  I suspect patient likely has some mild costochondritis secondary to viral illness however she does have a history of DVTs in the past.  Given this will check D-dimer and basic labs as well as EKG and chest x-ray.  We will also rule out ACS with heart score.  Patient with chest pain for greater than 3 hours prior to arrival, I believe a single troponin is sufficient.  D-dimer is within normal limits.  Patient's heart score is less than 4.  Chest x-ray is reviewed and reassuring.  Basic labs are reassuring.    Home with supportive care, loratadine, Flonase and ibuprofen        DISPOSITION AND DISCUSSIONS      Decision tools and prescription drugs considered including, but not limited to: Wells criteria, heart score    FINAL DIAGNOSIS  1. Viral URI

## 2023-06-22 NOTE — DISCHARGE INSTRUCTIONS
Count includes the [facility]  Adult Nutrition   Progress Note (Follow-Up)    SUMMARY      Recommendations:   1. Advance diet as tolerated to Cardiac, High fiber  2. Continue Ensure clear as tolerated.  3. RD to follow for intake, labs and bowel function PRN.     Goals:   Goals:   1, Diet to advance within 48 hours to Cardiac, High fiber.   2. Patient to meet at least 75% of estimated energy and protein needs.   3. Bowel function to improve.  Progressing    Dietitian Rounds Brief  Patient eating better per nursing, most of clear liquid meals and drinks Ensure clear. Last BM 4/13/23. Miralax changed to daily per GI MD. RD to monitor for intake, labs, and bowel function PRN.    Diet order: Clear Liquid diet    Oral Supplement: Ensure clear with meals    % Intake of Estimated Energy Needs: 25 - 50 %  % Meal Intake: 25 - 50 %    Estimated/Assessed Needs  Weight Used For Calorie Calculations: 81.7 kg (180 lb 1.9 oz)  Energy Calorie Requirements (kcal): 9265-7472 (20-25 kcal/kg)  Energy Need Method: Kcal/kg  Protein Requirements: 81-98 gm (1.0-1.2 gm/kg)  Weight Used For Protein Calculations: 81.7 kg (180 lb 1.9 oz)  Fluid Requirements (mL): 2042 (25 mL/kg)     RDA Method (mL): 1634     Weight History:  Wt Readings from Last 5 Encounters:   04/12/23 81.7 kg (180 lb 1.9 oz)   10/11/22 81.6 kg (180 lb)   07/24/22 80.5 kg (177 lb 7.5 oz)   07/24/22 80.3 kg (177 lb)   05/18/22 82.1 kg (180 lb 14.4 oz)      Reason for Assessment  Reason For Assessment: consult (education)  Diagnosis: gastrointestinal disease  Relevant Medical History: paroxysmal atrial fibrillation on apixaban, chronic HFpEF, constipation and hypertension  Interdisciplinary Rounds: did not attend  General Information Comments: Pt presents to the emergency room complaining of rectal bleeding.  Patient states that she has chronic constipation and takes MiraLax every other day. Pt reports following a high fiber diet at home regarding constipation issues. Provided  Your work-up today was very reassuring.  Your x-ray did not show any evidence of pneumonia.  Your heart enzyme was normal, your EKG was normal, your kidney function, liver function were normal.  Your blood clot enzyme was normal.  Your symptoms should improve over the next few days.  Take up to 3 tabs of standard dose ibuprofen every 6 hours as needed for pain.  I have also prescribed you a nasal spray and some Claritin as well to help with your symptoms.   constipation meal planning education with handouts. RD contact information provided.    Medications:Pertinent Medications Reviewed  Scheduled Meds:   apixaban  2.5 mg Oral BID    magnesium oxide  400 mg Oral Daily    pantoprazole  40 mg Oral Daily    piperacillin-tazobactam (ZOSYN) IVPB  4.5 g Intravenous Q8H    polyethylene glycol  17 g Oral BID    sotaloL  40 mg Oral BID     Continuous Infusions:  PRN Meds:.acetaminophen, acetaminophen, melatonin, morphine, naloxone, ondansetron, simethicone, sodium chloride 0.9%    Labs: Pertinent Labs Reviewed  Clinical Chemistry:  Recent Labs   Lab 04/10/23  1711 04/11/23  0729 04/14/23  0421      < > 141   K 4.3   < > 4.1      < > 111*   CO2 23   < > 24   GLU 91   < > 78   BUN 25*   < > 12   CREATININE 1.0   < > 1.3   CALCIUM 9.8   < > 9.1   PROT 6.7  --   --    ALBUMIN 4.0  --   --    BILITOT 0.6  --   --    ALKPHOS 88  --   --    AST 21  --   --    ALT 16  --   --    ANIONGAP 7*   < > 6*   MG 2.1   < > 1.9   LIPASE 43  --   --     < > = values in this interval not displayed.     CBC:   Recent Labs   Lab 04/14/23 0421   WBC 4.63   RBC 3.44*   HGB 10.6*   HCT 31.3*   *   MCV 91   MCH 30.8   MCHC 33.9     Lipid Panel:  No results for input(s): CHOL, HDL, LDLCALC, TRIG, CHOLHDL in the last 168 hours.  Cardiac Profile:  No results for input(s): BNP, CPK, CPKMB, TROPONINI, CKTOTAL in the last 168 hours.  Inflammatory Labs:  No results for input(s): CRP in the last 168 hours.  Diabetes:  Recent Labs   Lab 04/11/23  0729   HGBA1C 5.1     Thyroid & Parathyroid:  No results for input(s): TSH, FREET4, U1JVMOR, E0WQCSJ, THYROIDAB in the last 168 hours.    Monitor and Evaluation  Food and Nutrient Adminstration: diet order  Knowledge/Beliefs/Attitudes: food and nutrition knowledge/skill  Physical Activity and Function: nutrition-related ADLs and IADLs  Anthropometric Measurements: weight change, weight, body mass index  Biochemical Data, Medical Tests and Procedures:  electrolyte and renal panel, gastrointestinal profile, glucose/endocrine profile, inflammatory profile, lipid profile  Nutrition-Focused Physical Findings: overall appearance     Nutrition Risk  Level of Risk/Frequency of Follow-up: moderate     Nutrition Follow-Up  RD Follow-up?: Yes    Mary Young RD 04/14/2023 8:45 AM

## 2023-06-26 DIAGNOSIS — E03.9 HYPOTHYROIDISM, UNSPECIFIED TYPE: ICD-10-CM

## 2023-06-27 RX ORDER — LEVOTHYROXINE SODIUM 0.15 MG/1
150 TABLET ORAL
Qty: 90 TABLET | Refills: 1 | Status: SHIPPED | OUTPATIENT
Start: 2023-06-27 | End: 2023-08-15 | Stop reason: SDUPTHER

## 2023-07-18 ENCOUNTER — HOSPITAL ENCOUNTER (EMERGENCY)
Facility: MEDICAL CENTER | Age: 28
End: 2023-07-18
Payer: MEDICAID

## 2023-07-18 ENCOUNTER — HOSPITAL ENCOUNTER (EMERGENCY)
Facility: MEDICAL CENTER | Age: 28
End: 2023-07-18
Attending: EMERGENCY MEDICINE
Payer: MEDICAID

## 2023-07-18 VITALS
WEIGHT: 164.9 LBS | SYSTOLIC BLOOD PRESSURE: 113 MMHG | RESPIRATION RATE: 18 BRPM | BODY MASS INDEX: 29.22 KG/M2 | DIASTOLIC BLOOD PRESSURE: 78 MMHG | HEIGHT: 63 IN | TEMPERATURE: 97.7 F | HEART RATE: 67 BPM | OXYGEN SATURATION: 100 %

## 2023-07-18 VITALS
HEIGHT: 63 IN | TEMPERATURE: 97.6 F | OXYGEN SATURATION: 99 % | WEIGHT: 166.01 LBS | SYSTOLIC BLOOD PRESSURE: 132 MMHG | BODY MASS INDEX: 29.41 KG/M2 | HEART RATE: 70 BPM | DIASTOLIC BLOOD PRESSURE: 67 MMHG | RESPIRATION RATE: 16 BRPM

## 2023-07-18 DIAGNOSIS — E03.9 HYPOTHYROIDISM, UNSPECIFIED TYPE: ICD-10-CM

## 2023-07-18 DIAGNOSIS — R42 LIGHTHEADEDNESS: ICD-10-CM

## 2023-07-18 LAB
ALBUMIN SERPL BCP-MCNC: 4.5 G/DL (ref 3.2–4.9)
ALBUMIN/GLOB SERPL: 1.5 G/DL
ALP SERPL-CCNC: 72 U/L (ref 30–99)
ALT SERPL-CCNC: 13 U/L (ref 2–50)
ANION GAP SERPL CALC-SCNC: 9 MMOL/L (ref 7–16)
APPEARANCE UR: CLEAR
AST SERPL-CCNC: 16 U/L (ref 12–45)
BASOPHILS # BLD AUTO: 1.3 % (ref 0–1.8)
BASOPHILS # BLD: 0.08 K/UL (ref 0–0.12)
BILIRUB SERPL-MCNC: <0.2 MG/DL (ref 0.1–1.5)
BILIRUB UR QL STRIP.AUTO: NEGATIVE
BUN SERPL-MCNC: 8 MG/DL (ref 8–22)
CALCIUM ALBUM COR SERPL-MCNC: 8.8 MG/DL (ref 8.5–10.5)
CALCIUM SERPL-MCNC: 9.2 MG/DL (ref 8.5–10.5)
CHLORIDE SERPL-SCNC: 104 MMOL/L (ref 96–112)
CO2 SERPL-SCNC: 26 MMOL/L (ref 20–33)
COLOR UR: YELLOW
CREAT SERPL-MCNC: 0.69 MG/DL (ref 0.5–1.4)
EKG IMPRESSION: NORMAL
EOSINOPHIL # BLD AUTO: 0.09 K/UL (ref 0–0.51)
EOSINOPHIL NFR BLD: 1.5 % (ref 0–6.9)
ERYTHROCYTE [DISTWIDTH] IN BLOOD BY AUTOMATED COUNT: 49.3 FL (ref 35.9–50)
GFR SERPLBLD CREATININE-BSD FMLA CKD-EPI: 123 ML/MIN/1.73 M 2
GLOBULIN SER CALC-MCNC: 3.1 G/DL (ref 1.9–3.5)
GLUCOSE SERPL-MCNC: 103 MG/DL (ref 65–99)
GLUCOSE UR STRIP.AUTO-MCNC: NEGATIVE MG/DL
HCG SERPL QL: NEGATIVE
HCT VFR BLD AUTO: 38.7 % (ref 37–47)
HGB BLD-MCNC: 11.7 G/DL (ref 12–16)
IMM GRANULOCYTES # BLD AUTO: 0.02 K/UL (ref 0–0.11)
IMM GRANULOCYTES NFR BLD AUTO: 0.3 % (ref 0–0.9)
KETONES UR STRIP.AUTO-MCNC: NEGATIVE MG/DL
LEUKOCYTE ESTERASE UR QL STRIP.AUTO: NEGATIVE
LIPASE SERPL-CCNC: 30 U/L (ref 11–82)
LYMPHOCYTES # BLD AUTO: 2.5 K/UL (ref 1–4.8)
LYMPHOCYTES NFR BLD: 40.6 % (ref 22–41)
MCH RBC QN AUTO: 25.7 PG (ref 27–33)
MCHC RBC AUTO-ENTMCNC: 30.2 G/DL (ref 32.2–35.5)
MCV RBC AUTO: 84.9 FL (ref 81.4–97.8)
MICRO URNS: NORMAL
MONOCYTES # BLD AUTO: 0.29 K/UL (ref 0–0.85)
MONOCYTES NFR BLD AUTO: 4.7 % (ref 0–13.4)
NEUTROPHILS # BLD AUTO: 3.18 K/UL (ref 1.82–7.42)
NEUTROPHILS NFR BLD: 51.6 % (ref 44–72)
NITRITE UR QL STRIP.AUTO: NEGATIVE
NRBC # BLD AUTO: 0 K/UL
NRBC BLD-RTO: 0 /100 WBC (ref 0–0.2)
PH UR STRIP.AUTO: 6 [PH] (ref 5–8)
PLATELET # BLD AUTO: 272 K/UL (ref 164–446)
PMV BLD AUTO: 11.1 FL (ref 9–12.9)
POTASSIUM SERPL-SCNC: 3.9 MMOL/L (ref 3.6–5.5)
PROT SERPL-MCNC: 7.6 G/DL (ref 6–8.2)
PROT UR QL STRIP: NEGATIVE MG/DL
RBC # BLD AUTO: 4.56 M/UL (ref 4.2–5.4)
RBC UR QL AUTO: NEGATIVE
SODIUM SERPL-SCNC: 139 MMOL/L (ref 135–145)
SP GR UR STRIP.AUTO: 1.02
T4 FREE SERPL-MCNC: 0.83 NG/DL (ref 0.93–1.7)
TROPONIN T SERPL-MCNC: <6 NG/L (ref 6–19)
TSH SERPL DL<=0.005 MIU/L-ACNC: 96.5 UIU/ML (ref 0.38–5.33)
UROBILINOGEN UR STRIP.AUTO-MCNC: 0.2 MG/DL
WBC # BLD AUTO: 6.2 K/UL (ref 4.8–10.8)

## 2023-07-18 PROCEDURE — 700105 HCHG RX REV CODE 258: Mod: JZ | Performed by: EMERGENCY MEDICINE

## 2023-07-18 PROCEDURE — 80053 COMPREHEN METABOLIC PANEL: CPT

## 2023-07-18 PROCEDURE — 84439 ASSAY OF FREE THYROXINE: CPT

## 2023-07-18 PROCEDURE — 36415 COLL VENOUS BLD VENIPUNCTURE: CPT

## 2023-07-18 PROCEDURE — 84703 CHORIONIC GONADOTROPIN ASSAY: CPT

## 2023-07-18 PROCEDURE — 85025 COMPLETE CBC W/AUTO DIFF WBC: CPT

## 2023-07-18 PROCEDURE — 84484 ASSAY OF TROPONIN QUANT: CPT

## 2023-07-18 PROCEDURE — 93005 ELECTROCARDIOGRAM TRACING: CPT

## 2023-07-18 PROCEDURE — 81003 URINALYSIS AUTO W/O SCOPE: CPT

## 2023-07-18 PROCEDURE — 84443 ASSAY THYROID STIM HORMONE: CPT

## 2023-07-18 PROCEDURE — 302449 STATCHG TRIAGE ONLY (STATISTIC)

## 2023-07-18 PROCEDURE — 83690 ASSAY OF LIPASE: CPT

## 2023-07-18 PROCEDURE — 99284 EMERGENCY DEPT VISIT MOD MDM: CPT

## 2023-07-18 RX ORDER — ACETAMINOPHEN 500 MG
1000 TABLET ORAL EVERY 6 HOURS PRN
Status: SHIPPED | COMMUNITY
End: 2023-08-16

## 2023-07-18 RX ORDER — SODIUM CHLORIDE, SODIUM LACTATE, POTASSIUM CHLORIDE, CALCIUM CHLORIDE 600; 310; 30; 20 MG/100ML; MG/100ML; MG/100ML; MG/100ML
1000 INJECTION, SOLUTION INTRAVENOUS ONCE
Status: COMPLETED | OUTPATIENT
Start: 2023-07-18 | End: 2023-07-18

## 2023-07-18 RX ORDER — LEVONORGESTREL 52 MG/1
1 INTRAUTERINE DEVICE INTRAUTERINE ONCE
COMMUNITY

## 2023-07-18 RX ORDER — LEVOTHYROXINE SODIUM 0.03 MG/1
25 TABLET ORAL
Qty: 30 TABLET | Refills: 0 | Status: SHIPPED | OUTPATIENT
Start: 2023-07-18 | End: 2023-08-20

## 2023-07-18 RX ADMIN — SODIUM CHLORIDE, POTASSIUM CHLORIDE, SODIUM LACTATE AND CALCIUM CHLORIDE 1000 ML: 600; 310; 30; 20 INJECTION, SOLUTION INTRAVENOUS at 12:22

## 2023-07-18 ASSESSMENT — FIBROSIS 4 INDEX
FIB4 SCORE: 0.54
FIB4 SCORE: 0.41

## 2023-07-18 NOTE — DISCHARGE INSTRUCTIONS
Your test today show your thyroid function is low.  As we discussed would advise increasing your thyroid medication.  I have written you a prescription for an additional 25 mcg so you will take your usual 150mcg + the 25mcg for total of 175 daily.  You will need to follow-up with your primary care doctor for recheck as we discussed.  Otherwise your test showed no dangerous cause for your symptoms.  Seek more immediate medical attention for any new or worsening symptoms, passing out, chest pain or other concerns

## 2023-07-18 NOTE — Clinical Note
Ivis Klein was seen and treated in our emergency department on 7/18/2023.  She may return to work on 07/20/2023.       If you have any questions or concerns, please don't hesitate to call.      Lucas Telles M.D.

## 2023-07-18 NOTE — ED TRIAGE NOTES
"Chief Complaint   Patient presents with    Near Syncopal     States felt dizzy last night and this morning in the shower, was seeing \"black spots\". Has continued to be dizzy this morning. State this has happened when her thyroid is off. Takes 150mcg levothyroxine daily, has not missed any doses.     Abdominal Pain     Reports sharp pain in stomach this morning    Blood in Urine     Noticed small amount of blood in urine this morning, -dysuria     Pt ambulates to triage for above. VSS, pt in no acute distress.    Protocols ordered. Pt to EKG room. Educated on triage process.  "

## 2023-07-18 NOTE — ED NOTES
Pt provided with dc paper work and follow up care. Pt declines questions and ambulated out of ER.

## 2023-07-18 NOTE — ED TRIAGE NOTES
"Pt comes in by herself  c/o almost passing out this morning  was at [facility] for same today however LWBS   did not feel well last night however was able to sleep  symptoms this morning \"Things started to go black\"  pt laid back down and resting some more   later attempted to get up again and same thing happened   did not pass out   c/o dizziness and lightheadedness w/ slight nausea   \"I just don't feel right\"  pt w/ Hx of thyroid issues and is thinking it might be the cause   did have lab draw done  [facility] today and is in chart   "

## 2023-07-18 NOTE — ED NOTES
PT ambulating to restroom at this time. IVF completed and pts states she is still a  bit dizzy with standing but that it has improved.

## 2023-07-18 NOTE — ED NOTES
Med rec updated and complete, per pt   Allergies reviewed, per pt  Pt reports that she has not taken or used her FLONASONE 50MCG/ACT, LORATADINE 10MG, IMITREX 25MG, or ESCITALOPRAM 10MG in the last 30 days or longer.

## 2023-07-18 NOTE — ED NOTES
Pt ambulated to ER room with steady gait. Pt getting changed into gown at this time and is to be hooked up to monitor by ER Tech.

## 2023-07-18 NOTE — ED PROVIDER NOTES
ED Provider Note    CHIEF COMPLAINT  Chief Complaint   Patient presents with    Near Syncopal     Started this morning    Other     Not feeling well today when she woke up     Dizziness    Lightheadedness    Nausea       EXTERNAL RECORDS REVIEWED  Outpatient Notes from office visits April 2023 noted for fatigue menorrhagia with regular cycles, hypothyroidism    HPI/ROS  LIMITATION TO HISTORY   Select: : None  OUTSIDE HISTORIAN(S):  none    Ivis Klein is a 27 y.o. female who presents with episodes of lightheadedness.  Patient reports that she noticed this last night and then especially when she was in a hot shower earlier this morning.  No syncope but did feel lightheaded.  This seems to be more when she stands up.  She states she does feel improved now.  No chest pain, no shortness of breath.  No leg pain or swelling.  No recent illness fevers chills cough or congestion.  She does report some occasional nausea as well but no vomiting, no diarrhea, no blood in her stool.  No focal weakness or numbness just an overall sense of weakness.    PAST MEDICAL HISTORY   has a past medical history of Anemia (2/17/2022), Anxiety (2/16/2017), Anxiety (2/16/2017), Elevated antinuclear antibody (DEJON) level (5/23/2019), Graves disease (12/5/2016), Heart valve disease, Hemorrhagic cysts of both ovaries (5/23/2019), History of panic attack (4/9/2020), History of pericarditis (12/5/2016), History of thyroidectomy (1/31/2020), Hyperlipidemia (2/17/2022), Panic attack (3/11/2019), Postpartum depression (2/17/2022), Postpartum depression (2/17/2022), and Vaginal discharge (12/2/2021).    SURGICAL HISTORY   has a past surgical history that includes primary c section (9/8/2013) and thyroidectomy total (Bilateral, 3/22/2017).    FAMILY HISTORY  Family History   Problem Relation Age of Onset    Cancer Paternal Grandmother 56        breast cancer    No Known Problems Mother     Hyperlipidemia Father     No Known Problems Sister   "   No Known Problems Brother        SOCIAL HISTORY  Social History     Tobacco Use    Smoking status: Never    Smokeless tobacco: Never    Tobacco comments:     quit in 2012   Vaping Use    Vaping Use: Never used   Substance and Sexual Activity    Alcohol use: No    Drug use: Yes     Types: Marijuana, Inhaled     Comment: marijuana weekly    Sexual activity: Yes     Partners: Male     Birth control/protection: Abstinence     Comment: single       CURRENT MEDICATIONS  Home Medications       Reviewed by Lynne Phan (Pharmacy Tech) on 07/18/23 at 1231  Med List Status: Complete     Medication Last Dose Status   acetaminophen (TYLENOL) 500 MG Tab 7/17/2023 Active   levonorgestrel (MIRENA, 52 MG,) 20 MCG/DAY IUD > 3 years Active   levothyroxine (SYNTHROID) 150 MCG Tab 7/18/2023 Active                    ALLERGIES  No Known Allergies    PHYSICAL EXAM  VITAL SIGNS: /78   Pulse 67   Temp 36.5 °C (97.7 °F) (Temporal)   Resp 18   Ht 1.6 m (5' 3\")   Wt 74.8 kg (164 lb 14.5 oz)   LMP 07/03/2023 (Approximate)   SpO2 100%   BMI 29.21 kg/m²      Pulse ox interpretation: I interpret this pulse ox as normal.  Constitutional: Alert in no apparent distress.  HENT: No signs of trauma, Bilateral external ears normal, Nose normal.   Eyes: Pupils are equal and reactive, Conjunctiva normal, Non-icteric.   Neck: Normal range of motion, No tenderness, Supple, No stridor.   Cardiovascular: Regular rate and rhythm, no murmurs.   Thorax & Lungs: Normal breath sounds, No respiratory distress, No wheezing, No chest tenderness.   Abdomen: Bowel sounds normal, Soft, No tenderness, No masses, No pulsatile masses. No peritoneal signs.  Skin: Warm, Dry, No erythema, No rash.   Back: No bony tenderness, No CVA tenderness.   Extremities: Intact distal pulses, No edema, No tenderness, No cyanosis,  Negative Dwayne's sign.   Musculoskeletal: Good range of motion in all major joints. No tenderness to palpation or major deformities " noted.   Neurologic: Alert , Normal motor function, Normal sensory function, No focal deficits noted.   Psychiatric: Affect normal, Judgment normal, Mood normal.               DIAGNOSTIC STUDIES / PROCEDURES  Results for orders placed or performed during the hospital encounter of 07/18/23   TROPONIN   Result Value Ref Range    Troponin T <6 6 - 19 ng/L   TSH WITH REFLEX TO FT4   Result Value Ref Range    TSH 96.500 (H) 0.380 - 5.330 uIU/mL   FREE THYROXINE   Result Value Ref Range    Free T-4 0.83 (L) 0.93 - 1.70 ng/dL       EKG:   Interpreted by me    Rhythm:  Normal sinus rhythm   Rate: 68  Axis: normal  Intervals: normal  Ectopy: none  Conduction: normal  ST Segments: no acute change  T Waves: no acute change  Q Waves: none  Old EKG from 6/22 shows no significant changes.      Patient had labs drawn at [facility] just this morning, I have reviewed these labs, unremarkable metabolic panel, as well as CBC, negative pregnancy, negative urinalysis        COURSE & MEDICAL DECISION MAKING    ED Observation Status? Yes; I am placing the patient in to an observation status due to a diagnostic uncertainty as well as therapeutic intensity. Patient placed in observation status at 12:11 PM, 7/18/2023.     Observation plan is as follows: Undertreat monitoring given her symptoms    Upon Reevaluation, the patient's condition has: Improved; and will be discharged.    Patient discharged from ED Observation status at 1:50 PM   (Time) 07/18/23   (Date).     INITIAL ASSESSMENT, COURSE AND PLAN  Care Narrative: 12:11 PM  Patient evaluated at bedside, at this point differential includes acute processes such as dehydration, electrolyte or metabolic derangement, thyroid dysfunction, arrhythmia, seems less likely ACS although this is considered, also less likely anemia although this is also considered.  I have ordered for diagnostic labs, ECG, IV fluid    1:50 PM  Patient is reevaluated, updated on results, feeling improved after  fluids, will plan for discharge      HYDRATION: Based on the patient's presentation of Inability to take oral fluids the patient was given IV fluids. IV Hydration was used because oral hydration was not as rapid as required. Upon recheck following hydration, the patient was improved.      PROBLEM LIST  #Hypothyroid.  Patient with history of hypothyroid on levothyroxine only..  She does appear to be subtherapeutic currently with a significantly elevated TSH.  I will increase her levothyroxine by 25 mcg should she will start taking a total of 175 daily Free thyroxine low as well and she will follow-up with her primary care.  No findings of myxedema    #Lightheadedness.  Potentially some combination of her thyroid as well as dehydration given the current heat.  No findings of emergent pathology at this time.  The patient has no valvular abnormality on my examination to suggest valvular cause or hypertrophic cardiomyopathy. The ekg does not show any evidence of arrythmia, prolonged intervals, early excitation, or other abnormality such as an accessory pathway, qt prolongation, or brugada syndrome. ACS or MI are unlikely causes given nature of sx, unremarkable ECG and given the patients improvement the likely of acs of mi is very low. Serious bacterial illness was considered but ruled out by history and physical exam. There is no evidence of electrolyte abnormalities to explain this episode  on labs.  As the patient is now improved there is no evidence of emergent toxic, metabolic, or endocrine abnormalities.      DISPOSITION AND DISCUSSIONS      Barriers to care at this time, including but not limited to:  none .     Decision tools and prescription drugs considered including, but not limited to: Medication modification will increase her levothyroxine .    The patient will return for new or worsening symptoms and is stable at the time of discharge.    The patient is referred to a primary physician for blood pressure  management, diabetic screening, and for all other preventative health concerns.      DISPOSITION:  Patient will be discharged home in stable condition.    FOLLOW UP:  Roel Padilla M.D.  [address] [facility] [id number] [phone]    Schedule an appointment as soon as possible for a visit         OUTPATIENT MEDICATIONS:  New Prescriptions    LEVOTHYROXINE (SYNTHROID) 25 MCG TAB    Take 1 Tablet by mouth every morning on an empty stomach.         FINAL DIAGNOSIS  1. Lightheadedness    2. Hypothyroidism, unspecified type           Electronically signed by: Lucas Telles M.D., 7/18/2023 12:02 PM

## 2023-07-19 ENCOUNTER — HOSPITAL ENCOUNTER (OUTPATIENT)
Dept: LAB | Facility: MEDICAL CENTER | Age: 28
End: 2023-07-19
Attending: INTERNAL MEDICINE
Payer: MEDICAID

## 2023-07-19 DIAGNOSIS — N92.0 MENORRHAGIA WITH REGULAR CYCLE: ICD-10-CM

## 2023-07-19 DIAGNOSIS — R53.83 FATIGUE, UNSPECIFIED TYPE: ICD-10-CM

## 2023-07-19 DIAGNOSIS — E89.0 POSTSURGICAL HYPOTHYROIDISM: ICD-10-CM

## 2023-07-19 LAB
25(OH)D3 SERPL-MCNC: 25 NG/ML (ref 30–100)
ERYTHROCYTE [DISTWIDTH] IN BLOOD BY AUTOMATED COUNT: 48.7 FL (ref 35.9–50)
FERRITIN SERPL-MCNC: 4.7 NG/ML (ref 10–291)
HCT VFR BLD AUTO: 36.4 % (ref 37–47)
HGB BLD-MCNC: 11.2 G/DL (ref 12–16)
MCH RBC QN AUTO: 25.7 PG (ref 27–33)
MCHC RBC AUTO-ENTMCNC: 30.8 G/DL (ref 32.2–35.5)
MCV RBC AUTO: 83.7 FL (ref 81.4–97.8)
PLATELET # BLD AUTO: 290 K/UL (ref 164–446)
PMV BLD AUTO: 11.7 FL (ref 9–12.9)
RBC # BLD AUTO: 4.35 M/UL (ref 4.2–5.4)
TSH SERPL DL<=0.005 MIU/L-ACNC: 95.5 UIU/ML (ref 0.38–5.33)
WBC # BLD AUTO: 7.4 K/UL (ref 4.8–10.8)

## 2023-07-19 PROCEDURE — 84466 ASSAY OF TRANSFERRIN: CPT

## 2023-07-19 PROCEDURE — 82728 ASSAY OF FERRITIN: CPT

## 2023-07-19 PROCEDURE — 36415 COLL VENOUS BLD VENIPUNCTURE: CPT

## 2023-07-19 PROCEDURE — 84443 ASSAY THYROID STIM HORMONE: CPT

## 2023-07-19 PROCEDURE — 85027 COMPLETE CBC AUTOMATED: CPT

## 2023-07-19 PROCEDURE — 83540 ASSAY OF IRON: CPT

## 2023-07-19 PROCEDURE — 83550 IRON BINDING TEST: CPT

## 2023-07-19 PROCEDURE — 84439 ASSAY OF FREE THYROXINE: CPT

## 2023-07-19 PROCEDURE — 82306 VITAMIN D 25 HYDROXY: CPT

## 2023-07-20 LAB
IRON SATN MFR SERPL: 5 % (ref 15–55)
IRON SERPL-MCNC: 20 UG/DL (ref 40–170)
T4 FREE SERPL-MCNC: 0.93 NG/DL (ref 0.93–1.7)
TIBC SERPL-MCNC: 367 UG/DL (ref 250–450)
TRANSFERRIN SERPL-MCNC: 282 MG/DL (ref 200–370)
UIBC SERPL-MCNC: 347 UG/DL (ref 110–370)

## 2023-08-16 ENCOUNTER — APPOINTMENT (OUTPATIENT)
Dept: RADIOLOGY | Facility: MEDICAL CENTER | Age: 28
End: 2023-08-16
Attending: EMERGENCY MEDICINE
Payer: MEDICAID

## 2023-08-16 ENCOUNTER — HOSPITAL ENCOUNTER (EMERGENCY)
Facility: MEDICAL CENTER | Age: 28
End: 2023-08-16
Attending: EMERGENCY MEDICINE
Payer: MEDICAID

## 2023-08-16 VITALS
SYSTOLIC BLOOD PRESSURE: 127 MMHG | RESPIRATION RATE: 18 BRPM | DIASTOLIC BLOOD PRESSURE: 65 MMHG | HEART RATE: 82 BPM | HEIGHT: 62 IN | TEMPERATURE: 97.2 F | OXYGEN SATURATION: 100 % | WEIGHT: 161.38 LBS | BODY MASS INDEX: 29.7 KG/M2

## 2023-08-16 DIAGNOSIS — N39.0 ACUTE UTI: ICD-10-CM

## 2023-08-16 LAB
ALBUMIN SERPL BCP-MCNC: 4.5 G/DL (ref 3.2–4.9)
ALBUMIN/GLOB SERPL: 1.5 G/DL
ALP SERPL-CCNC: 66 U/L (ref 30–99)
ALT SERPL-CCNC: 8 U/L (ref 2–50)
ANION GAP SERPL CALC-SCNC: 12 MMOL/L (ref 7–16)
APPEARANCE UR: ABNORMAL
AST SERPL-CCNC: 13 U/L (ref 12–45)
BACTERIA #/AREA URNS HPF: ABNORMAL /HPF
BASOPHILS # BLD AUTO: 0.7 % (ref 0–1.8)
BASOPHILS # BLD: 0.05 K/UL (ref 0–0.12)
BILIRUB SERPL-MCNC: 0.7 MG/DL (ref 0.1–1.5)
BILIRUB UR QL STRIP.AUTO: NEGATIVE
BLOOD CULTURE HOLD CXBCH: NORMAL
BLOOD CULTURE HOLD CXBCH: NORMAL
BUN SERPL-MCNC: 9 MG/DL (ref 8–22)
C TRACH DNA SPEC QL NAA+PROBE: NEGATIVE
CALCIUM ALBUM COR SERPL-MCNC: 8.6 MG/DL (ref 8.5–10.5)
CALCIUM SERPL-MCNC: 9 MG/DL (ref 8.4–10.2)
CHLORIDE SERPL-SCNC: 99 MMOL/L (ref 96–112)
CO2 SERPL-SCNC: 24 MMOL/L (ref 20–33)
COLOR UR: YELLOW
CREAT SERPL-MCNC: 0.67 MG/DL (ref 0.5–1.4)
EOSINOPHIL # BLD AUTO: 0.03 K/UL (ref 0–0.51)
EOSINOPHIL NFR BLD: 0.4 % (ref 0–6.9)
EPI CELLS #/AREA URNS HPF: ABNORMAL /HPF
ERYTHROCYTE [DISTWIDTH] IN BLOOD BY AUTOMATED COUNT: 46 FL (ref 35.9–50)
GFR SERPLBLD CREATININE-BSD FMLA CKD-EPI: 124 ML/MIN/1.73 M 2
GLOBULIN SER CALC-MCNC: 3 G/DL (ref 1.9–3.5)
GLUCOSE SERPL-MCNC: 107 MG/DL (ref 65–99)
GLUCOSE UR STRIP.AUTO-MCNC: NEGATIVE MG/DL
HCG SERPL QL: NEGATIVE
HCT VFR BLD AUTO: 37.3 % (ref 37–47)
HGB BLD-MCNC: 11.4 G/DL (ref 12–16)
IMM GRANULOCYTES # BLD AUTO: 0.02 K/UL (ref 0–0.11)
IMM GRANULOCYTES NFR BLD AUTO: 0.3 % (ref 0–0.9)
KETONES UR STRIP.AUTO-MCNC: >=80 MG/DL
LEUKOCYTE ESTERASE UR QL STRIP.AUTO: ABNORMAL
LYMPHOCYTES # BLD AUTO: 2.6 K/UL (ref 1–4.8)
LYMPHOCYTES NFR BLD: 34.1 % (ref 22–41)
MCH RBC QN AUTO: 25.4 PG (ref 27–33)
MCHC RBC AUTO-ENTMCNC: 30.6 G/DL (ref 32.2–35.5)
MCV RBC AUTO: 83.1 FL (ref 81.4–97.8)
MICRO URNS: ABNORMAL
MONOCYTES # BLD AUTO: 0.38 K/UL (ref 0–0.85)
MONOCYTES NFR BLD AUTO: 5 % (ref 0–13.4)
MUCOUS THREADS #/AREA URNS HPF: ABNORMAL /HPF
N GONORRHOEA DNA SPEC QL NAA+PROBE: NEGATIVE
NEUTROPHILS # BLD AUTO: 4.55 K/UL (ref 1.82–7.42)
NEUTROPHILS NFR BLD: 59.5 % (ref 44–72)
NITRITE UR QL STRIP.AUTO: NEGATIVE
NRBC # BLD AUTO: 0 K/UL
NRBC BLD-RTO: 0 /100 WBC (ref 0–0.2)
PH UR STRIP.AUTO: 5.5 [PH] (ref 5–8)
PLATELET # BLD AUTO: 265 K/UL (ref 164–446)
PMV BLD AUTO: 10.9 FL (ref 9–12.9)
POTASSIUM SERPL-SCNC: 3.3 MMOL/L (ref 3.6–5.5)
PROT SERPL-MCNC: 7.5 G/DL (ref 6–8.2)
PROT UR QL STRIP: 30 MG/DL
RBC # BLD AUTO: 4.49 M/UL (ref 4.2–5.4)
RBC # URNS HPF: ABNORMAL /HPF
RBC UR QL AUTO: ABNORMAL
SODIUM SERPL-SCNC: 135 MMOL/L (ref 135–145)
SP GR UR STRIP.AUTO: >=1.03
SPECIMEN SOURCE: NORMAL
WBC # BLD AUTO: 7.6 K/UL (ref 4.8–10.8)
WBC #/AREA URNS HPF: ABNORMAL /HPF

## 2023-08-16 PROCEDURE — 700111 HCHG RX REV CODE 636 W/ 250 OVERRIDE (IP): Mod: JZ | Performed by: EMERGENCY MEDICINE

## 2023-08-16 PROCEDURE — 96375 TX/PRO/DX INJ NEW DRUG ADDON: CPT

## 2023-08-16 PROCEDURE — 87086 URINE CULTURE/COLONY COUNT: CPT

## 2023-08-16 PROCEDURE — 87591 N.GONORRHOEAE DNA AMP PROB: CPT

## 2023-08-16 PROCEDURE — 81001 URINALYSIS AUTO W/SCOPE: CPT

## 2023-08-16 PROCEDURE — 36415 COLL VENOUS BLD VENIPUNCTURE: CPT

## 2023-08-16 PROCEDURE — 74176 CT ABD & PELVIS W/O CONTRAST: CPT

## 2023-08-16 PROCEDURE — 96374 THER/PROPH/DIAG INJ IV PUSH: CPT

## 2023-08-16 PROCEDURE — 700105 HCHG RX REV CODE 258: Performed by: EMERGENCY MEDICINE

## 2023-08-16 PROCEDURE — 84703 CHORIONIC GONADOTROPIN ASSAY: CPT

## 2023-08-16 PROCEDURE — 85025 COMPLETE CBC W/AUTO DIFF WBC: CPT

## 2023-08-16 PROCEDURE — 87491 CHLMYD TRACH DNA AMP PROBE: CPT

## 2023-08-16 PROCEDURE — 99284 EMERGENCY DEPT VISIT MOD MDM: CPT

## 2023-08-16 PROCEDURE — 80053 COMPREHEN METABOLIC PANEL: CPT

## 2023-08-16 RX ORDER — SODIUM CHLORIDE 9 MG/ML
1000 INJECTION, SOLUTION INTRAVENOUS ONCE
Status: COMPLETED | OUTPATIENT
Start: 2023-08-16 | End: 2023-08-16

## 2023-08-16 RX ORDER — AMOXICILLIN AND CLAVULANATE POTASSIUM 875; 125 MG/1; MG/1
1 TABLET, FILM COATED ORAL 2 TIMES DAILY
Qty: 20 TABLET | Refills: 0 | Status: ACTIVE | OUTPATIENT
Start: 2023-08-16 | End: 2023-08-28

## 2023-08-16 RX ORDER — ONDANSETRON 2 MG/ML
4 INJECTION INTRAMUSCULAR; INTRAVENOUS ONCE
Status: COMPLETED | OUTPATIENT
Start: 2023-08-16 | End: 2023-08-16

## 2023-08-16 RX ORDER — ONDANSETRON 4 MG/1
4 TABLET, ORALLY DISINTEGRATING ORAL EVERY 6 HOURS PRN
Qty: 10 TABLET | Refills: 0 | Status: SHIPPED | OUTPATIENT
Start: 2023-08-16 | End: 2023-11-27

## 2023-08-16 RX ORDER — MORPHINE SULFATE 4 MG/ML
4 INJECTION INTRAVENOUS ONCE
Status: COMPLETED | OUTPATIENT
Start: 2023-08-16 | End: 2023-08-16

## 2023-08-16 RX ORDER — CEFTRIAXONE 1 G/1
1000 INJECTION, POWDER, FOR SOLUTION INTRAMUSCULAR; INTRAVENOUS ONCE
Status: COMPLETED | OUTPATIENT
Start: 2023-08-16 | End: 2023-08-16

## 2023-08-16 RX ADMIN — MORPHINE SULFATE 4 MG: 4 INJECTION INTRAVENOUS at 06:59

## 2023-08-16 RX ADMIN — SODIUM CHLORIDE 1000 ML: 9 INJECTION, SOLUTION INTRAVENOUS at 07:15

## 2023-08-16 RX ADMIN — ONDANSETRON 4 MG: 2 INJECTION INTRAMUSCULAR; INTRAVENOUS at 07:00

## 2023-08-16 RX ADMIN — CEFTRIAXONE SODIUM 1000 MG: 1 INJECTION, POWDER, FOR SOLUTION INTRAMUSCULAR; INTRAVENOUS at 09:08

## 2023-08-16 ASSESSMENT — LIFESTYLE VARIABLES
EVER HAD A DRINK FIRST THING IN THE MORNING TO STEADY YOUR NERVES TO GET RID OF A HANGOVER: NO
DO YOU DRINK ALCOHOL: NO
CONSUMPTION TOTAL: INCOMPLETE
HAVE YOU EVER FELT YOU SHOULD CUT DOWN ON YOUR DRINKING: NO
TOTAL SCORE: 0
EVER FELT BAD OR GUILTY ABOUT YOUR DRINKING: NO
TOTAL SCORE: 0
TOTAL SCORE: 0
HAVE PEOPLE ANNOYED YOU BY CRITICIZING YOUR DRINKING: NO

## 2023-08-16 ASSESSMENT — FIBROSIS 4 INDEX: FIB4 SCORE: 0.38

## 2023-08-16 NOTE — ED PROVIDER NOTES
"ED Provider Note    CHIEF COMPLAINT  Chief Complaint   Patient presents with    Flank Pain     Pt reports bi lat flank pain and blood in urine        EXTERNAL RECORDS REVIEWED  Previous notes    HPI/ROS  LIMITATION TO HISTORY   None  OUTSIDE HISTORIAN(S):  None    Ivis Klein is a 27 y.o. female who presents here for evaluation of abdominal pain.  Patient states that she has had abdominal pain over the last 2 days.  She also complains of some dysuria, urgency and frequency.  Patient states that the pain is \"all over\" the abdomen, with some radiation to the bilateral flank.  She has no chest pain or shortness of breath.  Patient has not taken anything for the pain.  Nothing seems to leave exacerbate her symptoms.  This feels very similar to her previous UTIs.  Patient did have episode of vomiting this morning.    PAST MEDICAL HISTORY   has a past medical history of Anemia (2/17/2022), Anxiety (2/16/2017), Anxiety (2/16/2017), Elevated antinuclear antibody (DEJON) level (5/23/2019), Graves disease (12/5/2016), Heart valve disease, Hemorrhagic cysts of both ovaries (5/23/2019), History of panic attack (4/9/2020), History of pericarditis (12/5/2016), History of thyroidectomy (1/31/2020), Hyperlipidemia (2/17/2022), Panic attack (3/11/2019), Postpartum depression (2/17/2022), Postpartum depression (2/17/2022), and Vaginal discharge (12/2/2021).    SURGICAL HISTORY   has a past surgical history that includes primary c section (9/8/2013) and thyroidectomy total (Bilateral, 3/22/2017).    FAMILY HISTORY  Family History   Problem Relation Age of Onset    Cancer Paternal Grandmother 56        breast cancer    No Known Problems Mother     Hyperlipidemia Father     No Known Problems Sister     No Known Problems Brother        SOCIAL HISTORY  Social History     Tobacco Use    Smoking status: Never    Smokeless tobacco: Never    Tobacco comments:     quit in 2012   Vaping Use    Vaping Use: Never used   Substance and " "Sexual Activity    Alcohol use: No    Drug use: Yes     Types: Marijuana, Inhaled     Comment: marijuana weekly    Sexual activity: Yes     Partners: Male     Birth control/protection: Abstinence     Comment: single       CURRENT MEDICATIONS  Home Medications    **Home medications have not yet been reviewed for this encounter**         ALLERGIES  No Known Allergies    PHYSICAL EXAM  VITAL SIGNS: /78   Pulse 85   Temp 36.4 °C (97.5 °F) (Temporal)   Resp 16   Ht 1.575 m (5' 2\")   Wt 73.2 kg (161 lb 6 oz)   LMP 07/03/2023 (Approximate)   SpO2 100%   BMI 29.52 kg/m²    Constitutional: Well developed, well nourished. mild acute distress.  HEENT: Normocephalic, atraumatic. Posterior pharynx clear and moist.  Eyes:  EOMI. Normal sclera.  Neck: Supple, Full range of motion, nontender.  Chest/Pulmonary: clear to ausculation. Symmetrical expansion.   Cardio: Regular rate and rhythm with no murmur.   Abdomen: Soft, mild diffuse tenderness, No peritoneal signs. No guarding. No palpable masses.  Back: No CVA tenderness, nontender midline, no step offs.  Musculoskeletal: No deformity, no edema, neurovascular intact.   Neuro: Clear speech, appropriate, cooperative, cranial nerves II-XII grossly intact.  Psych: Normal mood and affect      DIAGNOSTIC STUDIES / PROCEDURES  Results for orders placed or performed during the hospital encounter of 08/16/23   CBC WITH DIFFERENTIAL   Result Value Ref Range    WBC 7.6 4.8 - 10.8 K/uL    RBC 4.49 4.20 - 5.40 M/uL    Hemoglobin 11.4 (L) 12.0 - 16.0 g/dL    Hematocrit 37.3 37.0 - 47.0 %    MCV 83.1 81.4 - 97.8 fL    MCH 25.4 (L) 27.0 - 33.0 pg    MCHC 30.6 (L) 32.2 - 35.5 g/dL    RDW 46.0 35.9 - 50.0 fL    Platelet Count 265 164 - 446 K/uL    MPV 10.9 9.0 - 12.9 fL    Neutrophils-Polys 59.50 44.00 - 72.00 %    Lymphocytes 34.10 22.00 - 41.00 %    Monocytes 5.00 0.00 - 13.40 %    Eosinophils 0.40 0.00 - 6.90 %    Basophils 0.70 0.00 - 1.80 %    Immature Granulocytes 0.30 0.00 - " 0.90 %    Nucleated RBC 0.00 0.00 - 0.20 /100 WBC    Neutrophils (Absolute) 4.55 1.82 - 7.42 K/uL    Lymphs (Absolute) 2.60 1.00 - 4.80 K/uL    Monos (Absolute) 0.38 0.00 - 0.85 K/uL    Eos (Absolute) 0.03 0.00 - 0.51 K/uL    Baso (Absolute) 0.05 0.00 - 0.12 K/uL    Immature Granulocytes (abs) 0.02 0.00 - 0.11 K/uL    NRBC (Absolute) 0.00 K/uL   COMP METABOLIC PANEL   Result Value Ref Range    Sodium 135 135 - 145 mmol/L    Potassium 3.3 (L) 3.6 - 5.5 mmol/L    Chloride 99 96 - 112 mmol/L    Co2 24 20 - 33 mmol/L    Anion Gap 12.0 7.0 - 16.0    Glucose 107 (H) 65 - 99 mg/dL    Bun 9 8 - 22 mg/dL    Creatinine 0.67 0.50 - 1.40 mg/dL    Calcium 9.0 8.4 - 10.2 mg/dL    Correct Calcium 8.6 8.5 - 10.5 mg/dL    AST(SGOT) 13 12 - 45 U/L    ALT(SGPT) 8 2 - 50 U/L    Alkaline Phosphatase 66 30 - 99 U/L    Total Bilirubin 0.7 0.1 - 1.5 mg/dL    Albumin 4.5 3.2 - 4.9 g/dL    Total Protein 7.5 6.0 - 8.2 g/dL    Globulin 3.0 1.9 - 3.5 g/dL    A-G Ratio 1.5 g/dL   URINALYSIS CULTURE, IF INDICATED    Specimen: Urine, Clean Catch   Result Value Ref Range    Color Yellow     Character Hazy (A)     Specific Gravity >=1.030 <1.035    Ph 5.5 5.0 - 8.0    Glucose Negative Negative mg/dL    Ketones >=80 (A) Negative mg/dL    Protein 30 (A) Negative mg/dL    Bilirubin Negative Negative    Nitrite Negative Negative    Leukocyte Esterase Trace (A) Negative    Occult Blood Moderate (A) Negative    Micro Urine Req Microscopic    HCG QUAL SERUM   Result Value Ref Range    Beta-Hcg Qualitative Serum Negative Negative   ESTIMATED GFR   Result Value Ref Range    GFR (CKD-EPI) 124 >60 mL/min/1.73 m 2   URINE MICROSCOPIC (W/UA)   Result Value Ref Range    WBC 10-20 (A) /hpf    RBC 5-10 (A) /hpf    Bacteria Few (A) None /hpf    Epithelial Cells Few Few /hpf    Mucous Threads Few /hpf   URINE CULTURE(NEW)    Specimen: Urine   Result Value Ref Range    Significant Indicator NEG     Source UR     Site -     Culture Result -          RADIOLOGY  I have  independently interpreted the diagnostic imaging associated with this visit and am waiting the final reading from the radiologist.   My preliminary interpretation is as follows: see below  Radiologist interpretation:   CT-RENAL COLIC EVALUATION(A/P W/O)   Final Result         1.  No acute intra-abdominal abnormality identified   2.  Cholelithiasis            COURSE & MEDICAL DECISION MAKING    Discharged home in stable condition    INITIAL ASSESSMENT, COURSE AND PLAN  Care Narrative: This is a 27-year-old female here for evaluation of dysuria and some back pain.  Her CT scan shows no acute finding for stone, but we will treat her with antibiotics for her UTI.  I will give her a dose of Rocephin here, and place her on Augmentin secondary to her recent intermittent back pain.  I want to be sure this does not turn into Pyelo.  Patient has declined a pelvic exam, but is willing to do the GC urine.  She will follow-up on the results in 3 days.    DISPOSITION AND DISCUSSIONS  I have discussed management of the patient with the following physicians and JUSTIN's: None    Discussion of management with other QHP or appropriate source(s): None none    Escalation of care considered, and ultimately not performed: None    Barriers to care at this time, including but not limited to: Patient does not have established PCP.     Decision tools and prescription drugs considered including, but not limited to: None.    FINAL DIAGNOSIS  1. Acute UTI           Electronically signed by: Cem Merino D.O., 8/16/2023 7:19 AM

## 2023-08-16 NOTE — Clinical Note
Ivis Klein was seen and treated in our emergency department on 8/16/2023.  She may return to work on 08/19/2023.       If you have any questions or concerns, please don't hesitate to call.      Cem Merino D.O.

## 2023-08-16 NOTE — ED TRIAGE NOTES
"Chief Complaint   Patient presents with    Flank Pain     Pt reports bi lat flank pain and blood in urine      /78   Pulse 85   Temp 36.4 °C (97.5 °F) (Temporal)   Resp 16   Ht 1.575 m (5' 2\")   Wt 73.2 kg (161 lb 6 oz)   LMP 07/03/2023 (Approximate)   SpO2 100%   BMI 29.52 kg/m²     "

## 2023-08-16 NOTE — DISCHARGE INSTRUCTIONS
Please follow up on the results of your STD test.  If positive, please call your doctor for additional antibiotics.

## 2023-08-18 LAB
BACTERIA UR CULT: NORMAL
SIGNIFICANT IND 70042: NORMAL
SITE SITE: NORMAL
SOURCE SOURCE: NORMAL

## 2023-08-28 ENCOUNTER — OFFICE VISIT (OUTPATIENT)
Dept: INTERNAL MEDICINE | Facility: OTHER | Age: 28
End: 2023-08-28
Payer: MEDICAID

## 2023-08-28 VITALS
WEIGHT: 163.4 LBS | BODY MASS INDEX: 27.9 KG/M2 | SYSTOLIC BLOOD PRESSURE: 116 MMHG | DIASTOLIC BLOOD PRESSURE: 72 MMHG | TEMPERATURE: 97.2 F | HEART RATE: 73 BPM | OXYGEN SATURATION: 99 % | HEIGHT: 64 IN

## 2023-08-28 DIAGNOSIS — F41.9 ANXIETY: ICD-10-CM

## 2023-08-28 DIAGNOSIS — D64.9 ANEMIA, UNSPECIFIED TYPE: ICD-10-CM

## 2023-08-28 DIAGNOSIS — N83.209 CYST OF OVARY, UNSPECIFIED LATERALITY: ICD-10-CM

## 2023-08-28 DIAGNOSIS — N92.0 MENORRHAGIA WITH REGULAR CYCLE: ICD-10-CM

## 2023-08-28 DIAGNOSIS — D50.9 IRON DEFICIENCY ANEMIA, UNSPECIFIED IRON DEFICIENCY ANEMIA TYPE: ICD-10-CM

## 2023-08-28 DIAGNOSIS — F32.A DEPRESSION, UNSPECIFIED DEPRESSION TYPE: ICD-10-CM

## 2023-08-28 PROCEDURE — 99214 OFFICE O/P EST MOD 30 MIN: CPT | Mod: GC | Performed by: INTERNAL MEDICINE

## 2023-08-28 PROCEDURE — 3074F SYST BP LT 130 MM HG: CPT | Performed by: INTERNAL MEDICINE

## 2023-08-28 PROCEDURE — 3078F DIAST BP <80 MM HG: CPT | Performed by: INTERNAL MEDICINE

## 2023-08-28 RX ORDER — FERROUS SULFATE 325(65) MG
325 TABLET ORAL DAILY
Qty: 90 TABLET | Refills: 2 | Status: SHIPPED | OUTPATIENT
Start: 2023-08-28 | End: 2024-03-12

## 2023-08-28 RX ORDER — SERTRALINE HYDROCHLORIDE 25 MG/1
25 TABLET, FILM COATED ORAL DAILY
Qty: 90 TABLET | Refills: 3 | Status: SHIPPED | OUTPATIENT
Start: 2023-08-28 | End: 2024-01-08

## 2023-08-28 ASSESSMENT — ENCOUNTER SYMPTOMS
BLURRED VISION: 1
ORTHOPNEA: 0
NAUSEA: 0
VOMITING: 0
NERVOUS/ANXIOUS: 0
HEMOPTYSIS: 0
DOUBLE VISION: 0
CHILLS: 0
FEVER: 0
HEADACHES: 0
MYALGIAS: 0
WEIGHT LOSS: 0
DIZZINESS: 1
HEARTBURN: 0
PHOTOPHOBIA: 0
PALPITATIONS: 0
COUGH: 0

## 2023-08-28 ASSESSMENT — FIBROSIS 4 INDEX: FIB4 SCORE: 0.43

## 2023-08-28 NOTE — PROGRESS NOTES
CC: Thyroid follow up, labs     HPI:   Ivis presents today with PMHx of graves disease s/p partial thyroidectomy, now with post surgical hypothyroidism (on levothyroxine-endorses compliance), menorrhagia with regular cycles, migraines (prn imitrex), depression and anxiety (on lexapro-compliant per patient) who presents today for an ED follow up visit and to review her labs.     She was recently seen in the ED 8/16 with chief complaints of dysuria and abdominal pain found to have a UTI s/p antibiotic therapy.  Chlamydia and gonorrhea urine were negative at the time.    Free T4 0.83, TSH 95 and her thyroid supplementation was recently increased    Recent labs with CMP showing sodium 135, potassium 3.3, chloride 99, bicarb 24, BUN 9 and creatinine 0.67    Calcium 9, corrected calcium 8.6  AST 13, ALT 18 alk phos 66  T. bili 0.7 and albumin 4.5    CBC showing white count 7.6, red blood cells 4.49, hemoglobin 11.4, hematocrit 37.3  MCV 83.1, MCH 25.4 low MCHC 30.6 low  Platelets 265    Vitamin D 25    Transferrin 282   Iron 20 low, total iron binding capacity 367 WNL, unsaturated iron binding 347 upper limit of normal and percent saturation 5  Ferritin 4.7 low     Describes she has been doing okay, however she occasionally feels dizzy, especially when she stands up quickly.  Denies any chest pain, shortness of breath, chills or fevers.  No abdominal pain, nausea vomiting, diarrhea or constipation.  Endorses heavy bleeding with her menstrual cycles ongoing since her last child was born 3 years ago.  Has a Mirena IUD, however the bleeding continues to be heavy.  She thinks her cycles are regular.  Was on Zoloft previously, however was discontinued during pregnancy and while breast-feeding.  Describes it helped her a lot with her mood especially anxiety and would like to go back on it.  Her thyroid supplementation was recently increased during her ED visit, however describes that she has not started taking the  "increased dose yet and wanted to see me before increasing the dose to make sure it would be okay    No problems updated.    Health Maintenance: Completed    ROS:  Review of Systems   Constitutional:  Positive for malaise/fatigue. Negative for chills, fever and weight loss.   HENT:  Negative for hearing loss and tinnitus.    Eyes:  Positive for blurred vision. Negative for double vision and photophobia.   Respiratory:  Negative for cough and hemoptysis.    Cardiovascular:  Negative for chest pain, palpitations and orthopnea.   Gastrointestinal:  Negative for heartburn, nausea and vomiting.   Genitourinary:  Negative for dysuria and urgency.   Musculoskeletal:  Negative for myalgias.   Skin:  Negative for itching and rash.   Neurological:  Positive for dizziness. Negative for headaches.   Psychiatric/Behavioral:  The patient is not nervous/anxious.        Objective:     Exam:  /72 (BP Location: Left arm, Patient Position: Sitting, BP Cuff Size: Adult long)   Pulse 73   Temp 36.2 °C (97.2 °F) (Temporal)   Ht 1.626 m (5' 4\")   Wt 74.1 kg (163 lb 6.4 oz)   SpO2 99%   BMI 28.05 kg/m²  Body mass index is 28.05 kg/m².    Physical Exam  Constitutional:       General: She is not in acute distress.     Appearance: Normal appearance. She is not ill-appearing.   HENT:      Head: Normocephalic and atraumatic.      Right Ear: External ear normal.      Left Ear: External ear normal.      Nose: Nose normal. No congestion.      Mouth/Throat:      Mouth: Mucous membranes are moist.   Eyes:      Extraocular Movements: Extraocular movements intact.      Pupils: Pupils are equal, round, and reactive to light.   Cardiovascular:      Rate and Rhythm: Normal rate and regular rhythm.      Pulses: Normal pulses.   Pulmonary:      Effort: Pulmonary effort is normal.      Breath sounds: Normal breath sounds.   Musculoskeletal:         General: Normal range of motion.   Skin:     General: Skin is warm.      Capillary Refill: " Capillary refill takes less than 2 seconds.   Neurological:      Mental Status: She is alert and oriented to person, place, and time.             Labs:   Free T4 0.83, TSH 95 and her thyroid supplementation was recently increased    Recent labs with CMP showing sodium 135, potassium 3.3, chloride 99, bicarb 24, BUN 9 and creatinine 0.67    Calcium 9, corrected calcium 8.6  AST 13, ALT 18 alk phos 66  T. bili 0.7 and albumin 4.5    CBC showing white count 7.6, red blood cells 4.49, hemoglobin 11.4, hematocrit 37.3  MCV 83.1, MCH 25.4 low MCHC 30.6 low  Platelets 265    Vitamin D 25    Transferrin 282   Iron 20 low, total iron binding capacity 367 WNL, unsaturated iron binding 347 upper limit of normal and percent saturation 5  Ferritin 4.7 low       Assessment & Plan:     27 y.o. female with the following -       Graves' disease s/p thyroidectomy  Postsurgical hypothyroidism  Recent labs reviewed with TSH greater than 90 indicating suboptimal supplementation.  Her thyroxine was increased to 175 during recent ED visit, however she has not increased the dose yet as she wanted to come in in the office to discuss prior to increasing dose.  Does endorse ongoing fatigue, very low energy.  Plan:  - Increase thyroxine to 175 daily  - We discussed proper technique of taking the thyroxine in the morning, on an empty stomach, separate from other medications  - Follow-up in 5 weeks to assess response-      Menorrhagia with regular cycles  Iron deficiency anemia  Positive symptoms as described above with fatigue, occasional dizziness and blurred vision.    Plan:  - Start iron supplementation 325 every other day   - Discussed iron dense foods  - She has a Mirena IUD, has not followed up with OB/GYN recently, we discussed transvaginal ultrasound and follow-up with OB/GYN.  Orders placed.  Follow-up in 5 weeks      Vitamin D deficiency  Vitamin D level was 25 on recent labs  Plan:  - Start over-the-counter vitamin D  supplementation 600-800 daily      History of depression  Anxiety  Was previously on Zoloft that helped with mood, especially anxiety that she struggles with.  Her RAMON-7 scoring is 5 today indicating mild anxiety.  PHQ scoring of 5 today.  She describes that her functioning was much better when she was on the medication and she would like to go back on that.  Plan:  - Start Zoloft 25 mg daily  - New referral to psychology for counseling sent, anxiety relief she would benefit from counseling more than the medication considering her scores are low.            I spent a total of 30 minutes with record review, exam, communication with the patient, communication with other providers, and documentation of this encounter.      Return in about 5 weeks (around 10/2/2023).    Please note that this dictation was created using voice recognition software. I have made every reasonable attempt to correct obvious errors, but I expect that there are errors of grammar and possibly content that I did not discover before finalizing the note.

## 2023-08-28 NOTE — PATIENT INSTRUCTIONS
Thank you for visiting today!    Please follow-up in 5 weeks     Please follow-up on referrals and schedule an appointment with gynecology     Please try and eat healthy    If you have any questions or concerns please feel free to contact us at [phone].    If you feel like you are experiencing a medical emergency please seek immediate medical attention at an urgent care or in the emergency department.

## 2023-10-10 ENCOUNTER — HOSPITAL ENCOUNTER (OUTPATIENT)
Dept: RADIOLOGY | Facility: MEDICAL CENTER | Age: 28
End: 2023-10-10
Attending: INTERNAL MEDICINE
Payer: MEDICAID

## 2023-10-10 DIAGNOSIS — D64.9 ANEMIA, UNSPECIFIED TYPE: ICD-10-CM

## 2023-10-10 DIAGNOSIS — N92.0 MENORRHAGIA WITH REGULAR CYCLE: ICD-10-CM

## 2023-10-10 DIAGNOSIS — N83.209 CYST OF OVARY, UNSPECIFIED LATERALITY: ICD-10-CM

## 2023-10-10 PROCEDURE — 76830 TRANSVAGINAL US NON-OB: CPT

## 2023-10-16 ENCOUNTER — APPOINTMENT (OUTPATIENT)
Dept: INTERNAL MEDICINE | Facility: OTHER | Age: 28
End: 2023-10-16
Payer: MEDICAID

## 2023-10-23 NOTE — DISCHARGE PLANNING
Medicaid Meds-to-Beds: Discharge prescription orders listed below delivered to patient's bedside. Patient counseled.       Ivis Klein   Home Medication Instructions EDIE:[id number]    Printed on:09/19/19 1207   Medication Information                      cefdinir (OMNICEF) 300 MG Cap  Take 1 Cap by mouth 2 times a day for 10 days.             levothyroxine (SYNTHROID) 150 MCG Tab  Take 1 Tab by mouth Every morning on an empty stomach.               Amelia Gar, PharmD     Methotrexate Counseling:  Patient counseled regarding adverse effects of methotrexate including but not limited to nausea, vomiting, abnormalities in liver function tests. Patients may develop mouth sores, rash, diarrhea, and abnormalities in blood counts. The patient understands that monitoring is required including LFT's and blood counts.  There is a rare possibility of scarring of the liver and lung problems that can occur when taking methotrexate. Persistent nausea, loss of appetite, pale stools, dark urine, cough, and shortness of breath should be reported immediately. Patient advised to discontinue methotrexate treatment at least three months before attempting to become pregnant.  I discussed the need for folate supplements while taking methotrexate.  These supplements can decrease side effects during methotrexate treatment. The patient verbalized understanding of the proper use and possible adverse effects of methotrexate.  All of the patient's questions and concerns were addressed.

## 2023-10-26 ENCOUNTER — APPOINTMENT (OUTPATIENT)
Dept: RADIOLOGY | Facility: MEDICAL CENTER | Age: 28
End: 2023-10-26
Attending: EMERGENCY MEDICINE
Payer: MEDICAID

## 2023-10-26 ENCOUNTER — HOSPITAL ENCOUNTER (EMERGENCY)
Facility: MEDICAL CENTER | Age: 28
End: 2023-10-26
Attending: EMERGENCY MEDICINE
Payer: MEDICAID

## 2023-10-26 VITALS
HEART RATE: 66 BPM | BODY MASS INDEX: 30.02 KG/M2 | WEIGHT: 163.14 LBS | HEIGHT: 62 IN | DIASTOLIC BLOOD PRESSURE: 70 MMHG | OXYGEN SATURATION: 99 % | SYSTOLIC BLOOD PRESSURE: 108 MMHG | RESPIRATION RATE: 16 BRPM | TEMPERATURE: 98 F

## 2023-10-26 DIAGNOSIS — R07.9 CHEST PAIN, UNSPECIFIED TYPE: ICD-10-CM

## 2023-10-26 LAB
ALBUMIN SERPL BCP-MCNC: 4.2 G/DL (ref 3.2–4.9)
ALBUMIN/GLOB SERPL: 1.4 G/DL
ALP SERPL-CCNC: 63 U/L (ref 30–99)
ALT SERPL-CCNC: 10 U/L (ref 2–50)
ANION GAP SERPL CALC-SCNC: 13 MMOL/L (ref 7–16)
AST SERPL-CCNC: 14 U/L (ref 12–45)
BASOPHILS # BLD AUTO: 0.7 % (ref 0–1.8)
BASOPHILS # BLD: 0.05 K/UL (ref 0–0.12)
BILIRUB SERPL-MCNC: <0.2 MG/DL (ref 0.1–1.5)
BUN SERPL-MCNC: 8 MG/DL (ref 8–22)
CALCIUM ALBUM COR SERPL-MCNC: 8.7 MG/DL (ref 8.5–10.5)
CALCIUM SERPL-MCNC: 8.9 MG/DL (ref 8.4–10.2)
CHLORIDE SERPL-SCNC: 102 MMOL/L (ref 96–112)
CO2 SERPL-SCNC: 22 MMOL/L (ref 20–33)
CREAT SERPL-MCNC: 0.67 MG/DL (ref 0.5–1.4)
EKG IMPRESSION: NORMAL
EOSINOPHIL # BLD AUTO: 0.06 K/UL (ref 0–0.51)
EOSINOPHIL NFR BLD: 0.9 % (ref 0–6.9)
ERYTHROCYTE [DISTWIDTH] IN BLOOD BY AUTOMATED COUNT: 46.8 FL (ref 35.9–50)
FLUAV RNA SPEC QL NAA+PROBE: NEGATIVE
FLUBV RNA SPEC QL NAA+PROBE: NEGATIVE
GFR SERPLBLD CREATININE-BSD FMLA CKD-EPI: 123 ML/MIN/1.73 M 2
GLOBULIN SER CALC-MCNC: 2.9 G/DL (ref 1.9–3.5)
GLUCOSE SERPL-MCNC: 131 MG/DL (ref 65–99)
HCG SERPL QL: NEGATIVE
HCT VFR BLD AUTO: 36.1 % (ref 37–47)
HGB BLD-MCNC: 10.8 G/DL (ref 12–16)
IMM GRANULOCYTES # BLD AUTO: 0.02 K/UL (ref 0–0.11)
IMM GRANULOCYTES NFR BLD AUTO: 0.3 % (ref 0–0.9)
LIPASE SERPL-CCNC: 33 U/L (ref 11–82)
LYMPHOCYTES # BLD AUTO: 2.77 K/UL (ref 1–4.8)
LYMPHOCYTES NFR BLD: 40.8 % (ref 22–41)
MCH RBC QN AUTO: 25 PG (ref 27–33)
MCHC RBC AUTO-ENTMCNC: 29.9 G/DL (ref 32.2–35.5)
MCV RBC AUTO: 83.6 FL (ref 81.4–97.8)
MONOCYTES # BLD AUTO: 0.34 K/UL (ref 0–0.85)
MONOCYTES NFR BLD AUTO: 5 % (ref 0–13.4)
NEUTROPHILS # BLD AUTO: 3.55 K/UL (ref 1.82–7.42)
NEUTROPHILS NFR BLD: 52.3 % (ref 44–72)
NRBC # BLD AUTO: 0 K/UL
NRBC BLD-RTO: 0 /100 WBC (ref 0–0.2)
PLATELET # BLD AUTO: 264 K/UL (ref 164–446)
PMV BLD AUTO: 10.9 FL (ref 9–12.9)
POTASSIUM SERPL-SCNC: 3.6 MMOL/L (ref 3.6–5.5)
PROT SERPL-MCNC: 7.1 G/DL (ref 6–8.2)
RBC # BLD AUTO: 4.32 M/UL (ref 4.2–5.4)
RSV RNA SPEC QL NAA+PROBE: NEGATIVE
SARS-COV-2 RNA RESP QL NAA+PROBE: NOTDETECTED
SODIUM SERPL-SCNC: 137 MMOL/L (ref 135–145)
SPECIMEN SOURCE: NORMAL
TROPONIN T SERPL-MCNC: <6 NG/L (ref 6–19)
WBC # BLD AUTO: 6.8 K/UL (ref 4.8–10.8)

## 2023-10-26 PROCEDURE — 83690 ASSAY OF LIPASE: CPT

## 2023-10-26 PROCEDURE — 84703 CHORIONIC GONADOTROPIN ASSAY: CPT

## 2023-10-26 PROCEDURE — A9270 NON-COVERED ITEM OR SERVICE: HCPCS | Performed by: EMERGENCY MEDICINE

## 2023-10-26 PROCEDURE — 84484 ASSAY OF TROPONIN QUANT: CPT

## 2023-10-26 PROCEDURE — 0241U HCHG SARS-COV-2 COVID-19 NFCT DS RESP RNA 4 TRGT MIC: CPT

## 2023-10-26 PROCEDURE — 36415 COLL VENOUS BLD VENIPUNCTURE: CPT

## 2023-10-26 PROCEDURE — 700102 HCHG RX REV CODE 250 W/ 637 OVERRIDE(OP): Performed by: EMERGENCY MEDICINE

## 2023-10-26 PROCEDURE — 71045 X-RAY EXAM CHEST 1 VIEW: CPT

## 2023-10-26 PROCEDURE — C9803 HOPD COVID-19 SPEC COLLECT: HCPCS | Performed by: EMERGENCY MEDICINE

## 2023-10-26 PROCEDURE — 99284 EMERGENCY DEPT VISIT MOD MDM: CPT

## 2023-10-26 PROCEDURE — 80053 COMPREHEN METABOLIC PANEL: CPT

## 2023-10-26 PROCEDURE — 85025 COMPLETE CBC W/AUTO DIFF WBC: CPT

## 2023-10-26 PROCEDURE — 93005 ELECTROCARDIOGRAM TRACING: CPT | Performed by: EMERGENCY MEDICINE

## 2023-10-26 RX ORDER — ACETAMINOPHEN 500 MG
500-1000 TABLET ORAL EVERY 6 HOURS PRN
Status: SHIPPED | COMMUNITY
End: 2024-01-08

## 2023-10-26 RX ADMIN — LIDOCAINE HYDROCHLORIDE 15 ML: 20 SOLUTION OROPHARYNGEAL at 07:37

## 2023-10-26 ASSESSMENT — PAIN DESCRIPTION - DESCRIPTORS: DESCRIPTORS: STABBING

## 2023-10-26 ASSESSMENT — FIBROSIS 4 INDEX: FIB4 SCORE: 0.45

## 2023-10-26 NOTE — ED PROVIDER NOTES
ED PHYSICIAN NOTE    CHIEF COMPLAINT  Chief Complaint   Patient presents with    Shortness of Breath     Pt states SOB x 3 days - states co-worker is positive for COVID    Chest Pain     Pt has had chest pain with deep breath and moving around.  Now chest pain is constant; stabbing and radiates to right shoulder.    Nausea     X 3 days    Dizziness    Chills     X 3 days- does not know if she has had a fever.       EXTERNAL RECORDS REVIEWED  Outpatient Notes evaluated for menorrhagia by primary doctor.  Patient has thyroid dysfunction    HPI/ROS    Ivis Klein is a 28 y.o. female who presents because of chest pain.  Patient started getting sick 3 days ago.  She has had chills and nausea.  No fever.  She has been feeling short of breath without significant cough.  She started having chest pain particularly when over in bed last night and moves.  She has not had any vomiting.  She had diarrhea.  She has been feeling weak and tired.  No dysuria or hematuria.  Vague pain in the upper abdomen.  More in the left upper quadrant.    PAST MEDICAL HISTORY  Past Medical History:   Diagnosis Date    Anemia 2/17/2022    Anxiety 2/16/2017    Anxiety 2/16/2017    Elevated antinuclear antibody (DEJON) level 5/23/2019    Graves disease 12/5/2016    Heart valve disease     Hemorrhagic cysts of both ovaries 5/23/2019    History of panic attack 4/9/2020    History of pericarditis 12/5/2016    History of thyroidectomy 1/31/2020    Partial --> hypothyroidism     Hyperlipidemia 2/17/2022    Panic attack 3/11/2019    Postpartum depression 2/17/2022    Postpartum depression 2/17/2022    Vaginal discharge 12/2/2021       SOCIAL HISTORY  Social History     Tobacco Use    Smoking status: Never    Smokeless tobacco: Never    Tobacco comments:     quit in 2012   Vaping Use    Vaping Use: Never used   Substance Use Topics    Alcohol use: No    Drug use: Yes     Types: Marijuana, Inhaled     Comment: marijuana weekly       CURRENT  "MEDICATIONS  Home Medications       Reviewed by Tiffanie Jay R.N. (Registered Nurse) on 10/26/23 at 0659  Med List Status: Complete     Medication Last Dose Status   ferrous sulfate 325 (65 Fe) MG tablet 10/25/2023 Active   levonorgestrel (MIRENA, 52 MG,) 20 MCG/DAY IUD 10/26/2023 Active   levothyroxine (SYNTHROID) 175 MCG Tab 10/25/2023 Active   ondansetron (ZOFRAN ODT) 4 MG TABLET DISPERSIBLE Not Taking Active   sertraline (ZOLOFT) 25 MG tablet Not Taking Active                    ALLERGIES  No Known Allergies    PHYSICAL EXAM  VITAL SIGNS: /76   Pulse 71   Temp 36.4 °C (97.5 °F) (Temporal)   Resp 13   Ht 1.575 m (5' 2\")   Wt 74 kg (163 lb 2.3 oz)   LMP 10/08/2023 (Approximate)   SpO2 100%   BMI 29.84 kg/m²    Constitutional: Awake and alert  HENT: Normal inspection  Eyes: Normal inspection  Neck: Grossly normal range of motion.  Cardiovascular: Normal heart rate  Thorax & Lungs: No respiratory distress, No wheezing, No rales, No rhonchi, No chest tenderness.   Abdomen: Bowel sounds normal, soft, non-distended, nontender, no mass  Skin: No obvious rash.  Back: No tenderness, No CVA tenderness.   Extremities: No clubbing, cyanosis, edema, no Homans or cords.  Neurologic: Grossly normal   Psychiatric: Normal for situation     DIAGNOSTIC STUDIES / PROCEDURES  LABS/EKG  Results for orders placed or performed during the hospital encounter of 10/26/23   CBC WITH DIFFERENTIAL   Result Value Ref Range    WBC 6.8 4.8 - 10.8 K/uL    RBC 4.32 4.20 - 5.40 M/uL    Hemoglobin 10.8 (L) 12.0 - 16.0 g/dL    Hematocrit 36.1 (L) 37.0 - 47.0 %    MCV 83.6 81.4 - 97.8 fL    MCH 25.0 (L) 27.0 - 33.0 pg    MCHC 29.9 (L) 32.2 - 35.5 g/dL    RDW 46.8 35.9 - 50.0 fL    Platelet Count 264 164 - 446 K/uL    MPV 10.9 9.0 - 12.9 fL    Neutrophils-Polys 52.30 44.00 - 72.00 %    Lymphocytes 40.80 22.00 - 41.00 %    Monocytes 5.00 0.00 - 13.40 %    Eosinophils 0.90 0.00 - 6.90 %    Basophils 0.70 0.00 - 1.80 %    Immature " Granulocytes 0.30 0.00 - 0.90 %    Nucleated RBC 0.00 0.00 - 0.20 /100 WBC    Neutrophils (Absolute) 3.55 1.82 - 7.42 K/uL    Lymphs (Absolute) 2.77 1.00 - 4.80 K/uL    Monos (Absolute) 0.34 0.00 - 0.85 K/uL    Eos (Absolute) 0.06 0.00 - 0.51 K/uL    Baso (Absolute) 0.05 0.00 - 0.12 K/uL    Immature Granulocytes (abs) 0.02 0.00 - 0.11 K/uL    NRBC (Absolute) 0.00 K/uL   COMP METABOLIC PANEL   Result Value Ref Range    Sodium 137 135 - 145 mmol/L    Potassium 3.6 3.6 - 5.5 mmol/L    Chloride 102 96 - 112 mmol/L    Co2 22 20 - 33 mmol/L    Anion Gap 13.0 7.0 - 16.0    Glucose 131 (H) 65 - 99 mg/dL    Bun 8 8 - 22 mg/dL    Creatinine 0.67 0.50 - 1.40 mg/dL    Calcium 8.9 8.4 - 10.2 mg/dL    Correct Calcium 8.7 8.5 - 10.5 mg/dL    AST(SGOT) 14 12 - 45 U/L    ALT(SGPT) 10 2 - 50 U/L    Alkaline Phosphatase 63 30 - 99 U/L    Total Bilirubin <0.2 0.1 - 1.5 mg/dL    Albumin 4.2 3.2 - 4.9 g/dL    Total Protein 7.1 6.0 - 8.2 g/dL    Globulin 2.9 1.9 - 3.5 g/dL    A-G Ratio 1.4 g/dL   LIPASE   Result Value Ref Range    Lipase 33 11 - 82 U/L   CoV-2, FLU A/B, and RSV by PCR (2-4 Hours CEPHEID) : Collect NP swab in VTM    Specimen: Respirate   Result Value Ref Range    Influenza virus A RNA Negative Negative    Influenza virus B, PCR Negative Negative    RSV, PCR Negative Negative    SARS-CoV-2 by PCR NotDetected     SARS-CoV-2 Source Nasal Swab    HCG QUAL SERUM   Result Value Ref Range    Beta-Hcg Qualitative Serum Negative Negative   TROPONIN   Result Value Ref Range    Troponin T <6 6 - 19 ng/L   ESTIMATED GFR   Result Value Ref Range    GFR (CKD-EPI) 123 >60 mL/min/1.73 m 2   EKG   Result Value Ref Range    Report       [facility] Emergency Dept[facility]    Test Date:  2023-10-26  Pt Name:    MICHELLE MENARD               Department: [facility]  MRN:        [id number]                      Room:       -ROOM 5  Gender:     Female                       Technician: [id number]  :        [id number]                    Requested By:JAYLIN RICKS  Order #:    [id number]                    Reading MD: JAYLIN RICKS MD    Measurements  Intervals                                Axis  Rate:       68                           P:          14  OR:         211                          QRS:        27  QRSD:       82                           T:          10  QT:         361  QTc:        384    Interpretive Statements  Sinus rhythm  Prolonged OR interval  Compared to ECG 07/18/2023 08:39:24  First degree AV block now present  Electronically Signed On 10- 08:53:29 PDT by JAYLIN RICKS MD        I have independently interpreted this EKG  Rhythm strip interpretation-normal sinus rhythm    RADIOLOGY  I have independently interpreted the diagnostic imaging associated with this visit and am waiting the final reading from the radiologist.   My preliminary interpretation is as follows: Chest x-ray without infiltrate  Radiologist interpretation:   DX-CHEST-PORTABLE (1 VIEW)   Final Result      No radiographic evidence of acute cardiopulmonary process.            COURSE & MEDICAL DECISION MAKING    ED Observation Status? Yes; I am placing the patient in to an observation status due to a diagnostic uncertainty as well as therapeutic intensity. Patient placed in observation status at 7:46 AM, 10/26/2023.     Observation plan is as follows: Obtain diagnostic testing, monitoring, serial exams    Upon Reevaluation, the patient's condition has: Improved; and will be discharged.    Patient discharged from ED Observation status at 9:30 AM 10/26/2023    INITIAL ASSESSMENT, COURSE AND PLAN  Care Narrative: Patient presents with shortness of breath, abdominal pain, diarrhea, body aches and chills, chest pain, abdominal pain.  Suspect most likely viral process..  Vital signs are normal.  Physical exam is normal.  Work-up is initiated to rule out life-threatening conditions.  Ordered laboratory data x-ray EKG.    EKG without ischemia.  Laboratory data  returned reassuring.  No leukocytosis or left shift.  CMP is normal.  Lipase normal.  He does not have COVID.  Chest x-ray is negative for infiltrate.  She is not pregnant.  Her troponin was normal.    At this point I suspect most likely this is a viral process.  There is no suggestion of ACS, dissection.  Seems extremely unlikely to be pulmonary embolism based on the story of pain with movement, no hypoxia or tachycardia.  No hemoptysis leg swelling or immobilization or surgeries.  Therefore defer D-dimer.  EKG and history not consistent with pericarditis.  She does not have any clinical suggestion of surgical abdomen or other emergent process.  The best course is symptomatic management with watchful waiting.  I advised Tylenol as needed for pain.  I advised taking Pepcid daily.  Plenty of fluids rest and bland diet.  Patient was precautioned to return to the ER for significant difficulty breathing, high fevers notices specific area of pain or has concern.        DISPOSITION AND DISCUSSIONS    Escalation of care considered, and ultimately not performed: Considered CT imaging as well as admission.  Does not appear to be indicated    Prescription drugs considered and/or prescribed: Considered antibiotics but there is no evidence of bacterial illness.    FINAL IMPRESSION  1.  Chest pain  2.  Abdominal pain  3.  Suspected viral illness      This dictation was created using voice recognition software. The accuracy of the dictation is limited to the abilities of the software. I expect there may be some errors of grammar and possibly content. The nursing notes were reviewed and certain aspects of this information were incorporated into this note.    Electronically signed by: Rosendo Arvizu M.D., 10/26/2023

## 2023-10-26 NOTE — DISCHARGE INSTRUCTIONS
Take Pepcid daily.  Tylenol as needed for body aches and discomfort.  Drink plenty of fluids and rest.    Return to the emergency department if you have high fevers, persistent symptoms, significant difficulty breathing or concern.

## 2023-10-26 NOTE — ED TRIAGE NOTES
"Pt here with c/o    Chief Complaint   Patient presents with    Shortness of Breath     Pt states SOB x 3 days - states co-worker is positive for COVID    Chest Pain     Pt has had chest pain with deep breath and moving around.  Now chest pain is constant; stabbing and radiates to right shoulder.    Nausea     X 3 days    Dizziness    Chills     X 3 days- does not know if she has had a fever.       /76   Pulse 71   Temp 36.4 °C (97.5 °F) (Temporal)   Resp 13   Ht 1.575 m (5' 2\")   Wt 74 kg (163 lb 2.3 oz)   LMP 10/08/2023 (Approximate)   SpO2 100%   BMI 29.84 kg/m²    "

## 2023-11-27 ENCOUNTER — OFFICE VISIT (OUTPATIENT)
Dept: INTERNAL MEDICINE | Facility: OTHER | Age: 28
End: 2023-11-27
Payer: MEDICAID

## 2023-11-27 VITALS
SYSTOLIC BLOOD PRESSURE: 108 MMHG | WEIGHT: 163 LBS | OXYGEN SATURATION: 98 % | TEMPERATURE: 97.8 F | RESPIRATION RATE: 16 BRPM | HEIGHT: 62 IN | BODY MASS INDEX: 30 KG/M2 | DIASTOLIC BLOOD PRESSURE: 69 MMHG | HEART RATE: 72 BPM

## 2023-11-27 DIAGNOSIS — E87.6 HYPOKALEMIA: ICD-10-CM

## 2023-11-27 DIAGNOSIS — R79.89 HIGH SERUM THYROID STIMULATING HORMONE (TSH): ICD-10-CM

## 2023-11-27 DIAGNOSIS — E03.9 HYPOTHYROIDISM, UNSPECIFIED TYPE: ICD-10-CM

## 2023-11-27 DIAGNOSIS — E89.0 POSTSURGICAL HYPOTHYROIDISM: ICD-10-CM

## 2023-11-27 PROCEDURE — 3078F DIAST BP <80 MM HG: CPT | Performed by: INTERNAL MEDICINE

## 2023-11-27 PROCEDURE — 99213 OFFICE O/P EST LOW 20 MIN: CPT | Mod: GE | Performed by: INTERNAL MEDICINE

## 2023-11-27 PROCEDURE — 3074F SYST BP LT 130 MM HG: CPT | Performed by: INTERNAL MEDICINE

## 2023-11-27 RX ORDER — LEVOTHYROXINE SODIUM 175 UG/1
175 TABLET ORAL
Qty: 90 TABLET | Refills: 1 | Status: SHIPPED | OUTPATIENT
Start: 2023-11-27 | End: 2024-01-08 | Stop reason: SDUPTHER

## 2023-11-27 ASSESSMENT — ENCOUNTER SYMPTOMS
COUGH: 0
MYALGIAS: 0
NECK PAIN: 0
DIZZINESS: 0
CHILLS: 0
NERVOUS/ANXIOUS: 0
HEARTBURN: 0
HEMOPTYSIS: 0
BLURRED VISION: 0
DOUBLE VISION: 0
HEADACHES: 0
PALPITATIONS: 0
VOMITING: 0
FEVER: 0
NAUSEA: 0
WEIGHT LOSS: 0

## 2023-11-27 ASSESSMENT — FIBROSIS 4 INDEX: FIB4 SCORE: 0.44

## 2023-11-27 NOTE — PROGRESS NOTES
CC:   Chief Complaint   Patient presents with    Follow-Up     5 week    Medication Refill       HPI:   Ivis presents today with PMHx of graves disease s/p partial thyroidectomy, now with post surgical hypothyroidism (on levothyroxine), menorrhagia with regular cycles, migraines (prn imitrex), depression and anxiety who presents today for a follow up visit.     She was recently seen in the ED 10/26 with chest  s/p improvement with symdiscomfort associated with dizziness, chills, shortness of breath, she was diagnosed with a viral process and improved with sptomatic management. Doing well now.     We increased the dose of levothyroxine based on her labs at the last visit to 175 mcg.  Describes that she has not been persistent on the increased dose.  Describes she had received a refill which was 150 mcg and she continued taking the same.  Endorses ongoing fatigue.    We also started Zoloft at the last visit, describes she did not like the way it made her feel so she stopped taking it as well.  Has a referral to psychology, has not set up an appointment yet.    She did follow-up with the      No problems updated.    Health Maintenance: Completed    ROS:  Review of Systems   Constitutional:  Positive for malaise/fatigue. Negative for chills, fever and weight loss.   HENT:  Negative for hearing loss and tinnitus.    Eyes:  Negative for blurred vision and double vision.   Respiratory:  Negative for cough and hemoptysis.    Cardiovascular:  Negative for chest pain and palpitations.   Gastrointestinal:  Negative for heartburn, nausea and vomiting.   Genitourinary:  Negative for dysuria, frequency and urgency.   Musculoskeletal:  Negative for myalgias and neck pain.   Skin:  Negative for itching and rash.   Neurological:  Negative for dizziness and headaches.   Psychiatric/Behavioral:  The patient is not nervous/anxious.        Objective:     Exam:  /69   Pulse 72   Temp 36.6 °C (97.8 °F) (Temporal)   Resp 16    "Ht 1.575 m (5' 2\")   Wt 73.9 kg (163 lb)   SpO2 98%   BMI 29.81 kg/m²  Body mass index is 29.81 kg/m².    Physical Exam  Constitutional:       General: She is not in acute distress.     Appearance: Normal appearance. She is not ill-appearing.   HENT:      Head: Normocephalic and atraumatic.      Right Ear: External ear normal.      Left Ear: External ear normal.      Nose: Nose normal. No congestion.      Mouth/Throat:      Mouth: Mucous membranes are moist.   Eyes:      Extraocular Movements: Extraocular movements intact.      Pupils: Pupils are equal, round, and reactive to light.   Cardiovascular:      Rate and Rhythm: Normal rate and regular rhythm.      Pulses: Normal pulses.   Musculoskeletal:         General: Normal range of motion.   Skin:     General: Skin is warm.      Capillary Refill: Capillary refill takes less than 2 seconds.   Neurological:      Mental Status: She is alert and oriented to person, place, and time.   Psychiatric:         Mood and Affect: Mood normal.             Labs: Reviewed     Assessment & Plan:     28 y.o. female with the following -         Graves' disease s/p thyroidectomy  Postsurgical hypothyroidism  Recent TSH greater than 90 on recent labs.  Levothyroxine was increased to 175, however today she describes that she has not been taking the increased dose.  She had a refill of 150 mcg that she continued taking.   Endorses ongoing fatigue. Denies heat/cold intolerance.   Plan:  We addressed this in detail today.  Per chart review she has a history of Graves' disease and is s/p partial thyroidectomy.  She has been on 150 mcg of levothyroxine, however on her ED visit 7/23 her TSH was found to be more than 90.  She does endorse compliance to levothyroxine 150 mcg daily.  Increase levothyroxine to 175 at the last visit, however she has not started taking the increased dose yet.  Describes she had a refill of the decreased dose which she will continue taking.  Endorses ongoing " fatigue, feeling tired all the time.  Denies heat/cold intolerance.    - We discussed this in detail today, how this high level of TSH is concerning for pituitary adenoma?  We will also get prolactin and a.m. cortisol levels.  Repeat TSH with reflex to T4 and follow-up at the clinic in about 1 week to review labs.  Prolactin and cortisol levels were added after the patient left.  She was called and updated on the decision of these labs as well.  Discussed with patient to get the labs drawn ASAP to rule out lab error.  She describes she will try to get them done today.  If she is able to get them done today I will follow-up with her later this week, otherwise he will schedule her at the clinic in about 1-2 weeks   - Increase thyroxine to 175 daily  - We discussed proper technique of taking the thyroxine in the morning, on an empty stomach, separate from other medications.  Counseled at length about the importance of increased dose.   - Counseled on alarm signs and symptoms at length today   - Also placing an urgent referral to endocrinology   Alarm signs and symptoms prompting call 911 and/or going to the nearest urgent care/emergency department addressed in detail today.  Including symptoms of both hypo-/hyperthyroidism, myxedema coma.        Menorrhagia with regular cycles  Iron deficiency anemia  Presence of Mirena IUD  Improved.  Following up with gynecology s/p transvaginal ultrasound which was normal with IUD in place  Plan:  - Continue iron supplementation 325 every other day   - Discussed iron dense food  - Appreciate support from gynecology       Vitamin D deficiency  Vitamin D level was 25 on recent labs  Plan:  - Cont OTC supplementation         History of depression  Anxiety  Was previously on Zoloft that helped with mood, especially anxiety that she struggles with.  She stopped taking Zoloft during pregnancy and while breast-feeding.  This was restarted at the last visit per her request, however  describes she did feel good with that she will stop taking it.  Endorses fatigue which also could be secondary to the underlying hypothyroidism.  Referral to psychology was placed at last visit.  Encouraged her to set up an appointment if she is not still seeing them.  - Repeat TSH w/reflex to T4 prior to next visit   - Follow up with psychology for counseling          I spent a total of 30 minutes with record review, exam, communication with the patient, communication with other providers, and documentation of this encounter.      Return in about 5 weeks (around 1/1/2024).    Please note that this dictation was created using voice recognition software. I have made every reasonable attempt to correct obvious errors, but I expect that there are errors of grammar and possibly content that I did not discover before finalizing the note.

## 2023-11-27 NOTE — PATIENT INSTRUCTIONS
Thank you for visiting today!    Please follow-up in 1-2 weeks     Please follow-up on referrals and schedule an appointment with psychology [facility]  [id number]  [place] [id number]  Phone: [phone]    Please follow up with endocrinology     Please try and eat healthy, get at least 30 minutes of cardiovascular exercise a day to help keep your health as best as it can be.    If you have any questions or concerns please feel free to contact us at [phone].    If you feel like you are experiencing a medical emergency please seek immediate medical attention at an urgent care or in the emergency department.

## 2023-12-11 ENCOUNTER — HOSPITAL ENCOUNTER (OUTPATIENT)
Dept: LAB | Facility: MEDICAL CENTER | Age: 28
End: 2023-12-11
Attending: INTERNAL MEDICINE
Payer: MEDICAID

## 2023-12-11 DIAGNOSIS — R79.89 HIGH SERUM THYROID STIMULATING HORMONE (TSH): ICD-10-CM

## 2023-12-11 DIAGNOSIS — E89.0 POSTSURGICAL HYPOTHYROIDISM: ICD-10-CM

## 2023-12-11 DIAGNOSIS — E87.6 HYPOKALEMIA: ICD-10-CM

## 2023-12-11 LAB
ANION GAP SERPL CALC-SCNC: 10 MMOL/L (ref 7–16)
BUN SERPL-MCNC: 10 MG/DL (ref 8–22)
CALCIUM SERPL-MCNC: 9 MG/DL (ref 8.5–10.5)
CHLORIDE SERPL-SCNC: 103 MMOL/L (ref 96–112)
CO2 SERPL-SCNC: 24 MMOL/L (ref 20–33)
CREAT SERPL-MCNC: 0.62 MG/DL (ref 0.5–1.4)
GFR SERPLBLD CREATININE-BSD FMLA CKD-EPI: 126 ML/MIN/1.73 M 2
GLUCOSE SERPL-MCNC: 108 MG/DL (ref 65–99)
POTASSIUM SERPL-SCNC: 4.2 MMOL/L (ref 3.6–5.5)
PROLACTIN SERPL-MCNC: 18.8 NG/ML (ref 2.8–26)
SODIUM SERPL-SCNC: 137 MMOL/L (ref 135–145)

## 2023-12-11 PROCEDURE — 84439 ASSAY OF FREE THYROXINE: CPT

## 2023-12-11 PROCEDURE — 84443 ASSAY THYROID STIM HORMONE: CPT

## 2023-12-11 PROCEDURE — 84146 ASSAY OF PROLACTIN: CPT

## 2023-12-11 PROCEDURE — 36415 COLL VENOUS BLD VENIPUNCTURE: CPT

## 2023-12-11 PROCEDURE — 80048 BASIC METABOLIC PNL TOTAL CA: CPT

## 2023-12-11 PROCEDURE — 82533 TOTAL CORTISOL: CPT

## 2023-12-12 LAB
CORTIS SERPL-MCNC: 10.5 UG/DL (ref 0–23)
T4 FREE SERPL-MCNC: 0.8 NG/DL (ref 0.93–1.7)
TSH SERPL DL<=0.005 MIU/L-ACNC: 34.3 UIU/ML (ref 0.38–5.33)

## 2024-01-08 ENCOUNTER — OFFICE VISIT (OUTPATIENT)
Dept: INTERNAL MEDICINE | Facility: OTHER | Age: 29
End: 2024-01-08
Payer: MEDICAID

## 2024-01-08 VITALS
TEMPERATURE: 98.1 F | WEIGHT: 158.2 LBS | BODY MASS INDEX: 27.01 KG/M2 | DIASTOLIC BLOOD PRESSURE: 85 MMHG | OXYGEN SATURATION: 100 % | SYSTOLIC BLOOD PRESSURE: 112 MMHG | HEIGHT: 64 IN | HEART RATE: 81 BPM

## 2024-01-08 DIAGNOSIS — E03.9 HYPOTHYROIDISM, UNSPECIFIED TYPE: ICD-10-CM

## 2024-01-08 DIAGNOSIS — E05.00 GRAVES DISEASE: ICD-10-CM

## 2024-01-08 DIAGNOSIS — I49.8 OTHER CARDIAC ARRHYTHMIA: ICD-10-CM

## 2024-01-08 DIAGNOSIS — E89.0 POSTSURGICAL HYPOTHYROIDISM: Primary | ICD-10-CM

## 2024-01-08 DIAGNOSIS — R06.00 DYSPNEA, UNSPECIFIED TYPE: ICD-10-CM

## 2024-01-08 PROCEDURE — 3079F DIAST BP 80-89 MM HG: CPT | Performed by: INTERNAL MEDICINE

## 2024-01-08 PROCEDURE — 3074F SYST BP LT 130 MM HG: CPT | Performed by: INTERNAL MEDICINE

## 2024-01-08 PROCEDURE — 99213 OFFICE O/P EST LOW 20 MIN: CPT | Mod: GC | Performed by: INTERNAL MEDICINE

## 2024-01-08 RX ORDER — LEVOTHYROXINE SODIUM 0.2 MG/1
175 TABLET ORAL
Qty: 90 TABLET | Refills: 1 | Status: SHIPPED | OUTPATIENT
Start: 2024-01-08

## 2024-01-08 ASSESSMENT — FIBROSIS 4 INDEX: FIB4 SCORE: 0.44

## 2024-01-08 ASSESSMENT — ENCOUNTER SYMPTOMS
BLURRED VISION: 0
HEARTBURN: 1
FEVER: 0
DOUBLE VISION: 0
PALPITATIONS: 0
VOMITING: 0
MYALGIAS: 0
HALLUCINATIONS: 0
NAUSEA: 0
HEMOPTYSIS: 0
CHILLS: 0
HEADACHES: 0
ORTHOPNEA: 0
CLAUDICATION: 0
DIZZINESS: 0
COUGH: 0

## 2024-01-08 ASSESSMENT — PATIENT HEALTH QUESTIONNAIRE - PHQ9: CLINICAL INTERPRETATION OF PHQ2 SCORE: 0

## 2024-01-08 NOTE — PATIENT INSTRUCTIONS
Thank you for visiting today!    Please follow-up in 5 weeks     PLEASE INCREASE THE DOSE OF LEVOTHYROXINE  MCG     Please get the ultrasound of your heart done (echocardiogram)      Please follow-up on referrals and schedule an appointment with endocrinology     Please try and eat healthy, get at least 30 minutes of cardiovascular exercise a day to help keep your health as best as it can be.    If you have any questions or concerns please feel free to contact us at [phone].    If you feel like you are experiencing a medical emergency please seek immediate medical attention at an urgent care or in the emergency department.

## 2024-01-08 NOTE — PROGRESS NOTES
CC:   Chief Complaint   Patient presents with    Lab Results         HPI:   Ivis presents today with past medical history of Graves' disease s/p partial thyroidectomy now with postsurgical hypothyroidism, on levothyroxine here today for a follow-up visit.      Blood work during an ED visit 7/23 revealed TSH 95 with free T4 0.83.  She was recommended to increase levothyroxine from 150 MCG to 175 mcg daily which she was unable to increase at the time.  She was seen at the clinic 8/28 and advised to increase the dose to 175 mcg daily.  Repeat labs 12/23 revealing  TSH 34 (high), improved from 95 five months ago  Free thyroxine 0.80 (low), down from 0.935 months ago  BMP with sodium 137, potassium 4.2, chloride 103, bicarb 24, BUN 10 and creatinine 0.62.    Cortisol 10.5  Prolactin 18      Describes she has been taking the 175 mcg for the last 2 months.  However, endorses episodes of feeling cold intolerance.  Describes sometimes she gets superhot and is sweating and then the other times she will have chills.  Endorses occasional feeling of heart fluttering.  Furthermore, describes it at the end of the day when she is done with her work/job and goes home, she gets chest discomfort when she lays down in her bed.  Describes this last about 5-10 minutes.  Denies such episodes with exertion, however describes that these almost always happen at the end of the day.  She has started going to the gym again and describes she feels some discomfort in the chest during workouts.  No orthopnea or paroxysmal nocturnal dyspnea.  This has been ongoing since at least 7/23      No problems updated.    Health Maintenance: Completed    ROS:  Review of Systems   Constitutional:  Positive for malaise/fatigue. Negative for chills and fever.   HENT:  Negative for hearing loss and tinnitus.    Eyes:  Negative for blurred vision and double vision.   Respiratory:  Negative for cough and hemoptysis.    Cardiovascular:  Positive for chest  pain. Negative for palpitations, orthopnea and claudication.   Gastrointestinal:  Positive for heartburn. Negative for nausea and vomiting.   Genitourinary:  Negative for dysuria and urgency.   Musculoskeletal:  Negative for myalgias.   Skin:  Negative for itching and rash.   Neurological:  Negative for dizziness and headaches.   Psychiatric/Behavioral:  Negative for hallucinations.        Objective:     Exam:  There were no vitals taken for this visit. There is no height or weight on file to calculate BMI.    Physical Exam  Constitutional:       General: She is not in acute distress.     Appearance: Normal appearance. She is not ill-appearing.   HENT:      Head: Normocephalic and atraumatic.      Right Ear: External ear normal.      Left Ear: External ear normal.      Nose: Nose normal.      Mouth/Throat:      Mouth: Mucous membranes are moist.      Pharynx: No oropharyngeal exudate.   Eyes:      General: No scleral icterus.        Right eye: No discharge.         Left eye: No discharge.      Extraocular Movements: Extraocular movements intact.      Conjunctiva/sclera: Conjunctivae normal.      Pupils: Pupils are equal, round, and reactive to light.   Neck:      Vascular: No carotid bruit.      Comments: Thyroid exam, without any palpable nodules  Cardiovascular:      Rate and Rhythm: Normal rate and regular rhythm.      Pulses: Normal pulses.   Abdominal:      General: Abdomen is flat. Bowel sounds are normal.      Palpations: Abdomen is soft.   Musculoskeletal:         General: Normal range of motion.      Cervical back: Normal range of motion and neck supple. No rigidity or tenderness.   Lymphadenopathy:      Cervical: No cervical adenopathy.   Skin:     General: Skin is warm.      Capillary Refill: Capillary refill takes less than 2 seconds.   Neurological:      Mental Status: She is alert and oriented to person, place, and time.   Psychiatric:         Mood and Affect: Mood normal.             Labs: Reviewed      Assessment & Plan:     28 y.o. female with the following -       # Graves' disease s/p partial thyroidectomy  # Postsurgical hypothyroidism  See HPI for details.   - With ongoing symptoms.  Increase levothyroxine to 200 mcg in the morning.  Discussed to take this on empty stomach apart from other medications.  - Referral to endocrinology placed      # Atypical chest pain  # History of anxiety  See HPI for details   - In the setting of thyroid disease, history of anxiety.  We discussed getting an echocardiogram to rule out structural defects.  Get echocardiogram prior to next visit.  - Alarm signs and symptoms prompting calling 911 and/or going to the nearest emergency department addressed in detail today including but not limited to chest pain, shortness of breath etc.            I spent a total of 30 minutes with record review, exam, communication with the patient, communication with other providers, and documentation of this encounter.      No follow-ups on file.    Please note that this dictation was created using voice recognition software. I have made every reasonable attempt to correct obvious errors, but I expect that there are errors of grammar and possibly content that I did not discover before finalizing the note.

## 2024-01-29 ENCOUNTER — APPOINTMENT (OUTPATIENT)
Dept: CARDIOLOGY | Facility: MEDICAL CENTER | Age: 29
End: 2024-01-29
Attending: INTERNAL MEDICINE
Payer: MEDICAID

## 2024-03-09 ENCOUNTER — APPOINTMENT (OUTPATIENT)
Dept: RADIOLOGY | Facility: MEDICAL CENTER | Age: 29
End: 2024-03-09
Attending: EMERGENCY MEDICINE
Payer: MEDICAID

## 2024-03-09 ENCOUNTER — HOSPITAL ENCOUNTER (EMERGENCY)
Facility: MEDICAL CENTER | Age: 29
End: 2024-03-09
Attending: EMERGENCY MEDICINE
Payer: MEDICAID

## 2024-03-09 VITALS
BODY MASS INDEX: 27.81 KG/M2 | HEART RATE: 111 BPM | RESPIRATION RATE: 20 BRPM | DIASTOLIC BLOOD PRESSURE: 89 MMHG | HEIGHT: 63 IN | WEIGHT: 156.97 LBS | TEMPERATURE: 99 F | OXYGEN SATURATION: 99 % | SYSTOLIC BLOOD PRESSURE: 130 MMHG

## 2024-03-09 DIAGNOSIS — J10.1 INFLUENZA A: ICD-10-CM

## 2024-03-09 LAB
EKG IMPRESSION: NORMAL
FLUAV RNA SPEC QL NAA+PROBE: POSITIVE
FLUBV RNA SPEC QL NAA+PROBE: NEGATIVE
RSV RNA SPEC QL NAA+PROBE: NEGATIVE
SARS-COV-2 RNA RESP QL NAA+PROBE: NOTDETECTED
SPECIMEN SOURCE: ABNORMAL

## 2024-03-09 PROCEDURE — 0241U HCHG SARS-COV-2 COVID-19 NFCT DS RESP RNA 4 TRGT MIC: CPT

## 2024-03-09 PROCEDURE — 99283 EMERGENCY DEPT VISIT LOW MDM: CPT

## 2024-03-09 PROCEDURE — 93005 ELECTROCARDIOGRAM TRACING: CPT | Performed by: EMERGENCY MEDICINE

## 2024-03-09 PROCEDURE — 700102 HCHG RX REV CODE 250 W/ 637 OVERRIDE(OP): Performed by: EMERGENCY MEDICINE

## 2024-03-09 PROCEDURE — A9270 NON-COVERED ITEM OR SERVICE: HCPCS | Performed by: EMERGENCY MEDICINE

## 2024-03-09 PROCEDURE — 71045 X-RAY EXAM CHEST 1 VIEW: CPT

## 2024-03-09 RX ORDER — OSELTAMIVIR PHOSPHATE 75 MG/1
75 CAPSULE ORAL 2 TIMES DAILY
Qty: 10 CAPSULE | Refills: 0 | Status: ACTIVE | OUTPATIENT
Start: 2024-03-09 | End: 2024-03-14

## 2024-03-09 RX ORDER — ACETAMINOPHEN 325 MG/1
650 TABLET ORAL ONCE
Status: COMPLETED | OUTPATIENT
Start: 2024-03-09 | End: 2024-03-09

## 2024-03-09 RX ADMIN — ACETAMINOPHEN 650 MG: 325 TABLET ORAL at 11:54

## 2024-03-09 ASSESSMENT — FIBROSIS 4 INDEX: FIB4 SCORE: 0.44

## 2024-03-09 NOTE — Clinical Note
Ivis Klein was seen and treated in our emergency department on 3/9/2024.  She may return to work on 03/12/2024.       If you have any questions or concerns, please don't hesitate to call.      Rosendo Arvizu M.D.

## 2024-03-09 NOTE — ED PROVIDER NOTES
"ED PHYSICIAN NOTE    CHIEF COMPLAINT  Chief Complaint   Patient presents with    Flu Like Symptoms     Onset moist cough last night  Associated with sore throat, fever, H/A, SOB  and body aches  T-Max 102 F last night  \" Cough till I vomit \"       EXTERNAL RECORDS REVIEWED  Outpatient Notes patient followed by primary doctor.  Has Graves' disease and thyroidectomy.    HPI/ROS      Ivis Klein is a 28 y.o. female who presents with cough and difficulty breathing.  Patient started feeling sick last night.  Has had a temperature up to 102.  Has a dry cough that is forceful to the point where she has thrown up.  She has headache, sore throat congestion runny nose.  She has pain over the left side of her chest more than over the right.  She has not had a mopped assist.  No leg swelling leg pain.  No positional symptoms.  No nausea vomiting diarrhea.  No dysuria.  She is not pregnant.    PAST MEDICAL HISTORY  Past Medical History:   Diagnosis Date    Anemia 2/17/2022    Anxiety 2/16/2017    Anxiety 2/16/2017    Elevated antinuclear antibody (DEJON) level 5/23/2019    Graves disease 12/5/2016    Heart valve disease     Hemorrhagic cysts of both ovaries 5/23/2019    History of panic attack 4/9/2020    History of pericarditis 12/5/2016    History of thyroidectomy 1/31/2020    Partial --> hypothyroidism     Hyperlipidemia 2/17/2022    Panic attack 3/11/2019    Postpartum depression 2/17/2022    Postpartum depression 2/17/2022    Vaginal discharge 12/2/2021       SOCIAL HISTORY  Social History     Tobacco Use    Smoking status: Never    Smokeless tobacco: Never    Tobacco comments:     quit in 2012   Vaping Use    Vaping Use: Never used   Substance Use Topics    Alcohol use: No    Drug use: Yes     Types: Marijuana, Inhaled     Comment: marijuana weekly       CURRENT MEDICATIONS  Home Medications    **Home medications have not yet been reviewed for this encounter**         ALLERGIES  No Known Allergies    PHYSICAL " "EXAM  VITAL SIGNS: BP (!) 133/95   Pulse (!) 105   Temp 37.3 °C (99.1 °F) (Temporal)   Resp 16   Ht 1.6 m (5' 3\")   Wt 71.2 kg (156 lb 15.5 oz)   LMP 2024 (Exact Date)   SpO2 100%   Breastfeeding No   BMI 27.81 kg/m²    Constitutional: Awake and alert  HENT: Pharynx normal without exudate or asymmetry  Eyes: Normal inspection  Neck: Grossly normal range of motion.  Cardiovascular: Mildly elevated heart rate  Thorax & Lungs: No respiratory distress.  Lung fields are clear throughout without wheezes rales or rhonchi  Extremities: Well perfused  Neurologic: Grossly normal   Psychiatric: Normal for situation      DIAGNOSTIC STUDIES / PROCEDURES  LABS/EKG  Results for orders placed or performed during the hospital encounter of 24   CoV-2, Flu A/B, And RSV by PCR (Nexstim)    Specimen: Nasopharyngeal; Respirate   Result Value Ref Range    Influenza virus A RNA POSITIVE (A) Negative    Influenza virus B, PCR Negative Negative    RSV, PCR Negative Negative    SARS-CoV-2 by PCR NotDetected     SARS-CoV-2 Source NP Swab    EKG (NOW)   Result Value Ref Range    Report       [facility] Emergency Dept.    Test Date:  2024  Pt Name:    MICHELLE MENARD               Department: [facility]  MRN:        [id number]                      [facility]  Gender:     Female                       Technician: [id number]  :        1995                   Requested By:JAYLIN RICKS  Order #:    [id number]                    Reading MD: JAYLIN RICKS MD    Measurements  Intervals                                Axis  Rate:       98                           P:          16  VT:         179                          QRS:        32  QRSD:       74                           T:          5  QT:         305  QTc:        390    Interpretive Statements  Sinus rhythm  Compared to ECG 10/26/2023 07:51:47  First degree AV block no longer present  Electronically Signed On 2024 12:56:38 PST by " JAYLIN RICKS MD        I have independently interpreted this EKG      RADIOLOGY  I have independently interpreted the diagnostic imaging associated with this visit and am waiting the final reading from the radiologist.   My preliminary interpretation is as follows: Chest x-ray without infiltrate  Radiologist interpretation:   DX-CHEST-PORTABLE (1 VIEW)    (Results Pending)         COURSE & MEDICAL DECISION MAKING      INITIAL ASSESSMENT, COURSE AND PLAN  Care Narrative: Patient presents with fever, cough, chest pain.  Her examination was benign most consistent with viral process.  Order chest x-ray.  EKG did not show ischemia or acute abnormalities.  Lateral panel.  Treated patient with Tylenol.    Chest x-ray without infiltrate or acute abnormality.    Viral panel positive for influenza A.    Patient presents in time window for treatment with Tamiflu.  Discussed this medication she would like to proceed.  Prescription was written.  Have advised Tylenol and ibuprofen at home plenty of fluids rest over-the-counter cough remedies as needed.  Patient should return to the ER for difficulty breathing, uncontrolled fever or other concerning symptoms.          DISPOSITION AND DISCUSSIONS      Escalation of care considered, and ultimately not performed:blood analysis and IV fluids but patient is nontoxic and hemodynamically stable.    Prescription drugs considered and/or prescribed:  Prescribed Tamiflu    FINAL IMPRESSION  1.  Influenza A    This dictation was created using voice recognition software. The accuracy of the dictation is limited to the abilities of the software. I expect there may be some errors of grammar and possibly content. The nursing notes were reviewed and certain aspects of this information were incorporated into this note.    Electronically signed by: Jaylin Ricks M.D., 3/9/2024

## 2024-03-12 ENCOUNTER — HOSPITAL ENCOUNTER (EMERGENCY)
Facility: MEDICAL CENTER | Age: 29
End: 2024-03-12
Attending: EMERGENCY MEDICINE
Payer: MEDICAID

## 2024-03-12 ENCOUNTER — APPOINTMENT (OUTPATIENT)
Dept: RADIOLOGY | Facility: MEDICAL CENTER | Age: 29
End: 2024-03-12
Attending: EMERGENCY MEDICINE
Payer: MEDICAID

## 2024-03-12 VITALS
HEIGHT: 63 IN | HEART RATE: 83 BPM | BODY MASS INDEX: 26.95 KG/M2 | RESPIRATION RATE: 16 BRPM | OXYGEN SATURATION: 99 % | DIASTOLIC BLOOD PRESSURE: 71 MMHG | SYSTOLIC BLOOD PRESSURE: 133 MMHG | WEIGHT: 152.12 LBS | TEMPERATURE: 97.8 F

## 2024-03-12 DIAGNOSIS — M79.604 PAIN OF RIGHT LOWER EXTREMITY: ICD-10-CM

## 2024-03-12 DIAGNOSIS — J10.1 INFLUENZA A: ICD-10-CM

## 2024-03-12 DIAGNOSIS — D72.819 LEUKOPENIA, UNSPECIFIED TYPE: ICD-10-CM

## 2024-03-12 DIAGNOSIS — M94.0 COSTOCHONDRITIS: ICD-10-CM

## 2024-03-12 LAB
ALBUMIN SERPL BCP-MCNC: 4.4 G/DL (ref 3.2–4.9)
ALBUMIN/GLOB SERPL: 1.3 G/DL
ALP SERPL-CCNC: 65 U/L (ref 30–99)
ALT SERPL-CCNC: 13 U/L (ref 2–50)
ANION GAP SERPL CALC-SCNC: 11 MMOL/L (ref 7–16)
AST SERPL-CCNC: 20 U/L (ref 12–45)
BASOPHILS # BLD AUTO: 0.7 % (ref 0–1.8)
BASOPHILS # BLD: 0.02 K/UL (ref 0–0.12)
BILIRUB SERPL-MCNC: 0.2 MG/DL (ref 0.1–1.5)
BUN SERPL-MCNC: 11 MG/DL (ref 8–22)
CALCIUM ALBUM COR SERPL-MCNC: 8.5 MG/DL (ref 8.5–10.5)
CALCIUM SERPL-MCNC: 8.8 MG/DL (ref 8.4–10.2)
CHLORIDE SERPL-SCNC: 101 MMOL/L (ref 96–112)
CO2 SERPL-SCNC: 26 MMOL/L (ref 20–33)
CREAT SERPL-MCNC: 0.72 MG/DL (ref 0.5–1.4)
EKG IMPRESSION: NORMAL
EOSINOPHIL # BLD AUTO: 0.01 K/UL (ref 0–0.51)
EOSINOPHIL NFR BLD: 0.4 % (ref 0–6.9)
ERYTHROCYTE [DISTWIDTH] IN BLOOD BY AUTOMATED COUNT: 45.1 FL (ref 35.9–50)
GFR SERPLBLD CREATININE-BSD FMLA CKD-EPI: 118 ML/MIN/1.73 M 2
GLOBULIN SER CALC-MCNC: 3.5 G/DL (ref 1.9–3.5)
GLUCOSE SERPL-MCNC: 121 MG/DL (ref 65–99)
HCG SERPL QL: NEGATIVE
HCT VFR BLD AUTO: 38.6 % (ref 37–47)
HGB BLD-MCNC: 11.9 G/DL (ref 12–16)
IMM GRANULOCYTES # BLD AUTO: 0 K/UL (ref 0–0.11)
IMM GRANULOCYTES NFR BLD AUTO: 0 % (ref 0–0.9)
LYMPHOCYTES # BLD AUTO: 1.54 K/UL (ref 1–4.8)
LYMPHOCYTES NFR BLD: 56.4 % (ref 22–41)
MCH RBC QN AUTO: 24.4 PG (ref 27–33)
MCHC RBC AUTO-ENTMCNC: 30.8 G/DL (ref 32.2–35.5)
MCV RBC AUTO: 79.3 FL (ref 81.4–97.8)
MONOCYTES # BLD AUTO: 0.28 K/UL (ref 0–0.85)
MONOCYTES NFR BLD AUTO: 10.3 % (ref 0–13.4)
NEUTROPHILS # BLD AUTO: 0.88 K/UL (ref 1.82–7.42)
NEUTROPHILS NFR BLD: 32.2 % (ref 44–72)
NRBC # BLD AUTO: 0 K/UL
NRBC BLD-RTO: 0 /100 WBC (ref 0–0.2)
PLATELET # BLD AUTO: 209 K/UL (ref 164–446)
PMV BLD AUTO: 10.5 FL (ref 9–12.9)
POTASSIUM SERPL-SCNC: 3.6 MMOL/L (ref 3.6–5.5)
PROT SERPL-MCNC: 7.9 G/DL (ref 6–8.2)
RBC # BLD AUTO: 4.87 M/UL (ref 4.2–5.4)
SODIUM SERPL-SCNC: 138 MMOL/L (ref 135–145)
WBC # BLD AUTO: 2.7 K/UL (ref 4.8–10.8)

## 2024-03-12 PROCEDURE — 99284 EMERGENCY DEPT VISIT MOD MDM: CPT

## 2024-03-12 PROCEDURE — 36415 COLL VENOUS BLD VENIPUNCTURE: CPT

## 2024-03-12 PROCEDURE — 85025 COMPLETE CBC W/AUTO DIFF WBC: CPT

## 2024-03-12 PROCEDURE — 80053 COMPREHEN METABOLIC PANEL: CPT

## 2024-03-12 PROCEDURE — 93971 EXTREMITY STUDY: CPT | Mod: RT

## 2024-03-12 PROCEDURE — 93005 ELECTROCARDIOGRAM TRACING: CPT | Performed by: EMERGENCY MEDICINE

## 2024-03-12 PROCEDURE — 71275 CT ANGIOGRAPHY CHEST: CPT

## 2024-03-12 PROCEDURE — 84703 CHORIONIC GONADOTROPIN ASSAY: CPT

## 2024-03-12 PROCEDURE — 700117 HCHG RX CONTRAST REV CODE 255: Performed by: EMERGENCY MEDICINE

## 2024-03-12 PROCEDURE — 93005 ELECTROCARDIOGRAM TRACING: CPT

## 2024-03-12 RX ORDER — ACETAMINOPHEN 500 MG
500 TABLET ORAL EVERY 4 HOURS PRN
COMMUNITY

## 2024-03-12 RX ADMIN — IOHEXOL 50 ML: 350 INJECTION, SOLUTION INTRAVENOUS at 10:52

## 2024-03-12 ASSESSMENT — FIBROSIS 4 INDEX: FIB4 SCORE: 0.44

## 2024-03-12 NOTE — DISCHARGE INSTRUCTIONS
See your doctor for recheck if not better in 1 week.  Your white blood cell count today was measured low, please repeat blood work in approximately 1 month to recheck.    Return to the emergency department for difficulty breathing, or any concerns

## 2024-03-12 NOTE — ED NOTES
PIV placed, blood work collected & sent to lab, Pt denied having any needs at this time, no distress noted;    Pt aware that she is waiting for CT scan;

## 2024-03-12 NOTE — Clinical Note
Ivis Klein was seen and treated in our emergency department on 3/12/2024.  She may return to work on 03/14/2024.       If you have any questions or concerns, please don't hesitate to call.      Oleg Lakhani M.D.

## 2024-03-12 NOTE — ED NOTES
Pt states she tested positive for Flu on Saturday. Pt states she has chest pain since Saturday. Pt states she has had right calf pain since two days ago. Pt states history of blood clots.

## 2024-03-12 NOTE — ED PROVIDER NOTES
ED Provider Note    CHIEF COMPLAINT  Chief Complaint   Patient presents with    Flu Like Symptoms     Onset s/s Fri Cough, sore throat, fever, H/A and bodyaches  Tested Pos for Flu A on Saturday      Chest Pain     RT sided CP started Saturday    Leg Pain     RT calf pain x 2 d       EXTERNAL RECORDS REVIEWED  Outpatient Notes primary care visit January 2024, evaluation for Graves' disease, partial thyroidectomy on thyroid replacement    HPI/ROS  LIMITATION TO HISTORY   Select: : None  OUTSIDE HISTORIAN(S):  None    Ivis Klein is a 28 y.o. female who presents with right parasternal pain, increases with deep breath.  She has been more short of breath and lightheaded with exertion.  Patient is on day 4 of a cough, testing positive for influenza 2 days ago.  Patient currently taking Tamiflu.  In the last 2 days developed the right calf pain and chest pain.  She does not currently take blood thinners.  She states she developed blood clots 5 years ago underwent treatment for period of time and is no longer taking anticoagulants.    PAST MEDICAL HISTORY   has a past medical history of Anemia (2/17/2022), Anxiety (2/16/2017), Anxiety (2/16/2017), Elevated antinuclear antibody (DEJON) level (5/23/2019), Graves disease (12/5/2016), Heart valve disease, Hemorrhagic cysts of both ovaries (5/23/2019), History of panic attack (4/9/2020), History of pericarditis (12/5/2016), History of thyroidectomy (1/31/2020), Hyperlipidemia (2/17/2022), Panic attack (3/11/2019), Postpartum depression (2/17/2022), Postpartum depression (2/17/2022), and Vaginal discharge (12/2/2021).    SURGICAL HISTORY   has a past surgical history that includes primary c section (9/8/2013) and thyroidectomy total (Bilateral, 3/22/2017).    FAMILY HISTORY  Family History   Problem Relation Age of Onset    Cancer Paternal Grandmother 56        breast cancer    No Known Problems Mother     Hyperlipidemia Father     No Known Problems Sister     No  "Known Problems Brother        SOCIAL HISTORY  Social History     Tobacco Use    Smoking status: Never    Smokeless tobacco: Never    Tobacco comments:     quit in 2012   Vaping Use    Vaping Use: Never used   Substance and Sexual Activity    Alcohol use: No    Drug use: Yes     Types: Marijuana, Inhaled     Comment: marijuana weekly    Sexual activity: Yes     Partners: Male     Birth control/protection: Abstinence     Comment: single       CURRENT MEDICATIONS  Home Medications    **Home medications have not yet been reviewed for this encounter**         ALLERGIES  No Known Allergies    PHYSICAL EXAM  VITAL SIGNS: /82   Pulse 83   Temp 36.7 °C (98 °F) (Temporal)   Resp 14   Ht 1.6 m (5' 3\")   Wt 69 kg (152 lb 1.9 oz)   LMP 02/14/2024 (Exact Date)   SpO2 99%   BMI 26.95 kg/m²    Constitutional: Well-nourished  Respiratory: Slightly coarse breath sounds with expiration, moderate air movement.  No crackles.  No stridor  Musculoskeletal: Tender right parasternal chest wall.  Positive Homans' sign right leg  Cardiac: Regular rate and rhythm  GI: Abdomen is soft, nontender, no guarding  Skin: No asymmetric swelling  GI: Abdomen is soft and nontender    DIAGNOSTIC STUDIES / PROCEDURES  EKG  I have independently interpreted this EKG  See below    LABS  Results for orders placed or performed during the hospital encounter of 03/12/24   CBC WITH DIFFERENTIAL   Result Value Ref Range    WBC 2.7 (L) 4.8 - 10.8 K/uL    RBC 4.87 4.20 - 5.40 M/uL    Hemoglobin 11.9 (L) 12.0 - 16.0 g/dL    Hematocrit 38.6 37.0 - 47.0 %    MCV 79.3 (L) 81.4 - 97.8 fL    MCH 24.4 (L) 27.0 - 33.0 pg    MCHC 30.8 (L) 32.2 - 35.5 g/dL    RDW 45.1 35.9 - 50.0 fL    Platelet Count 209 164 - 446 K/uL    MPV 10.5 9.0 - 12.9 fL    Neutrophils-Polys 32.20 (L) 44.00 - 72.00 %    Lymphocytes 56.40 (H) 22.00 - 41.00 %    Monocytes 10.30 0.00 - 13.40 %    Eosinophils 0.40 0.00 - 6.90 %    Basophils 0.70 0.00 - 1.80 %    Immature Granulocytes 0.00 " 0.00 - 0.90 %    Nucleated RBC 0.00 0.00 - 0.20 /100 WBC    Neutrophils (Absolute) 0.88 (L) 1.82 - 7.42 K/uL    Lymphs (Absolute) 1.54 1.00 - 4.80 K/uL    Monos (Absolute) 0.28 0.00 - 0.85 K/uL    Eos (Absolute) 0.01 0.00 - 0.51 K/uL    Baso (Absolute) 0.02 0.00 - 0.12 K/uL    Immature Granulocytes (abs) 0.00 0.00 - 0.11 K/uL    NRBC (Absolute) 0.00 K/uL   COMP METABOLIC PANEL   Result Value Ref Range    Sodium 138 135 - 145 mmol/L    Potassium 3.6 3.6 - 5.5 mmol/L    Chloride 101 96 - 112 mmol/L    Co2 26 20 - 33 mmol/L    Anion Gap 11.0 7.0 - 16.0    Glucose 121 (H) 65 - 99 mg/dL    Bun 11 8 - 22 mg/dL    Creatinine 0.72 0.50 - 1.40 mg/dL    Calcium 8.8 8.4 - 10.2 mg/dL    Correct Calcium 8.5 8.5 - 10.5 mg/dL    AST(SGOT) 20 12 - 45 U/L    ALT(SGPT) 13 2 - 50 U/L    Alkaline Phosphatase 65 30 - 99 U/L    Total Bilirubin 0.2 0.1 - 1.5 mg/dL    Albumin 4.4 3.2 - 4.9 g/dL    Total Protein 7.9 6.0 - 8.2 g/dL    Globulin 3.5 1.9 - 3.5 g/dL    A-G Ratio 1.3 g/dL   HCG QUAL SERUM   Result Value Ref Range    Beta-Hcg Qualitative Serum Negative Negative   ESTIMATED GFR   Result Value Ref Range    GFR (CKD-EPI) 118 >60 mL/min/1.73 m 2   EKG   Result Value Ref Range    Report       [facility] Emergency Dept.    Test Date:  2024  Pt Name:    MICHELLE MENARD               Department: [facility]  MRN:        [id number]                      Room:       -ROOM 6  Gender:     Female                       Technician: alea  :        1995                   Requested By:ER TRIAGE PROTOCOL  Order #:    [id number]                    Reading MD: CATARINO RANDOLPH MD    Measurements  Intervals                                Axis  Rate:       81                           P:          29  MN:         169                          QRS:        42  QRSD:       73                           T:          21  QT:         354  QTc:        411    Interpretive Statements  Sinus rhythm  Compared to ECG 2024  11:55:05  No significant changes  Electronically Signed On 03- 17:18:37 PDT by CATARINO RANDOLPH MD           RADIOLOGY    Radiologist interpretation:   CT-CTA CHEST PULMONARY ARTERY W/ RECONS   Final Result      Negative CTA chest. No pulmonary embolism            US-EXTREMITY VENOUS LOWER UNILAT RIGHT   Final Result      Ultrasound of right lower extremity negative for DVT      COURSE & MEDICAL DECISION MAKING    ED Observation Status? No; Patient does not meet criteria for ED Observation.     INITIAL ASSESSMENT, COURSE AND PLAN  Care Narrative: Patient presents today for of influenza A, currently taking Tamiflu.  She is developed a pain in the right calf, with positive Homans' sign, ultrasound is obtained and negative for DVT.  Patient is high risk, therefore D-dimer was not indicated.  CT scan of the chest was obtained to rule out pulmonary embolism, this was a negative study.  There is also no evidence of pneumothorax, no pericardial effusion, no pneumonia.  With pain reproducible by palpation, I suspect costochondritis in the setting of influenza and repetitive coughing.  Musculoskeletal pain likely the cause of right calf pain.  I discussed with the patient new leukopenia with a white blood cell count of 2.7.  This is possible in the setting of viral syndrome although she is advised she will need to follow-up with primary care doctor for repeat blood work in 1 to 2 months.  She is advised to return emerged part if worse or for any concerns       DISPOSITION AND DISCUSSIONS    Escalation of care considered, and ultimately not performed:acute inpatient care management, however at this time, the patient is most appropriate for outpatient management      Decision tools and prescription drugs considered including, but not limited to: Antibiotics were not indicated, no evidence of bacterial infection. .  Patient does not require blood thinners, no evidence of DVT or pulmonary embolism         FINAL  DIAGNOSIS  1. Pain of right lower extremity    2. Costochondritis    3. Influenza A    4. Leukopenia, unspecified type           Electronically signed by: Oleg Lakhani M.D., 3/12/2024 9:47 AM

## 2024-03-12 NOTE — ED NOTES
Med rec is complete per Patient at bedside.     Allergies reviewed.    Has patient had any outside antibiotics in the last 30 days? Y    Any Anticoagulants (rivaroxaban, apixaban, edoxaban, dabigatran, warfarin, enoxaparin) taken in the last 14 days? N      Pharmacy/Pharmacies Pt utilizes : [facility] [phone]

## 2024-03-25 ENCOUNTER — APPOINTMENT (OUTPATIENT)
Dept: INTERNAL MEDICINE | Facility: OTHER | Age: 29
End: 2024-03-25
Payer: MEDICAID

## 2024-03-25 NOTE — PROGRESS NOTES
CC: No chief complaint on file.        HPI:   Ivis presents today with PMHx of Graves' disease s/p partial thyroidectomy now with postsurgical hypothyroidism, on levothyroxine here today for a follow-up visit.    She was recently seen in the ED twice, 3/9 and 3/12/2024 for concerns of right calf pain with positive Homans' sign and atypical chest pain.  CTA of the chest was performed that was negative for pulmonary embolism.  No adenopathy.  Clear pulmonary parenchyma.  Right lower extremity DVT ultrasound was also performed which was negative for deep venous thrombosis.     # Atypical chest pain   During her last clinic visit, we also addressed chest pain that had been ongoing since 7/23 for which an echocardiogram was ordered which is still pending.  EKG 3/12/24 during ED visit:  Sinus rhythm. Rate 80. Qtc 411.    Of note one of her EKGs 10/23 did show 1st degree AV block with prolonged PA interval 211     # Leukopenia   New low WBC count of 2.7 on recent ED visit.   Neutrophils low with higher percentage of lymphocytes in the setting of viral illness, influenz.   Hgb 11.9     # Chronic anemia   Hgb 11.9. Hgb in 11s since last 9 months atleast.   Not fully addressed. RTC 1 month to discuss in detail     # Hypothyroidism   PMHx of Graves' disease s/p partial thyroidectomy now with postsurgical hypothyroidism, on levothyroxine.This was not addressed in detail today considering she had other acute issues.   RTC 1 month to address in detail          No problems updated.    Health Maintenance: Completed    ROS:  ROS    Objective:     Exam:  LMP 02/14/2024 (Exact Date)  There is no height or weight on file to calculate BMI.    Physical Exam          Assessment & Plan:     28 y.o. female with the following -               I spent a total of 30 minutes with record review, exam, communication with the patient, communication with other providers, and documentation of this encounter.      No follow-ups on  file.    Please note that this dictation was created using voice recognition software. I have made every reasonable attempt to correct obvious errors, but I expect that there are errors of grammar and possibly content that I did not discover before finalizing the note.

## 2024-04-12 ENCOUNTER — GYNECOLOGY VISIT (OUTPATIENT)
Dept: OBGYN | Facility: CLINIC | Age: 29
End: 2024-04-12
Payer: MEDICAID

## 2024-04-12 ENCOUNTER — HOSPITAL ENCOUNTER (OUTPATIENT)
Facility: MEDICAL CENTER | Age: 29
End: 2024-04-12
Attending: OBSTETRICS & GYNECOLOGY
Payer: MEDICAID

## 2024-04-12 VITALS
BODY MASS INDEX: 28.17 KG/M2 | SYSTOLIC BLOOD PRESSURE: 116 MMHG | HEIGHT: 63 IN | WEIGHT: 159 LBS | DIASTOLIC BLOOD PRESSURE: 72 MMHG

## 2024-04-12 DIAGNOSIS — T83.89XA HEAVY MENSES DUE TO IUD (HCC): ICD-10-CM

## 2024-04-12 DIAGNOSIS — Z01.419 WELL WOMAN EXAM WITH ROUTINE GYNECOLOGICAL EXAM: ICD-10-CM

## 2024-04-12 DIAGNOSIS — N92.0 HEAVY MENSES DUE TO IUD (HCC): ICD-10-CM

## 2024-04-12 PROCEDURE — 87591 N.GONORRHOEAE DNA AMP PROB: CPT

## 2024-04-12 PROCEDURE — 87491 CHLMYD TRACH DNA AMP PROBE: CPT

## 2024-04-12 PROCEDURE — 3074F SYST BP LT 130 MM HG: CPT | Performed by: OBSTETRICS & GYNECOLOGY

## 2024-04-12 PROCEDURE — G0101 CA SCREEN;PELVIC/BREAST EXAM: HCPCS | Performed by: OBSTETRICS & GYNECOLOGY

## 2024-04-12 PROCEDURE — 88175 CYTOPATH C/V AUTO FLUID REDO: CPT

## 2024-04-12 PROCEDURE — 3078F DIAST BP <80 MM HG: CPT | Performed by: OBSTETRICS & GYNECOLOGY

## 2024-04-12 ASSESSMENT — FIBROSIS 4 INDEX: FIB4 SCORE: 0.69

## 2024-04-12 NOTE — PROGRESS NOTES
New Gynecological Visit    Ivis Dorantes Chavez    28 y.o.    Chief complaint    No chief complaint on file.      HPI    Patient is a 29 yo  who presents with concerns of heavy and prolonged menses since delivery of her last child. She reports she has had a Mirena IUD for past 3 years, placed after the delivery of her last child. Describes menses regular each month however lasting up to 7 days and soaking a pad every 2 hours with passage of large clots. Also has significant lower abdominal and pelvic cramping during her menses. Denies current pelvic pain, abnormal discharge or changes with bowel/bladder.   Upon review of her records, it appears that she had a Paragard IUD placed in  and not a Mirena IUD. I showed this to the patient. This may be why her menses are heavier; often times Paragard IUD may cause heavier and/or irregular menstrual cycles.   She states she had tried Mirena IUD in the past and had some weight gain.   Reports a history of migraines with aura as well as history of DVT during treatment of thyroid storm. She is on levothyroxine 200 mcg daily and sees her endocrinologist next month.   She is not planning on future pregnancies.         Review of Systems:  Review of Systems   All other systems reviewed and are negative.       Past Obstetrical History:    C/S x 1   x 1    Past Gynecological History:    Last pap:  normal  H/o abnormal pap: No  H/o STIs: hx chlamydia 2013  HPV vaccination? Yes   DEXA: N/A  Last Mammogram: N/A  LMP: Patient's last menstrual period was 2024 (exact date).  BCM: Paragard IUD    Past Medical History    Past Medical History:   Diagnosis Date    Anemia 2022    Anxiety 2017    Anxiety 2017    Elevated antinuclear antibody (DEJON) level 2019    Graves disease 2016    Heart valve disease     Hemorrhagic cysts of both ovaries 2019    History of panic attack 2020    History of pericarditis 2016     "History of thyroidectomy 01/31/2020    Partial --> hypothyroidism     Panic attack 03/11/2019    Postpartum depression 02/17/2022    Postpartum depression 02/17/2022    Vaginal discharge 12/02/2021       Past Surgical History    Past Surgical History:   Procedure Laterality Date    THYROIDECTOMY TOTAL Bilateral 3/22/2017    Procedure: THYROIDECTOMY TOTAL -NIMS RECURRENT LARYNGEAL NERVE MONITORING;  Surgeon: Vicente Eldridge M.D.;  Location: SURGERY SAME DAY [facility];  Service:     PRIMARY C SECTION  9/8/2013    Performed by Melisa Wright M.D. at [facility]       Family History   Problem Relation Age of Onset    Cancer Paternal Grandmother 56        breast cancer    No Known Problems Mother     Hyperlipidemia Father     No Known Problems Sister     No Known Problems Brother        Allergies    No Known Allergies    Medications    Current Outpatient Medications   Medication Sig Dispense Refill    levothyroxine (SYNTHROID) 200 MCG Tab Take 1 Tablet by mouth every morning on an empty stomach. 90 Tablet 1    levonorgestrel (MIRENA, 52 MG,) 20 MCG/DAY IUD 1 Each by Intrauterine route one time. Every 5 years      acetaminophen (TYLENOL) 500 MG Tab Take 500 mg by mouth every four hours as needed for Mild Pain. (Patient not taking: Reported on 4/12/2024)       No current facility-administered medications for this visit.       Social  Social History     Tobacco Use    Smoking status: Never    Smokeless tobacco: Never    Tobacco comments:     quit in 2012   Vaping Use    Vaping Use: Never used   Substance Use Topics    Alcohol use: No    Drug use: Yes     Types: Marijuana, Inhaled     Comment: marijuana weekly        OBJECTIVE:    Vitals    /72 (BP Location: Left arm, Patient Position: Sitting, BP Cuff Size: Large adult)   Ht 5' 3\"   Wt 159 lb   LMP 03/26/2024 (Exact Date)   BMI 28.17 kg/m²     Physical Exam    GENERAL: Well developed, well nourished, female in no acute distress.    HEENT: " NCAT, mucus membranes moist    Neck: Supple, nontender, no NEREIDA, no thyromegaly    Breasts: Symmetric, Nontender, no masses, no nipple discharge, no skin changes    CV: RRR    Pulm: CTAB    Abdomen: Soft ND NT.    Ext: MENSAH    : Normal Vulva, vagina. No lesions present. No abnormal discharge. No blood.    Urethral meatus normal    Cervix smooth pink no lesions, abnormal discharge or blood. IUD strings seen at os, short. Pap and GC/CT collected.     Uterus small midline AV mobile nontender    Adnexa no masses or tenderness    Labs/Pathology:     Latest Reference Range & Units 03/12/24 10:05   WBC 4.8 - 10.8 K/uL 2.7 (L)   RBC 4.20 - 5.40 M/uL 4.87   Hemoglobin 12.0 - 16.0 g/dL 11.9 (L)   Hematocrit 37.0 - 47.0 % 38.6   MCV 81.4 - 97.8 fL 79.3 (L)   MCH 27.0 - 33.0 pg 24.4 (L)   MCHC 32.2 - 35.5 g/dL 30.8 (L)   RDW 35.9 - 50.0 fL 45.1   Platelet Count 164 - 446 K/uL 209   (L): Data is abnormally low    A/P     Latest Reference Range & Units 03/12/24 10:05   Sodium 135 - 145 mmol/L 138   Potassium 3.6 - 5.5 mmol/L 3.6   Chloride 96 - 112 mmol/L 101   Co2 20 - 33 mmol/L 26   Anion Gap 7.0 - 16.0  11.0   Glucose 65 - 99 mg/dL 121 (H)   Bun 8 - 22 mg/dL 11   Creatinine 0.50 - 1.40 mg/dL 0.72   GFR (CKD-EPI) >60 mL/min/1.73 m 2 118   Calcium 8.4 - 10.2 mg/dL 8.8   Correct Calcium 8.5 - 10.5 mg/dL 8.5   AST(SGOT) 12 - 45 U/L 20   ALT(SGPT) 2 - 50 U/L 13   (H): Data is abnormally high     Latest Reference Range & Units 12/11/23 09:47   Cortisol 0.0 - 23.0 ug/dL 10.5   TSH 0.380 - 5.330 uIU/mL 34.300 (H)   Free T-4 0.93 - 1.70 ng/dL 0.80 (L)   (H): Data is abnormally high  (L): Data is abnormally low      Imaging:    10/10/2023 9:28 AM     HISTORY/REASON FOR EXAM:  Vaginal Bleeding        TECHNIQUE/EXAM DESCRIPTION:  Transabdominal and transvaginal pelvic ultrasound.     COMPARISON:   None     FINDINGS:  Both transabdominal and transvaginal scanning were performed to optimally visualize the pelvis.     UTERUS:  The uterus  measures 3.88 cm x 8.41 cm x 5.62 cm.  The uterine myometrium is within normal limits. IUD is noted in the endometrial cavity.  The endometrial echo complex measures 0.48 cm.  The endometrium is unremarkable in appearance and thickness for age and menstrual status.        OVARIES:  The right ovary measures 2.94 cm x 2.22 cm x 2.73 cm. Duplex Doppler examination of the right ovary shows normal waveforms. The right ovary is normal in size and appearance.     The left ovary measures 2.61 cm x 2.15 cm x 2.65 cm. Duplex Doppler examination of the left ovary shows normal waveforms. The left ovary is normal in size and appearance.           There is no free fluid seen.     IMPRESSION:     1.  Normal transvaginal appearance of the pelvis.     2.  IUD is noted centrally in the endometrial cavity.    Ivis Klein    28 y.o.     1. Well woman exam with routine gynecological exam - f/u pap, GC/CT. Continue yearly pelvic and breast exams. Monthly self breast exams encouraged. F/u with PCP for routine health maintenance/exams.    2. Heavy menses due to IUD (HCC)    Discussed with patient her heavy and prolonged menstrual cycles may be due to the Paragard IUD. It may also be due to suboptimal thyroid function. She does state she has a follow up with her endocrinologist next month.   We discussed alternative birth control options. Due to her history of migraines with aura and history of DVT, estrogen containing contraception is contraindicated. Alternative birth control options that would be appropriate are switching Paragard IUD for a progesterone IUD (ie Mirena), continuing progesterone IUD and layering progesterone only pill treatment on top, removing paragard IUD and placing etonorgestrel implant. She is interested in replacing her Paragard IUD with Mirena IUD. She has tried Mirena IUD in the past with some weight gain but she is willing to try this again to see if this would reduce her heavy and painful  menstrual cycles.     Advised to schedule appointment with us to get her Paragard IUD replaced with Mirena IUD.         Time spent: 30 minutes        Niki Guerrero M.D.    Obstetrics and Gynecology    4/12/20248:48 AM

## 2024-04-12 NOTE — PROGRESS NOTES
GYN visit (new pt)   Pt states having heavy periods and lower abdominal pain  LMP: 03/26/2024  Last pap: 01/31/2020 Negative   BC: currently on IUD (paragard)  Good # [phone]

## 2024-04-18 LAB
C TRACH RRNA CVX QL NAA+PROBE: NEGATIVE
COMMENT NL11729A: NORMAL
CYTOLOGIST CVX/VAG CYTO: NORMAL
CYTOLOGY CVX/VAG DOC CYTO: NORMAL
CYTOLOGY CVX/VAG DOC THIN PREP: NORMAL
N GONORRHOEA RRNA CVX QL NAA+PROBE: NEGATIVE
NOTE NL11727A: NORMAL
OTHER STN SPEC: NORMAL
QC REVIEWED BY NL11722A: NORMAL
STAT OF ADQ CVX/VAG CYTO-IMP: NORMAL

## 2024-05-06 ENCOUNTER — APPOINTMENT (OUTPATIENT)
Dept: INTERNAL MEDICINE | Facility: OTHER | Age: 29
End: 2024-05-06
Payer: MEDICAID

## 2024-05-06 VITALS
SYSTOLIC BLOOD PRESSURE: 117 MMHG | BODY MASS INDEX: 28.95 KG/M2 | HEIGHT: 63 IN | OXYGEN SATURATION: 96 % | TEMPERATURE: 97.6 F | WEIGHT: 163.4 LBS | HEART RATE: 67 BPM | DIASTOLIC BLOOD PRESSURE: 79 MMHG

## 2024-05-06 DIAGNOSIS — E88.9 METABOLIC DISORDER: ICD-10-CM

## 2024-05-06 DIAGNOSIS — Z11.59 NEED FOR HEPATITIS C SCREENING TEST: ICD-10-CM

## 2024-05-06 DIAGNOSIS — E03.9 HYPOTHYROIDISM, UNSPECIFIED TYPE: ICD-10-CM

## 2024-05-06 DIAGNOSIS — E78.5 DYSLIPIDEMIA: ICD-10-CM

## 2024-05-06 DIAGNOSIS — E66.3 OVERWEIGHT (BMI 25.0-29.9): ICD-10-CM

## 2024-05-06 PROCEDURE — 99213 OFFICE O/P EST LOW 20 MIN: CPT | Mod: GE | Performed by: INTERNAL MEDICINE

## 2024-05-06 PROCEDURE — 3078F DIAST BP <80 MM HG: CPT | Performed by: INTERNAL MEDICINE

## 2024-05-06 PROCEDURE — 3074F SYST BP LT 130 MM HG: CPT | Performed by: INTERNAL MEDICINE

## 2024-05-06 ASSESSMENT — ENCOUNTER SYMPTOMS
MYALGIAS: 0
NECK PAIN: 0
COUGH: 0
HEARTBURN: 0
BLURRED VISION: 0
HEMOPTYSIS: 0
HEADACHES: 0
FEVER: 0
NAUSEA: 0
DIZZINESS: 0
CHILLS: 0
NERVOUS/ANXIOUS: 0
PALPITATIONS: 0
VOMITING: 0
DOUBLE VISION: 0

## 2024-05-06 ASSESSMENT — FIBROSIS 4 INDEX: FIB4 SCORE: 0.69

## 2024-05-06 NOTE — PROGRESS NOTES
"CC:   Chief Complaint   Patient presents with    Follow-Up     Pt has questions about possibly having high cholesterol based on ER visit and OB doctor. Also is worried about her weight         HPI:   Ivis presents today with past medical history of Graves' disease s/p bilateral total thyroidectomy 2017, now with postsurgical hypothyroidism, on 200 mcg of levothyroxine here today for follow-up visit      Describes she recently visited her gynecologist who was concerned about high cholesterol.  Requesting repeat blood work.  Last lipid panel from 9/22 with cholesterol 207, triglycerides 179, HDL 45 and   She is also gained some weight and is concerned about.  Describes she tries to take care of her diet, however sometimes she is really tired and eats what ever is available.         Problem   Hypothyroidism (Resolved)       Health Maintenance: Completed    ROS:  Review of Systems   Constitutional:  Negative for chills, fever and malaise/fatigue.   HENT:  Negative for hearing loss and tinnitus.    Eyes:  Negative for blurred vision and double vision.   Respiratory:  Negative for cough and hemoptysis.    Cardiovascular:  Negative for chest pain and palpitations.   Gastrointestinal:  Negative for heartburn, nausea and vomiting.   Genitourinary:  Negative for dysuria, frequency and urgency.   Musculoskeletal:  Negative for myalgias and neck pain.   Skin:  Negative for itching and rash.   Neurological:  Negative for dizziness and headaches.   Psychiatric/Behavioral:  The patient is not nervous/anxious.        Objective:     Exam:  /79 (BP Location: Right arm, Patient Position: Sitting, BP Cuff Size: Adult)   Pulse 67   Temp 36.4 °C (97.6 °F) (Temporal)   Ht 1.6 m (5' 3\")   Wt 74.1 kg (163 lb 6.4 oz)   LMP 03/26/2024 (Exact Date)   SpO2 96%   BMI 28.95 kg/m²  Body mass index is 28.95 kg/m².    Physical Exam  Constitutional:       General: She is not in acute distress.     Appearance: Normal appearance. " She is not ill-appearing.   HENT:      Head: Normocephalic and atraumatic.      Right Ear: External ear normal.      Left Ear: External ear normal.      Nose: Nose normal. No congestion.      Mouth/Throat:      Mouth: Mucous membranes are moist.   Eyes:      Extraocular Movements: Extraocular movements intact.      Pupils: Pupils are equal, round, and reactive to light.   Cardiovascular:      Rate and Rhythm: Normal rate.      Pulses: Normal pulses.   Pulmonary:      Effort: No respiratory distress.   Abdominal:      General: Abdomen is flat.   Musculoskeletal:         General: Normal range of motion.   Skin:     General: Skin is warm.      Capillary Refill: Capillary refill takes less than 2 seconds.   Neurological:      Mental Status: She is alert and oriented to person, place, and time.   Psychiatric:         Mood and Affect: Mood normal.                 Assessment & Plan:     28 y.o. female with the following -       # Overweight (BMI 25-29.9)  # Dyslipidemia  # Screening for metabolic disorder   - I advise reducing sugars/carbohydrates/saturated fats/alcohol, and eating more vegetables and lean meats such as fish/chicken/turkey. I also recommend 30 minutes of cardiovascular exercise most days of the week.   - Follow up with nutrition services, referral placed   - Lipid panel and A1c prior to next appointment       # Hypothyroidism   She previously had some issues with levothyroxine, TSH was 95.5 7/23, which improved to 34.3 12/23 with medication augmentation and compliance.  She describes her energy is much improved, she takes levothyroxine every day in the morning on an empty stomach.  Denies any palpitations, heat/cold intolerance.  - Cont levothyroxine   - Blood work prior to next appointment      # Encounter for screening hepatitis C  - Hepatitis C antibody ordered      I spent a total of 30 minutes with record review, exam, communication with the patient, communication with other providers, and  documentation of this encounter.      Return in about 5 weeks (around 6/10/2024).    Please note that this dictation was created using voice recognition software. I have made every reasonable attempt to correct obvious errors, but I expect that there are errors of grammar and possibly content that I did not discover before finalizing the note.

## 2024-05-06 NOTE — PATIENT INSTRUCTIONS
Thank you for visiting today!     Please follow-up in 5 weeks     Please follow-up on referrals and schedule an appointment with nutrition services     Please get lab work done at least 5 days before next visit.    Please try and eat healthy, get at least 30 minutes of cardiovascular exercise a day to help keep your health as best as it can be.    If you have any questions or concerns please feel free to contact us at [phone].    If you feel like you are experiencing a medical emergency please seek immediate medical attention at an urgent care or in the emergency department.

## 2024-05-17 ENCOUNTER — APPOINTMENT (OUTPATIENT)
Dept: URGENT CARE | Facility: CLINIC | Age: 29
End: 2024-05-17
Payer: MEDICAID

## 2024-05-18 ENCOUNTER — APPOINTMENT (OUTPATIENT)
Dept: RADIOLOGY | Facility: MEDICAL CENTER | Age: 29
End: 2024-05-18
Attending: EMERGENCY MEDICINE
Payer: MEDICAID

## 2024-05-18 ENCOUNTER — HOSPITAL ENCOUNTER (EMERGENCY)
Facility: MEDICAL CENTER | Age: 29
End: 2024-05-18
Attending: EMERGENCY MEDICINE
Payer: MEDICAID

## 2024-05-18 VITALS
TEMPERATURE: 98.4 F | HEIGHT: 63 IN | BODY MASS INDEX: 27.46 KG/M2 | RESPIRATION RATE: 16 BRPM | SYSTOLIC BLOOD PRESSURE: 131 MMHG | WEIGHT: 155 LBS | DIASTOLIC BLOOD PRESSURE: 73 MMHG | OXYGEN SATURATION: 99 % | HEART RATE: 76 BPM

## 2024-05-18 DIAGNOSIS — R07.9 CHEST PAIN, UNSPECIFIED TYPE: ICD-10-CM

## 2024-05-18 DIAGNOSIS — U07.1 COVID-19: ICD-10-CM

## 2024-05-18 DIAGNOSIS — R05.1 ACUTE COUGH: ICD-10-CM

## 2024-05-18 DIAGNOSIS — J06.9 UPPER RESPIRATORY TRACT INFECTION, UNSPECIFIED TYPE: ICD-10-CM

## 2024-05-18 DIAGNOSIS — R11.2 NAUSEA AND VOMITING, UNSPECIFIED VOMITING TYPE: ICD-10-CM

## 2024-05-18 LAB
ALBUMIN SERPL BCP-MCNC: 4.5 G/DL (ref 3.2–4.9)
ALBUMIN/GLOB SERPL: 1.3 G/DL
ALP SERPL-CCNC: 77 U/L (ref 30–99)
ALT SERPL-CCNC: 18 U/L (ref 2–50)
ANION GAP SERPL CALC-SCNC: 11 MMOL/L (ref 7–16)
APPEARANCE UR: CLEAR
AST SERPL-CCNC: 21 U/L (ref 12–45)
BASOPHILS # BLD AUTO: 1 % (ref 0–1.8)
BASOPHILS # BLD: 0.05 K/UL (ref 0–0.12)
BILIRUB SERPL-MCNC: 0.3 MG/DL (ref 0.1–1.5)
BILIRUB UR QL STRIP.AUTO: NEGATIVE
BUN SERPL-MCNC: 12 MG/DL (ref 8–22)
CALCIUM ALBUM COR SERPL-MCNC: 8.5 MG/DL (ref 8.5–10.5)
CALCIUM SERPL-MCNC: 8.9 MG/DL (ref 8.4–10.2)
CHLORIDE SERPL-SCNC: 104 MMOL/L (ref 96–112)
CO2 SERPL-SCNC: 24 MMOL/L (ref 20–33)
COLOR UR: YELLOW
CREAT SERPL-MCNC: 0.63 MG/DL (ref 0.5–1.4)
EKG IMPRESSION: NORMAL
EOSINOPHIL # BLD AUTO: 0.04 K/UL (ref 0–0.51)
EOSINOPHIL NFR BLD: 0.8 % (ref 0–6.9)
ERYTHROCYTE [DISTWIDTH] IN BLOOD BY AUTOMATED COUNT: 45.5 FL (ref 35.9–50)
FLUAV RNA SPEC QL NAA+PROBE: NEGATIVE
FLUBV RNA SPEC QL NAA+PROBE: NEGATIVE
GFR SERPLBLD CREATININE-BSD FMLA CKD-EPI: 125 ML/MIN/1.73 M 2
GLOBULIN SER CALC-MCNC: 3.4 G/DL (ref 1.9–3.5)
GLUCOSE SERPL-MCNC: 117 MG/DL (ref 65–99)
GLUCOSE UR STRIP.AUTO-MCNC: NEGATIVE MG/DL
HCG SERPL QL: NEGATIVE
HCT VFR BLD AUTO: 36.6 % (ref 37–47)
HGB BLD-MCNC: 10.9 G/DL (ref 12–16)
IMM GRANULOCYTES # BLD AUTO: 0.02 K/UL (ref 0–0.11)
IMM GRANULOCYTES NFR BLD AUTO: 0.4 % (ref 0–0.9)
KETONES UR STRIP.AUTO-MCNC: 40 MG/DL
LEUKOCYTE ESTERASE UR QL STRIP.AUTO: NEGATIVE
LYMPHOCYTES # BLD AUTO: 1.47 K/UL (ref 1–4.8)
LYMPHOCYTES NFR BLD: 30.3 % (ref 22–41)
MCH RBC QN AUTO: 23.6 PG (ref 27–33)
MCHC RBC AUTO-ENTMCNC: 29.8 G/DL (ref 32.2–35.5)
MCV RBC AUTO: 79.4 FL (ref 81.4–97.8)
MICRO URNS: ABNORMAL
MONOCYTES # BLD AUTO: 0.41 K/UL (ref 0–0.85)
MONOCYTES NFR BLD AUTO: 8.5 % (ref 0–13.4)
NEUTROPHILS # BLD AUTO: 2.86 K/UL (ref 1.82–7.42)
NEUTROPHILS NFR BLD: 59 % (ref 44–72)
NITRITE UR QL STRIP.AUTO: NEGATIVE
NRBC # BLD AUTO: 0 K/UL
NRBC BLD-RTO: 0 /100 WBC (ref 0–0.2)
PH UR STRIP.AUTO: 6 [PH] (ref 5–8)
PLATELET # BLD AUTO: 232 K/UL (ref 164–446)
PLATELET BLD QL SMEAR: NORMAL
PMV BLD AUTO: 11 FL (ref 9–12.9)
POTASSIUM SERPL-SCNC: 4 MMOL/L (ref 3.6–5.5)
PROT SERPL-MCNC: 7.9 G/DL (ref 6–8.2)
PROT UR QL STRIP: NEGATIVE MG/DL
RBC # BLD AUTO: 4.61 M/UL (ref 4.2–5.4)
RBC BLD AUTO: NORMAL
RBC UR QL AUTO: NEGATIVE
RSV RNA SPEC QL NAA+PROBE: NEGATIVE
S PYO DNA SPEC NAA+PROBE: NOT DETECTED
SARS-COV-2 RNA RESP QL NAA+PROBE: DETECTED
SODIUM SERPL-SCNC: 139 MMOL/L (ref 135–145)
SP GR UR STRIP.AUTO: 1.02
SPECIMEN SOURCE: ABNORMAL
WBC # BLD AUTO: 4.9 K/UL (ref 4.8–10.8)

## 2024-05-18 RX ORDER — ONDANSETRON 4 MG/1
4 TABLET, ORALLY DISINTEGRATING ORAL EVERY 6 HOURS PRN
Qty: 10 TABLET | Refills: 0 | Status: SHIPPED | OUTPATIENT
Start: 2024-05-18

## 2024-05-18 RX ORDER — KETOROLAC TROMETHAMINE 15 MG/ML
15 INJECTION, SOLUTION INTRAMUSCULAR; INTRAVENOUS ONCE
Status: COMPLETED | OUTPATIENT
Start: 2024-05-18 | End: 2024-05-18

## 2024-05-18 RX ORDER — SODIUM CHLORIDE, SODIUM LACTATE, POTASSIUM CHLORIDE, CALCIUM CHLORIDE 600; 310; 30; 20 MG/100ML; MG/100ML; MG/100ML; MG/100ML
1000 INJECTION, SOLUTION INTRAVENOUS ONCE
Status: COMPLETED | OUTPATIENT
Start: 2024-05-18 | End: 2024-05-18

## 2024-05-18 RX ORDER — ONDANSETRON 2 MG/ML
4 INJECTION INTRAMUSCULAR; INTRAVENOUS ONCE
Status: COMPLETED | OUTPATIENT
Start: 2024-05-18 | End: 2024-05-18

## 2024-05-18 RX ADMIN — ONDANSETRON 4 MG: 2 INJECTION INTRAMUSCULAR; INTRAVENOUS at 10:04

## 2024-05-18 RX ADMIN — KETOROLAC TROMETHAMINE 15 MG: 15 INJECTION, SOLUTION INTRAMUSCULAR; INTRAVENOUS at 11:30

## 2024-05-18 RX ADMIN — SODIUM CHLORIDE, POTASSIUM CHLORIDE, SODIUM LACTATE AND CALCIUM CHLORIDE 1000 ML: 600; 310; 30; 20 INJECTION, SOLUTION INTRAVENOUS at 10:04

## 2024-05-18 ASSESSMENT — FIBROSIS 4 INDEX: FIB4 SCORE: 0.69

## 2024-05-18 NOTE — ED NOTES
PIV placed; blood work, COVID and strep swabs collected & sent to lab, Pt medicated per MAR, denied having any needs at this time, no distress noted;

## 2024-05-18 NOTE — ED TRIAGE NOTES
"Chief Complaint   Patient presents with    Cough     Pt stated that it started 2-days ago, she can feel it in her chest on the right side; Pt stated that she's also had fevers, taken tylenol however it continued to return;    N/V     Pt stated that she's been throwing up the past 2-days, hasn't been able to keep anything down; last emesis around 0630 this morning, no blood noted; -diarrhea;    Sore Throat     Started 2-days ago;      ED Triage Vitals [05/18/24 0917]   Enc Vitals Group      Blood Pressure 132/60      Pulse 84      Respiration 20      Temperature 36.7 °C (98 °F)      Temp src Temporal      Pulse Oximetry 98 %      Weight 70.3 kg (155 lb)      Height 1.6 m (5' 3\")      Head Circumference       Peak Flow       Pain Score       Pain Loc       Pain Edu?       Excl. in GC?        "

## 2024-05-18 NOTE — DISCHARGE INSTRUCTIONS
Rest, take ibuprofen or Tylenol for aches and pains.  Drink plenty of fluids.  Return for worsening cough, shortness of breath, continued vomiting, or any other concerns.  Follow-up with your doctor.  You have anemia that appears chronic.  Reviewed this with your doctor.

## 2024-05-18 NOTE — ED PROVIDER NOTES
ED Provider Note    CHIEF COMPLAINT  Chief Complaint   Patient presents with    Cough     Pt stated that it started 2-days ago, she can feel it in her chest on the right side; Pt stated that she's also had fevers, taken tylenol however it continued to return;    N/V     Pt stated that she's been throwing up the past 2-days, hasn't been able to keep anything down; last emesis around 0630 this morning, no blood noted; -diarrhea;    Sore Throat     Started 2-days ago;       EXTERNAL RECORDS REVIEWED  I have reviewed previous records and previous hospitalization based on labs for comparison.    HPI/ROS  LIMITATION TO HISTORY   Select: : None  OUTSIDE HISTORIAN(S):  None    Ivis Klein is a 28 y.o. female who presents to the emergency department with multiple complaints.  The patient states that she has had nausea and vomiting.  This has been associated with a cough.  She states she is coughing so much that it causes her to vomit she had a hard time keeping anything down.  She denies any diarrhea or abdominal pain.  She also says she has a sore throat mostly associate with coughing and vomiting.  She does some nasal congestion she feels achy.  She has some pain diffusely across her back denies dysuria and denies pregnancy.  Symptoms have been present for a few days and or gradually worsening.    PAST MEDICAL HISTORY   has a past medical history of Anemia (02/17/2022), Anxiety (02/16/2017), Anxiety (02/16/2017), Elevated antinuclear antibody (DEJON) level (05/23/2019), Graves disease (12/05/2016), Heart valve disease, Hemorrhagic cysts of both ovaries (05/23/2019), History of panic attack (04/09/2020), History of pericarditis (12/05/2016), History of thyroidectomy (01/31/2020), Panic attack (03/11/2019), Postpartum depression (02/17/2022), Postpartum depression (02/17/2022), and Vaginal discharge (12/02/2021).    SURGICAL HISTORY   has a past surgical history that includes primary c section (9/8/2013) and  "thyroidectomy total (Bilateral, 3/22/2017).    FAMILY HISTORY  Family History   Problem Relation Age of Onset    Cancer Paternal Grandmother 56        breast cancer    No Known Problems Mother     Hyperlipidemia Father     No Known Problems Sister     No Known Problems Brother        SOCIAL HISTORY  Social History     Tobacco Use    Smoking status: Never    Smokeless tobacco: Never    Tobacco comments:     quit in 2012   Vaping Use    Vaping status: Never Used   Substance and Sexual Activity    Alcohol use: No    Drug use: Yes     Types: Marijuana, Inhaled     Comment: marijuana weekly    Sexual activity: Yes     Partners: Male     Birth control/protection: I.U.D.     Comment: single       CURRENT MEDICATIONS  Home Medications       Reviewed by Earl Reynolds R.N. (Registered Nurse) on 05/18/24 at 0933  Med List Status: Not Addressed     Medication Last Dose Status   acetaminophen (TYLENOL) 500 MG Tab  Active   levonorgestrel (MIRENA, 52 MG,) 20 MCG/DAY IUD  Active   levothyroxine (SYNTHROID) 200 MCG Tab  Active                    ALLERGIES  No Known Allergies    PHYSICAL EXAM  VITAL SIGNS: /60   Pulse 83   Temp 36.7 °C (98 °F) (Temporal)   Resp 16   Ht 1.6 m (5' 3\")   Wt 70.3 kg (155 lb)   SpO2 98%   BMI 27.46 kg/m²    Constitutional: Well developed, Well nourished, No acute distress, Non-toxic appearance.   HENT: Normocephalic, Atraumatic, Bilateral external ears normal, Oropharynx pharyngeal erythema no exudates  Eyes: PERRL, EOMI, Conjunctiva normal, No discharge.   Neck: Normal range of motion,  Cardiovascular: Normal heart rate, Normal rhythm, No murmurs, No rubs, No gallops.   Thorax & Lungs: Normal breath sounds, No respiratory distress, No wheezing,  [place], Soft, No tenderness  Skin: Warm, Dry, No erythema, No rash.   Musculoskeletal: Good range of motion in all major joints.  Neurologic: Alert,No focal deficits noted.   Psychiatric: Affect normal      EKG/LABS    Results for orders placed " or performed during the hospital encounter of 05/18/24   CoV-2, Flu A/B, And RSV by PCR ([employer] [facility])    Specimen: Respirate   Result Value Ref Range    SARS-CoV-2 Source Nasal Swab    CBC WITH DIFFERENTIAL   Result Value Ref Range    WBC 4.9 4.8 - 10.8 K/uL    RBC 4.61 4.20 - 5.40 M/uL    Hemoglobin 10.9 (L) 12.0 - 16.0 g/dL    Hematocrit 36.6 (L) 37.0 - 47.0 %    MCV 79.4 (L) 81.4 - 97.8 fL    MCH 23.6 (L) 27.0 - 33.0 pg    MCHC 29.8 (L) 32.2 - 35.5 g/dL    RDW 45.5 35.9 - 50.0 fL    Platelet Count 232 164 - 446 K/uL    MPV 11.0 9.0 - 12.9 fL    Neutrophils-Polys 59.00 44.00 - 72.00 %    Lymphocytes 30.30 22.00 - 41.00 %    Monocytes 8.50 0.00 - 13.40 %    Eosinophils 0.80 0.00 - 6.90 %    Basophils 1.00 0.00 - 1.80 %    Immature Granulocytes 0.40 0.00 - 0.90 %    Nucleated RBC 0.00 0.00 - 0.20 /100 WBC    Neutrophils (Absolute) 2.86 1.82 - 7.42 K/uL    Lymphs (Absolute) 1.47 1.00 - 4.80 K/uL    Monos (Absolute) 0.41 0.00 - 0.85 K/uL    Eos (Absolute) 0.04 0.00 - 0.51 K/uL    Baso (Absolute) 0.05 0.00 - 0.12 K/uL    Immature Granulocytes (abs) 0.02 0.00 - 0.11 K/uL    NRBC (Absolute) 0.00 K/uL   COMP METABOLIC PANEL   Result Value Ref Range    Sodium 139 135 - 145 mmol/L    Potassium 4.0 3.6 - 5.5 mmol/L    Chloride 104 96 - 112 mmol/L    Co2 24 20 - 33 mmol/L    Anion Gap 11.0 7.0 - 16.0    Glucose 117 (H) 65 - 99 mg/dL    Bun 12 8 - 22 mg/dL    Creatinine 0.63 0.50 - 1.40 mg/dL    Calcium 8.9 8.4 - 10.2 mg/dL    Correct Calcium 8.5 8.5 - 10.5 mg/dL    AST(SGOT) 21 12 - 45 U/L    ALT(SGPT) 18 2 - 50 U/L    Alkaline Phosphatase 77 30 - 99 U/L    Total Bilirubin 0.3 0.1 - 1.5 mg/dL    Albumin 4.5 3.2 - 4.9 g/dL    Total Protein 7.9 6.0 - 8.2 g/dL    Globulin 3.4 1.9 - 3.5 g/dL    A-G Ratio 1.3 g/dL   URINALYSIS CULTURE, IF INDICATED    Specimen: Urine, Cath   Result Value Ref Range    Color Yellow     Character Clear     Specific Gravity 1.025 <1.035    Ph 6.0 5.0 - 8.0    Glucose Negative Negative mg/dL    Ketones 40  (A) Negative mg/dL    Protein Negative Negative mg/dL    Bilirubin Negative Negative    Nitrite Negative Negative    Leukocyte Esterase Negative Negative    Occult Blood Negative Negative    Micro Urine Req see below    Group A Strep by PCR    Specimen: Throat; Respirate   Result Value Ref Range    Group A Strep by PCR Not Detected Not Detected   HCG QUAL SERUM   Result Value Ref Range    Beta-Hcg Qualitative Serum Negative Negative   ESTIMATED GFR   Result Value Ref Range    GFR (CKD-EPI) 125 >60 mL/min/1.73 m 2   PLATELET ESTIMATE   Result Value Ref Range    Plt Estimation Normal    MORPHOLOGY   Result Value Ref Range    RBC Morphology Normal    EKG   Result Value Ref Range    Report       [facility] Emergency Dept.    Test Date:  2024  Pt Name:    MICHELLE MENARD               Department: [facility]  MRN:        [id number]                      Room:  Gender:     Female                       Technician: SYLVESTER  :        1995                   Requested By:ER TRIAGE PROTOCOL  Order #:    [id number]                    Reading MD: KARINA MOORE. AMD    Measurements  Intervals                                Axis  Rate:       81                           P:          34  AK:         175                          QRS:        40  QRSD:       72                           T:          15  QT:         356  QTc:        414    Interpretive Statements  Sinus rhythm  Compared to ECG 2024 08:58:19  No significant changes  Electronically Signed On 2024 09:46:54 PDT by KARINA MOORE. AMD        I have independently interpreted this EKG    RADIOLOGY/PROCEDURES   I have independently interpreted the diagnostic imaging associated with this visit and am waiting the final reading from the radiologist.       Radiologist interpretation:  DX-CHEST-PORTABLE (1 VIEW)   Final Result      No acute cardiopulmonary abnormality.             COURSE & MEDICAL DECISION MAKING    ASSESSMENT, COURSE AND  PLAN  Care Narrative:       28-year-old female presents emerged from with cough, sore throat, posttussive emesis, and difficult time tolerating fluids.  She also some chest wall pain and back pain.    Patient was seen and examined a broad recent diagnosis was considered including but not limited to pneumonia, pneumothorax, viral infection, UTI, Chon pregnancy, dehydration, and renal failure.    IV is established patient's workup with labs and imaging.  Chest x-ray is unremarkable.  Her EKG is unremarkable.  Her pain does not consider cardiac.  I do not think she requires a troponin therefore heart score is not indicated.    She is PERC negative.  I do not think we need to workup for PE.  She does not have an infiltrate.  Her history is not ches of a dissection.  There is no pneumonia or pneumothorax.    COVID and influenza are negative.  Strep is negative.  CBC and CMP remarkable only for a mild anemia which is chronic per chart review.    The patient likely has a viral infection she does not have a UTI.  Make sure she is tolerating fluids.     She is feeling better after some IV fluids.  She given some Toradol.  Recommend she take ibuprofen for aches and pains.  Will put her on Zofran.  Will have her return for worsening cough, continued vomiting she unable to tolerate food or fluids or any other concerns.  Her questions were answered, she is agreeable with this plan.  She is counseled to follow-up with her chronic anemia.        Hydration: Based on the patient's presentation of Acute Vomiting the patient was given IV fluids. IV Hydration was used because oral hydration was not adequate alone. Upon recheck following hydration, the patient was improved.        Patient prescribe Zofran and counseled follow-up with her doctor.      DISPOSITION AND DISCUSSIONS    Roel Padilla M.D.  [address] [facility] [address]  [phone]          Addendum.  COVID test came back positive.  Symptoms are likely related to  COVID-19.  Patient is updated to the plan.        FINAL DIAGNOSIS  1. Acute cough    2. Upper respiratory tract infection, unspecified type    3. Nausea and vomiting, unspecified vomiting type    4. Chest pain, unspecified type    5. COVID-19           Electronically signed by: Rasheed Ennis M.D., 5/18/2024 9:53 AM

## 2024-06-17 ENCOUNTER — APPOINTMENT (OUTPATIENT)
Dept: INTERNAL MEDICINE | Facility: OTHER | Age: 29
End: 2024-06-17
Payer: MEDICAID

## 2024-07-02 ENCOUNTER — HOSPITAL ENCOUNTER (EMERGENCY)
Facility: MEDICAL CENTER | Age: 29
End: 2024-07-02
Attending: EMERGENCY MEDICINE

## 2024-07-02 VITALS
DIASTOLIC BLOOD PRESSURE: 77 MMHG | BODY MASS INDEX: 28.83 KG/M2 | WEIGHT: 162.7 LBS | TEMPERATURE: 97.8 F | SYSTOLIC BLOOD PRESSURE: 123 MMHG | HEART RATE: 78 BPM | OXYGEN SATURATION: 100 % | HEIGHT: 63 IN | RESPIRATION RATE: 16 BRPM

## 2024-07-02 DIAGNOSIS — R73.9 HYPERGLYCEMIA: ICD-10-CM

## 2024-07-02 DIAGNOSIS — M54.50 BILATERAL LOW BACK PAIN WITHOUT SCIATICA, UNSPECIFIED CHRONICITY: ICD-10-CM

## 2024-07-02 DIAGNOSIS — R81 GLUCOSURIA: ICD-10-CM

## 2024-07-02 LAB
ANION GAP SERPL CALC-SCNC: 9 MMOL/L (ref 7–16)
APPEARANCE UR: CLEAR
BACTERIA #/AREA URNS HPF: ABNORMAL /HPF
BASOPHILS # BLD AUTO: 1 % (ref 0–1.8)
BASOPHILS # BLD: 0.06 K/UL (ref 0–0.12)
BILIRUB UR QL STRIP.AUTO: NEGATIVE
BUN SERPL-MCNC: 11 MG/DL (ref 8–22)
CALCIUM SERPL-MCNC: 8.9 MG/DL (ref 8.4–10.2)
CHLORIDE SERPL-SCNC: 103 MMOL/L (ref 96–112)
CO2 SERPL-SCNC: 24 MMOL/L (ref 20–33)
COLOR UR: YELLOW
CREAT SERPL-MCNC: 0.61 MG/DL (ref 0.5–1.4)
EOSINOPHIL # BLD AUTO: 0.06 K/UL (ref 0–0.51)
EOSINOPHIL NFR BLD: 1 % (ref 0–6.9)
EPI CELLS #/AREA URNS HPF: ABNORMAL /HPF
ERYTHROCYTE [DISTWIDTH] IN BLOOD BY AUTOMATED COUNT: 46.8 FL (ref 35.9–50)
GFR SERPLBLD CREATININE-BSD FMLA CKD-EPI: 126 ML/MIN/1.73 M 2
GLUCOSE SERPL-MCNC: 168 MG/DL (ref 65–99)
GLUCOSE UR STRIP.AUTO-MCNC: 500 MG/DL
HCT VFR BLD AUTO: 35.4 % (ref 37–47)
HGB BLD-MCNC: 10.8 G/DL (ref 12–16)
IMM GRANULOCYTES # BLD AUTO: 0.01 K/UL (ref 0–0.11)
IMM GRANULOCYTES NFR BLD AUTO: 0.2 % (ref 0–0.9)
KETONES UR STRIP.AUTO-MCNC: NEGATIVE MG/DL
LEUKOCYTE ESTERASE UR QL STRIP.AUTO: NEGATIVE
LYMPHOCYTES # BLD AUTO: 2.15 K/UL (ref 1–4.8)
LYMPHOCYTES NFR BLD: 34.8 % (ref 22–41)
MCH RBC QN AUTO: 24.3 PG (ref 27–33)
MCHC RBC AUTO-ENTMCNC: 30.5 G/DL (ref 32.2–35.5)
MCV RBC AUTO: 79.7 FL (ref 81.4–97.8)
MICRO URNS: ABNORMAL
MONOCYTES # BLD AUTO: 0.35 K/UL (ref 0–0.85)
MONOCYTES NFR BLD AUTO: 5.7 % (ref 0–13.4)
MUCOUS THREADS #/AREA URNS HPF: ABNORMAL /HPF
NEUTROPHILS # BLD AUTO: 3.55 K/UL (ref 1.82–7.42)
NEUTROPHILS NFR BLD: 57.3 % (ref 44–72)
NITRITE UR QL STRIP.AUTO: NEGATIVE
NRBC # BLD AUTO: 0 K/UL
NRBC BLD-RTO: 0 /100 WBC (ref 0–0.2)
PH UR STRIP.AUTO: 6 [PH] (ref 5–8)
PLATELET # BLD AUTO: 231 K/UL (ref 164–446)
PMV BLD AUTO: 10.8 FL (ref 9–12.9)
POTASSIUM SERPL-SCNC: 4.4 MMOL/L (ref 3.6–5.5)
PROT UR QL STRIP: NEGATIVE MG/DL
RBC # BLD AUTO: 4.44 M/UL (ref 4.2–5.4)
RBC # URNS HPF: ABNORMAL /HPF
RBC UR QL AUTO: ABNORMAL
SODIUM SERPL-SCNC: 136 MMOL/L (ref 135–145)
SP GR UR STRIP.AUTO: 1.02
WBC # BLD AUTO: 6.2 K/UL (ref 4.8–10.8)
WBC #/AREA URNS HPF: ABNORMAL /HPF

## 2024-07-02 PROCEDURE — 96374 THER/PROPH/DIAG INJ IV PUSH: CPT

## 2024-07-02 PROCEDURE — 85025 COMPLETE CBC W/AUTO DIFF WBC: CPT

## 2024-07-02 PROCEDURE — 81001 URINALYSIS AUTO W/SCOPE: CPT

## 2024-07-02 PROCEDURE — 99284 EMERGENCY DEPT VISIT MOD MDM: CPT

## 2024-07-02 PROCEDURE — 700111 HCHG RX REV CODE 636 W/ 250 OVERRIDE (IP): Mod: JZ | Performed by: EMERGENCY MEDICINE

## 2024-07-02 PROCEDURE — 80048 BASIC METABOLIC PNL TOTAL CA: CPT

## 2024-07-02 PROCEDURE — 36415 COLL VENOUS BLD VENIPUNCTURE: CPT

## 2024-07-02 RX ORDER — KETOROLAC TROMETHAMINE 15 MG/ML
15 INJECTION, SOLUTION INTRAMUSCULAR; INTRAVENOUS ONCE
Status: COMPLETED | OUTPATIENT
Start: 2024-07-02 | End: 2024-07-02

## 2024-07-02 RX ADMIN — KETOROLAC TROMETHAMINE 15 MG: 15 INJECTION, SOLUTION INTRAMUSCULAR; INTRAVENOUS at 09:45

## 2024-07-02 ASSESSMENT — FIBROSIS 4 INDEX: FIB4 SCORE: 0.55

## 2024-07-02 ASSESSMENT — PAIN DESCRIPTION - DESCRIPTORS: DESCRIPTORS: SHARP

## 2024-07-02 NOTE — ED TRIAGE NOTES
"Chief Complaint   Patient presents with    Flank Pain     Pt c/o bilateral flank pn, worse on the right, going on and off since Friday; -N/V/D      ED Triage Vitals [07/02/24 0805]   Enc Vitals Group      BP       Pulse 94      Respiration 14      Temperature 36.6 °C (97.8 °F)      Temp src Oral      Pulse Oximetry 97 %      Weight 73.8 kg (162 lb 11.2 oz)      Height 1.6 m (5' 3\")      Head Circumference       Peak Flow       Pain Score       Pain Loc       Pain Edu?       Excl. in GC?        "

## 2024-07-02 NOTE — Clinical Note
Ivis Klein was seen and treated in our emergency department on 7/2/2024.  She may return to work on 07/03/2024.  Please excuse patient from work today.  She can return tomorrow.     If you have any questions or concerns, please don't hesitate to call.      Malini Sherman D.O.

## 2024-07-02 NOTE — ED PROVIDER NOTES
ED Provider Note    CHIEF COMPLAINT  Chief Complaint   Patient presents with    Flank Pain     Pt c/o bilateral flank pn, worse on the right, going on and off since Friday; -N/V/D       EXTERNAL RECORDS REVIEWED  Patient's last encounter was an ED visit May 18 of this year she was seen for acute cough, URI, nausea vomiting chest pain and COVID-19 infection.    Outpatient encounter in the internal medicine clinic 26th of this year for dyslipidemia follow-up, hypothyroidism, need for hepatitis C screening, metabolic disorder, ovarian cyst, history of depression and anemia.    January 8 of this year, patient was again seen in the internal medicine clinic referred to endocrinology and referred for echocardiogram based on presenting symptoms.    HPI/ROS  LIMITATION TO HISTORY   Select: : None  OUTSIDE HISTORIAN(S):  None    Ivis Klein is a 28 y.o. female who presents to the emergency department with a chief complaint of bilateral, right greater than left, low back pain.  Patient states she has had it on and off for about 6 months but this past weekend, it has been worse, prompting her ED visit.  She states that she has been unable to sleep.  No history of trauma or falls.  No fever cough or cold symptoms.  She has a history of hyperthyroidism and thyroid ablation and now she is on levothyroxine for thyroid supplementation.  She reports her last menstrual period was last month and denies the possibility of pregnancy.  She has no allergies.  No chest pain or shortness of breath.  No dysuria or urinary frequency.  No history of kidney stones.    PAST MEDICAL HISTORY   has a past medical history of Anemia (02/17/2022), Anxiety (02/16/2017), Anxiety (02/16/2017), Elevated antinuclear antibody (DEJON) level (05/23/2019), Graves disease (12/05/2016), Heart valve disease, Hemorrhagic cysts of both ovaries (05/23/2019), History of panic attack (04/09/2020), History of pericarditis (12/05/2016), History of  "thyroidectomy (01/31/2020), Panic attack (03/11/2019), Postpartum depression (02/17/2022), Postpartum depression (02/17/2022), and Vaginal discharge (12/02/2021).    SURGICAL HISTORY   has a past surgical history that includes primary c section (9/8/2013) and thyroidectomy total (Bilateral, 3/22/2017).    FAMILY HISTORY  Family History   Problem Relation Age of Onset    Cancer Paternal Grandmother 56        breast cancer    No Known Problems Mother     Hyperlipidemia Father     No Known Problems Sister     No Known Problems Brother        SOCIAL HISTORY  Social History     Tobacco Use    Smoking status: Never    Smokeless tobacco: Never    Tobacco comments:     quit in 2012   Vaping Use    Vaping status: Never Used   Substance and Sexual Activity    Alcohol use: No    Drug use: Yes     Types: Marijuana, Inhaled     Comment: marijuana weekly    Sexual activity: Yes     Partners: Male     Birth control/protection: I.U.D.     Comment: single       CURRENT MEDICATIONS  Home Medications       Reviewed by Earl Reynolds R.N. (Registered Nurse) on 07/02/24 at 0821  Med List Status: Not Addressed     Medication Last Dose Status   acetaminophen (TYLENOL) 500 MG Tab  Active   levonorgestrel (MIRENA, 52 MG,) 20 MCG/DAY IUD  Active   levothyroxine (SYNTHROID) 200 MCG Tab  Active   ondansetron (ZOFRAN ODT) 4 MG TABLET DISPERSIBLE  Active                    ALLERGIES  No Known Allergies    PHYSICAL EXAM  VITAL SIGNS: /77   Pulse 78   Temp 36.6 °C (97.8 °F) (Oral)   Resp 16   Ht 1.6 m (5' 3\")   Wt 73.8 kg (162 lb 11.2 oz)   SpO2 100%   BMI 28.82 kg/m²    Vitals reviewed.  Constitutional: Patient is oriented to person, place, and time. Appears well-developed and well-nourished. Mild distress.    Head: Normocephalic and atraumatic.   Mouth/Throat: Oropharynx is clear  Eyes: Conjunctivae are normal.  Neck: Normal range of motion.   Cardiovascular: Normal rate, regular rhythm and normal heart sounds.   Pulmonary/Chest: " Effort normal and breath sounds normal. No respiratory distress, no wheezes, rhonchi, or rales.   Abdominal: Soft. Bowel sounds are normal. There is no tenderness, rebound or guarding, or peritoneal signs. No CVA tenderness.  Musculoskeletal: No  gait. No focal deficits.   Skin: Skin is warm and dry. No erythema. No pallor.   Psychiatric: Patient has a normal mood and affect.     EKG/LABS  Results for orders placed or performed during the hospital encounter of 07/02/24   URINALYSIS    Specimen: Urine, Clean Catch   Result Value Ref Range    Color Yellow     Character Clear     Specific Gravity 1.025 <1.035    Ph 6.0 5.0 - 8.0    Glucose 500 (A) Negative mg/dL    Ketones Negative Negative mg/dL    Protein Negative Negative mg/dL    Bilirubin Negative Negative    Nitrite Negative Negative    Leukocyte Esterase Negative Negative    Occult Blood Trace (A) Negative    Micro Urine Req Microscopic    URINE MICROSCOPIC (W/UA)   Result Value Ref Range    WBC 0-2 /hpf    RBC 2-5 (A) /hpf    Bacteria Few (A) None /hpf    Epithelial Cells Few Few /hpf    Mucous Threads Few /hpf   CBC WITH DIFFERENTIAL   Result Value Ref Range    WBC 6.2 4.8 - 10.8 K/uL    RBC 4.44 4.20 - 5.40 M/uL    Hemoglobin 10.8 (L) 12.0 - 16.0 g/dL    Hematocrit 35.4 (L) 37.0 - 47.0 %    MCV 79.7 (L) 81.4 - 97.8 fL    MCH 24.3 (L) 27.0 - 33.0 pg    MCHC 30.5 (L) 32.2 - 35.5 g/dL    RDW 46.8 35.9 - 50.0 fL    Platelet Count 231 164 - 446 K/uL    MPV 10.8 9.0 - 12.9 fL    Neutrophils-Polys 57.30 44.00 - 72.00 %    Lymphocytes 34.80 22.00 - 41.00 %    Monocytes 5.70 0.00 - 13.40 %    Eosinophils 1.00 0.00 - 6.90 %    Basophils 1.00 0.00 - 1.80 %    Immature Granulocytes 0.20 0.00 - 0.90 %    Nucleated RBC 0.00 0.00 - 0.20 /100 WBC    Neutrophils (Absolute) 3.55 1.82 - 7.42 K/uL    Lymphs (Absolute) 2.15 1.00 - 4.80 K/uL    Monos (Absolute) 0.35 0.00 - 0.85 K/uL    Eos (Absolute) 0.06 0.00 - 0.51 K/uL    Baso (Absolute) 0.06 0.00 - 0.12 K/uL    Immature  Granulocytes (abs) 0.01 0.00 - 0.11 K/uL    NRBC (Absolute) 0.00 K/uL   Basic Metabolic Panel   Result Value Ref Range    Sodium 136 135 - 145 mmol/L    Potassium 4.4 3.6 - 5.5 mmol/L    Chloride 103 96 - 112 mmol/L    Co2 24 20 - 33 mmol/L    Glucose 168 (H) 65 - 99 mg/dL    Bun 11 8 - 22 mg/dL    Creatinine 0.61 0.50 - 1.40 mg/dL    Calcium 8.9 8.4 - 10.2 mg/dL    Anion Gap 9.0 7.0 - 16.0   ESTIMATED GFR   Result Value Ref Range    GFR (CKD-EPI) 126 >60 mL/min/1.73 m 2       I have independently interpreted this EKG    RADIOLOGY/PROCEDURES     COURSE & MEDICAL DECISION MAKING    ASSESSMENT, COURSE AND PLAN  Care Narrative:     This is a pleasant, overall well-appearing 28-year-old female who presents with on and off low back pain for the last 6 months, worsened in the last several days.  No fever.  No flank pain.  Urine collected in triage and is available upon my initial evaluation.  Patient denies any history of diabetes but she has 500 mg/dL of glucose in her urine.  No other evidence of infection.  I have suggested further evaluation with serum testing to which she is agreeable.  She does not have significant blood in her urine, 2-5 RBCs and trace blood.    Patient was reevaluated at the bedside.  We discussed lab findings.  She does have an elevated glucose 168 without other electrolyte abnormalities.  Kidney function was normal.  White blood cell count is normal 6.2.  H&H 10 and 35.  Comparison made to prior values, this is essentially her baseline.  No evidence of UTI. She is not having flank pain rather low back pain, despite triage note.  Patient is getting relief with Tylenol and ibuprofen which she is advised that she can continue to take.  She is given strict return precautions.  We discussed gentle range of motion exercises and PT.  She after discussion, would like to forego CT imaging at this time as I do not think it would be very beneficial, I doubt kidney stone.  She is encouraged to follow-up  with her PCP to have her blood sugar monitored, due to his elevation today as well as glucose in her urine.  At this time, I do not think it is appropriate to start her on diabetic medications but this may be indicated if this remains high.  Patient's reassured.  She assures me that she will follow-up with her PCP.  She has reassuring vital signs.  She is discharged home in stable condition.    ADDITIONAL PROBLEMS MANAGED    DISPOSITION AND DISCUSSIONS  I have discussed management of the patient with the following physicians and JUSTIN's:  None    Discussion of management with other QHP or appropriate source(s): None     Escalation of care considered, and ultimately not performed: None    Barriers to care at this time, including but not limited to: None.     Decision tools and prescription drugs considered including, but not limited to: None.    FINAL DIAGNOSIS  1. Hyperglycemia    2. Glucosuria    3. Bilateral low back pain without sciatica, unspecified chronicity           Electronically signed by: Malini Sherman D.O., 7/2/2024 9:17 AM

## 2024-07-02 NOTE — ED NOTES
PIV placed, blood work collected and sent to lab, Pt denied having any needs at this time, no distress noted;

## 2024-07-02 NOTE — DISCHARGE INSTRUCTIONS
Your urine showed evidence of glucose, 500.  Your blood glucose was 168, elevated, but it is not clear, if you truly have diabetes.  I have encouraged you to have this reevaluated by your primary care doctor and have it followed.  You can continue to take Tylenol and ibuprofen at home.  I would also recommend gentle stretching exercises and back exercises.  No evidence of urine infection at this time.

## 2024-08-05 ENCOUNTER — TELEPHONE (OUTPATIENT)
Dept: INTERNAL MEDICINE | Facility: OTHER | Age: 29
End: 2024-08-05

## 2024-08-05 ENCOUNTER — HOSPITAL ENCOUNTER (EMERGENCY)
Facility: MEDICAL CENTER | Age: 29
End: 2024-08-05
Attending: EMERGENCY MEDICINE

## 2024-08-05 VITALS
TEMPERATURE: 97.6 F | HEIGHT: 63 IN | RESPIRATION RATE: 16 BRPM | OXYGEN SATURATION: 97 % | BODY MASS INDEX: 28.52 KG/M2 | HEART RATE: 90 BPM | SYSTOLIC BLOOD PRESSURE: 126 MMHG | WEIGHT: 160.94 LBS | DIASTOLIC BLOOD PRESSURE: 83 MMHG

## 2024-08-05 DIAGNOSIS — H57.89 IRRITATION OF RIGHT EYE: ICD-10-CM

## 2024-08-05 PROCEDURE — 99283 EMERGENCY DEPT VISIT LOW MDM: CPT

## 2024-08-05 PROCEDURE — 700101 HCHG RX REV CODE 250: Performed by: EMERGENCY MEDICINE

## 2024-08-05 RX ORDER — PROPARACAINE HYDROCHLORIDE 5 MG/ML
2 SOLUTION/ DROPS OPHTHALMIC ONCE
Status: COMPLETED | OUTPATIENT
Start: 2024-08-05 | End: 2024-08-05

## 2024-08-05 RX ADMIN — PROPARACAINE HYDROCHLORIDE 2 DROP: 5 SOLUTION/ DROPS OPHTHALMIC at 12:00

## 2024-08-05 RX ADMIN — FLUORESCEIN SODIUM 1 MG: 1 STRIP OPHTHALMIC at 12:00

## 2024-08-05 ASSESSMENT — FIBROSIS 4 INDEX: FIB4 SCORE: 0.56

## 2024-08-05 NOTE — ED NOTES
Reviewed discharge instructions w/ pt, verbalized understanding to information provided including follow up care w/ Optometrist, return precautions and OTC medications, denied questions/concerns.  Pt ambulated from ED.

## 2024-08-05 NOTE — ED TRIAGE NOTES
Ivis Klein  28 y.o.  Chief Complaint   Patient presents with    Eye Pain     RIGHT     Pt to triage with c/o eye pain (R>L) and blurry vision to her R eye.  Pt reports she has been having these symptoms for approximately a week.

## 2024-08-05 NOTE — DISCHARGE INSTRUCTIONS
I would recommend that you see an optometrist as I suspect that you likely need glasses.  Visual acuity today in the right eye, your affected eye is 20/40.  Visual acuity of your left eye, the unaffected eye is 20/50.  This would indicate that you do in fact need glasses.  Additionally, there is no evidence of foreign body or corneal abrasion.  Your eye pressures are normal.  I would recommend that you start using lubricant eye drops which are available over-the-counter at any pharmacy.

## 2024-08-05 NOTE — ED PROVIDER NOTES
ED Provider Note    CHIEF COMPLAINT  Chief Complaint   Patient presents with    Eye Pain     RIGHT       EXTERNAL RECORDS REVIEWED  Patient's last encounter was an ED visit in May of this year she was seen for acute cough, URI, chest pain and was diagnosed with COVID-19.    Prior to that patient was seen in the internal medicine clinic for dyslipidemia, hypothyroidism, hepatitis vaccine.      HPI/ROS  LIMITATION TO HISTORY   Select: : None  OUTSIDE HISTORIAN(S):  None    Ivis Klein is a 28 y.o. female who presents t to the  the emergency department with a chief complaint of right eye irritation.  Patient denies any injury.  She does not work with wood, model, welding.  She denies any foreign body sensation.  She reports more dry eye and irritation.  She states when she is at work, she spends a lot of time in the computer and this worsens her symptoms.  Will often cause right-sided headache.  She has had no drainage or matting of her lids.  No fever or URI symptoms.  She does not wear contacts.  She has not been rubbing her eyes.  She does think that she likely needs glasses.  She reports some blurring of her right eye occasionally, symptoms can be intermittent.  Again are worsened using the computer.    PAST MEDICAL HISTORY   has a past medical history of Anemia (02/17/2022), Anxiety (02/16/2017), Anxiety (02/16/2017), Elevated antinuclear antibody (DEJON) level (05/23/2019), Graves disease (12/05/2016), Heart valve disease, Hemorrhagic cysts of both ovaries (05/23/2019), History of panic attack (04/09/2020), History of pericarditis (12/05/2016), History of thyroidectomy (01/31/2020), Panic attack (03/11/2019), Postpartum depression (02/17/2022), Postpartum depression (02/17/2022), and Vaginal discharge (12/02/2021).    SURGICAL HISTORY   has a past surgical history that includes primary c section (9/8/2013) and thyroidectomy total (Bilateral, 3/22/2017).    FAMILY HISTORY  Family History   Problem  "Relation Age of Onset    Cancer Paternal Grandmother 56        breast cancer    No Known Problems Mother     Hyperlipidemia Father     No Known Problems Sister     No Known Problems Brother        SOCIAL HISTORY  Social History     Tobacco Use    Smoking status: Never    Smokeless tobacco: Never    Tobacco comments:     quit in 2012   Vaping Use    Vaping status: Never Used   Substance and Sexual Activity    Alcohol use: Yes    Drug use: Yes     Types: Marijuana, Inhaled     Comment: marijuana weekly    Sexual activity: Yes     Partners: Male     Birth control/protection: I.U.D.     Comment: single       CURRENT MEDICATIONS  Home Medications    **Home medications have not yet been reviewed for this encounter**         ALLERGIES  No Known Allergies    PHYSICAL EXAM  VITAL SIGNS: /83   Pulse 90   Temp 36.4 °C (97.6 °F) (Temporal)   Resp 16   Ht 1.6 m (5' 3\")   Wt 73 kg (160 lb 15 oz)   LMP 07/10/2024   SpO2 97%   BMI 28.51 kg/m²    Vitals reviewed.  Constitutional: Patient is oriented to person, place, and time. Appears well-developed and well-nourished. No distress.    Head: Normocephalic and atraumatic.   Eyes: Conjunctivae are normal. Pupils are equal, round, and reactive to light. EOMI. no hyphema.  No subconjunctival hemorrhage.  No corneal abrasions noted.  No fluorescein uptake.  No Elvi sign.  No foreign body.  Eye pressures 13 mmHg.  Neck: Normal range of motion.   Cardiovascular: Normal rate, regular rhythm and normal heart sounds.   Pulmonary/Chest: Effort normal.No respiratory distress  Neurological: Normal gait. No focal deficits.   Skin: Skin is warm and dry. No erythema. No pallor.   Psychiatric: Patient has a normal mood and affect.     EKG/LABS    RADIOLOGY/PROCEDURES     COURSE & MEDICAL DECISION MAKING    ASSESSMENT, COURSE AND PLAN  Care Narrative:     This is a pleasant 28-year-old female.  She presents with a week or more of right-sided eye pain.  Interestingly, visual acuity " shows her vision is worse in her unaffected eye 20/50, the right eye which is bothering her, her vision is 20/40.  Bilateral visual acuity 20/25.  Her eye exam is really unrevealing.  There is no injection.  No hyphema.  Eye pressures are normal.  There is no Supai sign.  There is no herpetic lesions.  There is no fluorescein uptake.  She does not have a foreign body sensation.  She denies any trauma.  I do not see any indication for treatment with antibiotic drops at this time.  She does not have participate in any high risk eye injuring activities such as welding working with wood.  I have suggested that she try a lubricant drop which is available over-the-counter.  Additionally, I would suggest that she follow-up with optometry as I suspect that she likely needs glasses and reducing the strain on her eyes will likely improve her symptoms.  She is given strict return precautions.  She is advised to follow-up with her PCP.    ADDITIONAL PROBLEMS MANAGED    DISPOSITION AND DISCUSSIONS  I have discussed management of the patient with the following physicians and JUSTIN's:  None    Discussion of management with other QHP or appropriate source(s): None     Escalation of care considered, and ultimately not performed: None    Barriers to care at this time, including but not limited to: None.     Decision tools and prescription drugs considered including, but not limited to: None.    FINAL DIAGNOSIS  1. Irritation of right eye         Electronically signed by: Malini Sherman D.O., 8/5/2024 12:08 PM

## 2024-08-06 NOTE — TELEPHONE ENCOUNTER
----- Message from Fabi Murrell M.D. sent at 8/5/2024  4:00 PM PDT -----  Please have patient schedule appointment to reestablish with new intern

## 2024-08-28 ENCOUNTER — ANESTHESIA EVENT (OUTPATIENT)
Dept: SURGERY | Facility: MEDICAL CENTER | Age: 29
End: 2024-08-28

## 2024-08-28 ENCOUNTER — APPOINTMENT (OUTPATIENT)
Dept: RADIOLOGY | Facility: MEDICAL CENTER | Age: 29
End: 2024-08-28
Attending: EMERGENCY MEDICINE

## 2024-08-28 ENCOUNTER — HOSPITAL ENCOUNTER (EMERGENCY)
Facility: MEDICAL CENTER | Age: 29
End: 2024-08-28
Attending: EMERGENCY MEDICINE

## 2024-08-28 ENCOUNTER — ANESTHESIA (OUTPATIENT)
Dept: SURGERY | Facility: MEDICAL CENTER | Age: 29
End: 2024-08-28

## 2024-08-28 VITALS
WEIGHT: 160.5 LBS | SYSTOLIC BLOOD PRESSURE: 134 MMHG | HEART RATE: 68 BPM | BODY MASS INDEX: 28.44 KG/M2 | DIASTOLIC BLOOD PRESSURE: 80 MMHG | HEIGHT: 63 IN | OXYGEN SATURATION: 100 % | RESPIRATION RATE: 14 BRPM | TEMPERATURE: 97.9 F

## 2024-08-28 DIAGNOSIS — K81.0 ACUTE CHOLECYSTITIS: Primary | ICD-10-CM

## 2024-08-28 DIAGNOSIS — G89.18 POST-OPERATIVE PAIN: ICD-10-CM

## 2024-08-28 DIAGNOSIS — K80.20 GALLSTONES: ICD-10-CM

## 2024-08-28 LAB
ALBUMIN SERPL BCP-MCNC: 4.4 G/DL (ref 3.2–4.9)
ALBUMIN/GLOB SERPL: 1.3 G/DL
ALP SERPL-CCNC: 74 U/L (ref 30–99)
ALT SERPL-CCNC: 10 U/L (ref 2–50)
ANION GAP SERPL CALC-SCNC: 15 MMOL/L (ref 7–16)
APPEARANCE UR: CLEAR
AST SERPL-CCNC: 15 U/L (ref 12–45)
BASOPHILS # BLD AUTO: 0.5 % (ref 0–1.8)
BASOPHILS # BLD: 0.05 K/UL (ref 0–0.12)
BILIRUB SERPL-MCNC: 0.3 MG/DL (ref 0.1–1.5)
BILIRUB UR QL STRIP.AUTO: NEGATIVE
BUN SERPL-MCNC: 12 MG/DL (ref 8–22)
CALCIUM ALBUM COR SERPL-MCNC: 8.8 MG/DL (ref 8.5–10.5)
CALCIUM SERPL-MCNC: 9.1 MG/DL (ref 8.4–10.2)
CHLORIDE SERPL-SCNC: 101 MMOL/L (ref 96–112)
CO2 SERPL-SCNC: 20 MMOL/L (ref 20–33)
COLOR UR: YELLOW
CREAT SERPL-MCNC: 0.62 MG/DL (ref 0.5–1.4)
EOSINOPHIL # BLD AUTO: 0.07 K/UL (ref 0–0.51)
EOSINOPHIL NFR BLD: 0.6 % (ref 0–6.9)
ERYTHROCYTE [DISTWIDTH] IN BLOOD BY AUTOMATED COUNT: 44.4 FL (ref 35.9–50)
GFR SERPLBLD CREATININE-BSD FMLA CKD-EPI: 125 ML/MIN/1.73 M 2
GLOBULIN SER CALC-MCNC: 3.5 G/DL (ref 1.9–3.5)
GLUCOSE BLD STRIP.AUTO-MCNC: 158 MG/DL (ref 65–99)
GLUCOSE SERPL-MCNC: 169 MG/DL (ref 65–99)
GLUCOSE UR STRIP.AUTO-MCNC: 500 MG/DL
HCG SERPL QL: NEGATIVE
HCT VFR BLD AUTO: 37.6 % (ref 37–47)
HGB BLD-MCNC: 11.6 G/DL (ref 12–16)
IMM GRANULOCYTES # BLD AUTO: 0.04 K/UL (ref 0–0.11)
IMM GRANULOCYTES NFR BLD AUTO: 0.4 % (ref 0–0.9)
KETONES UR STRIP.AUTO-MCNC: 15 MG/DL
LEUKOCYTE ESTERASE UR QL STRIP.AUTO: NEGATIVE
LIPASE SERPL-CCNC: 24 U/L (ref 11–82)
LYMPHOCYTES # BLD AUTO: 1.41 K/UL (ref 1–4.8)
LYMPHOCYTES NFR BLD: 12.9 % (ref 22–41)
MCH RBC QN AUTO: 24.4 PG (ref 27–33)
MCHC RBC AUTO-ENTMCNC: 30.9 G/DL (ref 32.2–35.5)
MCV RBC AUTO: 79 FL (ref 81.4–97.8)
MICRO URNS: ABNORMAL
MONOCYTES # BLD AUTO: 0.55 K/UL (ref 0–0.85)
MONOCYTES NFR BLD AUTO: 5.1 % (ref 0–13.4)
NEUTROPHILS # BLD AUTO: 8.77 K/UL (ref 1.82–7.42)
NEUTROPHILS NFR BLD: 80.5 % (ref 44–72)
NITRITE UR QL STRIP.AUTO: NEGATIVE
NRBC # BLD AUTO: 0 K/UL
NRBC BLD-RTO: 0 /100 WBC (ref 0–0.2)
PATHOLOGY CONSULT NOTE: NORMAL
PH UR STRIP.AUTO: 7.5 [PH] (ref 5–8)
PLATELET # BLD AUTO: 283 K/UL (ref 164–446)
PMV BLD AUTO: 11.3 FL (ref 9–12.9)
POTASSIUM SERPL-SCNC: 3.7 MMOL/L (ref 3.6–5.5)
PROT SERPL-MCNC: 7.9 G/DL (ref 6–8.2)
PROT UR QL STRIP: NEGATIVE MG/DL
RBC # BLD AUTO: 4.76 M/UL (ref 4.2–5.4)
RBC UR QL AUTO: NEGATIVE
SODIUM SERPL-SCNC: 136 MMOL/L (ref 135–145)
SP GR UR STRIP.AUTO: 1.01
WBC # BLD AUTO: 10.9 K/UL (ref 4.8–10.8)

## 2024-08-28 PROCEDURE — 160009 HCHG ANES TIME/MIN: Performed by: SURGERY

## 2024-08-28 PROCEDURE — 160041 HCHG SURGERY MINUTES - EA ADDL 1 MIN LEVEL 4: Performed by: SURGERY

## 2024-08-28 PROCEDURE — 160029 HCHG SURGERY MINUTES - 1ST 30 MINS LEVEL 4: Performed by: SURGERY

## 2024-08-28 PROCEDURE — 700111 HCHG RX REV CODE 636 W/ 250 OVERRIDE (IP): Mod: JZ | Performed by: ANESTHESIOLOGY

## 2024-08-28 PROCEDURE — 85025 COMPLETE CBC W/AUTO DIFF WBC: CPT

## 2024-08-28 PROCEDURE — 160036 HCHG PACU - EA ADDL 30 MINS PHASE I: Performed by: SURGERY

## 2024-08-28 PROCEDURE — 82962 GLUCOSE BLOOD TEST: CPT

## 2024-08-28 PROCEDURE — 88304 TISSUE EXAM BY PATHOLOGIST: CPT

## 2024-08-28 PROCEDURE — 160048 HCHG OR STATISTICAL LEVEL 1-5: Performed by: SURGERY

## 2024-08-28 PROCEDURE — 83690 ASSAY OF LIPASE: CPT

## 2024-08-28 PROCEDURE — 160002 HCHG RECOVERY MINUTES (STAT): Performed by: SURGERY

## 2024-08-28 PROCEDURE — 160035 HCHG PACU - 1ST 60 MINS PHASE I: Performed by: SURGERY

## 2024-08-28 PROCEDURE — 80053 COMPREHEN METABOLIC PANEL: CPT

## 2024-08-28 PROCEDURE — 700101 HCHG RX REV CODE 250: Performed by: ANESTHESIOLOGY

## 2024-08-28 PROCEDURE — 36415 COLL VENOUS BLD VENIPUNCTURE: CPT

## 2024-08-28 PROCEDURE — 700111 HCHG RX REV CODE 636 W/ 250 OVERRIDE (IP): Mod: JZ

## 2024-08-28 PROCEDURE — 76705 ECHO EXAM OF ABDOMEN: CPT

## 2024-08-28 PROCEDURE — 160047 HCHG PACU  - EA ADDL 30 MINS PHASE II: Performed by: SURGERY

## 2024-08-28 PROCEDURE — A9270 NON-COVERED ITEM OR SERVICE: HCPCS | Performed by: ANESTHESIOLOGY

## 2024-08-28 PROCEDURE — 96375 TX/PRO/DX INJ NEW DRUG ADDON: CPT

## 2024-08-28 PROCEDURE — 81003 URINALYSIS AUTO W/O SCOPE: CPT

## 2024-08-28 PROCEDURE — 94760 N-INVAS EAR/PLS OXIMETRY 1: CPT

## 2024-08-28 PROCEDURE — 700105 HCHG RX REV CODE 258: Performed by: ANESTHESIOLOGY

## 2024-08-28 PROCEDURE — 700105 HCHG RX REV CODE 258: Performed by: EMERGENCY MEDICINE

## 2024-08-28 PROCEDURE — 160046 HCHG PACU - 1ST 60 MINS PHASE II: Performed by: SURGERY

## 2024-08-28 PROCEDURE — 700102 HCHG RX REV CODE 250 W/ 637 OVERRIDE(OP): Performed by: ANESTHESIOLOGY

## 2024-08-28 PROCEDURE — 96374 THER/PROPH/DIAG INJ IV PUSH: CPT

## 2024-08-28 PROCEDURE — 700101 HCHG RX REV CODE 250: Performed by: SURGERY

## 2024-08-28 PROCEDURE — 160025 RECOVERY II MINUTES (STATS): Performed by: SURGERY

## 2024-08-28 PROCEDURE — 84703 CHORIONIC GONADOTROPIN ASSAY: CPT

## 2024-08-28 PROCEDURE — 99291 CRITICAL CARE FIRST HOUR: CPT

## 2024-08-28 PROCEDURE — 96376 TX/PRO/DX INJ SAME DRUG ADON: CPT

## 2024-08-28 PROCEDURE — 700111 HCHG RX REV CODE 636 W/ 250 OVERRIDE (IP): Performed by: EMERGENCY MEDICINE

## 2024-08-28 RX ORDER — ONDANSETRON 2 MG/ML
4 INJECTION INTRAMUSCULAR; INTRAVENOUS ONCE
Status: COMPLETED | OUTPATIENT
Start: 2024-08-28 | End: 2024-08-28

## 2024-08-28 RX ORDER — ALBUTEROL SULFATE 5 MG/ML
2.5 SOLUTION RESPIRATORY (INHALATION)
Status: DISCONTINUED | OUTPATIENT
Start: 2024-08-28 | End: 2024-08-28 | Stop reason: HOSPADM

## 2024-08-28 RX ORDER — DEXMEDETOMIDINE HYDROCHLORIDE 100 UG/ML
INJECTION, SOLUTION INTRAVENOUS PRN
Status: DISCONTINUED | OUTPATIENT
Start: 2024-08-28 | End: 2024-08-28 | Stop reason: SURG

## 2024-08-28 RX ORDER — BUPIVACAINE HYDROCHLORIDE AND EPINEPHRINE 5; 5 MG/ML; UG/ML
INJECTION, SOLUTION EPIDURAL; INTRACAUDAL; PERINEURAL
Status: DISCONTINUED | OUTPATIENT
Start: 2024-08-28 | End: 2024-08-28 | Stop reason: HOSPADM

## 2024-08-28 RX ORDER — ONDANSETRON 2 MG/ML
INJECTION INTRAMUSCULAR; INTRAVENOUS
Status: COMPLETED
Start: 2024-08-28 | End: 2024-08-28

## 2024-08-28 RX ORDER — KETOROLAC TROMETHAMINE 15 MG/ML
INJECTION, SOLUTION INTRAMUSCULAR; INTRAVENOUS PRN
Status: DISCONTINUED | OUTPATIENT
Start: 2024-08-28 | End: 2024-08-28 | Stop reason: SURG

## 2024-08-28 RX ORDER — DIPHENHYDRAMINE HYDROCHLORIDE 50 MG/ML
12.5 INJECTION INTRAMUSCULAR; INTRAVENOUS
Status: DISCONTINUED | OUTPATIENT
Start: 2024-08-28 | End: 2024-08-28 | Stop reason: HOSPADM

## 2024-08-28 RX ORDER — ONDANSETRON 2 MG/ML
4 INJECTION INTRAMUSCULAR; INTRAVENOUS
Status: DISCONTINUED | OUTPATIENT
Start: 2024-08-28 | End: 2024-08-28 | Stop reason: HOSPADM

## 2024-08-28 RX ORDER — ONDANSETRON 2 MG/ML
INJECTION INTRAMUSCULAR; INTRAVENOUS PRN
Status: DISCONTINUED | OUTPATIENT
Start: 2024-08-28 | End: 2024-08-28 | Stop reason: SURG

## 2024-08-28 RX ORDER — HALOPERIDOL 5 MG/ML
1 INJECTION INTRAMUSCULAR
Status: DISCONTINUED | OUTPATIENT
Start: 2024-08-28 | End: 2024-08-28 | Stop reason: HOSPADM

## 2024-08-28 RX ORDER — OXYCODONE HCL 5 MG/5 ML
10 SOLUTION, ORAL ORAL
Status: COMPLETED | OUTPATIENT
Start: 2024-08-28 | End: 2024-08-28

## 2024-08-28 RX ORDER — SODIUM CHLORIDE 9 MG/ML
1000 INJECTION, SOLUTION INTRAVENOUS ONCE
Status: COMPLETED | OUTPATIENT
Start: 2024-08-28 | End: 2024-08-28

## 2024-08-28 RX ORDER — HYDROMORPHONE HYDROCHLORIDE 2 MG/ML
INJECTION, SOLUTION INTRAMUSCULAR; INTRAVENOUS; SUBCUTANEOUS PRN
Status: DISCONTINUED | OUTPATIENT
Start: 2024-08-28 | End: 2024-08-29 | Stop reason: HOSPADM

## 2024-08-28 RX ORDER — HYDRALAZINE HYDROCHLORIDE 20 MG/ML
5 INJECTION INTRAMUSCULAR; INTRAVENOUS
Status: DISCONTINUED | OUTPATIENT
Start: 2024-08-28 | End: 2024-08-28 | Stop reason: HOSPADM

## 2024-08-28 RX ORDER — SODIUM CHLORIDE, SODIUM LACTATE, POTASSIUM CHLORIDE, CALCIUM CHLORIDE 600; 310; 30; 20 MG/100ML; MG/100ML; MG/100ML; MG/100ML
INJECTION, SOLUTION INTRAVENOUS CONTINUOUS
Status: DISCONTINUED | OUTPATIENT
Start: 2024-08-28 | End: 2024-08-28 | Stop reason: HOSPADM

## 2024-08-28 RX ORDER — ROCURONIUM BROMIDE 10 MG/ML
INJECTION, SOLUTION INTRAVENOUS PRN
Status: DISCONTINUED | OUTPATIENT
Start: 2024-08-28 | End: 2024-08-28 | Stop reason: SURG

## 2024-08-28 RX ORDER — HYDROMORPHONE HYDROCHLORIDE 1 MG/ML
0.4 INJECTION, SOLUTION INTRAMUSCULAR; INTRAVENOUS; SUBCUTANEOUS
Status: DISCONTINUED | OUTPATIENT
Start: 2024-08-28 | End: 2024-08-28 | Stop reason: HOSPADM

## 2024-08-28 RX ORDER — OXYCODONE HCL 5 MG/5 ML
5 SOLUTION, ORAL ORAL
Status: COMPLETED | OUTPATIENT
Start: 2024-08-28 | End: 2024-08-28

## 2024-08-28 RX ORDER — MORPHINE SULFATE 4 MG/ML
INJECTION INTRAVENOUS
Status: COMPLETED
Start: 2024-08-28 | End: 2024-08-28

## 2024-08-28 RX ORDER — EPHEDRINE SULFATE 50 MG/ML
5 INJECTION, SOLUTION INTRAVENOUS
Status: DISCONTINUED | OUTPATIENT
Start: 2024-08-28 | End: 2024-08-28 | Stop reason: HOSPADM

## 2024-08-28 RX ORDER — OXYCODONE AND ACETAMINOPHEN 5; 325 MG/1; MG/1
1 TABLET ORAL EVERY 4 HOURS PRN
Qty: 24 TABLET | Refills: 0 | Status: SHIPPED | OUTPATIENT
Start: 2024-08-28 | End: 2024-09-02

## 2024-08-28 RX ORDER — SODIUM CHLORIDE, SODIUM LACTATE, POTASSIUM CHLORIDE, CALCIUM CHLORIDE 600; 310; 30; 20 MG/100ML; MG/100ML; MG/100ML; MG/100ML
INJECTION, SOLUTION INTRAVENOUS CONTINUOUS
Status: ACTIVE | OUTPATIENT
Start: 2024-08-28 | End: 2024-08-28

## 2024-08-28 RX ORDER — SODIUM CHLORIDE, SODIUM LACTATE, POTASSIUM CHLORIDE, CALCIUM CHLORIDE 600; 310; 30; 20 MG/100ML; MG/100ML; MG/100ML; MG/100ML
INJECTION, SOLUTION INTRAVENOUS
Status: DISCONTINUED | OUTPATIENT
Start: 2024-08-28 | End: 2024-08-28 | Stop reason: SURG

## 2024-08-28 RX ORDER — HYDROMORPHONE HYDROCHLORIDE 1 MG/ML
0.1 INJECTION, SOLUTION INTRAMUSCULAR; INTRAVENOUS; SUBCUTANEOUS
Status: DISCONTINUED | OUTPATIENT
Start: 2024-08-28 | End: 2024-08-28 | Stop reason: HOSPADM

## 2024-08-28 RX ORDER — CEFAZOLIN SODIUM 1 G/3ML
INJECTION, POWDER, FOR SOLUTION INTRAMUSCULAR; INTRAVENOUS PRN
Status: DISCONTINUED | OUTPATIENT
Start: 2024-08-28 | End: 2024-08-28 | Stop reason: SURG

## 2024-08-28 RX ORDER — HYDROMORPHONE HYDROCHLORIDE 1 MG/ML
1 INJECTION, SOLUTION INTRAMUSCULAR; INTRAVENOUS; SUBCUTANEOUS ONCE
Status: COMPLETED | OUTPATIENT
Start: 2024-08-28 | End: 2024-08-28

## 2024-08-28 RX ORDER — HYDROMORPHONE HYDROCHLORIDE 1 MG/ML
0.2 INJECTION, SOLUTION INTRAMUSCULAR; INTRAVENOUS; SUBCUTANEOUS
Status: DISCONTINUED | OUTPATIENT
Start: 2024-08-28 | End: 2024-08-28 | Stop reason: HOSPADM

## 2024-08-28 RX ORDER — LIDOCAINE HYDROCHLORIDE 40 MG/ML
SOLUTION TOPICAL PRN
Status: DISCONTINUED | OUTPATIENT
Start: 2024-08-28 | End: 2024-08-28 | Stop reason: SURG

## 2024-08-28 RX ORDER — KETOROLAC TROMETHAMINE 15 MG/ML
INJECTION, SOLUTION INTRAMUSCULAR; INTRAVENOUS
Status: COMPLETED
Start: 2024-08-28 | End: 2024-08-28

## 2024-08-28 RX ORDER — LIDOCAINE HYDROCHLORIDE 20 MG/ML
INJECTION, SOLUTION EPIDURAL; INFILTRATION; INTRACAUDAL; PERINEURAL PRN
Status: DISCONTINUED | OUTPATIENT
Start: 2024-08-28 | End: 2024-08-28 | Stop reason: SURG

## 2024-08-28 RX ORDER — DEXAMETHASONE SODIUM PHOSPHATE 4 MG/ML
INJECTION, SOLUTION INTRA-ARTICULAR; INTRALESIONAL; INTRAMUSCULAR; INTRAVENOUS; SOFT TISSUE PRN
Status: DISCONTINUED | OUTPATIENT
Start: 2024-08-28 | End: 2024-08-28 | Stop reason: SURG

## 2024-08-28 RX ORDER — MORPHINE SULFATE 4 MG/ML
4 INJECTION INTRAVENOUS
Status: COMPLETED | OUTPATIENT
Start: 2024-08-28 | End: 2024-08-28

## 2024-08-28 RX ADMIN — DEXAMETHASONE SODIUM PHOSPHATE 8 MG: 4 INJECTION INTRA-ARTICULAR; INTRALESIONAL; INTRAMUSCULAR; INTRAVENOUS; SOFT TISSUE at 14:41

## 2024-08-28 RX ADMIN — SODIUM CHLORIDE, POTASSIUM CHLORIDE, SODIUM LACTATE AND CALCIUM CHLORIDE: 600; 310; 30; 20 INJECTION, SOLUTION INTRAVENOUS at 14:33

## 2024-08-28 RX ADMIN — MORPHINE SULFATE 4 MG: 4 INJECTION INTRAVENOUS at 03:30

## 2024-08-28 RX ADMIN — ONDANSETRON 4 MG: 2 INJECTION INTRAMUSCULAR; INTRAVENOUS at 08:28

## 2024-08-28 RX ADMIN — DEXMEDETOMIDINE HYDROCHLORIDE 20 MCG: 100 INJECTION, SOLUTION INTRAVENOUS at 15:08

## 2024-08-28 RX ADMIN — CEFAZOLIN 2 G: 1 INJECTION, POWDER, FOR SOLUTION INTRAMUSCULAR; INTRAVENOUS at 14:41

## 2024-08-28 RX ADMIN — PROPOFOL 200 MG: 10 INJECTION, EMULSION INTRAVENOUS at 14:38

## 2024-08-28 RX ADMIN — ROCURONIUM BROMIDE 50 MG: 50 INJECTION, SOLUTION INTRAVENOUS at 14:38

## 2024-08-28 RX ADMIN — FENTANYL CITRATE 50 MCG: 50 INJECTION, SOLUTION INTRAMUSCULAR; INTRAVENOUS at 16:05

## 2024-08-28 RX ADMIN — SUGAMMADEX 200 MG: 100 INJECTION, SOLUTION INTRAVENOUS at 15:09

## 2024-08-28 RX ADMIN — FENTANYL CITRATE 100 MCG: 50 INJECTION, SOLUTION INTRAMUSCULAR; INTRAVENOUS at 14:38

## 2024-08-28 RX ADMIN — KETOROLAC TROMETHAMINE: 15 INJECTION, SOLUTION INTRAMUSCULAR; INTRAVENOUS at 05:00

## 2024-08-28 RX ADMIN — SODIUM CHLORIDE 1000 ML: 9 INJECTION, SOLUTION INTRAVENOUS at 03:00

## 2024-08-28 RX ADMIN — HYDROMORPHONE HYDROCHLORIDE 1 MG: 1 INJECTION, SOLUTION INTRAMUSCULAR; INTRAVENOUS; SUBCUTANEOUS at 07:16

## 2024-08-28 RX ADMIN — LIDOCAINE HYDROCHLORIDE 4 ML: 40 SOLUTION TOPICAL at 14:38

## 2024-08-28 RX ADMIN — ONDANSETRON 4 MG: 2 INJECTION INTRAMUSCULAR; INTRAVENOUS at 03:00

## 2024-08-28 RX ADMIN — PROPOFOL 120 MCG/KG/MIN: 10 INJECTION, EMULSION INTRAVENOUS at 14:39

## 2024-08-28 RX ADMIN — ONDANSETRON 4 MG: 2 INJECTION INTRAMUSCULAR; INTRAVENOUS at 15:09

## 2024-08-28 RX ADMIN — KETOROLAC TROMETHAMINE 15 MG: 15 INJECTION, SOLUTION INTRAMUSCULAR; INTRAVENOUS at 15:09

## 2024-08-28 RX ADMIN — LIDOCAINE HYDROCHLORIDE 80 MG: 20 INJECTION, SOLUTION EPIDURAL; INFILTRATION; INTRACAUDAL at 14:38

## 2024-08-28 RX ADMIN — HYDROMORPHONE HYDROCHLORIDE 1 MG: 2 INJECTION INTRAMUSCULAR; INTRAVENOUS; SUBCUTANEOUS at 14:43

## 2024-08-28 RX ADMIN — MORPHINE SULFATE 4 MG: 4 INJECTION, SOLUTION INTRAMUSCULAR; INTRAVENOUS at 03:30

## 2024-08-28 RX ADMIN — OXYCODONE HYDROCHLORIDE 10 MG: 5 SOLUTION ORAL at 16:05

## 2024-08-28 RX ADMIN — HYDROMORPHONE HYDROCHLORIDE 1 MG: 2 INJECTION INTRAMUSCULAR; INTRAVENOUS; SUBCUTANEOUS at 14:55

## 2024-08-28 RX ADMIN — ONDANSETRON: 2 INJECTION INTRAMUSCULAR; INTRAVENOUS at 03:30

## 2024-08-28 ASSESSMENT — FIBROSIS 4 INDEX: FIB4 SCORE: 0.56

## 2024-08-28 ASSESSMENT — PAIN DESCRIPTION - PAIN TYPE
TYPE: SURGICAL PAIN

## 2024-08-28 NOTE — ANESTHESIA TIME REPORT
Anesthesia Start and Stop Event Times       Date Time Event    8/28/2024 1421 Ready for Procedure     1433 Anesthesia Start     1533 Anesthesia Stop          Responsible Staff  08/28/24      Name Role Begin End    Roger Vigil M.D. Anesth 1433 1533          Overtime Reason:  overtime    Comments:

## 2024-08-28 NOTE — H&P
DATE OF ADMISSION:  2024     HISTORY OF PRESENT ILLNESS:  Chris is a 28-year-old woman who presented to   the emergency room for further evaluation of abdominal pain.  She gives a   12-hour history of right upper quadrant pain, which is fairly severe.  She has   some associated nausea.  She has had similar more mild symptoms in the past.    She has not had any jaundice or acholic stools.  The pain has been unrelieved   despite pain medication.     PAST MEDICAL HISTORY:  Significant for hypothyroidism.     PAST SURGICAL HISTORY:  She has had a  and a partial thyroidectomy.     CURRENT MEDICATIONS:  Thyroid replacement, I believe, Synthroid.     ALLERGIES:  She has no stated drug allergies.     SOCIAL HISTORY:  She does not smoke.     FAMILY HISTORY:  Essentially negative.     REVIEW OF SYSTEMS:  Good health prior to the acute onset of abdominal pain as   above, otherwise negative per AMA and CMS criteria.     PHYSICAL EXAMINATION:    VITAL SIGNS:  She has temperature 36, pulse 70, blood pressure is 100/53.  HEENT:  Unremarkable.  Pupils are equal.  Oropharynx without lesions.  NECK:  Supple.  LUNGS:  Clear.  CARDIOVASCULAR:  Reveals regular rhythm.  ABDOMEN:  Soft.  She has minimal tenderness in the right upper quadrant.  No   stan peritoneal signs.  EXTREMITIES:  Symmetrical without clubbing, cyanosis, or edema.  NEUROLOGIC:  She is neurologically intact without any focal deficits and she   is alert and oriented x4.  SKIN:  Warm and dry.     LABORATORY DATA:  She does not have any laboratory data for me to review due   to a problem with the computer system.  She does have imaging, which I was   able to review which does show gallstones and tenderness with palpation of the   gallbladder consistent with a positive Brar sign.     IMPRESSION:  A 28-year-old woman with evidence of acute cholecystitis.  A   cholecystectomy is indicated.  I discussed this in detail with her including   laparoscopic  approach, potential for converting to an open procedure,   associated risk of bleeding, infection, abscess, and hematoma.  We also   discussed risk of postoperative bile leak, common duct stricture, common duct   transection, and the significance of these complications.  She understands all   the above and does wish to proceed.  We will make arrangements.        ______________________________  MD BENNY Jackson/ROMA    DD:  08/28/2024 10:32  DT:  08/28/2024 12:08    Job#:  [id number]

## 2024-08-28 NOTE — DISCHARGE INSTRUCTIONS
ACTIVITY: Rest and take it easy for the first 24 hours.  A responsible adult is recommended to remain with you during that time.  It is normal to feel sleepy.  We encourage you to not do anything that requires balance, judgment or coordination.    MILD FLU-LIKE SYMPTOMS ARE NORMAL. YOU MAY EXPERIENCE GENERALIZED MUSCLE ACHES, THROAT IRRITATION, HEADACHE AND/OR SOME NAUSEA.    FOR 24 HOURS DO NOT:  Drive, operate machinery or run household appliances.  Drink beer or alcoholic beverages.   Make important decisions or sign legal documents.    DIET: To avoid nausea, slowly advance diet as tolerated, avoiding spicy or greasy foods for the first day.  Add more substantial food to your diet according to your physician's instructions.  Babies can be fed formula or breast milk as soon as they are hungry.  INCREASE FLUIDS AND FIBER TO AVOID CONSTIPATION.    FOLLOW-UP APPOINTMENT:  A follow-up appointment should be arranged with your doctor; call to schedule.    You should CALL YOUR PHYSICIAN if you develop:  Fever greater than 101 degrees F.  Pain not relieved by medication, or persistent nausea or vomiting.  Excessive bleeding (blood soaking through dressing) or unexpected drainage from the wound.  Extreme redness or swelling around the incision site, drainage of pus or foul smelling drainage.  Inability to urinate or empty your bladder within 8 hours.  Problems with breathing or chest pain.    You should call 911 if you develop problems with breathing or chest pain.  If you are unable to contact your doctor or surgical center, you should go to the nearest emergency room or urgent care center.  Physician's telephone #: [phone]    If any questions arise, call your doctor.  If your doctor is not available, please feel free to call the Surgical Center at [phone].     A registered nurse may call you a few days after your surgery to see how you are doing after your procedure.    MEDICATIONS: Resume taking daily  medication.  Take prescribed pain medication with food.  If no medication is prescribed, you may take non-aspirin pain medication if needed.  PAIN MEDICATION CAN BE VERY CONSTIPATING.  Take a stool softener or laxative such as senokot, pericolace, or milk of magnesia if needed.    Last pain medication given at ________________________.    If your physician has prescribed pain medication that includes Acetaminophen (Tylenol), do not take additional Acetaminophen (Tylenol) while taking the prescribed medication.

## 2024-08-28 NOTE — OR NURSING
1626: Care resumed of pt verbalizing pain control.   1635: Dozing.  1645: SpO2 drops to 80's on RA when asleep. Roused.Given IS and instructed on use with goal of 2500cc, pt currently inhaling volumes of approx 1500cc.  1650: States pain controlled  1700: c/o urge to void, no further drop in SpO2, pt agrees to plan of transferring to Stage 2 via bathroom to allow for void.      1705: No change in surgical site assessment.Meets criteria to transfer to Stage 2

## 2024-08-28 NOTE — ANESTHESIA PREPROCEDURE EVALUATION
Case: [id number] Date/Time: 08/28/24 1606    Procedure: CHOLECYSTECTOMY, LAPAROSCOPIC    Location: [facility] OR 03 / SURGERY [facility]    Surgeons: Charles Rodas M.D.            Relevant Problems   NEURO   (positive) Migraine with aura, not intractable      CARDIAC   (positive) Migraine with aura, not intractable      ENDO   (positive) Postsurgical hypothyroidism       Physical Exam    Airway   Mallampati: I  TM distance: >3 FB  Neck ROM: full       Cardiovascular   Rhythm: regular  Rate: normal     Dental - normal exam           Pulmonary - normal exam     Abdominal   (-) obese  Abdomen: tender     Neurological - normal exam                   Anesthesia Plan    ASA 2- EMERGENT   ASA physical status emergent criteria: acutely contaminated wound or identified infection source    Plan - general       Airway plan will be ETT          Induction: intravenous    Postoperative Plan: Postoperative administration of opioids is intended.    Pertinent diagnostic labs and testing reviewed    Informed Consent:    Anesthetic plan and risks discussed with patient.    Use of blood products discussed with: patient whom consented to blood products.

## 2024-08-28 NOTE — ED NOTES
Previous paper charting due to system down time. Pt restull, call light in reach, npo since 2100 . Aware of pending OR

## 2024-08-28 NOTE — OR NURSING
1550: Report from Chen ROBERTO.     1600: Patient reports pain when coughing, declines pain medication. Mom updated via phone.     1605: Patient crying, requesting pain medication for 8/10 pain, see MAR.    1626: Report to Chen ROBERTO.

## 2024-08-28 NOTE — OR NURSING
1527: To PACU from OR via gurney, sleeping, respirations spontaneous and non-labored via OPA. Abdo soft, rounded. Laps sites x 4 w/o dressings or drainage.  1541: no change  1544: Rouses, coughs out OPA, return to sleep w/o responding verbally.  1549: rouses to name, denies pain, O2 d/samanta  1550: Report to Carol ROBERTO

## 2024-08-28 NOTE — ANESTHESIA PROCEDURE NOTES
Airway    Date/Time: 8/28/2024 2:38 PM    Performed by: Roger Vigil M.D.  Authorized by: Roger Vigil M.D.    Location:  OR  Urgency:  Elective  Difficult Airway: No    Indications for Airway Management:  Anesthesia      Spontaneous Ventilation: absent    Sedation Level:  Deep  Preoxygenated: Yes    Patient Position:  Sniffing  Mask Difficulty Assessment:  0 - not attempted  Final Airway Type:  Endotracheal airway  Final Endotracheal Airway:  ETT  Cuffed: Yes    Technique Used for Successful ETT Placement:  Direct laryngoscopy  Devices/Methods Used in Placement:  Intubating stylet    Insertion Site:  Oral  Blade Type:  Edith  Laryngoscope Blade/Videolaryngoscope Blade Size:  3  ETT Size (mm):  6.5  Measured from:  Teeth  ETT to Teeth (cm):  22  Placement Verified by: auscultation and capnometry    Cormack-Lehane Classification:  Grade IIa - partial view of glottis  Number of Attempts at Approach:  1

## 2024-08-28 NOTE — ED TRIAGE NOTES
"Chief Complaint   Patient presents with    Nausea/Vomiting/Diarrhea     /77   Pulse 83   Resp (!) 22   Ht 1.6 m (5' 3\")   Wt 72.8 kg (160 lb 7.9 oz)   LMP 07/10/2024   SpO2 99%   BMI 28.43 kg/m²       "

## 2024-08-28 NOTE — ED PROVIDER NOTES
ER Provider Note    Scribed for Kain Ta Ii, M.d. by Tess Biggs. 8/28/2024  2:32 AM    Primary Care Provider: Roel Padilla M.D.    CHIEF COMPLAINT   Chief Complaint   Patient presents with    Nausea/Vomiting/Diarrhea     EXTERNAL RECORDS REVIEWED  The patient was seen here on 7/02 of this year for bilateral flank pain. The patient denied any history of diabetes, but at that time, the patient had 500 mg/dL of glucose in her urine. No other evidence of infection was found.     HPI/ROS  LIMITATION TO HISTORY   Select: : None  OUTSIDE HISTORIAN(S):  None.    Ivis Klein is a 28 y.o. female who presents to the ED for evaluation of nausea, vomiting and diarrhea onset around 11 PM tonight. The patient states that she has had vomiting and diarrhea since 11 PM tonight, as well as upper abdominal pain. No alleviating or exacerbating factors noted. The patient does mention that she ate a [employer] hotdog prior to her pan starting.     PAST MEDICAL HISTORY  Past Medical History:   Diagnosis Date    Anemia 02/17/2022    Anxiety 02/16/2017    Anxiety 02/16/2017    Elevated antinuclear antibody (DEJON) level 05/23/2019    Graves disease 12/05/2016    Heart valve disease     Hemorrhagic cysts of both ovaries 05/23/2019    History of panic attack 04/09/2020    History of pericarditis 12/05/2016    History of thyroidectomy 01/31/2020    Partial --> hypothyroidism     Panic attack 03/11/2019    Postpartum depression 02/17/2022    Postpartum depression 02/17/2022    Vaginal discharge 12/02/2021     SURGICAL HISTORY  Past Surgical History:   Procedure Laterality Date    JUNIE BY LAPAROSCOPY N/A 8/28/2024    Procedure: CHOLECYSTECTOMY, LAPAROSCOPIC;  Surgeon: Charles Rodas M.D.;  Location: SURGERY [facility];  Service: General    THYROIDECTOMY TOTAL Bilateral 3/22/2017    Procedure: THYROIDECTOMY TOTAL -NIMS RECURRENT LARYNGEAL NERVE MONITORING;  Surgeon: Vicente Eldridge M.D.;  Location:  "SURGERY SAME DAY [facility];  Service:     PRIMARY C SECTION  9/8/2013    Performed by Melisa Wright M.D. at LABOR AND DELIVERY     FAMILY HISTORY  Family History   Problem Relation Age of Onset    Cancer Paternal Grandmother 56        breast cancer    No Known Problems Mother     Hyperlipidemia Father     No Known Problems Sister     No Known Problems Brother      SOCIAL HISTORY   reports that she has never smoked. She has never used smokeless tobacco. She reports current alcohol use. She reports current drug use. Drugs: Marijuana and Inhaled.    CURRENT MEDICATIONS  Discharge Medication List as of 8/28/2024  3:41 PM        CONTINUE these medications which have NOT CHANGED    Details   levothyroxine (SYNTHROID) 200 MCG Tab Take 1 Tablet by mouth every morning on an empty stomach., Disp-90 Tablet, R-1, Normal      ondansetron (ZOFRAN ODT) 4 MG TABLET DISPERSIBLE Take 1 Tablet by mouth every 6 hours as needed for Nausea/Vomiting., Disp-10 Tablet, R-0, Normal      acetaminophen (TYLENOL) 500 MG Tab Take 1,000 mg by mouth every 6 hours as needed for Mild Pain., Historical Med      levonorgestrel (MIRENA, 52 MG,) 20 MCG/DAY IUD 1 Each by Intrauterine route one time. Every 5 years, last placed 2020, Historical Med           ALLERGIES  Patient has no known allergies.      PHYSICAL EXAM  /77   Pulse 83   Temp 36 °C (96.8 °F) (Temporal)   Resp (!) 22   Ht 1.6 m (5' 3\")   Wt 72.8 kg (160 lb 7.9 oz)   LMP 07/10/2024   SpO2 99%   BMI 28.43 kg/m²     Physical Exam  Vitals and nursing note reviewed.   Constitutional:       Appearance: Normal appearance.   HENT:      Head: Normocephalic.   Eyes:      Pupils: Pupils are equal, round, and reactive to light.   Cardiovascular:      Rate and Rhythm: Normal rate and regular rhythm.   Abdominal:      Comments: Upper abdominal tenderness including tenderness at RUQ   Neurological:      Mental Status: She is alert.          DIAGNOSTIC STUDIES    LABS  Results for " orders placed or performed during the hospital encounter of 08/28/24   Comp Metabolic Panel   Result Value Ref Range    Sodium 136 135 - 145 mmol/L    Potassium 3.7 3.6 - 5.5 mmol/L    Chloride 101 96 - 112 mmol/L    Co2 20 20 - 33 mmol/L    Anion Gap 15.0 7.0 - 16.0    Glucose 169 (H) 65 - 99 mg/dL    Bun 12 8 - 22 mg/dL    Creatinine 0.62 0.50 - 1.40 mg/dL    Calcium 9.1 8.4 - 10.2 mg/dL    Correct Calcium 8.8 8.5 - 10.5 mg/dL    AST(SGOT) 15 12 - 45 U/L    ALT(SGPT) 10 2 - 50 U/L    Alkaline Phosphatase 74 30 - 99 U/L    Total Bilirubin 0.3 0.1 - 1.5 mg/dL    Albumin 4.4 3.2 - 4.9 g/dL    Total Protein 7.9 6.0 - 8.2 g/dL    Globulin 3.5 1.9 - 3.5 g/dL    A-G Ratio 1.3 g/dL   LIPASE   Result Value Ref Range    Lipase 24 11 - 82 U/L   HCG QUAL SERUM   Result Value Ref Range    Beta-Hcg Qualitative Serum Negative Negative   CBC WITH DIFFERENTIAL   Result Value Ref Range    WBC 10.9 (H) 4.8 - 10.8 K/uL    RBC 4.76 4.20 - 5.40 M/uL    Hemoglobin 11.6 (L) 12.0 - 16.0 g/dL    Hematocrit 37.6 37.0 - 47.0 %    MCV 79.0 (L) 81.4 - 97.8 fL    MCH 24.4 (L) 27.0 - 33.0 pg    MCHC 30.9 (L) 32.2 - 35.5 g/dL    RDW 44.4 35.9 - 50.0 fL    Platelet Count 283 164 - 446 K/uL    MPV 11.3 9.0 - 12.9 fL    Neutrophils-Polys 80.50 (H) 44.00 - 72.00 %    Lymphocytes 12.90 (L) 22.00 - 41.00 %    Monocytes 5.10 0.00 - 13.40 %    Eosinophils 0.60 0.00 - 6.90 %    Basophils 0.50 0.00 - 1.80 %    Immature Granulocytes 0.40 0.00 - 0.90 %    Nucleated RBC 0.00 0.00 - 0.20 /100 WBC    Neutrophils (Absolute) 8.77 (H) 1.82 - 7.42 K/uL    Lymphs (Absolute) 1.41 1.00 - 4.80 K/uL    Monos (Absolute) 0.55 0.00 - 0.85 K/uL    Eos (Absolute) 0.07 0.00 - 0.51 K/uL    Baso (Absolute) 0.05 0.00 - 0.12 K/uL    Immature Granulocytes (abs) 0.04 0.00 - 0.11 K/uL    NRBC (Absolute) 0.00 K/uL   URINALYSIS,CULTURE IF INDICATED   Result Value Ref Range    Color Yellow     Character Clear     Specific Gravity 1.015 <1.035    Ph 7.5 5.0 - 8.0    Glucose 500 (A)  Negative mg/dL    Ketones 15 (A) Negative mg/dL    Protein Negative Negative mg/dL    Bilirubin Negative Negative    Nitrite Negative Negative    Leukocyte Esterase Negative Negative    Occult Blood Negative Negative    Micro Urine Req see below    ESTIMATED GFR   Result Value Ref Range    GFR (CKD-EPI) 125 >60 mL/min/1.73 m 2   Histology Request   Result Value Ref Range    Pathology Request Sent to Histo    POCT glucose device results   Result Value Ref Range    POC Glucose, Blood 158 (H) 65 - 99 mg/dL          COURSE & MEDICAL DECISION MAKING     ASSESSMENT, COURSE AND PLAN  Care Narrative: This is an emergency department evaluation of a 28 year old woman who presents with upper abdominal pain, vomiting, and diarrhea. She at a [employer] hot dog 1 hour prior to onset.  Vitals normal but she has an increased respiratory rate. Glucose not significantly elevated, unlikely DKA.  Possible biliary colic vs pancreatitis vs gastritis vs viral GI syndrome.  Will be treated with IVF (unable to tolerate PO), zofran, and morphine.    05:30  Symptoms improving. Labs within acceptable limits. Not pregnant. US RUQ done. We have been on downtime for past few hours. US does have gallstones with borderline wall thickening, no pericholecystic fluid, no CBD dilation. Awaiting formal read. She was given toradol for remaining pain. Anticipate discharge with referral to general surgery.     7:30 AM  Read shows stones with thickened gb wall. Her pain is now returning. She has guarding at RUQ with palpation. Surgery paged to evaluate for intractable pain, possible cholecystitis. Awaiting call back.         PROBLEM LIST  #Cholecystitis secondary to gallstones   -To surgery with Dr. Rodas for cholecystectomy    DISPOSITION AND DISCUSSIONS  I have discussed management of the patient with the following physicians and JUSTIN's:  Clark (surgery)      Barriers to care at this time, including but not limited to:  None known .       FINAL DIANGOSIS  1.  Acute cholecystitis Active   2. Gallstones    3. Post-operative pain       I, Tess Biggs (Scribe), am scribing for, and in the presence of, DIPTI Schilling II.    Electronically signed by: Tess Biggs (Scribe), 8/28/2024    IKain II, M* personally performed the services described in this documentation, as scribed by Tess Biggs in my presence, and it is both accurate and complete.      The note accurately reflects work and decisions made by me.  Kain Ta II, M.D.  8/29/2024  12:24 AM

## 2024-08-29 ASSESSMENT — PAIN SCALES - GENERAL: PAIN_LEVEL: 4

## 2024-08-29 NOTE — OR NURSING
1706: Patient arrived to phase 2. Patient changed out of surgical gown into clothes. Patient is A&Ox4. Patient reports tolerable pain and no nausea. Vital signs taken and patient assessed. Asked the patient if they had to use the bathroom.    1740: Patient assisted up to the bathroom. Complains of feeling light headed and dizzy, VSS are stable. Will give patient more time to wake up.     1837: IV removed and discharge instructions read. All questions answered. Discharged to care of responsible adult via wheelchair by CNA.

## 2024-08-29 NOTE — ANESTHESIA POSTPROCEDURE EVALUATION
Patient: Ivis Klein    Procedure Summary       Date: 08/28/24 Room / Location:  OR 03 / SURGERY [facility]    Anesthesia Start: 1433 Anesthesia Stop: 1533    Procedure: CHOLECYSTECTOMY, LAPAROSCOPIC (Abdomen) Diagnosis: (acute cholecystitis)    Surgeons: Charles Rodas M.D. Responsible Provider: Roger Vigil M.D.    Anesthesia Type: general ASA Status: 2 - Emergent            Final Anesthesia Type: general  Last vitals  BP   Blood Pressure: 134/80, NIBP: 108/65    Temp   36.6 °C (97.9 °F)    Pulse   68   Resp   14    SpO2   100 %      Anesthesia Post Evaluation    Patient location during evaluation: PACU  Patient participation: complete - patient participated  Level of consciousness: awake  Pain score: 4    Airway patency: patent  Anesthetic complications: no  Cardiovascular status: adequate  Respiratory status: acceptable  Hydration status: stable    PONV: controlled          There were no known notable events for this encounter.     Nurse Pain Score: 7 (NPRS)

## 2024-08-30 NOTE — OP REPORT
Surgeon: Charles Rodas MD   Preoperative diagnosis: Acute cholecystitis   Postop diagnosis: Acute cholecystitis   Procedure: Laparoscopic cholecystectomy   Anesthesia: General endotracheal anesthesia   Anesthesiologist: Roger Vigil MD  Indications: 28-year-old woman with acute cholecystitis.  A cholecystectomy is indicated.  Narrative: The procedure was discussed in detail with the patient including the laparoscopic approach, the potential for converting to an open procedure, and the associated risk of bleeding, infection, abscess, and hematoma.  I also discussed the risk of postoperative bile leak, common bile duct stricture, common bile duct transection, and the significance of these complications. The patient understood all of the above and wished to proceed. The patient was placed under anesthesia by Dr. Vigil.  The patient's abdomen was prepped with chlorhexidine prep and sterile drapes. After a time out an infraumbilical incision was made. Through this incision the peritoneal cavity was entered using the open Hason entry technique. pneumoperitoneum was achieved with co2 insufflation to a pressure of 15 mmhg mercury. under direct visualization a 12 millimeters subxiphoid and two 5mm subcostal ports were placed.  The gallbladder was identified and retracted superiorly and laterally. multiple adhesions were noted and these were carefully taken down.  The base of the gallbladder was carefully dissected.  The cystic duct was identified and could be seen to clearly exit the gallbladder and retract laterally along the gallbladder. In  a similar fashion the cystic artery was identified and dissected away from surrounding connective tissue.  A critical view was obtained. subsequent to this 3 clips were placed proximally on the duct and 1 distally and it was divided with scissors. Similarly, the cystic artery was clipped twice proximally and one distally and divided sharply with scissors. The gallbladder was then  dissected off the liver bed and placed in a bag retrieval device. Hemostasis was assured with cautery. There was no evidence of bleeding or bile leak. Ports removed and the pneumoperitoneum was released. The infraumbilical fascia was approximated with an 0 Vicryl stitch. The subcutaneous tissue was irrigated and the skin on all incisions was approximatedwith 4.0 vicryl  stitches. Dermabond was placed. The patient tolerated procedure well without apparent complication. Final counts were reported as correct.

## 2024-10-07 DIAGNOSIS — E03.9 HYPOTHYROIDISM, UNSPECIFIED TYPE: ICD-10-CM

## 2024-10-07 RX ORDER — LEVOTHYROXINE SODIUM 200 UG/1
175 TABLET ORAL
Qty: 15 TABLET | Refills: 0 | Status: SHIPPED | OUTPATIENT
Start: 2024-10-07 | End: 2024-10-21

## 2024-10-10 ENCOUNTER — HOSPITAL ENCOUNTER (EMERGENCY)
Facility: MEDICAL CENTER | Age: 29
End: 2024-10-10
Attending: STUDENT IN AN ORGANIZED HEALTH CARE EDUCATION/TRAINING PROGRAM

## 2024-10-10 VITALS
HEIGHT: 63 IN | OXYGEN SATURATION: 98 % | SYSTOLIC BLOOD PRESSURE: 136 MMHG | HEART RATE: 83 BPM | WEIGHT: 147.27 LBS | DIASTOLIC BLOOD PRESSURE: 81 MMHG | RESPIRATION RATE: 16 BRPM | BODY MASS INDEX: 26.09 KG/M2 | TEMPERATURE: 97.7 F

## 2024-10-10 DIAGNOSIS — R81 GLUCOSURIA: ICD-10-CM

## 2024-10-10 DIAGNOSIS — B37.31 VULVOVAGINAL CANDIDIASIS: Primary | ICD-10-CM

## 2024-10-10 LAB
APPEARANCE UR: CLEAR
BACTERIA #/AREA URNS HPF: NEGATIVE /HPF
BILIRUB UR QL STRIP.AUTO: NEGATIVE
COLOR UR: YELLOW
EPI CELLS #/AREA URNS HPF: NEGATIVE /HPF
GLUCOSE UR STRIP.AUTO-MCNC: >=1000 MG/DL
KETONES UR STRIP.AUTO-MCNC: 40 MG/DL
LEUKOCYTE ESTERASE UR QL STRIP.AUTO: NEGATIVE
MICRO URNS: ABNORMAL
NITRITE UR QL STRIP.AUTO: NEGATIVE
PH UR STRIP.AUTO: 6 [PH] (ref 5–8)
PROT UR QL STRIP: NEGATIVE MG/DL
RBC # URNS HPF: ABNORMAL /HPF
RBC UR QL AUTO: ABNORMAL
SP GR UR STRIP.AUTO: <=1.005
WBC #/AREA URNS HPF: ABNORMAL /HPF

## 2024-10-10 PROCEDURE — A9270 NON-COVERED ITEM OR SERVICE: HCPCS | Performed by: STUDENT IN AN ORGANIZED HEALTH CARE EDUCATION/TRAINING PROGRAM

## 2024-10-10 PROCEDURE — 700102 HCHG RX REV CODE 250 W/ 637 OVERRIDE(OP): Performed by: STUDENT IN AN ORGANIZED HEALTH CARE EDUCATION/TRAINING PROGRAM

## 2024-10-10 PROCEDURE — 81001 URINALYSIS AUTO W/SCOPE: CPT

## 2024-10-10 PROCEDURE — 99284 EMERGENCY DEPT VISIT MOD MDM: CPT

## 2024-10-10 RX ORDER — CLOTRIMAZOLE 1 %
1 CREAM (GRAM) TOPICAL 2 TIMES DAILY
Qty: 28 G | Refills: 0 | Status: SHIPPED | OUTPATIENT
Start: 2024-10-10 | End: 2024-11-08

## 2024-10-10 RX ORDER — ACETAMINOPHEN 325 MG/1
650 TABLET ORAL ONCE
Status: COMPLETED | OUTPATIENT
Start: 2024-10-10 | End: 2024-10-10

## 2024-10-10 RX ORDER — FLUCONAZOLE 100 MG/1
150 TABLET ORAL ONCE
Status: COMPLETED | OUTPATIENT
Start: 2024-10-10 | End: 2024-10-10

## 2024-10-10 RX ORDER — IBUPROFEN 600 MG/1
600 TABLET, FILM COATED ORAL ONCE
Status: COMPLETED | OUTPATIENT
Start: 2024-10-10 | End: 2024-10-10

## 2024-10-10 RX ORDER — FLUCONAZOLE 150 MG/1
150 TABLET ORAL DAILY
Qty: 1 TABLET | Refills: 0 | Status: ACTIVE | OUTPATIENT
Start: 2024-10-10 | End: 2024-10-11

## 2024-10-10 RX ADMIN — IBUPROFEN 600 MG: 600 TABLET, FILM COATED ORAL at 20:50

## 2024-10-10 RX ADMIN — ACETAMINOPHEN 650 MG: 325 TABLET ORAL at 20:50

## 2024-10-10 RX ADMIN — FLUCONAZOLE 150 MG: 100 TABLET ORAL at 20:50

## 2024-10-10 ASSESSMENT — FIBROSIS 4 INDEX: FIB4 SCORE: 0.45

## 2024-10-21 DIAGNOSIS — E03.9 HYPOTHYROIDISM, UNSPECIFIED TYPE: ICD-10-CM

## 2024-10-21 RX ORDER — LEVOTHYROXINE SODIUM 200 UG/1
200 TABLET ORAL
Qty: 30 TABLET | Refills: 0 | Status: SHIPPED | OUTPATIENT
Start: 2024-10-21

## 2024-10-28 ENCOUNTER — HOSPITAL ENCOUNTER (EMERGENCY)
Facility: MEDICAL CENTER | Age: 29
End: 2024-10-28
Attending: EMERGENCY MEDICINE
Payer: MEDICAID

## 2024-10-28 VITALS
TEMPERATURE: 98.4 F | DIASTOLIC BLOOD PRESSURE: 89 MMHG | RESPIRATION RATE: 24 BRPM | SYSTOLIC BLOOD PRESSURE: 151 MMHG | HEART RATE: 94 BPM | OXYGEN SATURATION: 100 % | BODY MASS INDEX: 24.33 KG/M2 | HEIGHT: 63 IN | WEIGHT: 137.3 LBS

## 2024-10-28 DIAGNOSIS — R73.9 HYPERGLYCEMIA: ICD-10-CM

## 2024-10-28 DIAGNOSIS — B96.89 BACTERIAL VAGINITIS: ICD-10-CM

## 2024-10-28 DIAGNOSIS — N76.0 BACTERIAL VAGINITIS: ICD-10-CM

## 2024-10-28 LAB
ANION GAP SERPL CALC-SCNC: 17 MMOL/L (ref 7–16)
APPEARANCE UR: CLEAR
BACTERIA GENITAL QL WET PREP: NORMAL
BASOPHILS # BLD AUTO: 1 % (ref 0–1.8)
BASOPHILS # BLD: 0.05 K/UL (ref 0–0.12)
BILIRUB UR QL STRIP.AUTO: NEGATIVE
BUN SERPL-MCNC: 7 MG/DL (ref 8–22)
CALCIUM SERPL-MCNC: 9.6 MG/DL (ref 8.4–10.2)
CHLORIDE SERPL-SCNC: 98 MMOL/L (ref 96–112)
CO2 SERPL-SCNC: 21 MMOL/L (ref 20–33)
COLOR UR: YELLOW
CREAT SERPL-MCNC: 0.66 MG/DL (ref 0.5–1.4)
EOSINOPHIL # BLD AUTO: 0.04 K/UL (ref 0–0.51)
EOSINOPHIL NFR BLD: 0.8 % (ref 0–6.9)
ERYTHROCYTE [DISTWIDTH] IN BLOOD BY AUTOMATED COUNT: 46.9 FL (ref 35.9–50)
GFR SERPLBLD CREATININE-BSD FMLA CKD-EPI: 123 ML/MIN/1.73 M 2
GLUCOSE SERPL-MCNC: 266 MG/DL (ref 65–99)
GLUCOSE UR STRIP.AUTO-MCNC: 500 MG/DL
HCG SERPL QL: NEGATIVE
HCT VFR BLD AUTO: 42 % (ref 37–47)
HGB BLD-MCNC: 13.1 G/DL (ref 12–16)
HIV 1+2 AB+HIV1 P24 AG SERPL QL IA: NORMAL
IMM GRANULOCYTES # BLD AUTO: 0.01 K/UL (ref 0–0.11)
IMM GRANULOCYTES NFR BLD AUTO: 0.2 % (ref 0–0.9)
KETONES UR STRIP.AUTO-MCNC: >=80 MG/DL
LEUKOCYTE ESTERASE UR QL STRIP.AUTO: NEGATIVE
LYMPHOCYTES # BLD AUTO: 2.11 K/UL (ref 1–4.8)
LYMPHOCYTES NFR BLD: 41.5 % (ref 22–41)
MCH RBC QN AUTO: 24.7 PG (ref 27–33)
MCHC RBC AUTO-ENTMCNC: 31.2 G/DL (ref 32.2–35.5)
MCV RBC AUTO: 79.2 FL (ref 81.4–97.8)
MICRO URNS: ABNORMAL
MONOCYTES # BLD AUTO: 0.44 K/UL (ref 0–0.85)
MONOCYTES NFR BLD AUTO: 8.6 % (ref 0–13.4)
NEUTROPHILS # BLD AUTO: 2.44 K/UL (ref 1.82–7.42)
NEUTROPHILS NFR BLD: 47.9 % (ref 44–72)
NITRITE UR QL STRIP.AUTO: NEGATIVE
NRBC # BLD AUTO: 0 K/UL
NRBC BLD-RTO: 0 /100 WBC (ref 0–0.2)
PH UR STRIP.AUTO: 5.5 [PH] (ref 5–8)
PLATELET # BLD AUTO: 259 K/UL (ref 164–446)
PMV BLD AUTO: 11.9 FL (ref 9–12.9)
POTASSIUM SERPL-SCNC: 3.4 MMOL/L (ref 3.6–5.5)
PROT UR QL STRIP: NEGATIVE MG/DL
RBC # BLD AUTO: 5.3 M/UL (ref 4.2–5.4)
RBC UR QL AUTO: NEGATIVE
SIGNIFICANT IND 70042: NORMAL
SITE SITE: NORMAL
SODIUM SERPL-SCNC: 136 MMOL/L (ref 135–145)
SOURCE SOURCE: NORMAL
SP GR UR STRIP.AUTO: 1.02
T PALLIDUM AB SER QL IA: NORMAL
WBC # BLD AUTO: 5.1 K/UL (ref 4.8–10.8)

## 2024-10-28 PROCEDURE — 81003 URINALYSIS AUTO W/O SCOPE: CPT

## 2024-10-28 PROCEDURE — 85025 COMPLETE CBC W/AUTO DIFF WBC: CPT

## 2024-10-28 PROCEDURE — 87491 CHLMYD TRACH DNA AMP PROBE: CPT

## 2024-10-28 PROCEDURE — 84703 CHORIONIC GONADOTROPIN ASSAY: CPT

## 2024-10-28 PROCEDURE — 87389 HIV-1 AG W/HIV-1&-2 AB AG IA: CPT

## 2024-10-28 PROCEDURE — 36415 COLL VENOUS BLD VENIPUNCTURE: CPT

## 2024-10-28 PROCEDURE — 87591 N.GONORRHOEAE DNA AMP PROB: CPT

## 2024-10-28 PROCEDURE — 86780 TREPONEMA PALLIDUM: CPT

## 2024-10-28 PROCEDURE — 80048 BASIC METABOLIC PNL TOTAL CA: CPT

## 2024-10-28 PROCEDURE — 99284 EMERGENCY DEPT VISIT MOD MDM: CPT

## 2024-10-28 RX ORDER — METRONIDAZOLE 500 MG/1
500 TABLET ORAL 2 TIMES DAILY
Qty: 14 TABLET | Refills: 0 | Status: ACTIVE | OUTPATIENT
Start: 2024-10-28 | End: 2024-11-04

## 2024-10-28 RX ORDER — FLUCONAZOLE 150 MG/1
150 TABLET ORAL
Status: SHIPPED | COMMUNITY
End: 2024-11-01

## 2024-10-28 ASSESSMENT — FIBROSIS 4 INDEX: FIB4 SCORE: 0.45

## 2024-10-30 LAB
C TRACH DNA GENITAL QL NAA+PROBE: NEGATIVE
N GONORRHOEA DNA GENITAL QL NAA+PROBE: NEGATIVE
SPECIMEN SOURCE: NORMAL

## 2024-11-01 ENCOUNTER — APPOINTMENT (OUTPATIENT)
Dept: RADIOLOGY | Facility: MEDICAL CENTER | Age: 29
DRG: 638 | End: 2024-11-01
Attending: STUDENT IN AN ORGANIZED HEALTH CARE EDUCATION/TRAINING PROGRAM
Payer: MEDICAID

## 2024-11-01 ENCOUNTER — HOSPITAL ENCOUNTER (INPATIENT)
Facility: MEDICAL CENTER | Age: 29
LOS: 3 days | DRG: 638 | End: 2024-11-04
Attending: EMERGENCY MEDICINE | Admitting: STUDENT IN AN ORGANIZED HEALTH CARE EDUCATION/TRAINING PROGRAM
Payer: MEDICAID

## 2024-11-01 DIAGNOSIS — I30.9 ACUTE PERICARDITIS, UNSPECIFIED TYPE: ICD-10-CM

## 2024-11-01 DIAGNOSIS — E11.10 DIABETIC KETOACIDOSIS WITHOUT COMA ASSOCIATED WITH TYPE 2 DIABETES MELLITUS (HCC): ICD-10-CM

## 2024-11-01 DIAGNOSIS — N76.0 BACTERIAL VAGINOSIS: ICD-10-CM

## 2024-11-01 DIAGNOSIS — E10.10 DKA, TYPE 1, NOT AT GOAL (HCC): ICD-10-CM

## 2024-11-01 DIAGNOSIS — B96.89 BACTERIAL VAGINOSIS: ICD-10-CM

## 2024-11-01 DIAGNOSIS — B37.31 VULVOVAGINAL CANDIDIASIS: ICD-10-CM

## 2024-11-01 PROBLEM — R07.9 CHEST PAIN: Status: ACTIVE | Noted: 2024-11-01

## 2024-11-01 LAB
ALBUMIN SERPL BCP-MCNC: 4.6 G/DL (ref 3.2–4.9)
ALBUMIN/GLOB SERPL: 1.3 G/DL
ALP SERPL-CCNC: 101 U/L (ref 30–99)
ALT SERPL-CCNC: 13 U/L (ref 2–50)
ANION GAP SERPL CALC-SCNC: 20 MMOL/L (ref 7–16)
ANION GAP SERPL CALC-SCNC: 22 MMOL/L (ref 7–16)
ANION GAP SERPL CALC-SCNC: 24 MMOL/L (ref 7–16)
APPEARANCE UR: CLEAR
AST SERPL-CCNC: 15 U/L (ref 12–45)
B-OH-BUTYR SERPL-MCNC: 6.6 MMOL/L (ref 0.02–0.27)
BACTERIA #/AREA URNS HPF: ABNORMAL /HPF
BASE EXCESS BLDV CALC-SCNC: -11 MMOL/L
BASOPHILS # BLD AUTO: 0.6 % (ref 0–1.8)
BASOPHILS # BLD: 0.05 K/UL (ref 0–0.12)
BILIRUB SERPL-MCNC: 0.6 MG/DL (ref 0.1–1.5)
BILIRUB UR QL STRIP.AUTO: NEGATIVE
BODY TEMPERATURE: 36.8 CENTIGRADE
BUN SERPL-MCNC: 12 MG/DL (ref 8–22)
BUN SERPL-MCNC: 6 MG/DL (ref 8–22)
BUN SERPL-MCNC: 9 MG/DL (ref 8–22)
CALCIUM ALBUM COR SERPL-MCNC: 8.8 MG/DL (ref 8.5–10.5)
CALCIUM SERPL-MCNC: 7.1 MG/DL (ref 8.4–10.2)
CALCIUM SERPL-MCNC: 7.3 MG/DL (ref 8.4–10.2)
CALCIUM SERPL-MCNC: 9.3 MG/DL (ref 8.4–10.2)
CHLORIDE SERPL-SCNC: 101 MMOL/L (ref 96–112)
CHLORIDE SERPL-SCNC: 106 MMOL/L (ref 96–112)
CHLORIDE SERPL-SCNC: 95 MMOL/L (ref 96–112)
CO2 SERPL-SCNC: 10 MMOL/L (ref 20–33)
CO2 SERPL-SCNC: 12 MMOL/L (ref 20–33)
CO2 SERPL-SCNC: 15 MMOL/L (ref 20–33)
COLOR UR: ABNORMAL
CREAT SERPL-MCNC: 0.41 MG/DL (ref 0.5–1.4)
CREAT SERPL-MCNC: 0.5 MG/DL (ref 0.5–1.4)
CREAT SERPL-MCNC: 0.67 MG/DL (ref 0.5–1.4)
CRP SERPL HS-MCNC: <0.3 MG/DL (ref 0–0.75)
EKG IMPRESSION: NORMAL
EOSINOPHIL # BLD AUTO: 0.02 K/UL (ref 0–0.51)
EOSINOPHIL NFR BLD: 0.3 % (ref 0–6.9)
EPI CELLS #/AREA URNS HPF: ABNORMAL /HPF
ERYTHROCYTE [DISTWIDTH] IN BLOOD BY AUTOMATED COUNT: 48.1 FL (ref 35.9–50)
EST. AVERAGE GLUCOSE BLD GHB EST-MCNC: 292 MG/DL
GFR SERPLBLD CREATININE-BSD FMLA CKD-EPI: 123 ML/MIN/1.73 M 2
GFR SERPLBLD CREATININE-BSD FMLA CKD-EPI: 132 ML/MIN/1.73 M 2
GFR SERPLBLD CREATININE-BSD FMLA CKD-EPI: 138 ML/MIN/1.73 M 2
GLOBULIN SER CALC-MCNC: 3.5 G/DL (ref 1.9–3.5)
GLUCOSE BLD STRIP.AUTO-MCNC: 127 MG/DL (ref 65–99)
GLUCOSE BLD STRIP.AUTO-MCNC: 155 MG/DL (ref 65–99)
GLUCOSE BLD STRIP.AUTO-MCNC: 191 MG/DL (ref 65–99)
GLUCOSE BLD STRIP.AUTO-MCNC: 199 MG/DL (ref 65–99)
GLUCOSE BLD STRIP.AUTO-MCNC: 219 MG/DL (ref 65–99)
GLUCOSE SERPL-MCNC: 130 MG/DL (ref 65–99)
GLUCOSE SERPL-MCNC: 182 MG/DL (ref 65–99)
GLUCOSE SERPL-MCNC: 264 MG/DL (ref 65–99)
GLUCOSE UR STRIP.AUTO-MCNC: 500 MG/DL
HBA1C MFR BLD: 11.8 % (ref 4–5.6)
HCG SERPL QL: NEGATIVE
HCO3 BLDV-SCNC: 14 MMOL/L (ref 24–28)
HCT VFR BLD AUTO: 44.5 % (ref 37–47)
HGB BLD-MCNC: 14.1 G/DL (ref 12–16)
IMM GRANULOCYTES # BLD AUTO: 0.04 K/UL (ref 0–0.11)
IMM GRANULOCYTES NFR BLD AUTO: 0.5 % (ref 0–0.9)
INHALED O2 FLOW RATE: 100 L/MIN
KETONES UR STRIP.AUTO-MCNC: >=80 MG/DL
LACTATE SERPL-SCNC: 1.1 MMOL/L (ref 0.5–2)
LEUKOCYTE ESTERASE UR QL STRIP.AUTO: NEGATIVE
LYMPHOCYTES # BLD AUTO: 2 K/UL (ref 1–4.8)
LYMPHOCYTES NFR BLD: 25.8 % (ref 22–41)
MAGNESIUM SERPL-MCNC: 1.5 MG/DL (ref 1.5–2.5)
MCH RBC QN AUTO: 25.3 PG (ref 27–33)
MCHC RBC AUTO-ENTMCNC: 31.7 G/DL (ref 32.2–35.5)
MCV RBC AUTO: 79.9 FL (ref 81.4–97.8)
MICRO URNS: ABNORMAL
MONOCYTES # BLD AUTO: 0.52 K/UL (ref 0–0.85)
MONOCYTES NFR BLD AUTO: 6.7 % (ref 0–13.4)
MUCOUS THREADS #/AREA URNS HPF: ABNORMAL /HPF
NEUTROPHILS # BLD AUTO: 5.12 K/UL (ref 1.82–7.42)
NEUTROPHILS NFR BLD: 66.1 % (ref 44–72)
NITRITE UR QL STRIP.AUTO: NEGATIVE
NRBC # BLD AUTO: 0 K/UL
NRBC BLD-RTO: 0 /100 WBC (ref 0–0.2)
NT-PROBNP SERPL IA-MCNC: <36 PG/ML (ref 0–125)
PCO2 BLDV: 30.6 MMHG (ref 41–51)
PCO2 TEMP ADJ BLDV: 30.3 MMHG (ref 41–51)
PH BLDV: 7.29 [PH] (ref 7.31–7.45)
PH TEMP ADJ BLDV: 7.29 [PH] (ref 7.31–7.45)
PH UR STRIP.AUTO: 5.5 [PH] (ref 5–8)
PHOSPHATE SERPL-MCNC: 1.7 MG/DL (ref 2.5–4.5)
PLATELET # BLD AUTO: 258 K/UL (ref 164–446)
PMV BLD AUTO: 12.3 FL (ref 9–12.9)
PO2 BLDV: 31.5 MMHG (ref 25–40)
PO2 TEMP ADJ BLDV: 31.1 MMHG (ref 25–40)
POTASSIUM SERPL-SCNC: 3.6 MMOL/L (ref 3.6–5.5)
POTASSIUM SERPL-SCNC: 3.7 MMOL/L (ref 3.6–5.5)
POTASSIUM SERPL-SCNC: 3.7 MMOL/L (ref 3.6–5.5)
PROCALCITONIN SERPL-MCNC: 0.03 NG/ML
PROT SERPL-MCNC: 8.1 G/DL (ref 6–8.2)
PROT UR QL STRIP: NEGATIVE MG/DL
RBC # BLD AUTO: 5.57 M/UL (ref 4.2–5.4)
RBC # URNS HPF: ABNORMAL /HPF
RBC UR QL AUTO: ABNORMAL
SAO2 % BLDV: 58.2 %
SODIUM SERPL-SCNC: 133 MMOL/L (ref 135–145)
SODIUM SERPL-SCNC: 134 MMOL/L (ref 135–145)
SODIUM SERPL-SCNC: 138 MMOL/L (ref 135–145)
SP GR UR STRIP.AUTO: >=1.03
TROPONIN T SERPL-MCNC: <6 NG/L (ref 6–19)
TSH SERPL DL<=0.005 MIU/L-ACNC: 4.63 UIU/ML (ref 0.38–5.33)
WBC # BLD AUTO: 7.8 K/UL (ref 4.8–10.8)
WBC #/AREA URNS HPF: ABNORMAL /HPF

## 2024-11-01 PROCEDURE — 86665 EPSTEIN-BARR CAPSID VCA: CPT | Mod: 91

## 2024-11-01 PROCEDURE — 86644 CMV ANTIBODY: CPT

## 2024-11-01 PROCEDURE — 84703 CHORIONIC GONADOTROPIN ASSAY: CPT

## 2024-11-01 PROCEDURE — 86664 EPSTEIN-BARR NUCLEAR ANTIGEN: CPT

## 2024-11-01 PROCEDURE — 85025 COMPLETE CBC W/AUTO DIFF WBC: CPT

## 2024-11-01 PROCEDURE — 84443 ASSAY THYROID STIM HORMONE: CPT

## 2024-11-01 PROCEDURE — 770022 HCHG ROOM/CARE - ICU (200)

## 2024-11-01 PROCEDURE — 93005 ELECTROCARDIOGRAM TRACING: CPT | Performed by: EMERGENCY MEDICINE

## 2024-11-01 PROCEDURE — 93005 ELECTROCARDIOGRAM TRACING: CPT

## 2024-11-01 PROCEDURE — 83880 ASSAY OF NATRIURETIC PEPTIDE: CPT

## 2024-11-01 PROCEDURE — 700105 HCHG RX REV CODE 258: Performed by: STUDENT IN AN ORGANIZED HEALTH CARE EDUCATION/TRAINING PROGRAM

## 2024-11-01 PROCEDURE — 83735 ASSAY OF MAGNESIUM: CPT

## 2024-11-01 PROCEDURE — 87389 HIV-1 AG W/HIV-1&-2 AB AG IA: CPT

## 2024-11-01 PROCEDURE — 02H633Z INSERTION OF INFUSION DEVICE INTO RIGHT ATRIUM, PERCUTANEOUS APPROACH: ICD-10-PCS | Performed by: STUDENT IN AN ORGANIZED HEALTH CARE EDUCATION/TRAINING PROGRAM

## 2024-11-01 PROCEDURE — 96374 THER/PROPH/DIAG INJ IV PUSH: CPT

## 2024-11-01 PROCEDURE — 36415 COLL VENOUS BLD VENIPUNCTURE: CPT

## 2024-11-01 PROCEDURE — 700111 HCHG RX REV CODE 636 W/ 250 OVERRIDE (IP): Performed by: EMERGENCY MEDICINE

## 2024-11-01 PROCEDURE — 82010 KETONE BODYS QUAN: CPT

## 2024-11-01 PROCEDURE — 87799 DETECT AGENT NOS DNA QUANT: CPT

## 2024-11-01 PROCEDURE — 99291 CRITICAL CARE FIRST HOUR: CPT | Mod: 25 | Performed by: STUDENT IN AN ORGANIZED HEALTH CARE EDUCATION/TRAINING PROGRAM

## 2024-11-01 PROCEDURE — 84484 ASSAY OF TROPONIN QUANT: CPT

## 2024-11-01 PROCEDURE — 700102 HCHG RX REV CODE 250 W/ 637 OVERRIDE(OP): Performed by: STUDENT IN AN ORGANIZED HEALTH CARE EDUCATION/TRAINING PROGRAM

## 2024-11-01 PROCEDURE — 86658 ENTEROVIRUS ANTIBODY: CPT | Mod: 91

## 2024-11-01 PROCEDURE — 84145 PROCALCITONIN (PCT): CPT

## 2024-11-01 PROCEDURE — 700105 HCHG RX REV CODE 258: Performed by: EMERGENCY MEDICINE

## 2024-11-01 PROCEDURE — 36556 INSERT NON-TUNNEL CV CATH: CPT | Performed by: STUDENT IN AN ORGANIZED HEALTH CARE EDUCATION/TRAINING PROGRAM

## 2024-11-01 PROCEDURE — 86663 EPSTEIN-BARR ANTIBODY: CPT

## 2024-11-01 PROCEDURE — 82962 GLUCOSE BLOOD TEST: CPT

## 2024-11-01 PROCEDURE — 84100 ASSAY OF PHOSPHORUS: CPT

## 2024-11-01 PROCEDURE — 81001 URINALYSIS AUTO W/SCOPE: CPT

## 2024-11-01 PROCEDURE — 83605 ASSAY OF LACTIC ACID: CPT

## 2024-11-01 PROCEDURE — 700111 HCHG RX REV CODE 636 W/ 250 OVERRIDE (IP): Performed by: STUDENT IN AN ORGANIZED HEALTH CARE EDUCATION/TRAINING PROGRAM

## 2024-11-01 PROCEDURE — 99291 CRITICAL CARE FIRST HOUR: CPT

## 2024-11-01 PROCEDURE — 80048 BASIC METABOLIC PNL TOTAL CA: CPT

## 2024-11-01 PROCEDURE — 80053 COMPREHEN METABOLIC PANEL: CPT

## 2024-11-01 PROCEDURE — 82803 BLOOD GASES ANY COMBINATION: CPT

## 2024-11-01 PROCEDURE — 83036 HEMOGLOBIN GLYCOSYLATED A1C: CPT

## 2024-11-01 PROCEDURE — 86140 C-REACTIVE PROTEIN: CPT

## 2024-11-01 PROCEDURE — 86645 CMV ANTIBODY IGM: CPT

## 2024-11-01 PROCEDURE — 700111 HCHG RX REV CODE 636 W/ 250 OVERRIDE (IP): Mod: JZ | Performed by: HOSPITALIST

## 2024-11-01 PROCEDURE — A9270 NON-COVERED ITEM OR SERVICE: HCPCS | Performed by: STUDENT IN AN ORGANIZED HEALTH CARE EDUCATION/TRAINING PROGRAM

## 2024-11-01 RX ORDER — CALCIUM CARBONATE 500 MG/1
500 TABLET, CHEWABLE ORAL ONCE
Status: COMPLETED | OUTPATIENT
Start: 2024-11-01 | End: 2024-11-01

## 2024-11-01 RX ORDER — DEXTROSE AND SODIUM CHLORIDE 10; .45 G/100ML; G/100ML
INJECTION, SOLUTION INTRAVENOUS CONTINUOUS
Status: DISCONTINUED | OUTPATIENT
Start: 2024-11-01 | End: 2024-11-02

## 2024-11-01 RX ORDER — SODIUM CHLORIDE 9 MG/ML
1000 INJECTION, SOLUTION INTRAVENOUS ONCE
Status: COMPLETED | OUTPATIENT
Start: 2024-11-01 | End: 2024-11-01

## 2024-11-01 RX ORDER — DEXTROSE MONOHYDRATE 25 G/50ML
25 INJECTION, SOLUTION INTRAVENOUS
Status: DISCONTINUED | OUTPATIENT
Start: 2024-11-01 | End: 2024-11-02

## 2024-11-01 RX ORDER — ONDANSETRON 4 MG/1
4 TABLET, ORALLY DISINTEGRATING ORAL EVERY 4 HOURS PRN
Status: DISCONTINUED | OUTPATIENT
Start: 2024-11-01 | End: 2024-11-04 | Stop reason: HOSPADM

## 2024-11-01 RX ORDER — IBUPROFEN 400 MG/1
800 TABLET, FILM COATED ORAL EVERY 6 HOURS
Status: DISCONTINUED | OUTPATIENT
Start: 2024-11-02 | End: 2024-11-03

## 2024-11-01 RX ORDER — COLCHICINE 0.6 MG/1
0.6 TABLET ORAL DAILY
Status: DISCONTINUED | OUTPATIENT
Start: 2024-11-01 | End: 2024-11-04 | Stop reason: HOSPADM

## 2024-11-01 RX ORDER — MAGNESIUM SULFATE HEPTAHYDRATE 40 MG/ML
2 INJECTION, SOLUTION INTRAVENOUS
Status: COMPLETED | OUTPATIENT
Start: 2024-11-01 | End: 2024-11-01

## 2024-11-01 RX ORDER — ONDANSETRON 2 MG/ML
4 INJECTION INTRAMUSCULAR; INTRAVENOUS EVERY 4 HOURS PRN
Status: DISCONTINUED | OUTPATIENT
Start: 2024-11-01 | End: 2024-11-04 | Stop reason: HOSPADM

## 2024-11-01 RX ORDER — MAGNESIUM SULFATE HEPTAHYDRATE 40 MG/ML
4 INJECTION, SOLUTION INTRAVENOUS
Status: COMPLETED | OUTPATIENT
Start: 2024-11-01 | End: 2024-11-01

## 2024-11-01 RX ORDER — PROCHLORPERAZINE EDISYLATE 5 MG/ML
5-10 INJECTION INTRAMUSCULAR; INTRAVENOUS EVERY 4 HOURS PRN
Status: DISCONTINUED | OUTPATIENT
Start: 2024-11-01 | End: 2024-11-04 | Stop reason: HOSPADM

## 2024-11-01 RX ORDER — SODIUM CHLORIDE, SODIUM LACTATE, POTASSIUM CHLORIDE, AND CALCIUM CHLORIDE .6; .31; .03; .02 G/100ML; G/100ML; G/100ML; G/100ML
2000 INJECTION, SOLUTION INTRAVENOUS ONCE
Status: COMPLETED | OUTPATIENT
Start: 2024-11-01 | End: 2024-11-01

## 2024-11-01 RX ORDER — POTASSIUM CHLORIDE 7.45 MG/ML
10 INJECTION INTRAVENOUS
Status: COMPLETED | OUTPATIENT
Start: 2024-11-01 | End: 2024-11-02

## 2024-11-01 RX ORDER — METRONIDAZOLE 500 MG/1
500 TABLET ORAL EVERY 12 HOURS
Status: DISCONTINUED | OUTPATIENT
Start: 2024-11-01 | End: 2024-11-01

## 2024-11-01 RX ORDER — PROMETHAZINE HYDROCHLORIDE 25 MG/1
12.5-25 TABLET ORAL EVERY 4 HOURS PRN
Status: DISCONTINUED | OUTPATIENT
Start: 2024-11-01 | End: 2024-11-04 | Stop reason: HOSPADM

## 2024-11-01 RX ORDER — PROMETHAZINE HYDROCHLORIDE 25 MG/1
12.5-25 SUPPOSITORY RECTAL EVERY 4 HOURS PRN
Status: DISCONTINUED | OUTPATIENT
Start: 2024-11-01 | End: 2024-11-04 | Stop reason: HOSPADM

## 2024-11-01 RX ORDER — LEVOTHYROXINE SODIUM 100 UG/1
200 TABLET ORAL
Status: DISCONTINUED | OUTPATIENT
Start: 2024-11-02 | End: 2024-11-04 | Stop reason: HOSPADM

## 2024-11-01 RX ORDER — POTASSIUM CHLORIDE 7.45 MG/ML
10 INJECTION INTRAVENOUS
Status: COMPLETED | OUTPATIENT
Start: 2024-11-01 | End: 2024-11-01

## 2024-11-01 RX ORDER — ENOXAPARIN SODIUM 100 MG/ML
40 INJECTION SUBCUTANEOUS DAILY
Status: DISCONTINUED | OUTPATIENT
Start: 2024-11-01 | End: 2024-11-04 | Stop reason: HOSPADM

## 2024-11-01 RX ORDER — SODIUM CHLORIDE 9 MG/ML
INJECTION, SOLUTION INTRAVENOUS CONTINUOUS
Status: DISCONTINUED | OUTPATIENT
Start: 2024-11-01 | End: 2024-11-02

## 2024-11-01 RX ORDER — POTASSIUM CHLORIDE 14.9 MG/ML
20 INJECTION INTRAVENOUS ONCE
Status: CANCELLED | OUTPATIENT
Start: 2024-11-01 | End: 2024-11-01

## 2024-11-01 RX ORDER — METRONIDAZOLE 500 MG/1
500 TABLET ORAL EVERY 12 HOURS
Status: COMPLETED | OUTPATIENT
Start: 2024-11-01 | End: 2024-11-03

## 2024-11-01 RX ORDER — CALCIUM CARBONATE 500 MG/1
500 TABLET, CHEWABLE ORAL DAILY
Status: DISCONTINUED | OUTPATIENT
Start: 2024-11-02 | End: 2024-11-01

## 2024-11-01 RX ADMIN — COLCHICINE 0.6 MG: 0.6 TABLET, FILM COATED ORAL at 18:41

## 2024-11-01 RX ADMIN — METRONIDAZOLE 500 MG: 500 TABLET ORAL at 18:40

## 2024-11-01 RX ADMIN — MAGNESIUM SULFATE HEPTAHYDRATE 2 G: 2 INJECTION, SOLUTION INTRAVENOUS at 20:57

## 2024-11-01 RX ADMIN — POTASSIUM CHLORIDE 10 MEQ: 7.46 INJECTION, SOLUTION INTRAVENOUS at 21:25

## 2024-11-01 RX ADMIN — INSULIN HUMAN 5 UNITS: 1 INJECTION, SOLUTION INTRAVENOUS at 14:05

## 2024-11-01 RX ADMIN — DEXTROSE AND SODIUM CHLORIDE: 10; .45 INJECTION, SOLUTION INTRAVENOUS at 20:49

## 2024-11-01 RX ADMIN — ENOXAPARIN SODIUM 40 MG: 100 INJECTION SUBCUTANEOUS at 18:41

## 2024-11-01 RX ADMIN — ONDANSETRON 4 MG: 2 INJECTION INTRAMUSCULAR; INTRAVENOUS at 20:41

## 2024-11-01 RX ADMIN — FENTANYL CITRATE 25 MCG: 50 INJECTION, SOLUTION INTRAMUSCULAR; INTRAVENOUS at 20:37

## 2024-11-01 RX ADMIN — POTASSIUM CHLORIDE 10 MEQ: 7.46 INJECTION, SOLUTION INTRAVENOUS at 20:24

## 2024-11-01 RX ADMIN — SODIUM CHLORIDE 1000 ML: 9 INJECTION, SOLUTION INTRAVENOUS at 13:16

## 2024-11-01 RX ADMIN — FENTANYL CITRATE 50 MCG: 50 INJECTION, SOLUTION INTRAMUSCULAR; INTRAVENOUS at 23:13

## 2024-11-01 RX ADMIN — FENTANYL CITRATE 50 MCG: 50 INJECTION, SOLUTION INTRAMUSCULAR; INTRAVENOUS at 21:58

## 2024-11-01 RX ADMIN — SODIUM CHLORIDE, POTASSIUM CHLORIDE, SODIUM LACTATE AND CALCIUM CHLORIDE 2000 ML: 600; 310; 30; 20 INJECTION, SOLUTION INTRAVENOUS at 17:45

## 2024-11-01 RX ADMIN — SODIUM CHLORIDE 1000 ML: 9 INJECTION, SOLUTION INTRAVENOUS at 14:05

## 2024-11-01 RX ADMIN — POTASSIUM CHLORIDE 10 MEQ: 7.46 INJECTION, SOLUTION INTRAVENOUS at 22:18

## 2024-11-01 RX ADMIN — INSULIN HUMAN 3 UNITS/HR: 1 INJECTION, SOLUTION INTRAVENOUS at 20:51

## 2024-11-01 RX ADMIN — CALCIUM CARBONATE 500 MG: 500 TABLET, CHEWABLE ORAL at 18:41

## 2024-11-01 RX ADMIN — POTASSIUM CHLORIDE 10 MEQ: 7.46 INJECTION, SOLUTION INTRAVENOUS at 23:20

## 2024-11-01 SDOH — ECONOMIC STABILITY: TRANSPORTATION INSECURITY
IN THE PAST 12 MONTHS, HAS LACK OF RELIABLE TRANSPORTATION KEPT YOU FROM MEDICAL APPOINTMENTS, MEETINGS, WORK OR FROM GETTING THINGS NEEDED FOR DAILY LIVING?: NO

## 2024-11-01 SDOH — ECONOMIC STABILITY: TRANSPORTATION INSECURITY
IN THE PAST 12 MONTHS, HAS THE LACK OF TRANSPORTATION KEPT YOU FROM MEDICAL APPOINTMENTS OR FROM GETTING MEDICATIONS?: NO

## 2024-11-01 ASSESSMENT — SOCIAL DETERMINANTS OF HEALTH (SDOH)
IN THE PAST 12 MONTHS, HAS THE ELECTRIC, GAS, OIL, OR WATER COMPANY THREATENED TO SHUT OFF SERVICE IN YOUR HOME?: NO
WITHIN THE LAST YEAR, HAVE YOU BEEN AFRAID OF YOUR PARTNER OR EX-PARTNER?: NO
WITHIN THE PAST 12 MONTHS, THE FOOD YOU BOUGHT JUST DIDN'T LAST AND YOU DIDN'T HAVE MONEY TO GET MORE: NEVER TRUE
WITHIN THE LAST YEAR, HAVE YOU BEEN KICKED, HIT, SLAPPED, OR OTHERWISE PHYSICALLY HURT BY YOUR PARTNER OR EX-PARTNER?: NO
WITHIN THE LAST YEAR, HAVE TO BEEN RAPED OR FORCED TO HAVE ANY KIND OF SEXUAL ACTIVITY BY YOUR PARTNER OR EX-PARTNER?: NO
WITHIN THE PAST 12 MONTHS, YOU WORRIED THAT YOUR FOOD WOULD RUN OUT BEFORE YOU GOT THE MONEY TO BUY MORE: NEVER TRUE
WITHIN THE LAST YEAR, HAVE YOU BEEN HUMILIATED OR EMOTIONALLY ABUSED IN OTHER WAYS BY YOUR PARTNER OR EX-PARTNER?: NO

## 2024-11-01 ASSESSMENT — COGNITIVE AND FUNCTIONAL STATUS - GENERAL
SUGGESTED CMS G CODE MODIFIER MOBILITY: CH
DAILY ACTIVITIY SCORE: 24
SUGGESTED CMS G CODE MODIFIER DAILY ACTIVITY: CH
MOBILITY SCORE: 24

## 2024-11-01 ASSESSMENT — LIFESTYLE VARIABLES
TOTAL SCORE: 0
HAVE PEOPLE ANNOYED YOU BY CRITICIZING YOUR DRINKING: NO
EVER HAD A DRINK FIRST THING IN THE MORNING TO STEADY YOUR NERVES TO GET RID OF A HANGOVER: NO
ON A TYPICAL DAY WHEN YOU DRINK ALCOHOL HOW MANY DRINKS DO YOU HAVE: 0
ALCOHOL_USE: NO
DOES PATIENT WANT TO STOP DRINKING: NO
TOTAL SCORE: 0
EVER FELT BAD OR GUILTY ABOUT YOUR DRINKING: NO
HOW MANY TIMES IN THE PAST YEAR HAVE YOU HAD 5 OR MORE DRINKS IN A DAY: 0
CONSUMPTION TOTAL: NEGATIVE
HAVE YOU EVER FELT YOU SHOULD CUT DOWN ON YOUR DRINKING: NO
TOTAL SCORE: 0
AVERAGE NUMBER OF DAYS PER WEEK YOU HAVE A DRINK CONTAINING ALCOHOL: 1

## 2024-11-01 ASSESSMENT — PAIN DESCRIPTION - PAIN TYPE
TYPE: ACUTE PAIN

## 2024-11-01 ASSESSMENT — ENCOUNTER SYMPTOMS
COUGH: 0
EYE REDNESS: 0
WEAKNESS: 1
FALLS: 0
NAUSEA: 1

## 2024-11-01 ASSESSMENT — FIBROSIS 4 INDEX
FIB4 SCORE: 0.49
FIB4 SCORE: 0.44

## 2024-11-01 ASSESSMENT — PATIENT HEALTH QUESTIONNAIRE - PHQ9
2. FEELING DOWN, DEPRESSED, IRRITABLE, OR HOPELESS: NOT AT ALL
1. LITTLE INTEREST OR PLEASURE IN DOING THINGS: NOT AT ALL
SUM OF ALL RESPONSES TO PHQ9 QUESTIONS 1 AND 2: 0

## 2024-11-01 NOTE — ED PROVIDER NOTES
ER Provider Note    Scribed for Jay Caicedo M.d. by Tess Biggs. 11/1/2024  12:00 PM    Primary Care Provider: Pcp Pt States None    CHIEF COMPLAINT   Chief Complaint   Patient presents with    Anxiety    Weakness     Pt reports lethargy and generally feeling run down x  1 month.     High Blood Sugar     219 mg/dl in triage. Pt not known diabetic but had multiple visits to ed she was told she had high blood glucose. Has not followed up with PCP.      EXTERNAL RECORDS REVIEWED  Other The patient was seen here on October 28th and at that time she had bacterial vaginosis, as well as high blood sugar.     HPI/ROS  LIMITATION TO HISTORY   Select: : None  OUTSIDE HISTORIAN(S):  None.    Ivis Klein is a 29 y.o. female who presents to the ED for evaluation of generalized weakness onset about a month ago. She describes that she has not felt well for the past month, but states that she felt significantly worse today. The patient reports that she was recently seen for bacterial vaginosis and states that she has been complaint with her prescribed medications, but notes she has not completed the course yet. The patient states she also has nausea, and chest pain. The patient reports that she has been having chest pain for the past week now. She notes that it mainly occurs at night prior to her going to sleep. She states that her heart feels as though it is racing and notes that she gets out of breath and dizzy. At this time, the patient is having this pain. The patient states she has history of Grave's disease, but has never been on medication for this. She notes that she is not sexually active.    PAST MEDICAL HISTORY  Past Medical History:   Diagnosis Date    Anemia 02/17/2022    Anxiety 02/16/2017    Anxiety 02/16/2017    Elevated antinuclear antibody (DEJON) level 05/23/2019    Graves disease 12/05/2016    Heart valve disease     Hemorrhagic cysts of both ovaries 05/23/2019    History of  "panic attack 04/09/2020    History of pericarditis 12/05/2016    History of thyroidectomy 01/31/2020    Partial --> hypothyroidism     Panic attack 03/11/2019    Postpartum depression 02/17/2022    Postpartum depression 02/17/2022    Vaginal discharge 12/02/2021       SURGICAL HISTORY  Past Surgical History:   Procedure Laterality Date    JUNIE BY LAPAROSCOPY N/A 8/28/2024    Procedure: CHOLECYSTECTOMY, LAPAROSCOPIC;  Surgeon: Charles Rodas M.D.;  Location: SURGERY [facility];  Service: General    THYROIDECTOMY TOTAL Bilateral 3/22/2017    Procedure: THYROIDECTOMY TOTAL -NIMS RECURRENT LARYNGEAL NERVE MONITORING;  Surgeon: Vicente Eldridge M.D.;  Location: SURGERY SAME DAY [facility];  Service:     PRIMARY C SECTION  9/8/2013    Performed by Melisa Wright M.D. at LABOR AND DELIVERY       FAMILY HISTORY  Family History   Problem Relation Age of Onset    Cancer Paternal Grandmother 56        breast cancer    No Known Problems Mother     Hyperlipidemia Father     No Known Problems Sister     No Known Problems Brother        SOCIAL HISTORY   reports that she has never smoked. She has never used smokeless tobacco. She reports current alcohol use. She reports current drug use. Drugs: Marijuana and Inhaled.      CURRENT MEDICATIONS  Previous Medications    CLOTRIMAZOLE (LOTRIMIN) 1 % CREAM    Apply 1 Application topically 2 times a day.    LEVONORGESTREL (MIRENA, 52 MG,) 20 MCG/DAY IUD    1 Each by Intrauterine route see administration instructions. Every 5 years, last placed 2020    LEVOTHYROXINE (SYNTHROID) 200 MCG TAB    TAKE 1 TABLET BY MOUTH IN THE MORNING ON AN EMPTY STOMACH    METRONIDAZOLE (FLAGYL) 500 MG TAB    Take 1 Tablet by mouth 2 times a day for 7 days.       ALLERGIES  Patient has no known allergies.      PHYSICAL EXAM  /88   Pulse 96   Temp 37 °C (98.6 °F) (Temporal)   Resp 16   Ht 1.6 m (5' 3\")   Wt 62 kg (136 lb 11 oz)   LMP 10/19/2024 (Exact Date)   SpO2 98%   BMI 24.21 " kg/m²     Constitutional: Well developed, Well nourished, Mild distress.   HENT: Normocephalic, Atraumatic, Slightly dry mucous membranes.   Eyes: Conjunctiva normal, No discharge.    Cardiovascular: Normal heart rate, Normal rhythm, No murmurs, equal pulses.   Pulmonary: Normal breath sounds, No respiratory distress, No wheezing, No rales, No rhonchi.   Abdomen: Soft, No tenderness, No masses, no rebound, no guarding.   Musculoskeletal: No major deformities noted, No tenderness.   Skin: Warm, Dry, No erythema, No rash.   Neurologic: Alert & oriented x 3, Normal motor function,  No focal deficits noted.       DIAGNOSTIC STUDIES    EKG/LABS  Results for orders placed or performed during the hospital encounter of 24   EKG    Collection Time: 24 10:38 AM   Result Value Ref Range    Report       [facility] Emergency Dept.    Test Date:  2024  Pt Name:    MICHELLE MENARD               Department: [facility]  MRN:        [id number]                      Room:  Gender:     Female                       Technician: [id number]  :        1995                   Requested By:ER TRIAGE PROTOCOL  Order #:    [id number]                    Reading MD: AYDE LIVINGSTON MD    Measurements  Intervals                                Axis  Rate:       84                           P:          49  IN:         163                          QRS:        59  QRSD:       90                           T:          6  QT:         347  QTc:        411    Interpretive Statements  Sinus rhythm, rate of 84, normal axis,  diffuse St elevation , probable  Pericarditis  Compared to ECG 2024 09:06:47  ST (T wave) deviation now present  Electronically Signed On 2024 13:48:03 PDT by AYDE LIVINGSTON MD     POCT glucose device results    Collection Time: 24 11:27 AM   Result Value Ref Range    POC Glucose, Blood 219 (H) 65 - 99 mg/dL   CBC WITH DIFFERENTIAL    Collection Time: 24 12:05 PM    Result Value Ref Range    WBC 7.8 4.8 - 10.8 K/uL    RBC 5.57 (H) 4.20 - 5.40 M/uL    Hemoglobin 14.1 12.0 - 16.0 g/dL    Hematocrit 44.5 37.0 - 47.0 %    MCV 79.9 (L) 81.4 - 97.8 fL    MCH 25.3 (L) 27.0 - 33.0 pg    MCHC 31.7 (L) 32.2 - 35.5 g/dL    RDW 48.1 35.9 - 50.0 fL    Platelet Count 258 164 - 446 K/uL    MPV 12.3 9.0 - 12.9 fL    Neutrophils-Polys 66.10 44.00 - 72.00 %    Lymphocytes 25.80 22.00 - 41.00 %    Monocytes 6.70 0.00 - 13.40 %    Eosinophils 0.30 0.00 - 6.90 %    Basophils 0.60 0.00 - 1.80 %    Immature Granulocytes 0.50 0.00 - 0.90 %    Nucleated RBC 0.00 0.00 - 0.20 /100 WBC    Neutrophils (Absolute) 5.12 1.82 - 7.42 K/uL    Lymphs (Absolute) 2.00 1.00 - 4.80 K/uL    Monos (Absolute) 0.52 0.00 - 0.85 K/uL    Eos (Absolute) 0.02 0.00 - 0.51 K/uL    Baso (Absolute) 0.05 0.00 - 0.12 K/uL    Immature Granulocytes (abs) 0.04 0.00 - 0.11 K/uL    NRBC (Absolute) 0.00 K/uL   COMP METABOLIC PANEL    Collection Time: 11/01/24 12:05 PM   Result Value Ref Range    Sodium 134 (L) 135 - 145 mmol/L    Potassium 3.7 3.6 - 5.5 mmol/L    Chloride 95 (L) 96 - 112 mmol/L    Co2 15 (L) 20 - 33 mmol/L    Anion Gap 24.0 (H) 7.0 - 16.0    Glucose 264 (H) 65 - 99 mg/dL    Bun 12 8 - 22 mg/dL    Creatinine 0.67 0.50 - 1.40 mg/dL    Calcium 9.3 8.4 - 10.2 mg/dL    Correct Calcium 8.8 8.5 - 10.5 mg/dL    AST(SGOT) 15 12 - 45 U/L    ALT(SGPT) 13 2 - 50 U/L    Alkaline Phosphatase 101 (H) 30 - 99 U/L    Total Bilirubin 0.6 0.1 - 1.5 mg/dL    Albumin 4.6 3.2 - 4.9 g/dL    Total Protein 8.1 6.0 - 8.2 g/dL    Globulin 3.5 1.9 - 3.5 g/dL    A-G Ratio 1.3 g/dL   HEMOGLOBIN A1C    Collection Time: 11/01/24 12:05 PM   Result Value Ref Range    Glycohemoglobin 11.8 (H) 4.0 - 5.6 %    Est Avg Glucose 292 mg/dL   URINALYSIS (UA)    Collection Time: 11/01/24 12:05 PM    Specimen: Blood   Result Value Ref Range    Color Straw     Character Clear     Specific Gravity >=1.030 <1.035    Ph 5.5 5.0 - 8.0    Glucose 500 (A) Negative mg/dL     Ketones >=80 (A) Negative mg/dL    Protein Negative Negative mg/dL    Bilirubin Negative Negative    Nitrite Negative Negative    Leukocyte Esterase Negative Negative    Occult Blood Trace (A) Negative    Micro Urine Req Microscopic    TSH WITH REFLEX TO FT4    Collection Time: 11/01/24 12:05 PM   Result Value Ref Range    TSH 4.630 0.380 - 5.330 uIU/mL   HCG QUAL SERUM    Collection Time: 11/01/24 12:05 PM   Result Value Ref Range    Beta-Hcg Qualitative Serum Negative Negative   TROPONIN    Collection Time: 11/01/24 12:05 PM   Result Value Ref Range    Troponin T <6 6 - 19 ng/L   ESTIMATED GFR    Collection Time: 11/01/24 12:05 PM   Result Value Ref Range    GFR (CKD-EPI) 123 >60 mL/min/1.73 m 2   URINE MICROSCOPIC (W/UA)    Collection Time: 11/01/24 12:05 PM   Result Value Ref Range    WBC Rare /hpf    RBC Rare /hpf    Bacteria Few (A) None /hpf    Epithelial Cells Rare Few /hpf    Mucous Threads Few /hpf   VENOUS BLOOD GAS    Collection Time: 11/01/24  1:12 PM   Result Value Ref Range    Venous Bg Ph 7.29 (L) 7.31 - 7.45    Venous Bg Ph Temp Corrected 7.29 (L) 7.31 - 7.45    Venous Bg Pco2 30.6 (L) 41.0 - 51.0 mmHg    Venous Bg Pco2 Temp Corrected 30.3 (L) 41.0 - 51.0 mmHg    Venous Bg Po2 31.5 25.0 - 40.0 mmHg    Venous Bg Po2 Temp Corrected 31.1 25.0 - 40.0 mmHg    Venous Bg O2 Saturation 58.2 %    Venous Bg Hco3 14 (L) 24 - 28 mmol/L    Venous Bg Base Excess -11 mmol/L    Body Temp 36.8 Centigrade    O2 Therapy 100    BETA-HYDROXYBUTYRIC ACID    Collection Time: 11/01/24  1:12 PM   Result Value Ref Range    beta-Hydroxybutyric Acid 6.60 (H) 0.02 - 0.27 mmol/L   LACTIC ACID    Collection Time: 11/01/24  1:12 PM   Result Value Ref Range    Lactic Acid 1.1 0.5 - 2.0 mmol/L   BASIC METABOLIC PANEL    Collection Time: 11/01/24  4:08 PM   Result Value Ref Range    Sodium 138 135 - 145 mmol/L    Potassium 3.6 3.6 - 5.5 mmol/L    Chloride 106 96 - 112 mmol/L    Co2 12 (L) 20 - 33 mmol/L    Glucose 130 (H) 65 - 99  mg/dL    Bun 9 8 - 22 mg/dL    Creatinine 0.50 0.50 - 1.40 mg/dL    Calcium 7.1 (L) 8.4 - 10.2 mg/dL    Anion Gap 20.0 (H) 7.0 - 16.0   ESTIMATED GFR    Collection Time: 11/01/24  4:08 PM   Result Value Ref Range    GFR (CKD-EPI) 132 >60 mL/min/1.73 m 2      I have independently interpreted this EKG      COURSE & MEDICAL DECISION MAKING     ASSESSMENT, COURSE AND PLAN  Care Narrative:     12:00 PM - Patient seen and examined at bedside. The patient is a 29 year old woman who comes in to the emergency department for evaluation of generalized weakness and chest pain. She notes that she has not been feeling well for the past month, but felt significantly worse today. The patient reports that she has been having chest pain for the past month and states that she feels as though her heart is racing and short of breath. Discussed plan of care, including performing lab work. Patient agrees to the plan of care. Ordered for an EKG, UA, Hemoglobin A1C, CMP, CBC w/ Diff., TSH w/ Reflex to FT4 to evaluate her symptoms.      Differential diagnoses include, but are not limited to: DKA, hyperglycemia, myocardial infarction, hypothyroid.     1:45 PM - The patient was reevaluated at bedside. The patient adds to history and states that her chest pain is exacerbated when she lays down. Discussed the need for hospitalization. She was given an opportunity to ask questions. She is agreeable and understanding to the new plan of care.     1:50 PM discussed the case with Dr. Knox.  He would like me to give the patient a dose of insulin and repeat her basic metabolic panel in 2 hours to see what her bicarb does.    4:40 PM patient's bicarb is dropped to 12 as well as her sugars down to 160.  Discussed this with the patient that I think she may need the ICU.  Page has been placed ICU.    5:19 PM discussed the case with Dr. Jose intensivist.  She is agreed to consult on hospitalization.        PROBLEM LIST  #1 weakness at this point time  I think the patient's weakness is secondary to DKA.  Patient has had elevated glucose on multiple ER visits but at this point time she does appear to be in DKA.  Patient was started on IV fluids.  She was given a single dose of insulin which has brought her glucose down to 160 but her bicarb is dropped to 12.  At this point time I think she is likely going to need to be on insulin and dextrose drip.  I have discussed the case with intensive  care they have agreed to consult.    Problem #2 chest pain at this point time this appears to be pericarditis.  Patient's troponin is not elevated.  I think she would benefit from an echo.  Exact etiology of her pericarditis is unclear    DISPOSITION AND DISCUSSIONS  I have discussed management of the patient with the following physicians and JUSTIN's: Zackary hospitalist, Dr. Jose     Discussion of management with other Hospitals in [place] or appropriate source(s): None       Barriers to care at this time, including but not limited to: Patient does not have established PCP.     CRITICAL CARE  I provided critical care services, which included medication orders, frequent reevaluations of the patient's condition and response to treatment, ordering and reviewing test results, and discussing the case with various consultants.  The critical care time associated with the care of the patient was 35 minutes. Review chart for interventions. This time is exclusive of any other billable procedures.     DISPOSITION:  Patient will be hospitalized by Dr. Jose  in call condition.    FINAL DIAGNOSIS  1. Diabetic ketoacidosis without coma associated with type 2 diabetes mellitus (HCC)    2. Acute pericarditis, unspecified type    3.  Critical care 35 minutes     Tess HARRIS ([place]), am scribing for, and in the presence of, DAYNE Thornton*.    Electronically signed by: Tess Biggs ([place]), 11/1/2024    Jay HARRIS M.* personally performed the  services described in this documentation, as scribed by Tess Biggs in my presence, and it is both accurate and complete.      The note accurately reflects work and decisions made by me.  Jay Caicedo M.D.  11/1/2024  5:21 PM

## 2024-11-01 NOTE — ED TRIAGE NOTES
Chief Complaint   Patient presents with    Anxiety    Weakness     Pt reports lethargy and generally feeling run down x  1 month.     High Blood Sugar     219 mg/dl in triage. Pt not known diabetic but had multiple visits to ed she was told she had high blood glucose. Has not followed up with PCP.

## 2024-11-01 NOTE — ED NOTES
Med Rec completed per patient   Allergies reviewed    Patient started a 7 day course of Flagyl om 10/28/2024     Patient is not taking anticoagulants

## 2024-11-02 ENCOUNTER — APPOINTMENT (OUTPATIENT)
Dept: CARDIOLOGY | Facility: MEDICAL CENTER | Age: 29
DRG: 638 | End: 2024-11-02
Attending: STUDENT IN AN ORGANIZED HEALTH CARE EDUCATION/TRAINING PROGRAM
Payer: MEDICAID

## 2024-11-02 LAB
ANION GAP SERPL CALC-SCNC: 11 MMOL/L (ref 7–16)
ANION GAP SERPL CALC-SCNC: 13 MMOL/L (ref 7–16)
ANION GAP SERPL CALC-SCNC: 14 MMOL/L (ref 7–16)
ANION GAP SERPL CALC-SCNC: 9 MMOL/L (ref 7–16)
BASOPHILS # BLD AUTO: 0.6 % (ref 0–1.8)
BASOPHILS # BLD: 0.04 K/UL (ref 0–0.12)
BUN SERPL-MCNC: 2 MG/DL (ref 8–22)
BUN SERPL-MCNC: 2 MG/DL (ref 8–22)
BUN SERPL-MCNC: 3 MG/DL (ref 8–22)
BUN SERPL-MCNC: 4 MG/DL (ref 8–22)
CALCIUM SERPL-MCNC: 6.9 MG/DL (ref 8.4–10.2)
CALCIUM SERPL-MCNC: 7.1 MG/DL (ref 8.4–10.2)
CALCIUM SERPL-MCNC: 7.6 MG/DL (ref 8.4–10.2)
CALCIUM SERPL-MCNC: 7.9 MG/DL (ref 8.4–10.2)
CHLORIDE SERPL-SCNC: 104 MMOL/L (ref 96–112)
CHLORIDE SERPL-SCNC: 106 MMOL/L (ref 96–112)
CHLORIDE SERPL-SCNC: 107 MMOL/L (ref 96–112)
CHLORIDE SERPL-SCNC: 107 MMOL/L (ref 96–112)
CO2 SERPL-SCNC: 15 MMOL/L (ref 20–33)
CO2 SERPL-SCNC: 17 MMOL/L (ref 20–33)
CO2 SERPL-SCNC: 17 MMOL/L (ref 20–33)
CO2 SERPL-SCNC: 18 MMOL/L (ref 20–33)
CREAT SERPL-MCNC: 0.38 MG/DL (ref 0.5–1.4)
CREAT SERPL-MCNC: 0.38 MG/DL (ref 0.5–1.4)
CREAT SERPL-MCNC: 0.39 MG/DL (ref 0.5–1.4)
CREAT SERPL-MCNC: 0.41 MG/DL (ref 0.5–1.4)
EOSINOPHIL # BLD AUTO: 0.03 K/UL (ref 0–0.51)
EOSINOPHIL NFR BLD: 0.5 % (ref 0–6.9)
ERYTHROCYTE [DISTWIDTH] IN BLOOD BY AUTOMATED COUNT: 49.1 FL (ref 35.9–50)
GFR SERPLBLD CREATININE-BSD FMLA CKD-EPI: 138 ML/MIN/1.73 M 2
GFR SERPLBLD CREATININE-BSD FMLA CKD-EPI: 140 ML/MIN/1.73 M 2
GFR SERPLBLD CREATININE-BSD FMLA CKD-EPI: 141 ML/MIN/1.73 M 2
GFR SERPLBLD CREATININE-BSD FMLA CKD-EPI: 141 ML/MIN/1.73 M 2
GLUCOSE BLD STRIP.AUTO-MCNC: 125 MG/DL (ref 65–99)
GLUCOSE BLD STRIP.AUTO-MCNC: 133 MG/DL (ref 65–99)
GLUCOSE BLD STRIP.AUTO-MCNC: 138 MG/DL (ref 65–99)
GLUCOSE BLD STRIP.AUTO-MCNC: 143 MG/DL (ref 65–99)
GLUCOSE BLD STRIP.AUTO-MCNC: 145 MG/DL (ref 65–99)
GLUCOSE BLD STRIP.AUTO-MCNC: 149 MG/DL (ref 65–99)
GLUCOSE BLD STRIP.AUTO-MCNC: 152 MG/DL (ref 65–99)
GLUCOSE BLD STRIP.AUTO-MCNC: 153 MG/DL (ref 65–99)
GLUCOSE BLD STRIP.AUTO-MCNC: 156 MG/DL (ref 65–99)
GLUCOSE BLD STRIP.AUTO-MCNC: 166 MG/DL (ref 65–99)
GLUCOSE BLD STRIP.AUTO-MCNC: 167 MG/DL (ref 65–99)
GLUCOSE BLD STRIP.AUTO-MCNC: 170 MG/DL (ref 65–99)
GLUCOSE BLD STRIP.AUTO-MCNC: 170 MG/DL (ref 65–99)
GLUCOSE BLD STRIP.AUTO-MCNC: 177 MG/DL (ref 65–99)
GLUCOSE BLD STRIP.AUTO-MCNC: 185 MG/DL (ref 65–99)
GLUCOSE BLD STRIP.AUTO-MCNC: 98 MG/DL (ref 65–99)
GLUCOSE SERPL-MCNC: 149 MG/DL (ref 65–99)
GLUCOSE SERPL-MCNC: 155 MG/DL (ref 65–99)
GLUCOSE SERPL-MCNC: 158 MG/DL (ref 65–99)
GLUCOSE SERPL-MCNC: 196 MG/DL (ref 65–99)
HCT VFR BLD AUTO: 35 % (ref 37–47)
HGB BLD-MCNC: 11 G/DL (ref 12–16)
HIV 1+2 AB+HIV1 P24 AG SERPL QL IA: NORMAL
IMM GRANULOCYTES # BLD AUTO: 0.02 K/UL (ref 0–0.11)
IMM GRANULOCYTES NFR BLD AUTO: 0.3 % (ref 0–0.9)
LV EJECT FRACT MOD 2C 99903: 62.65
LV EJECT FRACT MOD 4C 99902: 66.84
LV EJECT FRACT MOD BP 99901: 64.89
LYMPHOCYTES # BLD AUTO: 2.59 K/UL (ref 1–4.8)
LYMPHOCYTES NFR BLD: 39.1 % (ref 22–41)
MAGNESIUM SERPL-MCNC: 1.8 MG/DL (ref 1.5–2.5)
MAGNESIUM SERPL-MCNC: 2 MG/DL (ref 1.5–2.5)
MCH RBC QN AUTO: 25.2 PG (ref 27–33)
MCHC RBC AUTO-ENTMCNC: 31.4 G/DL (ref 32.2–35.5)
MCV RBC AUTO: 80.1 FL (ref 81.4–97.8)
MONOCYTES # BLD AUTO: 0.54 K/UL (ref 0–0.85)
MONOCYTES NFR BLD AUTO: 8.1 % (ref 0–13.4)
NEUTROPHILS # BLD AUTO: 3.41 K/UL (ref 1.82–7.42)
NEUTROPHILS NFR BLD: 51.4 % (ref 44–72)
NRBC # BLD AUTO: 0 K/UL
NRBC BLD-RTO: 0 /100 WBC (ref 0–0.2)
PHOSPHATE SERPL-MCNC: 1 MG/DL (ref 2.5–4.5)
PHOSPHATE SERPL-MCNC: 2.4 MG/DL (ref 2.5–4.5)
PLATELET # BLD AUTO: 209 K/UL (ref 164–446)
PMV BLD AUTO: 12.1 FL (ref 9–12.9)
POTASSIUM SERPL-SCNC: 3.4 MMOL/L (ref 3.6–5.5)
POTASSIUM SERPL-SCNC: 3.6 MMOL/L (ref 3.6–5.5)
POTASSIUM SERPL-SCNC: 3.6 MMOL/L (ref 3.6–5.5)
POTASSIUM SERPL-SCNC: 3.9 MMOL/L (ref 3.6–5.5)
RBC # BLD AUTO: 4.37 M/UL (ref 4.2–5.4)
SODIUM SERPL-SCNC: 132 MMOL/L (ref 135–145)
SODIUM SERPL-SCNC: 134 MMOL/L (ref 135–145)
SODIUM SERPL-SCNC: 135 MMOL/L (ref 135–145)
SODIUM SERPL-SCNC: 137 MMOL/L (ref 135–145)
WBC # BLD AUTO: 6.6 K/UL (ref 4.8–10.8)

## 2024-11-02 PROCEDURE — 700101 HCHG RX REV CODE 250

## 2024-11-02 PROCEDURE — 85025 COMPLETE CBC W/AUTO DIFF WBC: CPT

## 2024-11-02 PROCEDURE — 700102 HCHG RX REV CODE 250 W/ 637 OVERRIDE(OP): Performed by: HOSPITALIST

## 2024-11-02 PROCEDURE — 93306 TTE W/DOPPLER COMPLETE: CPT | Mod: 26 | Performed by: STUDENT IN AN ORGANIZED HEALTH CARE EDUCATION/TRAINING PROGRAM

## 2024-11-02 PROCEDURE — 700111 HCHG RX REV CODE 636 W/ 250 OVERRIDE (IP): Mod: JZ | Performed by: HOSPITALIST

## 2024-11-02 PROCEDURE — A9270 NON-COVERED ITEM OR SERVICE: HCPCS | Performed by: STUDENT IN AN ORGANIZED HEALTH CARE EDUCATION/TRAINING PROGRAM

## 2024-11-02 PROCEDURE — 700102 HCHG RX REV CODE 250 W/ 637 OVERRIDE(OP): Performed by: STUDENT IN AN ORGANIZED HEALTH CARE EDUCATION/TRAINING PROGRAM

## 2024-11-02 PROCEDURE — 94760 N-INVAS EAR/PLS OXIMETRY 1: CPT

## 2024-11-02 PROCEDURE — 700105 HCHG RX REV CODE 258: Performed by: HOSPITALIST

## 2024-11-02 PROCEDURE — 84100 ASSAY OF PHOSPHORUS: CPT

## 2024-11-02 PROCEDURE — 83735 ASSAY OF MAGNESIUM: CPT

## 2024-11-02 PROCEDURE — 80048 BASIC METABOLIC PNL TOTAL CA: CPT

## 2024-11-02 PROCEDURE — 700111 HCHG RX REV CODE 636 W/ 250 OVERRIDE (IP): Performed by: STUDENT IN AN ORGANIZED HEALTH CARE EDUCATION/TRAINING PROGRAM

## 2024-11-02 PROCEDURE — A9270 NON-COVERED ITEM OR SERVICE: HCPCS | Performed by: HOSPITALIST

## 2024-11-02 PROCEDURE — 770001 HCHG ROOM/CARE - MED/SURG/GYN PRIV*

## 2024-11-02 PROCEDURE — 700105 HCHG RX REV CODE 258: Performed by: STUDENT IN AN ORGANIZED HEALTH CARE EDUCATION/TRAINING PROGRAM

## 2024-11-02 PROCEDURE — 93306 TTE W/DOPPLER COMPLETE: CPT

## 2024-11-02 PROCEDURE — 82962 GLUCOSE BLOOD TEST: CPT | Mod: 91

## 2024-11-02 PROCEDURE — 99291 CRITICAL CARE FIRST HOUR: CPT | Performed by: STUDENT IN AN ORGANIZED HEALTH CARE EDUCATION/TRAINING PROGRAM

## 2024-11-02 PROCEDURE — 700111 HCHG RX REV CODE 636 W/ 250 OVERRIDE (IP): Performed by: HOSPITALIST

## 2024-11-02 RX ORDER — INSULIN LISPRO 100 [IU]/ML
3-14 INJECTION, SOLUTION INTRAVENOUS; SUBCUTANEOUS
Status: DISCONTINUED | OUTPATIENT
Start: 2024-11-02 | End: 2024-11-03

## 2024-11-02 RX ORDER — POTASSIUM CHLORIDE 14.9 MG/ML
20 INJECTION INTRAVENOUS ONCE
Status: COMPLETED | OUTPATIENT
Start: 2024-11-02 | End: 2024-11-02

## 2024-11-02 RX ORDER — OXYCODONE HYDROCHLORIDE 5 MG/1
5 TABLET ORAL
Status: DISCONTINUED | OUTPATIENT
Start: 2024-11-02 | End: 2024-11-04 | Stop reason: HOSPADM

## 2024-11-02 RX ORDER — CALCIUM GLUCONATE 20 MG/ML
1 INJECTION, SOLUTION INTRAVENOUS ONCE
Status: COMPLETED | OUTPATIENT
Start: 2024-11-02 | End: 2024-11-02

## 2024-11-02 RX ORDER — POTASSIUM PHOSPHATE, MONOBASIC AND POTASSIUM PHOSPHATE, DIBASIC 224; 236 MG/ML; MG/ML
INJECTION, SOLUTION INTRAVENOUS
Status: COMPLETED
Start: 2024-11-02 | End: 2024-11-02

## 2024-11-02 RX ORDER — NOREPINEPHRINE BITARTRATE 0.03 MG/ML
INJECTION, SOLUTION INTRAVENOUS
Status: DISCONTINUED
Start: 2024-11-02 | End: 2024-11-02

## 2024-11-02 RX ORDER — OXYCODONE HYDROCHLORIDE 10 MG/1
10 TABLET ORAL
Status: DISCONTINUED | OUTPATIENT
Start: 2024-11-02 | End: 2024-11-04 | Stop reason: HOSPADM

## 2024-11-02 RX ORDER — DEXTROSE MONOHYDRATE 25 G/50ML
25 INJECTION, SOLUTION INTRAVENOUS
Status: DISCONTINUED | OUTPATIENT
Start: 2024-11-02 | End: 2024-11-03

## 2024-11-02 RX ORDER — HYDROMORPHONE HYDROCHLORIDE 1 MG/ML
0.5 INJECTION, SOLUTION INTRAMUSCULAR; INTRAVENOUS; SUBCUTANEOUS
Status: DISCONTINUED | OUTPATIENT
Start: 2024-11-02 | End: 2024-11-04 | Stop reason: HOSPADM

## 2024-11-02 RX ORDER — SODIUM CHLORIDE, SODIUM LACTATE, POTASSIUM CHLORIDE, AND CALCIUM CHLORIDE .6; .31; .03; .02 G/100ML; G/100ML; G/100ML; G/100ML
1000 INJECTION, SOLUTION INTRAVENOUS ONCE
Status: COMPLETED | OUTPATIENT
Start: 2024-11-02 | End: 2024-11-02

## 2024-11-02 RX ADMIN — DEXTROSE AND SODIUM CHLORIDE: 10; .45 INJECTION, SOLUTION INTRAVENOUS at 06:30

## 2024-11-02 RX ADMIN — ONDANSETRON 4 MG: 2 INJECTION INTRAMUSCULAR; INTRAVENOUS at 17:23

## 2024-11-02 RX ADMIN — ONDANSETRON 4 MG: 2 INJECTION INTRAMUSCULAR; INTRAVENOUS at 03:27

## 2024-11-02 RX ADMIN — POTASSIUM PHOSPHATE, MONOBASIC AND POTASSIUM PHOSPHATE, DIBASIC 45 MMOL: 224; 236 INJECTION, SOLUTION, CONCENTRATE INTRAVENOUS at 03:03

## 2024-11-02 RX ADMIN — FENTANYL CITRATE 100 MCG: 50 INJECTION, SOLUTION INTRAMUSCULAR; INTRAVENOUS at 07:54

## 2024-11-02 RX ADMIN — METRONIDAZOLE 500 MG: 500 TABLET ORAL at 17:21

## 2024-11-02 RX ADMIN — OXYCODONE 5 MG: 5 TABLET ORAL at 17:23

## 2024-11-02 RX ADMIN — POTASSIUM CHLORIDE 20 MEQ: 200 INJECTION, SOLUTION INTRAVENOUS at 10:08

## 2024-11-02 RX ADMIN — FENTANYL CITRATE 50 MCG: 50 INJECTION, SOLUTION INTRAMUSCULAR; INTRAVENOUS at 05:13

## 2024-11-02 RX ADMIN — FENTANYL CITRATE 50 MCG: 50 INJECTION, SOLUTION INTRAMUSCULAR; INTRAVENOUS at 03:13

## 2024-11-02 RX ADMIN — ONDANSETRON 4 MG: 2 INJECTION INTRAMUSCULAR; INTRAVENOUS at 07:54

## 2024-11-02 RX ADMIN — INSULIN LISPRO 3 UNITS: 100 INJECTION, SOLUTION INTRAVENOUS; SUBCUTANEOUS at 20:56

## 2024-11-02 RX ADMIN — INSULIN GLARGINE-YFGN 6 UNITS: 100 INJECTION, SOLUTION SUBCUTANEOUS at 17:30

## 2024-11-02 RX ADMIN — SODIUM CHLORIDE, POTASSIUM CHLORIDE, SODIUM LACTATE AND CALCIUM CHLORIDE 1000 ML: 600; 310; 30; 20 INJECTION, SOLUTION INTRAVENOUS at 14:56

## 2024-11-02 RX ADMIN — FENTANYL CITRATE 100 MCG: 50 INJECTION, SOLUTION INTRAMUSCULAR; INTRAVENOUS at 09:26

## 2024-11-02 RX ADMIN — IBUPROFEN 800 MG: 400 TABLET, FILM COATED ORAL at 11:04

## 2024-11-02 RX ADMIN — OXYCODONE 5 MG: 5 TABLET ORAL at 11:03

## 2024-11-02 RX ADMIN — IBUPROFEN 800 MG: 400 TABLET, FILM COATED ORAL at 17:21

## 2024-11-02 RX ADMIN — LEVOTHYROXINE SODIUM 200 MCG: 0.1 TABLET ORAL at 05:47

## 2024-11-02 RX ADMIN — OXYCODONE HYDROCHLORIDE 10 MG: 10 TABLET ORAL at 14:28

## 2024-11-02 RX ADMIN — OXYCODONE HYDROCHLORIDE 10 MG: 10 TABLET ORAL at 21:33

## 2024-11-02 RX ADMIN — CALCIUM GLUCONATE 1 G: 20 INJECTION, SOLUTION INTRAVENOUS at 06:02

## 2024-11-02 RX ADMIN — ENOXAPARIN SODIUM 40 MG: 100 INJECTION SUBCUTANEOUS at 17:20

## 2024-11-02 RX ADMIN — FENTANYL CITRATE 50 MCG: 50 INJECTION, SOLUTION INTRAMUSCULAR; INTRAVENOUS at 01:06

## 2024-11-02 RX ADMIN — PROCHLORPERAZINE EDISYLATE 5 MG: 5 INJECTION INTRAMUSCULAR; INTRAVENOUS at 05:27

## 2024-11-02 RX ADMIN — IBUPROFEN 800 MG: 400 TABLET, FILM COATED ORAL at 23:27

## 2024-11-02 RX ADMIN — METRONIDAZOLE 500 MG: 500 TABLET ORAL at 05:47

## 2024-11-02 RX ADMIN — ONDANSETRON 4 MG: 2 INJECTION INTRAMUSCULAR; INTRAVENOUS at 21:33

## 2024-11-02 RX ADMIN — INSULIN GLARGINE-YFGN 6 UNITS: 100 INJECTION, SOLUTION SUBCUTANEOUS at 09:08

## 2024-11-02 ASSESSMENT — PAIN DESCRIPTION - PAIN TYPE
TYPE: ACUTE PAIN

## 2024-11-02 ASSESSMENT — ENCOUNTER SYMPTOMS
WEAKNESS: 1
FALLS: 0
NAUSEA: 1
COUGH: 0
EYE REDNESS: 0

## 2024-11-02 NOTE — PROGRESS NOTES
Received report from ICU RN. Assumed pt care upon arrival to unit  Pt is A&Ox4, resting comfortably in bed. Plan of care reviewed for activities and goals this shift. Medication eduction provided.  Pt verbalizes understanding of plan of care for this shift.  Pt denies pain and/or nausea at this moment.  Patients needs attended well. Fall precautions in place. Bed at lowest position. Call light and personal belongings within reach.   Hourly rounding in progress.  Mother at bedside for plan of care review.

## 2024-11-02 NOTE — CARE PLAN
The patient is Stable - Low risk of patient condition declining or worsening    Shift Goals  Clinical Goals: DKA protocol, transition to long acting.   Patient Goals: Pain goal <2, rest and feel better  Family Goals: Updates      Problem: Knowledge Deficit - Standard  Goal: Patient and family/care givers will demonstrate understanding of plan of care, disease process/condition, diagnostic tests and medications  Outcome: Progressing     Problem: Pain - Standard  Goal: Alleviation of pain or a reduction in pain to the patient’s comfort goal  Outcome: Progressing     Problem: Diabetes Management - DKA  Goal: Patient will achieve and maintain glucose in satisfactory range  Outcome: Progressing     Problem: Nutrition Deficit - Diabetes  Goal: Patient will demonstrate adequate hydration and vital signs  Outcome: Progressing     Problem: Neuro Status  Goal: Neuro status will remain stable or improve  Outcome: Progressing       Progress made toward(s) clinical / shift goals:  fsbg checked q1h per protocol. Pt transitioned off DKA protocol, given lantus and food. Pain treated PRN. Pt and family educated and questions answered.

## 2024-11-02 NOTE — PROGRESS NOTES
from Lab called with critical result of Ca 6.9 at 0545. Critical lab result read back to .   Dr. Park notified of critical lab result at 0547.  Critical lab result read back by Dr. Park.

## 2024-11-02 NOTE — CONSULTS
Critical Care Consultation    Date of consult: 11/1/2024    Referring Physician  Leslye Jose M.D.    Reason for Consultation  DKA    History of Presenting Illness  29 y.o. female with history of diabetes type 1 who presented 11/1/2024 with generalized weakness for the past month that has progressively worsened over the last day.  She also reports ongoing nausea and chest pain that is worse when she is lying down.  The chest pain has been occurring for the past week causes palpitations and dyspnea.  She was also recently diagnosed with bacterial vaginosis and started on antibiotics but has not completed the treatment yet.  In the ER, patient was noted to have blood sugars in the 200s with an anion gap metabolic acidosis.  She was given 2 L of NS and 5 units of regular insulin with improvement in her blood sugars to the 160s but repeat BMP showed worsening metabolic acidosis.  BHOB was elevated at 6.6.  Patient's EKG showed some diffuse ST elevations concerning for pericarditis.  Her troponin was negative.    Code Status  Full Code      Review of Systems  Review of Systems   Constitutional:  Positive for malaise/fatigue.   Eyes:  Negative for redness.   Respiratory:  Negative for cough.    Cardiovascular:  Positive for chest pain.   Gastrointestinal:  Positive for nausea.   Musculoskeletal:  Negative for falls.   Neurological:  Positive for weakness.       Past Medical History   has a past medical history of Anemia (02/17/2022), Anxiety (02/16/2017), Anxiety (02/16/2017), Elevated antinuclear antibody (DEJON) level (05/23/2019), Graves disease (12/05/2016), Heart valve disease, Hemorrhagic cysts of both ovaries (05/23/2019), History of panic attack (04/09/2020), History of pericarditis (12/05/2016), History of thyroidectomy (01/31/2020), Panic attack (03/11/2019), Postpartum depression (02/17/2022), Postpartum depression (02/17/2022), and Vaginal discharge (12/02/2021).    She has no past medical history of  Addisons disease (HCC), Adrenal disorder (HCC), Allergy, Arthritis, Asthma, Blood transfusion, Cancer (HCC), Cataract, CHF (congestive heart failure) (HCC), Clotting disorder (HCC), COPD, Cushings syndrome (HCC), Diabetes (HCC), Diabetic neuropathy (HCC), Glaucoma, Heart attack (HCC), Heart murmur, HIV (human immunodeficiency virus infection) (HCC), Hyperlipidemia, Hypertension, IBD (inflammatory bowel disease), Kidney disease, Meningitis, Muscle disorder, OSTEOPOROSIS, Parathyroid disorder (HCC), Pituitary disease (HCC), Pulmonary emphysema (HCC), Seizure (HCC), Sickle cell disease (HCC), Stroke (HCC), Substance abuse (HCC), Tuberculosis, Ulcer, or Urinary tract infection.    Surgical History   has a past surgical history that includes primary c section (9/8/2013); thyroidectomy total (Bilateral, 3/22/2017); and aiyana by laparoscopy (N/A, 8/28/2024).    Family History  family history includes Cancer (age of onset: 56) in her paternal grandmother; Hyperlipidemia in her father; No Known Problems in her brother, mother, and sister.    Social History   reports that she has never smoked. She has never used smokeless tobacco. She reports current alcohol use. She reports current drug use. Drugs: Marijuana and Inhaled.    Medications  Home Medications       Reviewed by Lynne Hui (Pharmacy Tech) on 11/01/24 at 1219  Med List Status: Complete     Medication Last Dose Status   clotrimazole (LOTRIMIN) 1 % Cream 10/31/2024 Active   levonorgestrel (MIRENA, 52 MG,) 20 MCG/DAY IUD IN PLACE Active   levothyroxine (SYNTHROID) 200 MCG Tab 11/1/2024 Active   metroNIDAZOLE (FLAGYL) 500 MG Tab 11/1/2024 Active                  Current Facility-Administered Medications   Medication Dose Route Frequency Provider Last Rate Last Admin    dextrose 50% (D50W) injection 25 g  25 g Intravenous Q15 MIN PRN Jay Caicedo M.D.        LR (Bolus) infusion 2,000 mL  2,000 mL Intravenous Once Lesyle Jose M.D.        D10 1/2 NS  infusion   Intravenous Continuous Leslye Jose M.D.        D10 1/2 NS infusion   Intravenous Continuous Leslye Jose M.D. MD ALERT-PHARMACY TO CONSULT FOR DKA MONITORING 1 Each  1 Each Other PRN Leslye Jose M.D.        magnesium sulfate IVPB premix 2 g  2 g Intravenous Once PRN Leslye Jose M.D.        Or    magnesium sulfate IVPB premix 4 g  4 g Intravenous Once PRN Leslye Jose M.D.        Adult DKA potassium(K+) replacement scale  1 Each Intravenous Q4HRS Leslye Jose M.D.        NS infusion   Intravenous Continuous Leslye Jose M.D.        enoxaparin (Lovenox) inj 40 mg  40 mg Subcutaneous DAILY AT 1800 Leslye Jose M.D.        ondansetron (Zofran) syringe/vial injection 4 mg  4 mg Intravenous Q4HRS PRN Leslye Jose M.D.        ondansetron (Zofran ODT) dispertab 4 mg  4 mg Oral Q4HRS PRN Leslye Jose M.D.        promethazine (Phenergan) tablet 12.5-25 mg  12.5-25 mg Oral Q4HRS PRN Leslye Jose M.D.        promethazine (Phenergan) suppository 12.5-25 mg  12.5-25 mg Rectal Q4HRS PRN Leslye Jose M.D.        prochlorperazine (Compazine) injection 5-10 mg  5-10 mg Intravenous Q4HRS PRN Leslye Jose M.D.        insulin regular (HumuLIN R/NovoLIN R) 100 Units in  mL infusion premix  3 Units/hr Intravenous Continuous Leslye Jose M.D.        metroNIDAZOLE (Flagyl) tablet 500 mg  500 mg Oral Q12HRS Leslye Jose M.D.        potassium chloride (KCL) ivpb 10 mEq  10 mEq Intravenous Q HOUR Leslye Jose M.D.        ibuprofen (Motrin) tablet 600 mg  600 mg Oral Q8HRS Leslye Jose M.D.        colchicine (Colcrys) tablet 0.6 mg  0.6 mg Oral DAILY Leslye Jose M.D.         Current Outpatient Medications   Medication Sig Dispense Refill    metroNIDAZOLE (FLAGYL) 500 MG Tab Take 1 Tablet by mouth 2 times a day for 7 days. 14 Tablet 0    levothyroxine (SYNTHROID) 200 MCG Tab TAKE 1 TABLET BY  MOUTH IN THE MORNING ON AN EMPTY STOMACH 30 Tablet 0    clotrimazole (LOTRIMIN) 1 % Cream Apply 1 Application topically 2 times a day. (Patient taking differently: Apply 1 Application topically 1 time a day as needed (Apply's on vaginal area).) 28 g 0    levonorgestrel (MIRENA, 52 MG,) 20 MCG/DAY IUD 1 Each by Intrauterine route see administration instructions. Every 5 years, last placed          Allergies  No Known Allergies    Vital Signs last 24 hours  Temp:  [36.9 °C (98.5 °F)-37 °C (98.6 °F)] 37 °C (98.6 °F)  Pulse:  [83-96] 83  Resp:  [14-26] 25  BP: (122-135)/(67-88) 122/67  SpO2:  [98 %-100 %] 100 %    Physical Exam  Physical Exam  Vitals and nursing note reviewed.   Constitutional:       General: She is not in acute distress.  HENT:      Head: Normocephalic.      Nose: Nose normal.      Mouth/Throat:      Mouth: Mucous membranes are dry.   Eyes:      Conjunctiva/sclera: Conjunctivae normal.   Cardiovascular:      Rate and Rhythm: Normal rate and regular rhythm.   Pulmonary:      Effort: Pulmonary effort is normal. No respiratory distress.   Abdominal:      Palpations: Abdomen is soft.   Musculoskeletal:         General: No deformity.   Skin:     General: Skin is warm and dry.   Neurological:      Mental Status: She is alert. Mental status is at baseline.   Psychiatric:         Mood and Affect: Mood normal.         Fluids  No intake or output data in the 24 hours ending 24 0115    Laboratory  Recent Results (from the past 48 hour(s))   EKG    Collection Time: 24 10:38 AM   Result Value Ref Range    Report       [facility]    Test Date:  2024  Pt Name:    MICHELLE MENARD               Department: [facility]  MRN:        [id number]                      Room:  Gender:     Female                       Technician: [id number]  :        1995                   Requested By:ER TRIAGE PROTOCOL  Order #:    [id number]                    Demond MD: AYDE HAILE  MD MIKI    Measurements  Intervals                                Axis  Rate:       84                           P:          49  NH:         163                          QRS:        59  QRSD:       90                           T:          6  QT:         347  QTc:        411    Interpretive Statements  Sinus rhythm, rate of 84, normal axis,  diffuse St elevation , probable  Pericarditis  Compared to ECG 05/18/2024 09:06:47  ST (T wave) deviation now present  Electronically Signed On 11- 13:48:03 PDT by AYDE LIVINGSTON MD     POCT glucose device results    Collection Time: 11/01/24 11:27 AM   Result Value Ref Range    POC Glucose, Blood 219 (H) 65 - 99 mg/dL   CBC WITH DIFFERENTIAL    Collection Time: 11/01/24 12:05 PM   Result Value Ref Range    WBC 7.8 4.8 - 10.8 K/uL    RBC 5.57 (H) 4.20 - 5.40 M/uL    Hemoglobin 14.1 12.0 - 16.0 g/dL    Hematocrit 44.5 37.0 - 47.0 %    MCV 79.9 (L) 81.4 - 97.8 fL    MCH 25.3 (L) 27.0 - 33.0 pg    MCHC 31.7 (L) 32.2 - 35.5 g/dL    RDW 48.1 35.9 - 50.0 fL    Platelet Count 258 164 - 446 K/uL    MPV 12.3 9.0 - 12.9 fL    Neutrophils-Polys 66.10 44.00 - 72.00 %    Lymphocytes 25.80 22.00 - 41.00 %    Monocytes 6.70 0.00 - 13.40 %    Eosinophils 0.30 0.00 - 6.90 %    Basophils 0.60 0.00 - 1.80 %    Immature Granulocytes 0.50 0.00 - 0.90 %    Nucleated RBC 0.00 0.00 - 0.20 /100 WBC    Neutrophils (Absolute) 5.12 1.82 - 7.42 K/uL    Lymphs (Absolute) 2.00 1.00 - 4.80 K/uL    Monos (Absolute) 0.52 0.00 - 0.85 K/uL    Eos (Absolute) 0.02 0.00 - 0.51 K/uL    Baso (Absolute) 0.05 0.00 - 0.12 K/uL    Immature Granulocytes (abs) 0.04 0.00 - 0.11 K/uL    NRBC (Absolute) 0.00 K/uL   COMP METABOLIC PANEL    Collection Time: 11/01/24 12:05 PM   Result Value Ref Range    Sodium 134 (L) 135 - 145 mmol/L    Potassium 3.7 3.6 - 5.5 mmol/L    Chloride 95 (L) 96 - 112 mmol/L    Co2 15 (L) 20 - 33 mmol/L    Anion Gap 24.0 (H) 7.0 - 16.0    Glucose 264 (H) 65 - 99 mg/dL    Bun 12 8 -  22 mg/dL    Creatinine 0.67 0.50 - 1.40 mg/dL    Calcium 9.3 8.4 - 10.2 mg/dL    Correct Calcium 8.8 8.5 - 10.5 mg/dL    AST(SGOT) 15 12 - 45 U/L    ALT(SGPT) 13 2 - 50 U/L    Alkaline Phosphatase 101 (H) 30 - 99 U/L    Total Bilirubin 0.6 0.1 - 1.5 mg/dL    Albumin 4.6 3.2 - 4.9 g/dL    Total Protein 8.1 6.0 - 8.2 g/dL    Globulin 3.5 1.9 - 3.5 g/dL    A-G Ratio 1.3 g/dL   HEMOGLOBIN A1C    Collection Time: 11/01/24 12:05 PM   Result Value Ref Range    Glycohemoglobin 11.8 (H) 4.0 - 5.6 %    Est Avg Glucose 292 mg/dL   URINALYSIS (UA)    Collection Time: 11/01/24 12:05 PM    Specimen: Blood   Result Value Ref Range    Color Straw     Character Clear     Specific Gravity >=1.030 <1.035    Ph 5.5 5.0 - 8.0    Glucose 500 (A) Negative mg/dL    Ketones >=80 (A) Negative mg/dL    Protein Negative Negative mg/dL    Bilirubin Negative Negative    Nitrite Negative Negative    Leukocyte Esterase Negative Negative    Occult Blood Trace (A) Negative    Micro Urine Req Microscopic    TSH WITH REFLEX TO FT4    Collection Time: 11/01/24 12:05 PM   Result Value Ref Range    TSH 4.630 0.380 - 5.330 uIU/mL   HCG QUAL SERUM    Collection Time: 11/01/24 12:05 PM   Result Value Ref Range    Beta-Hcg Qualitative Serum Negative Negative   TROPONIN    Collection Time: 11/01/24 12:05 PM   Result Value Ref Range    Troponin T <6 6 - 19 ng/L   ESTIMATED GFR    Collection Time: 11/01/24 12:05 PM   Result Value Ref Range    GFR (CKD-EPI) 123 >60 mL/min/1.73 m 2   URINE MICROSCOPIC (W/UA)    Collection Time: 11/01/24 12:05 PM   Result Value Ref Range    WBC Rare /hpf    RBC Rare /hpf    Bacteria Few (A) None /hpf    Epithelial Cells Rare Few /hpf    Mucous Threads Few /hpf   VENOUS BLOOD GAS    Collection Time: 11/01/24  1:12 PM   Result Value Ref Range    Venous Bg Ph 7.29 (L) 7.31 - 7.45    Venous Bg Ph Temp Corrected 7.29 (L) 7.31 - 7.45    Venous Bg Pco2 30.6 (L) 41.0 - 51.0 mmHg    Venous Bg Pco2 Temp Corrected 30.3 (L) 41.0 - 51.0 mmHg     Venous Bg Po2 31.5 25.0 - 40.0 mmHg    Venous Bg Po2 Temp Corrected 31.1 25.0 - 40.0 mmHg    Venous Bg O2 Saturation 58.2 %    Venous Bg Hco3 14 (L) 24 - 28 mmol/L    Venous Bg Base Excess -11 mmol/L    Body Temp 36.8 Centigrade    O2 Therapy 100    BETA-HYDROXYBUTYRIC ACID    Collection Time: 11/01/24  1:12 PM   Result Value Ref Range    beta-Hydroxybutyric Acid 6.60 (H) 0.02 - 0.27 mmol/L   LACTIC ACID    Collection Time: 11/01/24  1:12 PM   Result Value Ref Range    Lactic Acid 1.1 0.5 - 2.0 mmol/L   BASIC METABOLIC PANEL    Collection Time: 11/01/24  4:08 PM   Result Value Ref Range    Sodium 138 135 - 145 mmol/L    Potassium 3.6 3.6 - 5.5 mmol/L    Chloride 106 96 - 112 mmol/L    Co2 12 (L) 20 - 33 mmol/L    Glucose 130 (H) 65 - 99 mg/dL    Bun 9 8 - 22 mg/dL    Creatinine 0.50 0.50 - 1.40 mg/dL    Calcium 7.1 (L) 8.4 - 10.2 mg/dL    Anion Gap 20.0 (H) 7.0 - 16.0   ESTIMATED GFR    Collection Time: 11/01/24  4:08 PM   Result Value Ref Range    GFR (CKD-EPI) 132 >60 mL/min/1.73 m 2       Imaging  Reviewed     Assessment/Plan  * DKA, type 1, not at goal (HCC)- (present on admission)  Assessment & Plan  - IVF boluses as needed  - maintenance IVFs per DKA protocol  - insulin gtt per DKA protocol  - BMP q4h, Mg/phos q8h  - replete electrolytes appropriatley  - NPO  - diabetic education      Bacterial vaginosis  Assessment & Plan  Diagnosed on 10/28 and has been on flagyl but hasn't finished it yet  -finish 7 day course of flagyl    Chest pain  Assessment & Plan  C/f pericarditis with her worsening pain when lying down and diffuse ST elevations on EKG. Trop neg.   - f/u TTE  - f/u ESR, CRP, BNP, procal  - f/u viral serologies  - start ibuprofen 800mg q8h and colchicine 0.6mg daily since she's <70 kg      Graves disease- (present on admission)  Assessment & Plan  S/p bilateral total thyroidectomy 03/22/2017   TSH wnl at 4.6  Continue home levothyroxine 200mcg        Discussed patient condition and risk of morbidity  and/or mortality with RN, RT, Pharmacy, Charge nurse / hot rounds, and Patient.    This note was generated using voice recognition software which has a chance of producing errors of grammar and content.  I have made every reasonable attempt to find and correct any errors, but it should be expected that some may not be found prior to finalization of this note.    The patient remains critically ill.  Critical care time = 60 minutes in directly providing and coordinating critical care and extensive data review.  No time overlap and excludes procedures.    __________  Leslye Jose MD  Pulmonary and Critical Care Medicine  [facility]

## 2024-11-02 NOTE — CARE PLAN
The patient is Stable - Low risk of patient condition declining or worsening    Shift Goals  Clinical Goals: Replace electrolytes, FSBG Q1H, CO2 >17, AG <12  Patient Goals: Pain goal <2, rest and feel better  Family Goals: Updates    Progress made toward(s) clinical / shift goals:    Problem: Knowledge Deficit - Standard  Goal: Patient and family/care givers will demonstrate understanding of plan of care, disease process/condition, diagnostic tests and medications  Outcome: Progressing     Problem: Diabetes Management - DKA  Goal: Patient will achieve and maintain glucose in satisfactory range  Outcome: Progressing  Note: The patient's AG has improved from 22 to 14, and CO2 has improved from 10 to 15     Problem: Knowledge Deficit - Diabetes  Goal: Patient will demonstrate knowledge of insulin injection, symptoms, and treatment of hypoglycemia and diet prior to discharge  Outcome: Progressing     Problem: Psychosocial  Goal: Patient's level of anxiety will decrease  Outcome: Progressing     Problem: Hemodynamics  Goal: Patient's hemodynamics, fluid balance and neurologic status will be stable or improve  Outcome: Progressing       Patient is not progressing towards the following goals:      Problem: Pain - Standard  Goal: Alleviation of pain or a reduction in pain to the patient’s comfort goal  Outcome: Not Progressing  Note: The patient has needed pain medication approximately every 2 hours. Non-pharmacological interventions such as heat, and repositioning have helped as well.

## 2024-11-02 NOTE — ASSESSMENT & PLAN NOTE
Diagnosed on 10/28 and has been on flagyl but hasn't finished it yet  -finish 7 day course of flagyl

## 2024-11-02 NOTE — PROGRESS NOTES
The patient's FSBG at 2200 increased from 155 to 191. Dr Park notified at 2208. Insulin running at 3 units/hr and D10 1/2 NS is running at 150 mL/hr. New orders received to increase the insulin gtt to 5 units/hr.    2335 - FSBG was 199. Dr. Park notified. New orders received to decrease D10 1/2 NS to 100 mL/hr.    Statement Selected

## 2024-11-02 NOTE — PROGRESS NOTES
Critical Care Progress Note    Date of admission  11/1/2024    Chief Complaint/Hospital Course  29 y.o. female with history of diabetes type 1 who presented 11/1/2024 with generalized weakness for the past month that has progressively worsened over the last day.  She also reports ongoing nausea and chest pain that is worse when she is lying down.  The chest pain has been occurring for the past week causes palpitations and dyspnea.  She was also recently diagnosed with bacterial vaginosis and started on antibiotics but has not completed the treatment yet.  In the ER, patient was noted to have blood sugars in the 200s with an anion gap metabolic acidosis.  She was given 2 L of NS and 5 units of regular insulin with improvement in her blood sugars to the 160s but repeat BMP showed worsening metabolic acidosis.  BHOB was elevated at 6.6.  Patient's EKG showed some diffuse ST elevations concerning for pericarditis.  Her troponin was negative.    Interval Problem Update  Reviewed last 24 hour events:    11/2: AG and bicarb improving, still on insulin gtt.     Review of Systems  Review of Systems   Constitutional:  Positive for malaise/fatigue.   Eyes:  Negative for redness.   Respiratory:  Negative for cough.    Cardiovascular:  Positive for chest pain.   Gastrointestinal:  Positive for nausea.   Musculoskeletal:  Negative for falls.   Neurological:  Positive for weakness.        Vital Signs for last 24 hours   Temp:  [36.3 °C (97.3 °F)-37.1 °C (98.7 °F)] 36.8 °C (98.3 °F)  Pulse:  [71-96] 80  Resp:  [12-39] 15  BP: (103-137)/(55-88) 111/69  SpO2:  [98 %-100 %] 99 %    Hemodynamic parameters for last 24 hours       Respiratory Information for the last 24 hours       Physical Exam   Physical Exam  Vitals and nursing note reviewed.   Constitutional:       General: She is not in acute distress.  HENT:      Head: Normocephalic.      Nose: Nose normal.      Mouth/Throat:      Mouth: Mucous membranes are dry.   Eyes:       Conjunctiva/sclera: Conjunctivae normal.   Cardiovascular:      Rate and Rhythm: Normal rate and regular rhythm.   Pulmonary:      Effort: Pulmonary effort is normal. No respiratory distress.   Abdominal:      Palpations: Abdomen is soft.   Musculoskeletal:         General: No deformity.   Skin:     General: Skin is warm and dry.   Neurological:      Mental Status: She is alert. Mental status is at baseline.   Psychiatric:         Mood and Affect: Mood normal.         Medications  Current Facility-Administered Medications   Medication Dose Route Frequency Provider Last Rate Last Admin    insulin GLARGINE (Lantus,Semglee) injection  6 Units Subcutaneous BID Leslye Jose M.D.   6 Units at 11/02/24 0908    And    insulin lispro (HumaLOG,AdmeLOG) subcutaneous injection  3-14 Units Subcutaneous 4X/DAY ACHS Leslye Jose M.D.        And    dextrose 50% (D50W) injection 25 g  25 g Intravenous Q15 MIN PRN Leslye Jose M.D.        oxyCODONE immediate-release (Roxicodone) tablet 5 mg  5 mg Oral Q3HRS PRN Leslye Jose M.D.   5 mg at 11/02/24 1103    oxyCODONE immediate release (Roxicodone) tablet 10 mg  10 mg Oral Q3HRS PRN Leslye Jose M.D.        HYDROmorphone (Dilaudid) injection 0.5 mg  0.5 mg Intravenous Q3HRS PRN Leslye Jose M.D.        potassium chloride in water (Kcl) ivpb **Administer in ICU only** 20 mEq  20 mEq Intravenous Once Leslye Jose M.D. 50 mL/hr at 11/02/24 1008 20 mEq at 11/02/24 1008    D10 1/2 NS infusion   Intravenous Continuous Leslye Jose M.D.   Dose not Required at 11/01/24 1745    D10 1/2 NS infusion   Intravenous Continuous Maria Esther Osborn M.D.   Stopped at 11/02/24 1012    MD ALERT-PHARMACY TO CONSULT FOR DKA MONITORING 1 Each  1 Each Other PRN Leslye Jose M.D.        Adult DKA potassium(K+) replacement scale  1 Each Intravenous Q4HRS Leslye Jose M.D.   1 Each at 11/02/24 1000    NS infusion   Intravenous Continuous  Leslye Jose M.D.   Dose not Required at 11/01/24 1745    enoxaparin (Lovenox) inj 40 mg  40 mg Subcutaneous DAILY AT 1800 DAYNE JiménezDFilemon   40 mg at 11/01/24 1841    ondansetron (Zofran) syringe/vial injection 4 mg  4 mg Intravenous Q4HRS PRN DAYNE JiménezDFilemon   4 mg at 11/02/24 0754    ondansetron (Zofran ODT) dispertab 4 mg  4 mg Oral Q4HRS PRN Leslye Jose M.D.        promethazine (Phenergan) tablet 12.5-25 mg  12.5-25 mg Oral Q4HRS PRN Leslye Jose M.D.        promethazine (Phenergan) suppository 12.5-25 mg  12.5-25 mg Rectal Q4HRS PRN Leslye Jose M.D.        prochlorperazine (Compazine) injection 5-10 mg  5-10 mg Intravenous Q4HRS PRN DAYNE JiménezDFilemon   5 mg at 11/02/24 0527    insulin regular (HumuLIN R/NovoLIN R) 100 Units in  mL infusion premix  5 Units/hr Intravenous Continuous Maria Esther Osborn M.D.   Stopped. (Insulin or Heparin) at 11/02/24 1012    colchicine (Colcrys) tablet 0.6 mg  0.6 mg Oral DAILY Leslye Jose M.D.   0.6 mg at 11/01/24 1841    metroNIDAZOLE (Flagyl) tablet 500 mg  500 mg Oral Q12HRS ANDREA Jiménez.DFilemon   500 mg at 11/02/24 0547    levothyroxine (Synthroid) tablet 200 mcg  200 mcg Oral AM ES Leslye PratherapaANDREA sousa.DFilemon   200 mcg at 11/02/24 0547    ibuprofen (Motrin) tablet 800 mg  800 mg Oral Q6HRS LevDAYNE LucianoDFilemon   800 mg at 11/02/24 1104       Fluids    Intake/Output Summary (Last 24 hours) at 11/2/2024 1109  Last data filed at 11/2/2024 1000  Gross per 24 hour   Intake 7380.82 ml   Output 3175 ml   Net 4205.82 ml       Laboratory  Recent Labs     11/01/24  1312   K4ITEDVPC 100         Recent Labs     11/01/24  1608 11/01/24  2117 11/02/24  0100 11/02/24  0510 11/02/24  0905   SODIUM 138   < > 135 132* 134*   POTASSIUM 3.6   < > 3.6 3.4* 3.6   CHLORIDE 106   < > 106 104 107   CO2 12*   < > 15* 17* 18*   BUN 9   < > 4* 3* 2*   CREATININE 0.50   < > 0.38* 0.38* 0.39*   MAGNESIUM 1.5  --  2.0 1.8  --     PHOSPHORUS 1.7*  --  1.0* 2.4*  --    CALCIUM 7.1*   < > 7.1* 6.9* 7.6*    < > = values in this interval not displayed.     Recent Labs     11/01/24  1205 11/01/24  1608 11/02/24  0100 11/02/24  0510 11/02/24  0905   ALTSGPT 13  --   --   --   --    ASTSGOT 15  --   --   --   --    ALKPHOSPHAT 101*  --   --   --   --    TBILIRUBIN 0.6  --   --   --   --    GLUCOSE 264*   < > 196* 155* 149*    < > = values in this interval not displayed.     Recent Labs     11/01/24  1205 11/02/24  0100   WBC 7.8 6.6   NEUTSPOLYS 66.10 51.40   LYMPHOCYTES 25.80 39.10   MONOCYTES 6.70 8.10   EOSINOPHILS 0.30 0.50   BASOPHILS 0.60 0.60   ASTSGOT 15  --    ALTSGPT 13  --    ALKPHOSPHAT 101*  --    TBILIRUBIN 0.6  --      Recent Labs     11/01/24  1205 11/02/24  0100   RBC 5.57* 4.37   HEMOGLOBIN 14.1 11.0*   HEMATOCRIT 44.5 35.0*   PLATELETCT 258 209       Imaging  Reviewed     Assessment/Plan  * DKA, type 1, not at goal (HCC)- (present on admission)  Assessment & Plan  - IVF boluses as needed  - maintenance IVFs per DKA protocol  - insulin gtt per DKA protocol - will start transition to SQ insulin, she is insulin naive prior to this  - BMP q4h, Mg/phos q8h  - replete electrolytes appropriatley  - diabetic education  - will need to f/u with endocrinology       Bacterial vaginosis  Assessment & Plan  Diagnosed on 10/28 and has been on flagyl but hasn't finished it yet  -finish 7 day course of flagyl    Chest pain  Assessment & Plan  C/f pericarditis with her worsening pain when lying down and diffuse ST elevations on EKG. Trop neg. BNP neg. Procal neg. CRP wnl.  - f/u TTE  - f/u viral serologies  - ibuprofen 800mg q8h and colchicine 0.6mg daily since she's <70 kg      Graves disease- (present on admission)  Assessment & Plan  S/p bilateral total thyroidectomy 03/22/2017   TSH wnl at 4.6  Continue home levothyroxine 200mcg         VTE:  Lovenox  Ulcer: Not Indicated  Lines: Central Line  Ongoing indication addressed    I have performed  a physical exam and reviewed and updated ROS and Plan today (11/2/2024). In review of yesterday's note (11/1/2024), there are no changes except as documented above.     Discussed patient condition and risk of morbidity and/or mortality with RN, RT, Pharmacy, Charge nurse / hot rounds, and Patient  The patient remains critically ill.  Critical care time = 45 minutes in directly providing and coordinating critical care and extensive data review.  No time overlap and excludes procedures.      Leslye Jose MD  Pulmonary and Critical Care Medicine  [facility]

## 2024-11-02 NOTE — ASSESSMENT & PLAN NOTE
- IVF boluses as needed  - maintenance IVFs per DKA protocol  - insulin gtt per DKA protocol - will start transition to SQ insulin, she is insulin naive prior to this  - BMP q4h, Mg/phos q8h  - replete electrolytes appropriatley  - diabetic education  - will need to f/u with endocrinology

## 2024-11-02 NOTE — ASSESSMENT & PLAN NOTE
C/f pericarditis with her worsening pain when lying down and diffuse ST elevations on EKG. Trop neg. BNP neg. Procal neg. CRP wnl.  - f/u TTE  - f/u viral serologies  - ibuprofen 800mg q8h and colchicine 0.6mg daily since she's <70 kg

## 2024-11-02 NOTE — PROCEDURES
Central Line Insertion    Date/Time: 11/1/2024 7:28 PM    Performed by: Leslye Jose M.D.  Authorized by: Leslye Jose M.D.    Consent:     Consent obtained:  Verbal    Consent given by:  Patient    Risks discussed:  Arterial puncture, bleeding, incorrect placement, infection and pneumothorax  Universal protocol:     Procedure explained and questions answered to patient or proxy's satisfaction: yes      Relevant documents present and verified: yes      Test results available and properly labeled: yes      Imaging studies available: yes      Required blood products, implants, devices, and special equipment available: yes      Site/side marked: yes      Immediately prior to procedure, a time out was called: yes      Patient identity confirmed:  Arm band  Pre-procedure details:     Hand hygiene: Hand hygiene performed prior to insertion      Sterile barrier technique: All elements of maximal sterile technique followed      Skin preparation:  ChloraPrep    Skin preparation agent: Skin preparation agent completely dried prior to procedure    Sedation:     Sedation type:  None  Anesthesia:     Anesthesia method:  Local infiltration    Local anesthetic:  Lidocaine 1% w/o epi  Procedure details:     Location:  R internal jugular    Patient position:  Flat    Procedural supplies:  Triple lumen    Landmarks identified: yes      Ultrasound guidance: yes      Sterile ultrasound techniques: Sterile gel and sterile probe covers were used      Number of attempts:  1    Successful placement: yes    Post-procedure details:     Post-procedure:  Dressing applied and line sutured    Guidewire: guidewire removal confirmed      Assessment:  Blood return through all ports and free fluid flow    Patient tolerance of procedure:  Tolerated well, no immediate complications      Pending CXR        Leslye Jose MD  Pulmonary and Critical Care Medicine  [facility]

## 2024-11-02 NOTE — CARE PLAN
The patient is {Patient Stability:[id number]}    Shift Goals  Clinical Goals: Replace electrolytes, FSBG Q1H, CO2 >17, AG <12  Patient Goals: Pain goal <2, rest and feel better  Family Goals: Updates    Progress made toward(s) clinical / shift goals:  ***    Patient is not progressing towards the following goals:      Problem: Pain - Standard  Goal: Alleviation of pain or a reduction in pain to the patient’s comfort goal  Outcome: Not Progressing  Note: The patient has needed pain medication approximately every 2 hours. Non-pharmacological interventions such as heat, and repositioning have helped as well.

## 2024-11-02 NOTE — DIETARY
Nutrition Services: Diabetes Education Consult   Day 1 of admit.  Ivis Klein is a 29 y.o. female with admitting DX of DKA, type 1, not at goal (HCC) [E10.10]    RD able to visit pt at bedside to provide diabetic diet education. Pt is newly diagnosed with late onset T1DM. Pt reports polyuria, polydipsia, and blurry vision pta.   RD discussed symptoms of hyperglycemia, symptoms of hypoglycemia including 15-15 rule, Glycemic targets, balanced plate method, CHO foods, and nutrition label reading. RD provided handouts reinforcing topics discussed. Pt demonstrated readiness and evidence of learning.   Pt was quite lethargic. RD able to answer all questions to patient's satisfaction.     Pt would benefit from continual education while in acute setting. Consider referral to outpatient nutrition services for continuation of education as indicated or per pt preferences.     RD following for continual diet education

## 2024-11-02 NOTE — PROGRESS NOTES
Haresh from Lab called with critical result of phos: 1 at 0146. Critical lab result read back to Haresh.   Dr. Park notified of critical lab result at 0149.  Critical lab result read back by Dr. Park.

## 2024-11-02 NOTE — PROGRESS NOTES
12-hour chart check complete.    Monitor Summary  Rhythm: SR  Rate: 73-87  Ectopy: rPVC/PAC  Measurements: .14/.08/.38

## 2024-11-02 NOTE — PROGRESS NOTES
4 Eyes Skin Assessment Completed by FELISA Baeza and FELISA Fuchs.    Head WDL  Ears WDL  Nose WDL  Mouth WDL  Neck WDL ( Right IJ III Lumen )  Breast/Chest WDL  Shoulder Blades WDL  Spine WDL  (R) Arm/Elbow/Hand WDL  (L) Arm/Elbow/Hand WDL  Abdomen WDL  *Groin/labia =  Rash barrier cream applied; tenderness ; no cuts or raised bumps noted. Pt c/o tenderness with wiping.  Scrotum/Coccyx/Buttocks WDL  (R) Leg WDL  (L) Leg WDL  (R) Heel/Foot/Toe WDL  (L) Heel/Foot/Toe WDL          Devices In Places Blood Pressure Cuff and Pulse Ox      Interventions In Place Pillows    Possible Skin Injury No    Pictures Uploaded Into Epic N/A  Wound Consult Placed N/A  RN Wound Prevention Protocol Ordered No

## 2024-11-03 PROBLEM — E87.6 HYPOKALEMIA: Status: ACTIVE | Noted: 2024-11-03

## 2024-11-03 PROBLEM — E83.42 HYPOMAGNESEMIA: Status: ACTIVE | Noted: 2024-11-03

## 2024-11-03 LAB
ANION GAP SERPL CALC-SCNC: 13 MMOL/L (ref 7–16)
BASOPHILS # BLD AUTO: 0.7 % (ref 0–1.8)
BASOPHILS # BLD AUTO: 0.8 % (ref 0–1.8)
BASOPHILS # BLD: 0.03 K/UL (ref 0–0.12)
BASOPHILS # BLD: 0.04 K/UL (ref 0–0.12)
BUN SERPL-MCNC: <2 MG/DL (ref 8–22)
CALCIUM SERPL-MCNC: 7.9 MG/DL (ref 8.4–10.2)
CHLORIDE SERPL-SCNC: 107 MMOL/L (ref 96–112)
CO2 SERPL-SCNC: 17 MMOL/L (ref 20–33)
CREAT SERPL-MCNC: 0.33 MG/DL (ref 0.5–1.4)
EOSINOPHIL # BLD AUTO: 0.04 K/UL (ref 0–0.51)
EOSINOPHIL # BLD AUTO: 0.04 K/UL (ref 0–0.51)
EOSINOPHIL NFR BLD: 0.8 % (ref 0–6.9)
EOSINOPHIL NFR BLD: 0.9 % (ref 0–6.9)
ERYTHROCYTE [DISTWIDTH] IN BLOOD BY AUTOMATED COUNT: 51.2 FL (ref 35.9–50)
ERYTHROCYTE [DISTWIDTH] IN BLOOD BY AUTOMATED COUNT: 51.7 FL (ref 35.9–50)
GFR SERPLBLD CREATININE-BSD FMLA CKD-EPI: 146 ML/MIN/1.73 M 2
GLUCOSE BLD STRIP.AUTO-MCNC: 129 MG/DL (ref 65–99)
GLUCOSE BLD STRIP.AUTO-MCNC: 156 MG/DL (ref 65–99)
GLUCOSE BLD STRIP.AUTO-MCNC: 217 MG/DL (ref 65–99)
GLUCOSE BLD STRIP.AUTO-MCNC: 222 MG/DL (ref 65–99)
GLUCOSE SERPL-MCNC: 138 MG/DL (ref 65–99)
HCT VFR BLD AUTO: 35.6 % (ref 37–47)
HCT VFR BLD AUTO: 38.1 % (ref 37–47)
HGB BLD-MCNC: 10.8 G/DL (ref 12–16)
HGB BLD-MCNC: 11.7 G/DL (ref 12–16)
IMM GRANULOCYTES # BLD AUTO: 0.01 K/UL (ref 0–0.11)
IMM GRANULOCYTES # BLD AUTO: 0.01 K/UL (ref 0–0.11)
IMM GRANULOCYTES NFR BLD AUTO: 0.2 % (ref 0–0.9)
IMM GRANULOCYTES NFR BLD AUTO: 0.2 % (ref 0–0.9)
LYMPHOCYTES # BLD AUTO: 2.1 K/UL (ref 1–4.8)
LYMPHOCYTES # BLD AUTO: 2.22 K/UL (ref 1–4.8)
LYMPHOCYTES NFR BLD: 42 % (ref 22–41)
LYMPHOCYTES NFR BLD: 45.7 % (ref 22–41)
MAGNESIUM SERPL-MCNC: 1.6 MG/DL (ref 1.5–2.5)
MCH RBC QN AUTO: 24.9 PG (ref 27–33)
MCH RBC QN AUTO: 25 PG (ref 27–33)
MCHC RBC AUTO-ENTMCNC: 30.3 G/DL (ref 32.2–35.5)
MCHC RBC AUTO-ENTMCNC: 30.7 G/DL (ref 32.2–35.5)
MCV RBC AUTO: 81.4 FL (ref 81.4–97.8)
MCV RBC AUTO: 82.2 FL (ref 81.4–97.8)
MONOCYTES # BLD AUTO: 0.49 K/UL (ref 0–0.85)
MONOCYTES # BLD AUTO: 0.55 K/UL (ref 0–0.85)
MONOCYTES NFR BLD AUTO: 12 % (ref 0–13.4)
MONOCYTES NFR BLD AUTO: 9.3 % (ref 0–13.4)
NEUTROPHILS # BLD AUTO: 1.87 K/UL (ref 1.82–7.42)
NEUTROPHILS # BLD AUTO: 2.49 K/UL (ref 1.82–7.42)
NEUTROPHILS NFR BLD: 40.5 % (ref 44–72)
NEUTROPHILS NFR BLD: 46.9 % (ref 44–72)
NRBC # BLD AUTO: 0 K/UL
NRBC # BLD AUTO: 0 K/UL
NRBC BLD-RTO: 0 /100 WBC (ref 0–0.2)
NRBC BLD-RTO: 0 /100 WBC (ref 0–0.2)
PHOSPHATE SERPL-MCNC: 2.1 MG/DL (ref 2.5–4.5)
PLATELET # BLD AUTO: 186 K/UL (ref 164–446)
PLATELET # BLD AUTO: 189 K/UL (ref 164–446)
PMV BLD AUTO: 11.5 FL (ref 9–12.9)
PMV BLD AUTO: 12.3 FL (ref 9–12.9)
POTASSIUM SERPL-SCNC: 3.4 MMOL/L (ref 3.6–5.5)
RBC # BLD AUTO: 4.33 M/UL (ref 4.2–5.4)
RBC # BLD AUTO: 4.68 M/UL (ref 4.2–5.4)
SODIUM SERPL-SCNC: 137 MMOL/L (ref 135–145)
WBC # BLD AUTO: 4.6 K/UL (ref 4.8–10.8)
WBC # BLD AUTO: 5.3 K/UL (ref 4.8–10.8)

## 2024-11-03 PROCEDURE — 83735 ASSAY OF MAGNESIUM: CPT

## 2024-11-03 PROCEDURE — 36415 COLL VENOUS BLD VENIPUNCTURE: CPT

## 2024-11-03 PROCEDURE — 700102 HCHG RX REV CODE 250 W/ 637 OVERRIDE(OP): Performed by: STUDENT IN AN ORGANIZED HEALTH CARE EDUCATION/TRAINING PROGRAM

## 2024-11-03 PROCEDURE — 700102 HCHG RX REV CODE 250 W/ 637 OVERRIDE(OP): Performed by: HOSPITALIST

## 2024-11-03 PROCEDURE — 99233 SBSQ HOSP IP/OBS HIGH 50: CPT | Performed by: INTERNAL MEDICINE

## 2024-11-03 PROCEDURE — A9270 NON-COVERED ITEM OR SERVICE: HCPCS | Performed by: STUDENT IN AN ORGANIZED HEALTH CARE EDUCATION/TRAINING PROGRAM

## 2024-11-03 PROCEDURE — 82962 GLUCOSE BLOOD TEST: CPT

## 2024-11-03 PROCEDURE — A9270 NON-COVERED ITEM OR SERVICE: HCPCS | Performed by: HOSPITALIST

## 2024-11-03 PROCEDURE — 770001 HCHG ROOM/CARE - MED/SURG/GYN PRIV*

## 2024-11-03 PROCEDURE — 94760 N-INVAS EAR/PLS OXIMETRY 1: CPT

## 2024-11-03 PROCEDURE — 700102 HCHG RX REV CODE 250 W/ 637 OVERRIDE(OP): Mod: JZ | Performed by: INTERNAL MEDICINE

## 2024-11-03 PROCEDURE — A9270 NON-COVERED ITEM OR SERVICE: HCPCS | Mod: JZ | Performed by: INTERNAL MEDICINE

## 2024-11-03 PROCEDURE — 700111 HCHG RX REV CODE 636 W/ 250 OVERRIDE (IP): Performed by: INTERNAL MEDICINE

## 2024-11-03 PROCEDURE — 80048 BASIC METABOLIC PNL TOTAL CA: CPT

## 2024-11-03 PROCEDURE — 84100 ASSAY OF PHOSPHORUS: CPT

## 2024-11-03 PROCEDURE — 85025 COMPLETE CBC W/AUTO DIFF WBC: CPT

## 2024-11-03 RX ORDER — MAGNESIUM SULFATE HEPTAHYDRATE 40 MG/ML
2 INJECTION, SOLUTION INTRAVENOUS ONCE
Status: COMPLETED | OUTPATIENT
Start: 2024-11-03 | End: 2024-11-03

## 2024-11-03 RX ORDER — POTASSIUM CHLORIDE 1500 MG/1
40 TABLET, EXTENDED RELEASE ORAL ONCE
Status: COMPLETED | OUTPATIENT
Start: 2024-11-03 | End: 2024-11-03

## 2024-11-03 RX ORDER — INSULIN LISPRO 100 [IU]/ML
3-14 INJECTION, SOLUTION INTRAVENOUS; SUBCUTANEOUS
Status: DISCONTINUED | OUTPATIENT
Start: 2024-11-03 | End: 2024-11-04

## 2024-11-03 RX ORDER — DEXTROSE MONOHYDRATE 25 G/50ML
25 INJECTION, SOLUTION INTRAVENOUS
Status: DISCONTINUED | OUTPATIENT
Start: 2024-11-03 | End: 2024-11-04

## 2024-11-03 RX ORDER — IBUPROFEN 600 MG/1
600 TABLET, FILM COATED ORAL EVERY 8 HOURS
Status: DISCONTINUED | OUTPATIENT
Start: 2024-11-03 | End: 2024-11-04 | Stop reason: HOSPADM

## 2024-11-03 RX ADMIN — POTASSIUM CHLORIDE 40 MEQ: 1500 TABLET, EXTENDED RELEASE ORAL at 09:01

## 2024-11-03 RX ADMIN — DIBASIC SODIUM PHOSPHATE, MONOBASIC POTASSIUM PHOSPHATE AND MONOBASIC SODIUM PHOSPHATE 500 MG: 852; 155; 130 TABLET ORAL at 17:20

## 2024-11-03 RX ADMIN — IBUPROFEN 800 MG: 400 TABLET, FILM COATED ORAL at 05:58

## 2024-11-03 RX ADMIN — COLCHICINE 0.6 MG: 0.6 TABLET, FILM COATED ORAL at 05:58

## 2024-11-03 RX ADMIN — METRONIDAZOLE 500 MG: 500 TABLET ORAL at 05:58

## 2024-11-03 RX ADMIN — INSULIN GLARGINE-YFGN 6 UNITS: 100 INJECTION, SOLUTION SUBCUTANEOUS at 06:04

## 2024-11-03 RX ADMIN — LEVOTHYROXINE SODIUM 200 MCG: 0.1 TABLET ORAL at 05:58

## 2024-11-03 RX ADMIN — INSULIN LISPRO 4 UNITS: 100 INJECTION, SOLUTION INTRAVENOUS; SUBCUTANEOUS at 12:10

## 2024-11-03 RX ADMIN — IBUPROFEN 600 MG: 600 TABLET, FILM COATED ORAL at 21:13

## 2024-11-03 RX ADMIN — INSULIN LISPRO 3 UNITS: 100 INJECTION, SOLUTION INTRAVENOUS; SUBCUTANEOUS at 17:09

## 2024-11-03 RX ADMIN — DIBASIC SODIUM PHOSPHATE, MONOBASIC POTASSIUM PHOSPHATE AND MONOBASIC SODIUM PHOSPHATE 500 MG: 852; 155; 130 TABLET ORAL at 09:00

## 2024-11-03 RX ADMIN — MAGNESIUM SULFATE HEPTAHYDRATE 2 G: 2 INJECTION, SOLUTION INTRAVENOUS at 09:09

## 2024-11-03 RX ADMIN — METRONIDAZOLE 500 MG: 500 TABLET ORAL at 17:04

## 2024-11-03 RX ADMIN — INSULIN LISPRO 4 UNITS: 100 INJECTION, SOLUTION INTRAVENOUS; SUBCUTANEOUS at 21:09

## 2024-11-03 RX ADMIN — IBUPROFEN 800 MG: 400 TABLET, FILM COATED ORAL at 11:54

## 2024-11-03 ASSESSMENT — COGNITIVE AND FUNCTIONAL STATUS - GENERAL
MOBILITY SCORE: 24
DAILY ACTIVITIY SCORE: 24
SUGGESTED CMS G CODE MODIFIER DAILY ACTIVITY: CH
SUGGESTED CMS G CODE MODIFIER MOBILITY: CH

## 2024-11-03 ASSESSMENT — ENCOUNTER SYMPTOMS
NAUSEA: 0
MYALGIAS: 0
BLOOD IN STOOL: 0
FOCAL WEAKNESS: 0
PALPITATIONS: 0
HEARTBURN: 0
ABDOMINAL PAIN: 0
DIARRHEA: 0
SORE THROAT: 0
DEPRESSION: 0
COUGH: 0
VOMITING: 0
HEADACHES: 0
WEAKNESS: 1
CHILLS: 0
SHORTNESS OF BREATH: 0
BACK PAIN: 0
FEVER: 0
DIZZINESS: 0
HALLUCINATIONS: 0

## 2024-11-03 ASSESSMENT — PAIN DESCRIPTION - PAIN TYPE
TYPE: CHRONIC PAIN;ACUTE PAIN
TYPE: CHRONIC PAIN
TYPE: ACUTE PAIN

## 2024-11-03 NOTE — PROGRESS NOTES
Hospital Medicine Daily Progress Note    Date of Service  11/3/2024    Chief Complaint  Ivis Klein is a 29 y.o. female admitted 11/1/2024 with fatigue and weakness    Hospital Course  History of hypothyroidism/Graves' disease status post bilateral total thyroidectomy in March 2017 who presented with 1 month of progressive fatigue and weakness with increased thirst and urination.  She was found to have metabolic acidosis consistent with DKA and a new diagnosis of type 1 diabetes.  She was treated with an insulin drip in the ICU and was transferred to the medical floor once her sugars and metabolic acidosis improved.    Interval Problem Update  11/3: I reviewed her echo which is normal.  EKG had suggested pericarditis and she is on colchicine and ibuprofen.  Increase Lantus sugars are not quite at goal today.  Long discussion with patient a mom re: DM1 and insulin tx.    I have discussed this patient's plan of care and discharge plan at IDT rounds today with Case Management, Nursing, Nursing leadership, and other members of the IDT team.    Consultants/Specialty  critical care    Code Status  Full Code    Disposition  The patient is not medically cleared for discharge to home or a post-acute facility.  Anticipate discharge to: home with close outpatient follow-up    I have placed the appropriate orders for post-discharge needs.    Review of Systems  Review of Systems   Constitutional:  Positive for malaise/fatigue. Negative for chills and fever.   HENT:  Negative for sore throat.    Respiratory:  Negative for cough and shortness of breath.    Cardiovascular:  Negative for chest pain and palpitations.   Gastrointestinal:  Negative for abdominal pain, blood in stool, diarrhea, heartburn, nausea and vomiting.   Genitourinary:  Negative for dysuria and frequency.   Musculoskeletal:  Negative for back pain and myalgias.   Neurological:  Positive for weakness. Negative for dizziness, focal weakness and  headaches.   Psychiatric/Behavioral:  Negative for depression and hallucinations.    All other systems reviewed and are negative.       Physical Exam  Temp:  [36.1 °C (97 °F)-36.6 °C (97.8 °F)] 36.1 °C (97 °F)  Pulse:  [69-76] 76  Resp:  [13-18] 18  BP: (107-123)/(61-74) 123/74  SpO2:  [98 %-100 %] 98 %    Physical Exam  Constitutional:       General: She is not in acute distress.  HENT:      Nose: Nose normal.      Mouth/Throat:      Mouth: Mucous membranes are dry.   Cardiovascular:      Rate and Rhythm: Normal rate and regular rhythm.      Pulses: Normal pulses.      Heart sounds: No murmur heard.     No gallop.      Comments: No rubs  Pulmonary:      Effort: Pulmonary effort is normal. No respiratory distress.      Breath sounds: Normal breath sounds. No wheezing.   Abdominal:      General: There is no distension.      Palpations: Abdomen is soft.   Musculoskeletal:         General: No swelling.      Cervical back: No muscular tenderness.   Lymphadenopathy:      Cervical: No cervical adenopathy.   Skin:     General: Skin is warm and dry.      Findings: No rash.   Neurological:      General: No focal deficit present.      Mental Status: She is alert and oriented to person, place, and time.      Motor: Weakness present.   Psychiatric:         Mood and Affect: Mood normal.         Thought Content: Thought content normal.         Fluids    Intake/Output Summary (Last 24 hours) at 11/3/2024 1450  Last data filed at 11/3/2024 1300  Gross per 24 hour   Intake 720 ml   Output --   Net 720 ml        Laboratory  Recent Labs     11/02/24  0100 11/03/24  0202 11/03/24  0704   WBC 6.6 5.3 4.6*   RBC 4.37 4.33 4.68   HEMOGLOBIN 11.0* 10.8* 11.7*   HEMATOCRIT 35.0* 35.6* 38.1   MCV 80.1* 82.2 81.4   MCH 25.2* 24.9* 25.0*   MCHC 31.4* 30.3* 30.7*   RDW 49.1 51.7* 51.2*   PLATELETCT 209 186 189   MPV 12.1 12.3 11.5     Recent Labs     11/02/24  0905 11/02/24  1300 11/03/24  0202   SODIUM 134* 137 137   POTASSIUM 3.6 3.9 3.4*    CHLORIDE 107 107 107   CO2 18* 17* 17*   GLUCOSE 149* 158* 138*   BUN 2* 2* <2*   CREATININE 0.39* 0.41* 0.33*   CALCIUM 7.6* 7.9* 7.9*                   Imaging  EC-ECHOCARDIOGRAM COMPLETE W/O CONT   Final Result      DX-CHEST-FOR LINE PLACEMENT Perform procedure in: Patient's Room   Final Result      Right IJ central line present without evidence of complication.           Assessment/Plan  * DKA, type 1, not at goal (HCC)- (present on admission)  Assessment & Plan  Presented with increased thirst, profound volume depletion and metabolic acidosis with new diagnosis of type 1 diabetes  A1c 11.1  Was treated in the ICU and has been transition to subcu insulin  Sugars are not quite at goal, greater than 200 today  Increase Lantus to 7 units twice daily  Continue sliding scale  I provided thorough diabetic teaching at bedside today  Nursing to provide teaching while administering subcu insulin today    Hypokalemia  Assessment & Plan  Associated with hypophosphatemia  From DKA  Replace p.o.  Repeat in the morning    Hypomagnesemia  Assessment & Plan  Replace IV  Repeat in AM    Bacterial vaginosis  Assessment & Plan  Continue Flagyl    Chest pain  Assessment & Plan  The ASCVD Risk score ([employer] ANGEL, et al., 2019) failed to calculate for the following reasons:    The 2019 ASCVD risk score is only valid for ages 40 to 79  Echocardiogram was done and is normal, no pericardial effusion, normal EF  EKG showed diffuse ST elevation concerning for possible pericarditis  Continue ibuprofen  Continue colchicine    Graves disease- (present on admission)  Assessment & Plan  Followed by endocrine as an outpatient however is currently between providers  Will need outpatient endocrinology follow-up  Continue Synthroid 200 mcg daily         VTE prophylaxis: Lovenox    I have performed a physical exam and reviewed and updated ROS and Plan today (11/3/2024). In review of yesterday's note (11/2/2024), there are no changes except as  documented above.    Total time:  55 minutes.  I spent greater than 50% of the time for patient care, counseling, and coordination on this date, including unit/floor time, and face-to-face time with the patient as per interval events and assessment and plan above

## 2024-11-03 NOTE — HOSPITAL COURSE
History of hypothyroidism/Graves' disease status post bilateral total thyroidectomy in March 2017 who presented with 1 month of progressive fatigue and weakness with increased thirst and urination.  She was found to have metabolic acidosis consistent with DKA and a new diagnosis of type 1 diabetes.  She was treated with an insulin drip in the ICU and was transferred to the medical floor once her sugars and metabolic acidosis improved.

## 2024-11-03 NOTE — PROGRESS NOTES
Received report from day shift RN. Patient is A&O4, on RA, no SOB noted. No signs of distress or discomfort. Patient denies having any pain or nausea, at this time. Plan of care for the night discussed with patient. Patient verbalized understanding. Patient resting in bed comfortably. Safety precautions in place. All patient belongings and call light within reach. All needs addressed at this time. Patient will call with any needs. Family at bedside.

## 2024-11-03 NOTE — ASSESSMENT & PLAN NOTE
Presented with increased thirst, profound volume depletion and metabolic acidosis with new diagnosis of type 1 diabetes  A1c 11.1  Was treated in the ICU and has been transition to subcu insulin  Sugars are not quite at goal, greater than 200 today  Increase Lantus to 7 units twice daily  Continue sliding scale  I provided thorough diabetic teaching at bedside today  Nursing to provide teaching while administering subcu insulin today

## 2024-11-03 NOTE — PROGRESS NOTES
Received report from night shift RN. Assumed pt care 0700  Pt is A&Ox4, resting comfortably in bed. Plan of care reviewed for activities and goals this shift. Medication eduction provided.  Pt verbalizes understanding of plan of care for this shift. Patients needs attended well. Fall precautions in place. Bed at lowest position. Call light and personal belongings within reach.   Hourly rounding in progress.    Diabetic education provided at lunch meal time. Pt performed her own finger stick and glucometer use. RN instructions provided with each step for safety.  Pt demonstrated she is competent with giving her self injections and checking her blood sugar prior to meal. Sliding scale instructions reviewed. Types of insulin prescribed and administered reviewed.

## 2024-11-03 NOTE — CARE PLAN
The patient is Stable - Low risk of patient condition declining or worsening    Shift Goals  Clinical Goals: Patient pain will be managed at a tolerable level of 2/10 or less throughout night, Patient will remain free from fall or injury, Monitor glucose levels, Diabetes education  Patient Goals: Rest comfortably, pain management, Understand diabetes  Family Goals: n/a    Progress made toward(s) clinical / shift goals:  Patient's pain maintained at a tolerable level of 2/10 or less throughout shift with medication per MAR and rest. Patient did not sustain a fall or injury during shift. Patient able to rest comfortably throughout shift. Glucose levels monitored and, insulin administered per orders and MAR. Pt beginning to understand diabetes.     Patient is not progressing towards the following goals:

## 2024-11-03 NOTE — CARE PLAN
The patient is Stable - Low risk of patient condition declining or worsening    Shift Goals  Clinical Goals: Patient pain will be managed at a tolerable level of 2/10 or less throughout night, Patient will remain free from fall or injury, Monitor glucose levels, Dabetes education  Patient Goals: Rest comfortably, pain management, Understand diabetes  Family Goals: n/a    Progress made toward(s) clinical / shift goals:  in progress    Patient is not progressing towards the following goals:

## 2024-11-03 NOTE — ASSESSMENT & PLAN NOTE
Followed by endocrine as an outpatient however is currently between providers  Will need outpatient endocrinology follow-up  Continue Synthroid 200 mcg daily

## 2024-11-03 NOTE — ASSESSMENT & PLAN NOTE
The ASCVD Risk score ([employer] ANGEL, et al., 2019) failed to calculate for the following reasons:    The 2019 ASCVD risk score is only valid for ages 40 to 79  Echocardiogram was done and is normal, no pericardial effusion, normal EF  EKG showed diffuse ST elevation concerning for possible pericarditis  Continue ibuprofen  Continue colchicine

## 2024-11-04 ENCOUNTER — PHARMACY VISIT (OUTPATIENT)
Dept: PHARMACY | Facility: MEDICAL CENTER | Age: 29
End: 2024-11-04
Payer: COMMERCIAL

## 2024-11-04 VITALS
SYSTOLIC BLOOD PRESSURE: 118 MMHG | RESPIRATION RATE: 17 BRPM | TEMPERATURE: 97.3 F | HEIGHT: 63 IN | BODY MASS INDEX: 24.53 KG/M2 | WEIGHT: 138.45 LBS | HEART RATE: 91 BPM | DIASTOLIC BLOOD PRESSURE: 78 MMHG | OXYGEN SATURATION: 99 %

## 2024-11-04 LAB
ALBUMIN SERPL BCP-MCNC: 3.4 G/DL (ref 3.2–4.9)
BASOPHILS # BLD AUTO: 1.1 % (ref 0–1.8)
BASOPHILS # BLD: 0.06 K/UL (ref 0–0.12)
BUN SERPL-MCNC: 4 MG/DL (ref 8–22)
CALCIUM ALBUM COR SERPL-MCNC: 8.9 MG/DL (ref 8.5–10.5)
CALCIUM SERPL-MCNC: 8.4 MG/DL (ref 8.4–10.2)
CHLORIDE SERPL-SCNC: 105 MMOL/L (ref 96–112)
CO2 SERPL-SCNC: 17 MMOL/L (ref 20–33)
CREAT SERPL-MCNC: 0.42 MG/DL (ref 0.5–1.4)
EOSINOPHIL # BLD AUTO: 0.04 K/UL (ref 0–0.51)
EOSINOPHIL NFR BLD: 0.8 % (ref 0–6.9)
ERYTHROCYTE [DISTWIDTH] IN BLOOD BY AUTOMATED COUNT: 57.1 FL (ref 35.9–50)
GFR SERPLBLD CREATININE-BSD FMLA CKD-EPI: 137 ML/MIN/1.73 M 2
GLUCOSE BLD STRIP.AUTO-MCNC: 146 MG/DL (ref 65–99)
GLUCOSE BLD STRIP.AUTO-MCNC: 90 MG/DL (ref 65–99)
GLUCOSE SERPL-MCNC: 98 MG/DL (ref 65–99)
HCT VFR BLD AUTO: 40.9 % (ref 37–47)
HGB BLD-MCNC: 11.7 G/DL (ref 12–16)
IMM GRANULOCYTES # BLD AUTO: 0.01 K/UL (ref 0–0.11)
IMM GRANULOCYTES NFR BLD AUTO: 0.2 % (ref 0–0.9)
LYMPHOCYTES # BLD AUTO: 2.84 K/UL (ref 1–4.8)
LYMPHOCYTES NFR BLD: 53.5 % (ref 22–41)
MAGNESIUM SERPL-MCNC: 2 MG/DL (ref 1.5–2.5)
MCH RBC QN AUTO: 25.1 PG (ref 27–33)
MCHC RBC AUTO-ENTMCNC: 28.6 G/DL (ref 32.2–35.5)
MCV RBC AUTO: 87.8 FL (ref 81.4–97.8)
MONOCYTES # BLD AUTO: 0.49 K/UL (ref 0–0.85)
MONOCYTES NFR BLD AUTO: 9.2 % (ref 0–13.4)
NEUTROPHILS # BLD AUTO: 1.87 K/UL (ref 1.82–7.42)
NEUTROPHILS NFR BLD: 35.2 % (ref 44–72)
NRBC # BLD AUTO: 0 K/UL
NRBC BLD-RTO: 0 /100 WBC (ref 0–0.2)
PHOSPHATE SERPL-MCNC: 2.5 MG/DL (ref 2.5–4.5)
PLATELET # BLD AUTO: 184 K/UL (ref 164–446)
PMV BLD AUTO: 12.2 FL (ref 9–12.9)
POTASSIUM SERPL-SCNC: 3.6 MMOL/L (ref 3.6–5.5)
RBC # BLD AUTO: 4.66 M/UL (ref 4.2–5.4)
SODIUM SERPL-SCNC: 136 MMOL/L (ref 135–145)
WBC # BLD AUTO: 5.3 K/UL (ref 4.8–10.8)

## 2024-11-04 PROCEDURE — 36415 COLL VENOUS BLD VENIPUNCTURE: CPT

## 2024-11-04 PROCEDURE — 700102 HCHG RX REV CODE 250 W/ 637 OVERRIDE(OP): Performed by: INTERNAL MEDICINE

## 2024-11-04 PROCEDURE — 82962 GLUCOSE BLOOD TEST: CPT

## 2024-11-04 PROCEDURE — 700102 HCHG RX REV CODE 250 W/ 637 OVERRIDE(OP): Performed by: STUDENT IN AN ORGANIZED HEALTH CARE EDUCATION/TRAINING PROGRAM

## 2024-11-04 PROCEDURE — A9270 NON-COVERED ITEM OR SERVICE: HCPCS | Performed by: INTERNAL MEDICINE

## 2024-11-04 PROCEDURE — 80069 RENAL FUNCTION PANEL: CPT

## 2024-11-04 PROCEDURE — 85025 COMPLETE CBC W/AUTO DIFF WBC: CPT

## 2024-11-04 PROCEDURE — A9270 NON-COVERED ITEM OR SERVICE: HCPCS | Performed by: STUDENT IN AN ORGANIZED HEALTH CARE EDUCATION/TRAINING PROGRAM

## 2024-11-04 PROCEDURE — 99239 HOSP IP/OBS DSCHRG MGMT >30: CPT | Performed by: INTERNAL MEDICINE

## 2024-11-04 PROCEDURE — 83735 ASSAY OF MAGNESIUM: CPT

## 2024-11-04 PROCEDURE — RXMED WILLOW AMBULATORY MEDICATION CHARGE: Performed by: INTERNAL MEDICINE

## 2024-11-04 PROCEDURE — 94760 N-INVAS EAR/PLS OXIMETRY 1: CPT

## 2024-11-04 RX ORDER — COLCHICINE 0.6 MG/1
0.6 TABLET ORAL DAILY
Qty: 30 TABLET | Refills: 1 | Status: SHIPPED | OUTPATIENT
Start: 2024-11-04 | End: 2024-11-08

## 2024-11-04 RX ORDER — DIPHENHYDRAMINE HYDROCHLORIDE 25 MG/1
CAPSULE, LIQUID FILLED ORAL
Qty: 1 KIT | Refills: 0 | Status: SHIPPED | OUTPATIENT
Start: 2024-11-04

## 2024-11-04 RX ORDER — DEXTROSE MONOHYDRATE 25 G/50ML
25 INJECTION, SOLUTION INTRAVENOUS
Status: DISCONTINUED | OUTPATIENT
Start: 2024-11-04 | End: 2024-11-04 | Stop reason: HOSPADM

## 2024-11-04 RX ORDER — GLUCOSAMINE HCL/CHONDROITIN SU 500-400 MG
CAPSULE ORAL
Qty: 100 EACH | Refills: 0 | Status: SHIPPED | OUTPATIENT
Start: 2024-11-04

## 2024-11-04 RX ORDER — INSULIN LISPRO 100 [IU]/ML
3-14 INJECTION, SOLUTION INTRAVENOUS; SUBCUTANEOUS
Status: DISCONTINUED | OUTPATIENT
Start: 2024-11-04 | End: 2024-11-04 | Stop reason: HOSPADM

## 2024-11-04 RX ORDER — INSULIN LISPRO 100 [IU]/ML
1-10 INJECTION, SOLUTION INTRAVENOUS; SUBCUTANEOUS
Qty: 12 ML | Refills: 0 | Status: ACTIVE | OUTPATIENT
Start: 2024-11-04 | End: 2024-11-21 | Stop reason: SDUPTHER

## 2024-11-04 RX ORDER — LANCETS 30 GAUGE
EACH MISCELLANEOUS
Qty: 100 EACH | Refills: 0 | Status: SHIPPED | OUTPATIENT
Start: 2024-11-04

## 2024-11-04 RX ORDER — FLUCONAZOLE 100 MG/1
150 TABLET ORAL ONCE
Status: COMPLETED | OUTPATIENT
Start: 2024-11-04 | End: 2024-11-04

## 2024-11-04 RX ORDER — FLUCONAZOLE 150 MG/1
150 TABLET ORAL ONCE
Qty: 1 TABLET | Refills: 0 | Status: ACTIVE | OUTPATIENT
Start: 2024-11-07 | End: 2024-11-07

## 2024-11-04 RX ORDER — IBUPROFEN 600 MG/1
600 TABLET, FILM COATED ORAL EVERY 8 HOURS
COMMUNITY
Start: 2024-11-04 | End: 2024-11-11

## 2024-11-04 RX ORDER — INSULIN GLARGINE 100 [IU]/ML
6 INJECTION, SOLUTION SUBCUTANEOUS 2 TIMES DAILY
Qty: 6 ML | Refills: 0 | Status: ACTIVE | OUTPATIENT
Start: 2024-11-04 | End: 2024-11-21 | Stop reason: SDUPTHER

## 2024-11-04 RX ADMIN — COLCHICINE 0.6 MG: 0.6 TABLET, FILM COATED ORAL at 05:50

## 2024-11-04 RX ADMIN — IBUPROFEN 600 MG: 600 TABLET, FILM COATED ORAL at 05:50

## 2024-11-04 RX ADMIN — FLUCONAZOLE 150 MG: 100 TABLET ORAL at 11:21

## 2024-11-04 RX ADMIN — LEVOTHYROXINE SODIUM 200 MCG: 0.1 TABLET ORAL at 05:50

## 2024-11-04 RX ADMIN — DIBASIC SODIUM PHOSPHATE, MONOBASIC POTASSIUM PHOSPHATE AND MONOBASIC SODIUM PHOSPHATE 500 MG: 852; 155; 130 TABLET ORAL at 05:50

## 2024-11-04 ASSESSMENT — PAIN DESCRIPTION - PAIN TYPE: TYPE: CHRONIC PAIN

## 2024-11-04 NOTE — DISCHARGE INSTRUCTIONS
Alternate days with colchicine: .6mg every other day until Wednesday 11/20 then resume daily through 1/1/25

## 2024-11-04 NOTE — PROGRESS NOTES
BSSR received from day shift RN. Patient laying in bed in no apparent distress with no complaints. A&O X4. Family at bedside. Plan of care discussed with patient. Bed in low position with bed brakes applied and call light in reach. Patient states no needs at this time.

## 2024-11-04 NOTE — DISCHARGE SUMMARY
Discharge Summary    CHIEF COMPLAINT ON ADMISSION  Chief Complaint   Patient presents with    Anxiety    Weakness     Pt reports lethargy and generally feeling run down x  1 month.     High Blood Sugar     219 mg/dl in triage. Pt not known diabetic but had multiple visits to ed she was told she had high blood glucose. Has not followed up with PCP.        Reason for Admission  Shortness of Breath;  Blurred visi*     Admission Date  11/1/2024    CODE STATUS  Full Code    HPI & HOSPITAL COURSE  This is a 29 y.o. female with a History of hypothyroidism/Graves' disease status post bilateral total thyroidectomy in March 2017 who presented with 1 month of progressive fatigue and weakness with increased thirst and urination.  She was found to have metabolic acidosis consistent with DKA and a new diagnosis of type 1 diabetes.  She was treated with an insulin drip in the ICU and was transferred to the medical floor once her sugars and metabolic acidosis improved.    Her Lantus was titrated and she had good control of her blood glucose levels.  She will discharge on insulin, presuming her diabetes is type I.  Currently she will take twice daily Lantus and sliding scale.  She will consider CGM as well as possibly an insulin pump in the future.  She was advised to follow-up with endocrinology for further adjustments of her diabetic medications.    Therefore, she is discharged in good and stable condition to home with close outpatient follow-up.    The patient met 2-midnight criteria for an inpatient stay at the time of discharge.    Discharge Date  11/4/2024    FOLLOW UP ITEMS POST DISCHARGE  Follow-up with endocrinology to discuss diabetic management  Follow-up with PCP    DISCHARGE DIAGNOSES  Principal Problem:    DKA, type 1, not at goal (HCC) (POA: Yes)  Active Problems:    Graves disease (POA: Yes)    Chest pain (POA: Unknown)    Bacterial vaginosis (POA: Unknown)    Hypomagnesemia (POA: Unknown)    Hypokalemia (POA:  Unknown)  Resolved Problems:    * No resolved hospital problems. *      FOLLOW UP  Reestablish care with endocrinology    MEDICATIONS ON DISCHARGE     Medication List        START taking these medications        Instructions   Alcohol Swabs   Doctor's comments: Per formulary preference. ICD-10 code: E10.65 - Uncontrolled type 1 Diabetes Mellitus  Wipe site with prep pad prior to injection.     Blood Glucose Monitor System w/Device Kit   Doctor's comments: Or per formulary preference. ICD-10 code: E10.65 - Uncontrolled type 1 Diabetes Mellitus  Test blood sugar as recommended by provider.     colchicine 0.6 MG Tabs  Commonly known as: Colcrys   Take 1 Tablet by mouth every day for 60 days.  Dose: 0.6 mg     glucose blood strip   Doctor's comments: Or per formulary preference. ICD-10 code: E10.65 - Uncontrolled type 1 Diabetes Mellitus  Use one strip to test blood sugar three times daily before meals.     ibuprofen 600 MG Tabs  Commonly known as: Motrin   Take 1 Tablet by mouth every 8 hours for 7 days.  Dose: 600 mg     insulin lispro 100 UNIT/ML Sopn injection PEN  Commonly known as: HumaLOG/AdmeLOG   Doctor's comments: 70   - 150  mg/dL =    0 Units  151 - 200  mg/dL =    3 Units  201 - 250  mg/dL =    4 Units  251 - 300  mg/dL  =   5 Units  301 - 350  mg/dL  =   8 Units  Over 351  mg/dL  =   9 Units  Inject 1-10 Units under the skin 4 Times a Day,Before Meals and at Bedtime.  Dose: 1-10 Units     Insulin Pen Needle 32 G x 4 mm   Doctor's comments: Per patient/formulary preference. ICD-10 code: E10.65 - Uncontrolled type 1 Diabetes Mellitus  Use one pen needle in pen device to inject insulin five times daily.     Lancets   Doctor's comments: Or per formulary preference. ICD-10 code: E10.65 - Uncontrolled type 1 Diabetes Mellitus  Use one Abbott Freestyle Lite lancet to test blood sugar three times daily before meals.     Lantus SoloStar 100 UNIT/ML Sopn injection  Generic drug: insulin glargine   Inject 6 Units  under the skin 2 times a day.  Dose: 6 Units     phosphorus 250 MG tablet  Commonly known as: K-Phos-Neutral   Take 2 Tablets by mouth 2 times a day for 2 doses.  Dose: 2 Tablet            CHANGE how you take these medications        Instructions   clotrimazole 1 % Crea  What changed:   when to take this  reasons to take this  Commonly known as: Lotrimin   Apply 1 Application topically 2 times a day.  Dose: 1 Application            CONTINUE taking these medications        Instructions   levothyroxine 200 MCG Tabs  Commonly known as: Synthroid   TAKE 1 TABLET BY MOUTH IN THE MORNING ON AN EMPTY STOMACH  Dose: 200 mcg     metroNIDAZOLE 500 MG Tabs  Commonly known as: Flagyl   Take 1 Tablet by mouth 2 times a day for 7 days.  Dose: 500 mg     Mirena (52 MG) 20 MCG/DAY IUD  Generic drug: levonorgestrel   1 Each by Intrauterine route see administration instructions. Every 5 years, last placed 2020  Dose: 1 Each              Allergies  No Known Allergies    DIET  Orders Placed This Encounter   Procedures    Diet Order Diet: Consistent CHO (Diabetic)     Standing Status:   Standing     Number of Occurrences:   1     Order Specific Question:   Diet:     Answer:   Consistent CHO (Diabetic) [4]       ACTIVITY  As tolerated.  Weight bearing as tolerated    CONSULTATIONS  Critical care    PROCEDURES  None    LABORATORY  Lab Results   Component Value Date    SODIUM 136 11/04/2024    POTASSIUM 3.6 11/04/2024    CHLORIDE 105 11/04/2024    CO2 17 (L) 11/04/2024    GLUCOSE 98 11/04/2024    BUN 4 (L) 11/04/2024    CREATININE 0.42 (L) 11/04/2024    CREATININE 0.6 01/31/2009        Lab Results   Component Value Date    WBC 5.3 11/04/2024    HEMOGLOBIN 11.7 (L) 11/04/2024    HEMATOCRIT 40.9 11/04/2024    PLATELETCT 184 11/04/2024        Total time of the discharge process exceeds 42 minutes.

## 2024-11-04 NOTE — CARE PLAN
The patient is Stable - Low risk of patient condition declining or worsening    Shift Goals  Clinical Goals: Patient will be free from falls or other injury overnight, Monitor blood sugar levels and diabetic education  Patient Goals: Sleep/rest overnight  Family Goals: n/a    Progress made toward(s) clinical / shift goals:  Patient has had no falls overnight and patient continues diabetic education.     Patient is not progressing towards the following goals:

## 2024-11-05 LAB
CMV IGG SERPL IA-ACNC: 2.2 U/ML
CMV IGM SERPL IA-ACNC: <8 AU/ML
EBV EA-D IGG SER-ACNC: <5 U/ML (ref 0–10.9)
EBV NA IGG SER IA-ACNC: 184 U/ML (ref 0–21.9)
EBV VCA IGG SER IA-ACNC: 474 U/ML (ref 0–21.9)
EBV VCA IGM SER IA-ACNC: <10 U/ML (ref 0–43.9)

## 2024-11-06 LAB
ANNOTATION COMMENT IMP: NOT DETECTED
B19V DNA SERPL NAA+PROBE-ACNC: <100 IU/ML
B19V DNA SERPL NAA+PROBE-LOG IU: <2 LOG IU/ML
SPECIMEN SOURCE: NORMAL

## 2024-11-08 ENCOUNTER — OFFICE VISIT (OUTPATIENT)
Dept: INTERNAL MEDICINE | Facility: OTHER | Age: 29
End: 2024-11-08
Payer: MEDICAID

## 2024-11-08 VITALS
BODY MASS INDEX: 25.58 KG/M2 | TEMPERATURE: 98.6 F | DIASTOLIC BLOOD PRESSURE: 75 MMHG | OXYGEN SATURATION: 97 % | WEIGHT: 144.4 LBS | SYSTOLIC BLOOD PRESSURE: 110 MMHG | HEART RATE: 87 BPM

## 2024-11-08 DIAGNOSIS — E05.00 GRAVES DISEASE: ICD-10-CM

## 2024-11-08 DIAGNOSIS — E66.3 OVERWEIGHT (BMI 25.0-29.9): ICD-10-CM

## 2024-11-08 DIAGNOSIS — E10.10 DKA, TYPE 1, NOT AT GOAL (HCC): ICD-10-CM

## 2024-11-08 DIAGNOSIS — E10.69 TYPE 1 DIABETES MELLITUS WITH OTHER SPECIFIED COMPLICATION (HCC): ICD-10-CM

## 2024-11-08 DIAGNOSIS — E89.0 POSTSURGICAL HYPOTHYROIDISM: ICD-10-CM

## 2024-11-08 DIAGNOSIS — E03.9 HYPOTHYROIDISM, UNSPECIFIED TYPE: ICD-10-CM

## 2024-11-08 PROBLEM — E87.6 HYPOKALEMIA: Status: RESOLVED | Noted: 2024-11-03 | Resolved: 2024-11-08

## 2024-11-08 PROBLEM — R07.9 CHEST PAIN: Status: RESOLVED | Noted: 2024-11-01 | Resolved: 2024-11-08

## 2024-11-08 PROBLEM — E83.42 HYPOMAGNESEMIA: Status: RESOLVED | Noted: 2024-11-03 | Resolved: 2024-11-08

## 2024-11-08 PROCEDURE — 3074F SYST BP LT 130 MM HG: CPT | Mod: GC

## 2024-11-08 PROCEDURE — 3078F DIAST BP <80 MM HG: CPT | Mod: GC

## 2024-11-08 PROCEDURE — 99214 OFFICE O/P EST MOD 30 MIN: CPT | Mod: GC

## 2024-11-08 RX ORDER — ACYCLOVIR 800 MG/1
1 TABLET ORAL CONTINUOUS
Qty: 1 EACH | Refills: 2 | Status: SHIPPED | OUTPATIENT
Start: 2024-11-08

## 2024-11-08 ASSESSMENT — ENCOUNTER SYMPTOMS
BACK PAIN: 0
MYALGIAS: 0
FEVER: 0
DIZZINESS: 0
SORE THROAT: 0
CHILLS: 0
NAUSEA: 0
HEADACHES: 0
BLURRED VISION: 1
PALPITATIONS: 0
COUGH: 0
SHORTNESS OF BREATH: 0
ABDOMINAL PAIN: 0
VOMITING: 0

## 2024-11-08 ASSESSMENT — FIBROSIS 4 INDEX: FIB4 SCORE: 0.61

## 2024-11-08 NOTE — PROGRESS NOTES
Reestablish    History of Present Illness:   Ivis Klein is a 29 y.o. female with PMH relevant for hypothyroidism and type 1 diabetes who presents with hospital follow-up after DKA.  Patient was recently in the hospital from 11/1 to 11/4 due to DKA.  Patient had symptoms of progressive fatigue, weakness, polyuria, and polydipsia.  She was found to have new onset type 1 diabetes with A1c of 11.8%.  She was discharged on Lantus 6 units in the morning and in the evening as well as sliding scale.  At her visit today, she states that her symptoms have resolved.  However, she still has some blurry vision.       Past Medical History:   Diagnosis Date    Anemia 02/17/2022    Anxiety 02/16/2017    Anxiety 02/16/2017    Elevated antinuclear antibody (DEJON) level 05/23/2019    Graves disease 12/05/2016    Heart valve disease     Hemorrhagic cysts of both ovaries 05/23/2019    History of panic attack 04/09/2020    History of pericarditis 12/05/2016    History of thyroidectomy 01/31/2020    Partial --> hypothyroidism     Panic attack 03/11/2019    Postpartum depression 02/17/2022    Postpartum depression 02/17/2022    Vaginal discharge 12/02/2021       Past Surgical History:   Procedure Laterality Date    JUNIE BY LAPAROSCOPY N/A 8/28/2024    Procedure: CHOLECYSTECTOMY, LAPAROSCOPIC;  Surgeon: Charles Rodas M.D.;  Location: SURGERY [facility];  Service: General    THYROIDECTOMY TOTAL Bilateral 3/22/2017    Procedure: THYROIDECTOMY TOTAL -NIMS RECURRENT LARYNGEAL NERVE MONITORING;  Surgeon: Vicente Eldridge M.D.;  Location: SURGERY SAME DAY [facility];  Service:     PRIMARY C SECTION  9/8/2013    Performed by Melisa Wright M.D. at LABOR AND DELIVERY       Family History   Problem Relation Age of Onset    Cancer Paternal Grandmother 56        breast cancer    No Known Problems Mother     Hyperlipidemia Father     No Known Problems Sister     No Known Problems Brother        Social History      Socioeconomic History    Marital status: Single     Spouse name: Not on file    Number of children: Not on file    Years of education: Not on file    Highest education level: Not on file   Occupational History    Occupation: myles     Comment: [employer]   Tobacco Use    Smoking status: Never    Smokeless tobacco: Never    Tobacco comments:     quit in 2012   Vaping Use    Vaping status: Never Used   Substance and Sexual Activity    Alcohol use: Yes    Drug use: Yes     Types: Marijuana, Inhaled     Comment: marijuana weekly    Sexual activity: Yes     Partners: Male     Birth control/protection: I.U.D.     Comment: single   Other Topics Concern    Not on file   Social History Narrative    Not on file     Social Drivers of Health     Financial Resource Strain: Not on file   Food Insecurity: No Food Insecurity (11/1/2024)    Hunger Vital Sign     Worried About Running Out of Food in the Last Year: Never true     Ran Out of Food in the Last Year: Never true   Transportation Needs: No Transportation Needs (11/1/2024)    PRAPARE - Transportation     Lack of Transportation (Medical): No     Lack of Transportation (Non-Medical): No   Physical Activity: Not on file   Stress: Not on file   Social Connections: Not on file   Intimate Partner Violence: Not At Risk (11/1/2024)    Humiliation, Afraid, Rape, and Kick questionnaire     Fear of Current or Ex-Partner: No     Emotionally Abused: No     Physically Abused: No     Sexually Abused: No   Housing Stability: Low Risk  (11/1/2024)    Housing Stability Vital Sign     Unable to Pay for Housing in the Last Year: No     Number of Times Moved in the Last Year: 1     Homeless in the Last Year: No       Current Outpatient Medications   Medication Sig Dispense Refill    Continuous Glucose Sensor (FREESTYLE MIRTA 3 SENSOR) Misc 1 Application continuous. 1 Each 2    ibuprofen (MOTRIN) 600 MG Tab Take 1 Tablet by mouth every 8 hours for 7 days.      insulin glargine (LANTUS  SOLOSTAR) 100 UNIT/ML Solution Pen-injector injection Inject 6 Units under the skin 2 times a day. 6 mL 0    insulin lispro 100 UNIT/ML SC SOPN injection PEN Inject 1-10 Units under the skin 4 Times a Day,Before Meals and at Bedtime.   70 -150  mg/dL = 0 Units  151 - 200mg/dL = 3 Units  201 - 250mg/dL =  4 Units  251 - 300mg/dL = 5 Units  301 - 350mg/dL = 8 Units  Over 351mg/dL  = 9 Units 12 mL 0    Blood Glucose Monitoring Suppl (BLOOD GLUCOSE MONITOR SYSTEM) w/Device Kit Test blood sugar as recommended by provider. 1 Kit 0    glucose blood strip Use one strip to test blood sugar three times daily before meals. 100 Strip 0    Lancets Use one lancet to test blood sugar three times daily before meals. 100 Each 0    Alcohol Swabs Wipe site with prep pad prior to injection. 100 Each 0    Insulin Pen Needle 32 G x 4 mm Use one pen needle in pen device to inject insulin five times daily. 200 Each 0    levothyroxine (SYNTHROID) 200 MCG Tab TAKE 1 TABLET BY MOUTH IN THE MORNING ON AN EMPTY STOMACH 30 Tablet 0    levonorgestrel (MIRENA, 52 MG,) 20 MCG/DAY IUD 1 Each by Intrauterine route see administration instructions. Every 5 years, last placed 2020       No current facility-administered medications for this visit.       No Known Allergies    Review of Systems:  Review of Systems   Constitutional:  Negative for chills and fever.   HENT:  Negative for congestion and sore throat.    Eyes:  Positive for blurred vision.   Respiratory:  Negative for cough and shortness of breath.    Cardiovascular:  Negative for chest pain and palpitations.   Gastrointestinal:  Negative for abdominal pain, nausea and vomiting.   Genitourinary:  Negative for dysuria and hematuria.   Musculoskeletal:  Negative for back pain and myalgias.   Skin:  Negative for itching and rash.   Neurological:  Negative for dizziness and headaches.        Medications:     Current Outpatient Medications:     FreeStyle Kianna 3 Sensor, 1 Application, Does not apply,  Continuous, Taking    ibuprofen, 600 mg, Oral, Q8HRS, PRN    Lantus SoloStar, 6 Units, Subcutaneous, BID, Taking    insulin lispro, 1-10 Units, Subcutaneous, 4X/DAY ACHS, Taking    Blood Glucose Monitor System, Test blood sugar as recommended by provider., Taking    glucose blood, Use one strip to test blood sugar three times daily before meals., Taking    Lancets, Use one lancet to test blood sugar three times daily before meals., Taking    Alcohol Swabs, Wipe site with prep pad prior to injection., Taking    Insulin Pen Needle 32 G x 4 mm, Use one pen needle in pen device to inject insulin five times daily., Taking    levothyroxine, 200 mcg, Oral, AM ES, Taking    Mirena (52 MG), 1 Each, Intrauterine, See Admin Instructions, Taking     Objective:  Vitals:   /75 (BP Location: Left arm, Patient Position: Sitting, BP Cuff Size: Adult)   Pulse 87   Temp 37 °C (98.6 °F) (Temporal)   Wt 65.5 kg (144 lb 6.4 oz)   SpO2 97%  Body mass index is 25.58 kg/m².    Physical Exam  Constitutional:       General: She is not in acute distress.     Appearance: Normal appearance.   HENT:      Head: Normocephalic and atraumatic.   Eyes:      Extraocular Movements: Extraocular movements intact.      Pupils: Pupils are equal, round, and reactive to light.   Cardiovascular:      Rate and Rhythm: Normal rate and regular rhythm.   Pulmonary:      Effort: No respiratory distress.      Breath sounds: Normal breath sounds.   Abdominal:      General: There is no distension.      Palpations: Abdomen is soft.   Neurological:      General: No focal deficit present.      Mental Status: She is oriented to person, place, and time.          Results:    Lab Results   Component Value Date/Time    CHOLSTRLTOT 207 (H) 09/23/2022 10:25 AM     (H) 09/23/2022 10:25 AM    HDL 45 09/23/2022 10:25 AM    TRIGLYCERIDE 179 (H) 09/23/2022 10:25 AM       Lab Results   Component Value Date/Time    SODIUM 136 11/04/2024 02:40 AM    POTASSIUM 3.6  11/04/2024 02:40 AM    CHLORIDE 105 11/04/2024 02:40 AM    CO2 17 (L) 11/04/2024 02:40 AM    GLUCOSE 98 11/04/2024 02:40 AM    BUN 4 (L) 11/04/2024 02:40 AM    CREATININE 0.42 (L) 11/04/2024 02:40 AM    CREATININE 0.6 01/31/2009 05:20 PM     Lab Results   Component Value Date/Time    ALKPHOSPHAT 101 (H) 11/01/2024 12:05 PM    ASTSGOT 15 11/01/2024 12:05 PM    ALTSGPT 13 11/01/2024 12:05 PM    TBILIRUBIN 0.6 11/01/2024 12:05 PM        Lab Results   Component Value Date/Time    WBC 5.3 11/04/2024 02:40 AM    RBC 4.66 11/04/2024 02:40 AM    HEMOGLOBIN 11.7 (L) 11/04/2024 02:40 AM    HEMATOCRIT 40.9 11/04/2024 02:40 AM    MCV 87.8 11/04/2024 02:40 AM    MCH 25.1 (L) 11/04/2024 02:40 AM    MCHC 28.6 (L) 11/04/2024 02:40 AM    MPV 12.2 11/04/2024 02:40 AM    NEUTSPOLYS 35.20 (L) 11/04/2024 02:40 AM    LYMPHOCYTES 53.50 (H) 11/04/2024 02:40 AM    MONOCYTES 9.20 11/04/2024 02:40 AM    EOSINOPHILS 0.80 11/04/2024 02:40 AM    BASOPHILS 1.10 11/04/2024 02:40 AM    ANISOCYTOSIS 2+ (A) 02/16/2022 01:30 PM        Assessment and Plan:    #Type 1 diabetes  A1c (11/2024): 11.8%  Current regimen: Glargine 6 units twice daily, sliding scale  Sugars been ranging 100-300 at home  -Referral to endocrinology sent  - Continuous glucose monitor ordered  -Referral to diabetic education and nutrition  - Advised patient that she can uptitrate glargine to 8 units twice daily if sugars are consistently elevated at home.  Close follow-up for further titrations to ensure that she is on stable level in the interim while she establishes with endocrinology    #Hypothyroidism  History of Graves' disease status post bilateral total thyroidectomy in March 2017  Stable on Synthroid 200 mcg daily  TSH (11/2024): 4.630    Follow Up:  Return in about 2 weeks (around 11/22/2024).

## 2024-11-08 NOTE — PATIENT INSTRUCTIONS
[facility]  [address] [place] [address] [place] [address]  Phone: [phone]

## 2024-11-12 LAB
CV B1 NAB TITR SER NT: ABNORMAL {TITER}
CV B2 NAB TITR SER NT: ABNORMAL {TITER}
CV B3 NAB TITR SER NT: ABNORMAL {TITER}
CV B4 NAB TITR SER NT: ABNORMAL {TITER}
CV B5 NAB TITR SER NT: ABNORMAL {TITER}
CV B6 NAB TITR SER NT: ABNORMAL {TITER}

## 2024-11-13 ENCOUNTER — TELEPHONE (OUTPATIENT)
Dept: INTERNAL MEDICINE | Facility: OTHER | Age: 29
End: 2024-11-13
Payer: MEDICAID

## 2024-11-13 DIAGNOSIS — E10.10 DKA, TYPE 1, NOT AT GOAL (HCC): ICD-10-CM

## 2024-11-13 RX ORDER — DIPHENHYDRAMINE HYDROCHLORIDE 25 MG/1
CAPSULE, LIQUID FILLED ORAL
Qty: 1 KIT | Refills: 0 | Status: CANCELLED | OUTPATIENT
Start: 2024-11-13

## 2024-11-13 NOTE — TELEPHONE ENCOUNTER
Please send both refills. The glucose monitoring kit is on back order and patient will need a new brand.

## 2024-11-13 NOTE — TELEPHONE ENCOUNTER
Patient is requesting a refill of test strips. She also states that the continuous glucose sensor is backordered everywhere and she is not sure what to do regarding that

## 2024-11-14 DIAGNOSIS — E10.10 DKA, TYPE 1, NOT AT GOAL (HCC): ICD-10-CM

## 2024-11-14 DIAGNOSIS — E10.69 TYPE 1 DIABETES MELLITUS WITH OTHER SPECIFIED COMPLICATION (HCC): ICD-10-CM

## 2024-11-15 ENCOUNTER — TELEPHONE (OUTPATIENT)
Dept: INTERNAL MEDICINE | Facility: OTHER | Age: 29
End: 2024-11-15
Payer: MEDICAID

## 2024-11-15 NOTE — TELEPHONE ENCOUNTER
FINAL PRIOR AUTHORIZATION STATUS:    1.  Name of Medication & Dose: Freestyle Kianna 3 Sensor      2. Prior Auth Status: Approved through [facility]      3. Action Taken: Pharmacy Notified: yes Patient Notified: yes

## 2024-11-15 NOTE — TELEPHONE ENCOUNTER
Phone Number Called: [phone] (home)     Call outcome:  Spoke to Ash    Message: [employer] states prior auth is needed for this medication to be cheaper. Waiting for fax for prior authorizations to be submitted.

## 2024-11-17 DIAGNOSIS — E03.9 HYPOTHYROIDISM, UNSPECIFIED TYPE: ICD-10-CM

## 2024-11-18 NOTE — TELEPHONE ENCOUNTER
Received request via: Pharmacy    Was the patient seen in the last year in this department? Yes    Does the patient have an active prescription (recently filled or refills available) for medication(s) requested? No    Pharmacy Name: [id number] - [place], [place] - [address]      Does the patient have USP Plus and need 100-day supply? (This applies to ALL medications) Patient does not have SCP

## 2024-11-19 ENCOUNTER — HOSPITAL ENCOUNTER (EMERGENCY)
Facility: MEDICAL CENTER | Age: 29
End: 2024-11-19
Attending: EMERGENCY MEDICINE
Payer: MEDICAID

## 2024-11-19 VITALS
WEIGHT: 142.42 LBS | HEART RATE: 95 BPM | DIASTOLIC BLOOD PRESSURE: 78 MMHG | OXYGEN SATURATION: 98 % | TEMPERATURE: 97.2 F | HEIGHT: 63 IN | SYSTOLIC BLOOD PRESSURE: 122 MMHG | BODY MASS INDEX: 25.23 KG/M2 | RESPIRATION RATE: 16 BRPM

## 2024-11-19 DIAGNOSIS — R81 GLUCOSURIA: ICD-10-CM

## 2024-11-19 DIAGNOSIS — R30.0 DYSURIA: ICD-10-CM

## 2024-11-19 DIAGNOSIS — N76.0 BACTERIAL VAGINOSIS: Primary | ICD-10-CM

## 2024-11-19 DIAGNOSIS — B96.89 BACTERIAL VAGINOSIS: Primary | ICD-10-CM

## 2024-11-19 LAB
APPEARANCE UR: CLEAR
BACTERIA GENITAL QL WET PREP: NORMAL
BILIRUB UR QL STRIP.AUTO: NEGATIVE
COLOR UR: YELLOW
GLUCOSE UR STRIP.AUTO-MCNC: >=1000 MG/DL
HCG UR QL: NEGATIVE
KETONES UR STRIP.AUTO-MCNC: ABNORMAL MG/DL
LEUKOCYTE ESTERASE UR QL STRIP.AUTO: NEGATIVE
MICRO URNS: ABNORMAL
NITRITE UR QL STRIP.AUTO: NEGATIVE
PH UR STRIP.AUTO: 6 [PH] (ref 5–8)
PROT UR QL STRIP: NEGATIVE MG/DL
RBC UR QL AUTO: NEGATIVE
SIGNIFICANT IND 70042: NORMAL
SITE SITE: NORMAL
SOURCE SOURCE: NORMAL
SP GR UR STRIP.AUTO: 1.02

## 2024-11-19 PROCEDURE — 81025 URINE PREGNANCY TEST: CPT

## 2024-11-19 PROCEDURE — 99283 EMERGENCY DEPT VISIT LOW MDM: CPT

## 2024-11-19 PROCEDURE — 700102 HCHG RX REV CODE 250 W/ 637 OVERRIDE(OP): Performed by: EMERGENCY MEDICINE

## 2024-11-19 PROCEDURE — A9270 NON-COVERED ITEM OR SERVICE: HCPCS | Performed by: EMERGENCY MEDICINE

## 2024-11-19 PROCEDURE — 81003 URINALYSIS AUTO W/O SCOPE: CPT

## 2024-11-19 RX ORDER — CLINDAMYCIN HYDROCHLORIDE 150 MG/1
300 CAPSULE ORAL ONCE
Status: COMPLETED | OUTPATIENT
Start: 2024-11-19 | End: 2024-11-19

## 2024-11-19 RX ORDER — CLINDAMYCIN HYDROCHLORIDE 300 MG/1
300 CAPSULE ORAL 2 TIMES DAILY
Qty: 14 CAPSULE | Refills: 0 | Status: ACTIVE | OUTPATIENT
Start: 2024-11-19 | End: 2024-11-26

## 2024-11-19 RX ORDER — LEVOTHYROXINE SODIUM 200 UG/1
200 TABLET ORAL
Qty: 90 TABLET | Refills: 1 | Status: SHIPPED | OUTPATIENT
Start: 2024-11-19

## 2024-11-19 RX ADMIN — CLINDAMYCIN HYDROCHLORIDE 300 MG: 150 CAPSULE ORAL at 20:39

## 2024-11-19 ASSESSMENT — LIFESTYLE VARIABLES
TOTAL SCORE: 0
EVER FELT BAD OR GUILTY ABOUT YOUR DRINKING: NO
HOW MANY TIMES IN THE PAST YEAR HAVE YOU HAD 5 OR MORE DRINKS IN A DAY: 0
DO YOU DRINK ALCOHOL: NO
DOES PATIENT WANT TO STOP DRINKING: NO
TOTAL SCORE: 0
AVERAGE NUMBER OF DAYS PER WEEK YOU HAVE A DRINK CONTAINING ALCOHOL: 0
ON A TYPICAL DAY WHEN YOU DRINK ALCOHOL HOW MANY DRINKS DO YOU HAVE: 0
HAVE PEOPLE ANNOYED YOU BY CRITICIZING YOUR DRINKING: NO
TOTAL SCORE: 0
HAVE YOU EVER FELT YOU SHOULD CUT DOWN ON YOUR DRINKING: NO
CONSUMPTION TOTAL: NEGATIVE
EVER HAD A DRINK FIRST THING IN THE MORNING TO STEADY YOUR NERVES TO GET RID OF A HANGOVER: NO

## 2024-11-19 ASSESSMENT — FIBROSIS 4 INDEX: FIB4 SCORE: 0.61

## 2024-11-20 NOTE — ED TRIAGE NOTES
"Patient presents to the ER with the following complaints:    Chief Complaint   Patient presents with    Painful Urination     Burning when urinating.     Flank Pain     Right flank    Suprapubic Pain     Pain onset 2 days ago.        /80   Pulse (!) 102   Temp 36.6 °C (97.9 °F) (Temporal)   Resp 18   Ht 1.6 m (5' 3\")   Wt 64.6 kg (142 lb 6.7 oz)   LMP 11/12/2024 (Approximate)   SpO2 97%   BMI 25.23 kg/m²       "

## 2024-11-20 NOTE — DISCHARGE INSTRUCTIONS
Take your medication as prescribed and followup with your obgyn on Thursday as you previously have scheduled.

## 2024-11-20 NOTE — ED PROVIDER NOTES
"ER Provider Note    Scribed for Rosendo Arevalo D.o. by Azul Díaz. 11/19/2024  7:24 PM    Primary Care Provider: Leonel Mcdermott D.O.    CHIEF COMPLAINT   Chief Complaint   Patient presents with    Painful Urination     Burning when urinating.     Flank Pain     Right flank    Suprapubic Pain     Pain onset 2 days ago.      EXTERNAL RECORDS REVIEWED  Inpatient Notes hospital admission for new onset type 1 diabetes on 11/1/2024.  History of other autoimmune disorders.    HPI/ROS  LIMITATION TO HISTORY   Select: : None  OUTSIDE HISTORIAN(S):  None    Ivis Klein is a 29 y.o. female who presents to the ED complaining of painful urination onset two days ago. Patient states that she was recently diagnosed with type I diabetes and is currently taking insulin. She states that since she was diagnosed she has been having bacterial infections after intercourse, she notes they are all diagnosed as bacterial vaginosis not STD. The patient notes that she has recently been diagnosed with bacterial vaginosis or UTI. She states that she has been having a burning sensation when she urinates, adding that it feels as though \"there are a million cuts down there\". Patient denies any abdominal pain. The patient was concerned that she has another infection which prompted her to report to the ED for further evaluation. She reports that she was diagnosed with diabetes at the beginning of November and notes that she has not received any vaccinations recently.      PAST MEDICAL HISTORY  Past Medical History:   Diagnosis Date    Anemia 02/17/2022    Anxiety 02/16/2017    Anxiety 02/16/2017    Elevated antinuclear antibody (DEJON) level 05/23/2019    Graves disease 12/05/2016    Heart valve disease     Hemorrhagic cysts of both ovaries 05/23/2019    History of panic attack 04/09/2020    History of pericarditis 12/05/2016    History of thyroidectomy 01/31/2020    Partial --> hypothyroidism     Panic attack 03/11/2019    " Postpartum depression 02/17/2022    Postpartum depression 02/17/2022    Type 1 diabetes mellitus with other specified complication (HCC) 11/8/2024    Vaginal discharge 12/02/2021       SURGICAL HISTORY  Past Surgical History:   Procedure Laterality Date    JUNIE BY LAPAROSCOPY N/A 8/28/2024    Procedure: CHOLECYSTECTOMY, LAPAROSCOPIC;  Surgeon: Charles Rodas M.D.;  Location: SURGERY [facility];  Service: General    THYROIDECTOMY TOTAL Bilateral 3/22/2017    Procedure: THYROIDECTOMY TOTAL -NIMS RECURRENT LARYNGEAL NERVE MONITORING;  Surgeon: Vicente Eldridge M.D.;  Location: SURGERY SAME DAY [facility];  Service:     PRIMARY C SECTION  9/8/2013    Performed by Melisa Wright M.D. at LABOR AND DELIVERY       FAMILY HISTORY  Family History   Problem Relation Age of Onset    Cancer Paternal Grandmother 56        breast cancer    No Known Problems Mother     Hyperlipidemia Father     No Known Problems Sister     No Known Problems Brother        SOCIAL HISTORY   reports that she has never smoked. She has never used smokeless tobacco. She reports current alcohol use. She reports current drug use. Drugs: Marijuana and Inhaled.    CURRENT MEDICATIONS  Previous Medications    ALCOHOL SWABS    Wipe site with prep pad prior to injection.    BLOOD GLUCOSE MONITORING SUPPL (BLOOD GLUCOSE MONITOR SYSTEM) W/DEVICE KIT    Test blood sugar as recommended by provider.    BLOOD GLUCOSE TEST STRIPS    Test strips order: Test strips for meter. Sig: use 3 times daily and prn ssx high or low sugar #100 RF x 3    CONTINUOUS GLUCOSE SENSOR (FREESTYLE MIRTA 3 SENSOR) MISC    1 Application continuous.    CONTINUOUS GLUCOSE SENSOR MISC    1 Units continuous.    GLUCOSE BLOOD STRIP    Use one strip to test blood sugar three times daily before meals.    INSULIN GLARGINE (LANTUS SOLOSTAR) 100 UNIT/ML SOLUTION PEN-INJECTOR INJECTION    Inject 6 Units under the skin 2 times a day.    INSULIN LISPRO 100 UNIT/ML SC SOPN INJECTION PEN  "   Inject 1-10 Units under the skin 4 Times a Day,Before Meals and at Bedtime.   70 -150  mg/dL = 0 Units  151 - 200mg/dL = 3 Units  201 - 250mg/dL =  4 Units  251 - 300mg/dL = 5 Units  301 - 350mg/dL = 8 Units  Over 351mg/dL  = 9 Units    INSULIN PEN NEEDLE 32 G X 4 MM    Use one pen needle in pen device to inject insulin five times daily.    LANCETS    Use one lancet to test blood sugar three times daily before meals.    LEVONORGESTREL (MIRENA, 52 MG,) 20 MCG/DAY IUD    1 Each by Intrauterine route see administration instructions. Every 5 years, last placed 2020    LEVOTHYROXINE (SYNTHROID) 200 MCG TAB    Take 1 Tablet by mouth every morning on an empty stomach.       ALLERGIES  Patient has no known allergies.    PHYSICAL EXAM  /80   Pulse (!) 102   Temp 36.6 °C (97.9 °F) (Temporal)   Resp 18   Ht 1.6 m (5' 3\")   Wt 64.6 kg (142 lb 6.7 oz)   LMP 11/12/2024 (Approximate)   SpO2 97%   BMI 25.23 kg/m²   General: No acute distress  Neuro: Awake alert and oriented, muscle strength sensation normal  Neck: Supple  Cardiac: Regular rate and rhythm  Pulmonary: Clear to auscultation bilaterally no distress  Abdomen: Soft nontender nondistended  Back: Nontender  Psych: Normal  Skin: Pink warm dry  Extremities: Full range of motion, muscle strength sensation intact 2+ pulses    DIAGNOSTIC STUDIES    LABS  Labs Reviewed   URINALYSIS,CULTURE IF INDICATED - Abnormal; Notable for the following components:       Result Value    Glucose >=1000 (*)     Ketones Trace (*)     All other components within normal limits   HCG QUALITATIVE UR   WET PREP     I have independently interpreted these labs.    COURSE & MEDICAL DECISION MAKING     ASSESSMENT, COURSE AND PLAN    Differential diagnoses include but not limited to: Vaginitis vs bacterial vaginosis vs UTI.     Care Narrative:  The patient is a 29 y.o. female who presents to the ED complaining of painful urination onset two days ago. Ordered labs and radiology to further " evaluate the patients condition.       7:30 PM - Patient presents to the ED with painful urination onset two days ago. Offered pelvic exam to further evaluate the patient's condition but the patient declines at this time. Discussed the plan of care with the patient including ordering labs to further evaluate the patient's condition. The patient was able to ask questions at this time. Patient agrees with the plan of care. Ordered for Wet Prep, Beta-HCG qualitative urine, UA to evaluate her symptoms. Differential diagnoses include but not limited to: Vaginitis vs bacterial vaginosis vs UTI.     8:25 PM - I reevaluated the patient at bedside.  Discussed findings of clue cells/bacterial vaginosis.  Give first dose of clindamycin here and clindamycin Rx.  I discussed the patient's diagnostic study results with the patient at this time. I discussed plan for discharge and recommend that she follows up with her gynecologist on Thursday as she has previously scheduled. I informed the patient that she will be prescribed antibiotics upon discharge and recommend that the patient she takes the antibiotic as prescribed until completed. The patient is stable for discharge at this time and will return for any new or worsening symptoms. Patient verbalizes understanding and support with my plan for discharge.     ADDITIONAL PROBLEM LIST  Dysuria: Positive clue cells, clindamycin.  Follow-up with OB/GYN  Type 1 diabetes: Counseled on CGM and continuous glucose monitor/insulin pump possibility in the future, also discussed her current insulin regimen    DISPOSITION AND DISCUSSIONS  I have discussed management of the patient with the following physicians and JUSTIN's:  None    Discussion of management with other QHP or appropriate source(s): None     Escalation of care considered, and ultimately not performed: diagnostic imaging.    Barriers to care at this time, including but not limited to:  None present .     Decision tools and  prescription drugs considered including, but not limited to: Antibiotics Cleocin .    The patient will return for new or worsening symptoms and is stable at the time of discharge.    DISPOSITION:  Patient will be discharged home in stable condition.    FOLLOW UP:  Leonel Mcdermott D.O.  [address]   [place] [id number] [phone]    Schedule an appointment as soon as possible for a visit in 2 days      [facility], Emergency Dept  [address]  [facility] [address] [phone]    As needed, If symptoms worsen      OUTPATIENT MEDICATIONS:  New Prescriptions    CLINDAMYCIN (CLEOCIN) 300 MG CAP    Take 1 Capsule by mouth 2 times a day for 7 days.       FINAL DIAGNOSIS  1. Bacterial vaginosis    2. Dysuria    3. Glucosuria       Azul HARRIS (Bethanyibe), am scribing for, and in the presence of, Rosendo Arevalo D.O..    Electronically signed by: Azul Díaz (Kamini), 11/19/2024    Rosendo HARRIS D.O. personally performed the services described in this documentation, as scribed by Azul Díaz in my presence, and it is both accurate and complete.    The note accurately reflects work and decisions made by me.  Rosendo Arevalo D.O.  11/19/2024  10:39 PM

## 2024-11-21 ENCOUNTER — OFFICE VISIT (OUTPATIENT)
Dept: INTERNAL MEDICINE | Facility: OTHER | Age: 29
End: 2024-11-21
Payer: MEDICAID

## 2024-11-21 ENCOUNTER — GYNECOLOGY VISIT (OUTPATIENT)
Dept: OBGYN | Facility: CLINIC | Age: 29
End: 2024-11-21
Payer: MEDICAID

## 2024-11-21 VITALS
HEIGHT: 63 IN | BODY MASS INDEX: 25.34 KG/M2 | SYSTOLIC BLOOD PRESSURE: 100 MMHG | WEIGHT: 143 LBS | DIASTOLIC BLOOD PRESSURE: 66 MMHG

## 2024-11-21 VITALS
TEMPERATURE: 98.3 F | OXYGEN SATURATION: 100 % | BODY MASS INDEX: 25.45 KG/M2 | WEIGHT: 143.6 LBS | HEART RATE: 87 BPM | SYSTOLIC BLOOD PRESSURE: 105 MMHG | HEIGHT: 63 IN | DIASTOLIC BLOOD PRESSURE: 65 MMHG

## 2024-11-21 DIAGNOSIS — E10.10 DKA, TYPE 1, NOT AT GOAL (HCC): ICD-10-CM

## 2024-11-21 DIAGNOSIS — N76.0 VAGINAL INFECTION: ICD-10-CM

## 2024-11-21 PROCEDURE — 99214 OFFICE O/P EST MOD 30 MIN: CPT | Mod: GC

## 2024-11-21 PROCEDURE — 3078F DIAST BP <80 MM HG: CPT | Mod: GC

## 2024-11-21 PROCEDURE — 3074F SYST BP LT 130 MM HG: CPT | Mod: GC

## 2024-11-21 PROCEDURE — 3074F SYST BP LT 130 MM HG: CPT | Performed by: NURSE PRACTITIONER

## 2024-11-21 PROCEDURE — 99213 OFFICE O/P EST LOW 20 MIN: CPT | Performed by: NURSE PRACTITIONER

## 2024-11-21 PROCEDURE — 3078F DIAST BP <80 MM HG: CPT | Performed by: NURSE PRACTITIONER

## 2024-11-21 RX ORDER — INSULIN GLARGINE 100 [IU]/ML
6 INJECTION, SOLUTION SUBCUTANEOUS 2 TIMES DAILY
Qty: 6 ML | Refills: 1 | Status: SHIPPED | OUTPATIENT
Start: 2024-11-21

## 2024-11-21 RX ORDER — INSULIN LISPRO 100 [IU]/ML
1-10 INJECTION, SOLUTION INTRAVENOUS; SUBCUTANEOUS
Qty: 6 ML | Refills: 1 | Status: SHIPPED | OUTPATIENT
Start: 2024-11-21

## 2024-11-21 ASSESSMENT — FIBROSIS 4 INDEX
FIB4 SCORE: 0.61
FIB4 SCORE: 0.61

## 2024-11-21 NOTE — PROGRESS NOTES
Patient here for GYN visit.   Last seen on : 4/12/24  LMP : 11/4/24  BCM : Mirena placed 2019  Pap : 4/2024 - WNL   Pt states she notices every time she has intercourse she gets BV, yeast infection or a UTI.   Phone/Pharmacy verified  [phone]

## 2024-11-21 NOTE — PROGRESS NOTES
Pt here for f/u after BV dx in ED. She reports she has been having issues with smell, itch and feeling like a million tiny cuts on her vagina after sex. She is currently on clindamycin as prescribed by ED and reports her symptoms have improved. She does not use lubricant during sex, she washes her vagina with body wash.         #Reviewed vulvar health including using a lot of water base lubricant, increasing water and probiotic intake and proper post-sex hygiene. #Can also trial boric acid suppositories at home  #Pt has f/u for her Type I diabetes with PCP  #Will finish clindamycin treatment   #Reviewed when to return to clinic

## 2024-11-21 NOTE — PATIENT INSTRUCTIONS
[place] [facility]   [address]  [facility] [address]  [phone]    Nutrition:  [address] [facility] [id number] [phone]    Diabetic Education:  [address] [facility] [id number] [phone]    Please give your premeal insulin(lispro) 30 minutes before meal, if you skip the meal don't give your the insulin.   For short acting dose:   Inject 1-10 Units under the skin 4 Times a Day,Before Meals and at Bedtime.    mg/dL = 0 Units   151 - 200 mg/dL = 2 Units   201 - 250 mg/dL = 3 Units   251 - 300 mg/dL = 4 Units   301 - 350 mg/dL = 6 Units   Over 351 mg/dL = 7 Units   Please record total lispro you get every day.

## 2024-11-21 NOTE — PROGRESS NOTES
Chief Complaint   Patient presents with    Follow-Up     Follow up on insulin     Medication Refill     - refill needles        HPI: Ivis Klein is a 29 y.o. female who presented to the clinic for the following.  PCP: Leonel Mcdermott    #Follow-up for diabetes  Patient is recently diagnosed type 1 diabetes after DKA hospitalization, currently she is taking glargine 60 units twice daily, with Premeal short acting lispro 4 times a day before meals and bedtime.  HbA1c 11.8 in November 2024.  Patient has a continuous glucose monitor which shows the fasting glucose about 140, and the post meal glucose still around 250-350; however, patient also has 2 hypoglycemia episodes after the dinner, when she felt slightly dizziness, and took the juice for recovery.  After discussion, it seems that patient use her short acting at the fixed time, not exactly before the meal. she has been referred to see the endocrinologist, nutrition service and diabetic education, however she has not make any appointment.     #Follow-up for pericarditis  Patient has been diagnosed with pericarditis in the recent hospitalization, has been prescribed colchicine 0.6 mg every other day, at night still feels slightly chest pain when she is laying flat, but not really bothering her, she denies shortness breath or palpitation,  patient is still taking colchicine.  Instruct patient to finish the dose as prescribed.         Patient Active Problem List    Diagnosis Date Noted    Type 1 diabetes mellitus with other specified complication (HCC) 11/08/2024    DKA, type 1, not at goal (HCC) 11/01/2024    Bacterial vaginosis 11/01/2024    Depression with anxiety 10/11/2022    Postsurgical hypothyroidism 02/17/2022    Ovarian cyst 02/17/2022    Hyperlipidemia 02/17/2022    Anemia 02/17/2022    Overweight (BMI 25.0-29.9) 07/15/2021    Graves disease 12/05/2016    Migraine with aura, not intractable 09/28/2010       Current Outpatient Medications    Medication Sig Dispense Refill    levothyroxine (SYNTHROID) 200 MCG Tab Take 1 Tablet by mouth every morning on an empty stomach. 90 Tablet 1    clindamycin (CLEOCIN) 300 MG Cap Take 1 Capsule by mouth 2 times a day for 7 days. 14 Capsule 0    Blood Glucose Test Strips Test strips order: Test strips for meter. Sig: use 3 times daily and prn ssx high or low sugar #100 RF x 3 100 Strip 2    Continuous Glucose Sensor Misc 1 Units continuous. 2 Units 1    Continuous Glucose Sensor (FREESTYLE MIRTA 3 SENSOR) Misc 1 Application continuous. 1 Each 2    insulin glargine (LANTUS SOLOSTAR) 100 UNIT/ML Solution Pen-injector injection Inject 6 Units under the skin 2 times a day. 6 mL 0    insulin lispro 100 UNIT/ML SC SOPN injection PEN Inject 1-10 Units under the skin 4 Times a Day,Before Meals and at Bedtime.   70 -150  mg/dL = 0 Units  151 - 200mg/dL = 3 Units  201 - 250mg/dL =  4 Units  251 - 300mg/dL = 5 Units  301 - 350mg/dL = 8 Units  Over 351mg/dL  = 9 Units 12 mL 0    Blood Glucose Monitoring Suppl (BLOOD GLUCOSE MONITOR SYSTEM) w/Device Kit Test blood sugar as recommended by provider. 1 Kit 0    glucose blood strip Use one strip to test blood sugar three times daily before meals. 100 Strip 0    Lancets Use one lancet to test blood sugar three times daily before meals. 100 Each 0    Alcohol Swabs Wipe site with prep pad prior to injection. 100 Each 0    Insulin Pen Needle 32 G x 4 mm Use one pen needle in pen device to inject insulin five times daily. 200 Each 0    levonorgestrel (MIRENA, 52 MG,) 20 MCG/DAY IUD 1 Each by Intrauterine route see administration instructions. Every 5 years, last placed 2020       No current facility-administered medications for this visit.       ROS: As per HPI. Additional pertinent systems as noted below.  Constitutional:  Negative for fever, chills   HENT:  Negative for ear pain, sore throat, runny nose   Eyes:  Negative for blurred vision and double vision   Cardiovascular:  Negative  "for chest pain, palpitations   Respiratory:  Negative for cough, sputum production, shortness of breath   Gastrointestinal: Negative for abdominal pain, nausea, vomiting, diarrhea, or blood in stools   Genitourinary: Negative for dysuria, flank pain and hematuria  Skin: Negative for rash, itching  Extremities: Negative for leg swelling   Neurologic: Negative for headaches, dizziness, seizures, weakness   Endo/Heme/Allergies: Negative for polydipsia. Does not bruise/bleed easily  Psychiatric: Negative for suicidal or homicidal ideas     /65 (BP Location: Right arm, Patient Position: Sitting, BP Cuff Size: Adult)   Pulse 87   Temp 36.8 °C (98.3 °F) (Temporal)   Ht 1.6 m (5' 3\")   Wt 65.1 kg (143 lb 9.6 oz)   LMP 11/12/2024 (Approximate)   SpO2 100%   BMI 25.44 kg/m²     Physical Exam   Constitutional:  Well developed, well nourished. Not in acute distress   HENT:  Normocephalic, Atraumatic, Oropharynx is pink and moist. No oral exudates, Nose normal   Eyes:  EOMI, Conjunctiva normal, No discharge. PERRLA   Neck:  Normal range of motion, No cervical lymphadenopathy. No thyromegaly.   Cardiovascular:  Regular rate and rhythm, No pedal edema, Intact distal pulses   Respiratory: Clear to auscultation bilaterally, No use of accessory muscles   Gastrointestinal: Bowel sounds normal, Soft, No tenderness, No palpable masses/hepatosplenomegaly   Skin: Warm, Dry, No erythema, No rash, no induration.   Musculoskeletal: No cyanosis or clubbing, normal ROM   Neurologic: Alert & oriented x 4, No focal deficits noted, cranial nerves II through X are grossly intact.   Psychiatric: Judgment normal, Mood normal, Memory normal     Note: I have reviewed all pertinent labs and diagnostic tests associated with this visit with specific comments listed under the assessment and plan below    Assessment and Plan  1. DKA, type 1, not at goal (HCC)  HbA1c 11.8 in November 2024, not on target  Fasting glucose above 140  Patient is " using short acting insulin at a fixed time ,not 30 minutes before the meal, which caused a variation glucose level after the eating, sometimes glucose level as high as above 350, sometimes she has hypoglycemia  Plan to slightly decrease short acting insulin dose, keep the same long-acting dose 6 units twice daily  Instruct patient to use short acting insulin 30 minutes before the meal  Instruct patient to make appointment with endocrinologist, diabetic education and nutrition service as soon as possible  Plan:   - Insulin Pen Needle 32 G x 4 mm; Use one pen needle in pen device to inject insulin five times daily.  Dispense: 200 Each; Refill: 0  - insulin glargine (LANTUS SOLOSTAR) 100 UNIT/ML Solution Pen-injector injection; Inject 6 Units under the skin 2 times a day.  Dispense: 6 mL; Refill: 1  - insulin lispro 100 UNIT/ML SC SOPN injection PEN; Inject 1-10 Units under the skin 4 Times a Day,Before Meals and at Bedtime.  mg/dL = 0 Units 151 - 200  mg/dL =  2 Units 201 - 250  mg/dL = 3 Units 251 - 300 mg/dL =  4 Units 301 - 350  mg/dL =  6 Units Over 351  mg/dL  = 7 Units  Dispense: 6 mL; Refill: 1   -Handout information for appointment with endocrinologist, diabetic education and nutrition service    Followup: Patient has follow-up with her PCP Dr. Leonel Mcdermott in middle December  Signed by: Christo Rich M.D.  [facility] Med Resident, PGY-2

## 2024-12-04 ENCOUNTER — PATIENT MESSAGE (OUTPATIENT)
Dept: INTERNAL MEDICINE | Facility: OTHER | Age: 29
End: 2024-12-04
Payer: MEDICAID

## 2024-12-04 DIAGNOSIS — E10.10 DKA, TYPE 1, NOT AT GOAL (HCC): ICD-10-CM

## 2024-12-09 RX ORDER — INSULIN GLARGINE 100 [IU]/ML
6 INJECTION, SOLUTION SUBCUTANEOUS 2 TIMES DAILY
Qty: 6 ML | Refills: 1
Start: 2024-12-09 | End: 2024-12-16 | Stop reason: SDUPTHER

## 2024-12-16 ENCOUNTER — OFFICE VISIT (OUTPATIENT)
Dept: INTERNAL MEDICINE | Facility: OTHER | Age: 29
End: 2024-12-16
Payer: MEDICAID

## 2024-12-16 VITALS
HEIGHT: 63 IN | DIASTOLIC BLOOD PRESSURE: 78 MMHG | WEIGHT: 141.2 LBS | SYSTOLIC BLOOD PRESSURE: 118 MMHG | TEMPERATURE: 98.9 F | BODY MASS INDEX: 25.02 KG/M2 | HEART RATE: 85 BPM | OXYGEN SATURATION: 100 %

## 2024-12-16 DIAGNOSIS — E10.10 DKA, TYPE 1, NOT AT GOAL (HCC): ICD-10-CM

## 2024-12-16 DIAGNOSIS — E89.0 POSTSURGICAL HYPOTHYROIDISM: ICD-10-CM

## 2024-12-16 DIAGNOSIS — E10.69 TYPE 1 DIABETES MELLITUS WITH OTHER SPECIFIED COMPLICATION (HCC): ICD-10-CM

## 2024-12-16 DIAGNOSIS — R07.9 CHEST PAIN, UNSPECIFIED TYPE: ICD-10-CM

## 2024-12-16 DIAGNOSIS — E05.00 GRAVES DISEASE: ICD-10-CM

## 2024-12-16 DIAGNOSIS — Z23 NEED FOR VACCINATION: ICD-10-CM

## 2024-12-16 PROCEDURE — 90472 IMMUNIZATION ADMIN EACH ADD: CPT | Performed by: INTERNAL MEDICINE

## 2024-12-16 PROCEDURE — 90471 IMMUNIZATION ADMIN: CPT | Performed by: INTERNAL MEDICINE

## 2024-12-16 PROCEDURE — 99999 PR NO CHARGE: CPT | Mod: GC

## 2024-12-16 PROCEDURE — 99214 OFFICE O/P EST MOD 30 MIN: CPT | Mod: GC,25

## 2024-12-16 PROCEDURE — 90656 IIV3 VACC NO PRSV 0.5 ML IM: CPT | Performed by: INTERNAL MEDICINE

## 2024-12-16 PROCEDURE — 3078F DIAST BP <80 MM HG: CPT | Mod: GC

## 2024-12-16 PROCEDURE — 3074F SYST BP LT 130 MM HG: CPT | Mod: GC

## 2024-12-16 PROCEDURE — 90677 PCV20 VACCINE IM: CPT | Performed by: INTERNAL MEDICINE

## 2024-12-16 RX ORDER — INSULIN GLARGINE 100 [IU]/ML
10 INJECTION, SOLUTION SUBCUTANEOUS
Qty: 15 ML | Refills: 2 | Status: SHIPPED | OUTPATIENT
Start: 2024-12-16

## 2024-12-16 ASSESSMENT — ENCOUNTER SYMPTOMS
ABDOMINAL PAIN: 0
COUGH: 0
FEVER: 0
CHILLS: 0
BACK PAIN: 0
VOMITING: 0
SORE THROAT: 0
NAUSEA: 0
SHORTNESS OF BREATH: 1
MYALGIAS: 0
HEADACHES: 0
PALPITATIONS: 0
DIZZINESS: 0

## 2024-12-16 ASSESSMENT — FIBROSIS 4 INDEX: FIB4 SCORE: 0.61

## 2024-12-16 NOTE — PROGRESS NOTES
Chief Complaint: Follow-up for diabetes    Last Seen: 11/21    History of Present Illness:   Ivis Klein is a 29 y.o. female with PMH relevant for diabetes and hypothyroidism who presents with follow-up.  Patient was able to get a CGM and notes that her sugars have been ranging anywhere from 100-200s. She notices that her sugars will spike to 250s after eating meals and will require 2 to 3 units of sliding scale insulin.  She has had hypoglycemic episodes where her lower sugars will go to 52 usually at night and she will experience dizziness, lightheadedness, sweats.  Because of this, she has stopped taking her nighttime glargine dose.      Patient also reports chest pressure and shortness of breath that she has noticed about 1 week ago.  Her symptoms are usually onset after walking up flights of stairs.  They resolve after she sits down for about an hour.  The chest pressure is across her midline.  She denies any radiation of the pain, numbness, tingling.  She smokes marijuana almost nightly.  She denies any family history of heart disease.  She notes that she did have pericarditis when she was in the hospital last month.    Past Medical History:   Diagnosis Date    Anemia 02/17/2022    Anxiety 02/16/2017    Anxiety 02/16/2017    Elevated antinuclear antibody (DEJON) level 05/23/2019    Graves disease 12/05/2016    Heart valve disease     Hemorrhagic cysts of both ovaries 05/23/2019    History of panic attack 04/09/2020    History of pericarditis 12/05/2016    History of thyroidectomy 01/31/2020    Partial --> hypothyroidism     Panic attack 03/11/2019    Postpartum depression 02/17/2022    Postpartum depression 02/17/2022    Type 1 diabetes mellitus with other specified complication (HCC) 11/8/2024    Vaginal discharge 12/02/2021       Past Surgical History:   Procedure Laterality Date    JUNIE BY LAPAROSCOPY N/A 8/28/2024    Procedure: CHOLECYSTECTOMY, LAPAROSCOPIC;  Surgeon: Charles Rodas M.D.;   Location: SURGERY [facility];  Service: General    THYROIDECTOMY TOTAL Bilateral 3/22/2017    Procedure: THYROIDECTOMY TOTAL -NIMS RECURRENT LARYNGEAL NERVE MONITORING;  Surgeon: Vicente Eldridge M.D.;  Location: SURGERY SAME DAY [facility];  Service:     PRIMARY C SECTION  9/8/2013    Performed by Melisa Wright M.D. at [facility] DELIVERY       Family History   Problem Relation Age of Onset    Cancer Paternal Grandmother 56        breast cancer    No Known Problems Mother     Hyperlipidemia Father     No Known Problems Sister     No Known Problems Brother        Social History     Socioeconomic History    Marital status: Single     Spouse name: Not on file    Number of children: Not on file    Years of education: Not on file    Highest education level: Not on file   Occupational History    Occupation: [employer]     Comment: [employer]   Tobacco Use    Smoking status: Never    Smokeless tobacco: Never    Tobacco comments:     quit in 2012   Vaping Use    Vaping status: Never Used   Substance and Sexual Activity    Alcohol use: Not Currently    Drug use: Yes     Types: Marijuana, Inhaled     Comment: marijuana weekly    Sexual activity: Yes     Partners: Male     Birth control/protection: I.U.D.   Other Topics Concern    Not on file   Social History Narrative    Not on file     Social Drivers of Health     Financial Resource Strain: Not on file   Food Insecurity: No Food Insecurity (11/1/2024)    Hunger Vital Sign     Worried About Running Out of Food in the Last Year: Never true     Ran Out of Food in the Last Year: Never true   Transportation Needs: No Transportation Needs (11/1/2024)    PRAPARE - Transportation     Lack of Transportation (Medical): No     Lack of Transportation (Non-Medical): No   Physical Activity: Not on file   Stress: Not on file   Social Connections: Not on file   Intimate Partner Violence: Not At Risk (11/1/2024)    Humiliation, Afraid, Rape, and Kick questionnaire     Fear of  Current or Ex-Partner: No     Emotionally Abused: No     Physically Abused: No     Sexually Abused: No   Housing Stability: Low Risk  (11/1/2024)    Housing Stability Vital Sign     Unable to Pay for Housing in the Last Year: No     Number of Times Moved in the Last Year: 1     Homeless in the Last Year: No       Current Outpatient Medications   Medication Sig Dispense Refill    Insulin Pen Needle 32 G x 4 mm Use one pen needle in pen device to inject insulin five times daily. 200 Each 0    insulin glargine (LANTUS SOLOSTAR) 100 UNIT/ML Solution Pen-injector injection Inject 6 Units under the skin 2 times a day. 6 mL 1    insulin lispro 100 UNIT/ML SC SOPN injection PEN Inject 1-10 Units under the skin 4 Times a Day,Before Meals and at Bedtime.  mg/dL = 0 Units 151 - 200  mg/dL =  2 Units 201 - 250  mg/dL = 3 Units 251 - 300 mg/dL =  4 Units 301 - 350  mg/dL =  6 Units Over 351  mg/dL  = 7 Units 6 mL 1    levothyroxine (SYNTHROID) 200 MCG Tab Take 1 Tablet by mouth every morning on an empty stomach. 90 Tablet 1    Blood Glucose Test Strips Test strips order: Test strips for meter. Sig: use 3 times daily and prn ssx high or low sugar #100 RF x 3 100 Strip 2    Continuous Glucose Sensor Misc 1 Units continuous. 2 Units 1    Continuous Glucose Sensor (FREESTYLE MIRTA 3 SENSOR) Misc 1 Application continuous. 1 Each 2    Blood Glucose Monitoring Suppl (BLOOD GLUCOSE MONITOR SYSTEM) w/Device Kit Test blood sugar as recommended by provider. 1 Kit 0    glucose blood strip Use one strip to test blood sugar three times daily before meals. 100 Strip 0    Lancets Use one lancet to test blood sugar three times daily before meals. 100 Each 0    Alcohol Swabs Wipe site with prep pad prior to injection. 100 Each 0    levonorgestrel (MIRENA, 52 MG,) 20 MCG/DAY IUD 1 Each by Intrauterine route see administration instructions. Every 5 years, last placed 2020       No current facility-administered medications for this visit.  "      No Known Allergies    Review of Systems:  Review of Systems   Constitutional:  Negative for chills and fever.   HENT:  Negative for congestion and sore throat.    Respiratory:  Positive for shortness of breath. Negative for cough.    Cardiovascular:  Positive for chest pain. Negative for palpitations.   Gastrointestinal:  Negative for abdominal pain, nausea and vomiting.   Genitourinary:  Negative for dysuria and hematuria.   Musculoskeletal:  Negative for back pain and myalgias.   Skin:  Negative for itching and rash.   Neurological:  Negative for dizziness and headaches.        Medications:     Current Outpatient Medications:     Insulin Pen Needle 32 G x 4 mm, Use one pen needle in pen device to inject insulin five times daily., Taking    Lantus SoloStar, 6 Units, Subcutaneous, BID, Taking    insulin lispro, 1-10 Units, Subcutaneous, 4X/DAY ACHS, Taking    levothyroxine, 200 mcg, Oral, AM ES, Taking    Blood Glucose Test Strips, Test strips order: Test strips for meter. Sig: use 3 times daily and prn ssx high or low sugar #100 RF x 3, Taking    Continuous Glucose Sensor, 1 Units, Does not apply, Continuous, Taking    FreeStyle Kianna 3 Sensor, 1 Application, Does not apply, Continuous, Taking    Blood Glucose Monitor System, Test blood sugar as recommended by provider., Taking    glucose blood, Use one strip to test blood sugar three times daily before meals., Taking    Lancets, Use one lancet to test blood sugar three times daily before meals., Taking    Alcohol Swabs, Wipe site with prep pad prior to injection., Taking    Mirena (52 MG), 1 Each, Intrauterine, See Admin Instructions, Taking     Objective:  Vitals:   /78 (BP Location: Right arm, Patient Position: Sitting, BP Cuff Size: Adult)   Pulse 85   Temp 37.2 °C (98.9 °F) (Temporal)   Ht 1.6 m (5' 3\")   Wt 64 kg (141 lb 3.2 oz)   SpO2 100%  Body mass index is 25.01 kg/m².    Physical Exam  Constitutional:       General: She is not in acute " distress.     Appearance: Normal appearance.   HENT:      Head: Normocephalic and atraumatic.   Eyes:      Extraocular Movements: Extraocular movements intact.      Pupils: Pupils are equal, round, and reactive to light.   Cardiovascular:      Rate and Rhythm: Normal rate and regular rhythm.   Pulmonary:      Effort: No respiratory distress.      Breath sounds: Normal breath sounds.   Abdominal:      General: There is no distension.      Palpations: Abdomen is soft.   Neurological:      General: No focal deficit present.      Mental Status: She is oriented to person, place, and time.          Results:    Lab Results   Component Value Date/Time    CHOLSTRLTOT 207 (H) 09/23/2022 10:25 AM     (H) 09/23/2022 10:25 AM    HDL 45 09/23/2022 10:25 AM    TRIGLYCERIDE 179 (H) 09/23/2022 10:25 AM       Lab Results   Component Value Date/Time    SODIUM 136 11/04/2024 02:40 AM    POTASSIUM 3.6 11/04/2024 02:40 AM    CHLORIDE 105 11/04/2024 02:40 AM    CO2 17 (L) 11/04/2024 02:40 AM    GLUCOSE 98 11/04/2024 02:40 AM    BUN 4 (L) 11/04/2024 02:40 AM    CREATININE 0.42 (L) 11/04/2024 02:40 AM    CREATININE 0.6 01/31/2009 05:20 PM     Lab Results   Component Value Date/Time    ALKPHOSPHAT 101 (H) 11/01/2024 12:05 PM    ASTSGOT 15 11/01/2024 12:05 PM    ALTSGPT 13 11/01/2024 12:05 PM    TBILIRUBIN 0.6 11/01/2024 12:05 PM        Lab Results   Component Value Date/Time    WBC 5.3 11/04/2024 02:40 AM    RBC 4.66 11/04/2024 02:40 AM    HEMOGLOBIN 11.7 (L) 11/04/2024 02:40 AM    HEMATOCRIT 40.9 11/04/2024 02:40 AM    MCV 87.8 11/04/2024 02:40 AM    MCH 25.1 (L) 11/04/2024 02:40 AM    MCHC 28.6 (L) 11/04/2024 02:40 AM    MPV 12.2 11/04/2024 02:40 AM    NEUTSPOLYS 35.20 (L) 11/04/2024 02:40 AM    LYMPHOCYTES 53.50 (H) 11/04/2024 02:40 AM    MONOCYTES 9.20 11/04/2024 02:40 AM    EOSINOPHILS 0.80 11/04/2024 02:40 AM    BASOPHILS 1.10 11/04/2024 02:40 AM    ANISOCYTOSIS 2+ (A) 02/16/2022 01:30 PM        Assessment and Plan:    #Type 1  diabetes  A1c (11/2024): 11.8%  Monofilament (12/2024): Normal  Retinopathy screening (12/2024): Pending  Previous regimen: Glargine 6 units BID, SSI  -Due to hypoglycemic events at night, advised patient to stop nighttime glargine and will uptitrate morning glargine to 10 units  Current regimen: Glargine 10 units every morning, SSI  -Referral to endocrinology approved.  Appointment in February 2025  -Referral to nutrition approved.  Information provided on paperwork for patient to make appointment  -Check urine microalbumin/creatinine ratio, lipid profile  -Provided pneumococcal and influenza vaccine    #Chest pain  #Shortness of breath  Had pericarditis while hospitalized in November    Echo was normal  Due to her nature of symptoms and risk factors, will obtain stress test to further evaluate.  Advised her to continue her colchicine  - Stress test     #Hypothyroidism  History of Graves' disease status post bilateral total thyroidectomy in March 2017  Stable on Synthroid 200 mcg daily  TSH (11/2024): 4.630    #Need for vaccination  - Pneumococcal Conjugate Vaccine 20-Valent (6 wks+)  - INFLUENZA VACCINE TRI INJ (PF)     Follow Up:  Return in about 5 weeks (around 1/20/2025).

## 2024-12-16 NOTE — PATIENT INSTRUCTIONS
[facility]  [address] [address]  Phone: [phone]

## 2024-12-23 DIAGNOSIS — E10.69 TYPE 1 DIABETES MELLITUS WITH OTHER SPECIFIED COMPLICATION (HCC): ICD-10-CM

## 2024-12-23 NOTE — TELEPHONE ENCOUNTER
Pt called wanting refill on rx as it fell off - informed that she may have to pay OOP for this as she recently filled this rx. Pt is requesting this for next month. Informed of 48-72 business hour turnaround time and holiday.    Received request via: Patient    Was the patient seen in the last year in this department? Yes    Does the patient have an active prescription (recently filled or refills available) for medication(s) requested? No    [facility]    Does the patient have intermediate Plus and need 100-day supply? (This applies to ALL medications) Patient does not have SCP

## 2025-01-01 NOTE — ED AVS SNAPSHOT
4/18/2017    Ivis Klein  [address] [place] [address]    Dear Ivis:    [facility] wants to ensure your discharge home is safe and you or your loved ones have had all of your questions answered regarding your care after you leave the hospital.    Below is a list of resources and contact information should you have any questions regarding your hospital stay, follow-up instructions, or active medical symptoms.    Questions or Concerns Regarding… Contact   Medical Questions Related to Your Discharge  (7 days a week, 8am-5pm) Contact a Nurse Care Coordinator   [phone]   Medical Questions Not Related to Your Discharge  (24 hours a day / 7 days a week)  Contact the Nurse Health Line   [phone]    Medications or Discharge Instructions Refer to your discharge packet   or contact your [facility] Primary Care Provider   [phone]   Follow-up Appointment(s) Schedule your appointment via [employer]   or contact Scheduling [phone]   Billing Review your statement via [employer]  or contact Billing [phone]   Medical Records Review your records via [employer]   or contact Medical Records [phone]     You may receive a telephone call within two days of discharge. This call is to make certain you understand your discharge instructions and have the opportunity to have any questions answered. You can also easily access your medical information, test results and upcoming appointments via the [employer] free online health management tool. You can learn more and sign up at [employer]. For assistance setting up your [employer] account, please call [phone].    Once again, we want to ensure your discharge home is safe and that you have a clear understanding of any next steps in your care. If you have any questions or concerns, please do not hesitate to contact us, we are here for you. Thank you for choosing [facility] for your healthcare needs.    Sincerely,    Your [facility] Healthcare Team          Subjective:   Lottie Melgoza is a 33 y.o. female who presents for Pharyngitis (Cough x2d)        Pharyngitis   This is a new (Report sore throat for the past 2 days, pain with swallowing which was worse this morning, reports exposure to streptococcal pharyngitis and noted community wide influenza exposure) problem. Episode onset: 2 days. Associated symptoms include congestion, coughing and trouble swallowing (Pain with swallowing). Pertinent negatives include no shortness of breath or vomiting. Associated symptoms comments: There has been community-wide COVID-19, influenza, and RSV exposure, the patient denies known direct COVID-19 or influenza exposure    Patient reports similar episodes in the past with improvement in eventual resolution with azithromycin and Medrol Dosepak  . She has had exposure to strep. She has tried cool liquids for the symptoms. The treatment provided no relief.     PMH:  has a past medical history of Anxiety, Celiac disease, and Depression.  MEDS:   Current Outpatient Medications:     methylPREDNISolone (MEDROL DOSEPAK) 4 MG Tablet Therapy Pack, Follow schedule on package instructions., Disp: 21 Tablet, Rfl: 0    ondansetron (ZOFRAN ODT) 4 MG TABLET DISPERSIBLE, Take 1 Tablet by mouth every 6 hours as needed for Nausea/Vomiting., Disp: 20 Tablet, Rfl: 0    EPINEPHrine (EPIPEN INJ), Inject  as directed., Disp: , Rfl:     albuterol 108 (90 Base) MCG/ACT Aero Soln inhalation aerosol, Inhale 2 Puffs by mouth every 6 hours as needed for Shortness of Breath., Disp: 8.5 g, Rfl: 2    citalopram (CELEXA) 20 MG Tab, Take 1 Tablet by mouth every day. (Patient not taking: Reported on 1/1/2025), Disp: 90 Tablet, Rfl: 3    ondansetron (ZOFRAN) 4 MG Tab tablet, Take 1 Tablet by mouth every four hours as needed for Nausea/Vomiting. (Patient not taking: Reported on 11/18/2024), Disp: 20 Tablet, Rfl: 0  ALLERGIES:   Allergies   Allergen Reactions    Cantaloupe Extract Allergy Skin Test Anaphylaxis     "Gluten Meal Nausea, Vomiting, Rash, Diarrhea and Palpitations    Kiwi Extract Anaphylaxis    Orange Oil Anaphylaxis    Cantaloupe Anaphylaxis    Latex Hives, Itching, Rash and Swelling    Orange Palpitations, Photosensitivity and Shortness of Breath    Pistachio Nut (Diagnostic) Itching, Rash and Swelling    Watermelon Flavor Hives and Rash     SURGHX:   Past Surgical History:   Procedure Laterality Date    ADENOIDECTOMY      KNEE ARTHROSCOPY       SOCHX:  reports that she has never smoked. She has never used smokeless tobacco. She reports current alcohol use. She reports that she does not use drugs.  FH:   Family History   Problem Relation Age of Onset    Cancer Mother         skin    Hyperlipidemia Father     Cancer Maternal Uncle         prostate    Ovarian Cancer Maternal Grandmother     Cancer Maternal Grandmother     Colorectal Cancer Maternal Grandfather     Cancer Maternal Grandfather     Lung Disease Paternal Grandfather     Diabetes Paternal Grandfather     Breast Cancer Neg Hx     Peritoneal Cancer Neg Hx     Tubal Cancer Neg Hx      Review of Systems   Constitutional:  Positive for fever (Subjective fever). Negative for chills.   HENT:  Positive for congestion, sore throat and trouble swallowing (Pain with swallowing).    Respiratory:  Positive for cough. Negative for shortness of breath.    Gastrointestinal:  Negative for nausea and vomiting.   Genitourinary:  Negative for dysuria.   Musculoskeletal:  Positive for myalgias.   Skin:  Negative for rash.        Objective:   /70   Pulse 76   Temp 36.6 °C (97.8 °F) (Temporal)   Resp 16   Ht 1.778 m (5' 10\")   Wt 93.4 kg (206 lb)   LMP 12/18/2024 (Approximate)   SpO2 98%   BMI 29.56 kg/m²   Physical Exam  Vitals and nursing note reviewed.   Constitutional:       General: She is not in acute distress.     Appearance: She is well-developed.   HENT:      Head: Normocephalic and atraumatic.      Right Ear: Tympanic membrane and external ear normal. "      Left Ear: Tympanic membrane and external ear normal.      Nose: Rhinorrhea present.      Mouth/Throat:      Mouth: Mucous membranes are moist.      Pharynx: Posterior oropharyngeal erythema present. No oropharyngeal exudate.   Eyes:      Conjunctiva/sclera: Conjunctivae normal.   Cardiovascular:      Rate and Rhythm: Normal rate.   Pulmonary:      Effort: Pulmonary effort is normal. No respiratory distress.      Breath sounds: Normal breath sounds. No wheezing or rhonchi.   Abdominal:      General: There is no distension.   Musculoskeletal:         General: Normal range of motion.   Skin:     General: Skin is warm and dry.   Neurological:      General: No focal deficit present.      Mental Status: She is alert and oriented to person, place, and time. Mental status is at baseline.      Gait: Gait (gait at baseline) normal.   Psychiatric:         Judgment: Judgment normal.           Assessment/Plan:   1. Pharyngitis, unspecified etiology  - POCT GROUP A STREP, PCR    2. Acute cough  - POCT CEPHEID COV-2, FLU A/B, RSV - PCR    3. Odynophagia  - methylPREDNISolone (MEDROL DOSEPAK) 4 MG Tablet Therapy Pack; Follow schedule on package instructions.  Dispense: 21 Tablet; Refill: 0  - POCT GROUP A STREP, PCR    4. Exposure to Streptococcal pharyngitis  - POCT GROUP A STREP, PCR    Medical decision-making/course: The patient remained afebrile, hemodynamically and neurologically stable with no evidence of respiratory compromise throughout the urgent care course.  There was no immediate clinical indication for the necessity of emergency department evaluation or inpatient admission and the patient was amendable to a trial of outpatient management.        In the course of preparing for this visit with review of the pertinent past medical history, recent and past clinic visits, current medications, and performing chart, immunization, medical history and medication reconciliation, and in the further course of obtaining the  current history pertinent to the clinic visit today, performing an exam and evaluation, ordering and independently evaluating labs, tests including group A strep Influenza A, Influenza B, RSV, and SARS CoV-2 by PCR testing   , and/or procedures, prescribing any recommended new medications as noted above, providing any pertinent counseling and education and recommending further coordination of care including recommendations for symptomatic and supportive measures and drafting work excuse letter, at least  18 minutes of total time were spent during this encounter.      Discussed close monitoring, return precautions, and supportive measures of maintaining adequate fluid hydration and caloric intake, relative rest and symptom management as needed for pain and/or fever.    Differential diagnosis, natural history, supportive care, and indications for immediate follow-up discussed.     Advised the patient to follow-up with the primary care physician for recheck, reevaluation, and consideration of further management.    Please note that this dictation was created using voice recognition software. I have worked with consultants from the vendor as well as technical experts from [facility] [employer] to optimize the interface. I have made every reasonable attempt to correct obvious errors, but I expect that there are errors of grammar and possibly content that I did not discover before finalizing the note.

## 2025-01-08 ENCOUNTER — HOSPITAL ENCOUNTER (OUTPATIENT)
Dept: RADIOLOGY | Facility: MEDICAL CENTER | Age: 30
End: 2025-01-08
Payer: MEDICAID

## 2025-01-08 DIAGNOSIS — R07.9 CHEST PAIN, UNSPECIFIED TYPE: ICD-10-CM

## 2025-01-08 PROCEDURE — 78452 HT MUSCLE IMAGE SPECT MULT: CPT | Mod: 26 | Performed by: INTERNAL MEDICINE

## 2025-01-08 PROCEDURE — A9502 TC99M TETROFOSMIN: HCPCS

## 2025-01-08 PROCEDURE — 93018 CV STRESS TEST I&R ONLY: CPT | Performed by: INTERNAL MEDICINE

## 2025-01-14 LAB — RETINAL SCREEN: NEGATIVE

## 2025-01-16 ENCOUNTER — HOSPITAL ENCOUNTER (EMERGENCY)
Facility: MEDICAL CENTER | Age: 30
End: 2025-01-17
Attending: EMERGENCY MEDICINE
Payer: MEDICAID

## 2025-01-16 ENCOUNTER — APPOINTMENT (OUTPATIENT)
Dept: RADIOLOGY | Facility: MEDICAL CENTER | Age: 30
End: 2025-01-16
Attending: EMERGENCY MEDICINE
Payer: MEDICAID

## 2025-01-16 DIAGNOSIS — E87.6 HYPOKALEMIA: ICD-10-CM

## 2025-01-16 DIAGNOSIS — R07.9 ACUTE CHEST PAIN: ICD-10-CM

## 2025-01-16 DIAGNOSIS — R53.83 MALAISE AND FATIGUE: ICD-10-CM

## 2025-01-16 DIAGNOSIS — R53.1 GENERALIZED WEAKNESS: ICD-10-CM

## 2025-01-16 DIAGNOSIS — R51.9 ACUTE NONINTRACTABLE HEADACHE, UNSPECIFIED HEADACHE TYPE: ICD-10-CM

## 2025-01-16 DIAGNOSIS — D64.9 ANEMIA, UNSPECIFIED TYPE: ICD-10-CM

## 2025-01-16 DIAGNOSIS — R06.02 SHORTNESS OF BREATH: ICD-10-CM

## 2025-01-16 DIAGNOSIS — R73.9 HYPERGLYCEMIA: ICD-10-CM

## 2025-01-16 DIAGNOSIS — R63.0 POOR APPETITE: ICD-10-CM

## 2025-01-16 DIAGNOSIS — R53.81 MALAISE AND FATIGUE: ICD-10-CM

## 2025-01-16 LAB
ALBUMIN SERPL BCP-MCNC: 4.3 G/DL (ref 3.2–4.9)
ALBUMIN/GLOB SERPL: 1.4 G/DL
ALP SERPL-CCNC: 66 U/L (ref 30–99)
ALT SERPL-CCNC: 10 U/L (ref 2–50)
ANION GAP SERPL CALC-SCNC: 11 MMOL/L (ref 7–16)
APPEARANCE UR: CLEAR
AST SERPL-CCNC: 14 U/L (ref 12–45)
BILIRUB SERPL-MCNC: 0.3 MG/DL (ref 0.1–1.5)
BILIRUB UR QL STRIP.AUTO: NEGATIVE
BUN SERPL-MCNC: 11 MG/DL (ref 8–22)
CALCIUM ALBUM COR SERPL-MCNC: 8.7 MG/DL (ref 8.5–10.5)
CALCIUM SERPL-MCNC: 8.9 MG/DL (ref 8.4–10.2)
CHLORIDE SERPL-SCNC: 103 MMOL/L (ref 96–112)
CO2 SERPL-SCNC: 23 MMOL/L (ref 20–33)
COLOR UR: YELLOW
CREAT SERPL-MCNC: 0.54 MG/DL (ref 0.5–1.4)
D DIMER PPP IA.FEU-MCNC: <0.27 UG/ML (FEU) (ref 0–0.5)
EKG IMPRESSION: NORMAL
GFR SERPLBLD CREATININE-BSD FMLA CKD-EPI: 129 ML/MIN/1.73 M 2
GLOBULIN SER CALC-MCNC: 3.1 G/DL (ref 1.9–3.5)
GLUCOSE SERPL-MCNC: 151 MG/DL (ref 65–99)
GLUCOSE UR STRIP.AUTO-MCNC: 250 MG/DL
HCG SERPL QL: NEGATIVE
KETONES UR STRIP.AUTO-MCNC: 15 MG/DL
LEUKOCYTE ESTERASE UR QL STRIP.AUTO: NEGATIVE
LIPASE SERPL-CCNC: 25 U/L (ref 11–82)
MAGNESIUM SERPL-MCNC: 2 MG/DL (ref 1.5–2.5)
MICRO URNS: ABNORMAL
NITRITE UR QL STRIP.AUTO: NEGATIVE
PH UR STRIP.AUTO: 6 [PH] (ref 5–8)
POTASSIUM SERPL-SCNC: 3.3 MMOL/L (ref 3.6–5.5)
PROT SERPL-MCNC: 7.4 G/DL (ref 6–8.2)
PROT UR QL STRIP: NEGATIVE MG/DL
RBC UR QL AUTO: NEGATIVE
SODIUM SERPL-SCNC: 137 MMOL/L (ref 135–145)
SP GR UR STRIP.AUTO: >=1.03
TROPONIN T SERPL-MCNC: <6 NG/L (ref 6–19)

## 2025-01-16 PROCEDURE — 85025 COMPLETE CBC W/AUTO DIFF WBC: CPT

## 2025-01-16 PROCEDURE — 99284 EMERGENCY DEPT VISIT MOD MDM: CPT

## 2025-01-16 PROCEDURE — 93005 ELECTROCARDIOGRAM TRACING: CPT | Mod: TC | Performed by: EMERGENCY MEDICINE

## 2025-01-16 PROCEDURE — 84443 ASSAY THYROID STIM HORMONE: CPT

## 2025-01-16 PROCEDURE — 84703 CHORIONIC GONADOTROPIN ASSAY: CPT

## 2025-01-16 PROCEDURE — 80053 COMPREHEN METABOLIC PANEL: CPT

## 2025-01-16 PROCEDURE — 94760 N-INVAS EAR/PLS OXIMETRY 1: CPT

## 2025-01-16 PROCEDURE — 71046 X-RAY EXAM CHEST 2 VIEWS: CPT

## 2025-01-16 PROCEDURE — 83690 ASSAY OF LIPASE: CPT

## 2025-01-16 PROCEDURE — 84484 ASSAY OF TROPONIN QUANT: CPT

## 2025-01-16 PROCEDURE — 83735 ASSAY OF MAGNESIUM: CPT

## 2025-01-16 PROCEDURE — 84439 ASSAY OF FREE THYROXINE: CPT

## 2025-01-16 PROCEDURE — 36415 COLL VENOUS BLD VENIPUNCTURE: CPT

## 2025-01-16 PROCEDURE — 81003 URINALYSIS AUTO W/O SCOPE: CPT

## 2025-01-16 PROCEDURE — 85379 FIBRIN DEGRADATION QUANT: CPT

## 2025-01-16 RX ORDER — POTASSIUM CHLORIDE 1500 MG/1
20 TABLET, EXTENDED RELEASE ORAL ONCE
Status: COMPLETED | OUTPATIENT
Start: 2025-01-17 | End: 2025-01-17

## 2025-01-16 ASSESSMENT — FIBROSIS 4 INDEX: FIB4 SCORE: 0.61

## 2025-01-17 ENCOUNTER — PATIENT MESSAGE (OUTPATIENT)
Dept: INTERNAL MEDICINE | Facility: OTHER | Age: 30
End: 2025-01-17
Payer: MEDICAID

## 2025-01-17 ENCOUNTER — TELEPHONE (OUTPATIENT)
Dept: INTERNAL MEDICINE | Facility: OTHER | Age: 30
End: 2025-01-17
Payer: MEDICAID

## 2025-01-17 VITALS
WEIGHT: 138.89 LBS | TEMPERATURE: 98.5 F | OXYGEN SATURATION: 99 % | HEART RATE: 70 BPM | HEIGHT: 63 IN | BODY MASS INDEX: 24.61 KG/M2 | SYSTOLIC BLOOD PRESSURE: 113 MMHG | RESPIRATION RATE: 18 BRPM | DIASTOLIC BLOOD PRESSURE: 67 MMHG

## 2025-01-17 DIAGNOSIS — E10.10 DKA, TYPE 1, NOT AT GOAL (HCC): ICD-10-CM

## 2025-01-17 LAB
BASOPHILS # BLD AUTO: 0.7 % (ref 0–1.8)
BASOPHILS # BLD: 0.05 K/UL (ref 0–0.12)
EOSINOPHIL # BLD AUTO: 0.05 K/UL (ref 0–0.51)
EOSINOPHIL NFR BLD: 0.7 % (ref 0–6.9)
ERYTHROCYTE [DISTWIDTH] IN BLOOD BY AUTOMATED COUNT: 44.9 FL (ref 35.9–50)
HCT VFR BLD AUTO: 33.6 % (ref 37–47)
HGB BLD-MCNC: 10 G/DL (ref 12–16)
IMM GRANULOCYTES # BLD AUTO: 0.01 K/UL (ref 0–0.11)
IMM GRANULOCYTES NFR BLD AUTO: 0.1 % (ref 0–0.9)
LYMPHOCYTES # BLD AUTO: 3.29 K/UL (ref 1–4.8)
LYMPHOCYTES NFR BLD: 48.2 % (ref 22–41)
MCH RBC QN AUTO: 24.3 PG (ref 27–33)
MCHC RBC AUTO-ENTMCNC: 29.8 G/DL (ref 32.2–35.5)
MCV RBC AUTO: 81.6 FL (ref 81.4–97.8)
MONOCYTES # BLD AUTO: 0.36 K/UL (ref 0–0.85)
MONOCYTES NFR BLD AUTO: 5.3 % (ref 0–13.4)
NEUTROPHILS # BLD AUTO: 3.07 K/UL (ref 1.82–7.42)
NEUTROPHILS NFR BLD: 45 % (ref 44–72)
NRBC # BLD AUTO: 0 K/UL
NRBC BLD-RTO: 0 /100 WBC (ref 0–0.2)
PLATELET # BLD AUTO: 300 K/UL (ref 164–446)
PLATELET BLD QL SMEAR: NORMAL
PMV BLD AUTO: 10.9 FL (ref 9–12.9)
RBC # BLD AUTO: 4.12 M/UL (ref 4.2–5.4)
RBC BLD AUTO: NORMAL
T4 FREE SERPL-MCNC: 1.42 NG/DL (ref 0.93–1.7)
TSH SERPL DL<=0.005 MIU/L-ACNC: 6.67 UIU/ML (ref 0.38–5.33)
WBC # BLD AUTO: 6.8 K/UL (ref 4.8–10.8)

## 2025-01-17 PROCEDURE — 99284 EMERGENCY DEPT VISIT MOD MDM: CPT

## 2025-01-17 PROCEDURE — A9270 NON-COVERED ITEM OR SERVICE: HCPCS | Mod: JZ | Performed by: EMERGENCY MEDICINE

## 2025-01-17 PROCEDURE — 700102 HCHG RX REV CODE 250 W/ 637 OVERRIDE(OP): Mod: JZ | Performed by: EMERGENCY MEDICINE

## 2025-01-17 RX ORDER — INSULIN GLARGINE 100 [IU]/ML
10 INJECTION, SOLUTION SUBCUTANEOUS
Qty: 15 ML | Refills: 2
Start: 2025-01-17 | End: 2025-01-20 | Stop reason: SDUPTHER

## 2025-01-17 RX ADMIN — POTASSIUM CHLORIDE 20 MEQ: 1500 TABLET, EXTENDED RELEASE ORAL at 00:09

## 2025-01-17 NOTE — PATIENT COMMUNICATION
Received request via: Patient    Was the patient seen in the last year in this department? Yes    Does the patient have an active prescription (recently filled or refills available) for medication(s) requested? No    Pharmacy Name: [id number] - [place], [place] - [address]     Does the patient have [facility] Plus and need 100-day supply? (This applies to ALL medications) Patient does not have SCP

## 2025-01-17 NOTE — TELEPHONE ENCOUNTER
VOICEMAIL  1. Caller Name: Ivis Klein                        Call Back Number: [phone]      2. Message: pt left message she needed refills but didn't say what was needed, called her back and left a message to call back.    3. Patient approves office to leave a detailed voicemail/MyChart message: N\A

## 2025-01-17 NOTE — ED TRIAGE NOTES
"Chief Complaint   Patient presents with    Weakness     Pt presents with generalized weakness, SOB, chest discomfort, and fatigue. Pt states she is worried about her sugar levels.     /67   Pulse 70   Temp 36.9 °C (98.5 °F) (Temporal)   Resp 18   Ht 1.6 m (5' 3\")   Wt 63 kg (138 lb 14.2 oz)   SpO2 99%   BMI 24.60 kg/m²     "

## 2025-01-17 NOTE — ED PROVIDER NOTES
"                                                        ED Provider Note    CHIEF COMPLAINT  Chief Complaint   Patient presents with    Weakness     Pt presents with generalized weakness, SOB, chest discomfort, and fatigue. Pt states she is worried about her sugar levels.        \[facility]\""    Primary care provider: Leonel Mcdermott D.O.   History obtained from: Patient  History limited by: None     Ivis Klein is a 29 y.o. female who presents to the ED with  complaining of \"I just don't feel well\" over the past week.  Patient reports she has type 1 diabetes and her glucose has sometimes been reading over 200.  She reports having headache, clumps of hair falling off, chest pain and shortness of breath, numbness in several of her fingertips, feeling weak and tired.  She reports she has not been eating much with poor appetite.  She denies missing her medicine or any recent changes to her medications.  She denies fever/congestion/cough.  No nausea or vomiting.  She has been constipated.  No dysuria.  She denies possibility of pregnancy.  She reports an uncle with heart attack but otherwise no known heart problems in the family.  She reports episode of blood clot after having thyroid surgery.  She is not on blood thinners currently.    REVIEW OF SYSTEMS  Please see HPI for pertinent positives/negatives.  All other systems reviewed and are negative.     PAST MEDICAL HISTORY  Past Medical History:   Diagnosis Date    Type 1 diabetes mellitus with other specified complication (HCC) 11/8/2024    Postpartum depression 02/17/2022    Postpartum depression 02/17/2022    Anemia 02/17/2022    Vaginal discharge 12/02/2021    History of panic attack 04/09/2020    History of thyroidectomy 01/31/2020    Partial --> hypothyroidism     Elevated antinuclear antibody (DEJON) level 05/23/2019    Hemorrhagic cysts of both ovaries 05/23/2019    Panic attack 03/11/2019    Anxiety 02/16/2017    Anxiety 02/16/2017    Graves disease " 12/05/2016    History of pericarditis 12/05/2016    Heart valve disease         SURGICAL HISTORY  Past Surgical History:   Procedure Laterality Date    JUNIE BY LAPAROSCOPY N/A 8/28/2024    Procedure: CHOLECYSTECTOMY, LAPAROSCOPIC;  Surgeon: Charles Rodas M.D.;  Location: SURGERY [facility];  Service: General    THYROIDECTOMY TOTAL Bilateral 3/22/2017    Procedure: THYROIDECTOMY TOTAL -NIMS RECURRENT LARYNGEAL NERVE MONITORING;  Surgeon: Vicente Eldridge M.D.;  Location: SURGERY SAME DAY [facility];  Service:     PRIMARY C SECTION  9/8/2013    Performed by Melisa Wright M.D. at LABOR AND DELIVERY        SOCIAL HISTORY  Social History     Tobacco Use    Smoking status: Never    Smokeless tobacco: Never    Tobacco comments:     quit in 2012   Vaping Use    Vaping status: Never Used   Substance and Sexual Activity    Alcohol use: Not Currently    Drug use: Yes     Types: Marijuana, Inhaled     Comment: marijuana weekly    Sexual activity: Yes     Partners: Male     Birth control/protection: I.U.D.        FAMILY HISTORY  Family History   Problem Relation Age of Onset    Cancer Paternal Grandmother 56        breast cancer    No Known Problems Mother     Hyperlipidemia Father     No Known Problems Sister     No Known Problems Brother         CURRENT MEDICATIONS  Home Medications       Reviewed by Anne Ziegler R.N. (Registered Nurse) on 01/17/25 at 0005  Med List Status: Complete     Medication Last Dose Status   Alcohol Swabs  Active   Blood Glucose Monitoring Suppl (BLOOD GLUCOSE MONITOR SYSTEM) w/Device Kit  Active   Blood Glucose Test Strips  Active   Continuous Glucose Sensor (FREESTYLE MIRTA 3 SENSOR) Misc  Active   Continuous Glucose Sensor Misc  Active   glucose blood strip  Active   insulin glargine (LANTUS SOLOSTAR) 100 UNIT/ML Solution Pen-injector injection  Active   insulin lispro 100 UNIT/ML SC SOPN injection PEN  Active   Insulin Pen Needle 32 G x 4 mm  Active   Lancets  Active  "  levonorgestrel (MIRENA, 52 MG,) 20 MCG/DAY IUD  Active   levothyroxine (SYNTHROID) 200 MCG Tab 1/16/2025 Active                     ALLERGIES  No Known Allergies     PHYSICAL EXAM  VITAL SIGNS: /67   Pulse 70   Temp 36.9 °C (98.5 °F) (Temporal)   Resp 18   Ht 1.6 m (5' 3\")   Wt 63 kg (138 lb 14.2 oz)   SpO2 99%   BMI 24.60 kg/m²  @MELODIE[[id number]::@     Pulse ox interpretation: 96% I interpret this pulse ox as normal     Cardiac monitor interpretation: Sinus rhythm with heart rate in the 80s as interpreted by me.  The patient presented with chest pain and shortness of breath and cardiac monitor was ordered to monitor for dysrhythmia.    Constitutional: Well developed, well nourished, alert in no apparent distress, nontoxic appearance    HENT: No external signs of trauma, normocephalic, oropharynx moist and clear   Eyes: PERRL, conjunctiva without erythema, no discharge, no icterus    Neck: Soft and supple, trachea midline, no stridor, no tenderness, no LAD, good ROM    Cardiovascular: Regular rate and rhythm, no murmurs/rubs/gallops, strong distal pulses and good perfusion    Thorax & Lungs: No respiratory distress, CTAB    Abdomen: Soft, nontender, nondistended, no guarding, no rebound, normal BS    Extremities: No cyanosis, no edema, no gross deformity, good ROM, intact distal pulses with brisk cap refill    Skin: Warm, dry, no pallor/cyanosis, no rash noted      Neuro: A/O times 3, no focal deficits noted, ambulating without difficulty  Psychiatric: Cooperative, normal mood and affect, normal judgement, appropriate for clinical situation        DIAGNOSTIC STUDIES / PROCEDURES    EKG  12 Lead EKG obtained at 2152 and interpreted by me:   Rate: 74   Rhythm: Sinus rhythm   Ectopy: None  Intervals: Normal   Axis: Normal   QRS: Normal   ST segments: Normal  T Waves: Normal    Clinical Impression: Sinus rhythm without acute ischemic changes or dysrhythmia  Compared to May 18, 2024 without significant " change      LABS  All labs reviewed by me.     Results for orders placed or performed during the hospital encounter of 25   EKG    Collection Time: 25  9:52 PM   Result Value Ref Range    Report       [facility] Emergency Dept.    Test Date:  2025  Pt Name:    MICHELLE MENARD               Department: [facility]  MRN:        [id number]                      Room:  Gender:     Female                       Technician: [id number]  :        1995                   Requested By:ER TRIAGE PROTOCOL  Order #:    [id number]                    Reading MD:    Measurements  Intervals                                Axis  Rate:       74                           P:          37  MA:         180                          QRS:        67  QRSD:       76                           T:          44  QT:         371  QTc:        412    Interpretive Statements  Sinus rhythm  Baseline wander in lead(s) V2  Compared to ECG 2024 10:38:51  ST (T wave) deviation no longer present     CBC WITH DIFFERENTIAL    Collection Time: 25 11:17 PM   Result Value Ref Range    WBC 6.8 4.8 - 10.8 K/uL    RBC 4.12 (L) 4.20 - 5.40 M/uL    Hemoglobin 10.0 (L) 12.0 - 16.0 g/dL    Hematocrit 33.6 (L) 37.0 - 47.0 %    MCV 81.6 81.4 - 97.8 fL    MCH 24.3 (L) 27.0 - 33.0 pg    MCHC 29.8 (L) 32.2 - 35.5 g/dL    RDW 44.9 35.9 - 50.0 fL    Platelet Count 300 164 - 446 K/uL    MPV 10.9 9.0 - 12.9 fL    Neutrophils-Polys 45.00 44.00 - 72.00 %    Lymphocytes 48.20 (H) 22.00 - 41.00 %    Monocytes 5.30 0.00 - 13.40 %    Eosinophils 0.70 0.00 - 6.90 %    Basophils 0.70 0.00 - 1.80 %    Immature Granulocytes 0.10 0.00 - 0.90 %    Nucleated RBC 0.00 0.00 - 0.20 /100 WBC    Neutrophils (Absolute) 3.07 1.82 - 7.42 K/uL    Lymphs (Absolute) 3.29 1.00 - 4.80 K/uL    Monos (Absolute) 0.36 0.00 - 0.85 K/uL    Eos (Absolute) 0.05 0.00 - 0.51 K/uL    Baso (Absolute) 0.05 0.00 - 0.12 K/uL    Immature Granulocytes (abs) 0.01 0.00 - 0.11 K/uL     NRBC (Absolute) 0.00 K/uL   COMP METABOLIC PANEL    Collection Time: 01/16/25 11:17 PM   Result Value Ref Range    Sodium 137 135 - 145 mmol/L    Potassium 3.3 (L) 3.6 - 5.5 mmol/L    Chloride 103 96 - 112 mmol/L    Co2 23 20 - 33 mmol/L    Anion Gap 11.0 7.0 - 16.0    Glucose 151 (H) 65 - 99 mg/dL    Bun 11 8 - 22 mg/dL    Creatinine 0.54 0.50 - 1.40 mg/dL    Calcium 8.9 8.4 - 10.2 mg/dL    Correct Calcium 8.7 8.5 - 10.5 mg/dL    AST(SGOT) 14 12 - 45 U/L    ALT(SGPT) 10 2 - 50 U/L    Alkaline Phosphatase 66 30 - 99 U/L    Total Bilirubin 0.3 0.1 - 1.5 mg/dL    Albumin 4.3 3.2 - 4.9 g/dL    Total Protein 7.4 6.0 - 8.2 g/dL    Globulin 3.1 1.9 - 3.5 g/dL    A-G Ratio 1.4 g/dL   LIPASE    Collection Time: 01/16/25 11:17 PM   Result Value Ref Range    Lipase 25 11 - 82 U/L   TROPONIN    Collection Time: 01/16/25 11:17 PM   Result Value Ref Range    Troponin T <6 6 - 19 ng/L   D-DIMER    Collection Time: 01/16/25 11:17 PM   Result Value Ref Range    D-Dimer <0.27 0.00 - 0.50 ug/mL (FEU)   BETA-HCG QUALITATIVE SERUM    Collection Time: 01/16/25 11:17 PM   Result Value Ref Range    Beta-Hcg Qualitative Serum Negative Negative   MAGNESIUM    Collection Time: 01/16/25 11:17 PM   Result Value Ref Range    Magnesium 2.0 1.5 - 2.5 mg/dL   URINALYSIS (UA)    Collection Time: 01/16/25 11:17 PM    Specimen: Urine, Clean Catch; Blood   Result Value Ref Range    Color Yellow     Character Clear     Specific Gravity >=1.030 <1.035    Ph 6.0 5.0 - 8.0    Glucose 250 (A) Negative mg/dL    Ketones 15 (A) Negative mg/dL    Protein Negative Negative mg/dL    Bilirubin Negative Negative    Nitrite Negative Negative    Leukocyte Esterase Negative Negative    Occult Blood Negative Negative    Micro Urine Req see below    TSH WITH REFLEX TO FT4    Collection Time: 01/16/25 11:17 PM   Result Value Ref Range    TSH 6.670 (H) 0.380 - 5.330 uIU/mL   ESTIMATED GFR    Collection Time: 01/16/25 11:17 PM   Result Value Ref Range    GFR (CKD-EPI)  129 >60 mL/min/1.73 m 2   PLATELET ESTIMATE    Collection Time: 01/16/25 11:17 PM   Result Value Ref Range    Plt Estimation Normal    MORPHOLOGY    Collection Time: 01/16/25 11:17 PM   Result Value Ref Range    RBC Morphology Normal    FREE THYROXINE    Collection Time: 01/16/25 11:17 PM   Result Value Ref Range    Free T-4 1.42 0.93 - 1.70 ng/dL        RADIOLOGY  I have independently interpreted the diagnostic imaging associated with this visit and am waiting the final reading from the radiologist.   My preliminary interpretation is as follows: No acute findings on chest x-ray compared to previous.    DX-CHEST-2 VIEWS   Final Result         1.  No acute cardiopulmonary disease.             COURSE & MEDICAL DECISION MAKING  Nursing notes, VS, PMSFHx reviewed in chart.     Review of past medical records shows the patient had outpatient visit with PCP on December 16, 2024 regarding need for vaccination, type 1 diabetes, chest pain, DKA type I not at goal, Graves' disease, postsurgical hypothyroidism.  Patient contacted the office on December 23, 2024 regarding refill of continuous glucose sensor.  Patient had outpatient nuclear cardiac stress test on January 8, 2025 with the following finding:     Normal myocardial perfusion with no ischemia.    Normal left ventricular wall motion.  LV ejection fraction = 69%.    ECG INTERPRETATION    Negative stress ECG for ischemia.       Cardiac echo on November 2, 2024 had the following findings:  Normal left ventricular systolic function. The left ventricular   ejection fraction is visually estimated to be 65%.  Normal right ventricular size and systolic function.  Mild mitral regurgitation.  No pericardial effusion.  No prior study is available for comparison.      CTPA on March 12, 2024 had the following findings:  PULMONARY ARTERIES: Well visualized.  There are no emboli.     Mediastinum and hilum: There is no adenopathy or mass within the axilla, the mediastinum, or norm.      AORTA: is normal in appearance.     LUNGS: The pulmonary parenchyma is clear.     PLEURA: There are no pleural effusions or pneumothoraces.     BONES: There are no significant osseous abnormalities.     ABDOMEN: The visualized portions of the upper abdomen are within normal limits.  IMPRESSION:       Differential diagnoses considered include but are not limited to: AMI, dissection, PE, pneumothorax, pneumomediastinum, CHF/pulm edema, pericardial effusion/tamponade, myocarditis, pericarditis, pleural effusion, pleurisy, costochondritis, mediastinitis, esophageal spasm, GERD, gastritis, PUD, hiatal hernia, pancreatitis, muscle strain, neuropathy, hyperglycemia, DKA, electrolyte derangement, thyroid disorder, autoimmune disorder       ED Observation Status? No; Patient does not meet criteria for ED Observation.       Discussion of management with other [facility] or appropriate source(s): None     Escalation of care considered, and ultimately not performed: acute inpatient care management, however at this time, the patient is most appropriate for outpatient management.     Decision tools and prescription drugs considered including, but not limited to: Medication modification   .        Pt risk-stratified as low risk for MACE in the next 6 weeks by HEART Score (0-3): 1    HISTORY  Highly suspicious +2  Moderately suspicious +1  Slightly suspicious 0    EKG  Significant ST depression +2  Nonspecific repolarization disturbance +1  Normal 0    AGE  >= 65 +2  45-65 +1  < 45 0    RISK FACTORS  Hypercholesterolemia, HTN, DM, Cigarette smoking, positive family history, obesity  >= 3 risk factors or history of atherosclerotic disease +2  1-2 risk factors +1  No risk factors known 0    TROPONIN  >= 3× normal limit +2  1-3× normal limit +1  <= normal limit 0      History and physical exam as above.  This is a 29-year-old female patient with medical history including insulin-dependent diabetes, thyroid disorder, anxiety, depression,  panic attacks who presents to the ED with above complaints.  EKG without acute findings or significant change compared to prior and troponin without elevation.  This is unlikely to be ACS or myocarditis.  Unlikely to be PE with negative D-dimer.  No clinical signs to indicate acute heart failure.  I also have low clinical suspicion for other concerning pathology such as dissection or tamponade.  Chest x-ray without acute findings.  Laboratory testing shows chronic anemia fairly stable.  She received oral potassium replacement for mild hypokalemia without evidence for other electrolyte derangement.  She has mild hyperglycemia without evidence for DKA.  No renal or acute hepatic/biliary dysfunction.  UA without overt signs of infection.  Pregnancy test is negative.  She has elevated TSH while free T4 is in normal range.  Patient was monitored in the ED and remained clinically stable.  She tolerated oral intake without difficulty.  She has no focal neurological findings to suggest CVA or concerns for meningitis to suggest need for LP or emergent imaging.  At this time, no convincing evidence for emergent pathology or indications for admission.  Patient was advised on outpatient follow-up and given return to ED precautions.  She verbalized understanding and agreed with plan of care with no further questions or concerns.      The patient is referred to a primary physician for blood pressure management, diabetic screening, and for all other preventative health concerns.       FINAL IMPRESSION  1. Generalized weakness Acute   2. Malaise and fatigue Acute   3. Acute nonintractable headache, unspecified headache type Acute   4. Acute chest pain Acute   5. Shortness of breath Acute   6. Poor appetite Acute   7. Hyperglycemia Active   8. Hypokalemia Active   9. Anemia, unspecified type Chronic          DISPOSITION  Patient will be discharged home in stable condition.       FOLLOW UP  Leonel Mcdermott D.O.  [address] [facility]  [address]  [phone]    Call today      [facility], Emergency Dept  [address]  [place] [id number] [phone]    If symptoms worsen         OUTPATIENT MEDICATIONS  Discharge Medication List as of 1/17/2025 12:47 AM             Electronically signed by: Pollo Mcdermott D.O., 1/16/2025 9:51 PM      Portions of this record were made with voice recognition software.  Despite my review, errors may remain.  Please interpret this chart in the appropriate context.

## 2025-01-17 NOTE — ED TRIAGE NOTES
"Pt to triage with a multitude of complaints. Pt is a type 1 diabetic and for the past week has had a headache, hair loss, weakness, numbness in her fingers, decreased appetite, bilateral lower abdominal pain, SOB and chest pressure. Pt states her sugar has been running higher \"sometimes in the 200s\" but it is currently at 148. Pt denies N/V  "

## 2025-01-20 ENCOUNTER — OFFICE VISIT (OUTPATIENT)
Dept: INTERNAL MEDICINE | Facility: OTHER | Age: 30
End: 2025-01-20
Payer: MEDICAID

## 2025-01-20 VITALS
SYSTOLIC BLOOD PRESSURE: 111 MMHG | DIASTOLIC BLOOD PRESSURE: 74 MMHG | OXYGEN SATURATION: 93 % | HEIGHT: 63 IN | WEIGHT: 139 LBS | TEMPERATURE: 98.6 F | HEART RATE: 80 BPM | BODY MASS INDEX: 24.63 KG/M2

## 2025-01-20 DIAGNOSIS — Z71.89 COUNSELING AND COORDINATION OF CARE: ICD-10-CM

## 2025-01-20 DIAGNOSIS — E89.0 POSTSURGICAL HYPOTHYROIDISM: ICD-10-CM

## 2025-01-20 DIAGNOSIS — E10.10 DKA, TYPE 1, NOT AT GOAL (HCC): ICD-10-CM

## 2025-01-20 DIAGNOSIS — D64.9 ANEMIA, UNSPECIFIED TYPE: ICD-10-CM

## 2025-01-20 PROCEDURE — 3078F DIAST BP <80 MM HG: CPT | Mod: GE

## 2025-01-20 PROCEDURE — 3074F SYST BP LT 130 MM HG: CPT | Mod: GE

## 2025-01-20 PROCEDURE — 99213 OFFICE O/P EST LOW 20 MIN: CPT | Mod: GE

## 2025-01-20 RX ORDER — LEVOTHYROXINE SODIUM 75 UG/1
225 TABLET ORAL
Qty: 270 TABLET | Refills: 1 | Status: SHIPPED | OUTPATIENT
Start: 2025-01-20

## 2025-01-20 RX ORDER — INSULIN GLARGINE 100 [IU]/ML
15 INJECTION, SOLUTION SUBCUTANEOUS
Qty: 15 ML | Refills: 2 | Status: SHIPPED | OUTPATIENT
Start: 2025-01-20

## 2025-01-20 ASSESSMENT — ENCOUNTER SYMPTOMS
SHORTNESS OF BREATH: 0
FEVER: 0
WEAKNESS: 0
WHEEZING: 0
DEPRESSION: 1
CHILLS: 0
ABDOMINAL PAIN: 0
VOMITING: 0
EYE REDNESS: 0
FALLS: 0

## 2025-01-20 ASSESSMENT — PATIENT HEALTH QUESTIONNAIRE - PHQ9
CLINICAL INTERPRETATION OF PHQ2 SCORE: 6
SUM OF ALL RESPONSES TO PHQ QUESTIONS 1-9: 21
5. POOR APPETITE OR OVEREATING: 3 - NEARLY EVERY DAY

## 2025-01-20 ASSESSMENT — FIBROSIS 4 INDEX: FIB4 SCORE: 0.4

## 2025-01-20 NOTE — ASSESSMENT & PLAN NOTE
During the ED visit, patient got labs which also noted anemia with hemoglobin of 10.  Review of chart reveals that patient is chronically anemic, likely iron deficiency given low/normal MCV and prior low iron levels in 2023.  Patient is not currently taking any iron supplementation  - Iron panel/ferritin and CBC labs to evaluate further  - Restart iron supplementation if clinically indicated by labs  - Investigate other etiologies if iron panel is normal

## 2025-01-20 NOTE — ASSESSMENT & PLAN NOTE
Reviewed labs and symptoms from recent ED visit on 1/16/2025.  Suspect symptoms might be related to subclinical hypothyroidism as documented below versus related to her blood sugar as she is still in the process of titrating insulin to appropriate levels (in the setting of recent type I DM diagnosis).  Also discussed patient's anemia noted on labs (as documented above).  Patient also reported chest pain that she describes as fluttering for which she had a negative troponin during the ED visit.  Also of note she had a negative stress test completed earlier this month.  Given patient's age, no further cardiac workup is clinically indicated.  - Additional care plans as elsewhere documented in this note

## 2025-01-20 NOTE — ASSESSMENT & PLAN NOTE
History of graves disease s/p thyroidectomy in 2017.  Patient thinks it was a partial thyroidectomy, although there is documentation in the chart stating that it might have been a total  Labs completed at recent ED visit noted an elevated TSH and normal T4.  Patient went to the ED for workup of 2-week history of feeling off, including more depressed, low energy, weakness, and some chest fluttering.  Given these symptoms in the setting of the elevated TSH, it is very possible that patient is having any symptoms from subclinical hypothyroidism.  Patient also reports some increased swelling in the right ventral neck area which she reports is new.  Exam notable for swelling in the area, no lymphadenopathy.  - Increase Synthroid from 200 mcg to 225 mcg daily  - Recheck TSH in 6 to 8 weeks  - Thyroid ultrasound to assess swelling as well as to confirm which kind of thyroidectomy patient has has completed  - RTC if symptoms do not improve

## 2025-01-20 NOTE — LETTER
January 20, 2025    To Whom It May Concern:         This is confirmation that Ivis Klein attended her scheduled appointment with Yarely Patel M.D. on 1/20/25.    At this time, please excuse patient from work in the setting of her current, actively ongoing medical conditions. She is scheduled to meet again with her primary care doctor on 1/27/25 who will be able to fill out any additional medical leave paperwork needed by employment.         If you have any questions please do not hesitate to call me at the phone number listed below.    Sincerely,          Yarely Patel M.D.  [phone]

## 2025-01-20 NOTE — ASSESSMENT & PLAN NOTE
A1c 11.1 in 11/2024, diagnosed with type 1. Has been on insulin ever since, titrated as needed  Is going to establish with endo in feb 2025.  Is having some difficulties with the extensive needle sticks and medication management related to the diabetes therapy and has been having trouble concentrating at work and timing her insulin appropriately. Is hoping to get a short term medical leave from work to prioritize her health and get into better patterns with her diabetes. She is planning on bringing the appropriate paperwork from work next week for her PCP to fill out.  BG levels have been intermittently >250s requiring anywhere from 3-8 units of short acting with meals  - increase lantus to 15 units  - hypoglycemia and return to clinic precautions reviewed with patient  - recheck A1c ordered, although not to be completed before 2/1/2025  - work note provided to patient to excuse her from full duties until she can bring in the proper paperwork next week to be filled out by her PCP

## 2025-01-20 NOTE — LETTER
January 20, 2025    To Whom It May Concern:         This is confirmation that Ivis Klein attended her scheduled appointment with Yarely Patel M.D. on 1/20/25.    At this time, please be cautious with full duties at work in the setting of her current ongoing medical conditions. She is scheduled to meet again with her primary care doctor on 1/27/25 who will be able to fill out any additional medical leave paperwork needed by her employment.         If you have any questions please do not hesitate to call me at the phone number listed below.    Sincerely,          Yarely Patel M.D.  [phone]

## 2025-01-20 NOTE — PROGRESS NOTES
ESTABLISHED PATIENT VISIT    PATIENT ID:  Name: Ivis Klein  YOB: 1995  Patient Care Team:  Leonel Mcdermott D.O. as PCP - General (Internal Medicine)    CHIEF COMPLAINT(s):  Follow-Up (ED follow up. Patient states she feels her thyroid is swollen. Having some pain in chest. Randomly feels fluttering in chest. )    Follow up for Diagnoses of DKA, type 1, not at goal (HCC), Anemia, unspecified type, Postsurgical hypothyroidism, and Counseling and coordination of care were pertinent to this visit.    History of Present Illness:    This is a pleasant 29 y.o. female clinic patient established with my colleague Dr. Mcdermott who presents today for follow-up regarding diabetes, hypothyroidism, and recent ED visit on 1/16/25.      Review of Systems   Constitutional:  Positive for malaise/fatigue. Negative for chills and fever.   HENT:  Negative for congestion.    Eyes:  Negative for redness.   Respiratory:  Negative for shortness of breath and wheezing.    Cardiovascular:  Negative for chest pain.   Gastrointestinal:  Negative for abdominal pain and vomiting.   Musculoskeletal:  Negative for falls.   Neurological:  Negative for weakness.   Psychiatric/Behavioral:  Positive for depression.        Past Medical History:   Diagnosis Date    Anemia 02/17/2022    Anxiety 02/16/2017    Anxiety 02/16/2017    Elevated antinuclear antibody (DEJON) level 05/23/2019    Graves disease 12/05/2016    Heart valve disease     Hemorrhagic cysts of both ovaries 05/23/2019    History of panic attack 04/09/2020    History of pericarditis 12/05/2016    History of thyroidectomy 01/31/2020    Partial --> hypothyroidism     Panic attack 03/11/2019    Postpartum depression 02/17/2022    Postpartum depression 02/17/2022    Type 1 diabetes mellitus with other specified complication (HCC) 11/8/2024    Vaginal discharge 12/02/2021     Past Surgical History:   Procedure Laterality Date    JUNIE BY LAPAROSCOPY N/A 8/28/2024     Procedure: CHOLECYSTECTOMY, LAPAROSCOPIC;  Surgeon: Charles Rodas M.D.;  Location: SURGERY [facility];  Service: General    THYROIDECTOMY TOTAL Bilateral 3/22/2017    Procedure: THYROIDECTOMY TOTAL -NIMS RECURRENT LARYNGEAL NERVE MONITORING;  Surgeon: Vicente Eldridge M.D.;  Location: SURGERY SAME DAY [facility];  Service:     PRIMARY C SECTION  9/8/2013    Performed by Melisa Wright M.D. at LABOR AND DELIVERY     Family History   Problem Relation Age of Onset    Cancer Paternal Grandmother 56        breast cancer    No Known Problems Mother     Hyperlipidemia Father     No Known Problems Sister     No Known Problems Brother      Patient has no known allergies.  Social History     Tobacco Use    Smoking status: Never    Smokeless tobacco: Never    Tobacco comments:     quit in 2012   Vaping Use    Vaping status: Never Used   Substance Use Topics    Alcohol use: Not Currently    Drug use: Yes     Types: Marijuana, Inhaled     Comment: marijuana weekly     Current Outpatient Medications   Medication Sig Dispense Refill    levothyroxine (SYNTHROID) 75 MCG Tab Take 3 Tablets by mouth every morning on an empty stomach. 270 Tablet 1    insulin glargine (LANTUS SOLOSTAR) 100 UNIT/ML Solution Pen-injector injection Inject 15 Units under the skin every morning before breakfast. 15 mL 2    Continuous Glucose Sensor Misc 1 Units continuous. 2 Units 1    Insulin Pen Needle 32 G x 4 mm Use one pen needle in pen device to inject insulin five times daily. 200 Each 0    insulin lispro 100 UNIT/ML SC SOPN injection PEN Inject 1-10 Units under the skin 4 Times a Day,Before Meals and at Bedtime.  mg/dL = 0 Units 151 - 200  mg/dL =  2 Units 201 - 250  mg/dL = 3 Units 251 - 300 mg/dL =  4 Units 301 - 350  mg/dL =  6 Units Over 351  mg/dL  = 7 Units 6 mL 1    Blood Glucose Test Strips Test strips order: Test strips for meter. Sig: use 3 times daily and prn ssx high or low sugar #100 RF x 3 100 Strip 2     "Continuous Glucose Sensor (FREESTYLE MIRTA 3 SENSOR) [facility] [place] 1 Application continuous. 1 Each 2    Blood Glucose Monitoring Suppl (BLOOD GLUCOSE MONITOR SYSTEM) w/Device Kit Test blood sugar as recommended by provider. 1 Kit 0    glucose blood strip Use one strip to test blood sugar three times daily before meals. 100 Strip 0    Lancets Use one lancet to test blood sugar three times daily before meals. 100 Each 0    Alcohol Swabs Wipe site with prep pad prior to injection. 100 Each 0    levonorgestrel (MIRENA, 52 MG,) 20 MCG/DAY IUD 1 Each by Intrauterine route see administration instructions. Every 5 years, last placed 2020       No current facility-administered medications for this visit.       OBJECTIVE:  /74 (BP Location: Left arm, Patient Position: Sitting, BP Cuff Size: Adult)   Pulse 80   Temp 37 °C (98.6 °F) (Temporal)   Ht 1.6 m (5' 3\")   Wt 63 kg (139 lb)   SpO2 93%   BMI 24.62 kg/m²   Physical Exam  Vitals reviewed.   Constitutional:       General: She is not in acute distress.     Appearance: Normal appearance. She is not ill-appearing or toxic-appearing.   HENT:      Head: Normocephalic.      Nose: Nose normal.   Eyes:      Conjunctiva/sclera: Conjunctivae normal.   Neck:      Thyroid: Thyroid tenderness (slight tenderness of right side of thyroid area with some mild swelling in the area) present.   Cardiovascular:      Rate and Rhythm: Normal rate.   Pulmonary:      Effort: Pulmonary effort is normal. No respiratory distress.      Breath sounds: No wheezing.   Musculoskeletal:         General: Normal range of motion.      Cervical back: Normal range of motion.      Right lower leg: No edema.      Left lower leg: No edema.   Lymphadenopathy:      Cervical: No cervical adenopathy.   Skin:     General: Skin is warm and dry.      Coloration: Skin is not jaundiced or pale.   Neurological:      General: No focal deficit present.      Mental Status: She is alert and oriented to person, place, and " time.   Psychiatric:         Mood and Affect: Mood normal.         Behavior: Behavior normal.         ASSESSMENT AND PLAN:     Problem List Items Addressed This Visit       Anemia     During the ED visit, patient got labs which also noted anemia with hemoglobin of 10.  Review of chart reveals that patient is chronically anemic, likely iron deficiency given low/normal MCV and prior low iron levels in 2023.  Patient is not currently taking any iron supplementation  - Iron panel/ferritin and CBC labs to evaluate further  - Restart iron supplementation if clinically indicated by labs  - Investigate other etiologies if iron panel is normal         Relevant Orders    IRON/TOTAL IRON BIND    FERRITIN    CBC WITHOUT DIFFERENTIAL    Counseling and coordination of care     Reviewed labs and symptoms from recent ED visit on 1/16/2025.  Suspect symptoms might be related to subclinical hypothyroidism as documented below versus related to her blood sugar as she is still in the process of titrating insulin to appropriate levels (in the setting of recent type I DM diagnosis).  Also discussed patient's anemia noted on labs (as documented above).  Patient also reported chest pain that she describes as fluttering for which she had a negative troponin during the ED visit.  Also of note she had a negative stress test completed earlier this month.  Given patient's age, no further cardiac workup is clinically indicated.  - Additional care plans as elsewhere documented in this note         DKA, type 1, not at goal (HCC)     A1c 11.1 in 11/2024, diagnosed with type 1. Has been on insulin ever since, titrated as needed  Is going to establish with endo in feb 2025.  Is having some difficulties with the extensive needle sticks and medication management related to the diabetes therapy and has been having trouble concentrating at work and timing her insulin appropriately. Is hoping to get a short term medical leave from work to prioritize her  health and get into better patterns with her diabetes. She is planning on bringing the appropriate paperwork from work next week for her PCP to fill out.  BG levels have been intermittently >250s requiring anywhere from 3-8 units of short acting with meals  - increase lantus to 15 units  - hypoglycemia and return to clinic precautions reviewed with patient  - recheck A1c ordered, although not to be completed before 2/1/2025  - work note provided to patient to excuse her from full duties until she can bring in the proper paperwork next week to be filled out by her PCP         Relevant Medications    insulin glargine (LANTUS SOLOSTAR) 100 UNIT/ML Solution Pen-injector injection    Other Relevant Orders    HEMOGLOBIN A1C    Postsurgical hypothyroidism     History of graves disease s/p thyroidectomy in 2017.  Patient thinks it was a partial thyroidectomy, although there is documentation in the chart stating that it might have been a total  Labs completed at recent ED visit noted an elevated TSH and normal T4.  Patient went to the ED for workup of 2-week history of feeling off, including more depressed, low energy, weakness, and some chest fluttering.  Given these symptoms in the setting of the elevated TSH, it is very possible that patient is having any symptoms from subclinical hypothyroidism.  Patient also reports some increased swelling in the right ventral neck area which she reports is new.  Exam notable for swelling in the area, no lymphadenopathy.  - Increase Synthroid from 200 mcg to 225 mcg daily  - Recheck TSH in 6 to 8 weeks  - Thyroid ultrasound to assess swelling as well as to confirm which kind of thyroidectomy patient has has completed  - RTC if symptoms do not improve         Relevant Medications    levothyroxine (SYNTHROID) 75 MCG Tab    Other Relevant Orders    TSH WITH REFLEX TO FT4    US-THYROID       Orders Placed This Encounter    US-THYROID    TSH WITH REFLEX TO FT4    HEMOGLOBIN A1C    IRON/TOTAL  IRON BIND    FERRITIN    CBC WITHOUT DIFFERENTIAL    levothyroxine (SYNTHROID) 75 MCG Tab    insulin glargine (LANTUS SOLOSTAR) 100 UNIT/ML Solution Pen-injector injection       Return in about 1 week (around 1/27/2025).      Yarely Patel M.D.  [facility] Internal Medicine Resident

## 2025-01-27 ENCOUNTER — OFFICE VISIT (OUTPATIENT)
Dept: INTERNAL MEDICINE | Facility: OTHER | Age: 30
End: 2025-01-27
Payer: MEDICAID

## 2025-01-27 ENCOUNTER — PATIENT MESSAGE (OUTPATIENT)
Dept: INTERNAL MEDICINE | Facility: OTHER | Age: 30
End: 2025-01-27

## 2025-01-27 VITALS
BODY MASS INDEX: 25.48 KG/M2 | TEMPERATURE: 98.9 F | HEART RATE: 85 BPM | DIASTOLIC BLOOD PRESSURE: 64 MMHG | WEIGHT: 143.8 LBS | OXYGEN SATURATION: 100 % | HEIGHT: 63 IN | SYSTOLIC BLOOD PRESSURE: 97 MMHG

## 2025-01-27 DIAGNOSIS — F39 MOOD DISORDER (HCC): ICD-10-CM

## 2025-01-27 DIAGNOSIS — E10.69 TYPE 1 DIABETES MELLITUS WITH OTHER SPECIFIED COMPLICATION (HCC): ICD-10-CM

## 2025-01-27 DIAGNOSIS — E87.6 HYPOKALEMIA: ICD-10-CM

## 2025-01-27 DIAGNOSIS — Z02.9 ADMINISTRATIVE ENCOUNTER: ICD-10-CM

## 2025-01-27 DIAGNOSIS — E89.0 POSTSURGICAL HYPOTHYROIDISM: ICD-10-CM

## 2025-01-27 PROBLEM — I31.9 PERICARDITIS: Status: ACTIVE | Noted: 2025-01-27

## 2025-01-27 PROCEDURE — 3074F SYST BP LT 130 MM HG: CPT | Mod: GC

## 2025-01-27 PROCEDURE — 99214 OFFICE O/P EST MOD 30 MIN: CPT | Mod: GC

## 2025-01-27 PROCEDURE — 3078F DIAST BP <80 MM HG: CPT | Mod: GC

## 2025-01-27 ASSESSMENT — LIFESTYLE VARIABLES: SUBSTANCE_ABUSE: 1

## 2025-01-27 ASSESSMENT — FIBROSIS 4 INDEX: FIB4 SCORE: 0.4

## 2025-01-27 ASSESSMENT — ENCOUNTER SYMPTOMS
NERVOUS/ANXIOUS: 1
DEPRESSION: 1
HEADACHES: 1
DIZZINESS: 1

## 2025-01-27 NOTE — LETTER
January 27, 2025    To Whom It May Concern:         This is confirmation that Ivis Klein attended her scheduled appointment with Trip Phan M.D. on 1/27/25. Due to her medical condition and care, it is recommended that she remain off work until further evaluation at her follow-up appointment, which will be scheduled in about 1 month.          If you have any questions please do not hesitate to call me at the phone number listed below.    Sincerely,          Trip Phan M.D.  [phone]

## 2025-01-28 DIAGNOSIS — E10.10 DKA, TYPE 1, NOT AT GOAL (HCC): ICD-10-CM

## 2025-01-28 RX ORDER — ACYCLOVIR 800 MG/1
1 TABLET ORAL CONTINUOUS
Qty: 1 EACH | Refills: 2 | Status: SHIPPED | OUTPATIENT
Start: 2025-01-28

## 2025-01-28 ASSESSMENT — ENCOUNTER SYMPTOMS
MUSCULOSKELETAL NEGATIVE: 1
RESPIRATORY NEGATIVE: 1
GASTROINTESTINAL NEGATIVE: 1
WEAKNESS: 1
CONSTITUTIONAL NEGATIVE: 1

## 2025-01-28 NOTE — PATIENT INSTRUCTIONS
"Thank you for visiting our office. As discussed, here is the plan to address your health issues.     PLAN:  - Increase the amount of fluids your drinking, including 2-3L fluid daily  - Increase your glargine insulin from 10 to 12, and if your blood sugars remain in goal for 2 days, increase from 12 to 14  - Continue to take your current medications as prescribed   - Follow-up on referrals and schedule an appointment with:    Endocrinology  - Go to lab at least 5 days prior to your next visit to get bloodwork completed (if \"fasting labs\" are ordered, don't eat the day of your blood draw)  - Make a follow-up appointment with our office in 1 week    Please try and eat healthy, get at least 30 minutes of cardiovascular exercise a day to help keep your health as best as it can be. If you feel like you are experiencing a medical emergency please seek immediate medical attention at an urgent care or in the emergency department.    Trip Phan MD    "

## 2025-01-28 NOTE — TELEPHONE ENCOUNTER
Received request via: Pharmacy    Was the patient seen in the last year in this department? Yes    Does the patient have an active prescription (recently filled or refills available) for medication(s) requested? No    Pharmacy Name: [facility]    Does the patient have FDC Plus and need 100-day supply? (This applies to ALL medications) Patient does not have SCP

## 2025-01-28 NOTE — PROGRESS NOTES
Teaching Physician Attestation      Level of Participation    I have personally interviewed and examined the patient.  In addition, I discussed with the resident physician the patient's history, exam, assessment and plan in detail.  Topics listed in my addendum were the focus of the visit.  Healthcare maintenance was not addressed this visit unless listed as a topic in my addendum.  I agree with the plan as written along with the following additions/modifications:    Low bp  -with some lightheadedness, may be contributing to her mood/ largly subjective weakness (strngth is 4+ to 5/5 b/l upper extremities and on hip flexion b/l).  Possible mild dehydration may be occurring due to polyuria in setting of hyperglycemia?  -incr hydration to 2-3 L daily, follow closely    Chronic chest pain  -reported unchanged, non-exertional, NM stress test and echo nl. not otherwisea ddressed    Post surgical hypothyroidism (2/2 Graves Disease per chart)  Recently Increased synthroid, repeat tsh 6-8 weeks      DM1  -150 fasting, 250 post prandial, only 1x hypoglycemia related to short acting insulin admin.  Didn't inrease lantus per last visit instruction.  -given hypoglycemic report, incr lantus by 20% to 12 units x 1 week, then to 14 units q day if still not at glycmeic goals  -endo pending, apprec support    Anxiety related to medical conditions  -no SI.  Needs dedicated f/u for formal assessment, this along with titration of above will allow us to determine length of short term disabiliy request if needed.  Note issued in interim for work.    Encourage pending labowkr, add bmp to trend mild hypok, not otherwsiea ddressed.    Rtc 1 week.  Pcr.

## 2025-01-28 NOTE — PROGRESS NOTES
Last Seen: 1/20/2025    Patient Care Team:  Leonel Mcdermott D.O. as PCP - General (Internal Medicine)    Chief Complaint   Patient presents with    Follow-Up     ER for weakness     Paperwork     Wishes 12 weeks for stress      History of Present Illness:   Ivis Klein is a 29 y.o. female with PMH as below who presents for:   1. Mood disorder (HCC)    2. Type 1 diabetes mellitus with other specified complication (HCC)    3. Postsurgical hypothyroidism    4. Administrative encounter      Patient comes in for urgent care visit for follow-up after ER visit with weakness and with multiple complaints, including requesting leave from work paperwork.    #Headache  The patient reports having an ongoing headache located at the back of the head. This has been persistent despite previous consultations.    #Hair Loss  The patient notes significant hair loss, describing it as falling out in chunks. This symptom has worsened since the last visit.    #Diabetes  The patient has been taking three tablets of levothyroxine and maintaining a dose of 10 units of insulin, pending further advice from the doctor. Blood sugar readings have been elevated, with postprandial readings around 264 mg/dL and fasting readings around 180 mg/dL.    #Fatigue and Work-related Stress  The patient describes feeling overly exhausted, with difficulty staying awake and focusing at work.  Her depressed mood and stress is related to her new health issues.  The patient works as a  and has been experiencing increased stress due to a new system implementation.    #Chest Pain  The patient reports ongoing chest pain, described as pressure that worsens when lying flat. The pain is chronic and has been present since after a thyroidectomy. The patient has a history of pericarditis.    #Thyroid Issues  The patient mentions difficulty swallowing and pain in the neck area, with tenderness upon examination. There is a plan for a thyroid  ultrasound.    Review of Systems:  Review of Systems   Constitutional: Negative.    HENT: Negative.     Respiratory: Negative.     Cardiovascular:  Positive for chest pain.   Gastrointestinal: Negative.    Genitourinary: Negative.    Musculoskeletal: Negative.    Skin: Negative.    Neurological:  Positive for dizziness, weakness and headaches.   Psychiatric/Behavioral:  Positive for depression and substance abuse. Negative for suicidal ideas. The patient is nervous/anxious.         Past Medical History:   Past Medical History:   Diagnosis Date    Anemia 02/17/2022    Anxiety 02/16/2017    Anxiety 02/16/2017    Elevated antinuclear antibody (DEJON) level 05/23/2019    Graves disease 12/05/2016    Heart valve disease     Hemorrhagic cysts of both ovaries 05/23/2019    History of panic attack 04/09/2020    History of pericarditis 12/05/2016    History of thyroidectomy 01/31/2020    Partial --> hypothyroidism     Panic attack 03/11/2019    Postpartum depression 02/17/2022    Postpartum depression 02/17/2022    Type 1 diabetes mellitus with other specified complication (HCC) 11/8/2024    Vaginal discharge 12/02/2021     Patient Active Problem List   Diagnosis    Migraine with aura, not intractable    Graves disease    Overweight (BMI 25.0-29.9)    Postsurgical hypothyroidism    Ovarian cyst    Hyperlipidemia    Anemia    Depression with anxiety    DKA, type 1, not at goal (HCC)    Bacterial vaginosis    Type 1 diabetes mellitus with other specified complication (HCC)    Counseling and coordination of care    Pericarditis       Medications:     Current Outpatient Medications:     levothyroxine, 225 mcg, Oral, AM ES, Taking    Lantus SoloStar, 15 Units, Subcutaneous, QAM AC, Taking    Continuous Glucose Sensor, 1 Units, Does not apply, Continuous, Taking    Insulin Pen Needle 32 G x 4 mm, Use one pen needle in pen device to inject insulin five times daily., Taking    insulin lispro, 1-10 Units, Subcutaneous, 4X/DAY ACHS,  "PRN    FreeStyle Kianna 3 Sensor, 1 Application, Does not apply, Continuous, Taking    Mirena (52 MG), 1 Each, Intrauterine, See Admin Instructions, Taking    Blood Glucose Test Strips, Test strips order: Test strips for meter. Sig: use 3 times daily and prn ssx high or low sugar #100 RF x 3 (Patient not taking: Reported on 1/27/2025), Not Taking    Blood Glucose Monitor System, Test blood sugar as recommended by provider. (Patient not taking: Reported on 1/27/2025), Not Taking    glucose blood, Use one strip to test blood sugar three times daily before meals. (Patient not taking: Reported on 1/27/2025), Not Taking    Lancets, Use one lancet to test blood sugar three times daily before meals. (Patient not taking: Reported on 1/27/2025), Not Taking    Alcohol Swabs, Wipe site with prep pad prior to injection. (Patient not taking: Reported on 1/27/2025), Not Taking     Social History:  Social History     Tobacco Use    Smoking status: Never    Smokeless tobacco: Never    Tobacco comments:     quit in 2012   Vaping Use    Vaping status: Never Used   Substance Use Topics    Alcohol use: Not Currently    Drug use: Yes     Types: Marijuana, Inhaled     Comment: marijuana weekly       Objective:  Vitals:   BP 97/64 (BP Location: Left arm, Patient Position: Sitting, BP Cuff Size: Adult)   Pulse 85   Temp 37.2 °C (98.9 °F) (Temporal)   Ht 1.6 m (5' 3\")   Wt 65.2 kg (143 lb 12.8 oz)   SpO2 100%  Body mass index is 25.47 kg/m².    Physical Exam  Vitals reviewed.   Constitutional:       General: She is not in acute distress.     Appearance: Normal appearance. She is not ill-appearing or toxic-appearing.   HENT:      Head: Normocephalic.      Nose: Nose normal.   Eyes:      Conjunctiva/sclera: Conjunctivae normal.   Neck:      Thyroid: Thyroid tenderness (slight tenderness of right side of thyroid area with some mild swelling in the area) present.   Cardiovascular:      Rate and Rhythm: Normal rate.   Pulmonary:      Effort: " Pulmonary effort is normal. No respiratory distress.      Breath sounds: No wheezing.   Musculoskeletal:         General: Normal range of motion.      Cervical back: Normal range of motion.      Right lower leg: No edema.      Left lower leg: No edema.   Lymphadenopathy:      Cervical: No cervical adenopathy.   Skin:     General: Skin is warm and dry.      Coloration: Skin is not jaundiced or pale.   Neurological:      General: No focal deficit present.      Mental Status: She is alert and oriented to person, place, and time.   Psychiatric:         Mood and Affect: Mood normal.         Behavior: Behavior normal.         Assessment and Plan:    29 y.o. female with:     1. Mood disorder (HCC)  No SI, reports stress/anxiety/low mood related to work and new health issues  -Return to clinic for formal assessment of depressed mood  -Work note given to recommend patient stay off work until follow-up visit when she can be reevaluated and FMLA paperwork can be reconsidered    2. Type 1 diabetes mellitus with other specified complication (HCC)  Ranging about 150-2 50 fasting/postprandial, rare hypoglycemia events,  -We will plan to increase Lantus by 20% (up to 12 units over the course of 1 week, followed by increase up to 14 units, as long as blood sugars remain without hypoglycemic events  -Caution patient on hypoglycemia protocol  -Continue short acting insulin  -Continue monitoring  -Plan for endocrinology visit, appointment scheduled, appreciate recommendations    3. Postsurgical hypothyroidism  -Continue levothyroxine  -Lab work pending, plan for reevaluation in about 3 months    4. Administrative encounter  -Work note given  -Consider formal FMLA paperwork, sent in by patient, to be filled in at follow-up visit    5. Hypokalemia  Previous hypokalemia, plan to monitor on lab work  - Basic Metabolic Panel; Future      Follow Up:  Return in about 2 weeks (around 2/10/2025) for PCP.  - discuss and assess depressed mood  symptoms  - consider work-leave paperwork    Trip Phan MD  Internal Medicine PGY-2  [facility]   EKG - see results section for interpretation

## 2025-02-03 ENCOUNTER — OFFICE VISIT (OUTPATIENT)
Dept: INTERNAL MEDICINE | Facility: OTHER | Age: 30
End: 2025-02-03
Payer: MEDICAID

## 2025-02-03 VITALS
DIASTOLIC BLOOD PRESSURE: 70 MMHG | TEMPERATURE: 98.6 F | WEIGHT: 144.6 LBS | SYSTOLIC BLOOD PRESSURE: 118 MMHG | HEIGHT: 63 IN | BODY MASS INDEX: 25.62 KG/M2 | OXYGEN SATURATION: 99 % | HEART RATE: 92 BPM

## 2025-02-03 DIAGNOSIS — E10.69 TYPE 1 DIABETES MELLITUS WITH OTHER SPECIFIED COMPLICATION (HCC): ICD-10-CM

## 2025-02-03 DIAGNOSIS — F43.22 ADJUSTMENT DISORDER WITH ANXIOUS MOOD: ICD-10-CM

## 2025-02-03 DIAGNOSIS — F43.22 ADJUSTMENT DISORDER WITH ANXIETY: ICD-10-CM

## 2025-02-03 DIAGNOSIS — E10.10 DKA, TYPE 1, NOT AT GOAL (HCC): ICD-10-CM

## 2025-02-03 DIAGNOSIS — E89.0 POSTSURGICAL HYPOTHYROIDISM: ICD-10-CM

## 2025-02-03 PROCEDURE — 3074F SYST BP LT 130 MM HG: CPT | Mod: GC

## 2025-02-03 PROCEDURE — 3078F DIAST BP <80 MM HG: CPT | Mod: GC

## 2025-02-03 PROCEDURE — 99214 OFFICE O/P EST MOD 30 MIN: CPT | Mod: GC

## 2025-02-03 RX ORDER — INSULIN GLARGINE 100 [IU]/ML
17 INJECTION, SOLUTION SUBCUTANEOUS
Qty: 15 ML | Refills: 0 | Status: SHIPPED | OUTPATIENT
Start: 2025-02-03 | End: 2025-02-13 | Stop reason: SDUPTHER

## 2025-02-03 ASSESSMENT — ENCOUNTER SYMPTOMS
DIAPHORESIS: 0
WEAKNESS: 0
NERVOUS/ANXIOUS: 1
DIARRHEA: 0
SHORTNESS OF BREATH: 0
FEVER: 0
HEADACHES: 0
WHEEZING: 0
CONSTIPATION: 0
PHOTOPHOBIA: 0
BLURRED VISION: 0
EYE PAIN: 0
CHILLS: 0
PALPITATIONS: 0
DEPRESSION: 0
INSOMNIA: 1
DIZZINESS: 0
NAUSEA: 0
HEARTBURN: 0
ABDOMINAL PAIN: 0
SORE THROAT: 0
VOMITING: 0
COUGH: 0

## 2025-02-03 ASSESSMENT — FIBROSIS 4 INDEX: FIB4 SCORE: 0.4

## 2025-02-03 NOTE — PROGRESS NOTES
OFFICE VISIT    Ivis Klein is a 29 y.o. female who presents today with the following:    Reason for visit: Follow up to do [place] paperwork, DM1    HPI:    Patient feels that she is not getting better. She expressed feeling more depressed, stressed, and fatigued. At baseline, she has anxiety but notes this is worse than before. Patient feels she has had more trouble concentrating, sleeping. She has also lost interest in things she used to enjoy such as her job. Endorses a lack of appetite, fatigue. Denies SI/HI. Patient reports she has been losing a large amount of hair since the beginning of January. She would pull chunks of hair often. Because of her ongoing health issues, she is requesting leave from work in order to get her health back on track. 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     Formally diagnosed with DM1 on 11/1/24. Home blood sugars have been ranging 280-323. Morning readings have been ranging 180-190s. Denies hypoglycemic symptoms such as diaphoresis, shakiness, palpitations, nausea, vomiting. She has titrated up her Lantus to now 14 units in the morning 2 days ago. She would then administer 4-6 units of short acting insulin after meals. Patient has an appointment with endocrinology on 2/24. She will see diabetic education in March.     Review of Systems   Constitutional:  Positive for malaise/fatigue. Negative for chills, diaphoresis and fever.   HENT:  Negative for congestion, ear pain, hearing loss and sore throat.    Eyes:  Negative for blurred vision, photophobia and pain.   Respiratory:  Negative for cough, shortness of breath and wheezing.    Cardiovascular:  Negative for chest pain, palpitations and leg swelling.   Gastrointestinal:  Negative for abdominal pain, constipation, diarrhea, heartburn, nausea and vomiting.   Genitourinary:  Negative for dysuria and frequency.   Skin:  Negative for rash.   Neurological:  Negative for  "dizziness, weakness and headaches.   Psychiatric/Behavioral:  Negative for depression and suicidal ideas. The patient is nervous/anxious and has insomnia.        Vitals:   /70 (BP Location: Left arm, Patient Position: Sitting, BP Cuff Size: Adult)   Pulse 92   Temp 37 °C (98.6 °F) (Temporal)   Ht 1.6 m (5' 3\")   Wt 65.6 kg (144 lb 9.6 oz)   SpO2 99%   BMI 25.61 kg/m²     Physical Exam  Vitals and nursing note reviewed.   Constitutional:       General: She is not in acute distress.     Appearance: She is not diaphoretic.   HENT:      Head: Normocephalic and atraumatic.      Mouth/Throat:      Mouth: Mucous membranes are moist.      Pharynx: Oropharynx is clear. No oropharyngeal exudate or posterior oropharyngeal erythema.   Cardiovascular:      Rate and Rhythm: Normal rate and regular rhythm.      Pulses: Normal pulses.      Heart sounds: Normal heart sounds. No murmur heard.     No gallop.   Pulmonary:      Effort: Pulmonary effort is normal. No respiratory distress.      Breath sounds: Normal breath sounds. No wheezing, rhonchi or rales.   Abdominal:      General: There is no distension.      Palpations: Abdomen is soft.      Tenderness: There is no abdominal tenderness.   Musculoskeletal:      Right lower leg: No edema.      Left lower leg: No edema.   Skin:     General: Skin is warm.      Findings: No rash.   Neurological:      General: No focal deficit present.      Mental Status: She is alert and oriented to person, place, and time.   Psychiatric:         Mood and Affect: Mood normal.         Assessment and Plan:     Type 1 diabetes mellitus with other specified complication (HCC)  Morning fasting blood glucose readings have been ranging between 180-190s since going up to 14 units on Lantus. Goal fasting blood glucose reading is 130.   -Will increase Lantus to 17 units for 1 week  -If not quite at goal range after 1 week, advise patient to increase to 20 units  -Continue SSI as prescribed  -Ordered " RAMON 65, Anti-islet antibody, zinc transporter 8 antibody   -Follow up with endo on 2/24/25  -Diabetic education scheduled  -Discussed red flag hypoglycemic symptoms and protocol  -[facility] paperwork filed and scanned for record purposes    Adjustment disorder with anxiety  Worsening anxiety and depression since diagnosis of DM1. 5/8 for SIGECAPS. She is overwhelmed from the massive change in her lifestyle and daily routine. Hair loss may be due to underlying anxiety as well as non-optimized hypothyroidism. Discussed treatment modalities such as CBT, medications, therapy. Patient would like to proceed with therapy at this time. Deferred medications as she did not tolerate in the past.   -Referral to behavioral health placed  -Discussed red flag symptoms such as SI/HI and advised to seek medical attention via on-call physician or going to ED    Postsurgical hypothyroidism  Hx of Graves Disease, now s/p partial thyroidectomy in 2015. Hair loss may be 2/2 to non-optimized hypothyroidism or anxiety or both. Recently had Levothyroxine increased to 225 mcg daily.   -Will follow TSH in 4 weeks and make adjustments at that time  -US Thyroid pending  -Will establish with Endo on 2/24      Orders Placed This Encounter    Zinc Transporter 8 Antibody    RAMON-65    ISLET ANTIGEN-2 (IA-2) AUTOANTIBODY    Referral to Behavioral Health    insulin glargine (LANTUS SOLOSTAR) 100 UNIT/ML Solution Pen-injector injection       Return in about 2 weeks (around 2/17/2025) for schedule with anyone in the office.      Patient case was seen/ assessed/ discussed with Dr. Prince      Please note that this dictation was created using voice recognition software. I have made every reasonable attempt to correct obvious errors, but I expect that there are errors of grammar and possibly content that I did not discover before finalizing the note.       Signed by:    Mauro Alonso DO    PGY-1 Internal Medicine Resident

## 2025-02-04 NOTE — ASSESSMENT & PLAN NOTE
Cipro Rx sent. Pt sees Dr. Chevy Mckeon, referral placed in Epic. Detailed message left on pt's VM per HIP[employer]. Hx of Graves Disease, now s/p partial thyroidectomy in 2015. Hair loss may be 2/2 to non-optimized hypothyroidism or anxiety or both. Recently had Levothyroxine increased to 225 mcg daily.   -Will follow TSH in 4 weeks and make adjustments at that time  -US Thyroid pending  -Will establish with Endo on 2/24

## 2025-02-04 NOTE — PROGRESS NOTES
Teaching Physician Attestation      Level of Participation    I have personally interviewed and examined the patient.  In addition, I discussed with the resident physician the patient's history, exam, assessment and plan in detail.  Topics listed in my addendum were the focus of the visit.  Healthcare maintenance was not addressed this visit unless listed as a topic in my addendum.  I agree with the plan as written along with the following additions/modifications:      History of low bp  -Resolved status post increased hydration, does have some mild head discomfort/fogginess but is persistent in setting of new diabetes diagnosis and anxiety, fallow blood pressure clinically     IDDM, titration visit  -180 fasting, no hypoglycemic symptoms.  On Lantus 14 units daily, short acting before meals  -Increase Lantus to 17 units daily for a few days, then if still not at goal around 130 fasting increase to 20 units for a few more days.  Continue short acting at same dose.  Hypoglycemia protocol reviewed  -Given sudden onset insulin-dependent diabetes suspect autoimmune, will check antibodies  -endo pending for later this month, apprec support     Situational anxiety and depression related to medical conditions in the setting of pre-existing anxiety, needs further assessment  -no SI/hi.  Patient reports chronically feeling anxious but an acute exacerbation of her anxiety occurred around the diagnosis of her new insulin-dependent diabetes.  She reports some difficulty with concentrating at work, also reports ruminating thoughts at night that make it difficult for her to fall asleep.  Denies an inciting event previous to the insulin-dependent diabetes which caused her prior anxiety.  Does at times seem to have some elevated energy per report but never has days with minimal to no sleep and despite that is not tired, dick seems less likely.  She reports sadness, anhedonia, decreased appetite, issues with concentration, issues  with sleep, she appears sad and is intermittently tearful during the exam thus mood is congruent with affect, she is dressed appropriately, no psychomotor slowing or agitation, speech is not pressured, no flight of ideas.  Reports she tried a anxiety medication in the past but it did not help.    Plan  -Referral to therapy, appreciate support  -Anxiety resources handout given, highlighted CBT, meditation, immediate relief exercise  -Supportively counseled  -Safety contract reviewed  -Follow-up 1 month  -Will fill out FMLA paperwork as below    Given patient's new insulin-dependent diabetes diagnosis and situational depression and anxiety superimposed on pre-existing anxiety causing difficulty with patient's ability to concentrate, patient requesting FMLA short-term which is reasonable.  Paperwork filled out for 3 months, we will assess her at 1 month after she has established with endocrine and hopefully therapy to assess progress, and modify if needed.    Post surgical hypothyroidism (2/2 Graves Disease per chart)  Recently Increased synthroid, repeat tsh ~mid March    Return to clinic 1 month.  PCR

## 2025-02-04 NOTE — PATIENT INSTRUCTIONS
Thank you for visiting [facility]!    Medications changes include increasing Lantus to 17 units once a day.    We talked about your diabetes and high blood sugars. The goal is to get your morning fasting blood sugars around 130s. It would be ideal to have your blood sugars     Please watch out for low blood sugar readings. Watch out for symptoms like sweats, shakiness, nausea, vomiting, palpitations which may indicate that you are low in sugars. When you do have symptoms, please try drinking juice or eating something with sugar and recheck in 15 minutes. If doing this twice, then go to the emergency department.    If you have thoughts of harming yourself, or other please call the on call number or go to the emergency department to get assessed.     I made a referral to behavioral health who will get you connected to a therapist.    Please consider the anxiety techniques discussed during the appointment.     Please do not hesitate to contact me on [employer] should you need anything else!

## 2025-02-04 NOTE — ASSESSMENT & PLAN NOTE
Morning fasting blood glucose readings have been ranging between 180-190s since going up to 14 units on Lantus. Goal fasting blood glucose reading is 130.   -Will increase Lantus to 17 units for 1 week  -If not quite at goal range after 1 week, advise patient to increase to 20 units  -Continue SSI as prescribed  -Ordered RAMON 65, Anti-islet antibody, zinc transporter 8 antibody   -Follow up with endo on 2/24/25  -Diabetic education scheduled  -Discussed red flag hypoglycemic symptoms and protocol  -[facility] paperwork filed and scanned for record purposes

## 2025-02-04 NOTE — ASSESSMENT & PLAN NOTE
Worsening anxiety and depression since diagnosis of DM1. 5/8 for SIGECAPS. She is overwhelmed from the massive change in her lifestyle and daily routine. Hair loss may be due to underlying anxiety as well as non-optimized hypothyroidism. Discussed treatment modalities such as CBT, medications, therapy. Patient would like to proceed with therapy at this time. Deferred medications as she did not tolerate in the past.   -Referral to behavioral health placed  -Discussed red flag symptoms such as SI/HI and advised to seek medical attention via on-call physician or going to ED

## 2025-02-10 ENCOUNTER — PATIENT MESSAGE (OUTPATIENT)
Dept: INTERNAL MEDICINE | Facility: OTHER | Age: 30
End: 2025-02-10
Payer: MEDICAID

## 2025-02-12 NOTE — PATIENT COMMUNICATION
I called patient line busy. Patient needs to come in today with paperwork and needs to be seen to complete form. I will route to  for appointment today.

## 2025-02-13 ENCOUNTER — OFFICE VISIT (OUTPATIENT)
Dept: INTERNAL MEDICINE | Facility: OTHER | Age: 30
End: 2025-02-13
Payer: MEDICAID

## 2025-02-13 VITALS
HEIGHT: 63 IN | WEIGHT: 141.2 LBS | BODY MASS INDEX: 25.02 KG/M2 | SYSTOLIC BLOOD PRESSURE: 107 MMHG | HEART RATE: 77 BPM | OXYGEN SATURATION: 98 % | DIASTOLIC BLOOD PRESSURE: 68 MMHG | TEMPERATURE: 98.8 F

## 2025-02-13 DIAGNOSIS — E10.69 TYPE 1 DIABETES MELLITUS WITH OTHER SPECIFIED COMPLICATION (HCC): ICD-10-CM

## 2025-02-13 PROCEDURE — 99214 OFFICE O/P EST MOD 30 MIN: CPT | Mod: GC

## 2025-02-13 RX ORDER — INSULIN GLARGINE 100 [IU]/ML
20 INJECTION, SOLUTION SUBCUTANEOUS
Qty: 15 ML | Refills: 0 | Status: SHIPPED | OUTPATIENT
Start: 2025-02-13

## 2025-02-13 ASSESSMENT — FIBROSIS 4 INDEX: FIB4 SCORE: 0.4

## 2025-02-13 NOTE — PROGRESS NOTES
Established Patient    Patient Care Team:  Leonel Mcdermott D.O. as PCP - General (Internal Medicine)    HPI:  Ivis Klein is a 29 y.o. female presents today for FMLA paperwork and IDDM    Ivis states that FMLA paper work filled in the last visit was not completely filled, hence requesting to complete it again as it is due by tomorrow.  She states that she has been feeling the same, more stressed on new diagnosis of DM, anxious about hair fall and fatigue. Also requests refill of Insulin glargine. Home blood sugars have been ranging in 250's as per her CGM, higher postprandially, though no hypoglycemia events were recorded. She is on Lantus 17 units and 4-6 units of lispro with meals. Patient has an upcoming appointment with endocrine and diabetes education. Referral to behavioral therapy was placed in the last visit.         ROS:  Constitutional:  Positive for malaise/fatigue. Negative for chills, diaphoresis and fever.   HENT:  Negative for congestion, ear pain, hearing loss and sore throat.    Eyes:  Negative for blurred vision, photophobia and pain.   Respiratory:  Negative for cough, shortness of breath and wheezing.    Cardiovascular:  Negative for chest pain, palpitations and leg swelling.   Gastrointestinal:  Negative for abdominal pain, constipation, diarrhea, heartburn, nausea and vomiting.   Genitourinary:  Negative for dysuria and frequency.   Skin:  Negative for rash.   Neurological:  Negative for dizziness, weakness and headaches.   Psychiatric/Behavioral:  Negative for depression and suicidal ideas. The patient is nervous/anxious and has insomnia.     Past Medical History:   Diagnosis Date    Anemia 02/17/2022    Anxiety 02/16/2017    Anxiety 02/16/2017    Elevated antinuclear antibody (DEJON) level 05/23/2019    Graves disease 12/05/2016    Heart valve disease     Hemorrhagic cysts of both ovaries 05/23/2019    History of panic attack 04/09/2020    History of pericarditis 12/05/2016     "History of thyroidectomy 01/31/2020    Partial --> hypothyroidism     Panic attack 03/11/2019    Postpartum depression 02/17/2022    Postpartum depression 02/17/2022    Type 1 diabetes mellitus with other specified complication (HCC) 11/8/2024    Vaginal discharge 12/02/2021       Social History     Tobacco Use    Smoking status: Never    Smokeless tobacco: Never    Tobacco comments:     quit in 2012   Vaping Use    Vaping status: Never Used   Substance Use Topics    Alcohol use: Not Currently    Drug use: Yes     Types: Marijuana, Inhaled     Comment: marijuana weekly       Current Outpatient Medications   Medication Sig Dispense Refill    insulin glargine (LANTUS SOLOSTAR) 100 UNIT/ML Solution Pen-injector injection Inject 20 Units under the skin every morning before breakfast. 15 mL 0    Continuous Glucose Sensor (FREESTYLE MIRTA 3 SENSOR) Misc 1 Application continuous. 1 Each 2    levothyroxine (SYNTHROID) 75 MCG Tab Take 3 Tablets by mouth every morning on an empty stomach. 270 Tablet 1    Continuous Glucose Sensor Misc 1 Units continuous. 2 Units 1    Insulin Pen Needle 32 G x 4 mm Use one pen needle in pen device to inject insulin five times daily. 200 Each 0    insulin lispro 100 UNIT/ML SC SOPN injection PEN Inject 1-10 Units under the skin 4 Times a Day,Before Meals and at Bedtime.  mg/dL = 0 Units 151 - 200  mg/dL =  2 Units 201 - 250  mg/dL = 3 Units 251 - 300 mg/dL =  4 Units 301 - 350  mg/dL =  6 Units Over 351  mg/dL  = 7 Units 6 mL 1    levonorgestrel (MIRENA, 52 MG,) 20 MCG/DAY IUD 1 Each by Intrauterine route see administration instructions. Every 5 years, last placed 2020       No current facility-administered medications for this visit.       /68 (BP Location: Left arm, Patient Position: Sitting, BP Cuff Size: Adult)   Pulse 77   Temp 37.1 °C (98.8 °F) (Temporal)   Ht 1.6 m (5' 3\")   Wt 64 kg (141 lb 3.2 oz)   SpO2 98%   BMI 25.01 kg/m²   Physical Exam  Vitals reviewed. "   Constitutional:       General: She is not in acute distress.     Appearance: Normal appearance. She is not ill-appearing.   HENT:      Head: Normocephalic.      Nose: Nose normal.      Mouth/Throat:      Mouth: Mucous membranes are moist.   Eyes:      Pupils: Pupils are equal, round, and reactive to light.   Cardiovascular:      Rate and Rhythm: Normal rate and regular rhythm.      Pulses: Normal pulses.      Heart sounds: Normal heart sounds.   Pulmonary:      Effort: Pulmonary effort is normal. No respiratory distress.      Breath sounds: Normal breath sounds.   Abdominal:      General: Bowel sounds are normal.   Musculoskeletal:         General: No swelling. Normal range of motion.      Cervical back: Normal range of motion.      Right lower leg: No edema.      Left lower leg: No edema.   Skin:     General: Skin is warm.      Capillary Refill: Capillary refill takes less than 2 seconds.   Neurological:      General: No focal deficit present.      Mental Status: She is alert and oriented to person, place, and time.   Psychiatric:         Mood and Affect: Mood is anxious.         Speech: Speech normal.         Behavior: Behavior is cooperative.         Assessment and Plan:     1. Type 1 diabetes mellitus with other specified complication (HCC)  Home glucose reading are in the ranges of 250's, especially postprandial readings. No hypoglycemic events were recorded.  -Advised to increase lantus from 17 to 20 units daily.    - insulin glargine (LANTUS SOLOSTAR) 100 UNIT/ML Solution Pen-injector injection; Inject 20 Units under the skin every morning before breakfast.  Dispense: 15 mL; Refill: 0     FMLA paperwork was filled today, as per patient request.    Return in about 4 weeks (around 3/13/2025).      Khang Rodriguez MD  PGY-1 Internal Medicine  [facility]    This note was created using voice recognition software.  While every attempt is made to ensure accuracy of transcription,  occasionally errors occur.

## 2025-02-13 NOTE — PATIENT INSTRUCTIONS
Thank you for coming in today, please follow-up within Return in about 4 weeks (around 3/13/2025).    Please get labs drawn in ~1 wk ( fasting needed)    Behavioral therapy  [address] [place] [address] [address]  [place] [id number] [phone]    Please call our clinic if you have any future questions or concerns, or If you'd like to be seen sooner for any reason.

## 2025-02-21 DIAGNOSIS — E89.0 POSTSURGICAL HYPOTHYROIDISM: ICD-10-CM

## 2025-02-21 RX ORDER — LEVOTHYROXINE SODIUM 75 UG/1
225 TABLET ORAL
Qty: 270 TABLET | Refills: 1 | OUTPATIENT
Start: 2025-02-21

## 2025-02-21 NOTE — TELEPHONE ENCOUNTER
Received request via: Patient    Was the patient seen in the last year in this department? Yes    Does the patient have an active prescription (recently filled or refills available) for medication(s) requested? Yes. Refill request has been refused in Epic. Contacted pharmacy and called in most recent prescription.    Pharmacy Name: [id number] - [place], [place] - [address]     Does the patient have prison Plus and need 100-day supply? (This applies to ALL medications) Patient does not have SCP

## 2025-02-24 ENCOUNTER — OFFICE VISIT (OUTPATIENT)
Dept: ENDOCRINOLOGY | Facility: MEDICAL CENTER | Age: 30
End: 2025-02-24
Payer: MEDICAID

## 2025-02-24 VITALS
DIASTOLIC BLOOD PRESSURE: 56 MMHG | HEIGHT: 63 IN | BODY MASS INDEX: 25.16 KG/M2 | WEIGHT: 142 LBS | SYSTOLIC BLOOD PRESSURE: 110 MMHG | HEART RATE: 82 BPM | OXYGEN SATURATION: 99 %

## 2025-02-24 DIAGNOSIS — E89.0 POSTSURGICAL HYPOTHYROIDISM: ICD-10-CM

## 2025-02-24 DIAGNOSIS — E10.69 TYPE 1 DIABETES MELLITUS WITH OTHER SPECIFIED COMPLICATION (HCC): ICD-10-CM

## 2025-02-24 DIAGNOSIS — Z86.39 HISTORY OF GRAVES' DISEASE: ICD-10-CM

## 2025-02-24 DIAGNOSIS — E05.00 GRAVES' EYE DISEASE: ICD-10-CM

## 2025-02-24 LAB
HBA1C MFR BLD: 8 % (ref ?–5.8)
POCT INT CON NEG: NEGATIVE
POCT INT CON POS: POSITIVE

## 2025-02-24 PROCEDURE — 83036 HEMOGLOBIN GLYCOSYLATED A1C: CPT

## 2025-02-24 ASSESSMENT — FIBROSIS 4 INDEX: FIB4 SCORE: 0.4

## 2025-02-24 NOTE — PROGRESS NOTES
Chief Complaint:  Consult requested by Leonel Mcdermott D.O. for initial evaluation of Type 1 Diabetes Mellitus    HPI:   Ivis Klein is a 29 y.o. female with Type 1 Diabetes Mellitus diagnosed in 2024.  She reports hospitalizations for DKA in the past. She was in the hospital from 11/1/ to 11/4 due to DKA with symptoms of progressive fatigue, weakness, polyruia, and polydipisa. She was found to have new onste of type 1 diabetes with A1c of 11.8    Diabetes Type 1  POC A1c on 2/24/25 is 8.0  POC A1c on 11/1/24 was 11.8    Current medications  Lantus 20 units in the morning  Lispro 4-6 with each meal - she reports taking it after meal        She denies hypoglycemic episodes occurring 1 times per month and are mild    She  denies hypoglycemic unawareness.   She denies episodes of severe hypoglycemia requiring third party assistance.      She  is not wearing a medical alert bracelet or necklace.    She does not a glucagon emergency kit.    She denies attending diabetes education classes. She has nutrition class next month  Diet: home made meals  .    Diabetes Complications   She  denies history of retinopathy.    She denies laser eye surgery.   Last eye exam: January 2025.    She denies history of peripheral sensory neuropathy.  She denies numbness, tingling in both feet.    She denies history of foot sores.     She denies history of kidney damage.    She is not on ACE inhibitor or ARB.     She denies history of coronary artery disease.    She  denies history of stroke and denies TIA.    She denies history of PAD.  She denies history of hyperlipidemia.    Post surgical hypothyroidism  History of total thyroidectomy in March 2017 due to graves disease  Currently taking levothyroxine 75 mcg three tabs a day.  She reports dizzy, hair loss, exhausted, increased anxiety, tremors, chest pain   Latest Reference Range & Units 01/16/25 23:17   TSH 0.380 - 5.330 uIU/mL 6.670 (H)   Free T-4 0.93 - 1.70 ng/dL 1.42       Latest Reference Range & Units 06/29/16 18:38   Thyrotropin Receptor Abs <=1.75 IU/L 8.76 (H)     Graves Eye disease  She reports bulging R>L  Teary eyes bilaterally  Swollen eye lids  Blurry vision  Denies pain with movement    ROS:     CONS:     No fever, no chills, no weight loss, no fatigue   EYES:      No diplopia, no blurry vision, no redness of eyes, no swelling of eyelids   ENT:    No hearing loss, No ear pain, No sore throat, no dysphagia, no neck swelling   CV:     No chest pain, no palpitations, no claudication, no orthopnea, no PND   PULM:    No SOB, no cough, no hemoptysis, no wheezing    GI:   No nausea, no vomiting, no diarrhea, no constipation, no bloody stools   :  Passing urine well, no dysuria, no hematuria   ENDO:   No polyuria, no polydipsia, no heat intolerance, no cold intolerance   NEURO: No headaches, no dizziness, no convulsions, no tremors   MUSC:  No joint swellings, no arthralgias, no myalgias, no weakness   SKIN:   No rash, no ulcers, no dry skin   PSYCH:   No depression, no anxiety, no difficulty sleeping       Past Medical History:  Patient Active Problem List    Diagnosis Date Noted    Adjustment disorder with anxiety 02/03/2025    Pericarditis 01/27/2025    Counseling and coordination of care 01/20/2025    Type 1 diabetes mellitus with other specified complication (HCC) 11/08/2024    Bacterial vaginosis 11/01/2024    Depression with anxiety 10/11/2022    Postsurgical hypothyroidism 02/17/2022    Ovarian cyst 02/17/2022    Hyperlipidemia 02/17/2022    Anemia 02/17/2022    Overweight (BMI 25.0-29.9) 07/15/2021    Graves disease 12/05/2016    Migraine with aura, not intractable 09/28/2010       Past Surgical History:  Past Surgical History:   Procedure Laterality Date    JUNIE BY LAPAROSCOPY N/A 8/28/2024    Procedure: CHOLECYSTECTOMY, LAPAROSCOPIC;  Surgeon: Charles Rodas M.D.;  Location: SURGERY [facility];  Service: General    THYROIDECTOMY TOTAL Bilateral 3/22/2017     Procedure: THYROIDECTOMY TOTAL -NIMS RECURRENT LARYNGEAL NERVE MONITORING;  Surgeon: Vicente Eldridge M.D.;  Location: SURGERY SAME DAY [facility];  Service:     PRIMARY C SECTION  9/8/2013    Performed by Melisa Wright M.D. at [facility] DELIVERY        Allergies:  Patient has no known allergies.     Current Medications:    Current Outpatient Medications:     insulin glargine (LANTUS SOLOSTAR) 100 UNIT/ML Solution Pen-injector injection, Inject 20 Units under the skin every morning before breakfast., Disp: 15 mL, Rfl: 0    Continuous Glucose Sensor (FREESTYLE MIRTA 3 SENSOR) Misc, 1 Application continuous., Disp: 1 Each, Rfl: 2    levothyroxine (SYNTHROID) 75 MCG Tab, Take 3 Tablets by mouth every morning on an empty stomach., Disp: 270 Tablet, Rfl: 1    Continuous Glucose Sensor Misc, 1 Units continuous., Disp: 2 Units, Rfl: 1    Insulin Pen Needle 32 G x 4 mm, Use one pen needle in pen device to inject insulin five times daily., Disp: 200 Each, Rfl: 0    insulin lispro 100 UNIT/ML SC SOPN injection PEN, Inject 1-10 Units under the skin 4 Times a Day,Before Meals and at Bedtime.  mg/dL = 0 Units 151 - 200  mg/dL =  2 Units 201 - 250  mg/dL = 3 Units 251 - 300 mg/dL =  4 Units 301 - 350  mg/dL =  6 Units Over 351  mg/dL  = 7 Units, Disp: 6 mL, Rfl: 1    levonorgestrel (MIRENA, 52 MG,) 20 MCG/DAY IUD, 1 Each by Intrauterine route see administration instructions. Every 5 years, last placed 2020, Disp: , Rfl:     Social History:  Social History     Socioeconomic History    Marital status: Single     Spouse name: Not on file    Number of children: Not on file    Years of education: Not on file    Highest education level: Not on file   Occupational History    Occupation: A [employer] [place]     Comment: [employer]   Tobacco Use    Smoking status: Never    Smokeless tobacco: Never    Tobacco comments:     quit in 2012   Vaping Use    Vaping status: Never Used   Substance and Sexual Activity    Alcohol use: Not  "Currently    Drug use: Yes     Types: Marijuana, Inhaled     Comment: marijuana weekly    Sexual activity: Yes     Partners: Male     Birth control/protection: I.U.D.   Other Topics Concern    Not on file   Social History Narrative    Not on file     Social Drivers of Health     Financial Resource Strain: Not on file   Food Insecurity: No Food Insecurity (11/1/2024)    Hunger Vital Sign     Worried About Running Out of Food in the Last Year: Never true     Ran Out of Food in the Last Year: Never true   Transportation Needs: No Transportation Needs (11/1/2024)    PRAPARE - Transportation     Lack of Transportation (Medical): No     Lack of Transportation (Non-Medical): No   Physical Activity: Not on file   Stress: Not on file   Social Connections: Not on file   Intimate Partner Violence: Not At Risk (11/1/2024)    Humiliation, Afraid, Rape, and Kick questionnaire     Fear of Current or Ex-Partner: No     Emotionally Abused: No     Physically Abused: No     Sexually Abused: No   Housing Stability: Low Risk  (11/1/2024)    Housing Stability Vital Sign     Unable to Pay for Housing in the Last Year: No     Number of Times Moved in the Last Year: 1     Homeless in the Last Year: No        Family History:   Family History   Problem Relation Age of Onset    Cancer Paternal Grandmother 56        breast cancer    No Known Problems Mother     Hyperlipidemia Father     No Known Problems Sister     No Known Problems Brother          PHYSICAL EXAM:   Vital signs: /56   Pulse 82   Ht 1.6 m (5' 3\")   Wt 64.4 kg (142 lb)   SpO2 99%   BMI 25.15 kg/m²   GENERAL: Well-developed, well-nourished  in no apparent distress.   EYE: No ocular and eyelid asymmetry, Anicteric sclerae,  PERRL, No exophthalmos or lidlag  HENT: Hearing grossly intact, Normocephalic, atraumatic. Pink, moist mucous membranes, No exudate  NECK: Supple. Trachea midline. thyroid is normal in size without nodules or tenderness  CARDIOVASCULAR: Regular rate " "and rhythm. No murmurs, rubs, or gallops.   LUNGS: Clear to auscultation bilaterally   ABDOMEN: Soft, nontender with positive bowel sounds.   EXTREMITIES: No clubbing, cyanosis, or edema.   NEUROLOGICAL: Cranial nerves II-XII are grossly intact   Symmetric reflexes at the patella no proximal muscle weakness, No visible tremor with both outstretched hands  LYMPH: No cervical, supraclavicular,  adenopathy palpated.   SKIN: No rashes, lesions. Turgor is normal.  FOOT: Normal sensation to monofilament testing, normal pulses, no ulcers.  Normal Vibration quantitative sensation test.    Labs:  Lab Results   Component Value Date/Time    HBA1C 11.8 (H) 11/01/2024 1205    AVGLUC 292 11/01/2024 1205       Lab Results   Component Value Date/Time    WBC 6.8 01/16/2025 11:17 PM    RBC 4.12 (L) 01/16/2025 11:17 PM    HEMOGLOBIN 10.0 (L) 01/16/2025 11:17 PM    MCV 81.6 01/16/2025 11:17 PM    MCH 24.3 (L) 01/16/2025 11:17 PM    MCHC 29.8 (L) 01/16/2025 11:17 PM    RDW 44.9 01/16/2025 11:17 PM    MPV 10.9 01/16/2025 11:17 PM       Lab Results   Component Value Date/Time    SODIUM 137 01/16/2025 11:17 PM    POTASSIUM 3.3 (L) 01/16/2025 11:17 PM    CHLORIDE 103 01/16/2025 11:17 PM    CO2 23 01/16/2025 11:17 PM    ANION 11.0 01/16/2025 11:17 PM    GLUCOSE 151 (H) 01/16/2025 11:17 PM    BUN 11 01/16/2025 11:17 PM    CREATININE 0.54 01/16/2025 11:17 PM    CREATININE 0.6 01/31/2009 05:20 PM    CALCIUM 8.9 01/16/2025 11:17 PM    ASTSGOT 14 01/16/2025 11:17 PM    ALTSGPT 10 01/16/2025 11:17 PM    TBILIRUBIN 0.3 01/16/2025 11:17 PM    ALBUMIN 4.3 01/16/2025 11:17 PM    TOTPROTEIN 7.4 01/16/2025 11:17 PM    GLOBULIN 3.1 01/16/2025 11:17 PM    AGRATIO 1.4 01/16/2025 11:17 PM       Lab Results   Component Value Date/Time    CHOLSTRLTOT 207 (H) 09/23/2022 1025    TRIGLYCERIDE 179 (H) 09/23/2022 1025    HDL 45 09/23/2022 1025     (H) 09/23/2022 1025       No results found for: \"MICROALBCALC\", \"MALBCRT\", \"MALBEXCR\", \"YGWMBM22\", " "\"MICROALBUR\", \"MICRALB\", \"UMICROALBUM\", \"MICROALBTIM\"     Lab Results   Component Value Date/Time    TSHULTRASEN 6.670 (H) 01/16/2025 2317     No results found for: \"FREEDIR\"  Lab Results   Component Value Date/Time    FREET3 2.12 05/28/2020 1003     No results found for: \"THYSTIMIG\"        ASSESSMENT/PLAN:     1. Type 1 diabetes mellitus with other specified complication (HCC)  Unstable  A1c 8.0  Medication:  Glargine 20 units in am - change to 20 units BID  Lispro sliding scale - change to 2 units plus sliding scale.   Patient is interested in insulin pump  I will parashute for tandem pump. With dexcom   CGM downloaded and reviewed  Recommend diet low in fats and carbs. Discussed my plate plannar   Recommend 150mins/activity weekly  Stay well hydrated  - POCT Hemoglobin A1C  - Comp Metabolic Panel; Future  - C-PEPTIDE; Future  - RAMON-65; Future  - Lipid Profile; Future  - MICROALBUMIN CREAT RATIO URINE; Future  - VITAMIN D,25 HYDROXY (DEFICIENCY); Future    2. History of Graves' disease  This is the the etiology of her postsurgical hypothyroidism    3. Graves' eye disease  Unstable  FRANCISCO score of 3.5  I will continue to monitor    4. Postsurgical hypothyroidism  Unstable  Patient presenting with iatrogenic hyperthyroid symptoms  Medication:  Levothyroxine 75 mcg three tabs daily - continue  I will have patient repeat levels to confirm this and re adjust   - TSH; Future  - FREE THYROXINE; Future     Return in about 3 months (around 5/24/2025). Patient will blood work done this week and notify via [facility].     I spent approximately 60 minutes with the patient face-to-face more than 50% of which time was spent on counseling on the diagnosis and treatment and review of her risks and prognosis.  I discussed current status, physical exam findings, and plan of care.  Answered all of her questions.  The patient understands and agrees with the treatment plan.        This patient during there office visit was started on new " medication.  Side effects of new medications were discussed with the patient today in the office. The patient was supplied paperwork on this new medication.    Thank you kindly for allowing me to participate in the diabetes care plan for this patient.    Cheko See, DARY   02/24/25    CC:   Leonel Mcdermott D.O.

## 2025-02-25 DIAGNOSIS — E10.10 DKA, TYPE 1, NOT AT GOAL (HCC): ICD-10-CM

## 2025-02-25 NOTE — TELEPHONE ENCOUNTER
Received request via: Patient    Was the patient seen in the last year in this department? Yes    Does the patient have an active prescription (recently filled or refills available) for medication(s) requested? No    Pharmacy Name: [id number] - [place], [place] - [address]     Does the patient have half-way Plus and need 100-day supply? (This applies to ALL medications) Patient does not have SCP

## 2025-02-28 ENCOUNTER — HOSPITAL ENCOUNTER (OUTPATIENT)
Dept: LAB | Facility: MEDICAL CENTER | Age: 30
End: 2025-02-28
Payer: MEDICAID

## 2025-02-28 DIAGNOSIS — E10.69 TYPE 1 DIABETES MELLITUS WITH OTHER SPECIFIED COMPLICATION (HCC): ICD-10-CM

## 2025-02-28 DIAGNOSIS — E89.0 POSTSURGICAL HYPOTHYROIDISM: ICD-10-CM

## 2025-02-28 LAB
25(OH)D3 SERPL-MCNC: 29 NG/ML (ref 30–100)
ALBUMIN SERPL BCP-MCNC: 4.4 G/DL (ref 3.2–4.9)
ALBUMIN/GLOB SERPL: 1.3 G/DL
ALP SERPL-CCNC: 71 U/L (ref 30–99)
ALT SERPL-CCNC: 11 U/L (ref 2–50)
ANION GAP SERPL CALC-SCNC: 13 MMOL/L (ref 7–16)
AST SERPL-CCNC: 19 U/L (ref 12–45)
BILIRUB SERPL-MCNC: 0.3 MG/DL (ref 0.1–1.5)
BUN SERPL-MCNC: 9 MG/DL (ref 8–22)
CALCIUM ALBUM COR SERPL-MCNC: 9 MG/DL (ref 8.5–10.5)
CALCIUM SERPL-MCNC: 9.3 MG/DL (ref 8.5–10.5)
CHLORIDE SERPL-SCNC: 103 MMOL/L (ref 96–112)
CHOLEST SERPL-MCNC: 154 MG/DL (ref 100–199)
CO2 SERPL-SCNC: 22 MMOL/L (ref 20–33)
CREAT SERPL-MCNC: 0.58 MG/DL (ref 0.5–1.4)
CREAT UR-MCNC: 298 MG/DL
GFR SERPLBLD CREATININE-BSD FMLA CKD-EPI: 127 ML/MIN/1.73 M 2
GLOBULIN SER CALC-MCNC: 3.4 G/DL (ref 1.9–3.5)
GLUCOSE SERPL-MCNC: 132 MG/DL (ref 65–99)
HDLC SERPL-MCNC: 51 MG/DL
LDLC SERPL CALC-MCNC: 81 MG/DL
MICROALBUMIN UR-MCNC: <1.2 MG/DL
MICROALBUMIN/CREAT UR: NORMAL MG/G (ref 0–30)
POTASSIUM SERPL-SCNC: 3.7 MMOL/L (ref 3.6–5.5)
PROT SERPL-MCNC: 7.8 G/DL (ref 6–8.2)
SODIUM SERPL-SCNC: 138 MMOL/L (ref 135–145)
T4 FREE SERPL-MCNC: 2.21 NG/DL (ref 0.93–1.7)
TRIGL SERPL-MCNC: 112 MG/DL (ref 0–149)
TSH SERPL-ACNC: 0.03 UIU/ML (ref 0.35–5.5)

## 2025-02-28 PROCEDURE — 36415 COLL VENOUS BLD VENIPUNCTURE: CPT

## 2025-02-28 PROCEDURE — 82570 ASSAY OF URINE CREATININE: CPT

## 2025-02-28 PROCEDURE — 82043 UR ALBUMIN QUANTITATIVE: CPT

## 2025-02-28 PROCEDURE — 82306 VITAMIN D 25 HYDROXY: CPT

## 2025-02-28 PROCEDURE — 84443 ASSAY THYROID STIM HORMONE: CPT

## 2025-02-28 PROCEDURE — 86341 ISLET CELL ANTIBODY: CPT | Mod: 91

## 2025-02-28 PROCEDURE — 84439 ASSAY OF FREE THYROXINE: CPT

## 2025-02-28 PROCEDURE — 84681 ASSAY OF C-PEPTIDE: CPT

## 2025-02-28 PROCEDURE — 80053 COMPREHEN METABOLIC PANEL: CPT

## 2025-02-28 PROCEDURE — 80061 LIPID PANEL: CPT

## 2025-03-03 ENCOUNTER — HOSPITAL ENCOUNTER (EMERGENCY)
Facility: MEDICAL CENTER | Age: 30
End: 2025-03-03
Attending: EMERGENCY MEDICINE
Payer: MEDICAID

## 2025-03-03 ENCOUNTER — APPOINTMENT (OUTPATIENT)
Dept: RADIOLOGY | Facility: MEDICAL CENTER | Age: 30
End: 2025-03-03
Attending: EMERGENCY MEDICINE
Payer: MEDICAID

## 2025-03-03 VITALS
BODY MASS INDEX: 24.8 KG/M2 | WEIGHT: 139.99 LBS | SYSTOLIC BLOOD PRESSURE: 123 MMHG | OXYGEN SATURATION: 100 % | HEIGHT: 63 IN | RESPIRATION RATE: 20 BRPM | HEART RATE: 89 BPM | DIASTOLIC BLOOD PRESSURE: 70 MMHG | TEMPERATURE: 99 F

## 2025-03-03 DIAGNOSIS — R11.2 NAUSEA AND VOMITING, UNSPECIFIED VOMITING TYPE: ICD-10-CM

## 2025-03-03 DIAGNOSIS — B34.9 VIRAL SYNDROME: ICD-10-CM

## 2025-03-03 DIAGNOSIS — R10.31 RLQ ABDOMINAL PAIN: ICD-10-CM

## 2025-03-03 LAB
ALBUMIN SERPL BCP-MCNC: 4.6 G/DL (ref 3.2–4.9)
ALBUMIN/GLOB SERPL: 1.2 G/DL
ALP SERPL-CCNC: 92 U/L (ref 30–99)
ALT SERPL-CCNC: 13 U/L (ref 2–50)
ANION GAP SERPL CALC-SCNC: 18 MMOL/L (ref 7–16)
APPEARANCE UR: CLEAR
AST SERPL-CCNC: 27 U/L (ref 12–45)
BASOPHILS # BLD AUTO: 0.9 % (ref 0–1.8)
BASOPHILS # BLD: 0.08 K/UL (ref 0–0.12)
BILIRUB SERPL-MCNC: 0.6 MG/DL (ref 0.1–1.5)
BILIRUB UR QL STRIP.AUTO: NEGATIVE
BUN SERPL-MCNC: 11 MG/DL (ref 8–22)
C PEPTIDE SERPL-MCNC: 0.7 NG/ML (ref 0.5–3.3)
CALCIUM ALBUM COR SERPL-MCNC: 8.8 MG/DL (ref 8.5–10.5)
CALCIUM SERPL-MCNC: 9.3 MG/DL (ref 8.4–10.2)
CHLORIDE SERPL-SCNC: 99 MMOL/L (ref 96–112)
CO2 SERPL-SCNC: 19 MMOL/L (ref 20–33)
COLOR UR: YELLOW
CREAT SERPL-MCNC: 0.67 MG/DL (ref 0.5–1.4)
EOSINOPHIL # BLD AUTO: 0.15 K/UL (ref 0–0.51)
EOSINOPHIL NFR BLD: 1.7 % (ref 0–6.9)
ERYTHROCYTE [DISTWIDTH] IN BLOOD BY AUTOMATED COUNT: 44.6 FL (ref 35.9–50)
FLUAV RNA SPEC QL NAA+PROBE: NEGATIVE
FLUBV RNA SPEC QL NAA+PROBE: NEGATIVE
GAD65 AB SER IA-ACNC: >250 IU/ML (ref 0–5)
GAD65 AB SER IA-ACNC: >250 IU/ML (ref 0–5)
GFR SERPLBLD CREATININE-BSD FMLA CKD-EPI: 122 ML/MIN/1.73 M 2
GLOBULIN SER CALC-MCNC: 3.8 G/DL (ref 1.9–3.5)
GLUCOSE SERPL-MCNC: 130 MG/DL (ref 65–99)
GLUCOSE UR STRIP.AUTO-MCNC: 100 MG/DL
HCG SERPL QL: NEGATIVE
HCT VFR BLD AUTO: 38.4 % (ref 37–47)
HGB BLD-MCNC: 11.4 G/DL (ref 12–16)
KETONES UR STRIP.AUTO-MCNC: >=160 MG/DL
LEUKOCYTE ESTERASE UR QL STRIP.AUTO: NEGATIVE
LIPASE SERPL-CCNC: 17 U/L (ref 11–82)
LYMPHOCYTES # BLD AUTO: 1.03 K/UL (ref 1–4.8)
LYMPHOCYTES NFR BLD: 11.3 % (ref 22–41)
MANUAL DIFF BLD: NORMAL
MCH RBC QN AUTO: 23.5 PG (ref 27–33)
MCHC RBC AUTO-ENTMCNC: 29.7 G/DL (ref 32.2–35.5)
MCV RBC AUTO: 79 FL (ref 81.4–97.8)
MICRO URNS: ABNORMAL
MICROCYTES BLD QL SMEAR: ABNORMAL
MONOCYTES # BLD AUTO: 0.4 K/UL (ref 0–0.85)
MONOCYTES NFR BLD AUTO: 4.4 % (ref 0–13.4)
NEUTROPHILS # BLD AUTO: 7.43 K/UL (ref 1.82–7.42)
NEUTROPHILS NFR BLD: 81.7 % (ref 44–72)
NITRITE UR QL STRIP.AUTO: NEGATIVE
NRBC # BLD AUTO: 0 K/UL
NRBC BLD-RTO: 0 /100 WBC (ref 0–0.2)
OVALOCYTES BLD QL SMEAR: NORMAL
PH UR STRIP.AUTO: 5.5 [PH] (ref 5–8)
PLATELET # BLD AUTO: 283 K/UL (ref 164–446)
PLATELET BLD QL SMEAR: NORMAL
PMV BLD AUTO: 11.3 FL (ref 9–12.9)
POLYCHROMASIA BLD QL SMEAR: NORMAL
POTASSIUM SERPL-SCNC: 3.5 MMOL/L (ref 3.6–5.5)
PROT SERPL-MCNC: 8.4 G/DL (ref 6–8.2)
PROT UR QL STRIP: NEGATIVE MG/DL
RBC # BLD AUTO: 4.86 M/UL (ref 4.2–5.4)
RBC BLD AUTO: PRESENT
RBC UR QL AUTO: NEGATIVE
RSV RNA SPEC QL NAA+PROBE: NEGATIVE
SARS-COV-2 RNA RESP QL NAA+PROBE: NOTDETECTED
SODIUM SERPL-SCNC: 136 MMOL/L (ref 135–145)
SP GR UR STRIP.AUTO: 1.03
SPECIMEN SOURCE: NORMAL
UROBILINOGEN UR STRIP.AUTO-MCNC: 1 EU/DL
WBC # BLD AUTO: 9.1 K/UL (ref 4.8–10.8)

## 2025-03-03 PROCEDURE — 83690 ASSAY OF LIPASE: CPT

## 2025-03-03 PROCEDURE — 74177 CT ABD & PELVIS W/CONTRAST: CPT

## 2025-03-03 PROCEDURE — 700117 HCHG RX CONTRAST REV CODE 255: Performed by: EMERGENCY MEDICINE

## 2025-03-03 PROCEDURE — 700111 HCHG RX REV CODE 636 W/ 250 OVERRIDE (IP): Mod: JZ | Performed by: EMERGENCY MEDICINE

## 2025-03-03 PROCEDURE — 36415 COLL VENOUS BLD VENIPUNCTURE: CPT

## 2025-03-03 PROCEDURE — 81003 URINALYSIS AUTO W/O SCOPE: CPT

## 2025-03-03 PROCEDURE — 0241U HCHG SARS-COV-2 COVID-19 NFCT DS RESP RNA 4 TRGT MIC: CPT

## 2025-03-03 PROCEDURE — 84703 CHORIONIC GONADOTROPIN ASSAY: CPT

## 2025-03-03 PROCEDURE — 85007 BL SMEAR W/DIFF WBC COUNT: CPT

## 2025-03-03 PROCEDURE — 99284 EMERGENCY DEPT VISIT MOD MDM: CPT

## 2025-03-03 PROCEDURE — 80053 COMPREHEN METABOLIC PANEL: CPT

## 2025-03-03 PROCEDURE — 85027 COMPLETE CBC AUTOMATED: CPT

## 2025-03-03 PROCEDURE — 700105 HCHG RX REV CODE 258: Performed by: EMERGENCY MEDICINE

## 2025-03-03 PROCEDURE — 96374 THER/PROPH/DIAG INJ IV PUSH: CPT | Mod: XU

## 2025-03-03 PROCEDURE — 96375 TX/PRO/DX INJ NEW DRUG ADDON: CPT

## 2025-03-03 PROCEDURE — 71045 X-RAY EXAM CHEST 1 VIEW: CPT

## 2025-03-03 RX ORDER — ONDANSETRON 2 MG/ML
4 INJECTION INTRAMUSCULAR; INTRAVENOUS ONCE
Status: COMPLETED | OUTPATIENT
Start: 2025-03-03 | End: 2025-03-03

## 2025-03-03 RX ORDER — KETOROLAC TROMETHAMINE 15 MG/ML
15 INJECTION, SOLUTION INTRAMUSCULAR; INTRAVENOUS ONCE
Status: COMPLETED | OUTPATIENT
Start: 2025-03-03 | End: 2025-03-03

## 2025-03-03 RX ORDER — ONDANSETRON 4 MG/1
4 TABLET, ORALLY DISINTEGRATING ORAL EVERY 6 HOURS PRN
Qty: 10 TABLET | Refills: 0 | Status: SHIPPED | OUTPATIENT
Start: 2025-03-03

## 2025-03-03 RX ORDER — ACETAMINOPHEN 500 MG
1000 TABLET ORAL EVERY 6 HOURS PRN
COMMUNITY

## 2025-03-03 RX ORDER — SODIUM CHLORIDE, SODIUM LACTATE, POTASSIUM CHLORIDE, CALCIUM CHLORIDE 600; 310; 30; 20 MG/100ML; MG/100ML; MG/100ML; MG/100ML
1000 INJECTION, SOLUTION INTRAVENOUS ONCE
Status: COMPLETED | OUTPATIENT
Start: 2025-03-03 | End: 2025-03-03

## 2025-03-03 RX ADMIN — SODIUM CHLORIDE, POTASSIUM CHLORIDE, SODIUM LACTATE AND CALCIUM CHLORIDE 1000 ML: 600; 310; 30; 20 INJECTION, SOLUTION INTRAVENOUS at 05:52

## 2025-03-03 RX ADMIN — IOHEXOL 100 ML: 350 INJECTION, SOLUTION INTRAVENOUS at 07:58

## 2025-03-03 RX ADMIN — ONDANSETRON 4 MG: 2 INJECTION INTRAMUSCULAR; INTRAVENOUS at 05:52

## 2025-03-03 RX ADMIN — FENTANYL CITRATE 50 MCG: 50 INJECTION, SOLUTION INTRAMUSCULAR; INTRAVENOUS at 06:18

## 2025-03-03 RX ADMIN — KETOROLAC TROMETHAMINE 15 MG: 15 INJECTION, SOLUTION INTRAMUSCULAR; INTRAVENOUS at 07:17

## 2025-03-03 ASSESSMENT — PAIN DESCRIPTION - PAIN TYPE
TYPE: ACUTE PAIN

## 2025-03-03 ASSESSMENT — FIBROSIS 4 INDEX: FIB4 SCORE: 0.52

## 2025-03-03 NOTE — ED TRIAGE NOTES
"Chief Complaint   Patient presents with    Cough    N/V    Fever     Patient ambulates to Er with a chief complaint of flue like symptoms that have been going on for 3 days. Patient states she has been nauseous with vomiting and diarrhea. Patient also states she has been having RLQ abdominal pain and back pain. Patient states she is a type 1 diabetic and has Hyperthyroidism.           /78   Pulse 93   Temp 37.2 °C (99 °F) (Temporal)   Resp 18   Ht 1.6 m (5' 3\")   Wt 63.5 kg (139 lb 15.9 oz)   SpO2 98%   BMI 24.80 kg/m²     "

## 2025-03-03 NOTE — ED TRIAGE NOTES
Patient ambulates to Er with a chief complaint of flue like symptoms that have been going on for 3 days. Patient states she has been nauseous with vomiting and diarrhea. Patient also states she has been having RLQ abdominal pain and back pain. Patient states she is a type 1 diabetic and has Hyperthyroidism.

## 2025-03-03 NOTE — ED NOTES
Pt medicated for pain 8/10 call light w/in reach pt education provided to use call light before getting up. Pt states understanding to all

## 2025-03-03 NOTE — DISCHARGE SUMMARY
ED Observation Discharge Summary    Patient:Ivis Klein  Patient : 1995  Patient MRN: [id number]  Patient PCP: Leonel Mcdermott D.O.    Admit Date: 3/3/2025  Discharge Date and Time: 25 6:24 AM  Discharge Diagnosis: Nausea and vomiting, right lower quadrant abdominal pain, upper respiratory infection, viral syndrome  Discharge Attending: Caitlin Tate D.O.  Discharge Service: ED Observation    ED Course  Ivis is a 29 y.o. female who was seen by ERP prior to my arrival for flulike symptoms, nausea vomiting, abdominal pain and fever.  Patient with diagnosis type 1 diabetes a few months ago, concerned that she is not tolerating food and fluids.  Symptoms started nearly 2 days ago, 3 episodes of vomiting since yesterday.  Has had some diarrhea as well.  Right lower quadrant, right flank pain.  Nasal congestion, cough without shortness of breath, chest pain or syncope.  Her child was sick this past week with similar symptoms.  Labs pending.  Zofran given for nausea.  IV fluid infusing.    0618 -patient seen evaluated at bedside.  Nausea much improved after Zofran, IV fluid infusing.  Hemodynamically stable without tachycardia, hypotension or hypoxia.  She does have some reproducible right lower quadrant abdominal pain, right flank pain with CVA tenderness.  Labs, urinalysis, viral swab pending.  Patient requesting Tylenol but had taken this prior to arrival, will remain n.p.o. until labs if not also CT results.  Pregnancy still pending.  Add small dose fentanyl.    6:54 AM labs without leukocytosis, only minimal leftward shift, no bandemia.  No significant anemia.  No DKA.  CO2 19, glucose 130 without other electrolyte derangement.  Renal function preserved.  Normal LFTs and lipase.  Pregnancy negative.  RSV, COVID, influenza negative.  Patient continues to have right lower quadrant abdominal pain, some right flank pain.  Awaiting urinalysis.  Add CT scan,  Chest x-ray.  Add Toradol for  "persistent discomfort.    8:09 AM chest x-ray is unremarkable.  CT abdomen pelvis does demonstrate a 2.7 cm left ovarian cyst, trace free fluid in the pelvis which can be within normal limits.  Unlikely the source of her right-sided abdominal pain.  Symptomatology atypical for torsion.  Otherwise appendix is normal.  No hydronephrosis.  Awaiting urinalysis.    9:32 AM urinalysis is unremarkable.  Overall patient symptom improvement with fentanyl, Toradol, Zofran and IV fluid in the emergency department.  Now tolerating water and elizabeth crackers without worsening abdominal pain, recurrent vomiting.  Hemodynamically stable overall.  Labs, imaging unremarkable.  Suspect viral illness, she has had similar recent exposure.  Stable for discharge home with strict return instructions detailed at length.  Zofran for nausea or vomiting, OTC medications for other symptom relief    Discharge Exam:  /70   Pulse 89   Temp 37.2 °C (99 °F) (Temporal)   Resp 20   Ht 1.6 m (5' 3\")   Wt 63.5 kg (139 lb 15.9 oz)   SpO2 100%   BMI 24.80 kg/m² .    Constitutional: Awake and alert. Nontoxic  HENT:  Grossly normal  Eyes: Grossly normal  Neck: Normal range of motion  Cardiovascular: Normal peripheral perfusion  Thorax & Lungs: Nonlabored respirations  Abdomen: Tender to palpation right lower quadrant without guarding or peritonitis.  No palpable or pulsatile mass or hernia  Skin:  No pathologic rash.   Extremities: Well perfused  Psychiatric: Affect normal    Labs  Results for orders placed or performed during the hospital encounter of 03/03/25   CoV-2, Flu A/B, And RSV by PCR ([place])    Collection Time: 03/03/25  5:22 AM    Specimen: Respirate   Result Value Ref Range    Influenza virus A RNA Negative Negative    Influenza virus B, PCR Negative Negative    RSV, PCR Negative Negative    SARS-CoV-2 by PCR NotDetected     SARS-CoV-2 Source NP Swab    HCG QUAL SERUM    Collection Time: 03/03/25  5:45 AM   Result Value Ref Range "    Beta-Hcg Qualitative Serum Negative Negative   CBC WITH DIFFERENTIAL    Collection Time: 03/03/25  5:45 AM   Result Value Ref Range    WBC 9.1 4.8 - 10.8 K/uL    RBC 4.86 4.20 - 5.40 M/uL    Hemoglobin 11.4 (L) 12.0 - 16.0 g/dL    Hematocrit 38.4 37.0 - 47.0 %    MCV 79.0 (L) 81.4 - 97.8 fL    MCH 23.5 (L) 27.0 - 33.0 pg    MCHC 29.7 (L) 32.2 - 35.5 g/dL    RDW 44.6 35.9 - 50.0 fL    Platelet Count 283 164 - 446 K/uL    MPV 11.3 9.0 - 12.9 fL    Neutrophils-Polys 81.70 (H) 44.00 - 72.00 %    Lymphocytes 11.30 (L) 22.00 - 41.00 %    Monocytes 4.40 0.00 - 13.40 %    Eosinophils 1.70 0.00 - 6.90 %    Basophils 0.90 0.00 - 1.80 %    Nucleated RBC 0.00 0.00 - 0.20 /100 WBC    Neutrophils (Absolute) 7.43 (H) 1.82 - 7.42 K/uL    Lymphs (Absolute) 1.03 1.00 - 4.80 K/uL    Monos (Absolute) 0.40 0.00 - 0.85 K/uL    Eos (Absolute) 0.15 0.00 - 0.51 K/uL    Baso (Absolute) 0.08 0.00 - 0.12 K/uL    NRBC (Absolute) 0.00 K/uL    Microcytosis 2+ (A)    COMP METABOLIC PANEL    Collection Time: 03/03/25  5:45 AM   Result Value Ref Range    Sodium 136 135 - 145 mmol/L    Potassium 3.5 (L) 3.6 - 5.5 mmol/L    Chloride 99 96 - 112 mmol/L    Co2 19 (L) 20 - 33 mmol/L    Anion Gap 18.0 (H) 7.0 - 16.0    Glucose 130 (H) 65 - 99 mg/dL    Bun 11 8 - 22 mg/dL    Creatinine 0.67 0.50 - 1.40 mg/dL    Calcium 9.3 8.4 - 10.2 mg/dL    Correct Calcium 8.8 8.5 - 10.5 mg/dL    AST(SGOT) 27 12 - 45 U/L    ALT(SGPT) 13 2 - 50 U/L    Alkaline Phosphatase 92 30 - 99 U/L    Total Bilirubin 0.6 0.1 - 1.5 mg/dL    Albumin 4.6 3.2 - 4.9 g/dL    Total Protein 8.4 (H) 6.0 - 8.2 g/dL    Globulin 3.8 (H) 1.9 - 3.5 g/dL    A-G Ratio 1.2 g/dL   LIPASE    Collection Time: 03/03/25  5:45 AM   Result Value Ref Range    Lipase 17 11 - 82 U/L   ESTIMATED GFR    Collection Time: 03/03/25  5:45 AM   Result Value Ref Range    GFR (CKD-EPI) 122 >60 mL/min/1.73 m 2   DIFFERENTIAL MANUAL    Collection Time: 03/03/25  5:45 AM   Result Value Ref Range    Manual Diff Status  PERFORMED    PLATELET ESTIMATE    Collection Time: 03/03/25  5:45 AM   Result Value Ref Range    Plt Estimation Normal    MORPHOLOGY    Collection Time: 03/03/25  5:45 AM   Result Value Ref Range    RBC Morphology Present     Polychromia 1+     Ovalocytes 1+    URINALYSIS (UA)    Collection Time: 03/03/25  7:29 AM    Specimen: Urine   Result Value Ref Range    Color Yellow     Character Clear     Specific Gravity 1.033 <1.035    Ph 5.5 5.0 - 8.0    Glucose 100 (A) Negative mg/dL    Ketones >=160 Negative mg/dL    Protein Negative Negative mg/dL    Bilirubin Negative Negative    Urobilinogen, Urine 1.0 <=1.0 EU/dL    Nitrite Negative Negative    Leukocyte Esterase Negative Negative    Occult Blood Negative Negative    Micro Urine Req see below      *Note: Due to a large number of results and/or encounters for the requested time period, some results have not been displayed. A complete set of results can be found in Results Review.       Radiology  CT-ABDOMEN-PELVIS WITH   Final Result         1. 2.7 cm left ovarian cyst. IUD in uterus. Trace free fluid in right pelvis is within normal limits.      2. No acute process otherwise.      3. Normal appendix. No bowel distention or inflammation. No hydronephrosis.      4. Prior cholecystectomy.                  DX-CHEST-PORTABLE (1 VIEW)   Final Result         1. No acute cardiopulmonary abnormalities identified.                         Medications:   New Prescriptions    ONDANSETRON (ZOFRAN ODT) 4 MG TABLET DISPERSIBLE    Take 1 Tablet by mouth every 6 hours as needed for Nausea/Vomiting.       My final assessment includes nausea vomiting, upper respiratory infection, abdominal pain, viral syndrome    Upon Reevaluation, the patient's condition has: Improved; and will be discharged.    Patient discharged from ED Observation status at 9:34 AM  (Time) 03/03/25 (Date).     Total time spent on this ED Observation discharge encounter is > 30 Minutes    Electronically signed by:  Caitlin Tate D.O., 3/3/2025 6:24 AM

## 2025-03-03 NOTE — ED PROVIDER NOTES
ED Provider Note        CHIEF COMPLAINT  Chief Complaint   Patient presents with    Cough    N/V    Fever     Patient ambulates to Er with a chief complaint of flue like symptoms that have been going on for 3 days. Patient states she has been nauseous with vomiting and diarrhea. Patient also states she has been having RLQ abdominal pain and back pain. Patient states she is a type 1 diabetic and has Hyperthyroidism.               HPI    Ivis Klein is a 29 y.o. female who presents to the Emergency Department feeling unwell.  The patient reports she has a history of type 1 diabetes, hypothyroidism.  She reports that for the past 3 days she has been having cough, subjective fever, right-sided abdominal pain, right-sided back pain, has been having 1 day of vomiting, unable to keep water down as well as diarrhea.  Her child was also sick but is doing better.  She denies any dysuria.  She occasionally has sputum production from a cough.    REVIEW OF SYSTEMS  See HPI for further details. All other systems are negative.     PAST MEDICAL HISTORY     Past Medical History:   Diagnosis Date    Anemia 02/17/2022    Anxiety 02/16/2017    Anxiety 02/16/2017    Elevated antinuclear antibody (DEJON) level 05/23/2019    Graves disease 12/05/2016    Heart valve disease     Hemorrhagic cysts of both ovaries 05/23/2019    History of panic attack 04/09/2020    History of pericarditis 12/05/2016    History of thyroidectomy 01/31/2020    Partial --> hypothyroidism     Panic attack 03/11/2019    Postpartum depression 02/17/2022    Postpartum depression 02/17/2022    Type 1 diabetes mellitus with other specified complication (HCC) 11/8/2024    Vaginal discharge 12/02/2021       SURGICAL HISTORY  Past Surgical History:   Procedure Laterality Date    JUNIE BY LAPAROSCOPY N/A 8/28/2024    Procedure: CHOLECYSTECTOMY, LAPAROSCOPIC;  Surgeon: Charles Rodas M.D.;  Location: SURGERY [facility];  Service: General    THYROIDECTOMY  "TOTAL Bilateral 3/22/2017    Procedure: THYROIDECTOMY TOTAL -NIMS RECURRENT LARYNGEAL NERVE MONITORING;  Surgeon: Vicente Eldridge M.D.;  Location: SURGERY SAME DAY [facility];  Service:     PRIMARY C SECTION  9/8/2013    Performed by Melisa Wright M.D. at LABOR AND DELIVERY       FAMILY HISTORY  Family History   Problem Relation Age of Onset    Cancer Paternal Grandmother 56        breast cancer    No Known Problems Mother     Hyperlipidemia Father     No Known Problems Sister     No Known Problems Brother        SOCIAL HISTORY    reports that she has never smoked. She has never used smokeless tobacco. She reports that she does not currently use alcohol. She reports current drug use. Drugs: Marijuana and Inhaled.    CURRENT MEDICATIONS  Home Medications    **Home medications have not yet been reviewed for this encounter**         ALLERGIES  No Known Allergies    PHYSICAL EXAM  VITAL SIGNS: /78   Pulse 93   Temp 37.2 °C (99 °F) (Temporal)   Resp 18   Ht 1.6 m (5' 3\")   Wt 63.5 kg (139 lb 15.9 oz)   SpO2 98%   BMI 24.80 kg/m²   Gen: Alert, no acute distress  HEENT: ATNC, dry mucous membranes  Eyes: PERRL, EOMI, normal conjunctiva  Neck: trachea midline  Resp: no respiratory distress, clear to auscultation bilaterally  CV: No JVD, regular rate and rhythm, no murmurs, rubs, gallop  Abd: non-distended, soft, minimal right upper quadrant tenderness, slight diffuse lower abdominal tenderness slightly worse in right lower quadrant compared to left lower quadrant  Back: Diffuse right paraspinous muscle tenderness.  No left paraspinous muscle tenderness.  No rash.  Ext: No deformities  Neuro: speech fluent GCS 15    DIAGNOSTIC STUDIES / PROCEDURES    LABS  Labs Reviewed   HCG QUAL SERUM   CBC WITH DIFFERENTIAL   COMP METABOLIC PANEL   LIPASE   URINALYSIS   COV-2, FLU A/B, AND RSV BY PCR ([email or web address])       COURSE & MEDICAL DECISION MAKING  Pertinent Labs & Imaging studies were reviewed. (See chart for " details)      EXTERNAL RECORDS REVIEWED  Outpatient Notes most recent outpatient note 2/24/2025 for follow-up of type 1 diabetes, hypothyroidism         INITIAL ASSESSMENT AND PLAN  Care Narrative: Patient presents with constellation of symptoms most likely viral in etiology.  This includes cough, subjective fever, vomiting, diarrhea.  She does have a close contact that was recently sick as well.  She does have some right lower quadrant and right back pain.  Will screen for occult urinary tract infection.  Appendicitis is a possibility, however somewhat unlikely and the overall clinical picture.  Will screen with labs, perform viral testing.  Given the patient's nausea and vomiting, will provide IV fluids, antiemetics.  On lung auscultation, her lungs are clear, no evidence of pneumonia.    ADDITIONAL PROBLEM LIST AND DISPOSITION    I have discussed management of the patient with the following medical professionals:  Bebeto    Hydration: Patient received IV fluids for vomiting . Oral hydration alone was not sufficient. After IV fluids patient is improved .      FINAL IMPRESSION  1. Nausea and vomiting, unspecified vomiting type          This dictation was created using voice recognition software. The accuracy of the dictation is limited to the abilities of the software. I expect there may be some errors of grammar and possibly content. The nursing notes were reviewed and certain aspects of this information were incorporated into this note.

## 2025-03-03 NOTE — DISCHARGE INSTRUCTIONS
Follow-up with primary care this week for reevaluation if symptoms persist.    Tylenol and ibuprofen, alternating if needed, as needed for fever, headache or other discomfort.  Zofran, oral dissolving tablet, every 6 hours as needed for nausea or vomiting.  OTC medications as needed for relief of cold and flu symptoms.    A cool-mist humidifier may be beneficial for cough and congestion as well.    Encourage oral fluid hydration.  Clear liquid diet for 8 to 12 hours, then advance to bland foods as tolerated.    Return to the emergency department in 24 hours for any persistent vomiting or abdominal pain.    Return to the emergency department immediately for intractable fever, altered mental status, chest pain or shortness of breath, syncope, persistent or worsening abdominal pain, intractable vomiting, uncontrolled blood sugars or other new concerns.

## 2025-03-04 LAB — ZNT8 AB SERPL IA-ACNC: >500 U/ML (ref 0–15)

## 2025-03-05 LAB — ISLET CELL512 AB SER IA-ACNC: >120 U/ML (ref 0–7.4)

## 2025-03-10 ENCOUNTER — APPOINTMENT (OUTPATIENT)
Dept: INTERNAL MEDICINE | Facility: OTHER | Age: 30
End: 2025-03-10
Payer: MEDICAID

## 2025-03-12 ENCOUNTER — TELEPHONE (OUTPATIENT)
Dept: ENDOCRINOLOGY | Facility: MEDICAL CENTER | Age: 30
End: 2025-03-12
Payer: MEDICAID

## 2025-03-13 NOTE — TELEPHONE ENCOUNTER
Left pt vm that [facility] will be OOO for scheduled appt in May and to call us back to get rescheduled.

## 2025-03-17 ENCOUNTER — OFFICE VISIT (OUTPATIENT)
Dept: INTERNAL MEDICINE | Facility: OTHER | Age: 30
End: 2025-03-17
Payer: MEDICAID

## 2025-03-17 VITALS
DIASTOLIC BLOOD PRESSURE: 74 MMHG | TEMPERATURE: 97.3 F | HEART RATE: 75 BPM | HEIGHT: 63 IN | SYSTOLIC BLOOD PRESSURE: 107 MMHG | BODY MASS INDEX: 25.08 KG/M2 | OXYGEN SATURATION: 100 % | WEIGHT: 141.54 LBS

## 2025-03-17 DIAGNOSIS — R42 LIGHTHEADEDNESS: ICD-10-CM

## 2025-03-17 DIAGNOSIS — N93.9 UTERINE BLEEDING: ICD-10-CM

## 2025-03-17 DIAGNOSIS — E87.6 HYPOKALEMIA: ICD-10-CM

## 2025-03-17 DIAGNOSIS — D50.9 IRON DEFICIENCY ANEMIA, UNSPECIFIED IRON DEFICIENCY ANEMIA TYPE: ICD-10-CM

## 2025-03-17 DIAGNOSIS — E89.0 POSTSURGICAL HYPOTHYROIDISM: ICD-10-CM

## 2025-03-17 PROCEDURE — 3078F DIAST BP <80 MM HG: CPT | Mod: GE

## 2025-03-17 PROCEDURE — 3074F SYST BP LT 130 MM HG: CPT | Mod: GE

## 2025-03-17 PROCEDURE — 99213 OFFICE O/P EST LOW 20 MIN: CPT | Mod: GE

## 2025-03-17 RX ORDER — FERROUS SULFATE 325(65) MG
325 TABLET ORAL
Qty: 15 TABLET | Refills: 1 | Status: SHIPPED | OUTPATIENT
Start: 2025-03-17

## 2025-03-17 ASSESSMENT — FIBROSIS 4 INDEX: FIB4 SCORE: 0.71

## 2025-03-17 ASSESSMENT — ENCOUNTER SYMPTOMS
FEVER: 0
NAUSEA: 0
VOMITING: 0
DIZZINESS: 1
SHORTNESS OF BREATH: 0
HEADACHES: 0

## 2025-03-17 NOTE — PROGRESS NOTES
History of Present Illness:   Ivis Klein is a 29 y.o. female who presents with lightheadedness.  She states that her lightheadedness started about 4 weeks ago when she was started on Synthroid.  It got slightly better when she got a reduced dose about 2 weeks ago but she still feels it throughout the day.  It gets worse when she gets up from a seated position.  These episodes last for about 3 minutes and resolve after she takes a rest.  She denies any syncopal episodes, nausea, headaches.      Past Medical History:   Diagnosis Date    Anemia 02/17/2022    Anxiety 02/16/2017    Anxiety 02/16/2017    Elevated antinuclear antibody (DEJON) level 05/23/2019    Graves disease 12/05/2016    Heart valve disease     Hemorrhagic cysts of both ovaries 05/23/2019    History of panic attack 04/09/2020    History of pericarditis 12/05/2016    History of thyroidectomy 01/31/2020    Partial --> hypothyroidism     Panic attack 03/11/2019    Postpartum depression 02/17/2022    Postpartum depression 02/17/2022    Type 1 diabetes mellitus with other specified complication (HCC) 11/8/2024    Vaginal discharge 12/02/2021       Past Surgical History:   Procedure Laterality Date    JUNIE BY LAPAROSCOPY N/A 8/28/2024    Procedure: CHOLECYSTECTOMY, LAPAROSCOPIC;  Surgeon: Charles Rodas M.D.;  Location: SURGERY [facility];  Service: General    THYROIDECTOMY TOTAL Bilateral 3/22/2017    Procedure: THYROIDECTOMY TOTAL -NIMS RECURRENT LARYNGEAL NERVE MONITORING;  Surgeon: Vicente Eldridge M.D.;  Location: SURGERY SAME DAY [facility];  Service:     PRIMARY C SECTION  9/8/2013    Performed by Melisa Wright M.D. at LABOR AND DELIVERY       Family History   Problem Relation Age of Onset    Cancer Paternal Grandmother 56        breast cancer    No Known Problems Mother     Hyperlipidemia Father     No Known Problems Sister     No Known Problems Brother        Social History     Socioeconomic History    Marital  status: Single     Spouse name: Not on file    Number of children: Not on file    Years of education: Not on file    Highest education level: Not on file   Occupational History    Occupation: myles     Comment: [employer]   Tobacco Use    Smoking status: Never    Smokeless tobacco: Never    Tobacco comments:     quit in 2012   Vaping Use    Vaping status: Never Used   Substance and Sexual Activity    Alcohol use: Not Currently    Drug use: Yes     Types: Marijuana, Inhaled     Comment: marijuana weekly    Sexual activity: Yes     Partners: Male     Birth control/protection: I.U.D.   Other Topics Concern    Not on file   Social History Narrative    Not on file     Social Drivers of Health     Financial Resource Strain: Not on file   Food Insecurity: No Food Insecurity (11/1/2024)    Hunger Vital Sign     Worried About Running Out of Food in the Last Year: Never true     Ran Out of Food in the Last Year: Never true   Transportation Needs: No Transportation Needs (11/1/2024)    PRAPARE - Transportation     Lack of Transportation (Medical): No     Lack of Transportation (Non-Medical): No   Physical Activity: Not on file   Stress: Not on file   Social Connections: Not on file   Intimate Partner Violence: Not At Risk (11/1/2024)    Humiliation, Afraid, Rape, and Kick questionnaire     Fear of Current or Ex-Partner: No     Emotionally Abused: No     Physically Abused: No     Sexually Abused: No   Housing Stability: Low Risk  (11/1/2024)    Housing Stability Vital Sign     Unable to Pay for Housing in the Last Year: No     Number of Times Moved in the Last Year: 1     Homeless in the Last Year: No       Current Outpatient Medications   Medication Sig Dispense Refill    ferrous sulfate 325 (65 Fe) MG tablet Take 1 Tablet by mouth every 48 hours. 15 Tablet 1    acetaminophen (TYLENOL) 500 MG Tab Take 1,000 mg by mouth every 6 hours as needed for Fever. Indications: Pain      ondansetron (ZOFRAN ODT) 4 MG TABLET DISPERSIBLE  Take 1 Tablet by mouth every 6 hours as needed for Nausea/Vomiting. 10 Tablet 0    Insulin Pen Needle 32 G x 4 mm Use one pen needle in pen device to inject insulin five times daily. 200 Each 0    insulin glargine (LANTUS SOLOSTAR) 100 UNIT/ML Solution Pen-injector injection Inject 20 Units under the skin every morning before breakfast. 15 mL 0    Continuous Glucose Sensor (FREESTYLE MIRTA 3 SENSOR) Misc 1 Application continuous. 1 Each 2    levothyroxine (SYNTHROID) 75 MCG Tab Take 3 Tablets by mouth every morning on an empty stomach. 270 Tablet 1    Continuous Glucose Sensor Misc 1 Units continuous. 2 Units 1    insulin lispro 100 UNIT/ML SC SOPN injection PEN Inject 1-10 Units under the skin 4 Times a Day,Before Meals and at Bedtime.  mg/dL = 0 Units 151 - 200  mg/dL =  2 Units 201 - 250  mg/dL = 3 Units 251 - 300 mg/dL =  4 Units 301 - 350  mg/dL =  6 Units Over 351  mg/dL  = 7 Units 6 mL 1    levonorgestrel (MIRENA, 52 MG,) 20 MCG/DAY IUD 1 Each by Intrauterine route see administration instructions. Every 5 years, last placed 2020       No current facility-administered medications for this visit.       No Known Allergies    Review of Systems:  Review of Systems   Constitutional:  Negative for fever.   Respiratory:  Negative for shortness of breath.    Cardiovascular:  Negative for chest pain.   Gastrointestinal:  Negative for nausea and vomiting.   Neurological:  Positive for dizziness. Negative for headaches.        Medications:     Current Outpatient Medications:     ferrous sulfate, 325 mg, Oral, Q48HRS, Taking    acetaminophen, 1,000 mg, Oral, Q6HRS PRN, PRN    ondansetron, 4 mg, Oral, Q6HRS PRN, PRN    Insulin Pen Needle 32 G x 4 mm, Use one pen needle in pen device to inject insulin five times daily., Taking    Lantus SoloStar, 20 Units, Subcutaneous, QAM AC, Taking    FreeStyle Mirta 3 Sensor, 1 Application, Does not apply, Continuous, Taking    levothyroxine, 225 mcg, Oral, AM ES, Taking     "Continuous Glucose Sensor, 1 Units, Does not apply, Continuous, Taking    insulin lispro, 1-10 Units, Subcutaneous, 4X/DAY ACHS, Taking    Mirena (52 MG), 1 Each, Intrauterine, See Admin Instructions, Taking     Objective:  Vitals:   /74 (BP Location: Left arm, Patient Position: Sitting, BP Cuff Size: Adult)   Pulse 75   Temp 36.3 °C (97.3 °F) (Temporal)   Ht 1.6 m (5' 3\")   Wt 64.2 kg (141 lb 8.6 oz)   SpO2 100%  Body mass index is 25.07 kg/m².    Physical Exam  Constitutional:       General: She is not in acute distress.  HENT:      Head: Normocephalic and atraumatic.   Eyes:      Conjunctiva/sclera: Conjunctivae normal.   Cardiovascular:      Heart sounds: Normal heart sounds.   Pulmonary:      Breath sounds: Normal breath sounds.   Abdominal:      General: There is no distension.   Neurological:      General: No focal deficit present.      Mental Status: She is oriented to person, place, and time.      Sensory: No sensory deficit.      Motor: No weakness.      Gait: Gait normal.          Results:    Lab Results   Component Value Date/Time    CHOLSTRLTOT 154 02/28/2025 09:49 AM    LDL 81 02/28/2025 09:49 AM    HDL 51 02/28/2025 09:49 AM    TRIGLYCERIDE 112 02/28/2025 09:49 AM       Lab Results   Component Value Date/Time    SODIUM 136 03/03/2025 05:45 AM    POTASSIUM 3.5 (L) 03/03/2025 05:45 AM    CHLORIDE 99 03/03/2025 05:45 AM    CO2 19 (L) 03/03/2025 05:45 AM    GLUCOSE 130 (H) 03/03/2025 05:45 AM    BUN 11 03/03/2025 05:45 AM    CREATININE 0.67 03/03/2025 05:45 AM    CREATININE 0.6 01/31/2009 05:20 PM     Lab Results   Component Value Date/Time    ALKPHOSPHAT 92 03/03/2025 05:45 AM    ASTSGOT 27 03/03/2025 05:45 AM    ALTSGPT 13 03/03/2025 05:45 AM    TBILIRUBIN 0.6 03/03/2025 05:45 AM        Lab Results   Component Value Date/Time    WBC 9.1 03/03/2025 05:45 AM    RBC 4.86 03/03/2025 05:45 AM    HEMOGLOBIN 11.4 (L) 03/03/2025 05:45 AM    HEMATOCRIT 38.4 03/03/2025 05:45 AM    MCV 79.0 (L) " 03/03/2025 05:45 AM    MCH 23.5 (L) 03/03/2025 05:45 AM    MCHC 29.7 (L) 03/03/2025 05:45 AM    MPV 11.3 03/03/2025 05:45 AM    NEUTSPOLYS 81.70 (H) 03/03/2025 05:45 AM    LYMPHOCYTES 11.30 (L) 03/03/2025 05:45 AM    MONOCYTES 4.40 03/03/2025 05:45 AM    EOSINOPHILS 1.70 03/03/2025 05:45 AM    BASOPHILS 0.90 03/03/2025 05:45 AM    ANISOCYTOSIS 2+ (A) 02/16/2022 01:30 PM        Assessment and Plan:    #Lightheadedness  Suspect likely due to iron deficiency anemia in setting of heavy menses.  Patient states that it began after initiation of Synthroid about 4 weeks ago, but unlikely to be due to medication side effect.  Patient is hydrating appropriately and her blood pressures are within her normal range  - Encourage continued oral hydration and nutritional intake  - Initiate ferrous sulfate every other day    #Iron deficiency anemia  Likely due to heavy menses.    % saturation in 2023 was 5 and ferritin 4.7  Patient has a levonorgestrel IUD, but notes that her periods continue be heavy  - Has appointment with OB/GYN  - Ferrous sulfate every other day  - Repeat CBC and iron studies  - Transvaginal ultrasound    #Type 1 diabetes  A1c (2/2025): 8%  Monofilament (12/2024): Normal  Retinopathy screening (12/2024: Normal  Sugars controlled at home  Following with endocrinology    #Hypothyroidism  History of Graves' disease status post bilateral total thyroidectomy in March 2017  Following with endocrinology    Follow Up:  Return in about 2 months (around 5/17/2025).

## 2025-03-17 NOTE — LETTER
March 17, 2025    To Whom It May Concern:         This is confirmation that Ivis Klein attended her scheduled appointment with Leonel Mcdermott D.O. on 3/17/25. She is medically clear to return to work on April 2, 2025.         If you have any questions please do not hesitate to call me at the phone number listed below.    Sincerely,          Leonel Mcdermott D.O.  [phone]

## 2025-03-21 ENCOUNTER — APPOINTMENT (OUTPATIENT)
Dept: RADIOLOGY | Facility: MEDICAL CENTER | Age: 30
End: 2025-03-21
Attending: EMERGENCY MEDICINE
Payer: MEDICAID

## 2025-03-21 ENCOUNTER — HOSPITAL ENCOUNTER (EMERGENCY)
Facility: MEDICAL CENTER | Age: 30
End: 2025-03-21
Attending: EMERGENCY MEDICINE
Payer: MEDICAID

## 2025-03-21 VITALS
HEART RATE: 67 BPM | OXYGEN SATURATION: 100 % | WEIGHT: 142.42 LBS | TEMPERATURE: 98.2 F | BODY MASS INDEX: 25.23 KG/M2 | HEIGHT: 63 IN | RESPIRATION RATE: 21 BRPM | SYSTOLIC BLOOD PRESSURE: 120 MMHG | DIASTOLIC BLOOD PRESSURE: 68 MMHG

## 2025-03-21 DIAGNOSIS — E16.2 HYPOGLYCEMIA: ICD-10-CM

## 2025-03-21 DIAGNOSIS — D50.9 IRON DEFICIENCY ANEMIA, UNSPECIFIED IRON DEFICIENCY ANEMIA TYPE: ICD-10-CM

## 2025-03-21 DIAGNOSIS — R07.89 CHEST PRESSURE: ICD-10-CM

## 2025-03-21 DIAGNOSIS — E05.90 HYPERTHYROIDISM: ICD-10-CM

## 2025-03-21 DIAGNOSIS — R19.7 ACUTE DIARRHEA: ICD-10-CM

## 2025-03-21 DIAGNOSIS — E10.69 TYPE 1 DIABETES MELLITUS WITH OTHER SPECIFIED COMPLICATION (HCC): ICD-10-CM

## 2025-03-21 DIAGNOSIS — E86.0 DEHYDRATION: ICD-10-CM

## 2025-03-21 LAB
ALBUMIN SERPL BCP-MCNC: 4.3 G/DL (ref 3.2–4.9)
ALBUMIN/GLOB SERPL: 1.3 G/DL
ALP SERPL-CCNC: 67 U/L (ref 30–99)
ALT SERPL-CCNC: 8 U/L (ref 2–50)
ANION GAP SERPL CALC-SCNC: 13 MMOL/L (ref 7–16)
APPEARANCE UR: CLEAR
AST SERPL-CCNC: 18 U/L (ref 12–45)
BASOPHILS # BLD AUTO: 0.7 % (ref 0–1.8)
BASOPHILS # BLD: 0.04 K/UL (ref 0–0.12)
BILIRUB SERPL-MCNC: <0.2 MG/DL (ref 0.1–1.5)
BILIRUB UR QL STRIP.AUTO: NEGATIVE
BLOOD CULTURE HOLD CXBCH: NORMAL
BLOOD CULTURE HOLD CXBCH: NORMAL
BUN SERPL-MCNC: 5 MG/DL (ref 8–22)
CALCIUM ALBUM COR SERPL-MCNC: 8.9 MG/DL (ref 8.5–10.5)
CALCIUM SERPL-MCNC: 9.1 MG/DL (ref 8.4–10.2)
CHLORIDE SERPL-SCNC: 106 MMOL/L (ref 96–112)
CO2 SERPL-SCNC: 23 MMOL/L (ref 20–33)
COLOR UR: YELLOW
CREAT SERPL-MCNC: 0.63 MG/DL (ref 0.5–1.4)
EKG IMPRESSION: NORMAL
EOSINOPHIL # BLD AUTO: 0.06 K/UL (ref 0–0.51)
EOSINOPHIL NFR BLD: 1.1 % (ref 0–6.9)
ERYTHROCYTE [DISTWIDTH] IN BLOOD BY AUTOMATED COUNT: 43.5 FL (ref 35.9–50)
FLUAV RNA SPEC QL NAA+PROBE: NEGATIVE
FLUBV RNA SPEC QL NAA+PROBE: NEGATIVE
GFR SERPLBLD CREATININE-BSD FMLA CKD-EPI: 124 ML/MIN/1.73 M 2
GLOBULIN SER CALC-MCNC: 3.3 G/DL (ref 1.9–3.5)
GLUCOSE BLD STRIP.AUTO-MCNC: 192 MG/DL (ref 65–99)
GLUCOSE BLD STRIP.AUTO-MCNC: 221 MG/DL (ref 65–99)
GLUCOSE BLD STRIP.AUTO-MCNC: 82 MG/DL (ref 65–99)
GLUCOSE SERPL-MCNC: 58 MG/DL (ref 65–99)
GLUCOSE UR STRIP.AUTO-MCNC: NEGATIVE MG/DL
HCG SERPL QL: NEGATIVE
HCT VFR BLD AUTO: 35.1 % (ref 37–47)
HGB BLD-MCNC: 10.3 G/DL (ref 12–16)
IMM GRANULOCYTES # BLD AUTO: 0.01 K/UL (ref 0–0.11)
IMM GRANULOCYTES NFR BLD AUTO: 0.2 % (ref 0–0.9)
KETONES UR STRIP.AUTO-MCNC: NEGATIVE MG/DL
LACTATE SERPL-SCNC: 1.1 MMOL/L (ref 0.5–2)
LEUKOCYTE ESTERASE UR QL STRIP.AUTO: NEGATIVE
LIPASE SERPL-CCNC: 23 U/L (ref 11–82)
LYMPHOCYTES # BLD AUTO: 2.74 K/UL (ref 1–4.8)
LYMPHOCYTES NFR BLD: 48.7 % (ref 22–41)
MCH RBC QN AUTO: 22.9 PG (ref 27–33)
MCHC RBC AUTO-ENTMCNC: 29.3 G/DL (ref 32.2–35.5)
MCV RBC AUTO: 78 FL (ref 81.4–97.8)
MICRO URNS: NORMAL
MICROCYTES BLD QL SMEAR: ABNORMAL
MONOCYTES # BLD AUTO: 0.38 K/UL (ref 0–0.85)
MONOCYTES NFR BLD AUTO: 6.7 % (ref 0–13.4)
NEUTROPHILS # BLD AUTO: 2.4 K/UL (ref 1.82–7.42)
NEUTROPHILS NFR BLD: 42.6 % (ref 44–72)
NITRITE UR QL STRIP.AUTO: NEGATIVE
NRBC # BLD AUTO: 0 K/UL
NRBC BLD-RTO: 0 /100 WBC (ref 0–0.2)
PH UR STRIP.AUTO: 5.5 [PH] (ref 5–8)
PLATELET # BLD AUTO: 317 K/UL (ref 164–446)
PLATELET BLD QL SMEAR: NORMAL
PMV BLD AUTO: 10.7 FL (ref 9–12.9)
POLYCHROMASIA BLD QL SMEAR: NORMAL
POTASSIUM SERPL-SCNC: 3.3 MMOL/L (ref 3.6–5.5)
PROT SERPL-MCNC: 7.6 G/DL (ref 6–8.2)
PROT UR QL STRIP: NEGATIVE MG/DL
RBC # BLD AUTO: 4.5 M/UL (ref 4.2–5.4)
RBC BLD AUTO: PRESENT
RBC UR QL AUTO: NEGATIVE
RSV RNA SPEC QL NAA+PROBE: NEGATIVE
SARS-COV-2 RNA RESP QL NAA+PROBE: NOTDETECTED
SODIUM SERPL-SCNC: 142 MMOL/L (ref 135–145)
SP GR UR STRIP.AUTO: 1
SPECIMEN SOURCE: NORMAL
T4 FREE SERPL-MCNC: 2.02 NG/DL (ref 0.93–1.7)
TROPONIN T SERPL-MCNC: <6 NG/L (ref 6–19)
TSH SERPL DL<=0.005 MIU/L-ACNC: 0.2 UIU/ML (ref 0.38–5.33)
UROBILINOGEN UR STRIP.AUTO-MCNC: 0.2 EU/DL
WBC # BLD AUTO: 5.6 K/UL (ref 4.8–10.8)

## 2025-03-21 PROCEDURE — 700102 HCHG RX REV CODE 250 W/ 637 OVERRIDE(OP): Performed by: EMERGENCY MEDICINE

## 2025-03-21 PROCEDURE — 96372 THER/PROPH/DIAG INJ SC/IM: CPT | Mod: XU

## 2025-03-21 PROCEDURE — 700101 HCHG RX REV CODE 250: Performed by: EMERGENCY MEDICINE

## 2025-03-21 PROCEDURE — 84484 ASSAY OF TROPONIN QUANT: CPT

## 2025-03-21 PROCEDURE — 36415 COLL VENOUS BLD VENIPUNCTURE: CPT

## 2025-03-21 PROCEDURE — 83605 ASSAY OF LACTIC ACID: CPT

## 2025-03-21 PROCEDURE — 0241U HCHG SARS-COV-2 COVID-19 NFCT DS RESP RNA 4 TRGT MIC: CPT

## 2025-03-21 PROCEDURE — 700117 HCHG RX CONTRAST REV CODE 255: Performed by: EMERGENCY MEDICINE

## 2025-03-21 PROCEDURE — 700111 HCHG RX REV CODE 636 W/ 250 OVERRIDE (IP): Mod: JZ | Performed by: EMERGENCY MEDICINE

## 2025-03-21 PROCEDURE — 80053 COMPREHEN METABOLIC PANEL: CPT

## 2025-03-21 PROCEDURE — 85025 COMPLETE CBC W/AUTO DIFF WBC: CPT

## 2025-03-21 PROCEDURE — 93005 ELECTROCARDIOGRAM TRACING: CPT | Mod: TC | Performed by: EMERGENCY MEDICINE

## 2025-03-21 PROCEDURE — 84439 ASSAY OF FREE THYROXINE: CPT

## 2025-03-21 PROCEDURE — 84443 ASSAY THYROID STIM HORMONE: CPT

## 2025-03-21 PROCEDURE — 74177 CT ABD & PELVIS W/CONTRAST: CPT

## 2025-03-21 PROCEDURE — 96375 TX/PRO/DX INJ NEW DRUG ADDON: CPT

## 2025-03-21 PROCEDURE — 83690 ASSAY OF LIPASE: CPT

## 2025-03-21 PROCEDURE — 84703 CHORIONIC GONADOTROPIN ASSAY: CPT

## 2025-03-21 PROCEDURE — 82962 GLUCOSE BLOOD TEST: CPT

## 2025-03-21 PROCEDURE — 99284 EMERGENCY DEPT VISIT MOD MDM: CPT

## 2025-03-21 PROCEDURE — 81003 URINALYSIS AUTO W/O SCOPE: CPT

## 2025-03-21 PROCEDURE — 96374 THER/PROPH/DIAG INJ IV PUSH: CPT | Mod: XU

## 2025-03-21 PROCEDURE — 700105 HCHG RX REV CODE 258: Performed by: EMERGENCY MEDICINE

## 2025-03-21 PROCEDURE — A9270 NON-COVERED ITEM OR SERVICE: HCPCS | Performed by: EMERGENCY MEDICINE

## 2025-03-21 RX ORDER — SODIUM CHLORIDE 9 MG/ML
1000 INJECTION, SOLUTION INTRAVENOUS ONCE
Status: COMPLETED | OUTPATIENT
Start: 2025-03-21 | End: 2025-03-21

## 2025-03-21 RX ORDER — ONDANSETRON 2 MG/ML
4 INJECTION INTRAMUSCULAR; INTRAVENOUS ONCE
Status: COMPLETED | OUTPATIENT
Start: 2025-03-21 | End: 2025-03-21

## 2025-03-21 RX ORDER — ONDANSETRON 4 MG/1
4 TABLET, FILM COATED ORAL EVERY 4 HOURS PRN
Qty: 10 TABLET | Refills: 0 | Status: SHIPPED | OUTPATIENT
Start: 2025-03-21 | End: 2025-03-21

## 2025-03-21 RX ORDER — DEXTROSE MONOHYDRATE 25 G/50ML
25 INJECTION, SOLUTION INTRAVENOUS ONCE
Status: COMPLETED | OUTPATIENT
Start: 2025-03-21 | End: 2025-03-21

## 2025-03-21 RX ORDER — ACYCLOVIR 800 MG/1
TABLET ORAL
Qty: 2 EACH | Refills: 2 | Status: SHIPPED | OUTPATIENT
Start: 2025-03-21

## 2025-03-21 RX ORDER — DICYCLOMINE HCL 20 MG
20 TABLET ORAL ONCE
Status: COMPLETED | OUTPATIENT
Start: 2025-03-21 | End: 2025-03-21

## 2025-03-21 RX ORDER — INSULIN LISPRO 100 [IU]/ML
2 INJECTION, SOLUTION INTRAVENOUS; SUBCUTANEOUS ONCE
Status: COMPLETED | OUTPATIENT
Start: 2025-03-21 | End: 2025-03-21

## 2025-03-21 RX ORDER — ONDANSETRON 4 MG/1
4 TABLET, FILM COATED ORAL EVERY 4 HOURS PRN
Qty: 10 TABLET | Refills: 0 | Status: SHIPPED | OUTPATIENT
Start: 2025-03-21

## 2025-03-21 RX ADMIN — INSULIN LISPRO 2 UNITS: 100 INJECTION, SOLUTION INTRAVENOUS; SUBCUTANEOUS at 07:22

## 2025-03-21 RX ADMIN — FAMOTIDINE 20 MG: 10 INJECTION, SOLUTION INTRAVENOUS at 05:20

## 2025-03-21 RX ADMIN — DICYCLOMINE HYDROCHLORIDE 20 MG: 20 TABLET ORAL at 05:20

## 2025-03-21 RX ADMIN — SODIUM CHLORIDE 1000 ML: 9 INJECTION, SOLUTION INTRAVENOUS at 05:19

## 2025-03-21 RX ADMIN — IOHEXOL 100 ML: 350 INJECTION, SOLUTION INTRAVENOUS at 06:34

## 2025-03-21 RX ADMIN — ONDANSETRON 4 MG: 2 INJECTION INTRAMUSCULAR; INTRAVENOUS at 05:19

## 2025-03-21 RX ADMIN — DEXTROSE MONOHYDRATE 25 G: 25 INJECTION, SOLUTION INTRAVENOUS at 06:11

## 2025-03-21 ASSESSMENT — FIBROSIS 4 INDEX: FIB4 SCORE: 0.71

## 2025-03-21 ASSESSMENT — HEART SCORE
ECG: NORMAL
RISK FACTORS: 1-2 RISK FACTORS
AGE: <45
HEART SCORE: 1
TROPONIN: LESS THAN OR EQUAL TO NORMAL LIMIT
HISTORY: SLIGHTLY SUSPICIOUS

## 2025-03-21 NOTE — ED PROVIDER NOTES
ED Provider Note    CHIEF COMPLAINT  Chief Complaint   Patient presents with    Low Blood Sugar     Pt has been experiencing low blood sugar and malaise over the past 3 days with diarrhea and nausea. Current FSBG @ 82.       EXTERNAL RECORDS REVIEWED  Inpatient Notes reviewed discharge summary dated March 3, 2025 by Dr. Hoover.  Patient seen for vomiting with abdominal pain and fever, type I diabetic.  Symptoms improved with IV fluids and Zofran, no evidence of DKA., Outpatient Notes reviewed office visit progress note by Dr. Mcdermott dated March 17, 2025.  Patient seen for lightheadedness.  Diagnosis of iron deficiency anemia, plan for ferrous sulfate daily., and Outpatient labs & studies reviewed CBC demonstrating left shift dated March 3, 2025.  CMP demonstrating mild hyperglycemia of 130 with mildly elevated anion gap 18.    HPI/ROS  LIMITATION TO HISTORY   Select: : None  OUTSIDE HISTORIAN(S):  Significant other relates chest pain/pressure with sensation of shortness of breath more so in the evening    Ivis Klein is a 29 y.o. female who presents for evaluation of multiple concerns.  Patient is a type I diabetic, she relates for the last 3 days she has had difficulty with low blood sugar readings into the 50s and 60s.  She does relate her Lantus was recently increased from 10 units in the morning to 20 units in the morning given she had difficulty with persistent hyperglycemia.  She notes for last 3 days she has had loose watery diarrheal stools, no blood in the bowel movement.  Occasional nausea but no vomiting.  She notes she has had worsening pain to the bilateral lower quadrants of the abdomen that has begun to radiate to both sides of the mid/low back over the last 1 to 2 days.  No dysuria nor hematuria.  Endorses generalized fatigue and malaise but no fever, no ill contacts.  She does relate earlier in the month she and family were ill with flulike symptoms but this is since resolved.  No cough or  sputum production.  She relates more so in the evenings and when she lies down she has had a sensation of pressure to the center of the chest with sensation of dyspnea.  She has no established coronary artery disease.  She does have a history of Graves' disease however for which she is on Synthroid 75 mcg in the morning.  Reassuringly blood sugar is 82 upon my initial assessment.    PAST MEDICAL HISTORY   has a past medical history of Anemia (02/17/2022), Anxiety (02/16/2017), Anxiety (02/16/2017), Elevated antinuclear antibody (DEJON) level (05/23/2019), Graves disease (12/05/2016), Heart valve disease, Hemorrhagic cysts of both ovaries (05/23/2019), History of panic attack (04/09/2020), History of pericarditis (12/05/2016), History of thyroidectomy (01/31/2020), Panic attack (03/11/2019), Postpartum depression (02/17/2022), Postpartum depression (02/17/2022), Type 1 diabetes mellitus with other specified complication (HCC) (11/8/2024), and Vaginal discharge (12/02/2021).    SURGICAL HISTORY   has a past surgical history that includes primary c section (9/8/2013); thyroidectomy total (Bilateral, 3/22/2017); and aiyana by laparoscopy (N/A, 8/28/2024).    FAMILY HISTORY  Family History   Problem Relation Age of Onset    Cancer Paternal Grandmother 56        breast cancer    No Known Problems Mother     Hyperlipidemia Father     No Known Problems Sister     No Known Problems Brother        SOCIAL HISTORY  Social History     Tobacco Use    Smoking status: Never    Smokeless tobacco: Never    Tobacco comments:     quit in 2012   Vaping Use    Vaping status: Never Used   Substance and Sexual Activity    Alcohol use: Not Currently    Drug use: Yes     Types: Marijuana, Inhaled     Comment: marijuana weekly    Sexual activity: Yes     Partners: Male     Birth control/protection: I.U.D.       CURRENT MEDICATIONS  Home Medications       Reviewed by Aba Longoria R.N. (Registered Nurse) on 03/21/25 at 0441  Med List Status: Not  "Addressed     Medication Last Dose Status   acetaminophen (TYLENOL) 500 MG Tab  Active   Continuous Glucose Sensor (FREESTYLE MIRTA 3 SENSOR) Misc  Active   Continuous Glucose Sensor Misc  Active   ferrous sulfate 325 (65 Fe) MG tablet  Active   insulin glargine (LANTUS SOLOSTAR) 100 UNIT/ML Solution Pen-injector injection  Active   insulin lispro 100 UNIT/ML SC SOPN injection PEN  Active   Insulin Pen Needle 32 G x 4 mm  Active   levonorgestrel (MIRENA, 52 MG,) 20 MCG/DAY IUD  Active   levothyroxine (SYNTHROID) 75 MCG Tab  Active   ondansetron (ZOFRAN ODT) 4 MG TABLET DISPERSIBLE  Active                  Audit from Redirected Encounters    **Home medications have not yet been reviewed for this encounter**         ALLERGIES  No Known Allergies    PHYSICAL EXAM  VITAL SIGNS: /80   Pulse 79   Temp 36.2 °C (97.2 °F) (Temporal)   Resp 18   Ht 1.6 m (5' 3\")   Wt 64.6 kg (142 lb 6.7 oz)   SpO2 97%   BMI 25.23 kg/m²    General: Alert, mild acute distress  Skin: Warm, dry, mildly pale, otherwise normal for ethnicity  Head: Normocephalic, atraumatic  Neck: Trachea midline, no tenderness  Eye: PERRL, normal conjunctiva  ENMT: Oral mucosa pink and mildly dry, no pharyngeal erythema or exudate  Cardiovascular: Regular rate and rhythm, No murmur, Normal peripheral perfusion  Respiratory: Lungs CTA, respirations are non-labored, breath sounds are equal  Gastrointestinal: Soft, generally tender throughout the anterior abdomen more so the bilateral upper quadrants, right CVA tenderness noted.  Bowel sounds hyperactive, no guarding, no rebound, no rigidity.  Abdomen is nondistended.  Musculoskeletal: No swelling, no deformity..  No pitting peripheral edema.  Neurological: Alert and oriented to person, place, time, and situation  Lymphatics: No lymphadenopathy  Psychiatric: Cooperative, mildly anxious with mood congruent affect    EKG/LABS  Results for orders placed or performed during the hospital encounter of 03/21/25 "   POCT glucose device results    Collection Time: 25  4:37 AM   Result Value Ref Range    POC Glucose, Blood 82 65 - 99 mg/dL   EKG    Collection Time: 25  4:56 AM   Result Value Ref Range    Report       [facility] Emergency Dept[facility]    Test Date:  2025  Pt Name:    MICHELLE MENARD               Department: [facility]  MRN:        [id number]                      [facility]  Gender:     Female                       Technician: karan  :        [id number]                   Requested By:GINA LUNA  Order #:    [id number]                    Reading MD: GINA LUNA MD    Measurements  Intervals                                Axis  Rate:       75                           P:          28  WV:         203                          QRS:        16  QRSD:       85                           T:          28  QT:         377  QTc:        421    Interpretive Statements  Sinus rhythm  Borderline prolonged WV interval  Compared to ECG 2025 21:52:21  No significant changes  Electronically Signed On 2025 04:56:44 PDT by GINA LUNA MD     HCG Qual Serum    Collection Time: 25  5:13 AM   Result Value Ref Range    Beta-Hcg Qualitative Serum Negative Negative   CBC WITH DIFFERENTIAL    Collection Time: 25  5:13 AM   Result Value Ref Range    WBC 5.6 4.8 - 10.8 K/uL    RBC 4.50 4.20 - 5.40 M/uL    Hemoglobin 10.3 (L) 12.0 - 16.0 g/dL    Hematocrit 35.1 (L) 37.0 - 47.0 %    MCV 78.0 (L) 81.4 - 97.8 fL    MCH 22.9 (L) 27.0 - 33.0 pg    MCHC 29.3 (L) 32.2 - 35.5 g/dL    RDW 43.5 35.9 - 50.0 fL    Platelet Count 317 164 - 446 K/uL    MPV 10.7 9.0 - 12.9 fL    Neutrophils-Polys 42.60 (L) 44.00 - 72.00 %    Lymphocytes 48.70 (H) 22.00 - 41.00 %    Monocytes 6.70 0.00 - 13.40 %    Eosinophils 1.10 0.00 - 6.90 %    Basophils 0.70 0.00 - 1.80 %    Immature Granulocytes 0.20 0.00 - 0.90 %    Nucleated RBC 0.00 0.00 - 0.20 /100 WBC    Neutrophils  (Absolute) 2.40 1.82 - 7.42 K/uL    Lymphs (Absolute) 2.74 1.00 - 4.80 K/uL    Monos (Absolute) 0.38 0.00 - 0.85 K/uL    Eos (Absolute) 0.06 0.00 - 0.51 K/uL    Baso (Absolute) 0.04 0.00 - 0.12 K/uL    Immature Granulocytes (abs) 0.01 0.00 - 0.11 K/uL    NRBC (Absolute) 0.00 K/uL    Microcytosis 2+ (A)    COMP METABOLIC PANEL    Collection Time: 03/21/25  5:13 AM   Result Value Ref Range    Sodium 142 135 - 145 mmol/L    Potassium 3.3 (L) 3.6 - 5.5 mmol/L    Chloride 106 96 - 112 mmol/L    Co2 23 20 - 33 mmol/L    Anion Gap 13.0 7.0 - 16.0    Glucose 58 (L) 65 - 99 mg/dL    Bun 5 (L) 8 - 22 mg/dL    Creatinine 0.63 0.50 - 1.40 mg/dL    Calcium 9.1 8.4 - 10.2 mg/dL    Correct Calcium 8.9 8.5 - 10.5 mg/dL    AST(SGOT) 18 12 - 45 U/L    ALT(SGPT) 8 2 - 50 U/L    Alkaline Phosphatase 67 30 - 99 U/L    Total Bilirubin <0.2 0.1 - 1.5 mg/dL    Albumin 4.3 3.2 - 4.9 g/dL    Total Protein 7.6 6.0 - 8.2 g/dL    Globulin 3.3 1.9 - 3.5 g/dL    A-G Ratio 1.3 g/dL   LACTIC ACID    Collection Time: 03/21/25  5:13 AM   Result Value Ref Range    Lactic Acid 1.1 0.5 - 2.0 mmol/L   TROPONIN    Collection Time: 03/21/25  5:13 AM   Result Value Ref Range    Troponin T <6 6 - 19 ng/L   LIPASE    Collection Time: 03/21/25  5:13 AM   Result Value Ref Range    Lipase 23 11 - 82 U/L   TSH    Collection Time: 03/21/25  5:13 AM   Result Value Ref Range    TSH 0.205 (L) 0.380 - 5.330 uIU/mL   FREE THYROXINE    Collection Time: 03/21/25  5:13 AM   Result Value Ref Range    Free T-4 2.02 (H) 0.93 - 1.70 ng/dL   ESTIMATED GFR    Collection Time: 03/21/25  5:13 AM   Result Value Ref Range    GFR (CKD-EPI) 124 >60 mL/min/1.73 m 2   CoV-2, FLU A/B, and RSV by PCR (2-4 Hours CEPHEID) : Collect NP swab in VTM    Collection Time: 03/21/25  5:15 AM    Specimen: Nasal; Respirate   Result Value Ref Range    SARS-CoV-2 Source Nasal Swab    URINALYSIS (UA)    Collection Time: 03/21/25  5:45 AM    Specimen: Urine, Clean Catch   Result Value Ref Range    Color  Yellow     Character Clear     Specific Gravity 1.003 <1.035    Ph 5.5 5.0 - 8.0    Glucose Negative Negative mg/dL    Ketones Negative Negative mg/dL    Protein Negative Negative mg/dL    Bilirubin Negative Negative    Urobilinogen, Urine 0.2 <=1.0 EU/dL    Nitrite Negative Negative    Leukocyte Esterase Negative Negative    Occult Blood Negative Negative    Micro Urine Req see below       I have independently interpreted this EKG    RADIOLOGY/PROCEDURES   I have independently interpreted the diagnostic imaging associated with this visit and am waiting the final reading from the radiologist.   My preliminary interpretation is as follows: Advanced imaging pending at change of shift    Radiologist interpretation:  CT-ABDOMEN-PELVIS WITH    (Results Pending)       COURSE & MEDICAL DECISION MAKING    ASSESSMENT, COURSE AND PLAN  Care Narrative: Afebrile 29-year-old type I diabetic presents for evaluation of acute lower abdominal pain with associated diarrhea also noting pressure to chest and sensation of dyspnea.  With regard to chest pain and dyspnea reassuring EKG is unremarkable, other than diabetes she has few cardiovascular risk factors.  Lungs are clear and she has no hypoxia and demonstrates no tachypnea nor any increased work of breathing.  With regard to her abdominal complaints she is fairly tender to multiple points of the abdomen and also demonstrates right CVA tenderness.  Thankfully no peritoneal signs.  Given persistence of diarrhea and worsening pain in context of a diabetic patient however advanced imaging is clearly indicated to assess further and CT is obtained.    ED OBS: Yes; I am placing the patient in to an observation status due to a diagnostic uncertainty as well as therapeutic intensity. Patient placed in observation status at 4:42 AM, 3/21/2025.     CHEST PAIN:   HEART Score for Major Cardiac Events  HEART Score     History: Slightly suspicious  ECG: Normal  Age: <45  Risk Factors: 1-2 risk  "factors  Troponin: Less than or equal to normal limit    Heart Score: 1    Total Score   0-3 Points = Low Score, risk of MACE 0.9-1.7%.  4-6 Points = Moderate Score, risk of MACE 12-16.6%  7-10 Points = High Score, risk of MACE 50-65%     Observation plan is as follows: Metabolic/cardiac workup as well as CT imaging of abdomen and pelvis will be obtained.  Differential diagnosis includes but is not restricted to diverticulitis, colitis, gastroenteritis, pyelonephritis, pericarditis, UTI, sepsis, acute coronary syndrome, viral illness, anxiety, hypoglycemia, hyperglycemia, electrolyte derangement, hyperthyroidism      Hydration: Based on the patient's presentation of Acute Diarrhea and Dehydration the patient was given IV fluids. IV Hydration was used because oral hydration was not as rapid as required. Upon recheck following hydration, the patient was stable.      Patient Vitals for the past 24 hrs:   BP Temp Temp src Pulse Resp SpO2 Height Weight   03/21/25 0426 118/80 36.2 °C (97.2 °F) Temporal 79 18 97 % 1.6 m (5' 3\") 64.6 kg (142 lb 6.7 oz)        ADDITIONAL PROBLEMS MANAGED  Hypoglycemia, abdominal pain of multiple sites, flank pain, fatigue/malaise, acute diarrhea, dehydration    DISPOSITION AND DISCUSSIONS  I have discussed management of the patient with the following physicians and JUSTIN's: Patient endorsed to my partner  who will follow patient's progress in ED observation status with regard to advanced imaging results for final disposition    Discussion of management with other [facility] or appropriate source(s): None     Escalation of care considered, and ultimately not performed:acute inpatient care management, however at this time, the patient is most appropriate for outpatient management    Barriers to care at this time, including but not limited to:  NA .     Decision tools and prescription drugs considered including, but not limited to:  SIRS criteria for sepsis not met .    FINAL DIAGNOSIS  1. " Acute diarrhea    2. Chest pressure    3. Dehydration    4. Hypoglycemia    5. Hyperthyroidism         Electronically signed by: Alicja Ivory M.D., 3/21/2025 4:26 AM

## 2025-03-21 NOTE — ED TRIAGE NOTES
"Chief Complaint   Patient presents with    Low Blood Sugar     Pt has been experiencing low blood sugar and malaise over the past 3 days with diarrhea and nausea. Current FSBG @ 82.     /80   Pulse 79   Temp 36.2 °C (97.2 °F) (Temporal)   Resp 18   Ht 1.6 m (5' 3\")   Wt 64.6 kg (142 lb 6.7 oz)   SpO2 97%   BMI 25.23 kg/m²     "

## 2025-03-21 NOTE — ED NOTES
Assumed care of pt for d/c. Minimal infusion of ivf noted-erp aware. Vs as charted, med order clarified with pharm. Pt for d/c after same. States personal glucose meter is now running/working

## 2025-03-21 NOTE — ED NOTES
Pt. C/o blood sugar dropping low/abd. Pain and SOB and chest pressure. Pt. States that this started couple days ago.  Pt. Does have a Continues blood sugar monitor.

## 2025-03-21 NOTE — TELEPHONE ENCOUNTER
Received request via: Pharmacy    Was the patient seen in the last year in this department? Yes    Does the patient have an active prescription (recently filled or refills available) for medication(s) requested? No    Pharmacy Name: [id number] - [place], [place] - [address]      Does the patient have skilled nursing Plus and need 100-day supply? (This applies to ALL medications) Patient does not have SCP

## 2025-03-21 NOTE — ED PROVIDER NOTES
ED Provider Note    CHIEF COMPLAINT  Chief Complaint   Patient presents with    Low Blood Sugar     Pt has been experiencing low blood sugar and malaise over the past 3 days with diarrhea and nausea. Current FSBG @ 82.   ADDITIONAL PROBLEMS MANAGED  Radiology results  CT-ABDOMEN-PELVIS WITH   Final Result         1.  No acute intra-abdominal abnormality identified            Patient was signed out to me.  CT scan results show no signs of acute abnormalities.  I did have a long discussion with the patient about her sugars.  Initially laboratory studies show a blood sugar of 54 so we did give her D50 and then I had her eat some complex carbs.  The patient has been given herself her normal dosing of her long-acting insulins in the morning which was 20 units despite the fact that she was not eating and having these hypoglycemic episodes.  At this point we will recommend that she take half of her normal dose and experiment with what her sugars are doing during the day she does have good monitoring and she has good skills in treating her own diabetes with her Humalog.  The recommended for her to follow-up with her endocrinologist if she still has problems with hypoglycemia but at this point this is a slight overdose of morning dosing of her long-acting along with what appears to be a gastroenteritis.  I will give the patient prescription of Zofran for nausea control.  The patient is to follow-up with her endocrinologist and primary care physician as above and return as needed.    DISPOSITION AND DISCUSSIONS  I have discussed management of the patient with the following physicians and JUSTIN's: None    Escalation of care considered, and ultimately not performed: None    Barriers to care at this time, including but not limited to: None.     Decision tools and prescription drugs considered including, but not limited to: As described above.       The patient will return for new or worsening symptoms and is stable at the time of  discharge.    The patient is referred to a primary physician for blood pressure management, diabetic screening, and for all other preventative health concerns.    I reviewed prescription monitoring program for patient's narcotic use before prescribing a scheduled drug.The patient will not drink alcohol nor drive with prescribed medications      DISPOSITION:  Patient will be discharged home in stable condition.    FOLLOW UP:  Leonel Mcdermott D.O.  [address] [facility] [address]  [phone]    Schedule an appointment as soon as possible for a visit         OUTPATIENT MEDICATIONS:  New Prescriptions    No medications on file         FINAL DIAGNOSIS  1. Acute diarrhea    2. Chest pressure    3. Dehydration    4. Hypoglycemia    5. Hyperthyroidism    6. Iron deficiency anemia, unspecified iron deficiency anemia type         Electronically signed by: Jared Díaz M.D., 3/21/2025 7:10 AM

## 2025-03-21 NOTE — ED NOTES
Dc instructions reviewed with pt. Aware of need to f/u with pcp as well as  med at [facility] and take as needed. Return for worsening s/s

## 2025-04-01 ENCOUNTER — HOSPITAL ENCOUNTER (OUTPATIENT)
Facility: MEDICAL CENTER | Age: 30
End: 2025-04-01
Payer: MEDICAID

## 2025-04-01 ENCOUNTER — APPOINTMENT (OUTPATIENT)
Dept: OBGYN | Facility: CLINIC | Age: 30
End: 2025-04-01
Payer: MEDICAID

## 2025-04-01 VITALS — WEIGHT: 136 LBS | DIASTOLIC BLOOD PRESSURE: 89 MMHG | SYSTOLIC BLOOD PRESSURE: 124 MMHG | BODY MASS INDEX: 24.09 KG/M2

## 2025-04-01 DIAGNOSIS — Z30.433 ENCOUNTER FOR REMOVAL AND REINSERTION OF IUD: ICD-10-CM

## 2025-04-01 LAB
POCT INT CON NEG: NEGATIVE
POCT INT CON POS: POSITIVE
POCT URINE PREGNANCY TEST: NEGATIVE

## 2025-04-01 PROCEDURE — 87591 N.GONORRHOEAE DNA AMP PROB: CPT

## 2025-04-01 PROCEDURE — 87510 GARDNER VAG DNA DIR PROBE: CPT

## 2025-04-01 PROCEDURE — 3079F DIAST BP 80-89 MM HG: CPT | Performed by: OBSTETRICS & GYNECOLOGY

## 2025-04-01 PROCEDURE — 87491 CHLMYD TRACH DNA AMP PROBE: CPT

## 2025-04-01 PROCEDURE — 87480 CANDIDA DNA DIR PROBE: CPT

## 2025-04-01 PROCEDURE — 81025 URINE PREGNANCY TEST: CPT | Performed by: OBSTETRICS & GYNECOLOGY

## 2025-04-01 PROCEDURE — 87660 TRICHOMONAS VAGIN DIR PROBE: CPT

## 2025-04-01 PROCEDURE — 99213 OFFICE O/P EST LOW 20 MIN: CPT | Performed by: OBSTETRICS & GYNECOLOGY

## 2025-04-01 PROCEDURE — 3074F SYST BP LT 130 MM HG: CPT | Performed by: OBSTETRICS & GYNECOLOGY

## 2025-04-01 RX ORDER — CLINDAMYCIN HYDROCHLORIDE 300 MG/1
300 CAPSULE ORAL 3 TIMES DAILY
Qty: 21 CAPSULE | Refills: 0 | Status: SHIPPED | OUTPATIENT
Start: 2025-04-01 | End: 2025-04-01

## 2025-04-01 RX ORDER — CLINDAMYCIN HYDROCHLORIDE 300 MG/1
300 CAPSULE ORAL
Qty: 14 CAPSULE | Refills: 0 | Status: SHIPPED | OUTPATIENT
Start: 2025-04-01 | End: 2025-04-01

## 2025-04-01 ASSESSMENT — FIBROSIS 4 INDEX: FIB4 SCORE: 0.54

## 2025-04-01 NOTE — PROGRESS NOTES
"GYN Visit    CC: Removal and insertion of IUD     HPI: Ivis Klein is a 29 y.o.  with a past medical history of Type 1 DM and Graves disase presenting for removal and insertion of IUD. Patient uncertain as to whether or not she has a Mirena or Copper IUD. She also reports \"thick, white\" vaginal discharge, vaginal pain, and itching for the past 2 days. Patient states that she has \"had BV several times\" and symptoms feel similar to this. Has previously taken Clindamycin for this. Denies fever, chills. Since last October patient has had heavier periods, lasting 7 days. Reports double padding for 5 days and changing pads every 2 hours during menstrual period. Patient does not desire IUD removal today after discussion.     ROS:  As in HPI    OB History    Para Term  AB Living   2 2 2   2   SAB IAB Ectopic Molar Multiple Live Births        2      # Outcome Date GA Lbr Lai/2nd Weight Sex Type Anes PTL Lv   2 Term 20 37w1d  5 lb 11 oz M  EPI N MY      Birth Comments: Pt states no complications   1 Term 13 38w2d  5 lb 11 oz M CS-LTranv Spinal N MY      Birth Comments: C/S due to breech       GYN Hx:   Denies recent hx of STIs  Denies hx of abnl paps, last pap 24    Past Medical History:   Diagnosis Date    Anemia 2022    Anxiety 2017    Anxiety 2017    Elevated antinuclear antibody (DEJON) level 2019    Graves disease 2016    Heart valve disease     Hemorrhagic cysts of both ovaries 2019    History of panic attack 2020    History of pericarditis 2016    History of thyroidectomy 2020    Partial --> hypothyroidism     Panic attack 2019    Postpartum depression 2022    Postpartum depression 2022    Type 1 diabetes mellitus with other specified complication (HCC) 2024    Vaginal discharge 2021       Past Surgical History:   Procedure Laterality Date    JUNIE BY LAPAROSCOPY N/A 2024    " Procedure: CHOLECYSTECTOMY, LAPAROSCOPIC;  Surgeon: Charles Rodas M.D.;  Location: SURGERY [facility];  Service: General    THYROIDECTOMY TOTAL Bilateral 3/22/2017    Procedure: THYROIDECTOMY TOTAL -NIMS RECURRENT LARYNGEAL NERVE MONITORING;  Surgeon: Vicente Eldridge M.D.;  Location: SURGERY SAME DAY [facility];  Service:     PRIMARY C SECTION  9/8/2013    Performed by Melisa Wright M.D. at LABOR AND DELIVERY       Medications:   Current Outpatient Medications Ordered in Epic   Medication Sig Dispense Refill    Continuous Glucose Sensor (FREESTYLE MIRTA 3 SENSOR) Misc USE SENSOR EVERY 14 DAYS 2 Each 2    ondansetron (ZOFRAN) 4 MG Tab tablet Take 1 Tablet by mouth every four hours as needed for Nausea/Vomiting. 10 Tablet 0    ferrous sulfate 325 (65 Fe) MG tablet Take 1 Tablet by mouth every 48 hours. 15 Tablet 1    ondansetron (ZOFRAN ODT) 4 MG TABLET DISPERSIBLE Take 1 Tablet by mouth every 6 hours as needed for Nausea/Vomiting. 10 Tablet 0    Insulin Pen Needle 32 G x 4 mm Use one pen needle in pen device to inject insulin five times daily. 200 Each 0    insulin glargine (LANTUS SOLOSTAR) 100 UNIT/ML Solution Pen-injector injection Inject 20 Units under the skin every morning before breakfast. 15 mL 0    Continuous Glucose Sensor (FREESTYLE MIRTA 3 SENSOR) [facility] 1 Application continuous. 1 Each 2    levothyroxine (SYNTHROID) 75 MCG Tab Take 3 Tablets by mouth every morning on an empty stomach. 270 Tablet 1    insulin lispro 100 UNIT/ML SC SOPN injection PEN Inject 1-10 Units under the skin 4 Times a Day,Before Meals and at Bedtime.  mg/dL = 0 Units 151 - 200  mg/dL =  2 Units 201 - 250  mg/dL = 3 Units 251 - 300 mg/dL =  4 Units 301 - 350  mg/dL =  6 Units Over 351  mg/dL  = 7 Units 6 mL 1    levonorgestrel (MIRENA, 52 MG,) 20 MCG/DAY IUD 1 Each by Intrauterine route see administration instructions. Every 5 years, last placed 2020      acetaminophen (TYLENOL) 500 MG Tab Take 1,000 mg by  mouth every 6 hours as needed for Fever. Indications: Pain (Patient not taking: Reported on 4/1/2025)       No current Epic-ordered facility-administered medications on file.       Allergies: Patient has no known allergies.    Social History     Socioeconomic History    Marital status: Single   Occupational History    Occupation: myles     Comment: [employer]   Tobacco Use    Smoking status: Never    Smokeless tobacco: Never    Tobacco comments:     quit in 2012   Vaping Use    Vaping status: Never Used   Substance and Sexual Activity    Alcohol use: Not Currently    Drug use: Yes     Types: Marijuana, Inhaled     Comment: marijuana weekly    Sexual activity: Yes     Partners: Male     Birth control/protection: I.U.D.     Social Drivers of Health     Food Insecurity: No Food Insecurity (11/1/2024)    Hunger Vital Sign     Worried About Running Out of Food in the Last Year: Never true     Ran Out of Food in the Last Year: Never true   Transportation Needs: No Transportation Needs (11/1/2024)    PRAPARE - Transportation     Lack of Transportation (Medical): No     Lack of Transportation (Non-Medical): No   Intimate Partner Violence: Not At Risk (11/1/2024)    Humiliation, Afraid, Rape, and Kick questionnaire     Fear of Current or Ex-Partner: No     Emotionally Abused: No     Physically Abused: No     Sexually Abused: No   Housing Stability: Low Risk  (11/1/2024)    Housing Stability Vital Sign     Unable to Pay for Housing in the Last Year: No     Number of Times Moved in the Last Year: 1     Homeless in the Last Year: No       Family History   Problem Relation Age of Onset    Cancer Paternal Grandmother 56        breast cancer    No Known Problems Mother     Hyperlipidemia Father     No Known Problems Sister     No Known Problems Brother        Physical Exam:  /89 (BP Location: Right arm, Patient Position: Sitting, BP Cuff Size: Adult)   Wt 136 lb   BMI 24.09 kg/m²   gen: AAO, NAD, affect appropriate  CV:  RRR; no LE edema  resp: ctab  abd: soft, NT, ND, no masses, no organomegaly, no hernias  : NEFG, normal urethral meatus, normal anus/perineum, normal vagina and cervix. No adnexal masses/tenderness, IUD strings visualized, white discharge noted   Skin: warm/dry, no lesions    A/P: 29 y.o.  with history of Type 1 DM and Graves who presents for encounter for removal and reinsertion of IUD, also reporting symptoms of vaginal discharge, pain, and itching.    #Vaginal itching, pain, discharge   Patient reports frequent diagnosis of BV, states that current symptoms are similar. Symptoms have been occurring for the past 2 days. Likely BV   -Will treat as BV, prescription for Clindamycin provided  -Ordered GC/CT   -Ordered vaginal pathogens DNA panel     #Menorrhagia  Patient reports heavy menstrual cycle for several months, lasting 7 days. Likely secondary to Paragard IUD. IUD strings visualized on exam, confirming Paragard.   -Discussed that this IUD can remain in place for 12 years, patient does not desire removal today.       1. Encounter for removal and reinsertion of IUD  POCT Pregnancy    Consent for all Surgical, Special Diagnostic or Therapeutic Procedures    VAGINAL PATHOGENS DNA PANEL    CANCELED: Consent for all Surgical, Special Diagnostic or Therapeutic Procedures

## 2025-04-01 NOTE — PROGRESS NOTES
Patient here for GYN visit.   Last seen on : 11-21-24  LMP : 3-10-25   BCM : mirena 4/2021  Pap 04-12-24   Pt states  discharge smell, and itchy   Phone/Pharmacy verified: [phone]

## 2025-04-02 ENCOUNTER — TELEPHONE (OUTPATIENT)
Dept: OBGYN | Facility: CLINIC | Age: 30
End: 2025-04-02
Payer: MEDICAID

## 2025-04-02 LAB
C TRACH DNA GENITAL QL NAA+PROBE: NEGATIVE
CANDIDA DNA VAG QL PROBE+SIG AMP: NEGATIVE
G VAGINALIS DNA VAG QL PROBE+SIG AMP: POSITIVE
N GONORRHOEA DNA GENITAL QL NAA+PROBE: NEGATIVE
SPECIMEN SOURCE: NORMAL
T VAGINALIS DNA VAG QL PROBE+SIG AMP: NEGATIVE

## 2025-04-02 NOTE — TELEPHONE ENCOUNTER
Caller Name: Ivis Klein  Call Back Number: [phone]    How would the patient prefer to be contacted with a response: Phone call OK to leave a detailed message    Pt would like to know test results pt states she is positive for BV, notified pt that we can't results any labs or give her any information unless provider had reviewed them told pt I will send this to provider so they can review them

## 2025-04-03 ENCOUNTER — APPOINTMENT (OUTPATIENT)
Dept: RADIOLOGY | Facility: MEDICAL CENTER | Age: 30
End: 2025-04-03
Attending: EMERGENCY MEDICINE
Payer: MEDICAID

## 2025-04-03 ENCOUNTER — HOSPITAL ENCOUNTER (EMERGENCY)
Facility: MEDICAL CENTER | Age: 30
End: 2025-04-03
Attending: EMERGENCY MEDICINE
Payer: MEDICAID

## 2025-04-03 VITALS
SYSTOLIC BLOOD PRESSURE: 126 MMHG | HEART RATE: 74 BPM | HEIGHT: 63 IN | WEIGHT: 136.02 LBS | BODY MASS INDEX: 24.1 KG/M2 | TEMPERATURE: 99 F | DIASTOLIC BLOOD PRESSURE: 80 MMHG | OXYGEN SATURATION: 98 % | RESPIRATION RATE: 14 BRPM

## 2025-04-03 DIAGNOSIS — N76.0 BACTERIAL VAGINOSIS: ICD-10-CM

## 2025-04-03 DIAGNOSIS — B96.89 BACTERIAL VAGINOSIS: ICD-10-CM

## 2025-04-03 LAB
APPEARANCE UR: CLEAR
BILIRUB UR QL STRIP.AUTO: NEGATIVE
COLOR UR: YELLOW
GLUCOSE UR STRIP.AUTO-MCNC: 100 MG/DL
HCG UR QL: NEGATIVE
KETONES UR STRIP.AUTO-MCNC: 40 MG/DL
LEUKOCYTE ESTERASE UR QL STRIP.AUTO: NEGATIVE
MICRO URNS: ABNORMAL
NITRITE UR QL STRIP.AUTO: NEGATIVE
PH UR STRIP.AUTO: 7 [PH] (ref 5–8)
PROT UR QL STRIP: NEGATIVE MG/DL
RBC UR QL AUTO: NEGATIVE
SP GR UR STRIP.AUTO: 1.02
UROBILINOGEN UR STRIP.AUTO-MCNC: 1 EU/DL

## 2025-04-03 PROCEDURE — 81025 URINE PREGNANCY TEST: CPT

## 2025-04-03 PROCEDURE — 76830 TRANSVAGINAL US NON-OB: CPT

## 2025-04-03 PROCEDURE — 700102 HCHG RX REV CODE 250 W/ 637 OVERRIDE(OP): Performed by: EMERGENCY MEDICINE

## 2025-04-03 PROCEDURE — 99284 EMERGENCY DEPT VISIT MOD MDM: CPT

## 2025-04-03 PROCEDURE — 700111 HCHG RX REV CODE 636 W/ 250 OVERRIDE (IP): Performed by: EMERGENCY MEDICINE

## 2025-04-03 PROCEDURE — A9270 NON-COVERED ITEM OR SERVICE: HCPCS | Performed by: EMERGENCY MEDICINE

## 2025-04-03 PROCEDURE — 81003 URINALYSIS AUTO W/O SCOPE: CPT

## 2025-04-03 RX ORDER — CLINDAMYCIN HYDROCHLORIDE 150 MG/1
300 CAPSULE ORAL ONCE
Status: COMPLETED | OUTPATIENT
Start: 2025-04-03 | End: 2025-04-03

## 2025-04-03 RX ORDER — CLINDAMYCIN HYDROCHLORIDE 300 MG/1
300 CAPSULE ORAL 2 TIMES DAILY
Qty: 13 CAPSULE | Refills: 0 | Status: ACTIVE | OUTPATIENT
Start: 2025-04-03 | End: 2025-04-10

## 2025-04-03 RX ORDER — ONDANSETRON 4 MG/1
4 TABLET, ORALLY DISINTEGRATING ORAL ONCE
Status: COMPLETED | OUTPATIENT
Start: 2025-04-03 | End: 2025-04-03

## 2025-04-03 RX ORDER — ONDANSETRON 4 MG/1
4 TABLET, ORALLY DISINTEGRATING ORAL EVERY 8 HOURS PRN
Qty: 10 TABLET | Refills: 0 | Status: SHIPPED | OUTPATIENT
Start: 2025-04-03

## 2025-04-03 RX ADMIN — CLINDAMYCIN HYDROCHLORIDE 300 MG: 150 CAPSULE ORAL at 21:40

## 2025-04-03 RX ADMIN — ONDANSETRON 4 MG: 4 TABLET, ORALLY DISINTEGRATING ORAL at 18:22

## 2025-04-03 ASSESSMENT — FIBROSIS 4 INDEX: FIB4 SCORE: 0.54

## 2025-04-04 ENCOUNTER — RESULTS FOLLOW-UP (OUTPATIENT)
Dept: MEDICAL GROUP | Facility: CLINIC | Age: 30
End: 2025-04-04

## 2025-04-04 NOTE — ED TRIAGE NOTES
"BP (!) 148/89   Pulse 80   Temp 37.1 °C (98.7 °F) (Temporal)   Resp 16   Ht 1.6 m (5' 3\")   Wt 61.7 kg (136 lb 0.4 oz)   LMP 03/10/2025 (Exact Date)   SpO2 99%   BMI 24.10 kg/m²   Chief Complaint   Patient presents with    Other     [facility] for an exam  DX w/ BV   they did not given pt medication for this   comes in today c/o not feeling better    feeling worse   having chills, thinking she had fever  having pelvic pain to lower quads and lower back pain        Comes in by herself   concerned that being Dx w/ BV and was not given medication for this she is thinking that her symptoms now are getting worse   wanting to be evaluated for this now   "

## 2025-04-04 NOTE — DISCHARGE INSTRUCTIONS
Continue taking the remaining course of clindamycin tomorrow.  Come back to the ER if your symptoms are getting worse

## 2025-04-04 NOTE — ED PROVIDER NOTES
ED Provider Note    CHIEF COMPLAINT  Chief Complaint   Patient presents with    Other     [facility] for an exam  DX w/ BV   they did not given pt medication for this   comes in today c/o not feeling better    feeling worse   having chills, thinking she had fever  having pelvic pain to lower quads and lower back pain          EXTERNAL RECORDS REVIEWED  Outpatient gynecology visit 4/1/2025 seen for IUD removal and reinsertion, however ultimately decided to not have IUD removal,, was also mentioning some thick white vaginal discharge at that time, vaginal swabs positive for Gardnerella    HPI/ROS  LIMITATION TO HISTORY   Select: : None  OUTSIDE HISTORIAN(S):  None    Ivis Klein is a 29 y.o. female who presents to the ER for ongoing vaginal discharge and now new lower pelvic pain, nausea and bilateral flank pain as well as dysuria and urinary frequency.  No fevers or chills.  No vaginal bleeding.  She did not get prescribed anything for her BV that she was diagnosed with on Monday.  Has had BV in the past that she has treated with clindamycin successfully.  Her other STI testing was negative.      PAST MEDICAL HISTORY   has a past medical history of Anemia (02/17/2022), Anxiety (02/16/2017), Anxiety (02/16/2017), Elevated antinuclear antibody (DEJON) level (05/23/2019), Graves disease (12/05/2016), Heart valve disease, Hemorrhagic cysts of both ovaries (05/23/2019), History of panic attack (04/09/2020), History of pericarditis (12/05/2016), History of thyroidectomy (01/31/2020), Panic attack (03/11/2019), Postpartum depression (02/17/2022), Postpartum depression (02/17/2022), Type 1 diabetes mellitus with other specified complication (HCC) (11/8/2024), and Vaginal discharge (12/02/2021).    SURGICAL HISTORY   has a past surgical history that includes primary c section (9/8/2013); thyroidectomy total (Bilateral, 3/22/2017); and aiyana by laparoscopy (N/A, 8/28/2024).    FAMILY  "HISTORY  Family History   Problem Relation Age of Onset    Cancer Paternal Grandmother 56        breast cancer    No Known Problems Mother     Hyperlipidemia Father     No Known Problems Sister     No Known Problems Brother        SOCIAL HISTORY  Social History     Tobacco Use    Smoking status: Never    Smokeless tobacco: Never    Tobacco comments:     quit in 2012   Vaping Use    Vaping status: Never Used   Substance and Sexual Activity    Alcohol use: Not Currently    Drug use: Yes     Types: Marijuana, Inhaled     Comment: marijuana weekly    Sexual activity: Yes     Partners: Male     Birth control/protection: I.U.D.       CURRENT MEDICATIONS  Home Medications       Reviewed by Samara Lowry R.N. (Registered Nurse) on 04/03/25 at 1742  Med List Status: Partial     Medication Last Dose Status   acetaminophen (TYLENOL) 500 MG Tab  Active   Continuous Glucose Sensor (FREESTYLE MIRTA 3 SENSOR) Misc  Active   Continuous Glucose Sensor (FREESTYLE MIRTA 3 SENSOR) Misc  Active   ferrous sulfate 325 (65 Fe) MG tablet  Active   insulin glargine (LANTUS SOLOSTAR) 100 UNIT/ML Solution Pen-injector injection  Active   insulin lispro 100 UNIT/ML SC SOPN injection PEN  Active   Insulin Pen Needle 32 G x 4 mm  Active   levonorgestrel (MIRENA, 52 MG,) 20 MCG/DAY IUD  Active   levothyroxine (SYNTHROID) 75 MCG Tab  Active   ondansetron (ZOFRAN ODT) 4 MG TABLET DISPERSIBLE  Active   ondansetron (ZOFRAN) 4 MG Tab tablet  Active                    ALLERGIES  No Known Allergies    PHYSICAL EXAM  VITAL SIGNS: /80   Pulse 74   Temp 37.2 °C (99 °F) (Temporal)   Resp 14   Ht 1.6 m (5' 3\")   Wt 61.7 kg (136 lb 0.4 oz)   LMP 03/10/2025 (Exact Date)   SpO2 98%   BMI 24.10 kg/m²    General: Lying calmly in stretcher, no distress  HEENT: NCAT, moist mucous membranes, normal conjunctiva  CV: Regular rate rhythm no murmurs  Pulmonary: CTAB normal work of breathing on abdomen: Soft nondistended, mild tenderness throughout the " lower abdomen/pelvis, mild bilateral CVA tenderness    EKG/LABS  Pregnancy negative.  Urinalysis with slight glucose and ketones but negative infectious markers or blood.      RADIOLOGY/PROCEDURES   I have independently interpreted the diagnostic imaging associated with this visit and am waiting the final reading from the radiologist.   My preliminary interpretation is as follows: IUD present, ovaries and uterus otherwise normal.    Radiologist interpretation:  US-PELVIC COMPLETE (TRANSABDOMINAL/TRANSVAGINAL) (COMBO)   Final Result         1.  IUD, otherwise unremarkable transvaginal appearance of the pelvis.          COURSE & MEDICAL DECISION MAKING    ASSESSMENT, COURSE AND PLAN  Care Narrative: Differential includes BV, PID, UTI, pyelonephritis, ovarian cyst, abrasion torsion    On arrival to patient is well-appearing, she is afebrile.  Has mild abdominal tenderness throughout the lower abdomen and bilateral CVA tenderness.  Differential above considered.  Given that she just had a pelvic exam done this week 2 days ago, with infectious swabs obtained, did not feel repeat pelvic exam was necessary today.  Less likely PID with only BV being positive.  Considered labs but will start with urinalysis and transvaginal ultrasound.    Ultrasound is unremarkable.  IUD is present, in decent position.  Goes along with pelvic exam she had by gynecologist this week.  Ovaries are normal no cyst or torsion.  No fibroids.  Overall unremarkable ultrasound.  Urinalysis without infection.  Pregnancy is negative.  Feeling better after Zofran.  Given workup today, we will just start by treating her BV.  Given first dose of clindamycin here and prescription sent to her pharmacy for additional 7 days.  Return precautions provided.  Discharged in stable condition    DISPOSITION AND DISCUSSIONS    Escalation of care considered, and ultimately not performed:Laboratory analysis    Barriers to care at this time, including but not limited  to: None.     Decision tools and prescription drugs considered including, but not limited to: Clindamycin.    FINAL DIAGNOSIS  1. Bacterial vaginosis         Electronically signed by: Rasheed Toscano M.D., 4/3/2025 6:06 PM

## 2025-04-06 NOTE — ED NOTES
Assisted w/ discharge.  Reviewed discharge instructions w/ pt, verbalized understanding to information provided including follow up care w/ PCP, return precautions and diet changes, denied further questions/concerns.  Pt ambulated from ED.     ---

## 2025-04-16 DIAGNOSIS — E10.10 DKA, TYPE 1, NOT AT GOAL (HCC): ICD-10-CM

## 2025-04-16 NOTE — TELEPHONE ENCOUNTER
Received request via: Pharmacy    Was the patient seen in the last year in this department? Yes    Does the patient have an active prescription (recently filled or refills available) for medication(s) requested? No    Pharmacy Name: [id number] - [place], [place] - [address]     Does the patient have snf Plus and need 100-day supply? (This applies to ALL medications) Patient does not have SCP

## 2025-04-17 RX ORDER — PEN NEEDLE, DIABETIC 32GX 5/32"
NEEDLE, DISPOSABLE MISCELLANEOUS
Qty: 200 EACH | Refills: 0 | Status: SHIPPED | OUTPATIENT
Start: 2025-04-17

## 2025-04-28 ENCOUNTER — APPOINTMENT (OUTPATIENT)
Dept: INTERNAL MEDICINE | Facility: OTHER | Age: 30
End: 2025-04-28
Payer: MEDICAID

## 2025-04-28 ENCOUNTER — HOSPITAL ENCOUNTER (EMERGENCY)
Facility: MEDICAL CENTER | Age: 30
End: 2025-04-28
Attending: EMERGENCY MEDICINE
Payer: MEDICAID

## 2025-04-28 ENCOUNTER — APPOINTMENT (OUTPATIENT)
Dept: RADIOLOGY | Facility: MEDICAL CENTER | Age: 30
End: 2025-04-28
Attending: EMERGENCY MEDICINE
Payer: MEDICAID

## 2025-04-28 VITALS
OXYGEN SATURATION: 99 % | WEIGHT: 135.8 LBS | DIASTOLIC BLOOD PRESSURE: 83 MMHG | TEMPERATURE: 98 F | BODY MASS INDEX: 24.06 KG/M2 | RESPIRATION RATE: 16 BRPM | HEART RATE: 70 BPM | HEIGHT: 63 IN | SYSTOLIC BLOOD PRESSURE: 138 MMHG

## 2025-04-28 DIAGNOSIS — N83.201 RIGHT OVARIAN CYST: ICD-10-CM

## 2025-04-28 DIAGNOSIS — R10.2 PELVIC PAIN: ICD-10-CM

## 2025-04-28 LAB
ALBUMIN SERPL BCP-MCNC: 4.3 G/DL (ref 3.2–4.9)
ALBUMIN/GLOB SERPL: 1.5 G/DL
ALP SERPL-CCNC: 66 U/L (ref 30–99)
ALT SERPL-CCNC: 10 U/L (ref 2–50)
ANION GAP SERPL CALC-SCNC: 13 MMOL/L (ref 7–16)
APPEARANCE UR: CLEAR
AST SERPL-CCNC: 15 U/L (ref 12–45)
BACTERIA GENITAL QL WET PREP: NORMAL
BASOPHILS # BLD AUTO: 0.8 % (ref 0–1.8)
BASOPHILS # BLD: 0.05 K/UL (ref 0–0.12)
BILIRUB SERPL-MCNC: 0.3 MG/DL (ref 0.1–1.5)
BILIRUB UR QL STRIP.AUTO: NEGATIVE
BUN SERPL-MCNC: 11 MG/DL (ref 8–22)
CALCIUM ALBUM COR SERPL-MCNC: 9.2 MG/DL (ref 8.5–10.5)
CALCIUM SERPL-MCNC: 9.4 MG/DL (ref 8.5–10.5)
CHLORIDE SERPL-SCNC: 102 MMOL/L (ref 96–112)
CO2 SERPL-SCNC: 22 MMOL/L (ref 20–33)
COLOR UR: YELLOW
CREAT SERPL-MCNC: 0.7 MG/DL (ref 0.5–1.4)
EOSINOPHIL # BLD AUTO: 0.07 K/UL (ref 0–0.51)
EOSINOPHIL NFR BLD: 1.2 % (ref 0–6.9)
ERYTHROCYTE [DISTWIDTH] IN BLOOD BY AUTOMATED COUNT: 48 FL (ref 35.9–50)
GFR SERPLBLD CREATININE-BSD FMLA CKD-EPI: 121 ML/MIN/1.73 M 2
GLOBULIN SER CALC-MCNC: 2.8 G/DL (ref 1.9–3.5)
GLUCOSE SERPL-MCNC: 195 MG/DL (ref 65–99)
GLUCOSE UR STRIP.AUTO-MCNC: >=1000 MG/DL
HCG SERPL QL: NEGATIVE
HCT VFR BLD AUTO: 36.6 % (ref 37–47)
HGB BLD-MCNC: 10.7 G/DL (ref 12–16)
HIV 1+2 AB+HIV1 P24 AG SERPL QL IA: NORMAL
IMM GRANULOCYTES # BLD AUTO: 0.01 K/UL (ref 0–0.11)
IMM GRANULOCYTES NFR BLD AUTO: 0.2 % (ref 0–0.9)
KETONES UR STRIP.AUTO-MCNC: ABNORMAL MG/DL
LEUKOCYTE ESTERASE UR QL STRIP.AUTO: NEGATIVE
LYMPHOCYTES # BLD AUTO: 2.79 K/UL (ref 1–4.8)
LYMPHOCYTES NFR BLD: 46 % (ref 22–41)
MCH RBC QN AUTO: 23.1 PG (ref 27–33)
MCHC RBC AUTO-ENTMCNC: 29.2 G/DL (ref 32.2–35.5)
MCV RBC AUTO: 78.9 FL (ref 81.4–97.8)
MICRO URNS: ABNORMAL
MONOCYTES # BLD AUTO: 0.33 K/UL (ref 0–0.85)
MONOCYTES NFR BLD AUTO: 5.4 % (ref 0–13.4)
NEUTROPHILS # BLD AUTO: 2.82 K/UL (ref 1.82–7.42)
NEUTROPHILS NFR BLD: 46.4 % (ref 44–72)
NITRITE UR QL STRIP.AUTO: NEGATIVE
NRBC # BLD AUTO: 0 K/UL
NRBC BLD-RTO: 0 /100 WBC (ref 0–0.2)
PH UR STRIP.AUTO: 5.5 [PH] (ref 5–8)
PLATELET # BLD AUTO: 237 K/UL (ref 164–446)
PMV BLD AUTO: 11.3 FL (ref 9–12.9)
POTASSIUM SERPL-SCNC: 3.8 MMOL/L (ref 3.6–5.5)
PROT SERPL-MCNC: 7.1 G/DL (ref 6–8.2)
PROT UR QL STRIP: NEGATIVE MG/DL
RBC # BLD AUTO: 4.64 M/UL (ref 4.2–5.4)
RBC UR QL AUTO: NEGATIVE
SIGNIFICANT IND 70042: NORMAL
SITE SITE: NORMAL
SODIUM SERPL-SCNC: 137 MMOL/L (ref 135–145)
SOURCE SOURCE: NORMAL
SP GR UR STRIP.AUTO: 1.03
T PALLIDUM AB SER QL IA: NORMAL
UROBILINOGEN UR STRIP.AUTO-MCNC: 1 EU/DL
WBC # BLD AUTO: 6.1 K/UL (ref 4.8–10.8)

## 2025-04-28 PROCEDURE — 86780 TREPONEMA PALLIDUM: CPT

## 2025-04-28 PROCEDURE — 84703 CHORIONIC GONADOTROPIN ASSAY: CPT

## 2025-04-28 PROCEDURE — 700111 HCHG RX REV CODE 636 W/ 250 OVERRIDE (IP): Mod: JZ | Performed by: EMERGENCY MEDICINE

## 2025-04-28 PROCEDURE — 76830 TRANSVAGINAL US NON-OB: CPT

## 2025-04-28 PROCEDURE — 85025 COMPLETE CBC W/AUTO DIFF WBC: CPT

## 2025-04-28 PROCEDURE — 87491 CHLMYD TRACH DNA AMP PROBE: CPT

## 2025-04-28 PROCEDURE — 36415 COLL VENOUS BLD VENIPUNCTURE: CPT

## 2025-04-28 PROCEDURE — 87389 HIV-1 AG W/HIV-1&-2 AB AG IA: CPT

## 2025-04-28 PROCEDURE — 80053 COMPREHEN METABOLIC PANEL: CPT

## 2025-04-28 PROCEDURE — 96374 THER/PROPH/DIAG INJ IV PUSH: CPT

## 2025-04-28 PROCEDURE — 81003 URINALYSIS AUTO W/O SCOPE: CPT

## 2025-04-28 PROCEDURE — 87591 N.GONORRHOEAE DNA AMP PROB: CPT

## 2025-04-28 PROCEDURE — 99284 EMERGENCY DEPT VISIT MOD MDM: CPT

## 2025-04-28 RX ORDER — KETOROLAC TROMETHAMINE 15 MG/ML
15 INJECTION, SOLUTION INTRAMUSCULAR; INTRAVENOUS ONCE
Status: COMPLETED | OUTPATIENT
Start: 2025-04-28 | End: 2025-04-28

## 2025-04-28 RX ADMIN — KETOROLAC TROMETHAMINE 15 MG: 15 INJECTION, SOLUTION INTRAMUSCULAR; INTRAVENOUS at 10:31

## 2025-04-28 ASSESSMENT — FIBROSIS 4 INDEX: FIB4 SCORE: 0.54

## 2025-04-28 NOTE — ED PROVIDER NOTES
ED Provider Note    CHIEF COMPLAINT  Chief Complaint   Patient presents with    Blood in Urine     Patient ambulates to Er with a chief complaint of lower abdominal and uterine pain that has been going on since Friday. Patient states she has an IUD in place and she thinks it got dislodged. Patient states she has a burning sensation when she urinates and states she noticed blood in her urine. Patient states her last menstrual cycle was on 3/10/25.        EXTERNAL RECORDS REVIEWED  Inpatient Notes ED note 4/3/25 when the patient was evaluated for pelvic pain and diagnosed with bacterial vaginosis    HPI/ROS  LIMITATION TO HISTORY   Select: : None  OUTSIDE HISTORIAN(S):  None    Ivis Klein is a 29 y.o. female who presents to the emergency department for evaluation of abdominal pain and blood in the urine.  The patient states that she started having pelvic pain over the last couple of days.  She states that she found out that her boyfriend has cheated on her and she had been sexually active with him 3 days ago without protection.  She states that she did have a little bit of blood in the urine when she wiped this morning after going to the bathroom.  She denies any fevers.  She admits to nausea but no vomiting.  She was recently seen at the beginning of the month and diagnosed with bacterial vaginosis.  She states that she does have a history of chlamydia infection in 2012.  She also has a history of diabetes and is currently on insulin.  She states that her sugars have been running in the 150s.  She denies any runny nose, cough, congestion, chest pain or shortness of breath.    PAST MEDICAL HISTORY   has a past medical history of Anemia (02/17/2022), Anxiety (02/16/2017), Anxiety (02/16/2017), Elevated antinuclear antibody (DEJON) level (05/23/2019), Graves disease (12/05/2016), Heart valve disease, Hemorrhagic cysts of both ovaries (05/23/2019), History of panic attack (04/09/2020), History of pericarditis  (12/05/2016), History of thyroidectomy (01/31/2020), Panic attack (03/11/2019), Postpartum depression (02/17/2022), Postpartum depression (02/17/2022), Type 1 diabetes mellitus with other specified complication (HCC) (11/8/2024), and Vaginal discharge (12/02/2021).    SURGICAL HISTORY   has a past surgical history that includes primary c section (9/8/2013); thyroidectomy total (Bilateral, 3/22/2017); and aiyana by laparoscopy (N/A, 8/28/2024).    FAMILY HISTORY  Family History   Problem Relation Age of Onset    Cancer Paternal Grandmother 56        breast cancer    No Known Problems Mother     Hyperlipidemia Father     No Known Problems Sister     No Known Problems Brother        SOCIAL HISTORY  Social History     Tobacco Use    Smoking status: Never    Smokeless tobacco: Never    Tobacco comments:     quit in 2012   Vaping Use    Vaping status: Never Used   Substance and Sexual Activity    Alcohol use: Not Currently    Drug use: Yes     Types: Marijuana, Inhaled     Comment: marijuana weekly    Sexual activity: Yes     Partners: Male     Birth control/protection: I.U.D.       CURRENT MEDICATIONS  Home Medications       Reviewed by Aba Longoria R.N. (Registered Nurse) on 04/28/25 at 0650  Med List Status: Not Addressed     Medication Last Dose Status   acetaminophen (TYLENOL) 500 MG Tab  Active   Continuous Glucose Sensor (FREESTYLE MIRTA 3 SENSOR) Misc  Active   Continuous Glucose Sensor (FREESTYLE MIRTA 3 SENSOR) Misc  Active   ferrous sulfate 325 (65 Fe) MG tablet  Active   insulin glargine (LANTUS SOLOSTAR) 100 UNIT/ML Solution Pen-injector injection  Active   insulin lispro 100 UNIT/ML SC SOPN injection PEN  Active   Insulin Pen Needle 32 G x 4 mm (BD PEN NEEDLE EDENILSON 2ND GEN)  Active   levonorgestrel (MIRENA, 52 MG,) 20 MCG/DAY IUD  Active   levothyroxine (SYNTHROID) 75 MCG Tab  Active   ondansetron (ZOFRAN ODT) 4 MG TABLET DISPERSIBLE  Active   ondansetron (ZOFRAN) 4 MG Tab tablet  Active                  Audit  "from Redirected Encounters    **Home medications have not yet been reviewed for this encounter**         ALLERGIES  No Known Allergies    PHYSICAL EXAM  VITAL SIGNS: /75   Pulse 82   Temp 36.7 °C (98.1 °F) (Temporal)   Resp 16   Ht 1.6 m (5' 3\")   Wt 61.6 kg (135 lb 12.9 oz)   LMP 03/10/2025 (Exact Date)   SpO2 99%   BMI 24.06 kg/m²   Constitutional: Alert and in no apparent distress.  HENT: Normocephalic atraumatic. Bilateral external ears normal. Nose normal. Mucous membranes are moist.  Eyes: Pupils are equal and reactive. Conjunctiva normal. Non-icteric sclera.   Neck: Normal range of motion without tenderness. Supple. No meningeal signs.  Cardiovascular: Regular rate and rhythm. No murmurs, gallops or rubs.  Thorax & Lungs: No increased work of breathing. Breath sounds are clear to auscultation bilaterally. No wheezing, rhonchi or rales.  Abdomen: Soft, nontender and nondistended.   Pelvic: Nirmal Mars ED RN.  Normal external genitalia.  The cervix is visualized.  No obvious IUD strings are noted.  Cervix is closed.  A small amount of physiologic appearing discharge is noted.  No blood is noted.  Skin: Warm and dry. No rashes are noted.  Back: No bony tenderness, No CVA tenderness.   Extremities: 2+ peripheral pulses. Cap refill is less than 2 seconds. No edema, cyanosis, or clubbing.  Musculoskeletal: Good range of motion in all major joints. No tenderness to palpation or major deformities noted.   Neurologic: Alert and appropriate for age. The patient moves all 4 extremities without obvious deficits.    LABS  Results for orders placed or performed during the hospital encounter of 04/28/25   CBC WITH DIFFERENTIAL    Collection Time: 04/28/25  6:58 AM   Result Value Ref Range    WBC 6.1 4.8 - 10.8 K/uL    RBC 4.64 4.20 - 5.40 M/uL    Hemoglobin 10.7 (L) 12.0 - 16.0 g/dL    Hematocrit 36.6 (L) 37.0 - 47.0 %    MCV 78.9 (L) 81.4 - 97.8 fL    MCH 23.1 (L) 27.0 - 33.0 pg    MCHC 29.2 (L) " 32.2 - 35.5 g/dL    RDW 48.0 35.9 - 50.0 fL    Platelet Count 237 164 - 446 K/uL    MPV 11.3 9.0 - 12.9 fL    Neutrophils-Polys 46.40 44.00 - 72.00 %    Lymphocytes 46.00 (H) 22.00 - 41.00 %    Monocytes 5.40 0.00 - 13.40 %    Eosinophils 1.20 0.00 - 6.90 %    Basophils 0.80 0.00 - 1.80 %    Immature Granulocytes 0.20 0.00 - 0.90 %    Nucleated RBC 0.00 0.00 - 0.20 /100 WBC    Neutrophils (Absolute) 2.82 1.82 - 7.42 K/uL    Lymphs (Absolute) 2.79 1.00 - 4.80 K/uL    Monos (Absolute) 0.33 0.00 - 0.85 K/uL    Eos (Absolute) 0.07 0.00 - 0.51 K/uL    Baso (Absolute) 0.05 0.00 - 0.12 K/uL    Immature Granulocytes (abs) 0.01 0.00 - 0.11 K/uL    NRBC (Absolute) 0.00 K/uL   COMP METABOLIC PANEL    Collection Time: 04/28/25  6:58 AM   Result Value Ref Range    Sodium 137 135 - 145 mmol/L    Potassium 3.8 3.6 - 5.5 mmol/L    Chloride 102 96 - 112 mmol/L    Co2 22 20 - 33 mmol/L    Anion Gap 13.0 7.0 - 16.0    Glucose 195 (H) 65 - 99 mg/dL    Bun 11 8 - 22 mg/dL    Creatinine 0.70 0.50 - 1.40 mg/dL    Calcium 9.4 8.5 - 10.5 mg/dL    Correct Calcium 9.2 8.5 - 10.5 mg/dL    AST(SGOT) 15 12 - 45 U/L    ALT(SGPT) 10 2 - 50 U/L    Alkaline Phosphatase 66 30 - 99 U/L    Total Bilirubin 0.3 0.1 - 1.5 mg/dL    Albumin 4.3 3.2 - 4.9 g/dL    Total Protein 7.1 6.0 - 8.2 g/dL    Globulin 2.8 1.9 - 3.5 g/dL    A-G Ratio 1.5 g/dL   WET PREP    Collection Time: 04/28/25  6:58 AM    Specimen: Vaginal; Genital   Result Value Ref Range    Significant Indicator NEG     Source GEN     Site VAGINAL     Wet Prep For Parasites       Few WBCs seen.  No yeast.  No motile Trichomonas seen.  No clue cells seen.     HCG QUAL SERUM    Collection Time: 04/28/25  6:58 AM   Result Value Ref Range    Beta-Hcg Qualitative Serum Negative Negative   ESTIMATED GFR    Collection Time: 04/28/25  6:58 AM   Result Value Ref Range    GFR (CKD-EPI) 121 >60 mL/min/1.73 m 2   URINALYSIS CULTURE, IF INDICATED    Collection Time: 04/28/25  8:36 AM    Specimen: Urine, Clean  Catch   Result Value Ref Range    Color Yellow     Character Clear     Specific Gravity 1.032 <1.035    Ph 5.5 5.0 - 8.0    Glucose >=1000 (A) Negative mg/dL    Ketones Trace (A) Negative mg/dL    Protein Negative Negative mg/dL    Bilirubin Negative Negative    Urobilinogen, Urine 1.0 <=1.0 EU/dL    Nitrite Negative Negative    Leukocyte Esterase Negative Negative    Occult Blood Negative Negative    Micro Urine Req see below      *Note: Due to a large number of results and/or encounters for the requested time period, some results have not been displayed. A complete set of results can be found in Results Review.     RADIOLOGY/PROCEDURES   I have independently interpreted the diagnostic imaging associated with this visit and am waiting the final reading from the radiologist.   My preliminary interpretation is as follows: An IUD is present.    Radiologist interpretation:  US-PELVIC COMPLETE (TRANSABDOMINAL/TRANSVAGINAL) (COMBO)   Final Result      1.  2 cm left ovarian simple cyst.      2.  IUD present within the endometrial cavity.          COURSE & MEDICAL DECISION MAKING    ASSESSMENT, COURSE AND PLAN  Care Narrative: This is a 29-year-old female presenting to the emergency department for evaluation of abdominal pain and hematuria.  On initial evaluation, the patient did not appear to be in any acute distress.  Vital signs were normal and reassuring.  Her abdominal exam was completed benign with no distention or tenderness.  Pelvic exam was notable for close cervix but I did not visualize IUD strings.  An ultrasound was ordered and    Labs including a vaginal pathogens panel and STI studies were ordered.    Patient's white blood cell count was normal and reassuring.  Her H&H were at her baseline upon review of her previous labs.    Urinalysis revealed glucose and trace ketones and her serum glucose was 195.  Her bicarb was normal.  She does not appear to be in DKA at this point.  No additional electrolyte  abnormalities were noted.  She had no evidence of occult UTI or pyelonephritis.  No blood concerning for kidney stone was noted.    Her pregnancy test was negative and I am less concerned for ruptured ectopic pregnancy.    An ultrasound was obtained and notable for an IUD within the endometrial cavity.  A 2 cm left ovarian simple cyst was noted.  No evidence of torsion was appreciated.    Wet prep was obtained and few WBCs but no yeast, trichomoniasis, or clue cells were noted.    STI tests were sent and pending.  I encouraged her to follow-up the results for these on MyChart.    The patient was treated with Toradol and improved upon reassessment.  Repeat vital signs are normal.  I do think she is stable for discharge at this time.  I discussed supportive measures with her and encouraged her to follow-up with her OB/GYN.  She will return to the emergency department with any worsening signs or symptoms.    The patient presents with abdominal pain without signs of peritonitis or other life-threatening or serious etiology. The patient appears stable for discharge and has been instructed to return immediately if the symptoms worsen in any way, or in 8-12hr if not improved for re-evaluation. The patient has been instructed to return if the symptoms worsen or change in any way.    ADDITIONAL PROBLEMS MANAGED  Ovarian cyst    DISPOSITION AND DISCUSSIONS  I have discussed management of the patient with the following physicians and JUSTIN's:  None    Discussion of management with other [facility] or appropriate source(s): None     Escalation of care considered, and ultimately not performed:acute inpatient care management, however at this time, the patient is most appropriate for outpatient management    Barriers to care at this time, including but not limited to:  None .     Decision tools and prescription drugs considered including, but not limited to:  None .    FINAL IMPRESSION  1. Pelvic pain    2. Right ovarian cyst         PRESCRIPTIONS  New Prescriptions    No medications on file     FOLLOW UP  Please follow-up with your OB/GYN in 1-3 days.          [facility], Emergency Dept  [id number]  [place] [address]  [phone]  Go to   As needed      -DISCHARGE-    Electronically signed by: Maggie Sierra D.O., 4/28/2025 6:38 AM

## 2025-04-28 NOTE — ED TRIAGE NOTES
"Chief Complaint   Patient presents with    Blood in Urine     Patient ambulates to Er with a chief complaint of lower abdominal and uterine pain that has been going on since Friday. Patient states she has an IUD in place and she thinks it got dislodged. Patient states she has a burning sensation when she urinates and states she noticed blood in her urine. Patient states her last menstrual cycle was on 3/10/25.      /76   Pulse 83   Temp 36.7 °C (98.1 °F) (Temporal)   Resp 18   Ht 1.6 m (5' 3\")   Wt 61.6 kg (135 lb 12.9 oz)   LMP 03/10/2025 (Exact Date)   SpO2 97%   BMI 24.06 kg/m²     "

## 2025-04-28 NOTE — ED NOTES
Patient given discharge instructions, verbalized understanding. PIV discontinued. Patient instructed to follow up with OB/GYN. Education provided to come to ER if symptoms worsen. Discharged in stable condition, able to walk out with steady gait.

## 2025-04-28 NOTE — ED TRIAGE NOTES
Patient ambulates to Er with a chief complaint of lower abdominal and uterine pain that has been going on since Friday. Patient states she has an IUD in place and she thinks it got dislodged. Patient states she has a burning sensation when she urinates and states she noticed blood in her urine. Patient states her last menstrual cycle was on 3/10/25.

## 2025-05-01 ENCOUNTER — HOSPITAL ENCOUNTER (EMERGENCY)
Facility: MEDICAL CENTER | Age: 30
End: 2025-05-01
Attending: EMERGENCY MEDICINE
Payer: MEDICAID

## 2025-05-01 VITALS
HEART RATE: 92 BPM | HEIGHT: 63 IN | WEIGHT: 132.94 LBS | DIASTOLIC BLOOD PRESSURE: 90 MMHG | BODY MASS INDEX: 23.55 KG/M2 | TEMPERATURE: 97.8 F | OXYGEN SATURATION: 100 % | SYSTOLIC BLOOD PRESSURE: 137 MMHG | RESPIRATION RATE: 18 BRPM

## 2025-05-01 DIAGNOSIS — N89.8 VAGINAL DISCHARGE: ICD-10-CM

## 2025-05-01 LAB
APPEARANCE UR: CLEAR
BACTERIA GENITAL QL WET PREP: NORMAL
BILIRUB UR QL STRIP.AUTO: NEGATIVE
COLOR UR: YELLOW
GLUCOSE UR STRIP.AUTO-MCNC: >=1000 MG/DL
HCG UR QL: NEGATIVE
KETONES UR STRIP.AUTO-MCNC: 15 MG/DL
LEUKOCYTE ESTERASE UR QL STRIP.AUTO: NEGATIVE
MICRO URNS: ABNORMAL
NITRITE UR QL STRIP.AUTO: NEGATIVE
PH UR STRIP.AUTO: 6 [PH] (ref 5–8)
PROT UR QL STRIP: NEGATIVE MG/DL
RBC UR QL AUTO: NEGATIVE
SIGNIFICANT IND 70042: NORMAL
SITE SITE: NORMAL
SOURCE SOURCE: NORMAL
SP GR UR STRIP.AUTO: >1.035
UROBILINOGEN UR STRIP.AUTO-MCNC: 1 EU/DL

## 2025-05-01 PROCEDURE — 700111 HCHG RX REV CODE 636 W/ 250 OVERRIDE (IP): Mod: JZ | Performed by: EMERGENCY MEDICINE

## 2025-05-01 PROCEDURE — 81003 URINALYSIS AUTO W/O SCOPE: CPT

## 2025-05-01 PROCEDURE — 87591 N.GONORRHOEAE DNA AMP PROB: CPT

## 2025-05-01 PROCEDURE — 87491 CHLMYD TRACH DNA AMP PROBE: CPT

## 2025-05-01 PROCEDURE — 81025 URINE PREGNANCY TEST: CPT

## 2025-05-01 PROCEDURE — A9270 NON-COVERED ITEM OR SERVICE: HCPCS | Performed by: EMERGENCY MEDICINE

## 2025-05-01 PROCEDURE — 99284 EMERGENCY DEPT VISIT MOD MDM: CPT

## 2025-05-01 PROCEDURE — 96372 THER/PROPH/DIAG INJ SC/IM: CPT

## 2025-05-01 PROCEDURE — 700102 HCHG RX REV CODE 250 W/ 637 OVERRIDE(OP): Performed by: EMERGENCY MEDICINE

## 2025-05-01 RX ORDER — CEFTRIAXONE 500 MG/1
500 INJECTION, POWDER, FOR SOLUTION INTRAMUSCULAR; INTRAVENOUS ONCE
Status: COMPLETED | OUTPATIENT
Start: 2025-05-01 | End: 2025-05-01

## 2025-05-01 RX ORDER — FLUCONAZOLE 100 MG/1
150 TABLET ORAL ONCE
Status: COMPLETED | OUTPATIENT
Start: 2025-05-01 | End: 2025-05-01

## 2025-05-01 RX ORDER — FLUCONAZOLE 150 MG/1
150 TABLET ORAL DAILY
Qty: 1 TABLET | Refills: 0 | Status: ACTIVE | OUTPATIENT
Start: 2025-05-01

## 2025-05-01 RX ORDER — DOXYCYCLINE 100 MG/1
100 CAPSULE ORAL 2 TIMES DAILY
Qty: 20 CAPSULE | Refills: 0 | Status: ACTIVE | OUTPATIENT
Start: 2025-05-01

## 2025-05-01 RX ORDER — DOXYCYCLINE 100 MG/1
100 TABLET ORAL ONCE
Status: COMPLETED | OUTPATIENT
Start: 2025-05-01 | End: 2025-05-01

## 2025-05-01 RX ADMIN — CEFTRIAXONE SODIUM 500 MG: 500 INJECTION, POWDER, FOR SOLUTION INTRAMUSCULAR; INTRAVENOUS at 23:07

## 2025-05-01 RX ADMIN — FLUCONAZOLE 150 MG: 100 TABLET ORAL at 22:25

## 2025-05-01 RX ADMIN — DOXYCYCLINE 100 MG: 100 TABLET, FILM COATED ORAL at 23:07

## 2025-05-01 ASSESSMENT — FIBROSIS 4 INDEX: FIB4 SCORE: 0.54

## 2025-05-01 ASSESSMENT — PAIN DESCRIPTION - PAIN TYPE: TYPE: ACUTE PAIN

## 2025-05-01 NOTE — Clinical Note
Ivis Klein was seen and treated in our emergency department on 5/1/2025.  She may return to work on 05/04/2025.       If you have any questions or concerns, please don't hesitate to call.      Cem Merino D.O.

## 2025-05-02 ENCOUNTER — APPOINTMENT (OUTPATIENT)
Dept: INTERNAL MEDICINE | Facility: OTHER | Age: 30
End: 2025-05-02
Payer: MEDICAID

## 2025-05-02 NOTE — ED TRIAGE NOTES
"Chief Complaint   Patient presents with    Vaginal Pain     Patient was seen here on Monday for UTI symptoms and STI exposure check. Patient reports worsening vaginal pain, thick white discharge, itchiness.    UTI     Reports dysuria, chills, fatigue, nausea.      BP (!) 137/90   Pulse 84   Temp 36.6 °C (97.8 °F) (Temporal)   Resp 16   Ht 1.6 m (5' 3\")   Wt 60.3 kg (132 lb 15 oz)   LMP 03/10/2025 (Exact Date)   SpO2 100%   BMI 23.55 kg/m²    "

## 2025-05-02 NOTE — ED NOTES
Pt resting in bed with equal chest rise and fall observed. No pt needs at this time. No s/s of distress noted.    Medicated per MAR

## 2025-05-02 NOTE — DISCHARGE INSTRUCTIONS
Follow up on your Gonorrhea /chlamydia results. If negative, you can stop taking the Doxy antibiotic.

## 2025-05-02 NOTE — ED PROVIDER NOTES
ED Provider Note    CHIEF COMPLAINT  Chief Complaint   Patient presents with    Vaginal Pain     Patient was seen here on Monday for UTI symptoms and STI exposure check. Patient reports worsening vaginal pain, thick white discharge, itchiness.    UTI     Reports dysuria, chills, fatigue, nausea.       EXTERNAL RECORDS REVIEWED  Outpatient Notes   women's health    HPI/ROS  LIMITATION TO HISTORY   None  OUTSIDE HISTORIAN(S):  None    Ivis Klein is a 29 y.o. female who presents here for evaluation of vaginal discharge.  Patient states she was seen evaluated recently for the same.  She states that she did not receive any antibiotics.  She states that her discharge become more white and increased in amount.  She has no fever or chills or vomiting, no chest pain shortness of breath.  Patient states she usually has yeast infections, and this feels very similar.    PAST MEDICAL HISTORY   has a past medical history of Anemia (02/17/2022), Anxiety (02/16/2017), Anxiety (02/16/2017), Elevated antinuclear antibody (DEJON) level (05/23/2019), Graves disease (12/05/2016), Heart valve disease, Hemorrhagic cysts of both ovaries (05/23/2019), History of panic attack (04/09/2020), History of pericarditis (12/05/2016), History of thyroidectomy (01/31/2020), Panic attack (03/11/2019), Postpartum depression (02/17/2022), Postpartum depression (02/17/2022), Type 1 diabetes mellitus with other specified complication (HCC) (11/8/2024), and Vaginal discharge (12/02/2021).    SURGICAL HISTORY   has a past surgical history that includes primary c section (9/8/2013); thyroidectomy total (Bilateral, 3/22/2017); and aiyana by laparoscopy (N/A, 8/28/2024).    FAMILY HISTORY  Family History   Problem Relation Age of Onset    Cancer Paternal Grandmother 56        breast cancer    No Known Problems Mother     Hyperlipidemia Father     No Known Problems Sister     No Known Problems Brother        SOCIAL HISTORY  Social History     Tobacco  "Use    Smoking status: Never    Smokeless tobacco: Never    Tobacco comments:     quit in 2012   Vaping Use    Vaping status: Never Used   Substance and Sexual Activity    Alcohol use: Not Currently    Drug use: Yes     Types: Marijuana, Inhaled     Comment: marijuana weekly    Sexual activity: Yes     Partners: Male     Birth control/protection: I.U.D.       CURRENT MEDICATIONS  Home Medications       Reviewed by Zoya Zapata R.N. (Registered Nurse) on 05/01/25 at 1923  Med List Status: Not Addressed     Medication Last Dose Status   acetaminophen (TYLENOL) 500 MG Tab  Active   Continuous Glucose Sensor (FREESTYLE MIRTA 3 SENSOR) Misc  Active   Continuous Glucose Sensor (FREESTYLE MIRTA 3 SENSOR) Misc  Active   ferrous sulfate 325 (65 Fe) MG tablet  Active   insulin glargine (LANTUS SOLOSTAR) 100 UNIT/ML Solution Pen-injector injection  Active   insulin lispro 100 UNIT/ML SC SOPN injection PEN  Active   Insulin Pen Needle 32 G x 4 mm (BD PEN NEEDLE EDENILSON 2ND GEN)  Active   levonorgestrel (MIRENA, 52 MG,) 20 MCG/DAY IUD  Active   levothyroxine (SYNTHROID) 75 MCG Tab  Active   ondansetron (ZOFRAN ODT) 4 MG TABLET DISPERSIBLE  Active   ondansetron (ZOFRAN) 4 MG Tab tablet  Active                    ALLERGIES  No Known Allergies    PHYSICAL EXAM  VITAL SIGNS: BP (!) 137/90   Pulse 92   Temp 36.6 °C (97.8 °F) (Temporal)   Resp 16   Ht 1.6 m (5' 3\")   Wt 60.3 kg (132 lb 15 oz)   LMP 03/10/2025 (Exact Date)   SpO2 100%   BMI 23.55 kg/m²    Constitutional: Well developed, well nourished. No acute distress.  HEENT: Normocephalic, atraumatic. Posterior pharynx clear and moist.  Eyes:  EOMI. Normal sclera.  Neck: Supple, Full range of motion, nontender.  Chest/Pulmonary: clear to ausculation. Symmetrical expansion.   Cardio: Regular rate and rhythm with no murmur.   Abdomen: Soft, nontender. No peritoneal signs. No guarding. No palpable masses.  ; no external lesions.  Vault with cmt and white discharge. "   Musculoskeletal: No deformity, no edema, neurovascular intact.   Neuro: Clear speech, appropriate, cooperative, cranial nerves II-XII grossly intact.  Psych: Normal mood and affect      EKG/LABS  Results for orders placed or performed during the hospital encounter of 05/01/25   Urinalysis, Culture if Indicated    Collection Time: 05/01/25  7:40 PM    Specimen: Urine, Clean Catch   Result Value Ref Range    Color Yellow     Character Clear     Specific Gravity >1.035 (A) <1.035    Ph 6.0 5.0 - 8.0    Glucose >=1000 (A) Negative mg/dL    Ketones 15 (A) Negative mg/dL    Protein Negative Negative mg/dL    Bilirubin Negative Negative    Urobilinogen, Urine 1.0 <=1.0 EU/dL    Nitrite Negative Negative    Leukocyte Esterase Negative Negative    Occult Blood Negative Negative    Micro Urine Req see below    HCG Qualitative Urine    Collection Time: 05/01/25  7:40 PM   Result Value Ref Range    Beta-Hcg Urine Negative Negative   WET PREP    Collection Time: 05/01/25  9:53 PM    Specimen: Vaginal; Genital   Result Value Ref Range    Significant Indicator NEG     Source GEN     Site VAGINAL     Wet Prep For Parasites       No yeast.  No motile Trichomonas seen.  No clue cells seen.  Few WBCs seen.       *Note: Due to a large number of results and/or encounters for the requested time period, some results have not been displayed. A complete set of results can be found in Results Review.         RADIOLOGY/PROCEDURES   none      COURSE & MEDICAL DECISION MAKING    ASSESSMENT, COURSE AND PLAN  Care Narrative: This is a 29-year-old female here for evaluation of vaginal discharge.  Patient had pelvic exam done here, culture sent, was treated with Rocephin and Doxy.  She will follow-up on her results, or return for any further issues or concerns.        DISPOSITION AND DISCUSSIONS  I have discussed management of the patient with the following physicians and JUSTIN's: None    Discussion of management with other [facility] or appropriate  source(s): None    Escalation of care considered, and ultimately not performed: None    Barriers to care at this time, including but not limited to: None.     Decision tools and prescription drugs considered including, but not limited to: none.    FINAL DIAGNOSIS  1. Vaginal discharge         Electronically signed by: Cem Merino D.O., 5/1/2025 11:15 PM

## 2025-05-13 ENCOUNTER — HOSPITAL ENCOUNTER (EMERGENCY)
Facility: MEDICAL CENTER | Age: 30
End: 2025-05-13
Attending: EMERGENCY MEDICINE
Payer: MEDICAID

## 2025-05-13 VITALS
DIASTOLIC BLOOD PRESSURE: 76 MMHG | BODY MASS INDEX: 23.71 KG/M2 | TEMPERATURE: 98.9 F | WEIGHT: 133.82 LBS | RESPIRATION RATE: 13 BRPM | HEART RATE: 84 BPM | SYSTOLIC BLOOD PRESSURE: 122 MMHG | HEIGHT: 63 IN | OXYGEN SATURATION: 99 %

## 2025-05-13 DIAGNOSIS — R52 BODY ACHES: ICD-10-CM

## 2025-05-13 DIAGNOSIS — R73.9 HYPERGLYCEMIA: ICD-10-CM

## 2025-05-13 LAB
ACETONE UR QL: ABNORMAL
ALBUMIN SERPL BCP-MCNC: 4.3 G/DL (ref 3.2–4.9)
ALBUMIN/GLOB SERPL: 1.5 G/DL
ALP SERPL-CCNC: 57 U/L (ref 30–99)
ALT SERPL-CCNC: 8 U/L (ref 2–50)
AMORPHOUS CRYSTALS 1764: PRESENT /HPF
ANION GAP SERPL CALC-SCNC: 14 MMOL/L (ref 7–16)
APPEARANCE UR: ABNORMAL
AST SERPL-CCNC: 16 U/L (ref 12–45)
B-OH-BUTYR SERPL-MCNC: 0.48 MMOL/L (ref 0.02–0.27)
BACTERIA #/AREA URNS HPF: ABNORMAL /HPF
BASOPHILS # BLD AUTO: 1 % (ref 0–1.8)
BASOPHILS # BLD: 0.03 K/UL (ref 0–0.12)
BILIRUB SERPL-MCNC: 0.6 MG/DL (ref 0.1–1.5)
BILIRUB UR QL STRIP.AUTO: ABNORMAL
BUN SERPL-MCNC: 11 MG/DL (ref 8–22)
CALCIUM ALBUM COR SERPL-MCNC: 8.7 MG/DL (ref 8.5–10.5)
CALCIUM SERPL-MCNC: 8.9 MG/DL (ref 8.5–10.5)
CASTS URNS QL MICRO: ABNORMAL /LPF (ref 0–2)
CHLORIDE SERPL-SCNC: 101 MMOL/L (ref 96–112)
CO2 SERPL-SCNC: 21 MMOL/L (ref 20–33)
COLOR UR: YELLOW
CREAT SERPL-MCNC: 0.59 MG/DL (ref 0.5–1.4)
EOSINOPHIL # BLD AUTO: 0 K/UL (ref 0–0.51)
EOSINOPHIL NFR BLD: 0 % (ref 0–6.9)
EPITHELIAL CELLS 1715: ABNORMAL /HPF (ref 0–5)
ERYTHROCYTE [DISTWIDTH] IN BLOOD BY AUTOMATED COUNT: 45.1 FL (ref 35.9–50)
EST. AVERAGE GLUCOSE BLD GHB EST-MCNC: 166 MG/DL
GFR SERPLBLD CREATININE-BSD FMLA CKD-EPI: 126 ML/MIN/1.73 M 2
GLOBULIN SER CALC-MCNC: 2.9 G/DL (ref 1.9–3.5)
GLUCOSE BLD STRIP.AUTO-MCNC: 156 MG/DL (ref 65–99)
GLUCOSE SERPL-MCNC: 145 MG/DL (ref 65–99)
GLUCOSE UR STRIP.AUTO-MCNC: 250 MG/DL
HBA1C MFR BLD: 7.4 % (ref 4–5.6)
HCG SERPL QL: NEGATIVE
HCT VFR BLD AUTO: 35.3 % (ref 37–47)
HGB BLD-MCNC: 10.6 G/DL (ref 12–16)
IMM GRANULOCYTES # BLD AUTO: 0 K/UL (ref 0–0.11)
IMM GRANULOCYTES NFR BLD AUTO: 0 % (ref 0–0.9)
KETONES UR STRIP.AUTO-MCNC: >=80 MG/DL
LEUKOCYTE ESTERASE UR QL STRIP.AUTO: NEGATIVE
LYMPHOCYTES # BLD AUTO: 0.5 K/UL (ref 1–4.8)
LYMPHOCYTES NFR BLD: 16.7 % (ref 22–41)
MCH RBC QN AUTO: 23.1 PG (ref 27–33)
MCHC RBC AUTO-ENTMCNC: 30 G/DL (ref 32.2–35.5)
MCV RBC AUTO: 76.9 FL (ref 81.4–97.8)
MICRO URNS: ABNORMAL
MONOCYTES # BLD AUTO: 0.33 K/UL (ref 0–0.85)
MONOCYTES NFR BLD AUTO: 11 % (ref 0–13.4)
NEUTROPHILS # BLD AUTO: 2.14 K/UL (ref 1.82–7.42)
NEUTROPHILS NFR BLD: 71.3 % (ref 44–72)
NITRITE UR QL STRIP.AUTO: NEGATIVE
NRBC # BLD AUTO: 0 K/UL
NRBC BLD-RTO: 0 /100 WBC (ref 0–0.2)
PH UR STRIP.AUTO: 6 [PH] (ref 5–8)
PLATELET # BLD AUTO: 223 K/UL (ref 164–446)
PMV BLD AUTO: 11.1 FL (ref 9–12.9)
POTASSIUM SERPL-SCNC: 3.6 MMOL/L (ref 3.6–5.5)
PROT SERPL-MCNC: 7.2 G/DL (ref 6–8.2)
PROT UR QL STRIP: NEGATIVE MG/DL
RBC # BLD AUTO: 4.59 M/UL (ref 4.2–5.4)
RBC # URNS HPF: ABNORMAL /HPF
RBC UR QL AUTO: ABNORMAL
SODIUM SERPL-SCNC: 136 MMOL/L (ref 135–145)
SP GR UR STRIP.AUTO: >=1.03
UROBILINOGEN UR STRIP.AUTO-MCNC: 0.2 EU/DL
WBC # BLD AUTO: 3 K/UL (ref 4.8–10.8)
WBC #/AREA URNS HPF: ABNORMAL /HPF

## 2025-05-13 PROCEDURE — 83036 HEMOGLOBIN GLYCOSYLATED A1C: CPT

## 2025-05-13 PROCEDURE — 36415 COLL VENOUS BLD VENIPUNCTURE: CPT

## 2025-05-13 PROCEDURE — 80053 COMPREHEN METABOLIC PANEL: CPT

## 2025-05-13 PROCEDURE — 81001 URINALYSIS AUTO W/SCOPE: CPT

## 2025-05-13 PROCEDURE — 82962 GLUCOSE BLOOD TEST: CPT

## 2025-05-13 PROCEDURE — 84703 CHORIONIC GONADOTROPIN ASSAY: CPT

## 2025-05-13 PROCEDURE — 85025 COMPLETE CBC W/AUTO DIFF WBC: CPT

## 2025-05-13 PROCEDURE — 81002 URINALYSIS NONAUTO W/O SCOPE: CPT

## 2025-05-13 PROCEDURE — 96374 THER/PROPH/DIAG INJ IV PUSH: CPT

## 2025-05-13 PROCEDURE — 96375 TX/PRO/DX INJ NEW DRUG ADDON: CPT

## 2025-05-13 PROCEDURE — 700105 HCHG RX REV CODE 258: Performed by: EMERGENCY MEDICINE

## 2025-05-13 PROCEDURE — 96361 HYDRATE IV INFUSION ADD-ON: CPT

## 2025-05-13 PROCEDURE — 82010 KETONE BODYS QUAN: CPT

## 2025-05-13 PROCEDURE — 700111 HCHG RX REV CODE 636 W/ 250 OVERRIDE (IP): Mod: JZ | Performed by: EMERGENCY MEDICINE

## 2025-05-13 PROCEDURE — 99284 EMERGENCY DEPT VISIT MOD MDM: CPT

## 2025-05-13 RX ORDER — KETOROLAC TROMETHAMINE 15 MG/ML
15 INJECTION, SOLUTION INTRAMUSCULAR; INTRAVENOUS ONCE
Status: COMPLETED | OUTPATIENT
Start: 2025-05-13 | End: 2025-05-13

## 2025-05-13 RX ORDER — ONDANSETRON 2 MG/ML
4 INJECTION INTRAMUSCULAR; INTRAVENOUS ONCE
Status: COMPLETED | OUTPATIENT
Start: 2025-05-13 | End: 2025-05-13

## 2025-05-13 RX ORDER — SODIUM CHLORIDE 9 MG/ML
1000 INJECTION, SOLUTION INTRAVENOUS ONCE
Status: COMPLETED | OUTPATIENT
Start: 2025-05-13 | End: 2025-05-13

## 2025-05-13 RX ORDER — SODIUM CHLORIDE, SODIUM LACTATE, POTASSIUM CHLORIDE, AND CALCIUM CHLORIDE .6; .31; .03; .02 G/100ML; G/100ML; G/100ML; G/100ML
1000 INJECTION, SOLUTION INTRAVENOUS ONCE
Status: COMPLETED | OUTPATIENT
Start: 2025-05-13 | End: 2025-05-13

## 2025-05-13 RX ADMIN — ONDANSETRON 4 MG: 2 INJECTION INTRAMUSCULAR; INTRAVENOUS at 10:23

## 2025-05-13 RX ADMIN — KETOROLAC TROMETHAMINE 15 MG: 15 INJECTION, SOLUTION INTRAMUSCULAR; INTRAVENOUS at 10:23

## 2025-05-13 RX ADMIN — SODIUM CHLORIDE, POTASSIUM CHLORIDE, SODIUM LACTATE AND CALCIUM CHLORIDE 1000 ML: 600; 310; 30; 20 INJECTION, SOLUTION INTRAVENOUS at 11:29

## 2025-05-13 RX ADMIN — SODIUM CHLORIDE 1000 ML: 9 INJECTION, SOLUTION INTRAVENOUS at 08:07

## 2025-05-13 ASSESSMENT — FIBROSIS 4 INDEX: FIB4 SCORE: 0.54

## 2025-05-13 ASSESSMENT — PAIN DESCRIPTION - PAIN TYPE
TYPE: ACUTE PAIN
TYPE: ACUTE PAIN

## 2025-05-13 NOTE — ED NOTES
Patient given discharge instructions, verbalized understanding. PIV discontinued. Patient instructed to follow up with PCP. Education provided to come to ER if symptoms worsen. Discharged in stable condition, able to walk out with steady gait.

## 2025-05-13 NOTE — ED PROVIDER NOTES
"ED Provider Note    CHIEF COMPLAINT  Chief Complaint   Patient presents with    Flu Like Symptoms     C/o cough, congestion, body aches and chills started yesterday    Flank Pain     Right x 2 days       EXTERNAL RECORDS REVIEWED  Patient's last encounter was earlier this month on May 1 she was seen in the emergency department for vaginal discharge wet prep did not show yeast, trichomonas or clue cells.  She was evaluated for similar symptoms at the end of April of this year diagnosed with pelvic pain and a right ovarian cyst based on ultrasound.  She was concerned about STD at the time.  Reported history of chlamydial infection in 2012.    ED abs admission in March of this year for nausea vomiting, right lower quadrant abdominal pain and viral syndrome.    Hospital admission for anxiety, weakness and elevated blood sugar in November 2024.  Patient was diagnosed with diabetic ketoacidosis and acute pericarditis.    HPI/ROS  LIMITATION TO HISTORY   Select: : None  OUTSIDE HISTORIAN(S):  None    Ivis Klein is a 29 y.o. female who presents to the emergency department with a chief complaint of a concern for DKA.  Patient states she was initially diagnosed with diabetes type 1, in November of last year.  At the time, she did have DKA and she noticed, that her urine was \"foamy\".  She is having similar symptoms now associated also with right-sided flank pain which was concerning, prompting her ED visit.  Patient states she has had bodyaches, cough and congestion as well.  She is on insulin.  Blood sugar 159 here in the ED.  She denies also has a history of Graves' disease.    PAST MEDICAL HISTORY   has a past medical history of Anemia (02/17/2022), Anxiety (02/16/2017), Anxiety (02/16/2017), Elevated antinuclear antibody (DEJON) level (05/23/2019), Graves disease (12/05/2016), Heart valve disease, Hemorrhagic cysts of both ovaries (05/23/2019), History of panic attack (04/09/2020), History of pericarditis " "(12/05/2016), History of thyroidectomy (01/31/2020), Panic attack (03/11/2019), Postpartum depression (02/17/2022), Postpartum depression (02/17/2022), Type 1 diabetes mellitus with other specified complication (HCC) (11/8/2024), and Vaginal discharge (12/02/2021).    SURGICAL HISTORY   has a past surgical history that includes primary c section (9/8/2013); thyroidectomy total (Bilateral, 3/22/2017); and aiyana by laparoscopy (N/A, 8/28/2024).    FAMILY HISTORY  Family History   Problem Relation Age of Onset    Cancer Paternal Grandmother 56        breast cancer    No Known Problems Mother     Hyperlipidemia Father     No Known Problems Sister     No Known Problems Brother        SOCIAL HISTORY  Social History     Tobacco Use    Smoking status: Never    Smokeless tobacco: Never    Tobacco comments:     quit in 2012   Vaping Use    Vaping status: Never Used   Substance and Sexual Activity    Alcohol use: Not Currently    Drug use: Yes     Types: Marijuana, Inhaled     Comment: marijuana weekly    Sexual activity: Yes     Partners: Male     Birth control/protection: I.U.D.       CURRENT MEDICATIONS  Home Medications    **Home medications have not yet been reviewed for this encounter**       ALLERGIES  No Known Allergies    PHYSICAL EXAM  VITAL SIGNS: /76   Pulse 84   Temp 37.2 °C (98.9 °F) (Temporal)   Resp 13   Ht 1.6 m (5' 3\")   Wt 60.7 kg (133 lb 13.1 oz)   LMP 05/10/2025   SpO2 99%   BMI 23.71 kg/m²    Vitals reviewed.  Constitutional: Patient is oriented to person, place, and time. Appears well-developed and well-nourished. No distress.    Head: Normocephalic and atraumatic.   Ears: Normal external ears bilaterally.   Mouth/Throat: Oropharynx is clear with dry MM  Eyes: Conjunctivae are normal. Pupils are equal, round, and reactive to light.   Neck: Normal range of motion. Neck supple.   Cardiovascular: Tachycardia, regular rhythm and normal heart sounds.   Pulmonary/Chest: Effort normal and breath " sounds normal. No respiratory distress, no wheezes, rhonchi, or rales.   Abdominal: Soft. Bowel sounds are normal. There is no tenderness, rebound or guarding, or peritoneal signs, no masses. Right CVA tenderness.  Musculoskeletal: No edema and no tenderness.   Neurological: No cranial nerve deficits. Normal gait. No focal deficits.   Skin: Skin is warm and dry. No erythema. No pallor.   Psychiatric: Patient has a normal mood and affect.     EKG/LABS  Results for orders placed or performed during the hospital encounter of 05/13/25   KETONES-URINE QUAL(ACETONE URINE QUAL)    Collection Time: 05/13/25  7:23 AM   Result Value Ref Range    Ketones Moderate (A) Negative   URINALYSIS    Collection Time: 05/13/25  7:23 AM    Specimen: Urine, Clean Catch   Result Value Ref Range    Color Yellow     Character Turbid (A)     Specific Gravity >=1.030 <1.035    Ph 6.0 5.0 - 8.0    Glucose 250 (A) Negative mg/dL    Ketones >=80 (A) Negative mg/dL    Protein Negative Negative mg/dL    Bilirubin Small (A) Negative    Urobilinogen, Urine 0.2 <=1.0 EU/dL    Nitrite Negative Negative    Leukocyte Esterase Negative Negative    Occult Blood Small (A) Negative    Micro Urine Req Microscopic    URINE MICROSCOPIC (W/UA)    Collection Time: 05/13/25  7:23 AM   Result Value Ref Range    WBC 3-5 (A) /hpf    RBC 3-5 (A) /hpf    Bacteria None None /hpf    Epithelial Cells 0-2 0 - 5 /hpf    Amorphous Crystal Present (A) Absent /hpf    Urine Casts 0-2 0 - 2 /lpf   POCT glucose device results    Collection Time: 05/13/25  7:42 AM   Result Value Ref Range    POC Glucose, Blood 156 (H) 65 - 99 mg/dL   CBC w/ Differential    Collection Time: 05/13/25  8:06 AM   Result Value Ref Range    WBC 3.0 (L) 4.8 - 10.8 K/uL    RBC 4.59 4.20 - 5.40 M/uL    Hemoglobin 10.6 (L) 12.0 - 16.0 g/dL    Hematocrit 35.3 (L) 37.0 - 47.0 %    MCV 76.9 (L) 81.4 - 97.8 fL    MCH 23.1 (L) 27.0 - 33.0 pg    MCHC 30.0 (L) 32.2 - 35.5 g/dL    RDW 45.1 35.9 - 50.0 fL     Platelet Count 223 164 - 446 K/uL    MPV 11.1 9.0 - 12.9 fL    Neutrophils-Polys 71.30 44.00 - 72.00 %    Lymphocytes 16.70 (L) 22.00 - 41.00 %    Monocytes 11.00 0.00 - 13.40 %    Eosinophils 0.00 0.00 - 6.90 %    Basophils 1.00 0.00 - 1.80 %    Immature Granulocytes 0.00 0.00 - 0.90 %    Nucleated RBC 0.00 0.00 - 0.20 /100 WBC    Neutrophils (Absolute) 2.14 1.82 - 7.42 K/uL    Lymphs (Absolute) 0.50 (L) 1.00 - 4.80 K/uL    Monos (Absolute) 0.33 0.00 - 0.85 K/uL    Eos (Absolute) 0.00 0.00 - 0.51 K/uL    Baso (Absolute) 0.03 0.00 - 0.12 K/uL    Immature Granulocytes (abs) 0.00 0.00 - 0.11 K/uL    NRBC (Absolute) 0.00 K/uL   Complete Metabolic Panel (CMP)    Collection Time: 05/13/25  8:06 AM   Result Value Ref Range    Sodium 136 135 - 145 mmol/L    Potassium 3.6 3.6 - 5.5 mmol/L    Chloride 101 96 - 112 mmol/L    Co2 21 20 - 33 mmol/L    Anion Gap 14.0 7.0 - 16.0    Glucose 145 (H) 65 - 99 mg/dL    Bun 11 8 - 22 mg/dL    Creatinine 0.59 0.50 - 1.40 mg/dL    Calcium 8.9 8.5 - 10.5 mg/dL    Correct Calcium 8.7 8.5 - 10.5 mg/dL    AST(SGOT) 16 12 - 45 U/L    ALT(SGPT) 8 2 - 50 U/L    Alkaline Phosphatase 57 30 - 99 U/L    Total Bilirubin 0.6 0.1 - 1.5 mg/dL    Albumin 4.3 3.2 - 4.9 g/dL    Total Protein 7.2 6.0 - 8.2 g/dL    Globulin 2.9 1.9 - 3.5 g/dL    A-G Ratio 1.5 g/dL   BETA-HYDROXYBUTYRIC ACID    Collection Time: 05/13/25  8:06 AM   Result Value Ref Range    beta-Hydroxybutyric Acid 0.48 (H) 0.02 - 0.27 mmol/L   HEMOGLOBIN A1C    Collection Time: 05/13/25  8:06 AM   Result Value Ref Range    Glycohemoglobin 7.4 (H) 4.0 - 5.6 %    Est Avg Glucose 166 mg/dL   ESTIMATED GFR    Collection Time: 05/13/25  8:06 AM   Result Value Ref Range    GFR (CKD-EPI) 126 >60 mL/min/1.73 m 2   HCG QUAL SERUM    Collection Time: 05/13/25  8:06 AM   Result Value Ref Range    Beta-Hcg Qualitative Serum Negative Negative     I have independently interpreted this EKG    RADIOLOGY/PROCEDURES     COURSE & MEDICAL DECISION  MAKING    ASSESSMENT, COURSE AND PLAN  Care Narrative:     This is an overall well-appearing 29-year-old female.  She is tachycardic however and she reports similar symptoms as when she was previously diagnosed with diabetes for the first time and presented with DKA.  She is afebrile.  Her abdomen is soft.  She is experiencing some right flank pain and dark urine concerning for possible hematuria.  Urine analysis has been obtained.  An IV will be established, IV fluids provided and an assessment for abnormal electrolytes evidence of DKA.    11:10 AM blood cell count is low 3.0.  It has been low in the past, 6 months ago 4.6.  Hemoglobin is overall unchanged to 10.6 and 35. Chemistry shows, normal electrolytes.  Bicarb is normal at 21, anion gap is normal, 14.  Glucose is slightly elevated 145.  LFTs normal.  Hydroxybutyric acid is slightly elevated 0.48.  Urine is turbid with includes glucose and ketones no nitrates or leukocyte esterase.  No bacteria.    11:56 AM reevaluated the bedside.  She is feeling much better.  Vital signs are reassuring.  We discussed lab results.  At this point, I feel she can safely be discharged to home.  She is advised on continued fluid intake.  Once second liter is infused, anticipate discharge.  She is given strict return precautions.    Hydration: Based on the patient's presentation of Dehydration, Hyperglycemia, and Tachycardia the patient was given IV fluids. IV Hydration was used because oral hydration was not adequate alone. Upon recheck following hydration, the patient was improved.    ADDITIONAL PROBLEMS MANAGED    DISPOSITION AND DISCUSSIONS  I have discussed management of the patient with the following physicians and JUSTIN's:  None    Discussion of management with other QHP or appropriate source(s):  None     Escalation of care considered, and ultimately not performed:acute inpatient care management, however at this time, the patient is most appropriate for outpatient  management    Barriers to care at this time, including but not limited to: None.     Decision tools and prescription drugs considered including, but not limited to: None.    FINAL DIAGNOSIS  1. Body aches    2. Hyperglycemia         Electronically signed by: Malini Sherman D.O., 5/13/2025 7:21 AM

## 2025-05-13 NOTE — ED NOTES
0806:  PIV placed using US, blood drawn and sent to lab.  IVF infusing as ordered.  Pt updated on POC including pending tests and chart review by ERP.

## 2025-05-13 NOTE — ED TRIAGE NOTES
"Chief Complaint   Patient presents with    Flu Like Symptoms     C/o cough, congestion, body aches and chills started yesterday    Flank Pain     Right x 2 days     /86   Pulse (!) 106   Temp 37.2 °C (98.9 °F) (Temporal)   Resp 18   Ht 1.6 m (5' 3\")   Wt 60.7 kg (133 lb 13.1 oz)   LMP 05/10/2025   SpO2 98%   BMI 23.71 kg/m²     Pt ambulated to ED by self for c/o flu like symptoms and Right Flank pain, recent antibx for c/o same.    "

## 2025-05-13 NOTE — ED NOTES
Pt complains of right flank pain since Sunday and developed fever and chills last night. Denies painful urination. Pt states she took tylenol at 0300. Pt also has DM1 and concerned about excess sugar in her urine.

## 2025-05-17 DIAGNOSIS — D50.9 IRON DEFICIENCY ANEMIA, UNSPECIFIED IRON DEFICIENCY ANEMIA TYPE: ICD-10-CM

## 2025-05-19 NOTE — TELEPHONE ENCOUNTER
Received request via: Pharmacy    Was the patient seen in the last year in this department? Yes    Does the patient have an active prescription (recently filled or refills available) for medication(s) requested? No    Pharmacy Name: [id number] - [place], [place] - [address]     Does the patient have long-term Plus and need 100-day supply? (This applies to ALL medications) Patient does not have SCP

## 2025-05-21 RX ORDER — FERROUS SULFATE 325(65) MG
325 TABLET ORAL
Qty: 15 TABLET | Refills: 0 | Status: SHIPPED | OUTPATIENT
Start: 2025-05-21

## 2025-05-22 ENCOUNTER — PATIENT MESSAGE (OUTPATIENT)
Dept: INTERNAL MEDICINE | Facility: OTHER | Age: 30
End: 2025-05-22
Payer: MEDICAID

## 2025-05-22 DIAGNOSIS — E10.10 DKA, TYPE 1, NOT AT GOAL (HCC): ICD-10-CM

## 2025-05-22 DIAGNOSIS — E10.69 TYPE 1 DIABETES MELLITUS WITH OTHER SPECIFIED COMPLICATION (HCC): ICD-10-CM

## 2025-05-22 NOTE — PATIENT COMMUNICATION
Received request via: Patient    Was the patient seen in the last year in this department? Yes    Does the patient have an active prescription (recently filled or refills available) for medication(s) requested? No    Pharmacy Name: [id number] - [place], [place] - [address]      Does the patient have correction Plus and need 100-day supply? (This applies to ALL medications) Patient does not have SCP

## 2025-05-23 RX ORDER — INSULIN LISPRO 100 [IU]/ML
1-10 INJECTION, SOLUTION INTRAVENOUS; SUBCUTANEOUS
Qty: 6 ML | Refills: 1 | Status: SHIPPED | OUTPATIENT
Start: 2025-05-23

## 2025-05-23 RX ORDER — INSULIN GLARGINE 100 [IU]/ML
20 INJECTION, SOLUTION SUBCUTANEOUS
Qty: 15 ML | Refills: 0 | Status: SHIPPED | OUTPATIENT
Start: 2025-05-23

## 2025-06-05 ENCOUNTER — HOSPITAL ENCOUNTER (EMERGENCY)
Facility: MEDICAL CENTER | Age: 30
End: 2025-06-05
Attending: EMERGENCY MEDICINE
Payer: MEDICAID

## 2025-06-05 VITALS
HEART RATE: 78 BPM | HEIGHT: 63 IN | TEMPERATURE: 98.7 F | RESPIRATION RATE: 18 BRPM | OXYGEN SATURATION: 98 % | BODY MASS INDEX: 23.32 KG/M2 | DIASTOLIC BLOOD PRESSURE: 76 MMHG | SYSTOLIC BLOOD PRESSURE: 128 MMHG | WEIGHT: 131.61 LBS

## 2025-06-05 DIAGNOSIS — N89.8 VAGINAL DISCHARGE: Primary | ICD-10-CM

## 2025-06-05 LAB — HCG SERPL QL: NEGATIVE

## 2025-06-05 PROCEDURE — 87389 HIV-1 AG W/HIV-1&-2 AB AG IA: CPT

## 2025-06-05 PROCEDURE — 87491 CHLMYD TRACH DNA AMP PROBE: CPT

## 2025-06-05 PROCEDURE — 87660 TRICHOMONAS VAGIN DIR PROBE: CPT

## 2025-06-05 PROCEDURE — 84703 CHORIONIC GONADOTROPIN ASSAY: CPT

## 2025-06-05 PROCEDURE — 87480 CANDIDA DNA DIR PROBE: CPT

## 2025-06-05 PROCEDURE — 99284 EMERGENCY DEPT VISIT MOD MDM: CPT

## 2025-06-05 PROCEDURE — 36415 COLL VENOUS BLD VENIPUNCTURE: CPT

## 2025-06-05 PROCEDURE — 87510 GARDNER VAG DNA DIR PROBE: CPT

## 2025-06-05 PROCEDURE — 87591 N.GONORRHOEAE DNA AMP PROB: CPT

## 2025-06-05 PROCEDURE — 86780 TREPONEMA PALLIDUM: CPT

## 2025-06-05 ASSESSMENT — FIBROSIS 4 INDEX: FIB4 SCORE: 0.68

## 2025-06-05 NOTE — LETTER
6/7/2025               Ivis Klein  [address]  [address] [address]  [place] [address]        Dear Ivis (MR#[id number])    This letter is sent in regards to your recent visit to the [facility] on 6/5/2025. During the visit, tests were performed to assist the physician in your medical diagnosis. A review of your tests requires that we notify you of the following:    Your vaginal swab was POSITIVE for a bacteria called Gardnerella Vaginalis. The antibiotic prescribed for you (metronidazole) should be active to treat this bacteria. It is important that you continue taking your antibiotic until it is finished. Your sexually transmitted infection (STI) panel was NEGATIVE for both chlamydia and gonorrhea.    Please feel free to contact me at the number below if you have any questions or concerns. Thank you for your cooperation in the matter.    Sincerely,  ED Culture Follow-Up Staff  Star Lozano, PharmD  [phone]    [facility] Emergency Department  [facility] [place], [place] [address] [phone] (ED Culture Line)

## 2025-06-06 ENCOUNTER — HOSPITAL ENCOUNTER (EMERGENCY)
Facility: MEDICAL CENTER | Age: 30
End: 2025-06-06
Payer: MEDICAID

## 2025-06-06 VITALS
SYSTOLIC BLOOD PRESSURE: 130 MMHG | HEIGHT: 63 IN | TEMPERATURE: 98.4 F | RESPIRATION RATE: 16 BRPM | DIASTOLIC BLOOD PRESSURE: 87 MMHG | BODY MASS INDEX: 23.09 KG/M2 | HEART RATE: 87 BPM | OXYGEN SATURATION: 100 % | WEIGHT: 130.29 LBS

## 2025-06-06 LAB
C TRACH DNA GENITAL QL NAA+PROBE: NEGATIVE
CANDIDA DNA VAG QL PROBE+SIG AMP: NEGATIVE
G VAGINALIS DNA VAG QL PROBE+SIG AMP: POSITIVE
HIV 1+2 AB+HIV1 P24 AG SERPL QL IA: NORMAL
N GONORRHOEA DNA GENITAL QL NAA+PROBE: NEGATIVE
SPECIMEN SOURCE: NORMAL
T PALLIDUM AB SER QL IA: NORMAL
T VAGINALIS DNA VAG QL PROBE+SIG AMP: NEGATIVE

## 2025-06-06 PROCEDURE — 302449 STATCHG TRIAGE ONLY (STATISTIC)

## 2025-06-06 RX ORDER — METRONIDAZOLE 500 MG/1
500 TABLET ORAL EVERY 12 HOURS
Qty: 14 TABLET | Refills: 0 | Status: ACTIVE | OUTPATIENT
Start: 2025-06-06 | End: 2025-06-13

## 2025-06-06 RX ORDER — CLINDAMYCIN PHOSPHATE 20 MG/G
1 CREAM VAGINAL NIGHTLY
Qty: 40 G | Refills: 0 | Status: SHIPPED | OUTPATIENT
Start: 2025-06-06 | End: 2025-06-13

## 2025-06-06 ASSESSMENT — FIBROSIS 4 INDEX: FIB4 SCORE: 0.68

## 2025-06-06 NOTE — ED NOTES
"ED Positive Culture Follow-up/Notification Note:    Date: 6/6/25     Patient seen in the ED on 6/5/2025 for 2 days of vaginal discharge, mild abdominal cramping, vaginal odor, and itching. Pelvic exam performed in ER with evidence of \"thin white copious discharge\".  1. Vaginal discharge       Discharge Medication List as of 6/5/2025 11:26 PM      Allergies: Patient has no known allergies.   Vitals:    06/05/25 2008 06/05/25 2010 06/05/25 2326   BP:  133/74 128/76   Pulse:  85 78   Resp:  16 18   Temp:  37.1 °C (98.7 °F)    TempSrc:  Temporal    SpO2:  100% 98%   Weight: 59.7 kg (131 lb 9.8 oz)     Height: 1.6 m (5' 3\")     Final cultures:   Results       Procedure Component Value Units Date/Time    Chlamydia/GC, PCR (Genital/Anal swab) [[id number]] Collected: 06/05/25 2133    Order Status: Completed Updated: 06/05/25 2144     Source Genital    GC CULTURE w/ GRAM STAIN [[id number]] Collected: 06/05/25 0000    Order Status: Canceled Specimen: Cervical         Plan: Patient presented to the ER yesterday after seeing results via MyChart. Appropriate antibiotic therapy prescribed. No changes required based upon culture result.  Sent letter to patient to notify of positive culture result and encourage compliance with prescribed antibiotics.   Star Lozano, PharmD   "

## 2025-06-06 NOTE — ED NOTES
ERP at bedside. Pt agrees with plan of care discussed by ERP. Glenny in low position, side rail up for pt safety. Call light within reach. Plan of care on-going

## 2025-06-06 NOTE — DISCHARGE INSTRUCTIONS
You will receive a call if any of your tests are positive you should also check MyChart and anything of them are positive you can also call in if you have not heard from us.  In the meantime take ibuprofen Tylenol as needed and refrain from sexual activity until you know the results of your tests      Also if your HIV and syphilis are negative I still recommend repeat testing in 6 weeks as sometimes it can take time to convert

## 2025-06-06 NOTE — ED TRIAGE NOTES
"Chief Complaint   Patient presents with    Medication Refill     Here yesterday for bacterial vaginosis. Requesting abx. Swab yesterday result positive for Gardnerella vaginalis.      Blood Pressure: 130/87, Pulse: 87, Respiration: 16, Temperature: 36.9 °C (98.4 °F), Height: 160 cm (5' 3\"), Weight: 59.1 kg (130 lb 4.7 oz), BMI (Calculated): 23.08, BSA (Calculated): 1.6, Pulse Oximetry: 100 %, O2 Delivery Device: None - Room Air  "

## 2025-06-06 NOTE — ED TRIAGE NOTES
"/74   Pulse 85   Temp 37.1 °C (98.7 °F) (Temporal)   Resp 16   Ht 1.6 m (5' 3\")   Wt 59.7 kg (131 lb 9.8 oz)   LMP 05/10/2025 (Approximate)   SpO2 100%   BMI 23.31 kg/m²   Chief Complaint   Patient presents with    Vaginal Discharge     Started have issues about 2 days ago   having a lot of dischg  odor and pain  Hx of BV  feels the same      Comes in by herself   "

## 2025-06-06 NOTE — ED PROVIDER NOTES
ED Provider Note    CHIEF COMPLAINT  Chief Complaint   Patient presents with    Vaginal Discharge     Started have issues about 2 days ago   having a lot of dischg  odor and pain  Hx of BV  feels the same        HPI/ROS  LIMITATION TO HISTORY   Select: : None  OUTSIDE HISTORIAN(S):  colt    Ivis Klein is a 29 y.o. female who presents to the emergency department chief complaint of 2 days of vaginal discharge.  She also states she is having some abdominal cramping a little bit of odor and itching.  She states that she is with a new partner and they are not using protection.  She denies any fevers chills nausea or vomiting no dysuria hematuria or flank pain.  She is requesting testing for STIs    PAST MEDICAL HISTORY   has a past medical history of Anemia (02/17/2022), Anxiety (02/16/2017), Anxiety (02/16/2017), Elevated antinuclear antibody (DEJON) level (05/23/2019), Graves disease (12/05/2016), Heart valve disease, Hemorrhagic cysts of both ovaries (05/23/2019), History of panic attack (04/09/2020), History of pericarditis (12/05/2016), History of thyroidectomy (01/31/2020), Panic attack (03/11/2019), Postpartum depression (02/17/2022), Postpartum depression (02/17/2022), Type 1 diabetes mellitus with other specified complication (HCC) (11/8/2024), and Vaginal discharge (12/02/2021).    SURGICAL HISTORY   has a past surgical history that includes primary c section (9/8/2013); thyroidectomy total (Bilateral, 3/22/2017); and aiyana by laparoscopy (N/A, 8/28/2024).    FAMILY HISTORY  Family History   Problem Relation Age of Onset    Cancer Paternal Grandmother 56        breast cancer    No Known Problems Mother     Hyperlipidemia Father     No Known Problems Sister     No Known Problems Brother        SOCIAL HISTORY  Social History     Tobacco Use    Smoking status: Never    Smokeless tobacco: Never    Tobacco comments:     quit in 2012   Vaping Use    Vaping status: Never Used   Substance and Sexual Activity  "   Alcohol use: Not Currently    Drug use: Yes     Types: Marijuana, Inhaled     Comment: marijuana weekly    Sexual activity: Yes     Partners: Male     Birth control/protection: I.U.D.       CURRENT MEDICATIONS  Home Medications       Reviewed by Samara Lowry R.N. (Registered Nurse) on 06/05/25 at 2013  Med List Status: Partial     Medication Last Dose Status   acetaminophen (TYLENOL) 500 MG Tab  Active   Continuous Glucose Sensor (FREESTYLE MIRTA 3 SENSOR) Misc  Active   Continuous Glucose Sensor (FREESTYLE MIRTA 3 SENSOR) Misc  Active   doxycycline (MONODOX) 100 MG capsule  Active   fluconazole (DIFLUCAN) 150 MG tablet  Active   insulin glargine (LANTUS SOLOSTAR) 100 UNIT/ML Solution Pen-injector injection  Active   insulin lispro 100 UNIT/ML SC SOPN injection PEN  Active   Insulin Pen Needle 32 G x 4 mm (BD PEN NEEDLE EDENILSON 2ND GEN)  Active   levonorgestrel (MIRENA, 52 MG,) 20 MCG/DAY IUD  Active   levothyroxine (SYNTHROID) 75 MCG Tab  Active   ondansetron (ZOFRAN ODT) 4 MG TABLET DISPERSIBLE  Active   ondansetron (ZOFRAN) 4 MG Tab tablet  Active   SV IRON 325 (65 Fe) MG tablet  Active                    ALLERGIES  Allergies[1]    PHYSICAL EXAM  VITAL SIGNS: /74   Pulse 85   Temp 37.1 °C (98.7 °F) (Temporal)   Resp 16   Ht 1.6 m (5' 3\")   Wt 59.7 kg (131 lb 9.8 oz)   LMP 05/10/2025 (Approximate)   SpO2 100%   BMI 23.31 kg/m²    Pulse OX: Pulse Oxygen level is normal on room air  Constitutional: Alert in no apparent distress.  HENT: Normocephalic, Atraumatic, MMM  Eyes: PERound. Conjunctiva normal, non-icteric.   Heart: Regular rate and rhythm, intact distal pulses   Lungs: Symmetrical movement, no resp distress   Abdomen: Non-tender, non-distended, normal bowel sounds  EXT/Back no edema or CVA tenderness  Skin: Warm, Dry, No erythema, No rash.   Neurologic: Alert and oriented, Grossly non-focal.       EKG/LABS  Labs Reviewed   HCG QUAL SERUM   CHLAMYDIA/GC, PCR (GENITAL/ANAL SWAB)   BACTERIAL " VAGINOSIS/VAGINITIS PANEL   HIV AG/AB COMBO ASSAY SCREENING   T.PALLIDUM AB ELEANOR (SCREENING)         COURSE & MEDICAL DECISION MAKING    ASSESSMENT, COURSE AND PLAN  Care Narrative:     Patient is a 29-year-old female presents to the Emergency Department with vaginal discharge in the setting of sexual activity without protection.  Abdomen is soft and nontender will test her for pregnancy chlamydia gonorrhea vaginal pathogens HIV and syphilis.    DISPOSITION AND DISCUSSIONS  9:24 PM  Pelvic examination performed external genitalia within normal limits no rash she does have a thin white copious discharge without cervical motion tenderness or fullness on either ovarian side no significant pelvic tenderness. -       11:22 PM  I reassessed patient at the bedside.  Unfortunately most of the labs that were done here tonight had to be sent to [facility].  She does have MyChart we confirmed her phone number in the chart and she wishes to go home.  Once her labs results she will be called and started on what ever is appropriate either for her Normal vaginal discharge possible STI.  However there is no signs of cervicitis on physical examination today  We did discuss abstaining from sexual activity until her lab results return       I have discussed management of the patient with the following physicians and JUSTIN's:  none    Discussion of management with other QHP or appropriate source(s): None     Escalation of care considered, and ultimately not performed:diagnostic imaging    Barriers to care at this time, including but not limited to: na.     Decision tools and prescription drugs considered including, but not limited to: may require antibiotics .    The patient will return for new or worsening symptoms and is stable at the time of discharge.    The patient is referred to a primary physician for blood pressure management, diabetic screening, and for all other preventative health concerns.    DISPOSITION:  Patient will be  discharged home in stable condition.    FOLLOW UP:  Leonel Mcdermott D.O.  [address] [facility] [id number] [phone]    Schedule an appointment as soon as possible for a visit         OUTPATIENT MEDICATIONS:  Discharge Medication List as of 6/5/2025 11:26 PM            FINAL DIAGNOSIS  1. Vaginal discharge         Electronically signed by: Caitlin Norton M.D., 6/5/2025 8:59 PM           [1] No Known Allergies

## 2025-06-06 NOTE — ED NOTES
Discharge instructions provided. Pt verbalized understanding that any positive results will be called to her by an ERP with further instructions on care. Pt verbalized understanding of discharge instructions to follow up with PCP for repeat testing and to return to ER if condition worsens. Pt ambulated out of ER without difficulty.

## 2025-06-07 NOTE — ED NOTES
"ED Positive Culture Follow-up/Notification Note:   Date: 6/6/2025    Patient seen in the ED on 6/5/2025 for vaginal discharge.   1. Vaginal discharge      Discharge Medication List as of 6/5/2025 11:26 PM        Allergies: Patient has no known allergies.    Vitals:    06/05/25 2008 06/05/25 2010 06/05/25 2326   BP:  133/74 128/76   Pulse:  85 78   Resp:  16 18   Temp:  37.1 °C (98.7 °F)    TempSrc:  Temporal    SpO2:  100% 98%   Weight: 59.7 kg (131 lb 9.8 oz)     Height: 1.6 m (5' 3\")         Final cultures:   Results       Procedure Component Value Units Date/Time    Chlamydia/GC, PCR (Genital/Anal swab) [[id number]] Collected: 06/05/25 2133    Order Status: Completed Updated: 06/05/25 2144     Source Genital    GC CULTURE w/ GRAM STAIN [[id number]] Collected: 06/05/25 0000    Order Status: Canceled Specimen: Cervical            Latest Reference Range & Units 06/05/25 21:33   Gardnerella vaginalis DNA Probe Negative  POSITIVE !   !: Data is abnormal    Plan:   Patient was positive for gardnerella vaginalis from her ER visit culture on 6/5/2025. She was prescribed clindamycin vaginal gel by her OBGYN however requested oral treatment option.     6/6 at 1716 Discussed with patient that her chlamydia and gonorrhea result were not back yet and that someone else would call her to inform her of those results.     Discussed culture result and change in antibiotics with patient, who will  new prescription at [facility] [address] Rx:  Metronidazole 500 mg twice daily  x 7 days #14, no refills - MD: Bebeto per protocol    EMERY Jessica, PharmD    "

## 2025-06-11 ENCOUNTER — APPOINTMENT (OUTPATIENT)
Dept: INTERNAL MEDICINE | Facility: OTHER | Age: 30
End: 2025-06-11
Payer: MEDICAID

## 2025-06-12 DIAGNOSIS — E10.69 TYPE 1 DIABETES MELLITUS WITH OTHER SPECIFIED COMPLICATION (HCC): ICD-10-CM

## 2025-06-12 RX ORDER — ACYCLOVIR 800 MG/1
TABLET ORAL
Qty: 2 EACH | Refills: 0 | Status: SHIPPED | OUTPATIENT
Start: 2025-06-12

## 2025-06-12 NOTE — TELEPHONE ENCOUNTER
Received request via: Pharmacy    Was the patient seen in the last year in this department? Yes    Does the patient have an active prescription (recently filled or refills available) for medication(s) requested? No    Pharmacy Name: [id number] - [place], [place] - [address]      Does the patient have [facility]PC Plus and need 100-day supply? (This applies to ALL medications) Patient does not have SCP

## 2025-06-13 ENCOUNTER — APPOINTMENT (OUTPATIENT)
Dept: INTERNAL MEDICINE | Facility: OTHER | Age: 30
End: 2025-06-13
Payer: MEDICAID

## 2025-06-16 DIAGNOSIS — D50.9 IRON DEFICIENCY ANEMIA, UNSPECIFIED IRON DEFICIENCY ANEMIA TYPE: ICD-10-CM

## 2025-06-16 NOTE — TELEPHONE ENCOUNTER
Received request via: Pharmacy    Was the patient seen in the last year in this department? Yes    Does the patient have an active prescription (recently filled or refills available) for medication(s) requested? No    Pharmacy Name: [id number] - [place], [place] - [address]     Does the patient have residential Plus and need 100-day supply? (This applies to ALL medications) no

## 2025-06-18 RX ORDER — FERROUS SULFATE 325(65) MG
325 TABLET ORAL
Qty: 15 TABLET | Refills: 0 | Status: SHIPPED | OUTPATIENT
Start: 2025-06-18

## 2025-06-23 ENCOUNTER — TELEPHONE (OUTPATIENT)
Dept: INTERNAL MEDICINE | Facility: OTHER | Age: 30
End: 2025-06-23
Payer: MEDICAID

## 2025-06-23 NOTE — TELEPHONE ENCOUNTER
FINAL PRIOR AUTHORIZATION STATUS:    1.  Name of Medication & Dose: insulin glargine (LANTUS SOLOSTAR) 100 UNIT/ML Solution Pen-injector injection     2. Prior Auth Status: Approved through 6/23/26     3. Action Taken: Pharmacy Notified: N\A Patient Notified: no   Deep Sutures: 4-0 Vicryl

## 2025-06-23 NOTE — TELEPHONE ENCOUNTER
DOCUMENTATION OF PAR STATUS:    1. Name of Medication & Dose: insulin glargine (LANTUS SOLOSTAR) 100 UNIT/ML Solution Pen-injector injection      2. Name of Prescription Coverage Company & phone #: [employer]    3. Date Prior Auth Submitted: 6/23/25    4. What information was given to obtain insurance decision? Appointment notes    5. Prior Auth Status? Pending    6. Patient Notified: no

## 2025-06-26 ENCOUNTER — HOSPITAL ENCOUNTER (EMERGENCY)
Facility: MEDICAL CENTER | Age: 30
End: 2025-06-26
Attending: EMERGENCY MEDICINE
Payer: MEDICAID

## 2025-06-26 VITALS
TEMPERATURE: 98.8 F | OXYGEN SATURATION: 97 % | HEART RATE: 77 BPM | BODY MASS INDEX: 22.58 KG/M2 | HEIGHT: 63 IN | SYSTOLIC BLOOD PRESSURE: 145 MMHG | DIASTOLIC BLOOD PRESSURE: 76 MMHG | WEIGHT: 127.43 LBS | RESPIRATION RATE: 16 BRPM

## 2025-06-26 DIAGNOSIS — N89.8 VAGINAL DISCHARGE: Primary | ICD-10-CM

## 2025-06-26 LAB
ALBUMIN SERPL BCP-MCNC: 4.6 G/DL (ref 3.2–4.9)
ALBUMIN/GLOB SERPL: 1.4 G/DL
ALP SERPL-CCNC: 55 U/L (ref 30–99)
ALT SERPL-CCNC: 16 U/L (ref 2–50)
ANION GAP SERPL CALC-SCNC: 12 MMOL/L (ref 7–16)
APPEARANCE UR: CLEAR
APPEARANCE UR: CLEAR
AST SERPL-CCNC: 21 U/L (ref 12–45)
BACTERIA #/AREA URNS HPF: ABNORMAL /HPF
BACTERIA #/AREA URNS HPF: ABNORMAL /HPF
BACTERIA GENITAL QL WET PREP: NORMAL
BASOPHILS # BLD AUTO: 1.1 % (ref 0–1.8)
BASOPHILS # BLD: 0.06 K/UL (ref 0–0.12)
BILIRUB SERPL-MCNC: 0.3 MG/DL (ref 0.1–1.5)
BILIRUB UR QL STRIP.AUTO: NEGATIVE
BILIRUB UR QL STRIP.AUTO: NEGATIVE
BUN SERPL-MCNC: 8 MG/DL (ref 8–22)
CALCIUM ALBUM COR SERPL-MCNC: 9.2 MG/DL (ref 8.5–10.5)
CALCIUM SERPL-MCNC: 9.7 MG/DL (ref 8.5–10.5)
CASTS URNS QL MICRO: ABNORMAL /LPF (ref 0–2)
CASTS URNS QL MICRO: ABNORMAL /LPF (ref 0–2)
CHLORIDE SERPL-SCNC: 101 MMOL/L (ref 96–112)
CO2 SERPL-SCNC: 25 MMOL/L (ref 20–33)
COLOR UR: YELLOW
COLOR UR: YELLOW
CREAT SERPL-MCNC: 0.67 MG/DL (ref 0.5–1.4)
EOSINOPHIL # BLD AUTO: 0.06 K/UL (ref 0–0.51)
EOSINOPHIL NFR BLD: 1.1 % (ref 0–6.9)
EPITHELIAL CELLS 1715: ABNORMAL /HPF (ref 0–5)
EPITHELIAL CELLS 1715: ABNORMAL /HPF (ref 0–5)
ERYTHROCYTE [DISTWIDTH] IN BLOOD BY AUTOMATED COUNT: 45.8 FL (ref 35.9–50)
GFR SERPLBLD CREATININE-BSD FMLA CKD-EPI: 122 ML/MIN/1.73 M 2
GLOBULIN SER CALC-MCNC: 3.4 G/DL (ref 1.9–3.5)
GLUCOSE SERPL-MCNC: 145 MG/DL (ref 65–99)
GLUCOSE UR STRIP.AUTO-MCNC: >=1000 MG/DL
GLUCOSE UR STRIP.AUTO-MCNC: >=1000 MG/DL
HCG SERPL QL: NEGATIVE
HCT VFR BLD AUTO: 37.1 % (ref 37–47)
HGB BLD-MCNC: 11 G/DL (ref 12–16)
HYPOCHROMIA BLD QL SMEAR: ABNORMAL
IMM GRANULOCYTES # BLD AUTO: 0 K/UL (ref 0–0.11)
IMM GRANULOCYTES NFR BLD AUTO: 0 % (ref 0–0.9)
KETONES UR STRIP.AUTO-MCNC: 15 MG/DL
KETONES UR STRIP.AUTO-MCNC: ABNORMAL MG/DL
LEUKOCYTE ESTERASE UR QL STRIP.AUTO: NEGATIVE
LEUKOCYTE ESTERASE UR QL STRIP.AUTO: NEGATIVE
LIPASE SERPL-CCNC: 27 U/L (ref 11–82)
LYMPHOCYTES # BLD AUTO: 2.47 K/UL (ref 1–4.8)
LYMPHOCYTES NFR BLD: 46.1 % (ref 22–41)
MCH RBC QN AUTO: 23.5 PG (ref 27–33)
MCHC RBC AUTO-ENTMCNC: 29.6 G/DL (ref 32.2–35.5)
MCV RBC AUTO: 79.1 FL (ref 81.4–97.8)
MICRO URNS: ABNORMAL
MICRO URNS: ABNORMAL
MICROCYTES BLD QL SMEAR: ABNORMAL
MONOCYTES # BLD AUTO: 0.33 K/UL (ref 0–0.85)
MONOCYTES NFR BLD AUTO: 6.2 % (ref 0–13.4)
NEUTROPHILS # BLD AUTO: 2.44 K/UL (ref 1.82–7.42)
NEUTROPHILS NFR BLD: 45.5 % (ref 44–72)
NITRITE UR QL STRIP.AUTO: NEGATIVE
NITRITE UR QL STRIP.AUTO: NEGATIVE
NRBC # BLD AUTO: 0 K/UL
NRBC BLD-RTO: 0 /100 WBC (ref 0–0.2)
OVALOCYTES BLD QL SMEAR: NORMAL
PH UR STRIP.AUTO: 5.5 [PH] (ref 5–8)
PH UR STRIP.AUTO: 5.5 [PH] (ref 5–8)
PLATELET # BLD AUTO: 302 K/UL (ref 164–446)
PLATELET BLD QL SMEAR: NORMAL
PMV BLD AUTO: 10.9 FL (ref 9–12.9)
POIKILOCYTOSIS BLD QL SMEAR: NORMAL
POTASSIUM SERPL-SCNC: 3.6 MMOL/L (ref 3.6–5.5)
PROT SERPL-MCNC: 8 G/DL (ref 6–8.2)
PROT UR QL STRIP: NEGATIVE MG/DL
PROT UR QL STRIP: NEGATIVE MG/DL
RBC # BLD AUTO: 4.69 M/UL (ref 4.2–5.4)
RBC # URNS HPF: ABNORMAL /HPF
RBC # URNS HPF: ABNORMAL /HPF
RBC BLD AUTO: PRESENT
RBC UR QL AUTO: ABNORMAL
RBC UR QL AUTO: ABNORMAL
SIGNIFICANT IND 70042: NORMAL
SITE SITE: NORMAL
SODIUM SERPL-SCNC: 138 MMOL/L (ref 135–145)
SOURCE SOURCE: NORMAL
SP GR UR STRIP.AUTO: >1.035
SP GR UR STRIP.AUTO: >1.035
SPECIMEN SOURCE: NORMAL
UROBILINOGEN UR STRIP.AUTO-MCNC: 1 EU/DL
UROBILINOGEN UR STRIP.AUTO-MCNC: 1 EU/DL
WBC # BLD AUTO: 5.4 K/UL (ref 4.8–10.8)
WBC #/AREA URNS HPF: ABNORMAL /HPF
WBC #/AREA URNS HPF: ABNORMAL /HPF

## 2025-06-26 PROCEDURE — 700101 HCHG RX REV CODE 250

## 2025-06-26 PROCEDURE — 83690 ASSAY OF LIPASE: CPT

## 2025-06-26 PROCEDURE — 84703 CHORIONIC GONADOTROPIN ASSAY: CPT

## 2025-06-26 PROCEDURE — 87491 CHLMYD TRACH DNA AMP PROBE: CPT | Mod: 91

## 2025-06-26 PROCEDURE — 87389 HIV-1 AG W/HIV-1&-2 AB AG IA: CPT

## 2025-06-26 PROCEDURE — 87591 N.GONORRHOEAE DNA AMP PROB: CPT | Mod: 91

## 2025-06-26 PROCEDURE — 80053 COMPREHEN METABOLIC PANEL: CPT

## 2025-06-26 PROCEDURE — 96372 THER/PROPH/DIAG INJ SC/IM: CPT

## 2025-06-26 PROCEDURE — 86780 TREPONEMA PALLIDUM: CPT

## 2025-06-26 PROCEDURE — 700111 HCHG RX REV CODE 636 W/ 250 OVERRIDE (IP): Mod: JZ | Performed by: EMERGENCY MEDICINE

## 2025-06-26 PROCEDURE — 81001 URINALYSIS AUTO W/SCOPE: CPT | Mod: 91

## 2025-06-26 PROCEDURE — 85025 COMPLETE CBC W/AUTO DIFF WBC: CPT

## 2025-06-26 PROCEDURE — 99284 EMERGENCY DEPT VISIT MOD MDM: CPT

## 2025-06-26 PROCEDURE — 36415 COLL VENOUS BLD VENIPUNCTURE: CPT

## 2025-06-26 RX ORDER — DOXYCYCLINE 100 MG/1
100 CAPSULE ORAL 2 TIMES DAILY
Qty: 14 CAPSULE | Refills: 0 | Status: ACTIVE | OUTPATIENT
Start: 2025-06-26 | End: 2025-07-03

## 2025-06-26 RX ORDER — CEFTRIAXONE 1 G/1
1000 INJECTION, POWDER, FOR SOLUTION INTRAMUSCULAR; INTRAVENOUS ONCE
Status: COMPLETED | OUTPATIENT
Start: 2025-06-26 | End: 2025-06-26

## 2025-06-26 RX ADMIN — CEFTRIAXONE SODIUM 1000 MG: 1 INJECTION, POWDER, FOR SOLUTION INTRAMUSCULAR; INTRAVENOUS at 21:19

## 2025-06-26 RX ADMIN — LIDOCAINE HYDROCHLORIDE 2.1 ML: 10 INJECTION, SOLUTION INFILTRATION; PERINEURAL at 21:19

## 2025-06-26 ASSESSMENT — FIBROSIS 4 INDEX: FIB4 SCORE: 0.68

## 2025-06-27 LAB
HIV 1+2 AB+HIV1 P24 AG SERPL QL IA: NORMAL
T PALLIDUM AB SER QL IA: NORMAL

## 2025-06-27 NOTE — ED PROVIDER NOTES
"ED Provider Note    CHIEF COMPLAINT  Chief Complaint   Patient presents with    Vaginal Discharge     Thinking she has BV or something else  has Hx of such  about 2 weeks ago had same  was given 7 day course med which it helped   has returned having vag bleeding and \"feels like I have a thousand knife cut's down there\"         EXTERNAL RECORDS REVIEWED  Inpatient Notes ED note 6/5/25 when the patient was evaluated for the same complaint and diagnosed with bacterial vaginosis    HPI/ROS  LIMITATION TO HISTORY   Select: : None  OUTSIDE HISTORIAN(S):  None    Ivis Klein is a 29 y.o. female who presents to the emergency department for evaluation of vaginal discharge.  Patient states that she has had 2 days of vaginal discharge and discomfort.  She states that this feels very similar to when she is had bacterial vaginosis in the past.  She states that she was sexually active with her male partner a couple of days prior to her symptoms starting.  She states that he travels a lot and she is concerned he could have other partners.  She denies any other history of STIs.  She denies fevers greater than 100 °F.  She has had some nausea.  She denies any vomiting or changes in urination.  She has not had any diarrhea.  She has not had any chest pain or shortness of breath.    PAST MEDICAL HISTORY   has a past medical history of Anemia (02/17/2022), Anxiety (02/16/2017), Anxiety (02/16/2017), Elevated antinuclear antibody (DEJON) level (05/23/2019), Graves disease (12/05/2016), Heart valve disease, Hemorrhagic cysts of both ovaries (05/23/2019), History of panic attack (04/09/2020), History of pericarditis (12/05/2016), History of thyroidectomy (01/31/2020), Panic attack (03/11/2019), Postpartum depression (02/17/2022), Postpartum depression (02/17/2022), Type 1 diabetes mellitus with other specified complication (HCC) (11/8/2024), and Vaginal discharge (12/02/2021).    SURGICAL HISTORY   has a past surgical history " "that includes primary c section (9/8/2013); thyroidectomy total (Bilateral, 3/22/2017); and aiyana by laparoscopy (N/A, 8/28/2024).    FAMILY HISTORY  Family History   Problem Relation Age of Onset    Cancer Paternal Grandmother 56        breast cancer    No Known Problems Mother     Hyperlipidemia Father     No Known Problems Sister     No Known Problems Brother        SOCIAL HISTORY  Social History     Tobacco Use    Smoking status: Never    Smokeless tobacco: Never    Tobacco comments:     quit in 2012   Vaping Use    Vaping status: Never Used   Substance and Sexual Activity    Alcohol use: Not Currently    Drug use: Yes     Types: Marijuana, Inhaled     Comment: marijuana weekly    Sexual activity: Yes     Partners: Male     Birth control/protection: I.U.D.       CURRENT MEDICATIONS  Home Medications    **Home medications have not yet been reviewed for this encounter**         ALLERGIES  Allergies[1]    PHYSICAL EXAM  VITAL SIGNS: BP (!) 150/83   Pulse 78   Temp 37.3 °C (99.2 °F) (Temporal)   Resp 18   Ht 1.6 m (5' 3\")   Wt 57.8 kg (127 lb 6.8 oz)   LMP 06/10/2025 (Approximate)   SpO2 98%   BMI 22.57 kg/m²   Constitutional: Alert and in no apparent distress.  HENT: Normocephalic atraumatic. Bilateral external ears normal. Nose normal. Mucous membranes are moist.  Eyes: Pupils are equal and reactive. Conjunctiva normal. Non-icteric sclera.   Neck: Normal range of motion without tenderness. Supple. No meningeal signs.  Cardiovascular: Regular rate and rhythm. No murmurs, gallops or rubs.  Thorax & Lungs: No increased work of breathing. Breath sounds are clear to auscultation bilaterally. No wheezing, rhonchi or rales.  Abdomen: Soft, nontender and nondistended.   Skin: Warm and dry. No rashes are noted.  Back: No bony tenderness, No CVA tenderness.   Pelvic: Nirmal - RHIANNA Jean RN.  Normal external genitalia.  There is a moderate amount of clear white discharge.  Cervix is closed and appears normal.  No " CMT.  Musculoskeletal: Good range of motion in all major joints. No tenderness to palpation or major deformities noted.   Neurologic: Alert and appropriate for age. The patient moves all 4 extremities without obvious deficits.    LABS  Results for orders placed or performed during the hospital encounter of 06/26/25   URINALYSIS    Collection Time: 06/26/25  6:10 PM    Specimen: Urine, Clean Catch   Result Value Ref Range    Color Yellow     Character Clear     Specific Gravity >1.035 (A) <1.035    Ph 5.5 5.0 - 8.0    Glucose >=1000 (A) Negative mg/dL    Ketones Trace (A) Negative mg/dL    Protein Negative Negative mg/dL    Bilirubin Negative Negative    Urobilinogen, Urine 1.0 <=1.0 EU/dL    Nitrite Negative Negative    Leukocyte Esterase Negative Negative    Occult Blood Small (A) Negative    Micro Urine Req Microscopic    URINE MICROSCOPIC (W/UA)    Collection Time: 06/26/25  6:10 PM   Result Value Ref Range    WBC 3-5 (A) /hpf    RBC 11-20 (A) /hpf    Bacteria None Seen None /hpf    Epithelial Cells 6-10 (A) 0 - 5 /hpf    Urine Casts 0-2 0 - 2 /lpf   URINALYSIS CULTURE, IF INDICATED    Collection Time: 06/26/25  7:00 PM    Specimen: Urine, Clean Catch   Result Value Ref Range    Color Yellow     Character Clear     Specific Gravity >1.035 (A) <1.035    Ph 5.5 5.0 - 8.0    Glucose >=1000 (A) Negative mg/dL    Ketones 15 (A) Negative mg/dL    Protein Negative Negative mg/dL    Bilirubin Negative Negative    Urobilinogen, Urine 1.0 <=1.0 EU/dL    Nitrite Negative Negative    Leukocyte Esterase Negative Negative    Occult Blood Trace (A) Negative    Micro Urine Req Microscopic    WET PREP    Collection Time: 06/26/25  7:00 PM    Specimen: Vaginal; Genital   Result Value Ref Range    Significant Indicator NEG     Source GEN     Site VAGINAL     Wet Prep For Parasites       No yeast.  No motile Trichomonas seen.  No clue cells seen.  Rare WBCs seen.     Chlamydia/GC, PCR (Genital/Anal swab)    Collection Time: 06/26/25   7:00 PM   Result Value Ref Range    Source Vaginal    URINE MICROSCOPIC (W/UA)    Collection Time: 06/26/25  7:00 PM   Result Value Ref Range    WBC 0-2 /hpf    RBC 6-10 (A) /hpf    Bacteria None Seen None /hpf    Epithelial Cells 6-10 (A) 0 - 5 /hpf    Urine Casts 0-2 0 - 2 /lpf   CBC WITH DIFFERENTIAL    Collection Time: 06/26/25  7:32 PM   Result Value Ref Range    WBC 5.4 4.8 - 10.8 K/uL    RBC 4.69 4.20 - 5.40 M/uL    Hemoglobin 11.0 (L) 12.0 - 16.0 g/dL    Hematocrit 37.1 37.0 - 47.0 %    MCV 79.1 (L) 81.4 - 97.8 fL    MCH 23.5 (L) 27.0 - 33.0 pg    MCHC 29.6 (L) 32.2 - 35.5 g/dL    RDW 45.8 35.9 - 50.0 fL    Platelet Count 302 164 - 446 K/uL    MPV 10.9 9.0 - 12.9 fL    Neutrophils-Polys 45.50 44.00 - 72.00 %    Lymphocytes 46.10 (H) 22.00 - 41.00 %    Monocytes 6.20 0.00 - 13.40 %    Eosinophils 1.10 0.00 - 6.90 %    Basophils 1.10 0.00 - 1.80 %    Immature Granulocytes 0.00 0.00 - 0.90 %    Nucleated RBC 0.00 0.00 - 0.20 /100 WBC    Neutrophils (Absolute) 2.44 1.82 - 7.42 K/uL    Lymphs (Absolute) 2.47 1.00 - 4.80 K/uL    Monos (Absolute) 0.33 0.00 - 0.85 K/uL    Eos (Absolute) 0.06 0.00 - 0.51 K/uL    Baso (Absolute) 0.06 0.00 - 0.12 K/uL    Immature Granulocytes (abs) 0.00 0.00 - 0.11 K/uL    NRBC (Absolute) 0.00 K/uL    Hypochromia 1+     Microcytosis 2+ (A)    COMP METABOLIC PANEL    Collection Time: 06/26/25  7:32 PM   Result Value Ref Range    Sodium 138 135 - 145 mmol/L    Potassium 3.6 3.6 - 5.5 mmol/L    Chloride 101 96 - 112 mmol/L    Co2 25 20 - 33 mmol/L    Anion Gap 12.0 7.0 - 16.0    Glucose 145 (H) 65 - 99 mg/dL    Bun 8 8 - 22 mg/dL    Creatinine 0.67 0.50 - 1.40 mg/dL    Calcium 9.7 8.5 - 10.5 mg/dL    Correct Calcium 9.2 8.5 - 10.5 mg/dL    AST(SGOT) 21 12 - 45 U/L    ALT(SGPT) 16 2 - 50 U/L    Alkaline Phosphatase 55 30 - 99 U/L    Total Bilirubin 0.3 0.1 - 1.5 mg/dL    Albumin 4.6 3.2 - 4.9 g/dL    Total Protein 8.0 6.0 - 8.2 g/dL    Globulin 3.4 1.9 - 3.5 g/dL    A-G Ratio 1.4 g/dL    LIPASE    Collection Time: 06/26/25  7:32 PM   Result Value Ref Range    Lipase 27 11 - 82 U/L   HCG QUAL SERUM    Collection Time: 06/26/25  7:32 PM   Result Value Ref Range    Beta-Hcg Qualitative Serum Negative Negative   ESTIMATED GFR    Collection Time: 06/26/25  7:32 PM   Result Value Ref Range    GFR (CKD-EPI) 122 >60 mL/min/1.73 m 2   PLATELET ESTIMATE    Collection Time: 06/26/25  7:32 PM   Result Value Ref Range    Plt Estimation Normal    MORPHOLOGY    Collection Time: 06/26/25  7:32 PM   Result Value Ref Range    RBC Morphology Present     Poikilocytosis 1+     Ovalocytes 1+      COURSE & MEDICAL DECISION MAKING    ASSESSMENT, COURSE AND PLAN  Care Narrative: This is a 29-year-old female presenting to the emergency department for evaluation of vaginal discharge.  On initial evaluation, the patient did not appear to be in any acute distress.  Vital signs were reassuring.  Physical exam was notable for a moderate amount of clear to white discharge.  She had no evidence of cervicitis or PID.  Her abdominal exam was benign as well.  I am less concerned for tubo-ovarian abscess.    Given the lack of tenderness to palpation on abdominal exam, I do not think that she requires imaging at this point.    White blood cell count was normal and reassuring.  H&H were reassuring with no significant anemia.  Electrolytes were without derangement aside from a mildly elevated glucose.  She had no evidence of DKA.    Pregnancy test was negative.  Urinalysis was notable for glucose consistent with her history of diabetes.  There were 6-10 WBCs noted but no bacteria.  I am less concern for occult UTI or pyelonephritis.    Wet prep did not reveal any yeast, motile trichomonas, or clue cells.  I am less concerned for bacterial vaginosis.  GC, screening and syphilis and HIV were pending.  However, given the patient's symptoms the plan was made to treat for GC.  She is given a dose of IM ceftriaxone here in the emergency  department and a prescription for doxycycline was sent to the pharmacy.  I strongly encouraged her to follow-up the results of her labs via [employer].  If positive her partner will need to be treated.  She will follow-up with her primary care physician and return to the ED with any worsening signs or symptoms.    ADDITIONAL PROBLEMS MANAGED  None    DISPOSITION AND DISCUSSIONS  I have discussed management of the patient with the following physicians and JUSTIN's:  None    Discussion of management with other Hospitals in [place] or appropriate source(s): None     Escalation of care considered, and ultimately not performed:acute inpatient care management, however at this time, the patient is most appropriate for outpatient management    Barriers to care at this time, including but not limited to: None.     Decision tools and prescription drugs considered including, but not limited to: Antibiotics Doxycyline.    FINAL IMPRESSION  1. Vaginal discharge      PRESCRIPTIONS  New Prescriptions    DOXYCYCLINE (MONODOX) 100 MG CAPSULE    Take 1 Capsule by mouth 2 times a day for 7 days.     FOLLOW UP  Leonel Mcdermott D.O.  [address] [facility] [id number] [phone]    Call in 1 day  To schedule a follow up appointment    [facility], Emergency Dept  [address] [facility] [address]  [phone]  Go to   As needed    -DISCHARGE-    Electronically signed by: Maggie Sierra D.O., 6/26/2025 6:50 PM           [1] No Known Allergies

## 2025-07-06 DIAGNOSIS — E10.69 TYPE 1 DIABETES MELLITUS WITH OTHER SPECIFIED COMPLICATION (HCC): ICD-10-CM

## 2025-07-07 RX ORDER — ACYCLOVIR 800 MG/1
TABLET ORAL
Qty: 2 EACH | Refills: 0 | Status: SHIPPED | OUTPATIENT
Start: 2025-07-07 | End: 2025-07-26 | Stop reason: SDUPTHER

## 2025-07-07 NOTE — TELEPHONE ENCOUNTER
Received request via: Pharmacy    Was the patient seen in the last year in this department? Yes    Does the patient have an active prescription (recently filled or refills available) for medication(s) requested? No    Pharmacy Name: [id number] - [place], [place] - [address]     Does the patient have shelter Plus and need 100-day supply? (This applies to ALL medications) Patient does not have SCP

## 2025-07-08 ENCOUNTER — PATIENT MESSAGE (OUTPATIENT)
Dept: OBGYN | Facility: CLINIC | Age: 30
End: 2025-07-08
Payer: MEDICAID

## 2025-07-08 ENCOUNTER — HOSPITAL ENCOUNTER (EMERGENCY)
Facility: MEDICAL CENTER | Age: 30
End: 2025-07-08
Attending: EMERGENCY MEDICINE
Payer: MEDICAID

## 2025-07-08 VITALS
SYSTOLIC BLOOD PRESSURE: 124 MMHG | HEART RATE: 58 BPM | TEMPERATURE: 97.7 F | RESPIRATION RATE: 16 BRPM | HEIGHT: 63 IN | WEIGHT: 138 LBS | OXYGEN SATURATION: 99 % | DIASTOLIC BLOOD PRESSURE: 61 MMHG | BODY MASS INDEX: 24.45 KG/M2

## 2025-07-08 DIAGNOSIS — R30.0 DYSURIA: ICD-10-CM

## 2025-07-08 DIAGNOSIS — N76.1 SUBACUTE VAGINITIS: Primary | ICD-10-CM

## 2025-07-08 DIAGNOSIS — E10.10: ICD-10-CM

## 2025-07-08 DIAGNOSIS — R73.9 HYPERGLYCEMIA: ICD-10-CM

## 2025-07-08 DIAGNOSIS — N76.0 ACUTE VAGINITIS: Primary | ICD-10-CM

## 2025-07-08 LAB
ACETONE UR QL: ABNORMAL
ANION GAP SERPL CALC-SCNC: 11 MMOL/L (ref 7–16)
ANISOCYTOSIS BLD QL SMEAR: ABNORMAL
APPEARANCE UR: CLEAR
B-OH-BUTYR SERPL-MCNC: 0.15 MMOL/L (ref 0.02–0.27)
BACTERIA #/AREA URNS HPF: ABNORMAL /HPF
BACTERIA GENITAL QL WET PREP: NORMAL
BASOPHILS # BLD AUTO: 0 % (ref 0–1.8)
BASOPHILS # BLD: 0 K/UL (ref 0–0.12)
BILIRUB UR QL STRIP.AUTO: NEGATIVE
BUN SERPL-MCNC: 9 MG/DL (ref 8–22)
CALCIUM SERPL-MCNC: 8.9 MG/DL (ref 8.5–10.5)
CASTS URNS QL MICRO: ABNORMAL /LPF (ref 0–2)
CHLORIDE SERPL-SCNC: 101 MMOL/L (ref 96–112)
CO2 SERPL-SCNC: 24 MMOL/L (ref 20–33)
COLOR UR: YELLOW
CREAT SERPL-MCNC: 0.57 MG/DL (ref 0.5–1.4)
EOSINOPHIL # BLD AUTO: 0 K/UL (ref 0–0.51)
EOSINOPHIL NFR BLD: 0 % (ref 0–6.9)
EPITHELIAL CELLS 1715: ABNORMAL /HPF (ref 0–5)
ERYTHROCYTE [DISTWIDTH] IN BLOOD BY AUTOMATED COUNT: 45.1 FL (ref 35.9–50)
GFR SERPLBLD CREATININE-BSD FMLA CKD-EPI: 127 ML/MIN/1.73 M 2
GLUCOSE SERPL-MCNC: 256 MG/DL (ref 65–99)
GLUCOSE UR STRIP.AUTO-MCNC: >=1000 MG/DL
HCT VFR BLD AUTO: 34.3 % (ref 37–47)
HGB BLD-MCNC: 10.3 G/DL (ref 12–16)
KETONES UR STRIP.AUTO-MCNC: ABNORMAL MG/DL
LEUKOCYTE ESTERASE UR QL STRIP.AUTO: NEGATIVE
LYMPHOCYTES # BLD AUTO: 2.34 K/UL (ref 1–4.8)
LYMPHOCYTES NFR BLD: 41.7 % (ref 22–41)
MANUAL DIFF BLD: NORMAL
MCH RBC QN AUTO: 23.6 PG (ref 27–33)
MCHC RBC AUTO-ENTMCNC: 30 G/DL (ref 32.2–35.5)
MCV RBC AUTO: 78.7 FL (ref 81.4–97.8)
MICRO URNS: ABNORMAL
MICROCYTES BLD QL SMEAR: ABNORMAL
MONOCYTES # BLD AUTO: 0.15 K/UL (ref 0–0.85)
MONOCYTES NFR BLD AUTO: 2.6 % (ref 0–13.4)
NEUTROPHILS # BLD AUTO: 3.12 K/UL (ref 1.82–7.42)
NEUTROPHILS NFR BLD: 55.7 % (ref 44–72)
NITRITE UR QL STRIP.AUTO: NEGATIVE
NRBC # BLD AUTO: 0 K/UL
NRBC BLD-RTO: 0 /100 WBC (ref 0–0.2)
OVALOCYTES BLD QL SMEAR: NORMAL
PH UR STRIP.AUTO: 6 [PH] (ref 5–8)
PLATELET # BLD AUTO: 220 K/UL (ref 164–446)
PLATELET BLD QL SMEAR: NORMAL
PMV BLD AUTO: 11.1 FL (ref 9–12.9)
POIKILOCYTOSIS BLD QL SMEAR: NORMAL
POTASSIUM SERPL-SCNC: 3.8 MMOL/L (ref 3.6–5.5)
PROT UR QL STRIP: NEGATIVE MG/DL
RBC # BLD AUTO: 4.36 M/UL (ref 4.2–5.4)
RBC # URNS HPF: ABNORMAL /HPF
RBC BLD AUTO: PRESENT
RBC UR QL AUTO: ABNORMAL
SIGNIFICANT IND 70042: NORMAL
SITE SITE: NORMAL
SODIUM SERPL-SCNC: 136 MMOL/L (ref 135–145)
SOURCE SOURCE: NORMAL
SP GR UR STRIP.AUTO: 1.02
UROBILINOGEN UR STRIP.AUTO-MCNC: 0.2 EU/DL
WBC # BLD AUTO: 5.6 K/UL (ref 4.8–10.8)
WBC #/AREA URNS HPF: ABNORMAL /HPF

## 2025-07-08 PROCEDURE — 700111 HCHG RX REV CODE 636 W/ 250 OVERRIDE (IP): Mod: JZ | Performed by: EMERGENCY MEDICINE

## 2025-07-08 PROCEDURE — 87591 N.GONORRHOEAE DNA AMP PROB: CPT

## 2025-07-08 PROCEDURE — 87491 CHLMYD TRACH DNA AMP PROBE: CPT

## 2025-07-08 PROCEDURE — 99285 EMERGENCY DEPT VISIT HI MDM: CPT

## 2025-07-08 PROCEDURE — 82010 KETONE BODYS QUAN: CPT

## 2025-07-08 PROCEDURE — 81002 URINALYSIS NONAUTO W/O SCOPE: CPT | Mod: XU

## 2025-07-08 PROCEDURE — 96374 THER/PROPH/DIAG INJ IV PUSH: CPT

## 2025-07-08 PROCEDURE — 81001 URINALYSIS AUTO W/SCOPE: CPT

## 2025-07-08 PROCEDURE — 700105 HCHG RX REV CODE 258: Performed by: EMERGENCY MEDICINE

## 2025-07-08 PROCEDURE — 80048 BASIC METABOLIC PNL TOTAL CA: CPT

## 2025-07-08 PROCEDURE — 85007 BL SMEAR W/DIFF WBC COUNT: CPT

## 2025-07-08 PROCEDURE — 85027 COMPLETE CBC AUTOMATED: CPT

## 2025-07-08 PROCEDURE — 36415 COLL VENOUS BLD VENIPUNCTURE: CPT

## 2025-07-08 RX ORDER — ONDANSETRON 2 MG/ML
4 INJECTION INTRAMUSCULAR; INTRAVENOUS ONCE
Status: COMPLETED | OUTPATIENT
Start: 2025-07-08 | End: 2025-07-08

## 2025-07-08 RX ORDER — SODIUM CHLORIDE, SODIUM LACTATE, POTASSIUM CHLORIDE, CALCIUM CHLORIDE 600; 310; 30; 20 MG/100ML; MG/100ML; MG/100ML; MG/100ML
1000 INJECTION, SOLUTION INTRAVENOUS ONCE
Status: COMPLETED | OUTPATIENT
Start: 2025-07-08 | End: 2025-07-08

## 2025-07-08 RX ADMIN — ONDANSETRON 4 MG: 2 INJECTION INTRAMUSCULAR; INTRAVENOUS at 06:58

## 2025-07-08 RX ADMIN — SODIUM CHLORIDE, POTASSIUM CHLORIDE, SODIUM LACTATE AND CALCIUM CHLORIDE 1000 ML: 600; 310; 30; 20 INJECTION, SOLUTION INTRAVENOUS at 06:59

## 2025-07-08 ASSESSMENT — FIBROSIS 4 INDEX: FIB4 SCORE: 0.47

## 2025-07-08 NOTE — ED PROVIDER NOTES
"ED Provider Note    CHIEF COMPLAINT  Chief Complaint   Patient presents with    Vaginal Pain     Pt complaining of itchy/painful vaginal area, states \"small red cuts\" to vaginal area along with nausea and shakiness that has been going on for past week. Denies new sexual parteners. Hx of previous UTIs/BV that she has been prescribed abx for. Pt just finished most recent abx last week for BV.    UTI       EXTERNAL RECORDS REVIEWED  Since last encounter was June 26 of this year she was seen in the emergency department for vaginal discharge, at that time, she had urine analysis, wet prep, GC chlamydia testing HIV and syphilis testing.  This was all reassuring.  She was seen in the emergency department for vaginal discharge on June 5 as well at that time, she tested positive for Gardnerella and was treated with metronidazole.  ED visit for flulike symptoms, flank pain in May of this year.  She was seen by myself.  She was concerned about DKA as she had previously been diagnosed with that fairly recently in November.    HPI/ROS  LIMITATION TO HISTORY   Select: : None  OUTSIDE HISTORIAN(S):  None    Ivis Klein is a 29 y.o. female who presents to the emergency department with chief complaint of continued symptoms over the last \"couple of months\".  When asked further, patient states that it really started in November of last year when she first got diagnosed with type 1 diabetes.  She states that she has been diagnosed with BV multiple times.  She will get on antibiotics, the symptoms will improve for several days but then return.  She just went on a trip and has not yet had time to  a prescription and this prescription is for her as well as her partner as we did discuss the fact that they may be continuing to swab for infection.  She reports irritation to her vaginal area, pain with urination due to the irritation and urinary frequency.  She states painful to wait.  Additionally, she has had nausea, " "abdominal pain and cramping as well as chills.  She admits, that her diabetes is not well-controlled and she frequently has values greater than 400.  She has not had a fever.    PAST MEDICAL HISTORY   has a past medical history of Anemia (02/17/2022), Anxiety (02/16/2017), Anxiety (02/16/2017), Elevated antinuclear antibody (DEJON) level (05/23/2019), Graves disease (12/05/2016), Heart valve disease, Hemorrhagic cysts of both ovaries (05/23/2019), History of panic attack (04/09/2020), History of pericarditis (12/05/2016), History of thyroidectomy (01/31/2020), Panic attack (03/11/2019), Postpartum depression (02/17/2022), Postpartum depression (02/17/2022), Type 1 diabetes mellitus with other specified complication (HCC) (11/8/2024), and Vaginal discharge (12/02/2021).    SURGICAL HISTORY   has a past surgical history that includes primary c section (9/8/2013); thyroidectomy total (Bilateral, 3/22/2017); and aiyana by laparoscopy (N/A, 8/28/2024).    FAMILY HISTORY  Family History   Problem Relation Age of Onset    Cancer Paternal Grandmother 56        breast cancer    No Known Problems Mother     Hyperlipidemia Father     No Known Problems Sister     No Known Problems Brother        SOCIAL HISTORY  Social History     Tobacco Use    Smoking status: Never    Smokeless tobacco: Never    Tobacco comments:     quit in 2012   Vaping Use    Vaping status: Never Used   Substance and Sexual Activity    Alcohol use: Not Currently    Drug use: Yes     Types: Marijuana, Inhaled     Comment: marijuana weekly    Sexual activity: Yes     Partners: Male     Birth control/protection: I.U.D.       CURRENT MEDICATIONS  Home Medications    **Home medications have not yet been reviewed for this encounter**         ALLERGIES  Allergies[1]    PHYSICAL EXAM  VITAL SIGNS: /61   Pulse (!) 58   Temp 36.5 °C (97.7 °F) (Temporal)   Resp 16   Ht 1.6 m (5' 3\")   Wt 62.6 kg (138 lb)   LMP 06/10/2025 (Approximate)   SpO2 99%   BMI 24.45 " kg/m²    Vitals reviewed.  Constitutional: Patient is oriented to person, place, and time. Appears well-developed and well-nourished. Mild distress.    Head: Normocephalic and atraumatic.   Ears: Normal external ears bilaterally.   Mouth/Throat: Oropharynx is clear.  Eyes: Conjunctivae are normal.   Neck: Normal range of motion.  Cardiovascular: Normal rate, regular rhythm and normal heart sounds.   Pulmonary/Chest: Effort normal and breath sounds normal. No respiratory distress, no wheezes, rhonchi, or rales.   Abdominal: Soft. Bowel sounds are normal. There is mild diffuse tenderness, rebound or guarding, or peritoneal signs. No CVA tenderness.  Pelvic: Mild skin irritation on the external labia.  Small amount of vaginal discharge in the vault.  No lesions.  No ulcerations.  Small amount of brownish, old blood.  Musculoskeletal: No edema and no tenderness.   Neurological: No cranial nerve deficits. Normal gait.  No focal deficits.   Skin: Skin is warm and dry. No erythema. No pallor.   Psychiatric: Patient has a normal mood and affect.     EKG/LABS  Results for orders placed or performed during the hospital encounter of 07/08/25   URINALYSIS,CULTURE IF INDICATED    Collection Time: 07/08/25  5:44 AM    Specimen: Urine, Clean Catch   Result Value Ref Range    Color Yellow     Character Clear     Specific Gravity 1.020 <1.035    Ph 6.0 5.0 - 8.0    Glucose >=1000 (A) Negative mg/dL    Ketones Trace (A) Negative mg/dL    Protein Negative Negative mg/dL    Bilirubin Negative Negative    Urobilinogen, Urine 0.2 <=1.0 EU/dL    Nitrite Negative Negative    Leukocyte Esterase Negative Negative    Occult Blood Small (A) Negative    Micro Urine Req Microscopic    URINE MICROSCOPIC (W/UA)    Collection Time: 07/08/25  5:44 AM   Result Value Ref Range    WBC 0-2 /hpf    RBC 0-2 /hpf    Bacteria Rare (A) None /hpf    Epithelial Cells 6-10 (A) 0 - 5 /hpf    Urine Casts 0-2 0 - 2 /lpf   KETONES-URINE QUAL(ACETONE URINE QUAL)     Collection Time: 07/08/25  5:44 AM   Result Value Ref Range    Ketones Small (A) Negative   CBC w/ Differential    Collection Time: 07/08/25  6:51 AM   Result Value Ref Range    WBC 5.6 4.8 - 10.8 K/uL    RBC 4.36 4.20 - 5.40 M/uL    Hemoglobin 10.3 (L) 12.0 - 16.0 g/dL    Hematocrit 34.3 (L) 37.0 - 47.0 %    MCV 78.7 (L) 81.4 - 97.8 fL    MCH 23.6 (L) 27.0 - 33.0 pg    MCHC 30.0 (L) 32.2 - 35.5 g/dL    RDW 45.1 35.9 - 50.0 fL    Platelet Count 220 164 - 446 K/uL    MPV 11.1 9.0 - 12.9 fL    Neutrophils-Polys 55.70 44.00 - 72.00 %    Lymphocytes 41.70 (H) 22.00 - 41.00 %    Monocytes 2.60 0.00 - 13.40 %    Eosinophils 0.00 0.00 - 6.90 %    Basophils 0.00 0.00 - 1.80 %    Nucleated RBC 0.00 0.00 - 0.20 /100 WBC    Neutrophils (Absolute) 3.12 1.82 - 7.42 K/uL    Lymphs (Absolute) 2.34 1.00 - 4.80 K/uL    Monos (Absolute) 0.15 0.00 - 0.85 K/uL    Eos (Absolute) 0.00 0.00 - 0.51 K/uL    Baso (Absolute) 0.00 0.00 - 0.12 K/uL    NRBC (Absolute) 0.00 K/uL    Anisocytosis 1+     Microcytosis 1+    Basic Metabolic Panel (BMP)    Collection Time: 07/08/25  6:51 AM   Result Value Ref Range    Sodium 136 135 - 145 mmol/L    Potassium 3.8 3.6 - 5.5 mmol/L    Chloride 101 96 - 112 mmol/L    Co2 24 20 - 33 mmol/L    Glucose 256 (H) 65 - 99 mg/dL    Bun 9 8 - 22 mg/dL    Creatinine 0.57 0.50 - 1.40 mg/dL    Calcium 8.9 8.5 - 10.5 mg/dL    Anion Gap 11.0 7.0 - 16.0   BETA-HYDROXYBUTYRIC ACID    Collection Time: 07/08/25  6:51 AM   Result Value Ref Range    beta-Hydroxybutyric Acid 0.15 0.02 - 0.27 mmol/L   ESTIMATED GFR    Collection Time: 07/08/25  6:51 AM   Result Value Ref Range    GFR (CKD-EPI) 127 >60 mL/min/1.73 m 2   DIFFERENTIAL MANUAL    Collection Time: 07/08/25  6:51 AM   Result Value Ref Range    Manual Diff Status PERFORMED    PLATELET ESTIMATE    Collection Time: 07/08/25  6:51 AM   Result Value Ref Range    Plt Estimation Normal    MORPHOLOGY    Collection Time: 07/08/25  6:51 AM   Result Value Ref Range    RBC  Morphology Present     Poikilocytosis 1+     Ovalocytes 1+    WET PREP    Collection Time: 07/08/25  9:15 AM    Specimen: Vaginal; Genital   Result Value Ref Range    Significant Indicator NEG     Source GEN     Site VAGINAL     Wet Prep For Parasites       No yeast.  No motile Trichomonas seen.  No clue cells seen.  Rare WBCs seen.     Chlamydia/GC, PCR (Genital/Anal swab)    Collection Time: 07/08/25  9:15 AM   Result Value Ref Range    Source Genital        RADIOLOGY/PROCEDURES     COURSE & MEDICAL DECISION MAKING    ASSESSMENT, COURSE AND PLAN  Care Narrative:     This is a pleasant 29-year-old female who presents to the emergency department with continued symptoms of vaginal irritation, urinary frequency and uncontrolled diabetes.  She states she has been here about every week for similar symptoms and the only resolved for short periods before returning.  We discussed results of her urine which show significant glucose.  She admits that her diabetes is not well-controlled and we discussed the fact that this is contributing to these ongoing infections.  She is only had diabetes since November of last year and she is still learning how to manage it.  I suggested given her constellation of symptoms, further evaluation with labs and and IV fluid resuscitation.  She is agreeable.    7:56 AM data reviewed.  Patient has normal white blood cell count 5.6.  H&H 10 and 34.  A decrease from prior values on June 26 when it was 11 and 37.  There is no neutrophilic predominance.  Chemistry shows a glucose of 256 but her bicarb, anion gap are reassuring.  Beta hydroxybutyric acid is in the normal range.    9:00 AM patient was reevaluated at the bedside.  She requested repeat pelvic swabs which were obtained.  When further questioned, she does have a prescription for doxycycline waiting for her at the pharmacy.  I anticipate discharge to home once, pelvic swabs are returned.    10:17 AM patient is reevaluated bedside.  We  discussed vaginal swabs.  We again discussed troubleshooting this with barrier cream, possibly a gentle wipe rather than dry toilet paper.  She will consider these options.  She will follow-up with her PCP.  She will continue to drink plenty of fluids and try to better manage her sugars.  She will follow-up with her PCP.  She is well-appearing overall and nontoxic.  She is discharged home in stable condition.    Hydration: Based on the patient's presentation of Hyperglycemia the patient was given IV fluids. IV Hydration was used because oral hydration was not adequate alone. Upon recheck following hydration, the patient was improved.    ADDITIONAL PROBLEMS MANAGED    DISPOSITION AND DISCUSSIONS  I have discussed management of the patient with the following physicians and JUSTIN's:  None    Discussion of management with other QHP or appropriate source(s): None     Escalation of care considered, and ultimately not performed: None    Barriers to care at this time, including but not limited to: None.     Decision tools and prescription drugs considered including, but not limited to: None    FINAL DIAGNOSIS  1. Subacute vaginitis    2. Hyperglycemia    3. Dysuria    4. DM ketoacidosis type I, uncontrolled ([facility])         Electronically signed by: Malini Sherman D.O., 7/8/2025 6:03 AM           [1] No Known Allergies

## 2025-07-08 NOTE — Clinical Note
Ivis Klein was seen and treated in our emergency department on 7/8/2025.  She may return to work on 07/09/2025.  Please excuse Ivis Klein from work today, she can return tomorrow.     If you have any questions or concerns, please don't hesitate to call.      Malini Sherman D.O.

## 2025-07-08 NOTE — ED TRIAGE NOTES
"/78   Pulse 68   Temp 36.5 °C (97.7 °F) (Temporal)   Resp 18   Ht 1.6 m (5' 3\")   Wt 62.6 kg (138 lb)   LMP 06/10/2025 (Approximate)   SpO2 98%   BMI 24.45 kg/m²   Chief Complaint   Patient presents with    Vaginal Pain     Pt complaining of itchy/painful vaginal area, states \"small red cuts\" to vaginal area along with nausea and shakiness that has been going on for past week. Denies new sexual parteners. Hx of previous UTIs/BV that she has been prescribed abx for. Pt just finished most recent abx last week for BV.    UTI       "

## 2025-07-10 ENCOUNTER — HOSPITAL ENCOUNTER (EMERGENCY)
Facility: MEDICAL CENTER | Age: 30
End: 2025-07-10
Attending: EMERGENCY MEDICINE
Payer: MEDICAID

## 2025-07-10 VITALS
TEMPERATURE: 97.3 F | HEIGHT: 63 IN | OXYGEN SATURATION: 98 % | RESPIRATION RATE: 18 BRPM | SYSTOLIC BLOOD PRESSURE: 127 MMHG | BODY MASS INDEX: 22.58 KG/M2 | HEART RATE: 75 BPM | WEIGHT: 127.43 LBS | DIASTOLIC BLOOD PRESSURE: 68 MMHG

## 2025-07-10 DIAGNOSIS — E10.69 TYPE 1 DIABETES MELLITUS WITH OTHER SPECIFIED COMPLICATION (HCC): ICD-10-CM

## 2025-07-10 DIAGNOSIS — R73.9 HYPERGLYCEMIA: ICD-10-CM

## 2025-07-10 DIAGNOSIS — N30.00 ACUTE CYSTITIS WITHOUT HEMATURIA: Primary | ICD-10-CM

## 2025-07-10 LAB
ALBUMIN SERPL BCP-MCNC: 4.7 G/DL (ref 3.2–4.9)
ALBUMIN/GLOB SERPL: 1.4 G/DL
ALP SERPL-CCNC: 86 U/L (ref 30–99)
ALT SERPL-CCNC: 19 U/L (ref 2–50)
ANION GAP SERPL CALC-SCNC: 13 MMOL/L (ref 7–16)
APPEARANCE UR: ABNORMAL
AST SERPL-CCNC: 19 U/L (ref 12–45)
B-OH-BUTYR SERPL-MCNC: 0.14 MMOL/L (ref 0.02–0.27)
BACTERIA #/AREA URNS HPF: ABNORMAL /HPF
BASE EXCESS BLDV CALC-SCNC: 2 MMOL/L (ref -2–3)
BASOPHILS # BLD AUTO: 0.8 % (ref 0–1.8)
BASOPHILS # BLD: 0.05 K/UL (ref 0–0.12)
BILIRUB SERPL-MCNC: 0.2 MG/DL (ref 0.1–1.5)
BILIRUB UR QL STRIP.AUTO: NEGATIVE
BODY TEMPERATURE: 36.2 CENTIGRADE
BUN SERPL-MCNC: 8 MG/DL (ref 8–22)
CALCIUM ALBUM COR SERPL-MCNC: 8.9 MG/DL (ref 8.5–10.5)
CALCIUM SERPL-MCNC: 9.5 MG/DL (ref 8.5–10.5)
CASTS URNS QL MICRO: ABNORMAL /LPF (ref 0–2)
CHLORIDE SERPL-SCNC: 99 MMOL/L (ref 96–112)
CO2 SERPL-SCNC: 23 MMOL/L (ref 20–33)
COLOR UR: YELLOW
CREAT SERPL-MCNC: 0.68 MG/DL (ref 0.5–1.4)
EKG IMPRESSION: NORMAL
EOSINOPHIL # BLD AUTO: 0.04 K/UL (ref 0–0.51)
EOSINOPHIL NFR BLD: 0.7 % (ref 0–6.9)
EPITHELIAL CELLS 1715: ABNORMAL /HPF (ref 0–5)
ERYTHROCYTE [DISTWIDTH] IN BLOOD BY AUTOMATED COUNT: 45.4 FL (ref 35.9–50)
GFR SERPLBLD CREATININE-BSD FMLA CKD-EPI: 122 ML/MIN/1.73 M 2
GLOBULIN SER CALC-MCNC: 3.3 G/DL (ref 1.9–3.5)
GLUCOSE BLD STRIP.AUTO-MCNC: 336 MG/DL (ref 65–99)
GLUCOSE SERPL-MCNC: 300 MG/DL (ref 65–99)
GLUCOSE UR STRIP.AUTO-MCNC: >=1000 MG/DL
HCO3 BLDV-SCNC: 27 MMOL/L (ref 22–29)
HCT VFR BLD AUTO: 38.2 % (ref 37–47)
HGB BLD-MCNC: 11.4 G/DL (ref 12–16)
IMM GRANULOCYTES # BLD AUTO: 0.01 K/UL (ref 0–0.11)
IMM GRANULOCYTES NFR BLD AUTO: 0.2 % (ref 0–0.9)
KETONES UR STRIP.AUTO-MCNC: ABNORMAL MG/DL
LEUKOCYTE ESTERASE UR QL STRIP.AUTO: NEGATIVE
LYMPHOCYTES # BLD AUTO: 1.92 K/UL (ref 1–4.8)
LYMPHOCYTES NFR BLD: 31.4 % (ref 22–41)
MAGNESIUM SERPL-MCNC: 1.9 MG/DL (ref 1.5–2.5)
MCH RBC QN AUTO: 23.7 PG (ref 27–33)
MCHC RBC AUTO-ENTMCNC: 29.8 G/DL (ref 32.2–35.5)
MCV RBC AUTO: 79.3 FL (ref 81.4–97.8)
MICRO URNS: ABNORMAL
MONOCYTES # BLD AUTO: 0.25 K/UL (ref 0–0.85)
MONOCYTES NFR BLD AUTO: 4.1 % (ref 0–13.4)
NEUTROPHILS # BLD AUTO: 3.85 K/UL (ref 1.82–7.42)
NEUTROPHILS NFR BLD: 62.8 % (ref 44–72)
NITRITE UR QL STRIP.AUTO: NEGATIVE
NRBC # BLD AUTO: 0 K/UL
NRBC BLD-RTO: 0 /100 WBC (ref 0–0.2)
NT-PROBNP SERPL IA-MCNC: <36 PG/ML (ref 0–125)
PCO2 BLDV: 46.3 MMHG (ref 38–54)
PCO2 TEMP ADJ BLDV: 44.7 MMHG
PH BLDV: 7.38 [PH] (ref 7.31–7.45)
PH TEMP ADJ BLDV: 7.39 [PH] (ref 7.31–7.45)
PH UR STRIP.AUTO: 7 [PH] (ref 5–8)
PHOSPHATE SERPL-MCNC: 3.1 MG/DL (ref 2.5–4.5)
PLATELET # BLD AUTO: 250 K/UL (ref 164–446)
PMV BLD AUTO: 11 FL (ref 9–12.9)
PO2 BLDV: 35.1 MMHG (ref 23–48)
PO2 TEMP ADJ BLDV: 33.2 MMHG (ref 23–48)
POTASSIUM SERPL-SCNC: 3.7 MMOL/L (ref 3.6–5.5)
PROT SERPL-MCNC: 8 G/DL (ref 6–8.2)
PROT UR QL STRIP: NEGATIVE MG/DL
RBC # BLD AUTO: 4.82 M/UL (ref 4.2–5.4)
RBC # URNS HPF: ABNORMAL /HPF
RBC UR QL AUTO: NEGATIVE
SAO2 % BLDV: 58 % (ref 60–85)
SODIUM SERPL-SCNC: 135 MMOL/L (ref 135–145)
SP GR UR STRIP.AUTO: >1.035
TROPONIN T SERPL-MCNC: <6 NG/L (ref 6–19)
UROBILINOGEN UR STRIP.AUTO-MCNC: 0.2 EU/DL
WBC # BLD AUTO: 6.1 K/UL (ref 4.8–10.8)
WBC #/AREA URNS HPF: ABNORMAL /HPF

## 2025-07-10 PROCEDURE — 80053 COMPREHEN METABOLIC PANEL: CPT

## 2025-07-10 PROCEDURE — 93005 ELECTROCARDIOGRAM TRACING: CPT | Mod: TC | Performed by: EMERGENCY MEDICINE

## 2025-07-10 PROCEDURE — 81001 URINALYSIS AUTO W/SCOPE: CPT

## 2025-07-10 PROCEDURE — 84484 ASSAY OF TROPONIN QUANT: CPT

## 2025-07-10 PROCEDURE — 93005 ELECTROCARDIOGRAM TRACING: CPT | Mod: TC

## 2025-07-10 PROCEDURE — 83880 ASSAY OF NATRIURETIC PEPTIDE: CPT

## 2025-07-10 PROCEDURE — 87086 URINE CULTURE/COLONY COUNT: CPT

## 2025-07-10 PROCEDURE — 82010 KETONE BODYS QUAN: CPT

## 2025-07-10 PROCEDURE — 700105 HCHG RX REV CODE 258: Performed by: EMERGENCY MEDICINE

## 2025-07-10 PROCEDURE — 84100 ASSAY OF PHOSPHORUS: CPT

## 2025-07-10 PROCEDURE — 82962 GLUCOSE BLOOD TEST: CPT

## 2025-07-10 PROCEDURE — 82803 BLOOD GASES ANY COMBINATION: CPT

## 2025-07-10 PROCEDURE — 36415 COLL VENOUS BLD VENIPUNCTURE: CPT

## 2025-07-10 PROCEDURE — 83735 ASSAY OF MAGNESIUM: CPT

## 2025-07-10 PROCEDURE — 99284 EMERGENCY DEPT VISIT MOD MDM: CPT

## 2025-07-10 PROCEDURE — 700111 HCHG RX REV CODE 636 W/ 250 OVERRIDE (IP): Mod: JZ | Performed by: EMERGENCY MEDICINE

## 2025-07-10 PROCEDURE — 85025 COMPLETE CBC W/AUTO DIFF WBC: CPT

## 2025-07-10 PROCEDURE — 96374 THER/PROPH/DIAG INJ IV PUSH: CPT

## 2025-07-10 RX ORDER — CEFTRIAXONE 2 G/1
2000 INJECTION, POWDER, FOR SOLUTION INTRAMUSCULAR; INTRAVENOUS ONCE
Status: COMPLETED | OUTPATIENT
Start: 2025-07-10 | End: 2025-07-10

## 2025-07-10 RX ORDER — CLINDAMYCIN PHOSPHATE 20 MG/G
1 CREAM VAGINAL NIGHTLY
Qty: 40 G | Refills: 0 | Status: SHIPPED | OUTPATIENT
Start: 2025-07-10 | End: 2025-07-17

## 2025-07-10 RX ORDER — METRONIDAZOLE 500 MG/1
500 TABLET ORAL 2 TIMES DAILY
Qty: 14 TABLET | Refills: 0 | Status: SHIPPED | OUTPATIENT
Start: 2025-07-10 | End: 2025-07-17

## 2025-07-10 RX ORDER — SODIUM CHLORIDE 9 MG/ML
1000 INJECTION, SOLUTION INTRAVENOUS ONCE
Status: COMPLETED | OUTPATIENT
Start: 2025-07-10 | End: 2025-07-10

## 2025-07-10 RX ORDER — SULFAMETHOXAZOLE AND TRIMETHOPRIM 800; 160 MG/1; MG/1
1 TABLET ORAL 2 TIMES DAILY
Qty: 14 TABLET | Refills: 0 | Status: ACTIVE | OUTPATIENT
Start: 2025-07-10 | End: 2025-07-17

## 2025-07-10 RX ADMIN — CEFTRIAXONE SODIUM 2000 MG: 2 INJECTION, POWDER, FOR SOLUTION INTRAMUSCULAR; INTRAVENOUS at 09:58

## 2025-07-10 RX ADMIN — SODIUM CHLORIDE 1000 ML: 9 INJECTION, SOLUTION INTRAVENOUS at 08:45

## 2025-07-10 ASSESSMENT — FIBROSIS 4 INDEX: FIB4 SCORE: 0.64

## 2025-07-10 ASSESSMENT — PAIN SCALES - WONG BAKER: WONGBAKER_NUMERICALRESPONSE: HURTS JUST A LITTLE BIT

## 2025-07-10 NOTE — ED TRIAGE NOTES
"Chief Complaint   Patient presents with    Chest Pain     Ambulates to the ER complaining of frequent urination, high blood sugars, and chest pain. Also reports blurry vision and dizziness. HX of DKA.        Pulse 79   Temp 36.2 °C (97.2 °F) (Temporal)   Resp 18   Ht 1.6 m (5' 3\")   Wt 57.8 kg (127 lb 6.8 oz)   LMP 06/10/2025 (Approximate)   SpO2 98%   BMI 22.57 kg/m²     "

## 2025-07-10 NOTE — ED NOTES
Patient ambulates to the ER complaining of frequent urination, high blood sugars, and chest pain. Patient also reports blurry vision and dizziness. Patient reports that she has has these symptoms before when she was in DKA.

## 2025-07-10 NOTE — ED PROVIDER NOTES
"  ER Provider Note    Scribed for Ace Becker M.D. by Azul Díaz. 7/10/2025   7:59 AM    Primary Care Provider: Leonel Mcdermott D.O.    CHIEF COMPLAINT  Chief Complaint   Patient presents with    Chest Pain     Ambulates to the ER complaining of frequent urination, high blood sugars, and chest pain. Also reports blurry vision and dizziness. HX of DKA.        EXTERNAL RECORDS REVIEWED  Inpatient Notes Patient was seen in the ED two days ago for vaginal pain. She has a history of diabetes that has not been well controlled, and was given IV hydration during this time due to hyperglycemia. Patient has been diagnosed with BV multiple times in the past.    HPI/ROS    Ivis Klein is a 29 y.o. female with a history of diabetes who presents to the ED for evaluation of fatigue, abdominal pain and chest pain onset three days ago. The patient describes her chest pain as a pressure like sensation. She mentions that her head \"feel heavy\". She has associated burred vision, urinary frequency with large urine output and hyperglycemia with levels in the 300s. The patient notes that due to her urinary frequency she has been wiping more frequently, adding that she has received multiple small cuts secondary to the wiping. She states that she has been taking her insulin shots to help manage her diabetes, but denies checking her ketone levels.     PAST MEDICAL HISTORY  Past Medical History[1]    SURGICAL HISTORY  Past Surgical History[2]    FAMILY HISTORY  Family History   Problem Relation Age of Onset    Cancer Paternal Grandmother 56        breast cancer    No Known Problems Mother     Hyperlipidemia Father     No Known Problems Sister     No Known Problems Brother      SOCIAL HISTORY   reports that she has never smoked. She has never used smokeless tobacco. She reports that she does not currently use alcohol. She reports current drug use. Drugs: Marijuana and Inhaled.    CURRENT MEDICATIONS  Previous Medications " "   ACETAMINOPHEN (TYLENOL) 500 MG TAB    Take 1,000 mg by mouth every 6 hours as needed for Fever. Indications: Pain    CONTINUOUS GLUCOSE SENSOR (FREESTYLE MIRTA 3 SENSOR) MISC    1 Application continuous.    CONTINUOUS GLUCOSE SENSOR (FREESTYLE MIRTA 3 SENSOR) MISC    USE 1 SENSOR ONCE EVERY 14 DAYS    DOXYCYCLINE (MONODOX) 100 MG CAPSULE    Take 1 Capsule by mouth 2 times a day.    FLUCONAZOLE (DIFLUCAN) 150 MG TABLET    Take 1 Tablet by mouth every day.    INSULIN GLARGINE (LANTUS SOLOSTAR) 100 UNIT/ML SOLUTION PEN-INJECTOR INJECTION    Inject 20 Units under the skin every morning before breakfast.    INSULIN LISPRO 100 UNIT/ML SC SOPN INJECTION PEN    Inject 1-10 Units under the skin 4 Times a Day,Before Meals and at Bedtime.  mg/dL = 0 Units 151 - 200  mg/dL =  2 Units 201 - 250  mg/dL = 3 Units 251 - 300 mg/dL =  4 Units 301 - 350  mg/dL =  6 Units Over 351  mg/dL  = 7 Units    INSULIN PEN NEEDLE 32 G X 4 MM (BD PEN NEEDLE EDENILSON 2ND GEN)    USE NEEDLE IN PEN DEVICE TO INJECT INSULIN  FIVE TIMES DAILY    LEVONORGESTREL (MIRENA, 52 MG,) 20 MCG/DAY IUD    1 Each by Intrauterine route see administration instructions. Every 5 years, last placed 2020    LEVOTHYROXINE (SYNTHROID) 75 MCG TAB    Take 3 Tablets by mouth every morning on an empty stomach.    ONDANSETRON (ZOFRAN ODT) 4 MG TABLET DISPERSIBLE    Take 1 Tablet by mouth every 8 hours as needed for Nausea/Vomiting.    ONDANSETRON (ZOFRAN) 4 MG TAB TABLET    Take 1 Tablet by mouth every four hours as needed for Nausea/Vomiting.    SV IRON 325 (65 FE) MG TABLET    TAKE 1 TABLET BY MOUTH EVERY 48 HOURS       ALLERGIES  Allergies[3]     PHYSICAL EXAM  Pulse 79   Temp 36.2 °C (97.2 °F) (Temporal)   Resp 18   Ht 1.6 m (5' 3\")   Wt 57.8 kg (127 lb 6.8 oz)   LMP 06/10/2025 (Approximate)   SpO2 98%   BMI 22.57 kg/m²      Nursing note and vitals reviewed.  Constitutional: Well-developed and well-nourished. No distress.   HENT: Head is normocephalic and " atraumatic. Oropharynx is clear and moist without exudate or erythema.   Eyes: Pupils are equal, round, and reactive to light. Conjunctiva are normal.   Cardiovascular: Normal rate and regular rhythm. No murmur heard. Normal radial pulses.  Pulmonary/Chest: Breath sounds normal. No wheezes or rales.   Abdominal: Soft, non-tender and non-distended. Unable to elicit abdominal tenderness. Normal active bowel sounds. No guarding or peritoneal signs. No palpable abdominal aortic aneurysm. No masses. No tenderness at McBurney's point.   Musculoskeletal: Extremities exhibit normal range of motion without edema or tenderness.   Neurological: Awake, alert and oriented to person, place, and time. No focal deficits noted.  Skin: Skin is warm and dry. No rash.  Psychiatric: Normal mood and affect. Appropriate for clinical situation    DIAGNOSTIC STUDIES    Labs:   Results for orders placed or performed during the hospital encounter of 07/10/25   POCT glucose device results    Collection Time: 07/10/25  7:36 AM   Result Value Ref Range    POC Glucose, Blood 336 (H) 65 - 99 mg/dL   EKG    Collection Time: 07/10/25  7:58 AM   Result Value Ref Range    Report       [facility] Emergency Dept.    Test Date:  2025-07-10  Pt Name:    MICHELLE MENARD               Department: [facility]  MRN:        [id number]                      Room:  Gender:     Female                       Technician: LI  :        [id number]                   Requested By:ER TRIAGE PROTOCOL  Order #:    [id number]                    Reading MD: EDUARDO PENA MD    Measurements  Intervals                                Axis  Rate:       74                           P:          44  KS:         184                          QRS:        52  QRSD:       78                           T:          39  QT:         363  QTc:        403    Interpretive Statements  Sinus rhythm  Baseline wander in lead(s) V1  Compared to ECG 2025 04:41:59  No  significant changes  Electronically Signed On 07- 07:58:25 PDT by EDUARDO PENA MD     CBC w/ Differential    Collection Time: 07/10/25  8:18 AM   Result Value Ref Range    WBC 6.1 4.8 - 10.8 K/uL    RBC 4.82 4.20 - 5.40 M/uL    Hemoglobin 11.4 (L) 12.0 - 16.0 g/dL    Hematocrit 38.2 37.0 - 47.0 %    MCV 79.3 (L) 81.4 - 97.8 fL    MCH 23.7 (L) 27.0 - 33.0 pg    MCHC 29.8 (L) 32.2 - 35.5 g/dL    RDW 45.4 35.9 - 50.0 fL    Platelet Count 250 164 - 446 K/uL    MPV 11.0 9.0 - 12.9 fL    Neutrophils-Polys 62.80 44.00 - 72.00 %    Lymphocytes 31.40 22.00 - 41.00 %    Monocytes 4.10 0.00 - 13.40 %    Eosinophils 0.70 0.00 - 6.90 %    Basophils 0.80 0.00 - 1.80 %    Immature Granulocytes 0.20 0.00 - 0.90 %    Nucleated RBC 0.00 0.00 - 0.20 /100 WBC    Neutrophils (Absolute) 3.85 1.82 - 7.42 K/uL    Lymphs (Absolute) 1.92 1.00 - 4.80 K/uL    Monos (Absolute) 0.25 0.00 - 0.85 K/uL    Eos (Absolute) 0.04 0.00 - 0.51 K/uL    Baso (Absolute) 0.05 0.00 - 0.12 K/uL    Immature Granulocytes (abs) 0.01 0.00 - 0.11 K/uL    NRBC (Absolute) 0.00 K/uL   Complete Metabolic Panel (CMP)    Collection Time: 07/10/25  8:18 AM   Result Value Ref Range    Sodium 135 135 - 145 mmol/L    Potassium 3.7 3.6 - 5.5 mmol/L    Chloride 99 96 - 112 mmol/L    Co2 23 20 - 33 mmol/L    Anion Gap 13.0 7.0 - 16.0    Glucose 300 (H) 65 - 99 mg/dL    Bun 8 8 - 22 mg/dL    Creatinine 0.68 0.50 - 1.40 mg/dL    Calcium 9.5 8.5 - 10.5 mg/dL    Correct Calcium 8.9 8.5 - 10.5 mg/dL    AST(SGOT) 19 12 - 45 U/L    ALT(SGPT) 19 2 - 50 U/L    Alkaline Phosphatase 86 30 - 99 U/L    Total Bilirubin 0.2 0.1 - 1.5 mg/dL    Albumin 4.7 3.2 - 4.9 g/dL    Total Protein 8.0 6.0 - 8.2 g/dL    Globulin 3.3 1.9 - 3.5 g/dL    A-G Ratio 1.4 g/dL   proBrain Natriuretic Peptide, NT    Collection Time: 07/10/25  8:18 AM   Result Value Ref Range    NT-proBNP <36 0 - 125 pg/mL   Urinalysis, culture if indicated    Collection Time: 07/10/25  8:18 AM    Specimen: Urine   Result  Value Ref Range    Color Yellow     Character Cloudy (A)     Specific Gravity >1.035 (A) <1.035    Ph 7.0 5.0 - 8.0    Glucose >=1000 (A) Negative mg/dL    Ketones Trace (A) Negative mg/dL    Protein Negative Negative mg/dL    Bilirubin Negative Negative    Urobilinogen, Urine 0.2 <=1.0 EU/dL    Nitrite Negative Negative    Leukocyte Esterase Negative Negative    Occult Blood Negative Negative    Micro Urine Req Microscopic    MAGNESIUM    Collection Time: 07/10/25  8:18 AM   Result Value Ref Range    Magnesium 1.9 1.5 - 2.5 mg/dL   PHOSPHORUS    Collection Time: 07/10/25  8:18 AM   Result Value Ref Range    Phosphorus 3.1 2.5 - 4.5 mg/dL   BETA-HYDROXYBUTYRIC ACID    Collection Time: 07/10/25  8:18 AM   Result Value Ref Range    beta-Hydroxybutyric Acid 0.14 0.02 - 0.27 mmol/L   VENOUS BLOOD GAS    Collection Time: 07/10/25  8:18 AM   Result Value Ref Range    Venous Bg Ph 7.38 7.31 - 7.45    Venous Bg Ph Temp Corrected 7.39 7.31 - 7.45    Venous Bg Pco2 46.3 38.0 - 54.0 mmHg    Venous Bg Pco2 Temp Corrected 44.7 mmHg    Venous Bg Po2 35.1 23.0 - 48.0 mmHg    Venous Bg Po2 Temp Corrected 33.2 23.0 - 48.0 mmHg    Venous Bg O2 Saturation 58.0 (L) 60.0 - 85.0 %    Venous Bg Hco3 27 22 - 29 mmol/L    Venous Bg Base Excess 2 -2 - 3 mmol/L    Body Temp 36.2 Centigrade   TROPONIN    Collection Time: 07/10/25  8:18 AM   Result Value Ref Range    Troponin T <6 6 - 19 ng/L   URINE MICROSCOPIC (W/UA)    Collection Time: 07/10/25  8:18 AM   Result Value Ref Range    WBC 3-5 (A) /hpf    RBC 0-2 /hpf    Bacteria None Seen None /hpf    Epithelial Cells 0-2 0 - 5 /hpf    Urine Casts 0-2 0 - 2 /lpf   ESTIMATED GFR    Collection Time: 07/10/25  8:18 AM   Result Value Ref Range    GFR (CKD-EPI) 122 >60 mL/min/1.73 m 2     EKG:   I have independently interpreted this EKG as detailed above.    ASSESSMENT AND PLAN    7:59 AM - Patient was evaluated at bedside. Discussed the plan of care with the patient including ordering labs and EKG  to further evaluate the patient's condition. The patient was able to ask questions at this time. Ordered for Troponin, UA, Magnesium, Phosphorus, Beta-hydroxybutyric acid, Venous blood gas, CBC w/ diff, CMP, pBNP and EKG to evaluate. Patient verbalizes understanding and support with my plan of care.  Differential diagnoses include but not limited to: Hyperglycemia vs DKA vs UTI.     8:21 AM - Patient will be treated with an NS infusion 1000 mL. Ordered eGFR, Troponin and Urine microscopic to further evaluate the patient's condition.     Laboratory studies showed no evidence of DKA.  She has hyperglycemia.  Urinalysis is consistent with urinary tract infection.  Patient is treated with IV antibiotics here in the emergency department.  She will be treated with 1 week of Bactrim.  She is given a longer course of antibiotics due to her comorbidity of diabetes with hyperglycemia.    Hydration: Based on the patient's presentation of Hyperglycemia the patient was given IV fluids. IV Hydration was used because oral hydration was not adequate alone. Upon recheck following hydration, the patient was improved.    9:32 AM - I reevaluated the patient at bedside. The patient informs me they feel improved following fluid administration. I discussed the patient's diagnostic study results with the patient at this time. I discussed plan for discharge and follow up as outlined below. I informed the patient that I will be prescribing them Bactrim upon discharge and recommend that the patient take the antibiotic as prescribed until completed. The patient is stable for discharge at this time and will return for any new or worsening symptoms. Patient verbalizes understanding and support with my plan for discharge.     DISPOSITION AND DISCUSSIONS    I have discussed management of the patient with the following physicians and JUSTIN's:  None    Discussion of management with other QHP or appropriate source(s): None     Barriers to care at this  time, including but not limited to: None.     Decision tools and prescription drugs considered including, but not limited to: Antibiotics Bactrim.    The patient will return for new or worsening symptoms and is stable at the time of discharge.    DISPOSITION:  Patient will be discharged home in stable condition.    FOLLOW UP:  Leonel Mcdermott D.O.  [address]   [place] [id number] [phone]    Schedule an appointment as soon as possible for a visit       [facility], Emergency Dept  [address]  [facility] [id number] [phone]    If symptoms worsen    OUTPATIENT MEDICATIONS:  New Prescriptions    SULFAMETHOXAZOLE-TRIMETHOPRIM (BACTRIM DS) 800-160 MG TABLET    Take 1 Tablet by mouth 2 times a day for 7 days.     FINAL DIAGNOSIS  1. Acute cystitis without hematuria    2. Hyperglycemia    3. Type 1 diabetes mellitus with other specified complication (HCC)       Azul HARRIS (Scribe), am scribing for, and in the presence of, Ace Becker M.D..    Electronically signed by: Azul Díaz (Scribe), 7/10/2025    Ace HARRIS M.D. personally performed the services described in this documentation, as scribed by Azul Díaz in my presence, and it is both accurate and complete.      The note accurately reflects work and decisions made by me.  Ace Becker M.D.  7/10/2025  12:56 PM          [1]   Past Medical History:  Diagnosis Date    Anemia 02/17/2022    Anxiety 02/16/2017    Anxiety 02/16/2017    Elevated antinuclear antibody (DEJON) level 05/23/2019    Graves disease 12/05/2016    Heart valve disease     Hemorrhagic cysts of both ovaries 05/23/2019    History of panic attack 04/09/2020    History of pericarditis 12/05/2016    History of thyroidectomy 01/31/2020    Partial --> hypothyroidism     Panic attack 03/11/2019    Postpartum depression 02/17/2022    Postpartum depression 02/17/2022    Type 1 diabetes mellitus with other specified complication  (HCC) 11/8/2024    Vaginal discharge 12/02/2021   [2]   Past Surgical History:  Procedure Laterality Date    JUNIE BY LAPAROSCOPY N/A 8/28/2024    Procedure: CHOLECYSTECTOMY, LAPAROSCOPIC;  Surgeon: Charles Rodas M.D.;  Location: SURGERY [facility];  Service: General    THYROIDECTOMY TOTAL Bilateral 3/22/2017    Procedure: THYROIDECTOMY TOTAL -NIMS RECURRENT LARYNGEAL NERVE MONITORING;  Surgeon: Vicente Eldridge M.D.;  Location: SURGERY SAME DAY [facility];  Service:     PRIMARY C SECTION  9/8/2013    Performed by Melisa Wright M.D. at [facility]   [3] No Known Allergies

## 2025-07-13 LAB
BACTERIA UR CULT: NORMAL
SIGNIFICANT IND 70042: NORMAL
SITE SITE: NORMAL
SOURCE SOURCE: NORMAL

## 2025-07-15 ENCOUNTER — HOSPITAL ENCOUNTER (EMERGENCY)
Facility: MEDICAL CENTER | Age: 30
End: 2025-07-15
Attending: EMERGENCY MEDICINE
Payer: MEDICAID

## 2025-07-15 VITALS
WEIGHT: 131.17 LBS | RESPIRATION RATE: 16 BRPM | TEMPERATURE: 98.5 F | HEIGHT: 63 IN | HEART RATE: 82 BPM | SYSTOLIC BLOOD PRESSURE: 117 MMHG | DIASTOLIC BLOOD PRESSURE: 68 MMHG | OXYGEN SATURATION: 100 % | BODY MASS INDEX: 23.24 KG/M2

## 2025-07-15 DIAGNOSIS — R30.0 DYSURIA: Primary | ICD-10-CM

## 2025-07-15 DIAGNOSIS — R73.9 HYPERGLYCEMIA: ICD-10-CM

## 2025-07-15 DIAGNOSIS — N76.0 ACUTE VAGINITIS: ICD-10-CM

## 2025-07-15 LAB
ALBUMIN SERPL BCP-MCNC: 4.3 G/DL (ref 3.2–4.9)
ALBUMIN/GLOB SERPL: 1.6 G/DL
ALP SERPL-CCNC: 65 U/L (ref 30–99)
ALT SERPL-CCNC: 13 U/L (ref 2–50)
ANION GAP SERPL CALC-SCNC: 11 MMOL/L (ref 7–16)
APPEARANCE UR: CLEAR
AST SERPL-CCNC: 20 U/L (ref 12–45)
B-OH-BUTYR SERPL-MCNC: 0.1 MMOL/L (ref 0.02–0.27)
BACTERIA GENITAL QL WET PREP: NORMAL
BASOPHILS # BLD AUTO: 2.6 % (ref 0–1.8)
BASOPHILS # BLD: 0.16 K/UL (ref 0–0.12)
BILIRUB SERPL-MCNC: <0.2 MG/DL (ref 0.1–1.5)
BILIRUB UR QL STRIP.AUTO: NEGATIVE
BUN SERPL-MCNC: 12 MG/DL (ref 8–22)
CALCIUM ALBUM COR SERPL-MCNC: 8.8 MG/DL (ref 8.5–10.5)
CALCIUM SERPL-MCNC: 9 MG/DL (ref 8.5–10.5)
CHLORIDE SERPL-SCNC: 102 MMOL/L (ref 96–112)
CO2 SERPL-SCNC: 22 MMOL/L (ref 20–33)
COLOR UR: YELLOW
CREAT SERPL-MCNC: 0.76 MG/DL (ref 0.5–1.4)
EOSINOPHIL # BLD AUTO: 0.05 K/UL (ref 0–0.51)
EOSINOPHIL NFR BLD: 0.9 % (ref 0–6.9)
ERYTHROCYTE [DISTWIDTH] IN BLOOD BY AUTOMATED COUNT: 45.9 FL (ref 35.9–50)
GFR SERPLBLD CREATININE-BSD FMLA CKD-EPI: 110 ML/MIN/1.73 M 2
GLOBULIN SER CALC-MCNC: 2.7 G/DL (ref 1.9–3.5)
GLUCOSE SERPL-MCNC: 129 MG/DL (ref 65–99)
GLUCOSE UR STRIP.AUTO-MCNC: >=1000 MG/DL
HCG UR QL: NEGATIVE
HCT VFR BLD AUTO: 34.1 % (ref 37–47)
HGB BLD-MCNC: 10.1 G/DL (ref 12–16)
KETONES UR STRIP.AUTO-MCNC: ABNORMAL MG/DL
LEUKOCYTE ESTERASE UR QL STRIP.AUTO: NEGATIVE
LIPASE SERPL-CCNC: 29 U/L (ref 11–82)
LYMPHOCYTES # BLD AUTO: 1.99 K/UL (ref 1–4.8)
LYMPHOCYTES NFR BLD: 32.7 % (ref 22–41)
MANUAL DIFF BLD: NORMAL
MCH RBC QN AUTO: 23.2 PG (ref 27–33)
MCHC RBC AUTO-ENTMCNC: 29.6 G/DL (ref 32.2–35.5)
MCV RBC AUTO: 78.2 FL (ref 81.4–97.8)
MICRO URNS: ABNORMAL
MICROCYTES BLD QL SMEAR: ABNORMAL
MONOCYTES # BLD AUTO: 0.32 K/UL (ref 0–0.85)
MONOCYTES NFR BLD AUTO: 5.2 % (ref 0–13.4)
NEUTROPHILS # BLD AUTO: 3.57 K/UL (ref 1.82–7.42)
NEUTROPHILS NFR BLD: 58.6 % (ref 44–72)
NITRITE UR QL STRIP.AUTO: NEGATIVE
NRBC # BLD AUTO: 0 K/UL
NRBC BLD-RTO: 0 /100 WBC (ref 0–0.2)
OVALOCYTES BLD QL SMEAR: NORMAL
PH UR STRIP.AUTO: 5.5 [PH] (ref 5–8)
PLATELET # BLD AUTO: 234 K/UL (ref 164–446)
PLATELET BLD QL SMEAR: NORMAL
PMV BLD AUTO: 11.4 FL (ref 9–12.9)
POTASSIUM SERPL-SCNC: 3.9 MMOL/L (ref 3.6–5.5)
PROT SERPL-MCNC: 7 G/DL (ref 6–8.2)
PROT UR QL STRIP: NEGATIVE MG/DL
RBC # BLD AUTO: 4.36 M/UL (ref 4.2–5.4)
RBC BLD AUTO: PRESENT
RBC UR QL AUTO: NEGATIVE
SIGNIFICANT IND 70042: NORMAL
SITE SITE: NORMAL
SODIUM SERPL-SCNC: 135 MMOL/L (ref 135–145)
SOURCE SOURCE: NORMAL
SP GR UR STRIP.AUTO: >1.035
UROBILINOGEN UR STRIP.AUTO-MCNC: 0.2 EU/DL
WBC # BLD AUTO: 6.1 K/UL (ref 4.8–10.8)

## 2025-07-15 PROCEDURE — 80053 COMPREHEN METABOLIC PANEL: CPT

## 2025-07-15 PROCEDURE — A9270 NON-COVERED ITEM OR SERVICE: HCPCS | Performed by: EMERGENCY MEDICINE

## 2025-07-15 PROCEDURE — 83690 ASSAY OF LIPASE: CPT

## 2025-07-15 PROCEDURE — 85027 COMPLETE CBC AUTOMATED: CPT

## 2025-07-15 PROCEDURE — 700102 HCHG RX REV CODE 250 W/ 637 OVERRIDE(OP): Performed by: EMERGENCY MEDICINE

## 2025-07-15 PROCEDURE — 36415 COLL VENOUS BLD VENIPUNCTURE: CPT

## 2025-07-15 PROCEDURE — 81003 URINALYSIS AUTO W/O SCOPE: CPT

## 2025-07-15 PROCEDURE — 99284 EMERGENCY DEPT VISIT MOD MDM: CPT

## 2025-07-15 PROCEDURE — 81025 URINE PREGNANCY TEST: CPT

## 2025-07-15 PROCEDURE — 85007 BL SMEAR W/DIFF WBC COUNT: CPT

## 2025-07-15 PROCEDURE — 82010 KETONE BODYS QUAN: CPT

## 2025-07-15 PROCEDURE — 700105 HCHG RX REV CODE 258: Performed by: EMERGENCY MEDICINE

## 2025-07-15 RX ORDER — FLUCONAZOLE 200 MG/1
200 TABLET ORAL ONCE
Status: COMPLETED | OUTPATIENT
Start: 2025-07-15 | End: 2025-07-15

## 2025-07-15 RX ORDER — FLUCONAZOLE 150 MG/1
150 TABLET ORAL DAILY
Qty: 1 TABLET | Refills: 0 | Status: ACTIVE | OUTPATIENT
Start: 2025-07-15 | End: 2025-07-16

## 2025-07-15 RX ORDER — SODIUM CHLORIDE 9 MG/ML
1000 INJECTION, SOLUTION INTRAVENOUS ONCE
Status: COMPLETED | OUTPATIENT
Start: 2025-07-15 | End: 2025-07-15

## 2025-07-15 RX ADMIN — FLUCONAZOLE 200 MG: 200 TABLET ORAL at 19:32

## 2025-07-15 RX ADMIN — SODIUM CHLORIDE 1000 ML: 9 INJECTION, SOLUTION INTRAVENOUS at 20:14

## 2025-07-15 ASSESSMENT — FIBROSIS 4 INDEX: FIB4 SCORE: 0.47

## 2025-07-16 NOTE — DISCHARGE INSTRUCTIONS
We recommend following with primary care in a couple days.  Take your second dose of that medication in 3 days.  Return to the emergency department as needed.

## 2025-07-16 NOTE — ED PROVIDER NOTES
"ER Provider Note    Scribed for  Rosendo Arevalo D.O. by Rosendo Arevalo D.O.. 7/15/2025   8:18 PM    Primary Care Provider: Leonel Mcdermott D.O.    CHIEF COMPLAINT  Chief Complaint   Patient presents with    Painful Urination     Seen 5 dys ago for c/o same  States feels worse, now has \"cuts from wiping so much\", \"feels super dry\"   States has been taking prescribed antibx as ordered     EXTERNAL RECORDS REVIEWED  Outpatient Notes prescription via [facility]    HPI/ROS  LIMITATION TO HISTORY   Select: : None  OUTSIDE HISTORIAN(S):  None    Ivis Klein is a 29 y.o. female who presents to the ED for evaluation of dysuria and vaginal pain after a course of antibiotics.  Patient has a history of type 1 diabetes on insulin.    PAST MEDICAL HISTORY  Past Medical History[1]    SURGICAL HISTORY  Past Surgical History[2]    FAMILY HISTORY  Family History   Problem Relation Age of Onset    Cancer Paternal Grandmother 56        breast cancer    No Known Problems Mother     Hyperlipidemia Father     No Known Problems Sister     No Known Problems Brother        SOCIAL HISTORY   reports that she has never smoked. She has never used smokeless tobacco. She reports that she does not currently use alcohol. She reports current drug use. Drug: Marijuana.    CURRENT MEDICATIONS  Discharge Medication List as of 7/15/2025  9:02 PM        CONTINUE these medications which have NOT CHANGED    Details   sulfamethoxazole-trimethoprim (BACTRIM DS) 800-160 MG tablet Take 1 Tablet by mouth 2 times a day for 7 days., Disp-14 Tablet, R-0, Normal      clindamycin (CLEOCIN) 2 % vaginal cream Insert 1 Applicator into the vagina every evening for 7 days., Disp-40 g, R-0, Normal      metroNIDAZOLE (FLAGYL) 500 MG Tab Take 1 Tablet by mouth 2 times a day for 7 days., Disp-14 Tablet, R-0, Normal      !! Continuous Glucose Sensor (FREESTYLE MIRTA 3 SENSOR) [facility] [place] 1 SENSOR ONCE EVERY 14 DAYS, Disp-2 Each, R-0, Normal      SV IRON " 325 (65 Fe) MG tablet TAKE 1 TABLET BY MOUTH EVERY 48 HOURS, Disp-15 Tablet, R-0, Normal      insulin glargine (LANTUS SOLOSTAR) 100 UNIT/ML Solution Pen-injector injection Inject 20 Units under the skin every morning before breakfast., Disp-15 mL, R-0, Normal      insulin lispro 100 UNIT/ML SC SOPN injection PEN Inject 1-10 Units under the skin 4 Times a Day,Before Meals and at Bedtime.  mg/dL = 0 Units 151 - 200  mg/dL =  2 Units 201 - 250  mg/dL = 3 Units 251 - 300 mg/dL =  4 Units 301 - 350  mg/dL =  6 Units Over 351  mg/dL  = 7 Qbtri77-850 mg/dL = 0 U nits 151 - 200  mg/dL =  2 Units 201 - 250  mg/dL = 3 Units 251 - 300 mg/dL =  4 Units 301 - 350  mg/dL =  6 Units Over 351  mg/dL  = 7 UnitsDisp-6 mL, R-1, Normal      doxycycline (MONODOX) 100 MG capsule Take 1 Capsule by mouth 2 times a day., Disp-20 Capsule, R-0, Normal      Insulin Pen Needle 32 G x 4 mm (BD PEN NEEDLE EDENILSON 2ND GEN) USE NEEDLE IN PEN DEVICE TO INJECT INSULIN  FIVE TIMES DAILY, Disp-200 Each, R-0, Normal      ondansetron (ZOFRAN ODT) 4 MG TABLET DISPERSIBLE Take 1 Tablet by mouth every 8 hours as needed for Nausea/Vomiting., Disp-10 Tablet, R-0, Normal      ondansetron (ZOFRAN) 4 MG Tab tablet Take 1 Tablet by mouth every four hours as needed for Nausea/Vomiting., Disp-10 Tablet, R-0, Normal      acetaminophen (TYLENOL) 500 MG Tab Take 1,000 mg by mouth every 6 hours as needed for Fever. Indications: Pain, Historical Med      !! Continuous Glucose Sensor (FREESTYLE MIRTA 3 SENSOR) Misc 1 Application continuous., Disp-1 Each, R-2, Normal      levothyroxine (SYNTHROID) 75 MCG Tab Take 3 Tablets by mouth every morning on an empty stomach., Disp-270 Tablet, R-1, Normal      levonorgestrel (MIRENA, 52 MG,) 20 MCG/DAY IUD 1 Each by Intrauterine route see administration instructions. Every 5 years, last placed 2020, Historical Med       !! - Potential duplicate medications found. Please discuss with provider.          ALLERGIES  Allergies[3]  "    PHYSICAL EXAM  /68   Pulse 82   Temp 36.9 °C (98.5 °F) (Temporal)   Resp 16   Ht 1.6 m (5' 3\")   Wt 59.5 kg (131 lb 2.8 oz)   LMP 06/10/2025 (Approximate)   SpO2 100%   BMI 23.24 kg/m²    General: No acute distress  Neuro: Awake alert and oriented, muscle strength sensation normal  Neck: Supple, FROM, no cervical spinal tenderness  Cardiac: Regular rate and rhythm  Pulmonary: Clear to auscultation bilaterally no distress  Abdomen: Soft nontender nondistended  Back: Nontender, no CVA tenderness, no spinal tenderness  Psych: Normal  Skin: Pink warm dry, no rash  Extremities: Full range of motion, compartments soft, muscle strength sensation intact 2+ pulses x 4    DIAGNOSTIC STUDIES/PROCEDURES  Labs:   Labs Reviewed   URINALYSIS,CULTURE IF INDICATED - Abnormal; Notable for the following components:       Result Value    Specific Gravity >1.035 (*)     Glucose >=1000 (*)     Ketones Trace (*)     All other components within normal limits   CBC WITH DIFFERENTIAL - Abnormal; Notable for the following components:    Hemoglobin 10.1 (*)     Hematocrit 34.1 (*)     MCV 78.2 (*)     MCH 23.2 (*)     MCHC 29.6 (*)     Basophils 2.60 (*)     Baso (Absolute) 0.16 (*)     Microcytosis 2+ (*)     All other components within normal limits   COMP METABOLIC PANEL - Abnormal; Notable for the following components:    Glucose 129 (*)     All other components within normal limits   HCG QUALITATIVE UR   LIPASE   WET PREP   BETA-HYDROXYBUTYRIC ACID   ESTIMATED GFR   DIFFERENTIAL MANUAL   PLATELET ESTIMATE   MORPHOLOGY   CHLAMYDIA/GC, PCR (URINE)     I have independently interpreted the above labs    EKG:   Results for orders placed or performed during the hospital encounter of 07/10/25   EKG   Result Value Ref Range    Report       [facility] Emergency Dept.    Test Date:  2025-07-10  Pt Name:    MICHELLE MENARD               Department: [facility]  MRN:        [id number]                      " Room:  Gender:     Female                       Technician: LI  :        1995                   Requested By:ER TRIAGE PROTOCOL  Order #:    [id number]                    Reading MD: EDUARDO PENA MD    Measurements  Intervals                                Axis  Rate:       74                           P:          44  IL:         184                          QRS:        52  QRSD:       78                           T:          39  QT:         363  QTc:        403    Interpretive Statements  Sinus rhythm  Baseline wander in lead(s) V1  Compared to ECG 2025 04:41:59  No significant changes  Electronically Signed On 07- 07:58:25 PDT by EDUARDO PENA MD       *Note: Due to a large number of results and/or encounters for the requested time period, some results have not been displayed. A complete set of results can be found in Results Review.     I have independently interpreted this EKG    Radiology:   This attending emergency physician has independently interpreted the diagnostic imaging associated with this visit and is awaiting the final reading from the radiologist.   Preliminary interpretation is a follows: None  Radiologist interpretation:   No orders to display        COURSE & MEDICAL DECISION MAKING         INITIAL ASSESSMENT, COURSE AND PLAN  Differential diagnoses include but not limited to: UTI, vaginitis, BV, GC chlamydia    Care Narrative: 29-year-old recently treated for UTI who presents with vaginal itching and tenderness of her vagina and dysuria    8:18 PM - Patient was first seen and evaluated at bedside.  Urinalysis, wet prep, labs, fluid resuscitation       ED COURSE AND ADDITIONAL PROBLEMS    1. Dysuria Acute: Patient's urinalysis is negative for urinary tract infection.  GC chlamydia negative in the past.  Retested today.  Wet prep negative for bacterial vaginosis or trichomoniasis.  I do believe this is likely a vaginitis secondary to patient's hyperglycemia and recent  treatment with antibiotics.  1 dose of Diflucan here 1 follow-up.  Patient agrees to follow-up with OB/GYN in the next 48 to 72 hours for reevaluation return emergency department thing worsens.   2. Acute vaginitis Acute: Same as above   3. Hyperglycemia Acute: Prolonged discussion about the management of the patient's insulin.  Patient receptive and is doing her very best take care of this challenging type I diagnosis diabetes that she has from only a few months ago.  Crystalloid given here.       Medications   NS infusion 1,000 mL (0 mL Intravenous Stopped 7/15/25 2102)   fluconazole (Diflucan) tablet 200 mg (200 mg Oral Given 7/15/25 1932)       DISPOSITION AND DISCUSSIONS    I have discussed management of the patient with the following physicians and JUSTIN's: None    Discussion of management with other Q or appropriate source(s):     Escalation of care considered, and ultimately not performed: acute inpatient care management, however at this time, the patient is most appropriate for outpatient management.    Barriers to care at this time, including but not limited to: None.     The patient will return for new or worsening symptoms and is stable at the time of discharge.    The patient is referred to a primary physician for blood pressure management, diabetic screening, and for all other preventative health concerns.        DISPOSITION:  Patient will be discharged home in stable condition.    FOLLOW UP:  Leonel Mcdermott D.O.  [address] [id number] [phone]    Schedule an appointment as soon as possible for a visit in 2 days      [facility], Emergency Dept  [address] [facility] [id number] [phone]    As needed, If symptoms worsen      OUTPATIENT MEDICATIONS:  Discharge Medication List as of 7/15/2025  9:02 PM            FINAL DIAGNOSIS  1. Dysuria Acute   2. Acute vaginitis Acute   3. Hyperglycemia Acute        IRosendo D.O. (Kamini), am sebastiáning  for, and in the presence of, Rosendo Arevalo D.O..    Electronically signed by: Rosendo Arevalo D.O. (Scribe), 7/15/2025    I, Rosendo Arevalo D.O. personally performed the services described in this documentation, as scribed by Rosendo Arevalo D.O. in my presence, and it is both accurate and complete.      The note accurately reflects work and decisions made by me.  Rosendo Arevalo D.O.  7/16/2025  12:41 AM         [1]   Past Medical History:  Diagnosis Date    Anemia 02/17/2022    Anxiety 02/16/2017    Anxiety 02/16/2017    Elevated antinuclear antibody (DEJON) level 05/23/2019    Graves disease 12/05/2016    Heart valve disease     Hemorrhagic cysts of both ovaries 05/23/2019    History of panic attack 04/09/2020    History of pericarditis 12/05/2016    History of thyroidectomy 01/31/2020    Partial --> hypothyroidism     Panic attack 03/11/2019    Postpartum depression 02/17/2022    Postpartum depression 02/17/2022    Type 1 diabetes mellitus with other specified complication (HCC) 11/8/2024    Vaginal discharge 12/02/2021   [2]   Past Surgical History:  Procedure Laterality Date    JUNIE BY LAPAROSCOPY N/A 8/28/2024    Procedure: CHOLECYSTECTOMY, LAPAROSCOPIC;  Surgeon: Charles Rodas M.D.;  Location: SURGERY [facility];  Service: General    THYROIDECTOMY TOTAL Bilateral 3/22/2017    Procedure: THYROIDECTOMY TOTAL -NIMS RECURRENT LARYNGEAL NERVE MONITORING;  Surgeon: Vicente Eldridge M.D.;  Location: SURGERY SAME DAY [facility];  Service:     PRIMARY C SECTION  9/8/2013    Performed by Melisa Wright M.D. at [facility] DELIVERY   [3] No Known Allergies

## 2025-07-16 NOTE — ED TRIAGE NOTES
"Chief Complaint   Patient presents with    Painful Urination     Seen 5 dys ago for c/o same  States feels worse, now has \"cuts from wiping so much\", \"feels super dry\"   States has been taking prescribed antibx as ordered     /70   Pulse (!) 52   Temp 36.9 °C (98.5 °F) (Temporal)   Resp 15   Ht 1.6 m (5' 3\")   Wt 59.5 kg (131 lb 2.8 oz)   LMP 06/10/2025 (Approximate)   SpO2 93%   BMI 23.24 kg/m²     Pt ambulated to ED by self for increasing dysuria and vaginal pain.   "

## 2025-07-18 ENCOUNTER — HOSPITAL ENCOUNTER (EMERGENCY)
Facility: MEDICAL CENTER | Age: 30
End: 2025-07-18
Attending: EMERGENCY MEDICINE
Payer: MEDICAID

## 2025-07-18 VITALS
HEART RATE: 66 BPM | RESPIRATION RATE: 16 BRPM | OXYGEN SATURATION: 94 % | WEIGHT: 127.43 LBS | TEMPERATURE: 98.8 F | BODY MASS INDEX: 22.58 KG/M2 | HEIGHT: 63 IN | DIASTOLIC BLOOD PRESSURE: 83 MMHG | SYSTOLIC BLOOD PRESSURE: 122 MMHG

## 2025-07-18 DIAGNOSIS — N89.8 VAGINAL DISCHARGE: Primary | ICD-10-CM

## 2025-07-18 LAB
APPEARANCE UR: CLEAR
BACTERIA GENITAL QL WET PREP: NORMAL
BILIRUB UR QL STRIP.AUTO: NEGATIVE
COLOR UR: YELLOW
GLUCOSE UR STRIP.AUTO-MCNC: 250 MG/DL
HCG UR QL: NEGATIVE
KETONES UR STRIP.AUTO-MCNC: 15 MG/DL
LEUKOCYTE ESTERASE UR QL STRIP.AUTO: NEGATIVE
MICRO URNS: ABNORMAL
NITRITE UR QL STRIP.AUTO: NEGATIVE
PH UR STRIP.AUTO: 5 [PH] (ref 5–8)
PROT UR QL STRIP: NEGATIVE MG/DL
RBC UR QL AUTO: NEGATIVE
SIGNIFICANT IND 70042: NORMAL
SITE SITE: NORMAL
SOURCE SOURCE: NORMAL
SP GR UR STRIP.AUTO: 1.03
UROBILINOGEN UR STRIP.AUTO-MCNC: 1 EU/DL

## 2025-07-18 PROCEDURE — 99284 EMERGENCY DEPT VISIT MOD MDM: CPT

## 2025-07-18 PROCEDURE — 87491 CHLMYD TRACH DNA AMP PROBE: CPT

## 2025-07-18 PROCEDURE — 81025 URINE PREGNANCY TEST: CPT

## 2025-07-18 PROCEDURE — 36415 COLL VENOUS BLD VENIPUNCTURE: CPT

## 2025-07-18 PROCEDURE — 81003 URINALYSIS AUTO W/O SCOPE: CPT

## 2025-07-18 PROCEDURE — 87591 N.GONORRHOEAE DNA AMP PROB: CPT

## 2025-07-18 ASSESSMENT — FIBROSIS 4 INDEX: FIB4 SCORE: 0.64

## 2025-07-18 NOTE — ED PROVIDER NOTES
ED Provider Note    CHIEF COMPLAINT  Chief Complaint   Patient presents with    Abdominal Pain    Vaginal Discharge       EXTERNAL RECORDS REVIEWED  Inpatient Notes ED note 7/15/25 when the patient was evaluated for vaginal discharge.    HPI/ROS  LIMITATION TO HISTORY   Select: : None  OUTSIDE HISTORIAN(S):  None    Ivis Klein is a 29 y.o. female who presents to the emergency department for evaluation of abdominal pain and vaginal discharge.   the patient states that she is having persistent vaginal discharge.  She was seen here 3 days ago and diagnosed with a yeast infection.  She was given fluconazole which she has taken.  She states that this is not helped.  She has been seen multiple times over the last 2 months for the same complaint.  She does have an appointment with OB/GYN on 7/30/2025.  All of her previous testing has been negative.  She states that she has not been sexually active without protection since last month.  She has not had a fever.  She has had some nausea.  She does have diabetes and states that her blood sugars have been running in the 70s.  She has had some lower abdominal discomfort.    PAST MEDICAL HISTORY   has a past medical history of Anemia (02/17/2022), Anxiety (02/16/2017), Anxiety (02/16/2017), Elevated antinuclear antibody (DEJON) level (05/23/2019), Graves disease (12/05/2016), Heart valve disease, Hemorrhagic cysts of both ovaries (05/23/2019), History of panic attack (04/09/2020), History of pericarditis (12/05/2016), History of thyroidectomy (01/31/2020), Panic attack (03/11/2019), Postpartum depression (02/17/2022), Postpartum depression (02/17/2022), Type 1 diabetes mellitus with other specified complication (HCC) (11/8/2024), and Vaginal discharge (12/02/2021).    SURGICAL HISTORY   has a past surgical history that includes primary c section (9/8/2013); thyroidectomy total (Bilateral, 3/22/2017); and aiyana by laparoscopy (N/A, 8/28/2024).    FAMILY HISTORY  Family  "History   Problem Relation Age of Onset    Cancer Paternal Grandmother 56        breast cancer    No Known Problems Mother     Hyperlipidemia Father     No Known Problems Sister     No Known Problems Brother        SOCIAL HISTORY  Social History     Tobacco Use    Smoking status: Never    Smokeless tobacco: Never    Tobacco comments:     quit in 2012   Vaping Use    Vaping status: Never Used   Substance and Sexual Activity    Alcohol use: Not Currently    Drug use: Yes     Types: Marijuana    Sexual activity: Yes     Partners: Male     Birth control/protection: I.U.D.       CURRENT MEDICATIONS  Home Medications       Reviewed by Chery Wilson R.N. (Registered Nurse) on 07/18/25 at 1147  Med List Status: Not Addressed     Medication Last Dose Status   acetaminophen (TYLENOL) 500 MG Tab  Active   Continuous Glucose Sensor (FREESTYLE MIRTA 3 SENSOR) Misc  Active   Continuous Glucose Sensor (FREESTYLE MIRTA 3 SENSOR) Misc  Active   doxycycline (MONODOX) 100 MG capsule  Active   insulin glargine (LANTUS SOLOSTAR) 100 UNIT/ML Solution Pen-injector injection  Active   insulin lispro 100 UNIT/ML SC SOPN injection PEN  Active   Insulin Pen Needle 32 G x 4 mm (BD PEN NEEDLE EDENILSON 2ND GEN)  Active   levonorgestrel (MIRENA, 52 MG,) 20 MCG/DAY IUD  Active   levothyroxine (SYNTHROID) 75 MCG Tab  Active   ondansetron (ZOFRAN ODT) 4 MG TABLET DISPERSIBLE  Active   ondansetron (ZOFRAN) 4 MG Tab tablet  Active   SV IRON 325 (65 Fe) MG tablet  Active                    ALLERGIES  Allergies[1]    PHYSICAL EXAM  VITAL SIGNS: /83   Pulse 66   Temp 37.1 °C (98.8 °F) (Temporal)   Resp 16   Ht 1.6 m (5' 3\")   Wt 57.8 kg (127 lb 6.8 oz)   LMP 06/10/2025 (Approximate)   SpO2 94%   BMI 22.57 kg/m²   Constitutional: Alert and in no apparent distress.  HENT: Normocephalic atraumatic. Bilateral external ears normal. BNose normal. Mucous membranes are moist.  Eyes: Pupils are equal and reactive. Conjunctiva normal. Non-icteric " sclera.   Neck: Normal range of motion without tenderness. Supple. No meningeal signs.  Cardiovascular: Regular rate and rhythm. No murmurs, gallops or rubs.  Thorax & Lungs: No increased work of breathing. Breath sounds are clear to auscultation bilaterally. No wheezing, rhonchi or rales.  Abdomen: Soft, nontender and nondistended.   Pelvic: Nirmal - Kimi, ED RN.  Normal external genitalia.  No lesions, abrasions, or ecchymosis.  There is a small amount of white discharge.  The cervix appears normal and is closed.  Skin: Warm and dry. No rashes are noted.  Back: No bony tenderness, No CVA tenderness.   Extremities: 2+ peripheral pulses. Cap refill is less than 2 seconds. No edema, cyanosis, or clubbing.  Musculoskeletal: Good range of motion in all major joints. No tenderness to palpation or major deformities noted.   Neurologic: Alert and appropriate for age. The patient moves all 4 extremities without obvious deficits.    LABS  Results for orders placed or performed during the hospital encounter of 07/18/25   URINALYSIS CULTURE, IF INDICATED    Collection Time: 07/18/25  4:00 PM    Specimen: Urine, Clean Catch   Result Value Ref Range    Color Yellow     Character Clear     Specific Gravity 1.035 <1.035    Ph 5.0 5.0 - 8.0    Glucose 250 (A) Negative mg/dL    Ketones 15 (A) Negative mg/dL    Protein Negative Negative mg/dL    Bilirubin Negative Negative    Urobilinogen, Urine 1.0 <=1.0 EU/dL    Nitrite Negative Negative    Leukocyte Esterase Negative Negative    Occult Blood Negative Negative    Micro Urine Req see below    WET PREP    Collection Time: 07/18/25  4:00 PM    Specimen: Vaginal; Genital   Result Value Ref Range    Significant Indicator NEG     Source GEN     Site VAGINAL     Wet Prep For Parasites       Few WBCs seen.  No yeast.  No motile Trichomonas seen.  No clue cells seen.     Chlamydia/GC, PCR (Urine)    Collection Time: 07/18/25  4:00 PM    Specimen: Urine   Result Value Ref Range     Source Urine    HCG QUALITATIVE UR (Lab)    Collection Time: 07/18/25  4:00 PM   Result Value Ref Range    Beta-Hcg Urine Negative Negative     COURSE & MEDICAL DECISION MAKING    ASSESSMENT, COURSE AND PLAN  Care Narrative: This is a 29-year-old female presenting to the emergency department for evaluation of abdominal pain and vaginal discharge.  Patient appeared well on my initial evaluation.  Her vital signs are normal and reassuring.  Her abdominal exam was completely benign.  I very low clinical suspicion for ovarian torsion, acute appendicitis, or tubo-ovarian abscess.  Pelvic exam was notable for small amount of white discharge.  No other abnormalities were noted.  The patient showed me her Dexcom reading on her phone and her blood sugar 75.  She denied any other symptoms concerning for    The patient has been seen multiple times in the emergency department for the same complaint over the last 2 months.  I reviewed her previous workups including negative syphilis and HIV screening.  DKA at this point.  She declined any blood work at this point.    Urinalysis was negative for leukocyte esterase and blood and nitrites.  I am less concern for occult UTI or pyelonephritis.    Wet prep did not have any evidence of clue cells consistent with bacterial vaginosis.  No yeast were noted.  No trichomonas were noted either.    GC was sent and pending.    I do think she stable for discharge at this time.  She has an appointment with OB/GYN on 7/29 and I encouraged her to keep this appointment.  She will follow-up and return to the ED with any worsening signs or symptoms.    ADDITIONAL PROBLEMS MANAGED  None    DISPOSITION AND DISCUSSIONS  I have discussed management of the patient with the following physicians and JUSTIN's:  None    Discussion of management with other QHP or appropriate source(s): None     Escalation of care considered, and ultimately not performed:acute inpatient care management, however at this time, the  patient is most appropriate for outpatient management    Barriers to care at this time, including but not limited to: None.     Decision tools and prescription drugs considered including, but not limited to: None.    FINAL IMPRESSION  1. Vaginal discharge      PRESCRIPTIONS  New Prescriptions    No medications on file       FOLLOW UP  Please follow-up with your scheduled OB/GYN appointment next week.          [facility], Emergency Dept  [address]  [place] [address]  [phone]  Go to   As needed    -DISCHARGE-    Electronically signed by: Maggie Sierra D.O., 7/18/2025 3:42 PM           [1] No Known Allergies

## 2025-07-18 NOTE — ED TRIAGE NOTES
Pt presents by self from home for std testing. Reports she was treated for uti and yeast infection. However she continues to have lower abd pain, nausea, vaginal Itching, and white discharge. Pt A&Ox4 and ambulatory.

## 2025-07-19 NOTE — ED NOTES
Provided DC instructions, advised f/u with GYN as scheduled to f/u on outstanding test results, questions answered.

## 2025-07-23 DIAGNOSIS — E10.69 TYPE 1 DIABETES MELLITUS WITH OTHER SPECIFIED COMPLICATION (HCC): ICD-10-CM

## 2025-07-23 NOTE — TELEPHONE ENCOUNTER
Received request via: Pharmacy    Was the patient seen in the last year in this department? Yes    Does the patient have an active prescription (recently filled or refills available) for medication(s) requested? No    Pharmacy Name:[id number] - [place], [place] - [address]     Does the patient have skilled nursing Plus and need 100-day supply? (This applies to ALL medications) Patient does not have SCP

## 2025-07-24 RX ORDER — INSULIN GLARGINE 100 [IU]/ML
20 INJECTION, SOLUTION SUBCUTANEOUS
Qty: 15 ML | Refills: 0 | Status: SHIPPED | OUTPATIENT
Start: 2025-07-24

## 2025-07-26 DIAGNOSIS — E10.69 TYPE 1 DIABETES MELLITUS WITH OTHER SPECIFIED COMPLICATION (HCC): ICD-10-CM

## 2025-07-28 DIAGNOSIS — E10.10 DKA, TYPE 1, NOT AT GOAL (HCC): ICD-10-CM

## 2025-07-28 DIAGNOSIS — E10.69 TYPE 1 DIABETES MELLITUS WITH OTHER SPECIFIED COMPLICATION (HCC): ICD-10-CM

## 2025-07-28 RX ORDER — ACYCLOVIR 800 MG/1
TABLET ORAL
Qty: 2 EACH | Refills: 0 | OUTPATIENT
Start: 2025-07-28

## 2025-07-28 RX ORDER — INSULIN LISPRO 100 [IU]/ML
1-10 INJECTION, SOLUTION INTRAVENOUS; SUBCUTANEOUS
Qty: 6 ML | Refills: 1 | Status: SHIPPED | OUTPATIENT
Start: 2025-07-28

## 2025-07-28 RX ORDER — ACYCLOVIR 800 MG/1
TABLET ORAL
Qty: 2 EACH | Refills: 2 | Status: SHIPPED | OUTPATIENT
Start: 2025-07-28

## 2025-07-28 NOTE — TELEPHONE ENCOUNTER
Received request via: Pharmacy    Was the patient seen in the last year in this department? Yes    Does the patient have an active prescription (recently filled or refills available) for medication(s) requested? No    Pharmacy Name: [id number] - [place], [place] - [address]     Does the patient have alf Plus and need 100-day supply? (This applies to ALL medications) Patient does not have SCP   Throat swab collected and sent to lab. Pt tolerated well. Required PPE was worn.

## 2025-07-30 ENCOUNTER — APPOINTMENT (OUTPATIENT)
Dept: OBGYN | Facility: CLINIC | Age: 30
End: 2025-07-30
Payer: MEDICAID

## 2025-07-30 VITALS
HEART RATE: 84 BPM | WEIGHT: 128.8 LBS | SYSTOLIC BLOOD PRESSURE: 114 MMHG | BODY MASS INDEX: 22.82 KG/M2 | DIASTOLIC BLOOD PRESSURE: 72 MMHG | HEIGHT: 63 IN

## 2025-07-30 DIAGNOSIS — Z30.432 ENCOUNTER FOR IUD REMOVAL: Primary | ICD-10-CM

## 2025-07-30 DIAGNOSIS — N92.0 MENORRHAGIA WITH REGULAR CYCLE: ICD-10-CM

## 2025-07-30 DIAGNOSIS — N76.1 CHRONIC VAGINITIS: ICD-10-CM

## 2025-07-30 DIAGNOSIS — N94.6 DYSMENORRHEA: ICD-10-CM

## 2025-07-30 ASSESSMENT — FIBROSIS 4 INDEX: FIB4 SCORE: 0.64

## 2025-07-30 NOTE — PROGRESS NOTES
Procedure note    Procedure: IUD removal  Indications: Progressively worsening heavy painful menses  Anesthesia: None  Prep: None  Description: Using ring forceps, the IUD string was grasped and the IUD removed without difficulty.  No complications noted.

## 2025-07-30 NOTE — PROGRESS NOTES
Gynecology return visit    HPI: The patient is a 29-year-old G2, P2 young woman who presents for IUD removal.  The patient reports that she is unaware of what type of IUD that she has however it has been in place for 5 years.  She reports progressively worsening heavy painful menses.  She also is reporting recurrent BV infections generally right before her menses.  She does feel better after using antibiotics.    Past Medical History:   Diagnosis Date    Type 1 diabetes mellitus with other specified complication (HCC) 2024    Postpartum depression 2022    Postpartum depression 2022    Anemia 2022    Vaginal discharge 2021    History of panic attack 2020    History of thyroidectomy 2020    Partial --> hypothyroidism     Elevated antinuclear antibody (DEJON) level 2019    Hemorrhagic cysts of both ovaries 2019    Panic attack 2019    Anxiety 2017    Anxiety 2017    Graves disease 2016    History of pericarditis 2016    Heart valve disease       Past Surgical History[1]     OB History    Para Term  AB Living   2 2 2 0 0 2   SAB IAB Ectopic Molar Multiple Live Births   0 0 0 0 0 2      Review of systems-see HPI otherwise negative    Examination:  General-no acute distress  Pelvic-  Vulva vagina within normal limits  Cervix-no lesions identified, IUD string visualized.  See procedure note    Assessment/plan: The patient presents with a history of progressively worsening heavy painful menstrual cycles.  The patient did have a ParaGard IUD and she was counseled that copper IUDs do not have the same benefits with respect to menstrual and pain control during menses.  She also was counseled that she potentially would have improved symptoms with a progestin IUD.  The patient is considering replacing with a progestin IUD preferably after her next menses.    The patient also has a history of chronic vaginitis.  These occurrences  usually occur prior to her menses and is consistent with bacterial vaginosis.  She was counseled regarding management options including probiotics to improve her vaginal bacterial soto.  She will incorporate supplement into her diet.  Follow up as needed IUD insertion.       [1]   Past Surgical History:  Procedure Laterality Date    JUNIE BY LAPAROSCOPY N/A 8/28/2024    Procedure: CHOLECYSTECTOMY, LAPAROSCOPIC;  Surgeon: Charles Rodas M.D.;  Location: SURGERY [facility];  Service: General    THYROIDECTOMY TOTAL Bilateral 3/22/2017    Procedure: THYROIDECTOMY TOTAL -NIMS RECURRENT LARYNGEAL NERVE MONITORING;  Surgeon: Vicente Eldridge M.D.;  Location: SURGERY SAME DAY [facility];  Service:     PRIMARY C SECTION  9/8/2013    Performed by Melisa Wright M.D. at [facility]

## 2025-07-30 NOTE — PROGRESS NOTES
Pt is here for IUD removal  BCM: Gladys placed 2019  Pt is not family planning but does not desire any BCM at the moment  Pt states she has history of BV and is concerned as to why it has been consistent.  LMP: 7/22/25 regular cycles  Phone/Pharm verified [phone]

## 2025-08-04 ENCOUNTER — HOSPITAL ENCOUNTER (EMERGENCY)
Facility: MEDICAL CENTER | Age: 30
End: 2025-08-04
Attending: EMERGENCY MEDICINE
Payer: MEDICAID

## 2025-08-04 VITALS
RESPIRATION RATE: 18 BRPM | HEIGHT: 63 IN | WEIGHT: 128.31 LBS | TEMPERATURE: 98 F | SYSTOLIC BLOOD PRESSURE: 122 MMHG | OXYGEN SATURATION: 98 % | HEART RATE: 82 BPM | DIASTOLIC BLOOD PRESSURE: 74 MMHG | BODY MASS INDEX: 22.73 KG/M2

## 2025-08-04 DIAGNOSIS — N76.0 ACUTE VAGINITIS: Primary | ICD-10-CM

## 2025-08-04 LAB
APPEARANCE UR: CLEAR
BACTERIA GENITAL QL WET PREP: NORMAL
BILIRUB UR QL STRIP.AUTO: NEGATIVE
COLOR UR: YELLOW
GLUCOSE UR STRIP.AUTO-MCNC: >=1000 MG/DL
HCG UR QL: NEGATIVE
KETONES UR STRIP.AUTO-MCNC: ABNORMAL MG/DL
LEUKOCYTE ESTERASE UR QL STRIP.AUTO: NEGATIVE
MICRO URNS: ABNORMAL
NITRITE UR QL STRIP.AUTO: NEGATIVE
PH UR STRIP.AUTO: 7.5 [PH] (ref 5–8)
PROT UR QL STRIP: NEGATIVE MG/DL
RBC UR QL AUTO: NEGATIVE
SIGNIFICANT IND 70042: NORMAL
SITE SITE: NORMAL
SOURCE SOURCE: NORMAL
SP GR UR STRIP.AUTO: >1.035
UROBILINOGEN UR STRIP.AUTO-MCNC: 1 EU/DL

## 2025-08-04 PROCEDURE — 86780 TREPONEMA PALLIDUM: CPT

## 2025-08-04 PROCEDURE — 700102 HCHG RX REV CODE 250 W/ 637 OVERRIDE(OP): Performed by: EMERGENCY MEDICINE

## 2025-08-04 PROCEDURE — 99284 EMERGENCY DEPT VISIT MOD MDM: CPT

## 2025-08-04 PROCEDURE — 700111 HCHG RX REV CODE 636 W/ 250 OVERRIDE (IP): Mod: JZ | Performed by: EMERGENCY MEDICINE

## 2025-08-04 PROCEDURE — A9270 NON-COVERED ITEM OR SERVICE: HCPCS | Performed by: EMERGENCY MEDICINE

## 2025-08-04 PROCEDURE — 700101 HCHG RX REV CODE 250: Performed by: EMERGENCY MEDICINE

## 2025-08-04 PROCEDURE — 36415 COLL VENOUS BLD VENIPUNCTURE: CPT

## 2025-08-04 PROCEDURE — 87491 CHLMYD TRACH DNA AMP PROBE: CPT

## 2025-08-04 PROCEDURE — 81003 URINALYSIS AUTO W/O SCOPE: CPT

## 2025-08-04 PROCEDURE — 96372 THER/PROPH/DIAG INJ SC/IM: CPT

## 2025-08-04 PROCEDURE — 81025 URINE PREGNANCY TEST: CPT

## 2025-08-04 PROCEDURE — 87389 HIV-1 AG W/HIV-1&-2 AB AG IA: CPT

## 2025-08-04 PROCEDURE — 87591 N.GONORRHOEAE DNA AMP PROB: CPT

## 2025-08-04 RX ORDER — ONDANSETRON 4 MG/1
4 TABLET, ORALLY DISINTEGRATING ORAL EVERY 6 HOURS PRN
Qty: 10 TABLET | Refills: 0 | Status: SHIPPED | OUTPATIENT
Start: 2025-08-04

## 2025-08-04 RX ORDER — ONDANSETRON 4 MG/1
4 TABLET, ORALLY DISINTEGRATING ORAL ONCE
Status: COMPLETED | OUTPATIENT
Start: 2025-08-04 | End: 2025-08-04

## 2025-08-04 RX ORDER — DOXYCYCLINE 100 MG/1
100 CAPSULE ORAL 2 TIMES DAILY
Qty: 20 CAPSULE | Refills: 0 | Status: SHIPPED | OUTPATIENT
Start: 2025-08-04 | End: 2025-08-04

## 2025-08-04 RX ORDER — DOXYCYCLINE 100 MG/1
100 CAPSULE ORAL 2 TIMES DAILY
Qty: 20 CAPSULE | Refills: 0 | Status: ACTIVE | OUTPATIENT
Start: 2025-08-04 | End: 2025-08-14

## 2025-08-04 RX ORDER — ACETAMINOPHEN 325 MG/1
650 TABLET ORAL ONCE
Status: COMPLETED | OUTPATIENT
Start: 2025-08-04 | End: 2025-08-04

## 2025-08-04 RX ORDER — IBUPROFEN 600 MG/1
600 TABLET, FILM COATED ORAL ONCE
Status: COMPLETED | OUTPATIENT
Start: 2025-08-04 | End: 2025-08-04

## 2025-08-04 RX ORDER — CEFTRIAXONE 500 MG/1
500 INJECTION, POWDER, FOR SOLUTION INTRAMUSCULAR; INTRAVENOUS ONCE
Status: COMPLETED | OUTPATIENT
Start: 2025-08-04 | End: 2025-08-04

## 2025-08-04 RX ORDER — DOXYCYCLINE 100 MG/1
100 TABLET ORAL ONCE
Status: COMPLETED | OUTPATIENT
Start: 2025-08-04 | End: 2025-08-04

## 2025-08-04 RX ADMIN — LIDOCAINE HYDROCHLORIDE 1 ML: 10 INJECTION, SOLUTION INFILTRATION; PERINEURAL at 20:41

## 2025-08-04 RX ADMIN — DOXYCYCLINE 100 MG: 100 TABLET, FILM COATED ORAL at 20:41

## 2025-08-04 RX ADMIN — CEFTRIAXONE SODIUM 500 MG: 500 INJECTION, POWDER, FOR SOLUTION INTRAMUSCULAR; INTRAVENOUS at 20:42

## 2025-08-04 RX ADMIN — ACETAMINOPHEN 650 MG: 325 TABLET ORAL at 20:29

## 2025-08-04 RX ADMIN — IBUPROFEN 600 MG: 600 TABLET ORAL at 20:29

## 2025-08-04 RX ADMIN — ONDANSETRON 4 MG: 4 TABLET, ORALLY DISINTEGRATING ORAL at 20:29

## 2025-08-04 ASSESSMENT — FIBROSIS 4 INDEX: FIB4 SCORE: 0.64

## 2025-08-05 LAB
C TRACH DNA GENITAL QL NAA+PROBE: NEGATIVE
HIV 1+2 AB+HIV1 P24 AG SERPL QL IA: NORMAL
N GONORRHOEA DNA GENITAL QL NAA+PROBE: NEGATIVE
SPECIMEN SOURCE: NORMAL
T PALLIDUM AB SER QL IA: NORMAL

## 2025-08-12 ENCOUNTER — APPOINTMENT (OUTPATIENT)
Dept: RADIOLOGY | Facility: MEDICAL CENTER | Age: 30
End: 2025-08-12
Attending: EMERGENCY MEDICINE
Payer: MEDICAID

## 2025-08-12 ENCOUNTER — HOSPITAL ENCOUNTER (EMERGENCY)
Facility: MEDICAL CENTER | Age: 30
End: 2025-08-12
Attending: EMERGENCY MEDICINE
Payer: MEDICAID

## 2025-08-12 VITALS
WEIGHT: 129.63 LBS | DIASTOLIC BLOOD PRESSURE: 91 MMHG | OXYGEN SATURATION: 98 % | RESPIRATION RATE: 20 BRPM | BODY MASS INDEX: 22.96 KG/M2 | SYSTOLIC BLOOD PRESSURE: 117 MMHG | HEART RATE: 71 BPM | TEMPERATURE: 98 F

## 2025-08-12 DIAGNOSIS — R07.89 ATYPICAL CHEST PAIN: ICD-10-CM

## 2025-08-12 DIAGNOSIS — M79.604 PAIN OF RIGHT LOWER EXTREMITY: Primary | ICD-10-CM

## 2025-08-12 LAB
ALBUMIN SERPL BCP-MCNC: 4.5 G/DL (ref 3.2–4.9)
ALBUMIN/GLOB SERPL: 1.3 G/DL
ALP SERPL-CCNC: 68 U/L (ref 30–99)
ALT SERPL-CCNC: 18 U/L (ref 2–50)
ANION GAP SERPL CALC-SCNC: 12 MMOL/L (ref 7–16)
AST SERPL-CCNC: 27 U/L (ref 12–45)
B-OH-BUTYR SERPL-MCNC: 0.46 MMOL/L (ref 0.02–0.27)
BASOPHILS # BLD AUTO: 1.1 % (ref 0–1.8)
BASOPHILS # BLD: 0.07 K/UL (ref 0–0.12)
BILIRUB SERPL-MCNC: 0.4 MG/DL (ref 0.1–1.5)
BUN SERPL-MCNC: 11 MG/DL (ref 8–22)
CALCIUM ALBUM COR SERPL-MCNC: 9.1 MG/DL (ref 8.5–10.5)
CALCIUM SERPL-MCNC: 9.5 MG/DL (ref 8.5–10.5)
CHLORIDE SERPL-SCNC: 103 MMOL/L (ref 96–112)
CO2 SERPL-SCNC: 23 MMOL/L (ref 20–33)
CREAT SERPL-MCNC: 0.76 MG/DL (ref 0.5–1.4)
EKG IMPRESSION: NORMAL
EOSINOPHIL # BLD AUTO: 0.05 K/UL (ref 0–0.51)
EOSINOPHIL NFR BLD: 0.8 % (ref 0–6.9)
ERYTHROCYTE [DISTWIDTH] IN BLOOD BY AUTOMATED COUNT: 49.1 FL (ref 35.9–50)
GFR SERPLBLD CREATININE-BSD FMLA CKD-EPI: 109 ML/MIN/1.73 M 2
GLOBULIN SER CALC-MCNC: 3.5 G/DL (ref 1.9–3.5)
GLUCOSE BLD STRIP.AUTO-MCNC: 222 MG/DL (ref 65–99)
GLUCOSE SERPL-MCNC: 213 MG/DL (ref 65–99)
HCG SERPL QL: NEGATIVE
HCT VFR BLD AUTO: 38.1 % (ref 37–47)
HGB BLD-MCNC: 11.2 G/DL (ref 12–16)
HYPOCHROMIA BLD QL SMEAR: ABNORMAL
IMM GRANULOCYTES # BLD AUTO: 0.01 K/UL (ref 0–0.11)
IMM GRANULOCYTES NFR BLD AUTO: 0.2 % (ref 0–0.9)
LIPASE SERPL-CCNC: 26 U/L (ref 11–82)
LYMPHOCYTES # BLD AUTO: 2.82 K/UL (ref 1–4.8)
LYMPHOCYTES NFR BLD: 43.7 % (ref 22–41)
MAGNESIUM SERPL-MCNC: 2 MG/DL (ref 1.5–2.5)
MCH RBC QN AUTO: 23.4 PG (ref 27–33)
MCHC RBC AUTO-ENTMCNC: 29.4 G/DL (ref 32.2–35.5)
MCV RBC AUTO: 79.5 FL (ref 81.4–97.8)
MICROCYTES BLD QL SMEAR: ABNORMAL
MONOCYTES # BLD AUTO: 0.3 K/UL (ref 0–0.85)
MONOCYTES NFR BLD AUTO: 4.7 % (ref 0–13.4)
NEUTROPHILS # BLD AUTO: 3.2 K/UL (ref 1.82–7.42)
NEUTROPHILS NFR BLD: 49.5 % (ref 44–72)
NRBC # BLD AUTO: 0 K/UL
NRBC BLD-RTO: 0 /100 WBC (ref 0–0.2)
OVALOCYTES BLD QL SMEAR: NORMAL
PHOSPHATE SERPL-MCNC: 2.9 MG/DL (ref 2.5–4.5)
PLATELET # BLD AUTO: 285 K/UL (ref 164–446)
PLATELET BLD QL SMEAR: NORMAL
PMV BLD AUTO: 11.3 FL (ref 9–12.9)
POTASSIUM SERPL-SCNC: 3.8 MMOL/L (ref 3.6–5.5)
PROT SERPL-MCNC: 8 G/DL (ref 6–8.2)
RBC # BLD AUTO: 4.79 M/UL (ref 4.2–5.4)
RBC BLD AUTO: PRESENT
SODIUM SERPL-SCNC: 138 MMOL/L (ref 135–145)
T4 FREE SERPL-MCNC: 1.17 NG/DL (ref 0.93–1.7)
TROPONIN T SERPL-MCNC: <6 NG/L (ref 6–19)
TROPONIN T SERPL-MCNC: <6 NG/L (ref 6–19)
TSH SERPL DL<=0.005 MIU/L-ACNC: 15.3 UIU/ML (ref 0.38–5.33)
WBC # BLD AUTO: 6.5 K/UL (ref 4.8–10.8)

## 2025-08-12 PROCEDURE — 83735 ASSAY OF MAGNESIUM: CPT

## 2025-08-12 PROCEDURE — 84484 ASSAY OF TROPONIN QUANT: CPT

## 2025-08-12 PROCEDURE — 71275 CT ANGIOGRAPHY CHEST: CPT

## 2025-08-12 PROCEDURE — 84703 CHORIONIC GONADOTROPIN ASSAY: CPT

## 2025-08-12 PROCEDURE — 36415 COLL VENOUS BLD VENIPUNCTURE: CPT

## 2025-08-12 PROCEDURE — 82010 KETONE BODYS QUAN: CPT

## 2025-08-12 PROCEDURE — 82962 GLUCOSE BLOOD TEST: CPT

## 2025-08-12 PROCEDURE — 84439 ASSAY OF FREE THYROXINE: CPT

## 2025-08-12 PROCEDURE — 700117 HCHG RX CONTRAST REV CODE 255: Performed by: EMERGENCY MEDICINE

## 2025-08-12 PROCEDURE — 93005 ELECTROCARDIOGRAM TRACING: CPT | Mod: TC

## 2025-08-12 PROCEDURE — 93971 EXTREMITY STUDY: CPT | Mod: RT

## 2025-08-12 PROCEDURE — 93005 ELECTROCARDIOGRAM TRACING: CPT | Mod: TC | Performed by: EMERGENCY MEDICINE

## 2025-08-12 PROCEDURE — 99284 EMERGENCY DEPT VISIT MOD MDM: CPT

## 2025-08-12 PROCEDURE — 85025 COMPLETE CBC W/AUTO DIFF WBC: CPT

## 2025-08-12 PROCEDURE — 84100 ASSAY OF PHOSPHORUS: CPT

## 2025-08-12 PROCEDURE — 84443 ASSAY THYROID STIM HORMONE: CPT

## 2025-08-12 PROCEDURE — 83690 ASSAY OF LIPASE: CPT

## 2025-08-12 PROCEDURE — 80053 COMPREHEN METABOLIC PANEL: CPT

## 2025-08-12 RX ADMIN — IOHEXOL 54 ML: 350 INJECTION, SOLUTION INTRAVENOUS at 19:49

## 2025-08-12 ASSESSMENT — HEART SCORE
HISTORY: SLIGHTLY SUSPICIOUS
AGE: <45
RISK FACTORS: NO KNOWN RISK FACTORS
HEART SCORE: 0
TROPONIN: LESS THAN OR EQUAL TO NORMAL LIMIT
ECG: NORMAL

## 2025-08-12 ASSESSMENT — FIBROSIS 4 INDEX: FIB4 SCORE: 0.64

## 2025-08-28 ENCOUNTER — OFFICE VISIT (OUTPATIENT)
Dept: INTERNAL MEDICINE | Facility: OTHER | Age: 30
End: 2025-08-28
Payer: MEDICAID

## 2025-08-28 ENCOUNTER — TELEPHONE (OUTPATIENT)
Dept: ENDOCRINOLOGY | Facility: MEDICAL CENTER | Age: 30
End: 2025-08-28

## 2025-08-28 VITALS
HEIGHT: 65 IN | BODY MASS INDEX: 21.56 KG/M2 | WEIGHT: 129.4 LBS | DIASTOLIC BLOOD PRESSURE: 73 MMHG | TEMPERATURE: 98 F | HEART RATE: 71 BPM | OXYGEN SATURATION: 100 % | SYSTOLIC BLOOD PRESSURE: 109 MMHG

## 2025-08-28 DIAGNOSIS — E10.10 DKA, TYPE 1, NOT AT GOAL (HCC): ICD-10-CM

## 2025-08-28 DIAGNOSIS — E10.69 TYPE 1 DIABETES MELLITUS WITH OTHER SPECIFIED COMPLICATION (HCC): Primary | ICD-10-CM

## 2025-08-28 LAB
HBA1C MFR BLD: 10.4 % (ref ?–5.8)
POCT INT CON NEG: NEGATIVE
POCT INT CON POS: POSITIVE

## 2025-08-28 RX ORDER — INSULIN LISPRO 100 [IU]/ML
2 INJECTION, SOLUTION INTRAVENOUS; SUBCUTANEOUS EVERY 6 HOURS
Qty: 3 ML | Refills: 2 | Status: SHIPPED | OUTPATIENT
Start: 2025-08-28

## 2025-08-28 RX ORDER — INSULIN GLARGINE 100 [IU]/ML
INJECTION, SOLUTION SUBCUTANEOUS
Qty: 15 ML | Refills: 2 | Status: SHIPPED | OUTPATIENT
Start: 2025-08-28

## 2025-08-28 RX ORDER — PEN NEEDLE, DIABETIC 32GX 5/32"
NEEDLE, DISPOSABLE MISCELLANEOUS
Qty: 200 EACH | Refills: 0 | Status: SHIPPED | OUTPATIENT
Start: 2025-08-28

## 2025-08-28 RX ORDER — INSULIN LISPRO 100 [IU]/ML
1-10 INJECTION, SOLUTION INTRAVENOUS; SUBCUTANEOUS
Qty: 6 ML | Refills: 1 | Status: SHIPPED | OUTPATIENT
Start: 2025-08-28

## 2025-08-28 ASSESSMENT — FIBROSIS 4 INDEX: FIB4 SCORE: 0.6

## 2025-08-28 ASSESSMENT — ENCOUNTER SYMPTOMS
HEADACHES: 0
DIARRHEA: 0
WHEEZING: 0
BLURRED VISION: 1
HEARTBURN: 0
EYE PAIN: 0
ABDOMINAL PAIN: 0
FEVER: 0
DIZZINESS: 0
CONSTIPATION: 0
PALPITATIONS: 0
SHORTNESS OF BREATH: 0
NAUSEA: 0
VOMITING: 0
DEPRESSION: 0
PHOTOPHOBIA: 0
WEAKNESS: 0
INSOMNIA: 0
CHILLS: 0
COUGH: 0
NERVOUS/ANXIOUS: 0
SORE THROAT: 0
POLYDIPSIA: 1
DIAPHORESIS: 0

## 2025-08-29 ENCOUNTER — APPOINTMENT (OUTPATIENT)
Dept: INTERNAL MEDICINE | Facility: OTHER | Age: 30
End: 2025-08-29
Payer: MEDICAID

## 2025-08-29 ENCOUNTER — TELEPHONE (OUTPATIENT)
Dept: INTERNAL MEDICINE | Facility: OTHER | Age: 30
End: 2025-08-29

## (undated) DEVICE — SODIUM CHL IRRIGATION 0.9% 1000ML (12EA/CA)

## (undated) DEVICE — Device

## (undated) DEVICE — SUCTION INSTRUMENT YANKAUER BULBOUS TIP W/O VENT (50EA/CA)

## (undated) DEVICE — DRESSING TRANSPARENT FILM TEGADERM 2.375 X 2.75"  (100EA/BX)"

## (undated) DEVICE — ELECTRODE DUAL RETURN W/ CORD - (50/PK)

## (undated) DEVICE — KIT  I.V. START (100EA/CA)

## (undated) DEVICE — SUTURE GENERAL

## (undated) DEVICE — NEPTUNE 4 PORT MANIFOLD - (20/PK)

## (undated) DEVICE — SENSOR SPO2 NEO LNCS ADHESIVE (20/BX) SEE USER NOTES

## (undated) DEVICE — LEAD SET 6 DISP. EKG NIHON KOHDEN (100EA/CA)

## (undated) DEVICE — SLEEVE, VASO, THIGH, MED

## (undated) DEVICE — TRAY SKIN SCRUB PVP WET (20EA/CA) PART #DYND70356 DISCONTINUED

## (undated) DEVICE — TUBING CLEARLINK DUO-VENT - C-FLO (48EA/CA)

## (undated) DEVICE — DRAPE STRLE REG TOWEL 18X24 - (10/BX 4BX/CA)"

## (undated) DEVICE — SHEET THYROID - (10EA/CA)

## (undated) DEVICE — SLEEVE, XCEL 5MM TROCAR

## (undated) DEVICE — NEEDLE SHARP EZ CLEAN 2.0 CM (12EA/BX)

## (undated) DEVICE — SPONGE PEANUT - (5/PK 50PK/CA)

## (undated) DEVICE — SUTURE 3-0 VICRYL PLUS SH - 27 INCH (36/BX)

## (undated) DEVICE — SET LEADWIRE 5 LEAD BEDSIDE DISPOSABLE ECG (1SET OF 5/EA)

## (undated) DEVICE — TOWEL STOP TIMEOUT SAFETY FLAG (40EA/CA)

## (undated) DEVICE — SUTURE 3-0 VICRYL PLUS RB-1 - 8 X 18 INCH (12/BX)

## (undated) DEVICE — PROBE PRASS STAND STIMULATING (5EA/PK)

## (undated) DEVICE — GOWN SURGEONS X-LARGE - DISP. (30/CA)

## (undated) DEVICE — GLOVE BIOGEL SZ 7 SURGICAL PF LTX - (50PR/BX 4BX/CA)

## (undated) DEVICE — PROTECTOR ULNA NERVE - (36PR/CA)

## (undated) DEVICE — SHEAR HS FOCUS 9CM CVD - (6/BX)

## (undated) DEVICE — TUBE CONNECTING SUCTION - CLEAR PLASTIC STERILE 72 IN (50EA/CA)

## (undated) DEVICE — GLOVE BIOGEL SZ 8 SURGICAL PF LTX - (50PR/BX 4BX/CA)

## (undated) DEVICE — CHLORAPREP 26 ML APPLICATOR - ORANGE TINT(25/CA)

## (undated) DEVICE — BAG RETRIEVAL 10ML (10EA/BX)

## (undated) DEVICE — SUTURE 0 VICRYL PLUS CT-2 - 27 INCH (36/BX)

## (undated) DEVICE — CATHETER IV 20 GA X 1-1/4 ---SURG.& SDS ONLY--- (50EA/BX)

## (undated) DEVICE — CLIP MED LG INTNL HRZN TI ESCP - (20/BX)

## (undated) DEVICE — TROCAR LAPSCP 100MM 12MM NTHRD - (6/BX)

## (undated) DEVICE — CANISTER SUCTION 3000ML MECHANICAL FILTER AUTO SHUTOFF MEDI-VAC NONSTERILE LF DISP  (40EA/CA)

## (undated) DEVICE — SUTURE 4-0 MONOCRYL PLUS PS-1 - 27 INCH (36/BX)

## (undated) DEVICE — CANISTER SUCTION RIGID RED 1500CC (40EA/CA)

## (undated) DEVICE — TROCAR 5X100 BLADED Z-THREAD - KII (6/BX)

## (undated) DEVICE — SCISSORS 5MM CVD (6EA/BX)

## (undated) DEVICE — FIBRILLAR SURGICEL 4X4 - 10/CA

## (undated) DEVICE — SUTURE 3-0 VICRYL PLUS SH - 8X 18 INCH (12/BX)

## (undated) DEVICE — WATER IRRIGATION STERILE 1000ML (12EA/CA)

## (undated) DEVICE — ELECTRODE 850 FOAM ADHESIVE - HYDROGEL RADIOTRNSPRNT (50/PK)

## (undated) DEVICE — HEAD HOLDER JUNIOR/ADULT

## (undated) DEVICE — KIT ANESTHESIA W/CIRCUIT & 3/LT BAG W/FILTER (20EA/CA)

## (undated) DEVICE — SUTURE  5-0 SILK CV22 5 X 18 - (24PK/CA)

## (undated) DEVICE — COVER LIGHT HANDLE FLEXIBLE - SOFT (2EA/PK 80PK/CA)

## (undated) DEVICE — SUTURE 4-0 VICRYL PLUS FS-2 - 27 INCH (36/BX)

## (undated) DEVICE — MASK ANESTHESIA ADULT  - (100/CA)

## (undated) DEVICE — GLOVE BIOGEL SZ 6.5 SURGICAL PF LTX (50PR/BX 4BX/CA)

## (undated) DEVICE — DRAPE MAGNETIC (INSTRA-MAG) - (30/CA)

## (undated) DEVICE — GOWN SURGEONS LARGE - (32/CA)

## (undated) DEVICE — LACTATED RINGERS INJ 1000 ML - (14EA/CA 60CA/PF)

## (undated) DEVICE — TUBE 7MM TRIVANTAGE EMG

## (undated) DEVICE — SET SUCTION/IRRIGATION WITH DISPOSABLE TIP (6/CA )PART #0250-070-520 IS A SUB

## (undated) DEVICE — PACK MINOR BASIN - (2EA/CA)

## (undated) DEVICE — GOWN WARMING STANDARD FLEX - (30/CA)